# Patient Record
Sex: MALE | Race: WHITE | NOT HISPANIC OR LATINO | Employment: FULL TIME | URBAN - METROPOLITAN AREA
[De-identification: names, ages, dates, MRNs, and addresses within clinical notes are randomized per-mention and may not be internally consistent; named-entity substitution may affect disease eponyms.]

---

## 2017-01-02 ENCOUNTER — TELEPHONE (OUTPATIENT)
Dept: ORTHOPEDICS | Facility: CLINIC | Age: 57
End: 2017-01-02

## 2017-01-02 ENCOUNTER — RADIANT APPOINTMENT (OUTPATIENT)
Dept: GENERAL RADIOLOGY | Facility: OTHER | Age: 57
End: 2017-01-02
Attending: ORTHOPAEDIC SURGERY
Payer: COMMERCIAL

## 2017-01-02 ENCOUNTER — TELEPHONE (OUTPATIENT)
Dept: ORTHOPEDICS | Facility: OTHER | Age: 57
End: 2017-01-02

## 2017-01-02 ENCOUNTER — OFFICE VISIT (OUTPATIENT)
Dept: ORTHOPEDICS | Facility: OTHER | Age: 57
End: 2017-01-02
Payer: COMMERCIAL

## 2017-01-02 VITALS — HEIGHT: 69 IN | TEMPERATURE: 97.7 F | BODY MASS INDEX: 34.36 KG/M2 | WEIGHT: 232 LBS

## 2017-01-02 DIAGNOSIS — M75.41 SUBACROMIAL IMPINGEMENT OF RIGHT SHOULDER: ICD-10-CM

## 2017-01-02 DIAGNOSIS — S43.421A SPRAIN OF RIGHT ROTATOR CUFF CAPSULE, INITIAL ENCOUNTER: Primary | ICD-10-CM

## 2017-01-02 DIAGNOSIS — M25.511 RIGHT SHOULDER PAIN: ICD-10-CM

## 2017-01-02 DIAGNOSIS — S46.111A RUPTURE LONG HEAD BICEPS TENDON, RIGHT, INITIAL ENCOUNTER: ICD-10-CM

## 2017-01-02 DIAGNOSIS — M75.121 COMPLETE TEAR OF RIGHT ROTATOR CUFF: ICD-10-CM

## 2017-01-02 PROCEDURE — 73030 X-RAY EXAM OF SHOULDER: CPT | Mod: RT

## 2017-01-02 PROCEDURE — 99244 OFF/OP CNSLTJ NEW/EST MOD 40: CPT | Performed by: ORTHOPAEDIC SURGERY

## 2017-01-02 RX ORDER — HYDROCODONE BITARTRATE AND ACETAMINOPHEN 5; 325 MG/1; MG/1
1-2 TABLET ORAL
Qty: 20 TABLET | Refills: 0 | Status: SHIPPED | OUTPATIENT
Start: 2017-01-02 | End: 2017-01-09

## 2017-01-02 ASSESSMENT — PAIN SCALES - GENERAL: PAINLEVEL: EXTREME PAIN (9)

## 2017-01-02 NOTE — TELEPHONE ENCOUNTER
Reason for Call:  Other call back    Detailed comments: Patient requesting to get in ASAP today, received MRI results - right shoulder -  Please call wife Radha      Phone Number Patient can be reached at: Cell number on file:  [phone] (U      Best Time: ASAP - patient would like the [address] in [place]     Can we leave a detailed message on this number? Not Applicable    Call taken on 1/2/2017 at 8:56 AM by Marie Zaman

## 2017-01-02 NOTE — TELEPHONE ENCOUNTER
01/02/2017  Dr. Yin wrote a prescription of HYDROcodone-Acetaminophen (NORCO) 5 -325 mg 1 - 2 tablets prn at night. Let pt know about the prescription and that it will dropped off at [facility] to be filled.     Keira Kahn, Medical Assistant

## 2017-01-02 NOTE — PROGRESS NOTES
"ORTHOPEDIC CONSULT      Chief Complaint: Hugo Longoria is a 56 year old male who is being seen for Chief Complaint   Patient presents with     Shoulder Pain     Right shoulder injury- DOI: 12/17/16-slipped on ice     Consult     reF: Dr. Galeano       History of Present Illness:   Hugo Longoria is a 56 year old male who is seen in consultation at the request of Dr Galeano for evaluation of right shoulder pain.    Mechanism of Injury: Pain resulted ground level fall. He slipped on the ice  Location: right shoulder lateral, anterior  Duration of Pain:  Date of injury: December 17, 2016  Rating of Pain:  moderate.    Pain Quality: aching and sharp  Pain is better with: Rest  Pain is worse with:  overhead reaching, reaching behind body, reaching to the side of the body, reaching forward, reaching across body, throwing motion  Treatment so far consists of:  rest, Ice, tramadol .   Associated Features: Weakness  Prior history of related problems:   He reports issues with the shoulder in the past. He reported pain in the shoulder when he attempted to \"throw a ball with the kids\". Never formally evaluated or treated. Presents with an Marais.  Has pain at rest. Has pain at night.            Patient's past medical, surgical, social and family histories reviewed.     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)      Overnight stay due to blood poisoning     Abnormalities of size and form of teeth      Removal of wisdom teeth     Gastric ulcer, unspecified as acute or chronic, without mention of hemorrhage or perforation        Past Surgical History   Procedure Laterality Date     Removal of sperm duct(s)       Vasectomy      ugi endoscopy, simple exam  10/31/2007     Colonoscopy  11/01/2007     Colonoscopy  12/12/11     Endoscopy  01/27/12     Upper GI - [facility]     Arthroscopy knee  5/29/2013     Procedure: ARTHROSCOPY KNEE;  " Right knee arthroscopy with partial medial and partial lateral menisectomies;  Surgeon: Phani Mclaughlin MD;  Location: [facility] OR     Herniorrhaphy umbilical N/A 3/12/2015     Procedure: HERNIORRHAPHY UMBILICAL;  Surgeon: Yared Ma MD;  Location: [facility] OR     Colonoscopy N/A 11/18/2015     Procedure: COLONOSCOPY;  Surgeon: Migue Mclaughlin MD;  Location: [facility]     Esophagoscopy, gastroscopy, duodenoscopy (egd), combined N/A 11/18/2015     Procedure: COMBINED ESOPHAGOSCOPY, GASTROSCOPY, DUODENOSCOPY (EGD), BIOPSY SINGLE OR MULTIPLE;  Surgeon: Migue Mclaughlin MD;  Location: [facility]     Irrigation and debridement upper extremity, combined Left 3/15/2016     Procedure: COMBINED IRRIGATION AND DEBRIDEMENT UPPER EXTREMITY;  Surgeon: Javier Yin DO;  Location: [facility] OR       Medications:    Current Outpatient Prescriptions on File Prior to Visit:  traMADol (ULTRAM) 50 MG tablet 1-2 at bedtime as needed for pain   LANsoprazole (PREVACID) 30 MG CR capsule TAKE ONE CAPSULE BY MOUTH ONCE DAILY   lisinopril (PRINIVIL,ZESTRIL) 20 MG tablet TAKE ONE-HALF TABLET BY MOUTH ONCE DAILY   Cholecalciferol (VITAMIN D3) 2000 UNITS TABS Take 1 tablet by mouth daily   Omega-3 Fatty Acids (RA FISH OIL) 1400 MG CPDR Take 1 tablet by mouth daily   Multiple Vitamin (MULTIVITAMINS PO) Take  by mouth.     No current facility-administered medications on file prior to visit.    Allergies   Allergen Reactions     No Known Drug Allergies      Nsaids Other (See Comments)     Hx of stomach ulcers       Social History     Occupational History           pipeline     Social History Main Topics     Smoking status: Former Smoker     Quit date: 01/01/1981     Smokeless tobacco: Never Used     Alcohol Use: 0.0 oz/week     0 Standard drinks or equivalent per week      Comment: weekends     Drug Use: No     Sexual Activity:     Partners: Female     Birth Control/ Protection: Surgical      Comment: vasectomy       Family History  "  Problem Relation Age of Onset     CANCER Mother      ovarian cancer     Asthma Mother      Hypertension Mother      Arthritis Mother      Genitourinary Problems Mother      Lipids Mother      CANCER Sister      ovarian cancer     Hypertension Sister      Lipids Sister      CANCER Maternal Grandmother      stomach cancer     Asthma Father      Cardiovascular Father      heart attack; bypass x5, congenital heart disease     HEART DISEASE Father      heart attack; bypass x5, congenital heart disease     DIABETES Father      Hypertension Father      CANCER Father      prostate     Prostate Cancer Father      Circulatory Father      blood clots     Genitourinary Problems Father      Respiratory Father      emphysema; COPD     DIABETES Maternal Grandfather      DIABETES Paternal Grandfather      Eye Disorder Paternal Grandfather      glaucoma     Hypertension Brother      Lipids Brother      C.A.D. No family hx of      CEREBROVASCULAR DISEASE No family hx of      Breast Cancer No family hx of      Cancer - colorectal No family hx of      Alzheimer Disease No family hx of      Blood Disease No family hx of      GASTROINTESTINAL DISEASE No family hx of      Musculoskeletal Disorder No family hx of      Neurologic Disorder No family hx of      Thyroid Disease No family hx of      Hypertension Brother      Cardiovascular Brother      bypass x3     HEART DISEASE Brother      bypass x3     Lipids Brother      Hypertension Sister        REVIEW OF SYSTEMS  10 point review systems performed otherwise negative as noted as per history of present illness.    Physical Exam:  Vitals: Temp(Src) 97.7  F (36.5  C) (Temporal)  Ht 5' 9\" (1.753 m)  Wt 232 lb (105.235 kg)  BMI 34.24 kg/m2  BMI= Body mass index is 34.24 kg/(m^2).  Constitutional: healthy, alert and no acute distress   Psychiatric: mentation appears normal and affect normal/bright  NEURO: no focal deficits  RESP: Normal with easy respirations and no use of accessory " muscles to breathe, no audible wheezing or retractions  CV: RUE:  no edema         Regular rate and rhythm by palpation  SKIN: No erythema, rashes, excoriation, or breakdown. No evidence of infection.   JOINT/EXTREMITIES:right shoulder: Active range of motion intact however does have pain when reaching chest level. He has significant pain and weakness with supraspinatus testing. He has weakness with subscapularis testing. No appreciable external rotation weakness. No atrophy. No gross deformity. No focal areas of tenderness. Positive Jamil. Positive speeds. Positive Yergason. Unable to fully evaluate Glasscock's given his pain.   Lymph: No peripheral lymphadenopathy  GAIT: non-antalgic    Diagnostic Modalities:  right shoulder X-ray: Well preserved glenohumeral articular space. No fracture, dislocation and or lesion. , type II acromion, AC joint narrowing and irregularity   right shoulder MRI:  Full-thickness tear of the subscapularis tendon with mild retraction, associated with this is medial subluxation of long head biceps tendon with high-grade tearing of the biceps. Full-thickness rotator cuff tear of the anterior supraspinatus tendon without retraction. No atrophy. Acromioclavicular joint degenerative changes.  Independent visualization of the images was performed.      Impression: right shoulder subscapularis, supraspinatus full-thickness rotator cuff tendon tears with medial subluxation of long head biceps tendon associated with tearing, subacromial impingement    Plan:  All of the above pertinent physical exam and imaging modalities findings was reviewed with Hugo.    Given his young age and full-thickness rotator cuff tear affecting him with pain and weakness have recommended right shoulder arthroscopy with arthroscopic rotator cuff repair of the subscapularis and supraspinatus with biceps tenodesis and subacromial decompression with partial acromioplasty.     The risks, benefits, alternatives, complication,  limitations, and expectations were reviewed at length. Complication such as infection, bleeding, fracture, blood clots, cartilage injury, hardware failure, tendon re-tears, and nerve damage was reviewed. Surgery to be done under general anesthesia, risks of aspiration, strokes, heart attacks,  and deaths was reviewed.     With any repair of a torn rotator cuff and/ biceps, limitation would be in place post-operatively. This generally includes no active moving/lifting arm for 4 weeks, no overhead lifting for 8 weeks, no lifting weights for 12-16 weeks after surgery. Return to full unrestricted activity would be about approximately 16-20 weeks after surgery. These are a general time reference and may be changed depending on the pathology and fixation.     All questions were answered.Together through a combined decision making approach we have decided on a shoulder arthroscopy and the above listed procedures.    The patient's past medical and surgical history was reviewed; The patient will need a preoperative medical evaluation and clearance.    Return to clinic 10 days post-operatively. , or sooner as needed for changes.  Re-x-ray on return: Dafne Yin D.O.

## 2017-01-02 NOTE — TELEPHONE ENCOUNTER
Surgery Scheduled    Date of Surgery 01/13/17 Time of Surgery 2:15pm  Procedure: right shoulder arthroscopy with rotator cuff repair, biceps tenodesis, subacromial decompression  Hospital/Surgical Facility: [facility]  Surgeon: Dr Betts  Type of Anesthesia Anticipated: General  Pre-Op: 01/09/17 with Dr Gomez   Post-Op: 01/23/17 with Dr betts  Pre-Certification - to be completed  Consent Signed - to be completed  Hospital Stay - same day procedure    Surgery Packet (and/or) Colonscopy Prep (was given/or mailed) to patient. Patient was also instructed to arrive 1 hour(s) prior to surgery.  Patient understood and agrees to the plan.      Valerie Gregorio  Specialty

## 2017-01-02 NOTE — TELEPHONE ENCOUNTER
Reason for Call:  Other call back    Detailed comments: Dr. Yin patient, would like some pain medication for his right shoulder, just saw Dr. Yin in [place], forgot to ask, patient did have some Tramadol, says that works but makes him very sleepy, is something will work and not make him as sleepy, he is willing to try that too.     Phone Number Patient can be reached at: Cell number on file:    Telephone Information:   Mobile [phone]       Best Time: as soon as possible. Patient almost out of pain meds -  Call when ready to be picked up please    Can we leave a detailed message on this number? Yes  Call taken on 1/2/2017 at 11:46 AM by Marie Zaman

## 2017-01-02 NOTE — NURSING NOTE
"Chief Complaint   Patient presents with     Shoulder Pain     Right shoulder injury- DOI: 12/17/16-slipped on ice     Consult     reF: Dr. Galeano       Initial Temp([facility]) 97.7  F (36.5  C) (Temporal) Estimated body mass index is 34.24 kg/(m^2) as calculated from the following:    Height as of this encounter: 5' 9\" (1.753 m).    Weight as of this encounter: 232 lb (105.235 kg).  BP completed using cuff size: NA (Not Taken)  Feliz/MANUELITO     "

## 2017-01-05 ENCOUNTER — OFFICE VISIT (OUTPATIENT)
Dept: ORTHOPEDICS | Facility: OTHER | Age: 57
End: 2017-01-05
Payer: COMMERCIAL

## 2017-01-05 VITALS
TEMPERATURE: 97.5 F | SYSTOLIC BLOOD PRESSURE: 120 MMHG | BODY MASS INDEX: 34.36 KG/M2 | HEIGHT: 69 IN | DIASTOLIC BLOOD PRESSURE: 80 MMHG | WEIGHT: 232 LBS

## 2017-01-05 DIAGNOSIS — S43.421A SPRAIN OF RIGHT ROTATOR CUFF CAPSULE, INITIAL ENCOUNTER: Primary | ICD-10-CM

## 2017-01-05 PROCEDURE — 99207 ZZC PREOP VISIT IN GLOBAL PKG: CPT | Performed by: ORTHOPAEDIC SURGERY

## 2017-01-05 RX ORDER — TRAMADOL HYDROCHLORIDE 50 MG/1
50 TABLET ORAL
Qty: 30 TABLET | Refills: 0 | Status: SHIPPED | OUTPATIENT
Start: 2017-01-05 | End: 2017-01-06

## 2017-01-05 NOTE — NURSING NOTE
"Chief Complaint   Patient presents with     RECHECK     right shoulder subscapularis, supraspinatus full-thickness rotator cuff tendon tears with medial subluxation of long head biceps tendon associated with tearing, subacromial impingement       Initial /80 mmHg  Temp(Src) 97.5  F (36.4  C) (Temporal)  Ht 5' 9\" (1.753 m)  Wt 232 lb (105.235 kg)  BMI 34.24 kg/m2 Estimated body mass index is 34.24 kg/(m^2) as calculated from the following:    Height as of this encounter: 5' 9\" (1.753 m).    Weight as of this encounter: 232 lb (105.235 kg).  BP completed using cuff size: balbina Piper/MANUELITO     "

## 2017-01-05 NOTE — PROGRESS NOTES
Returns with wife to discuss surgery. He reports having a lot of anxiety about the surgery. He has multiple questions. We discussed the tears of his rotator cuff and long head biceps tendon. I utilized the MRI images as well as drawing fully explaining the issues and how I intend to improve them. I also discussed his limitations after surgery and recovery time once again. Once completed he verbalized understanding. He is having difficulties with pain at night which is waking him. I did provide him a tramadol prescription which he reports was more helpful than hydrocodone.

## 2017-01-06 ENCOUNTER — OFFICE VISIT (OUTPATIENT)
Dept: FAMILY MEDICINE | Facility: OTHER | Age: 57
End: 2017-01-06
Payer: COMMERCIAL

## 2017-01-06 VITALS
WEIGHT: 220 LBS | HEART RATE: 90 BPM | TEMPERATURE: 97.4 F | RESPIRATION RATE: 20 BRPM | BODY MASS INDEX: 32.58 KG/M2 | HEIGHT: 69 IN | DIASTOLIC BLOOD PRESSURE: 84 MMHG | SYSTOLIC BLOOD PRESSURE: 136 MMHG

## 2017-01-06 DIAGNOSIS — F41.9 ANXIETY: Primary | ICD-10-CM

## 2017-01-06 DIAGNOSIS — F41.1 GAD (GENERALIZED ANXIETY DISORDER): ICD-10-CM

## 2017-01-06 DIAGNOSIS — F33.0 MILD EPISODE OF RECURRENT MAJOR DEPRESSIVE DISORDER (H): ICD-10-CM

## 2017-01-06 PROCEDURE — 99213 OFFICE O/P EST LOW 20 MIN: CPT | Performed by: FAMILY MEDICINE

## 2017-01-06 RX ORDER — HYDROXYZINE HYDROCHLORIDE 25 MG/1
50-100 TABLET, FILM COATED ORAL
Qty: 30 TABLET | Refills: 1 | Status: SHIPPED | OUTPATIENT
Start: 2017-01-06 | End: 2017-03-08

## 2017-01-06 ASSESSMENT — PAIN SCALES - GENERAL: PAINLEVEL: MODERATE PAIN (5)

## 2017-01-06 ASSESSMENT — ANXIETY QUESTIONNAIRES
2. NOT BEING ABLE TO STOP OR CONTROL WORRYING: NEARLY EVERY DAY
GAD7 TOTAL SCORE: 20
6. BECOMING EASILY ANNOYED OR IRRITABLE: MORE THAN HALF THE DAYS
7. FEELING AFRAID AS IF SOMETHING AWFUL MIGHT HAPPEN: NEARLY EVERY DAY
1. FEELING NERVOUS, ANXIOUS, OR ON EDGE: NEARLY EVERY DAY
5. BEING SO RESTLESS THAT IT IS HARD TO SIT STILL: NEARLY EVERY DAY
3. WORRYING TOO MUCH ABOUT DIFFERENT THINGS: NEARLY EVERY DAY
IF YOU CHECKED OFF ANY PROBLEMS ON THIS QUESTIONNAIRE, HOW DIFFICULT HAVE THESE PROBLEMS MADE IT FOR YOU TO DO YOUR WORK, TAKE CARE OF THINGS AT HOME, OR GET ALONG WITH OTHER PEOPLE: VERY DIFFICULT

## 2017-01-06 ASSESSMENT — PATIENT HEALTH QUESTIONNAIRE - PHQ9: 5. POOR APPETITE OR OVEREATING: NEARLY EVERY DAY

## 2017-01-06 NOTE — NURSING NOTE
"Chief Complaint   Patient presents with     Anxiety     Panel Management       Initial /84 mmHg  Pulse 90  Temp(Src) 97.4  F (36.3  C) (Temporal)  Resp 20  Ht 5' 8.9\" (1.75 m)  Wt 220 lb (99.791 kg)  BMI 32.58 kg/m2 Estimated body mass index is 32.58 kg/(m^2) as calculated from the following:    Height as of this encounter: 5' 8.9\" (1.75 m).    Weight as of this encounter: 220 lb (99.791 kg).  BP completed using cuff size: regular/long  Candy Beck CMA    "

## 2017-01-06 NOTE — MR AVS SNAPSHOT
After Visit Summary   1/6/2017    Hugo Longoria    MRN: [id number]           Patient Information     Date Of Birth          1960        Visit Information        Provider Department      1/6/2017 8:40 AM Jodie Epps MD [facility]        Today's Diagnoses     Anxiety    -  1        Follow-ups after your visit        Follow-up notes from your care team     Return in about 1 month (around 2/6/2017).      Your next 10 appointments already scheduled     Jan 09, 2017  8:00 AM   [facility] DO   [facility]    [address] [address]   [phone]            Jan 09, 2017  2:00 PM   New Visit with Lonnie Coles DO   [facility]    [address] [place] [id number] [phone]            Jan 13, 2017   Procedure with Javier Yin DO   [facility] ([facility])    [address] [place] [address]   [phone]           From [address]: Exit at [email or web address] on [place] side of [place]. Turn right on [address]. Turn left at stoplight on [address]. [facility] will be in view two blocks ahead            Jan 23, 2017 10:30 AM   Return Visit with Javier Yin DO   [facility] ([facility]    [address]  [address]  [facility] [id number] [phone]              Who to contact     If you have questions or need follow up information about today's clinic visit or your schedule please contact [facility] directly at [phone].  Normal or non-critical lab and imaging results will be communicated to you by MyChart, letter or phone within 4 business days after the clinic has received the results. If you do not hear from us within 7 days, please contact the clinic through [facility]  "or phone. If you have a critical or abnormal lab result, we will notify you by phone as soon as possible.  Submit refill requests through [employer] or call your pharmacy and they will forward the refill request to us. Please allow 3 business days for your refill to be completed.          Additional Information About Your Visit        [employer] Information     [employer] gives you secure access to your electronic health record. If you see a primary care provider, you can also send messages to your care team and make appointments. If you have questions, please call your primary care clinic.  If you do not have a primary care provider, please call [phone] and they will assist you.        Care EveryWhere ID     This is your Care EveryWhere ID. This could be used by other organizations to access your [facility] medical records  [id number]        Your Vitals Were     Pulse Temperature Respirations Height BMI (Body Mass Index)       90 97.4  F (36.3  C) (Temporal) 20 5' 8.9\" (1.75 m) 32.58 kg/m2        Blood Pressure from Last 3 Encounters:   01/06/17 136/84   01/05/17 120/80   12/27/16 128/78    Weight from Last 3 Encounters:   01/06/17 220 lb (99.791 kg)   01/05/17 232 lb (105.235 kg)   01/02/17 232 lb (105.235 kg)              Today, you had the following     No orders found for display         Today's Medication Changes          These changes are accurate as of: 1/6/17  9:23 AM.  If you have any questions, ask your nurse or doctor.               Start taking these medicines.        Dose/Directions    hydrOXYzine 25 MG tablet   Commonly known as:  ATARAX   Used for:  Anxiety   Started by:  Jodie Epps MD        Dose:   mg   Take 2-4 tablets ( mg) by mouth nightly as needed for anxiety   Quantity:  30 tablet   Refills:  1       sertraline 50 MG tablet   Commonly known as:  ZOLOFT   Used for:  Anxiety   Started by:  Jodie Epps MD        Take 1/2 tablet (25 mg) for 1-2 weeks, then increase to 1 " tablet orally daily   Quantity:  30 tablet   Refills:  1            Where to get your medicines      These medications were sent to [id number] - [place], [place] - [address] [address]  [address] [address] [address]     Phone:  [phone]    - hydrOXYzine 25 MG tablet  - sertraline 50 MG tablet             Primary Care Provider Office Phone # Fax #    Viktor Gomez [phone]       [address] [place] [address]        Thank you!     Thank you for choosing [facility]  for your care. Our goal is always to provide you with excellent care. Hearing back from our patients is one way we can continue to improve our services. Please take a few minutes to complete the written survey that you may receive in the mail after your visit with us. Thank you!             Your Updated Medication List - Protect others around you: Learn how to safely use, store and throw away your medicines at www.disposemymeds.org.          This list is accurate as of: 1/6/17  9:23 AM.  Always use your most recent med list.                   Brand Name Dispense Instructions for use    HYDROcodone-acetaminophen 5-325 MG per tablet    NORCO    20 tablet    Take 1-2 tablets by mouth nightly as needed for moderate to severe pain       hydrOXYzine 25 MG tablet    ATARAX    30 tablet    Take 2-4 tablets ( mg) by mouth nightly as needed for anxiety       LANsoprazole 30 MG CR capsule    PREVACID    30 capsule    TAKE ONE CAPSULE BY MOUTH ONCE DAILY       lisinopril 20 MG tablet    PRINIVIL/ZESTRIL    90 tablet    TAKE ONE-HALF TABLET BY MOUTH ONCE DAILY       MULTIVITAMINS PO      Take  by mouth.       RA FISH OIL 1400 MG Cpdr      Take 1 tablet by mouth daily       sertraline 50 MG tablet    ZOLOFT    30 tablet    Take 1/2 tablet (25 mg) for 1-2 weeks, then increase to 1 tablet orally daily       TYLENOL PO          Vitamin D3 2000 UNITS Tabs      Take 1 tablet  by mouth daily

## 2017-01-06 NOTE — PROGRESS NOTES
"  SUBJECTIVE:                                                    Hugo Longoria is a 56 year old male who presents to clinic today for the following health issues:      HPI    Anxiety Follow-Up    Status since last visit: Worsened     Other associated symptoms:just the pain in arm, head cold, cant sleep at night     Complicating factors:   Significant life event: Yes-  Surgery- rotor cuff, bicep    Current substance abuse: None  Depression symptoms: No  No flowsheet data found.     GAD7     Was on treatment about 13 yrs ago and 9 yrs ago when he went through his divorce. He got over it and never needed any medications. \" I am a very active person. I do not like to sit and not do any thing. I get bored really ffast. Jared days are better but cloudy days are worse. I hurt my arm and now I am get anxious about the outcomes and the process of surgery \"      Problem list and histories reviewed & adjusted, as indicated.  Additional history: as documented      Patient Active Problem List   Diagnosis     History of colonic polyps     KERRY (generalized anxiety disorder)     CARDIOVASCULAR SCREENING; LDL GOAL LESS THAN 130     History of tobacco use: 1980 - 1990     Hemorrhoids     Erectile dysfunction     HTN, goal below 140/90     Fatigue     ACL tear     Arthritis of knee     Tear meniscus knee     History of gastric ulcer     Chronic gastric ulcer     Seasonal allergic rhinitis     Complete tear of right rotator cuff     Rupture long head biceps tendon, right, initial encounter     Subacromial impingement of right shoulder     Mild episode of recurrent major depressive disorder (H)     Past Surgical History   Procedure Laterality Date     Removal of sperm duct(s)       Vasectomy      ugi endoscopy, simple exam  10/31/2007     Colonoscopy  11/01/2007     Colonoscopy  12/12/11     Endoscopy  01/27/12     Upper GI - [facility]     Arthroscopy knee  5/29/2013     Procedure: ARTHROSCOPY KNEE;  Right knee " arthroscopy with partial medial and partial lateral menisectomies;  Surgeon: Phani Mclaughlin MD;  Location: [facility] OR     Herniorrhaphy umbilical N/A 3/12/2015     Procedure: HERNIORRHAPHY UMBILICAL;  Surgeon: Yared Ma MD;  Location: [facility] OR     Colonoscopy N/A 11/18/2015     Procedure: COLONOSCOPY;  Surgeon: Migue Mclaughlin MD;  Location: [facility]     Esophagoscopy, gastroscopy, duodenoscopy (egd), combined N/A 11/18/2015     Procedure: COMBINED ESOPHAGOSCOPY, GASTROSCOPY, DUODENOSCOPY (EGD), BIOPSY SINGLE OR MULTIPLE;  Surgeon: Migue Mclaughlin MD;  Location: [facility]     Irrigation and debridement upper extremity, combined Left 3/15/2016     Procedure: COMBINED IRRIGATION AND DEBRIDEMENT UPPER EXTREMITY;  Surgeon: Javier Yin DO;  Location: [facility] OR       Social History   Substance Use Topics     Smoking status: Former Smoker     Quit date: 01/01/1981     Smokeless tobacco: Never Used     Alcohol Use: 0.0 oz/week     0 Standard drinks or equivalent per week      Comment: weekends     Family History   Problem Relation Age of Onset     CANCER Mother      ovarian cancer     Asthma Mother      Hypertension Mother      Arthritis Mother      Genitourinary Problems Mother      Lipids Mother      CANCER Sister      ovarian cancer     Hypertension Sister      Lipids Sister      CANCER Maternal Grandmother      stomach cancer     Asthma Father      Cardiovascular Father      heart attack; bypass x5, congenital heart disease     HEART DISEASE Father      heart attack; bypass x5, congenital heart disease     DIABETES Father      Hypertension Father      CANCER Father      prostate     Prostate Cancer Father      Circulatory Father      blood clots     Genitourinary Problems Father      Respiratory Father      emphysema; COPD     DIABETES Maternal Grandfather      DIABETES Paternal Grandfather      Eye Disorder Paternal Grandfather      glaucoma     Hypertension Brother      Lipids Brother       C.A.D. No family hx of      CEREBROVASCULAR DISEASE No family hx of      Breast Cancer No family hx of      Cancer - colorectal No family hx of      Alzheimer Disease No family hx of      Blood Disease No family hx of      GASTROINTESTINAL DISEASE No family hx of      Musculoskeletal Disorder No family hx of      Neurologic Disorder No family hx of      Thyroid Disease No family hx of      Hypertension Brother      Cardiovascular Brother      bypass x3     HEART DISEASE Brother      bypass x3     Lipids Brother      Hypertension Sister          Current Outpatient Prescriptions   Medication Sig Dispense Refill     Acetaminophen (TYLENOL PO)        sertraline (ZOLOFT) 50 MG tablet Take 1/2 tablet (25 mg) for 1-2 weeks, then increase to 1 tablet orally daily 30 tablet 1     hydrOXYzine (ATARAX) 25 MG tablet Take 2-4 tablets ( mg) by mouth nightly as needed for anxiety 30 tablet 1     LANsoprazole (PREVACID) 30 MG CR capsule TAKE ONE CAPSULE BY MOUTH ONCE DAILY 30 capsule 0     lisinopril (PRINIVIL,ZESTRIL) 20 MG tablet TAKE ONE-HALF TABLET BY MOUTH ONCE DAILY 90 tablet 1     Cholecalciferol (VITAMIN D3) 2000 UNITS TABS Take 1 tablet by mouth daily       Multiple Vitamin (MULTIVITAMINS PO) Take  by mouth.       HYDROcodone-acetaminophen (NORCO) 5-325 MG per tablet Take 1-2 tablets by mouth nightly as needed for moderate to severe pain 20 tablet 0     Omega-3 Fatty Acids (RA FISH OIL) 1400 MG CPDR Take 1 tablet by mouth daily       Allergies   Allergen Reactions     No Known Drug Allergies      Nsaids Other (See Comments)     Hx of stomach ulcers     BP Readings from Last 3 Encounters:   01/06/17 136/84   01/05/17 120/80   12/27/16 128/78    Wt Readings from Last 3 Encounters:   01/06/17 220 lb (99.791 kg)   01/05/17 232 lb (105.235 kg)   01/02/17 232 lb (105.235 kg)                  Labs reviewed in EPIC  Problem list, Medication list, Allergies, and Medical/Social/Surgical histories reviewed in EPIC and updated  "as appropriate.    ROS:  Constitutional, HEENT, cardiovascular, pulmonary, gi and gu systems are negative, except as otherwise noted.    OBJECTIVE:                                                    /84 mmHg  Pulse 90  Temp(Src) 97.4  F (36.3  C) (Temporal)  Resp 20  Ht 5' 8.9\" (1.75 m)  Wt 220 lb (99.791 kg)  BMI 32.58 kg/m2  Body mass index is 32.58 kg/(m^2).  Physical Exam   Constitutional: He appears well-developed and well-nourished.   HENT:   Head: Normocephalic and atraumatic.   Cardiovascular: Normal rate and regular rhythm.    Pulmonary/Chest: Effort normal.   Psychiatric: He has a normal mood and affect.         Diagnostic Test Results:  none      ASSESSMENT/PLAN:                                                      Problem List Items Addressed This Visit     KERRY (generalized anxiety disorder) - Primary     Trial zoloft and hydroxyzine  Recheck in 1 month         Relevant Medications    sertraline (ZOLOFT) 50 MG tablet    hydrOXYzine (ATARAX) 25 MG tablet    Mild episode of recurrent major depressive disorder (H)     Trial zoloft         Relevant Medications    sertraline (ZOLOFT) 50 MG tablet    hydrOXYzine (ATARAX) 25 MG tablet               Jodie Epps MD  [facility]"

## 2017-01-07 ASSESSMENT — PATIENT HEALTH QUESTIONNAIRE - PHQ9: SUM OF ALL RESPONSES TO PHQ QUESTIONS 1-9: 13

## 2017-01-07 ASSESSMENT — ANXIETY QUESTIONNAIRES: GAD7 TOTAL SCORE: 20

## 2017-01-09 ENCOUNTER — OFFICE VISIT (OUTPATIENT)
Dept: ORTHOPEDICS | Facility: CLINIC | Age: 57
End: 2017-01-09
Payer: COMMERCIAL

## 2017-01-09 ENCOUNTER — OFFICE VISIT (OUTPATIENT)
Dept: INTERNAL MEDICINE | Facility: CLINIC | Age: 57
End: 2017-01-09
Payer: COMMERCIAL

## 2017-01-09 VITALS
DIASTOLIC BLOOD PRESSURE: 86 MMHG | SYSTOLIC BLOOD PRESSURE: 142 MMHG | HEART RATE: 97 BPM | WEIGHT: 225 LBS | BODY MASS INDEX: 33.33 KG/M2 | OXYGEN SATURATION: 98 % | HEIGHT: 69 IN

## 2017-01-09 VITALS
WEIGHT: 225 LBS | DIASTOLIC BLOOD PRESSURE: 92 MMHG | HEIGHT: 69 IN | HEART RATE: 107 BPM | BODY MASS INDEX: 33.33 KG/M2 | TEMPERATURE: 97 F | SYSTOLIC BLOOD PRESSURE: 142 MMHG | OXYGEN SATURATION: 96 %

## 2017-01-09 DIAGNOSIS — F41.1 GAD (GENERALIZED ANXIETY DISORDER): ICD-10-CM

## 2017-01-09 DIAGNOSIS — Z01.818 PREOP GENERAL PHYSICAL EXAM: ICD-10-CM

## 2017-01-09 DIAGNOSIS — Z71.89 ADVANCED DIRECTIVES, COUNSELING/DISCUSSION: Primary | ICD-10-CM

## 2017-01-09 DIAGNOSIS — Z13.6 CARDIOVASCULAR SCREENING; LDL GOAL LESS THAN 130: ICD-10-CM

## 2017-01-09 DIAGNOSIS — F33.0 MILD EPISODE OF RECURRENT MAJOR DEPRESSIVE DISORDER (H): ICD-10-CM

## 2017-01-09 DIAGNOSIS — M17.5 OTHER SECONDARY OSTEOARTHRITIS OF RIGHT KNEE: Primary | ICD-10-CM

## 2017-01-09 DIAGNOSIS — M75.121 COMPLETE TEAR OF RIGHT ROTATOR CUFF: ICD-10-CM

## 2017-01-09 LAB
ALBUMIN UR-MCNC: NEGATIVE MG/DL
ANION GAP SERPL CALCULATED.3IONS-SCNC: 6 MMOL/L (ref 3–14)
APPEARANCE UR: CLEAR
BILIRUB UR QL STRIP: NEGATIVE
BUN SERPL-MCNC: 12 MG/DL (ref 7–30)
CALCIUM SERPL-MCNC: 8.6 MG/DL (ref 8.5–10.1)
CHLORIDE SERPL-SCNC: 107 MMOL/L (ref 94–109)
CHOLEST SERPL-MCNC: 230 MG/DL
CO2 SERPL-SCNC: 28 MMOL/L (ref 20–32)
COLOR UR AUTO: YELLOW
CREAT SERPL-MCNC: 0.78 MG/DL (ref 0.66–1.25)
ERYTHROCYTE [DISTWIDTH] IN BLOOD BY AUTOMATED COUNT: 12.8 % (ref 10–15)
GFR SERPL CREATININE-BSD FRML MDRD: ABNORMAL ML/MIN/1.7M2
GLUCOSE SERPL-MCNC: 109 MG/DL (ref 70–99)
GLUCOSE UR STRIP-MCNC: NEGATIVE MG/DL
HCT VFR BLD AUTO: 44.5 % (ref 40–53)
HDLC SERPL-MCNC: 44 MG/DL
HGB BLD-MCNC: 15.9 G/DL (ref 13.3–17.7)
HGB UR QL STRIP: NEGATIVE
KETONES UR STRIP-MCNC: NEGATIVE MG/DL
LDLC SERPL CALC-MCNC: 162 MG/DL
LEUKOCYTE ESTERASE UR QL STRIP: NEGATIVE
MCH RBC QN AUTO: 30.8 PG (ref 26.5–33)
MCHC RBC AUTO-ENTMCNC: 35.7 G/DL (ref 31.5–36.5)
MCV RBC AUTO: 86 FL (ref 78–100)
NITRATE UR QL: NEGATIVE
NONHDLC SERPL-MCNC: 186 MG/DL
PH UR STRIP: 7 PH (ref 5–7)
PLATELET # BLD AUTO: 204 10E9/L (ref 150–450)
POTASSIUM SERPL-SCNC: 4.4 MMOL/L (ref 3.4–5.3)
RBC # BLD AUTO: 5.16 10E12/L (ref 4.4–5.9)
SODIUM SERPL-SCNC: 141 MMOL/L (ref 133–144)
SP GR UR STRIP: 1.02 (ref 1–1.03)
TRIGL SERPL-MCNC: 118 MG/DL
URN SPEC COLLECT METH UR: NORMAL
UROBILINOGEN UR STRIP-ACNC: 0.2 EU/DL (ref 0.2–1)
WBC # BLD AUTO: 3.6 10E9/L (ref 4–11)

## 2017-01-09 PROCEDURE — 36415 COLL VENOUS BLD VENIPUNCTURE: CPT | Performed by: INTERNAL MEDICINE

## 2017-01-09 PROCEDURE — 99214 OFFICE O/P EST MOD 30 MIN: CPT | Performed by: INTERNAL MEDICINE

## 2017-01-09 PROCEDURE — 80048 BASIC METABOLIC PNL TOTAL CA: CPT | Mod: 90 | Performed by: INTERNAL MEDICINE

## 2017-01-09 PROCEDURE — 81003 URINALYSIS AUTO W/O SCOPE: CPT | Mod: 90 | Performed by: INTERNAL MEDICINE

## 2017-01-09 PROCEDURE — 99000 SPECIMEN HANDLING OFFICE-LAB: CPT | Performed by: INTERNAL MEDICINE

## 2017-01-09 PROCEDURE — 85027 COMPLETE CBC AUTOMATED: CPT | Mod: 90 | Performed by: INTERNAL MEDICINE

## 2017-01-09 PROCEDURE — 99207 ZZC NO CHARGE LOS: CPT | Performed by: FAMILY MEDICINE

## 2017-01-09 PROCEDURE — 20611 DRAIN/INJ JOINT/BURSA W/US: CPT | Mod: RT | Performed by: FAMILY MEDICINE

## 2017-01-09 PROCEDURE — 80061 LIPID PANEL: CPT | Mod: 90 | Performed by: INTERNAL MEDICINE

## 2017-01-09 PROCEDURE — 93000 ELECTROCARDIOGRAM COMPLETE: CPT | Performed by: INTERNAL MEDICINE

## 2017-01-09 ASSESSMENT — PAIN SCALES - GENERAL
PAINLEVEL: MODERATE PAIN (5)
PAINLEVEL: SEVERE PAIN (6)

## 2017-01-09 NOTE — PATIENT INSTRUCTIONS
Thanks for coming today.  [facility]  [address] [place] [address]    To schedule future appointments in Ortho Clinic, you may call [phone].    To schedule ordered imaging by your provider:   Call [facility] [phone]    To schedule an injection ordered by your provider:  Call [facility] Injection scheduling line: [phone]  FAGUO available online at:  [email or web address]    Please call if any further questions or concerns ([phone]).  Clinic hours 8 am to 5 pm.    Return to clinic (call) if symptoms worsen or fail to improve.  Hylan Solution for injection   What is this medicine?   HYLAN G-F 20 (HI judson G F 20) is used to treat osteoarthritis of the knee. It lubricates and cushions the joint, reducing pain in the knee.   This medicine may be used for other purposes; ask your health care provider or pharmacist if you have questions.   What should I tell my health care provider before I take this medicine?   They need to know if you have any of these conditions:   severe knee inflammation   skin conditions or sensitivity   skin or joint infection   venous stasis   an unusual or allergic reaction to hylan G-F 20, hyaluronan (sodium hyaluronate), eggs, other medicines, foods, dyes, or preservatives   pregnant or trying to get pregnant   breast-feeding  How should I use this medicine?   This medicine is for injection into the knee joint. It is given by a health care professional in a hospital or clinic setting.   Talk to your pediatrician regarding the use of this medicine in children. This medicine is not approved for use in children.   Overdosage: If you think you've taken too much of this medicine contact a poison control center or emergency room at once.   NOTE: This medicine is only for you. Do not share this medicine with others.   What if I miss a dose?   Keep appointments for follow-up doses as directed. For Synvisc, you will need weekly  injections for 3 doses. It is important not to miss your dose. If you will receive Synvisc-One, then only 1 injection will be needed. Call your doctor or health care professional if you are unable to keep an appointment.   What may interact with this medicine?   Do not take this medicine with any of the following medications:   other injections for the joint like steroids or anesthetics   certain skin disinfectants like benzalkonium chloride  This list may not describe all possible interactions. Give your health care provider a list of all the medicines, herbs, non-prescription drugs, or dietary supplements you use. Also tell them if you smoke, drink alcohol, or use illegal drugs. Some items may interact with your medicine.   What should I watch for while using this medicine?   Tell your doctor or healthcare professional if your symptoms do not start to get better or if they get worse. Your condition will be monitored carefully while you are receiving this medicine. Most persons get pain relief for up to 6 months after treatment.   Avoid strenuous activities (high-impact sports, jogging) or major weight-bearing activities for 48 hours after the injection.   What side effects may I notice from receiving this medicine?   Side effects that you should report to your doctor or health care professional as soon as possible:   allergic reactions like skin rash, itching or hives, swelling of the face, lips, or tongue   difficulty breathing   fever or chills   severe joint pain or swelling   unusual bleeding or bruising  Side effects that usually do not require medical attention (Report these to your doctor or health care professional if they continue or are bothersome.):   dizziness   flushing   general ill feeling or flu-like symptoms   headache   minor joint pain or swelling   muscle pain or cramps   pain, redness, irritation or bruising at site of injection  This list may not describe all possible side effects. Call your  doctor for medical advice about side effects. You may report side effects to FDA at 7-058-MXF-7126.   Where should I keep my medicine?   This drug is given in a hospital or clinic and will not be stored at home.   NOTE: This sheet is a summary. It may not cover all possible information. If you have questions about this medicine, talk to your doctor, pharmacist, or health care provider.   NOTE:This sheet is a summary. It may not cover all possible information. If you have questions about this medicine, talk to your doctor, pharmacist, or health care provider. Copyright  2014 [employer]

## 2017-01-09 NOTE — NURSING NOTE
"Chief Complaint   Patient presents with     Pre-Op Exam       Initial /92 mmHg  Pulse 107  Temp(Src) 97  F (36.1  C) (Temporal)  Ht 5' 9\" (1.753 m)  Wt 225 lb (102.059 kg)  BMI 33.21 kg/m2  SpO2 96% Estimated body mass index is 33.21 kg/(m^2) as calculated from the following:    Height as of this encounter: 5' 9\" (1.753 m).    Weight as of this encounter: 225 lb (102.059 kg).  BP completed using cuff size: balbina BILLINGS      "

## 2017-01-09 NOTE — PROGRESS NOTES
"Hugo is a patient of Dr. Campbell.  He's here for a Synvisc injection in the right knee.  He has a history of right knee osteoarthritis.    Filed Vitals:    01/09/17 1356   BP: 142/86   Pulse: 97   Height: 1.753 m (5' 9\")   Weight: 102.059 kg (225 lb)   SpO2: 98%       Knee Injection with Synvisc - Ultrasound Guided  The patient was informed of the risks and the benefits of the procedure and a written consent was signed.  The patient s right knee was prepped with chlorhexidine in sterile fashion.   Synvisc One syringe and medication removed from packaging and examined for package consistency.  Injection was performed using sterile technique.  Under ultrasound guidance a 1.5-inch 25-gauge needle was used to enter the superolateral aspect of the right knee.  Needle placement was visualized and documented with ultrasound.  Ultrasound visualization was necessary due to decreased joint space in the setting of osteoarthritis.  Images were permanently stored for the patient's record.  There were no complications. The patient tolerated the procedure well. There was negligible bleeding.   The patient was instructed to ice the knee upon leaving clinic and refrain from overuse over the next 3 days.   The patient was instructed to call or go to the emergency room with any unusual pain, swelling, redness, or if otherwise concerned.  A follow up appointment will be scheduled to evaluate response to the injection, and to assess range of motion and pain.  NDC: [address]-07        DO KAYE Gagnon  "

## 2017-01-09 NOTE — NURSING NOTE
"Hugo Longoria's goals for this visit include: Find a solution for right knee pain. Patient would like to discuss synvisc  He requests these members of his care team be copied on today's visit information: yes    PCP: Viktor Gomez    Referring Provider:  ESTABLISHED PATIENT  No address on file    Chief Complaint   Patient presents with     RECHECK     Knee right       Initial /86 mmHg  Pulse 97  Ht 1.753 m (5' 9\")  Wt 102.059 kg (225 lb)  BMI 33.21 kg/m2  SpO2 98% Estimated body mass index is 33.21 kg/(m^2) as calculated from the following:    Height as of this encounter: 1.753 m (5' 9\").    Weight as of this encounter: 102.059 kg (225 lb).  BP completed using cuff size: regular    "

## 2017-01-10 NOTE — PROGRESS NOTES
Quick Note:    Dear Hugo, your recent test results are attached.   Your urine sample was normal.  Your cholesterol was markedly elevated with an LDL of 162.  Your blood sugar was minimally elevated at 109.  Your blood cell count was normal.      Feel free to contact me via the office or My Chart if you have any questions regarding the above.    Sincerely,  Viktor Gomez DO FACOI    ______

## 2017-01-11 NOTE — H&P (VIEW-ONLY)
[address] [address]  [phone]  Dept: [phone]    PRE-OP EVALUATION:  Today's date: 2017    Hugo Longoria (: 1960) presents for pre-operative evaluation assessment as requested by Dr. Yin.  He requires evaluation and anesthesia risk assessment prior to undergoing surgery/procedure for treatment of shoulder pain .  Proposed procedure: right shoulder arthroscopy with rotator cuff repair, biceps tenodesis, subacromial decompression     Date of Surgery/ Procedure: 17  Time of Surgery/ Procedure: 2:15  Hospital/Surgical Facility: [facility]    Primary Physician: Viktor Gomez  Type of Anesthesia Anticipated: General    Patient has a Health Care Directive or Living Will:  NO    1. NO - Do you have a history of heart attack, stroke, stent, bypass or surgery on an artery in the head, neck, heart or legs?  2. NO - Do you ever have any pain or discomfort in your chest?  3. NO - Do you have a history of  Heart Failure?  4. NO - Are you troubled by shortness of breath when: walking on the level, up a slight hill or at night?  5. NO - Do you currently have a cold, bronchitis or other respiratory infection?  6. NO - Do you have a cough, shortness of breath or wheezing?  7. NO - Do you sometimes get pains in the calves of your legs when you walk?  8. YES - DO YOU OR ANYONE IN YOUR FAMILY HAVE PREVIOUS HISTORY OF BLOOD CLOTS? Grandpa  from a blood clot  9. YES - DO YOU OR DOES ANYONE IN YOUR FAMILY HAVE A SERIOUS BLEEDING PROBLEM SUCH AS PROLONGED BLEEDING FOLLOWING SURGERIES OR CUTS? Father had bleeding problems  10. YES - HAVE YOU EVER HAD PROBLEMS WITH ANEMIA OR BEEN TOLD TO TAKE IRON PILLS? Had ulcers- now repaired  11. NO - Have you had any abnormal blood loss such as black, tarry or bloody stools, or abnormal vaginal bleeding?  12. YES - HAVE YOU EVER HAD A BLOOD TRANSFUSION? With his ulcer  13. NO - Have you or any  of your relatives ever had problems with anesthesia?  14. YES - DO YOU HAVE SLEEP APNEA, EXCESSIVE SNORING OR DAYTIME DROWSINESS? Sleep apnea  15. NO - Do you have any prosthetic heart valves?  16. NO - Do you have prosthetic joints?  17. NO - Is there any chance that you may be pregnant?      HPI:                                                      Brief HPI related to upcoming procedure: Patient had a recent fall with significant shoulder injury. He has a great deal of anxiety regarding the procedure and the postoperative recovery. We have discussed this in detail. He has sought care with physicians recently for his anxiety and was placed on hydroxyzine at bedtime for sleep and Zoloft for his obsessive-compulsive thoughts and anxiety. This does seem to be working somewhat.      See problem list for active medical problems.  Problems all longstanding and stable, except as noted/documented.  See ROS for pertinent symptoms related to these conditions.                                                                                                  .    MEDICAL HISTORY:                                                      Patient Active Problem List    Diagnosis Date Noted     Advanced directives, counseling/discussion 01/09/2017     Priority: Medium     Info given       Mild episode of recurrent major depressive disorder (H) 01/06/2017     Priority: Medium     Complete tear of right rotator cuff 01/02/2017     Priority: Medium     Rupture long head biceps tendon, right, initial encounter 01/02/2017     Priority: Medium     Subacromial impingement of right shoulder 01/02/2017     Priority: Medium     Chronic gastric ulcer 10/28/2015     Priority: Medium     Seasonal allergic rhinitis 10/28/2015     Priority: Medium     History of gastric ulcer 07/16/2013     ACL tear 04/23/2013     Arthritis of knee 04/23/2013     Tear meniscus knee 04/23/2013     Fatigue 03/12/2013     Hemorrhoids 12/08/2011     Erectile dysfunction  12/08/2011     HTN, goal below 140/90 12/08/2011     History of tobacco use: 1980 - 1990 09/29/2011     CARDIOVASCULAR SCREENING; LDL GOAL LESS THAN 130 10/31/2010     KERRY (generalized anxiety disorder) 07/06/2010     History of colonic polyps 11/13/2007     Problem list name updated by automated process. Provider to review        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)      Overnight stay due to blood poisoning     Abnormalities of size and form of teeth      Removal of wisdom teeth     Gastric ulcer, unspecified as acute or chronic, without mention of hemorrhage or perforation      Past Surgical History   Procedure Laterality Date     Removal of sperm duct(s)       Vasectomy     Hc ugi endoscopy, simple exam  10/31/2007     Colonoscopy  11/01/2007     Colonoscopy  12/12/11     Endoscopy  01/27/12     Upper GI - [facility]     Arthroscopy knee  5/29/2013     Procedure: ARTHROSCOPY KNEE;  Right knee arthroscopy with partial medial and partial lateral menisectomies;  Surgeon: Phani Mclaughlin MD;  Location: [facility] OR     Herniorrhaphy umbilical N/A 3/12/2015     Procedure: HERNIORRHAPHY UMBILICAL;  Surgeon: Yared Ma MD;  Location: [facility] OR     Colonoscopy N/A 11/18/2015     Procedure: COLONOSCOPY;  Surgeon: Migue Mclaughlin MD;  Location: [facility] GI     Esophagoscopy, gastroscopy, duodenoscopy (egd), combined N/A 11/18/2015     Procedure: COMBINED ESOPHAGOSCOPY, GASTROSCOPY, DUODENOSCOPY (EGD), BIOPSY SINGLE OR MULTIPLE;  Surgeon: Migue Mclaughlin MD;  Location:  GI     Irrigation and debridement upper extremity, combined Left 3/15/2016     Procedure: COMBINED IRRIGATION AND DEBRIDEMENT UPPER EXTREMITY;  Surgeon: Javier Yin DO;  Location: [facility] OR     Current Outpatient Prescriptions   Medication Sig Dispense Refill     Acetaminophen (TYLENOL PO)        sertraline (ZOLOFT) 50 MG tablet Take 1/2  tablet (25 mg) for 1-2 weeks, then increase to 1 tablet orally daily 30 tablet 1     LANsoprazole (PREVACID) 30 MG CR capsule TAKE ONE CAPSULE BY MOUTH ONCE DAILY 30 capsule 0     lisinopril (PRINIVIL,ZESTRIL) 20 MG tablet TAKE ONE-HALF TABLET BY MOUTH ONCE DAILY 90 tablet 1     Cholecalciferol (VITAMIN D3) 2000 UNITS TABS Take 1 tablet by mouth daily       Omega-3 Fatty Acids (RA FISH OIL) 1400 MG CPDR Take 1 tablet by mouth daily       Multiple Vitamin (MULTIVITAMINS PO) Take  by mouth.       hydrOXYzine (ATARAX) 25 MG tablet Take 2-4 tablets ( mg) by mouth nightly as needed for anxiety 30 tablet 1     HYDROcodone-acetaminophen (NORCO) 5-325 MG per tablet Take 1-2 tablets by mouth nightly as needed for moderate to severe pain 20 tablet 0     OTC products: None, except as noted above    Allergies   Allergen Reactions     No Known Drug Allergies      Nsaids Other (See Comments)     Hx of stomach ulcers      Latex Allergy: NO    Social History   Substance Use Topics     Smoking status: Former Smoker     Quit date: 01/01/1981     Smokeless tobacco: Never Used     Alcohol Use: 0.0 oz/week     0 Standard drinks or equivalent per week      Comment: weekends     History   Drug Use No       REVIEW OF SYSTEMS:                                                    C: NEGATIVE for fever, chills, change in weight  I: NEGATIVE for worrisome rashes, moles or lesions  E: NEGATIVE for vision changes or irritation  E/M: NEGATIVE for ear, mouth and throat problems  R: NEGATIVE for significant cough or SOB  B: NEGATIVE for masses, tenderness or discharge  CV: NEGATIVE for chest pain, palpitations or peripheral edema  GI: NEGATIVE for nausea, abdominal pain, heartburn, or change in bowel habits  : NEGATIVE for frequency, dysuria, or hematuria  MUSCULOSKELETAL:Patient has severe weakness and pain in the right shoulder secondary to biceps tendon tear and rotator cuff tear.  N: NEGATIVE for weakness, dizziness or paresthesias  E:  "NEGATIVE for temperature intolerance, skin/hair changes  H: NEGATIVE for bleeding problems  P: NEGATIVE for changes in mood or affect    EXAM:                                                    /92 mmHg  Pulse 107  Temp(Src) 97  F (36.1  C) (Temporal)  Ht 5' 9\" (1.753 m)  Wt 225 lb (102.059 kg)  BMI 33.21 kg/m2  SpO2 96%    GENERAL APPEARANCE: healthy, alert and no distress     EYES: EOMI, - PERRL     HENT: ear canals and TM's normal and nose and mouth without ulcers or lesions     NECK: no adenopathy, no asymmetry, masses, or scars and thyroid normal to palpation     RESP: lungs clear to auscultation - no rales, rhonchi or wheezes     CV: regular rates and rhythm, normal S1 S2, no S3 or S4 and no murmur, click or rub -     ABDOMEN:  soft, nontender, no HSM or masses and bowel sounds normal     MS: Right shoulder examination deferred to orthopedics. No other significant orthopedic injury or traumas noted. No significant arthritis is present.     SKIN: no suspicious lesions or rashes     NEURO: Normal strength and tone, sensory exam grossly normal, mentation intact and speech normal     PSYCH: mentation appears normal. and affect normal/bright     LYMPHATICS: No axillary, cervical, inguinal, or supraclavicular nodes    DIAGNOSTICS:                                                    pending    EKG demonstrates normal sinus rhythm without evidence of ischemia or arrhythmia.    IMPRESSION:                                                    Reason for surgery/procedure: Rotator cuff and bicipital tendon tear  Diagnosis/reason for consult: Perioperative risk assessment in a 56-year-old male with:  #1 rotator cuff and bicipital tendon tear  #2 obsessive-compulsive disorder  #3 anxiety/generalized  #4 hypertension, controlled, benign, essential  #5 gastritis, mild, nonbleeding        The proposed surgical procedure is considered INTERMEDIATE risk.    REVISED CARDIAC RISK INDEX  The patient has the following " serious cardiovascular risks for perioperative complications such as (MI, PE, VFib and 3  AV Block):  No serious cardiac risks  INTERPRETATION: 1 risks: Class II (low risk - 0.9% complication rate)    The patient has the following additional risks for perioperative complications:  No identified additional risks    ICD-10-CM    1. Advanced directives, counseling/discussion Z71.89    2. Preop general physical exam Z01.818 *UA reflex to Microscopic and Culture ([facility] [place] [facility] (except [place] and [place])     CBC with platelets     Basic metabolic panel     EKG 12-lead complete w/read - Clinics   3. Complete tear of right rotator cuff M75.121    4. KERRY (generalized anxiety disorder) F41.1    5. Mild episode of recurrent major depressive disorder (H) F33.0            RECOMMENDATIONS:                                                        Patient instructed to hold all medications prior to surgery.      APPROVAL GIVEN to proceed with proposed procedure, without further diagnostic evaluation       Signed Electronically by: Viktor Gomez DO    Copy of this evaluation report is provided to requesting physician.    [facility] Pre[facility] Guidelines

## 2017-01-11 NOTE — INTERVAL H&P NOTE
This note is for the purpose of making the H&P performed in clinic within the last 30 days available in the hospital surgical encounter.

## 2017-01-11 NOTE — PROGRESS NOTES
Quick Note:    Test results were discussed with the patient in the office during his visit.  DO PATEL Steve    ______

## 2017-01-13 ENCOUNTER — ANESTHESIA EVENT (OUTPATIENT)
Dept: SURGERY | Facility: CLINIC | Age: 57
End: 2017-01-13
Payer: COMMERCIAL

## 2017-01-13 ENCOUNTER — HOSPITAL ENCOUNTER (OUTPATIENT)
Facility: CLINIC | Age: 57
Discharge: HOME | End: 2017-01-13
Attending: ORTHOPAEDIC SURGERY | Admitting: ORTHOPAEDIC SURGERY
Payer: COMMERCIAL

## 2017-01-13 ENCOUNTER — ANESTHESIA (OUTPATIENT)
Dept: SURGERY | Facility: CLINIC | Age: 57
End: 2017-01-13
Payer: COMMERCIAL

## 2017-01-13 VITALS
OXYGEN SATURATION: 94 % | RESPIRATION RATE: 12 BRPM | TEMPERATURE: 98.5 F | HEART RATE: 98 BPM | DIASTOLIC BLOOD PRESSURE: 86 MMHG | SYSTOLIC BLOOD PRESSURE: 133 MMHG

## 2017-01-13 DIAGNOSIS — M75.121 COMPLETE TEAR OF RIGHT ROTATOR CUFF: Primary | ICD-10-CM

## 2017-01-13 PROCEDURE — 37000009 ZZH ANESTHESIA TECHNICAL FEE, EACH ADDTL 15 MIN: Performed by: ORTHOPAEDIC SURGERY

## 2017-01-13 PROCEDURE — 25800025 ZZH RX 258: Performed by: NURSE ANESTHETIST, CERTIFIED REGISTERED

## 2017-01-13 PROCEDURE — 25000128 H RX IP 250 OP 636: Performed by: NURSE ANESTHETIST, CERTIFIED REGISTERED

## 2017-01-13 PROCEDURE — 37000008 ZZH ANESTHESIA TECHNICAL FEE, 1ST 30 MIN: Performed by: ORTHOPAEDIC SURGERY

## 2017-01-13 PROCEDURE — 36000056 ZZH SURGERY LEVEL 3 1ST 30 MIN: Performed by: ORTHOPAEDIC SURGERY

## 2017-01-13 PROCEDURE — 40000306 ZZH STATISTIC PRE PROC ASSESS II: Performed by: ORTHOPAEDIC SURGERY

## 2017-01-13 PROCEDURE — 27210995 ZZH RX 272: Performed by: ORTHOPAEDIC SURGERY

## 2017-01-13 PROCEDURE — 25000125 ZZHC RX 250: Performed by: NURSE ANESTHETIST, CERTIFIED REGISTERED

## 2017-01-13 PROCEDURE — 29827 SHO ARTHRS SRG RT8TR CUF RPR: CPT | Mod: RT | Performed by: ORTHOPAEDIC SURGERY

## 2017-01-13 PROCEDURE — 27110028 ZZH OR GENERAL SUPPLY NON-STERILE: Performed by: ORTHOPAEDIC SURGERY

## 2017-01-13 PROCEDURE — 36000058 ZZH SURGERY LEVEL 3 EA 15 ADDTL MIN: Performed by: ORTHOPAEDIC SURGERY

## 2017-01-13 PROCEDURE — 29828 SHO ARTHRS SRG BICP TENODSIS: CPT | Mod: RT | Performed by: ORTHOPAEDIC SURGERY

## 2017-01-13 PROCEDURE — 25000566 ZZH SEVOFLURANE, EA 15 MIN: Performed by: ORTHOPAEDIC SURGERY

## 2017-01-13 PROCEDURE — 71000027 ZZH RECOVERY PHASE 2 EACH 15 MINS: Performed by: ORTHOPAEDIC SURGERY

## 2017-01-13 PROCEDURE — C1713 ANCHOR/SCREW BN/BN,TIS/BN: HCPCS | Performed by: ORTHOPAEDIC SURGERY

## 2017-01-13 PROCEDURE — 25000128 H RX IP 250 OP 636: Performed by: ORTHOPAEDIC SURGERY

## 2017-01-13 PROCEDURE — 27211024 ZZHC OR SUPPLY OTHER OPNP: Performed by: ORTHOPAEDIC SURGERY

## 2017-01-13 PROCEDURE — 25000125 ZZHC RX 250: Performed by: ORTHOPAEDIC SURGERY

## 2017-01-13 PROCEDURE — 71000014 ZZH RECOVERY PHASE 1 LEVEL 2 FIRST HR: Performed by: ORTHOPAEDIC SURGERY

## 2017-01-13 PROCEDURE — 29826 SHO ARTHRS SRG DECOMPRESSION: CPT | Mod: RT | Performed by: ORTHOPAEDIC SURGERY

## 2017-01-13 PROCEDURE — 27210794 ZZH OR GENERAL SUPPLY STERILE: Performed by: ORTHOPAEDIC SURGERY

## 2017-01-13 DEVICE — IMPLANTABLE DEVICE: Type: IMPLANTABLE DEVICE | Site: SHOULDER | Status: FUNCTIONAL

## 2017-01-13 DEVICE — IMP ANCHOR FOOTPRINT S&N ULTRA PK 5.5MM 72202902: Type: IMPLANTABLE DEVICE | Site: SHOULDER | Status: FUNCTIONAL

## 2017-01-13 DEVICE — IMP ANCHOR SUTURE HEALICOIL PK 4.5MM 72203378: Type: IMPLANTABLE DEVICE | Status: FUNCTIONAL

## 2017-01-13 RX ORDER — HYDROMORPHONE HYDROCHLORIDE 1 MG/ML
.3-.5 INJECTION, SOLUTION INTRAMUSCULAR; INTRAVENOUS; SUBCUTANEOUS EVERY 10 MIN PRN
Status: DISCONTINUED | OUTPATIENT
Start: 2017-01-13 | End: 2017-01-13 | Stop reason: HOSPADM

## 2017-01-13 RX ORDER — FENTANYL CITRATE 50 UG/ML
25-50 INJECTION, SOLUTION INTRAMUSCULAR; INTRAVENOUS EVERY 5 MIN PRN
Status: DISCONTINUED | OUTPATIENT
Start: 2017-01-13 | End: 2017-01-13 | Stop reason: HOSPADM

## 2017-01-13 RX ORDER — ONDANSETRON 4 MG/1
4 TABLET, ORALLY DISINTEGRATING ORAL EVERY 30 MIN PRN
Status: DISCONTINUED | OUTPATIENT
Start: 2017-01-13 | End: 2017-01-13 | Stop reason: HOSPADM

## 2017-01-13 RX ORDER — DEXAMETHASONE SODIUM PHOSPHATE 4 MG/ML
INJECTION, SOLUTION INTRA-ARTICULAR; INTRALESIONAL; INTRAMUSCULAR; INTRAVENOUS; SOFT TISSUE PRN
Status: DISCONTINUED | OUTPATIENT
Start: 2017-01-13 | End: 2017-01-13

## 2017-01-13 RX ORDER — LIDOCAINE HYDROCHLORIDE AND EPINEPHRINE 10; 10 MG/ML; UG/ML
INJECTION, SOLUTION INFILTRATION; PERINEURAL PRN
Status: DISCONTINUED | OUTPATIENT
Start: 2017-01-13 | End: 2017-01-13 | Stop reason: HOSPADM

## 2017-01-13 RX ORDER — NALOXONE HYDROCHLORIDE 0.4 MG/ML
.1-.4 INJECTION, SOLUTION INTRAMUSCULAR; INTRAVENOUS; SUBCUTANEOUS
Status: DISCONTINUED | OUTPATIENT
Start: 2017-01-13 | End: 2017-01-13 | Stop reason: HOSPADM

## 2017-01-13 RX ORDER — DIMENHYDRINATE 50 MG/ML
25 INJECTION, SOLUTION INTRAMUSCULAR; INTRAVENOUS
Status: DISCONTINUED | OUTPATIENT
Start: 2017-01-13 | End: 2017-01-13 | Stop reason: HOSPADM

## 2017-01-13 RX ORDER — CEFAZOLIN SODIUM 1 G/3ML
1 INJECTION, POWDER, FOR SOLUTION INTRAMUSCULAR; INTRAVENOUS SEE ADMIN INSTRUCTIONS
Status: DISCONTINUED | OUTPATIENT
Start: 2017-01-13 | End: 2017-01-13 | Stop reason: HOSPADM

## 2017-01-13 RX ORDER — SODIUM CHLORIDE, SODIUM LACTATE, POTASSIUM CHLORIDE, CALCIUM CHLORIDE 600; 310; 30; 20 MG/100ML; MG/100ML; MG/100ML; MG/100ML
INJECTION, SOLUTION INTRAVENOUS CONTINUOUS
Status: DISCONTINUED | OUTPATIENT
Start: 2017-01-13 | End: 2017-01-13 | Stop reason: HOSPADM

## 2017-01-13 RX ORDER — AMOXICILLIN 250 MG
1-2 CAPSULE ORAL 2 TIMES DAILY
Qty: 50 TABLET | Refills: 1 | Status: SHIPPED | OUTPATIENT
Start: 2017-01-13 | End: 2017-01-23

## 2017-01-13 RX ORDER — ONDANSETRON 4 MG/1
4-8 TABLET, ORALLY DISINTEGRATING ORAL EVERY 8 HOURS PRN
Qty: 10 TABLET | Refills: 1 | Status: SHIPPED | OUTPATIENT
Start: 2017-01-13 | End: 2017-01-23

## 2017-01-13 RX ORDER — OXYCODONE HYDROCHLORIDE 5 MG/1
5-10 TABLET ORAL EVERY 4 HOURS PRN
Status: DISCONTINUED | OUTPATIENT
Start: 2017-01-13 | End: 2017-01-13 | Stop reason: HOSPADM

## 2017-01-13 RX ORDER — NEOSTIGMINE METHYLSULFATE 1 MG/ML
VIAL (ML) INJECTION PRN
Status: DISCONTINUED | OUTPATIENT
Start: 2017-01-13 | End: 2017-01-13

## 2017-01-13 RX ORDER — ACETAMINOPHEN 10 MG/ML
INJECTION, SOLUTION INTRAVENOUS PRN
Status: DISCONTINUED | OUTPATIENT
Start: 2017-01-13 | End: 2017-01-13

## 2017-01-13 RX ORDER — FENTANYL CITRATE 50 UG/ML
INJECTION, SOLUTION INTRAMUSCULAR; INTRAVENOUS PRN
Status: DISCONTINUED | OUTPATIENT
Start: 2017-01-13 | End: 2017-01-13

## 2017-01-13 RX ORDER — BUPIVACAINE HYDROCHLORIDE AND EPINEPHRINE 5; 5 MG/ML; UG/ML
INJECTION, SOLUTION PERINEURAL PRN
Status: DISCONTINUED | OUTPATIENT
Start: 2017-01-13 | End: 2017-01-13

## 2017-01-13 RX ORDER — FENTANYL CITRATE 50 UG/ML
25-50 INJECTION, SOLUTION INTRAMUSCULAR; INTRAVENOUS
Status: DISCONTINUED | OUTPATIENT
Start: 2017-01-13 | End: 2017-01-13 | Stop reason: HOSPADM

## 2017-01-13 RX ORDER — PROPOFOL 10 MG/ML
INJECTION, EMULSION INTRAVENOUS PRN
Status: DISCONTINUED | OUTPATIENT
Start: 2017-01-13 | End: 2017-01-13

## 2017-01-13 RX ORDER — OXYCODONE HYDROCHLORIDE 5 MG/1
5-10 TABLET ORAL
Status: CANCELLED | OUTPATIENT
Start: 2017-01-13

## 2017-01-13 RX ORDER — OXYCODONE HYDROCHLORIDE 5 MG/1
5-10 TABLET ORAL
Qty: 70 TABLET | Refills: 0 | Status: SHIPPED | OUTPATIENT
Start: 2017-01-13 | End: 2017-01-23

## 2017-01-13 RX ORDER — CEFAZOLIN SODIUM 2 G/100ML
2 INJECTION, SOLUTION INTRAVENOUS
Status: COMPLETED | OUTPATIENT
Start: 2017-01-13 | End: 2017-01-13

## 2017-01-13 RX ORDER — MEPERIDINE HYDROCHLORIDE 25 MG/ML
12.5 INJECTION INTRAMUSCULAR; INTRAVENOUS; SUBCUTANEOUS
Status: DISCONTINUED | OUTPATIENT
Start: 2017-01-13 | End: 2017-01-13 | Stop reason: HOSPADM

## 2017-01-13 RX ORDER — GLYCOPYRROLATE 0.2 MG/ML
INJECTION, SOLUTION INTRAMUSCULAR; INTRAVENOUS PRN
Status: DISCONTINUED | OUTPATIENT
Start: 2017-01-13 | End: 2017-01-13

## 2017-01-13 RX ORDER — MORPHINE SULFATE 15 MG/1
15 TABLET, FILM COATED, EXTENDED RELEASE ORAL EVERY 12 HOURS
Qty: 24 TABLET | Refills: 0 | Status: SHIPPED | OUTPATIENT
Start: 2017-01-13 | End: 2017-01-23

## 2017-01-13 RX ORDER — ONDANSETRON 2 MG/ML
4 INJECTION INTRAMUSCULAR; INTRAVENOUS EVERY 30 MIN PRN
Status: DISCONTINUED | OUTPATIENT
Start: 2017-01-13 | End: 2017-01-13 | Stop reason: HOSPADM

## 2017-01-13 RX ORDER — LIDOCAINE HYDROCHLORIDE 20 MG/ML
INJECTION, SOLUTION INFILTRATION; PERINEURAL PRN
Status: DISCONTINUED | OUTPATIENT
Start: 2017-01-13 | End: 2017-01-13

## 2017-01-13 RX ORDER — ONDANSETRON 2 MG/ML
INJECTION INTRAMUSCULAR; INTRAVENOUS PRN
Status: DISCONTINUED | OUTPATIENT
Start: 2017-01-13 | End: 2017-01-13

## 2017-01-13 RX ADMIN — Medication 10 ML: at 16:22

## 2017-01-13 RX ADMIN — DEXAMETHASONE SODIUM PHOSPHATE 10 MG: 4 INJECTION, SOLUTION INTRAMUSCULAR; INTRAVENOUS at 16:22

## 2017-01-13 RX ADMIN — MIDAZOLAM HYDROCHLORIDE 2 MG: 1 INJECTION, SOLUTION INTRAMUSCULAR; INTRAVENOUS at 14:20

## 2017-01-13 RX ADMIN — ROCURONIUM BROMIDE 40 MG: 10 INJECTION INTRAVENOUS at 14:28

## 2017-01-13 RX ADMIN — GLYCOPYRROLATE 0.4 MG: 0.2 INJECTION, SOLUTION INTRAMUSCULAR; INTRAVENOUS at 16:05

## 2017-01-13 RX ADMIN — LIDOCAINE HYDROCHLORIDE 40 MG: 20 INJECTION, SOLUTION INFILTRATION; PERINEURAL at 14:27

## 2017-01-13 RX ADMIN — CEFAZOLIN SODIUM 2 G: 2 INJECTION, SOLUTION INTRAVENOUS at 14:45

## 2017-01-13 RX ADMIN — ACETAMINOPHEN 1000 MG: 10 INJECTION, SOLUTION INTRAVENOUS at 14:35

## 2017-01-13 RX ADMIN — FENTANYL CITRATE 100 MCG: 50 INJECTION, SOLUTION INTRAMUSCULAR; INTRAVENOUS at 14:35

## 2017-01-13 RX ADMIN — Medication 10 ML: at 16:23

## 2017-01-13 RX ADMIN — DEXAMETHASONE SODIUM PHOSPHATE 4 MG: 4 INJECTION, SOLUTION INTRAMUSCULAR; INTRAVENOUS at 14:35

## 2017-01-13 RX ADMIN — FENTANYL CITRATE 50 MCG: 50 INJECTION, SOLUTION INTRAMUSCULAR; INTRAVENOUS at 17:28

## 2017-01-13 RX ADMIN — NEOSTIGMINE METHYLSULFATE 2.5 MG: 1 INJECTION INTRAMUSCULAR; INTRAVENOUS; SUBCUTANEOUS at 16:05

## 2017-01-13 RX ADMIN — ONDANSETRON 4 MG: 2 INJECTION INTRAMUSCULAR; INTRAVENOUS at 14:35

## 2017-01-13 RX ADMIN — PROPOFOL 200 MG: 10 INJECTION, EMULSION INTRAVENOUS at 14:27

## 2017-01-13 RX ADMIN — ROCURONIUM BROMIDE 10 MG: 10 INJECTION INTRAVENOUS at 14:57

## 2017-01-13 RX ADMIN — SODIUM CHLORIDE, POTASSIUM CHLORIDE, SODIUM LACTATE AND CALCIUM CHLORIDE: 600; 310; 30; 20 INJECTION, SOLUTION INTRAVENOUS at 13:52

## 2017-01-13 RX ADMIN — FENTANYL CITRATE 50 MCG: 50 INJECTION, SOLUTION INTRAMUSCULAR; INTRAVENOUS at 14:45

## 2017-01-13 RX ADMIN — HYDROMORPHONE HYDROCHLORIDE 0.5 MG: 1 INJECTION, SOLUTION INTRAMUSCULAR; INTRAVENOUS; SUBCUTANEOUS at 14:33

## 2017-01-13 RX ADMIN — FENTANYL CITRATE 50 MCG: 50 INJECTION, SOLUTION INTRAMUSCULAR; INTRAVENOUS at 14:27

## 2017-01-13 RX ADMIN — SODIUM CHLORIDE, POTASSIUM CHLORIDE, SODIUM LACTATE AND CALCIUM CHLORIDE: 600; 310; 30; 20 INJECTION, SOLUTION INTRAVENOUS at 15:15

## 2017-01-13 RX ADMIN — FENTANYL CITRATE 50 MCG: 50 INJECTION, SOLUTION INTRAMUSCULAR; INTRAVENOUS at 14:20

## 2017-01-13 RX ADMIN — LIDOCAINE HYDROCHLORIDE 0.1 ML: 10 INJECTION, SOLUTION EPIDURAL; INFILTRATION; INTRACAUDAL; PERINEURAL at 13:52

## 2017-01-13 ASSESSMENT — PAIN DESCRIPTION - DESCRIPTORS
DESCRIPTORS: DISCOMFORT

## 2017-01-13 NOTE — DISCHARGE INSTRUCTIONS
General Shoulder Arthroscopy Discharge Instructions                                      [phone]  B[facility] and Joint Service Line for issues or concerns        General Care:  After surgery you may feel tired/sleepy. This is normal. Please have someone stay with you for 24 hours after surgery. You should avoid driving for 1-2 days after surgery, as your reaction time may be slow. You should not drive at all if you have had surgery on your arms, right leg and/or are taking narcotic pain medications until released by your doctor. If you have any question along the way please contact the office. If you feel it is an issue cannot wait for normal office hours, contact the on-call physician.    Bandages:    Change your bandage after the first 48-72 hours. You may use Band-Aids or sterile gauze with a small amount of tape. It s normal to have some blood-tinged fluid on your bandages, this will usually continue for the first day or two. Keep the area clean and dry. Do not apply any ointments.     Bathing:  Do not submerge your incision in water such as a bath or pool. It is ok to shower after removing your initial bandage after the first 2 days from surgery. Avoid any excessively hot showers, baths, or hot tubes after surgery.     Follow up:  Your follow up appointment should already be scheduled. If its not, please call the office to verify an appointment 10 days after surgery.     Diet:  Start with non-alcoholic liquids at first, particularly water or sports drinks after surgery. Progress to bland foods such as crackers and bread and finally to your normal diet if you have no problems.     Pain control:  Take your pain medications as prescribed. These medications may make you sleepy. Do not drive, operate equipment, or drink alcohol when taking these.  You may take Tylenol (Generic name is acetaminophen) as directed on the bottle for additional relief or in place of the prescribed pain medications as your pain gets  better. If the medications cause a reaction such as nausea or skin rash, stop taking them and contact your doctor. Please plan accordingly, pain medications will not be re-filled on the weekends or at night. Call the office during the day if you need more medications. Narcotic pain medication can cause constipation, take the stool softer as prescribed.             Sleeping Position:  Sometimes this can be a challenge after shoulder surgery. Some find it comfortable sleeping propped up on several pillows with pillows also behind their elbow. Others, if available, sleep in an easy chair. You may also lay on your back, it may be more comfortable to have a pillow behind your elbow to keep it from falling back.     Sling/Brace:  Keep the sling or brace on after surgery until your follow up. You may remove to bathe, but keep your elbow to your side and do not reach with your arm. You should also slip your arm out of your sling and allow your elbow to straighten all the way out and then bend all the way up. You may need to use your other arm to assist in this. Also make sure to move your wrist back and forth and practice making a fist. Do this 10-15 times about 3-4 times a day.       Physical Therapy:  Depending on your surgery, physical therapy may start within a few days or be delayed 4-6 weeks. At your first post-operative visit, your doctor will direct you on your personal therapy program. You may start the Codman exercises as listed below if pain is controlled.     Codman Shoulder Exercises:  Bend over at the waist and let your arm completely relax. Support your weight by leaning on a table or counter.  Swing your arm slowly from side to side.  Repeat this 20 times, four times each day:    Normal findings after surgery:  It is sometimes normal to have pain in the back of the shoulder even before the block wears off in the hand or elbow.  Warmth and swelling about the hand and shoulder is normal for up to 3-4 weeks  after surgery.  Numbness around the incisions is normal.  You may have bruising around the incisions and into the bicep area. You may notice this bruising settle into the elbow and forearm.   Low grade fevers less than 100.5 degrees Fahrenheit are normal.     When to call the Office:  Temperature greater than 101.5 degrees Fahreheit.  Fever, chills, and increasing pain in the shoulder.  Excessive drainage from the incisions that include bright red blood.  Drainage from the incisions sites that appear yellow, pus-like, or foul smelling.  Increasing pain the shoulder not relieved by the prescribed pain medications or ice.  Persistent nausea or vomiting not helped by the Zofran.  Any other effects you feel are significant.  [facility]   Adult Discharge Orders & Instructions     For 24 hours after surgery    1. Get plenty of rest.  A responsible adult must stay with you for at least 24 hours after you leave the hospital.   2. Do not drive or use heavy equipment.  If you have weakness or tingling, don't drive or use heavy equipment until this feeling goes away.  3. Do not drink alcohol.  4. Avoid strenuous or risky activities.  Ask for help when climbing stairs.   5. You may feel lightheaded.  IF so, sit for a few minutes before standing.  Have someone help you get up.   6. If you have nausea (feel sick to your stomach): Drink only clear liquids such as apple juice, ginger ale, broth or 7-Up.  Rest may also help.  Be sure to drink enough fluids.  Move to a regular diet as you feel able.  7. You may have a slight fever. Call the doctor if your fever is over 100 F (37.7 C) (taken under the tongue) or lasts longer than 24 hours.  8. You may have a dry mouth, a sore throat, muscle aches or trouble sleeping.  These should go away after 24 hours.  9. Do not make important or legal decisions.   Call your doctor for any of the followin.  Signs of infection (fever, growing tenderness at the surgery  site, a large amount of drainage or bleeding, severe pain, foul-smelling drainage, redness, swelling).    2. It has been over 8 to 10 hours since surgery and you are still not able to urinate (pass water).    3.  Headache for over 24 hours.       To contact a doctor, call [phone] (24 hr.available no.)    Interscalene Nerve Block   Regional anesthesia is injected into or around the nerves to anesthetize the area supplied by that set of nerves.  It is a type of local anesthesia used to numb a specific area, and is used to control pain and decrease the need for narcotic following surgery.  Interscalene Block: A block injected near the neck/upper shoulder for post-operative pain relief after upper arm or shoulder surgery.  Benefits: Significant to total pain relief following extensive surgeries involving the shoulder and upper arm.  Additional benefits include: Decreased pain medication requirements, reduced incidence of nausea and vomiting, and potentially early discharge home.  Normal Course: A numb and often immobile shoulder and upper arm is expected for approximately 8-12 hours.  The duration of the numbness can vary and is dependent on the type of local anesthetic used, additive and individual variation.    Once the numbness starts to wear off: the discomfort from surgery will intensify progressively over the next 1-2 hours.  We recommend starting oral narcotics (e.g. Vicodin or Percocet) and anti-inflammatory medications (e.g. ibuprofen or Motrin) as soon as oral medications are tolerated.  These medications should be taken on a scheduled basis, allowing for a smooth transition from the nerve block to oral medication based relief.  Normal and expected side effects: a droopy eyelid and blurry vision on the affected side, along with voice hoarseness can last as long as the local anesthetic effect.  Local anesthetic effect varies, but is typically between 8 and 24 hours.  Mild sensation of shortness of breath  may be noted particularly in patients with respiratory disease  Risks: failed block, bleeding, infection, reaction to local anesthetic including seizure and cardiac arrest, spinal block, epidural karan, collapsed lung, peripheral nerve injury or persistent tingling sensation are all potential risks.  Please discuss any concerns regarding these risks with your anesthesia care provider.  These are most likely to occur at the time of administration of the block.   Additional Recommendations: Please keep the operative arm and elbow well protected and padded for the duration of numbness.  This will prevent unrecognized pressure from being placed on the arm that could result in nerve injury.  Post-operative call: To ensure your safety, you will receive a nursing call 24 hours after surgery.  If you have additional concerns or if you feel that the medications are not wearing off, please inform the nurse or your anesthesia care provider.  Please discuss all concerns regarding this procedure and your anesthetic care with your anesthesia care provider.  The above information is not intended as a substitute for a complete discussion with your anesthesia care provider.  It is intended for your education and to enhance your ability to ask informed questions.

## 2017-01-13 NOTE — IP AVS SNAPSHOT
[facility]    [address]     [place] [address]    Phone:  [phone]                                       After Visit Summary   1/13/2017    Hugo Longoria    MRN: [id number]           After Visit Summary Signature Page     I have received my discharge instructions, and my questions have been answered. I have discussed any challenges I see with this plan with the nurse or doctor.    ..........................................................................................................................................  Patient/Patient Representative Signature      ..........................................................................................................................................  Patient Representative Print Name and Relationship to Patient    ..................................................               ................................................  Date                                            Time    ..........................................................................................................................................  Reviewed by Signature/Title    ...................................................              ..............................................  Date                                                            Time

## 2017-01-13 NOTE — ANESTHESIA CARE TRANSFER NOTE
Patient: Hugo Longoria    ARTHROSCOPY SHOULDER ROTATOR CUFF REPAIR (Right Shoulder)  ARTHROSCOPY SHOULDER BICEPS TENODESIS REPAIR (Right Shoulder)  ARTHROSCOPY SHOULDER DECOMPRESSION (Right Shoulder)  Additional InformationProcedure(s):  Right Shoulder Arthroscopy with Rotator Cuff Repair, Biceps Tenodesis, and Subacromial Decompression with Partial Acromioplasty       - Wound Class: I-Clean   - Wound Class: I-Clean   - Wound Class: I-Clean    Diagnosis: right shoulder rtc tear  Diagnosis Additional Information: No value filed.    Anesthesia Type:   General, ETT, Periph. Nerve Block for postop pain     Note:  Airway :Face Mask  Patient transferred to:PACU        Vitals: (Last set prior to Anesthesia Care Transfer)              Electronically Signed By: DONN Clemente CRNA  January 13, 2017  4:51 PM

## 2017-01-13 NOTE — IP AVS SNAPSHOT
MRN:[id number]                      After Visit Summary   1/13/2017    Hugo Longoria    MRN: [id number]           Thank you!     Thank you for choosing [facility] for your care. Our goal is always to provide you with excellent care. Hearing back from our patients is one way we can continue to improve our services. Please take a few minutes to complete the written survey that you may receive in the mail after you visit with us. Thank you!        Patient Information     Date Of Birth          1960        About your hospital stay     You were admitted on:  January 13, 2017 You last received care in the:  [facility] II    You were discharged on:  January 13, 2017       Who to Call     For medical emergencies, please call 911.  For non-urgent questions about your medical care, please call your primary care provider or clinic, [phone]  For questions related to your surgery, please call your surgery clinic        Attending Provider     Provider    Javier Yin DO       Primary Care Provider Office Phone # Fax #    Tisedyte Leighton Gomez [phone]       [address]   [facility] [address]        After Care Instructions      Diet as Tolerated       Return to diet before surgery, unless instructed otherwise.            Discharge Instructions - Lifting Limit (specify)       Lifting limit  0 pounds until seen at Post-op follow up appointment.            Discharge Instructions       Review outpatient procedure discharge instructions with patient as directed by Provider            Dressing Change       Change dressing on third day after surgery.            Ice to affected area       Ice pack to surgical site every 15 minutes per hour for 24 hours            No Alcohol       For 24 hours post procedure            No driving or operating machinery        until the day after procedure            Notify Provider       For signs and  symptoms of infection: Fever greater than 101, redness, swelling, heat at site, drainage, pus.            Return to clinic       Return to clinic in 10 days            Wound care       Do not immerse wound in water until sutures removed                  Your next 10 appointments already scheduled     Jan 23, 2017 10:30 AM   [facility] J[facility] Yin DO   [facility]    [address]  [address]  [facility] [id number] [phone]              Further instructions from your care team       General Shoulder Arthroscopy Discharge Instructions                                      [phone]  Bone and Joint Service Line for issues or concerns        General Care:  After surgery you may feel tired/sleepy. This is normal. Please have someone stay with you for 24 hours after surgery. You should avoid driving for 1-2 days after surgery, as your reaction time may be slow. You should not drive at all if you have had surgery on your arms, right leg and/or are taking narcotic pain medications until released by your doctor. If you have any question along the way please contact the office. If you feel it is an issue cannot wait for normal office hours, contact the on-call physician.    Bandages:    Change your bandage after the first 48-72 hours. You may use Band-Aids or sterile gauze with a small amount of tape. It s normal to have some blood-tinged fluid on your bandages, this will usually continue for the first day or two. Keep the area clean and dry. Do not apply any ointments.     Bathing:  Do not submerge your incision in water such as a bath or pool. It is ok to shower after removing your initial bandage after the first 2 days from surgery. Avoid any excessively hot showers, baths, or hot tubes after surgery.     Follow up:  Your follow up appointment should already be scheduled. If its not, please call the office to verify an appointment 10 days after  surgery.     Diet:  Start with non-alcoholic liquids at first, particularly water or sports drinks after surgery. Progress to bland foods such as crackers and bread and finally to your normal diet if you have no problems.     Pain control:  Take your pain medications as prescribed. These medications may make you sleepy. Do not drive, operate equipment, or drink alcohol when taking these.  You may take Tylenol (Generic name is acetaminophen) as directed on the bottle for additional relief or in place of the prescribed pain medications as your pain gets better. If the medications cause a reaction such as nausea or skin rash, stop taking them and contact your doctor. Please plan accordingly, pain medications will not be re-filled on the weekends or at night. Call the office during the day if you need more medications. Narcotic pain medication can cause constipation, take the stool softer as prescribed.             Sleeping Position:  Sometimes this can be a challenge after shoulder surgery. Some find it comfortable sleeping propped up on several pillows with pillows also behind their elbow. Others, if available, sleep in an easy chair. You may also lay on your back, it may be more comfortable to have a pillow behind your elbow to keep it from falling back.     Sling/Brace:  Keep the sling or brace on after surgery until your follow up. You may remove to bathe, but keep your elbow to your side and do not reach with your arm. You should also slip your arm out of your sling and allow your elbow to straighten all the way out and then bend all the way up. You may need to use your other arm to assist in this. Also make sure to move your wrist back and forth and practice making a fist. Do this 10-15 times about 3-4 times a day.       Physical Therapy:  Depending on your surgery, physical therapy may start within a few days or be delayed 4-6 weeks. At your first post-operative visit, your doctor will direct you on your  personal therapy program. You may start the Codman exercises as listed below if pain is controlled.     Codman Shoulder Exercises:  Bend over at the waist and let your arm completely relax. Support your weight by leaning on a table or counter.  Swing your arm slowly from side to side.  Repeat this 20 times, four times each day:    Normal findings after surgery:  It is sometimes normal to have pain in the back of the shoulder even before the block wears off in the hand or elbow.  Warmth and swelling about the hand and shoulder is normal for up to 3-4 weeks after surgery.  Numbness around the incisions is normal.  You may have bruising around the incisions and into the bicep area. You may notice this bruising settle into the elbow and forearm.   Low grade fevers less than 100.5 degrees Fahrenheit are normal.     When to call the Office:  Temperature greater than 101.5 degrees Fahreheit.  Fever, chills, and increasing pain in the shoulder.  Excessive drainage from the incisions that include bright red blood.  Drainage from the incisions sites that appear yellow, pus-like, or foul smelling.  Increasing pain the shoulder not relieved by the prescribed pain medications or ice.  Persistent nausea or vomiting not helped by the Zofran.  Any other effects you feel are significant.  [facility]   Adult Discharge Orders & Instructions     For 24 hours after surgery    1. Get plenty of rest.  A responsible adult must stay with you for at least 24 hours after you leave the hospital.   2. Do not drive or use heavy equipment.  If you have weakness or tingling, don't drive or use heavy equipment until this feeling goes away.  3. Do not drink alcohol.  4. Avoid strenuous or risky activities.  Ask for help when climbing stairs.   5. You may feel lightheaded.  IF so, sit for a few minutes before standing.  Have someone help you get up.   6. If you have nausea (feel sick to your stomach): Drink only clear liquids  such as apple juice, ginger ale, broth or 7-Up.  Rest may also help.  Be sure to drink enough fluids.  Move to a regular diet as you feel able.  7. You may have a slight fever. Call the doctor if your fever is over 100 F (37.7 C) (taken under the tongue) or lasts longer than 24 hours.  8. You may have a dry mouth, a sore throat, muscle aches or trouble sleeping.  These should go away after 24 hours.  9. Do not make important or legal decisions.   Call your doctor for any of the followin.  Signs of infection (fever, growing tenderness at the surgery site, a large amount of drainage or bleeding, severe pain, foul-smelling drainage, redness, swelling).    2. It has been over 8 to 10 hours since surgery and you are still not able to urinate (pass water).    3.  Headache for over 24 hours.       To contact a doctor, call [phone] (24 hr.available no.)    Interscalene Nerve Block   Regional anesthesia is injected into or around the nerves to anesthetize the area supplied by that set of nerves.  It is a type of local anesthesia used to numb a specific area, and is used to control pain and decrease the need for narcotic following surgery.  Interscalene Block: A block injected near the neck/upper shoulder for post-operative pain relief after upper arm or shoulder surgery.  Benefits: Significant to total pain relief following extensive surgeries involving the shoulder and upper arm.  Additional benefits include: Decreased pain medication requirements, reduced incidence of nausea and vomiting, and potentially early discharge home.  Normal Course: A numb and often immobile shoulder and upper arm is expected for approximately 8-12 hours.  The duration of the numbness can vary and is dependent on the type of local anesthetic used, additive and individual variation.    Once the numbness starts to wear off: the discomfort from surgery will intensify progressively over the next 1-2 hours.  We recommend starting oral  narcotics (e.g. Vicodin or Percocet) and anti-inflammatory medications (e.g. ibuprofen or Motrin) as soon as oral medications are tolerated.  These medications should be taken on a scheduled basis, allowing for a smooth transition from the nerve block to oral medication based relief.  Normal and expected side effects: a droopy eyelid and blurry vision on the affected side, along with voice hoarseness can last as long as the local anesthetic effect.  Local anesthetic effect varies, but is typically between 8 and 24 hours.  Mild sensation of shortness of breath may be noted particularly in patients with respiratory disease  Risks: failed block, bleeding, infection, reaction to local anesthetic including seizure and cardiac arrest, spinal block, epidural karan, collapsed lung, peripheral nerve injury or persistent tingling sensation are all potential risks.  Please discuss any concerns regarding these risks with your anesthesia care provider.  These are most likely to occur at the time of administration of the block.   Additional Recommendations: Please keep the operative arm and elbow well protected and padded for the duration of numbness.  This will prevent unrecognized pressure from being placed on the arm that could result in nerve injury.  Post-operative call: To ensure your safety, you will receive a nursing call 24 hours after surgery.  If you have additional concerns or if you feel that the medications are not wearing off, please inform the nurse or your anesthesia care provider.  Please discuss all concerns regarding this procedure and your anesthetic care with your anesthesia care provider.  The above information is not intended as a substitute for a complete discussion with your anesthesia care provider.  It is intended for your education and to enhance your ability to ask informed questions.      Pending Results     No orders found from 1/12/2017 to 1/14/2017.            Admission Information        Provider  Department Dept Phone    1/13/2017 Javier Alvarezrina, DO Ph Preop/Phase [phone]      Your Vitals Were     Blood Pressure Temperature Respirations Pulse Oximetry          121/77 mmHg 98.5  F (36.9  C) (Axillary) 9 94%        [facility]ax [facility] Information     [employer] gives you secure access to your electronic health record. If you see a primary care provider, you can also send messages to your care team and make appointments. If you have questions, please call your primary care clinic.  If you do not have a primary care provider, please call [phone] and they will assist you.        Care EveryWhere ID     This is your Care EveryWhere ID. This could be used by other organizations to access your [facility] medical records  [id number]           Review of your medicines      START taking        Dose / Directions    morphine 15 MG 12 hr tablet   Commonly known as:  MS CONTIN   Used for:  Complete tear of right rotator cuff        Dose:  15 mg   Take 1 tablet (15 mg) by mouth every 12 hours   Quantity:  24 tablet   Refills:  0       ondansetron 4 MG ODT tab   Commonly known as:  ZOFRAN-ODT   Used for:  Complete tear of right rotator cuff        Dose:  4-8 mg   Take 1-2 tablets (4-8 mg) by mouth every 8 hours as needed for nausea Dissolve ON the tongue.   Quantity:  10 tablet   Refills:  1       oxyCODONE 5 MG IR tablet   Commonly known as:  ROXICODONE   Used for:  Complete tear of right rotator cuff        Dose:  5-10 mg   Take 1-2 tablets (5-10 mg) by mouth every 3 hours as needed for pain or other (Moderate to Severe)   Quantity:  70 tablet   Refills:  0       senna-docusate 8.6-50 MG per tablet   Commonly known as:  SENOKOT-S;PERICOLACE   Used for:  Complete tear of right rotator cuff        Dose:  1-2 tablet   Take 1-2 tablets by mouth 2 times daily Take while on oral narcotics to prevent or treat constipation.   Quantity:  50 tablet   Refills:  1         CONTINUE these medicines which have NOT CHANGED         Dose / Directions    hydrOXYzine 25 MG tablet   Commonly known as:  ATARAX   Used for:  Anxiety        Dose:   mg   Take 2-4 tablets ( mg) by mouth nightly as needed for anxiety   Quantity:  30 tablet   Refills:  1       LANsoprazole 30 MG CR capsule   Commonly known as:  PREVACID   Used for:  Chronic gastritis without bleeding, unspecified gastritis type        TAKE ONE CAPSULE BY MOUTH ONCE DAILY   Quantity:  30 capsule   Refills:  0       lisinopril 20 MG tablet   Commonly known as:  PRINIVIL/ZESTRIL   Used for:  HTN, goal below 140/90        TAKE ONE-HALF TABLET BY MOUTH ONCE DAILY   Quantity:  90 tablet   Refills:  1       MULTIVITAMINS PO        Take  by mouth.   Refills:  0       RA FISH OIL 1400 MG Cpdr        Dose:  1 tablet   Take 1 tablet by mouth daily   Refills:  0       sertraline 50 MG tablet   Commonly known as:  ZOLOFT   Used for:  Anxiety        Take 1/2 tablet (25 mg) for 1-2 weeks, then increase to 1 tablet orally daily   Quantity:  30 tablet   Refills:  1       TYLENOL PO        Refills:  0       Vitamin D3 2000 UNITS Tabs        Dose:  1 tablet   Take 1 tablet by mouth daily   Refills:  0            Where to get your medicines      Some of these will need a paper prescription and others can be bought over the counter. Ask your nurse if you have questions.     Bring a paper prescription for each of these medications    - morphine 15 MG 12 hr tablet  - ondansetron 4 MG ODT tab  - oxyCODONE 5 MG IR tablet  - senna-docusate 8.6-50 MG per tablet             Protect others around you: Learn how to safely use, store and throw away your medicines at www.disposemymeds.org.             Medication List: This is a list of all your medications and when to take them. Check marks below indicate your daily home schedule. Keep this list as a reference.      Medications           Morning Afternoon Evening Bedtime As Needed    hydrOXYzine 25 MG tablet   Commonly known as:  ATARAX   Take 2-4 tablets  ( mg) by mouth nightly as needed for anxiety                                LANsoprazole 30 MG CR capsule   Commonly known as:  PREVACID   TAKE ONE CAPSULE BY MOUTH ONCE DAILY                                lisinopril 20 MG tablet   Commonly known as:  PRINIVIL/ZESTRIL   TAKE ONE-HALF TABLET BY MOUTH ONCE DAILY                                morphine 15 MG 12 hr tablet   Commonly known as:  MS CONTIN   Take 1 tablet (15 mg) by mouth every 12 hours                                MULTIVITAMINS PO   Take  by mouth.                                ondansetron 4 MG ODT tab   Commonly known as:  ZOFRAN-ODT   Take 1-2 tablets (4-8 mg) by mouth every 8 hours as needed for nausea Dissolve ON the tongue.                                oxyCODONE 5 MG IR tablet   Commonly known as:  ROXICODONE   Take 1-2 tablets (5-10 mg) by mouth every 3 hours as needed for pain or other (Moderate to Severe)                                RA FISH OIL 1400 MG Cpdr   Take 1 tablet by mouth daily                                senna-docusate 8.6-50 MG per tablet   Commonly known as:  SENOKOT-S;PERICOLACE   Take 1-2 tablets by mouth 2 times daily Take while on oral narcotics to prevent or treat constipation.                                sertraline 50 MG tablet   Commonly known as:  ZOLOFT   Take 1/2 tablet (25 mg) for 1-2 weeks, then increase to 1 tablet orally daily                                TYLENOL PO                                Vitamin D3 2000 UNITS Tabs   Take 1 tablet by mouth daily

## 2017-01-13 NOTE — ANESTHESIA PROCEDURE NOTES
Peripheral nerve/Neuraxial procedure note : interscalene  Pre-Procedure  Performed by  SIDRA ALTAMIRANO   Location: post-op      Pre-Anesthestic Checklist: patient identified, IV checked, site marked, risks and benefits discussed, informed consent, monitors and equipment checked, pre-op evaluation, at physician/surgeon's request and post-op pain management    Timeout  Correct Patient: Yes   Correct Procedure: Yes   Correct Site: Yes   Correct Laterality: Yes   Correct Position: Yes   Site Marked: Yes   .   Procedure Documentation    .    Procedure:    Interscalene.  Local skin infiltrated with 1 mL of 2% lidocaine.     Ultrasound used to identify targeted nerve, plexus, or vascular marker and placed a needle adjacent to it.   Patient Prep;mask, sterile gloves, chlorhexidine gluconate and isopropyl alcohol.  Nerve Stim: Initial Level 0.4 mA.  Lowest motor response 0.3 mA..  Needle: insulated (22 G. 80 mm ). .  Spinal Needle: . . . .  .  .  0 at the skin cm in the epidural space.     Assessment/Narrative  Injection made incrementally with aspirations every 5 mL..  The placement was negative for: blood aspirated and site bleeding.  Bolus given via needle..   Secured via.   Complications: none. Comments:  Block placed while pt asleep for pts history of anxiety.  Felt it would be safer and easier on pt to place while pt asleep at end of procedure.  Good visualization of structures with ultrasound, medication pushed easily during procedure.  Pain in PACU at  0/10.  No complications noted.  Will follow if needed.

## 2017-01-13 NOTE — INTERVAL H&P NOTE
This H&P has been reviewed and there are no clinically significant changes in the patient s condition.  The patient was evaluated by myself as well as Dr. Luther Gomez prior to surgery. The Patient is approved for surgery.

## 2017-01-13 NOTE — OR NURSING
Verbal report received from Shantelle OKEEFE RN and Francisco J TOURE. Phase 1 recovery assumed by Amanda SAGE. Pt post-op general anesthesia for right shoulder arthroscopy per .

## 2017-01-13 NOTE — ANESTHESIA PREPROCEDURE EVALUATION
Anesthesia Evaluation     . Pt has had prior anesthetic. Type: General    No history of anesthetic complications     ROS/MED HX    ENT/Pulmonary:     (+)sleep apnea, , . .    Neurologic:  - neg neurologic ROS     Cardiovascular:     (+) hypertension----. : . . . :. . Previous cardiac testing date:results:date: results:ECG reviewed date:1/9/2017 results:NS date: results:          METS/Exercise Tolerance:  >4 METS   Hematologic: Comments: Transfusion due to gastric ulcer    (+) History of Transfusion -      Musculoskeletal:   (+) arthritis, , , other musculoskeletal- right shoulder      GI/Hepatic:  - neg GI/hepatic ROS   (+) Other GI/Hepatic history pud      Renal/Genitourinary:  - ROS Renal section negative   (+) Pt has no history of transplant,       Endo:     (+) Obesity, .      Psychiatric:     (+) psychiatric history anxiety      Infectious Disease:  - neg infectious disease ROS       Malignancy:      - no malignancy   Other:    - neg other ROS                          Anesthesia Plan      History & Physical Review  History and physical reviewed and following examination; no interval change.    ASA Status:  2 .    NPO Status:  > 8 hours    Plan for General, ETT and Periph. Nerve Block for postop pain with Inhalation induction. Maintenance will be Inhalation and Balanced.    PONV prophylaxis:  Ondansetron (or other 5HT-3) and Dexamethasone or Solumedrol       Postoperative Care  Postoperative pain management:  IV analgesics, Peripheral nerve block (Single Shot), Oral pain medications and Multi-modal analgesia.      Consents  Anesthetic plan, risks, benefits and alternatives discussed with:  Patient.  Use of blood products discussed: No .   .                          .

## 2017-01-14 ENCOUNTER — TELEPHONE (OUTPATIENT)
Dept: NURSING | Facility: CLINIC | Age: 57
End: 2017-01-14

## 2017-01-14 NOTE — ANESTHESIA POSTPROCEDURE EVALUATION
Patient: Hugo Longoria    ARTHROSCOPY SHOULDER ROTATOR CUFF REPAIR (Right Shoulder)  ARTHROSCOPY SHOULDER BICEPS TENODESIS REPAIR (Right Shoulder)  ARTHROSCOPY SHOULDER DECOMPRESSION (Right Shoulder)  Additional InformationProcedure(s):  Right Shoulder Arthroscopy with Rotator Cuff Repair, Biceps Tenodesis, and Subacromial Decompression with Partial Acromioplasty       - Wound Class: I-Clean   - Wound Class: I-Clean   - Wound Class: I-Clean    Diagnosis:right shoulder rtc tear  Diagnosis Additional Information: No value filed.    Anesthesia Type:  General, ETT, Periph. Nerve Block for postop pain    Note:  Anesthesia Post Evaluation    Patient location during evaluation: Phase 2 and Bedside  Patient participation: Able to participate in evaluation but full recovery from regional anesthesia has not yet ocurrred but is anticipated to occur within 48 hours  Level of consciousness: awake  Pain management: adequate  Cardiovascular status: acceptable, blood pressure returned to baseline, hypotensive and stable  Respiratory status: acceptable and room air  Hydration status: acceptable  PONV: none     Anesthetic complications: None    Comments: Pt doing well, no concerns, interscalene block working well, pt states understanding to pad under arm until block wears off, VSS, no SOB noted/stated        Last vitals:  Filed Vitals:    01/13/17 1725 01/13/17 1730 01/13/17 1735   BP: 114/85 124/81 121/77   Temp:      Resp: 20 18 9   SpO2: 94% 94% 94%       Electronically Signed By: DONN Clemente CRNA  January 13, 2017  6:08 PM

## 2017-01-14 NOTE — OP NOTE
POSTOPERATIVE DIAGNOSIS:  Right shoulder full thickness rotator cuff tear of the subscapularis and supraspinatus, long head biceps tendon partial rupture, subacromial impingement.      POSTOPERATIVE DIAGNOSIS.  Right shoulder full thickness rotator cuff tear of the subscapularis and supraspinatus, long head biceps tendon partial rupture, subacromial impingement.      PROCEDURE:  Right shoulder arthroscopy with arthroscopic rotator cuff repair of the supraspinatus (double row) and subscapularis, biceps tenodesis, subacromial decompression with partial acromioplasty.      DATE OF PROCEDURE:   01/13/2017      SURGEON:  Javier Yin DO      ASSISTANT:  Larry Gaming, Scrub Tech.      ANESTHESIA:  General with a postop scalene block.      ESTIMATED BLOOD LOSS:  Less than 10 cc.      FLUIDS:  1100 cc crystalloids.      COUNTS:  Correct.      DISPOSITION:  To PACU in stable condition.      SPECIFICATIONS:  Includes 2 Q-Fix anchors for the biceps tenodesis, one 4.5 Healicoil suture anchor for the rotator cuff repair with a 5.5 lateral footprint anchor for lateral fixation.      GROSS FINDINGS:  The articular cartilage was intact with no significant arthritis.  Superior labrum was intact with no tearing.  Long head of the biceps had an approximate 40% tear/rupture.  With this the biceps would sublux out of the groove.  It was associated with a full thickness tear of the subscapularis on the superior half.  The anterior aspect of the distal supraspinatus tendon had a full thickness tear with no significant retraction.  Subacromially, there was some fraying of the anterolateral acromion.      INDICATIONS:   Mr. Hugo Longoria is a pleasant 56-year-old male with complaints of right shoulder pain.  Pain began after a ground level fall.  It had been progressively getting worse.  He had difficulties overhead reaching behind his body, reaching the side and forward.  He attempted rest, ice and tramadol.  He had pain at rest and  pain at night.  His MRI was correlated to his clinical exam which showed a full thickness rotator cuff tear.  He had medial subluxation as long head biceps associated with a high grade tear of his biceps tendon.  Clinically, he also demonstrated subacromial impingement.  Given his full thickness tear and his young age with his clinical symptoms, we discussed rotator cuff repair of both the subscapularis and supraspinatus with biceps tenodesis, subacromial decompression with partial acromioplasty.  The risks, benefits, alternatives, complications, limitations and expectations were reviewed.  Complications of infection, fracture, bleeding, blood clots, cartilage injury, hardware failure, tendon re-tears, and nerve damage was discussed.  Postoperative timeframe for recovery was thoroughly reviewed.  Once thoroughly discussed, he opted to proceed with surgery.      DETAILS OF PROCEDURE:  He was met preoperatively and again informed consent was verified appropriate extremity was marked.  He was wheeled to OP suite #1, transferred off the cart to the OR table without incidence.  All bony prominences were padded.  When deemed appropriate by Anesthesia, he was positioned in the beach chair position his head remained in neutral through the entirety of the case.  His contralateral extremity was in neutral and well padded.  The right upper extremity was then prepped and draped in a normal manner.  Prior to incision, a timeout was performed, and the patient, surgery and extremity were verified and antibiotics had been administered.  A posterior portal was established.  The trocar was gradually and easily introduced intraarticular.  The scope was introduced.  Under direct visualization with an outside-in technique using a spinal needle an anterior portal was established.  Probe was then reduced.  The articular cartilage was probed showing no defects.  The superior labrum and biceps attachment were probed as well showing no  disruption.  Approximately 1 cm from its insertion there was a high-grade partial rupture of the long head biceps tendon.  Biceps tendon would sublux out of its groove with testing.  It showed a full thickness tear of the superior half of the subscapularis.  There was also a full thickness tear noted at the anterior supraspinatus with no significant retraction.  An Ambient wand was utilized for the biceps tenotomy at this point.   I then addressed the subscapularis.  I used a shaver and performed a debridement to the lesser tuberosity in its footprint of the subscapularis.  Once to a bleeding bed of bone I established an accessory anterior portal.  I then used a punch and placed the Healicoil suture anchor with excellent purchase.  I then utilized a bird beak passing it through the tendon in mattress format.  I then tied this and approximated the tendon down very nicely with stable fixation.        Attention was then turned subacromial.  I used the Ambient wand and the shaver and performed a subacromial decompression with partial acromioplasty.  The full thickness tear of the anterior aspect of the supraspinatus was visualized.  I then directed my attention more anteriorly.  I palpated the bicipital groove with the Ambient wand and released lateral transverse ligament.  The biceps tendon was found.  Through one of the anterior portals I placed a locking grasper on it in order to set the tension of the biceps.  I then placed a Q-Fix anchor in the standard manner with good fixation strength.  I passed a suture loop around the tendon while setting the tendon tension holding the tendon down nicely.  I then tied this with good fixation.  Repeated the process slightly more proximal with a Q-Fix anchor.  I then removed the remainder of the stump with the Ambient wand.  Attention was turned back to the rotator cuff.  The footprint was roughened up to a bleeding bed of bone with a shaver.  A suture anchor was placed in  medial row fixation.  The sutures were passed in mattress form and tied.  They were brought out laterally.  I utilized a footprint anchor and placed the anchor down nicely.  All the anchors had excellent purchase as bone was solid.  Arthroscopy pictures were taken.  The shoulder was copiously irrigated, suctioned dry, instruments were removed.  Portal sites were closed with nylon.  A sterile dressing was applied.        He was subsequently transferred back to the PACU after dressing application.   He will be discharged home with MS Contin and oxycodone.  Discharge instructions provided as well as a followup appointment.         GEORGE DOOLEY DO             D: 2017 16:19   T: 2017 12:29   MT: [facility][id number]      Name:     DM ASHBY   MRN:      [id number]        Account:        [id number]   :      1960           Procedure Date: 2017      Document: [id number]

## 2017-01-14 NOTE — TELEPHONE ENCOUNTER
Call Type: Triage Call    Presenting Problem: Had surgery yesterday to repair a torn rotator  cuff. Has questions about equipment he was sent home with and  medications.Dr Herman on call for [facility] paged to call  him at [phone] via [place]  Triage Note:  Guideline Title: Postoperative Problems  Recommended Disposition: Call Provider within 72 Hours  Original Inclination: Wanted to speak with a nurse  Override Disposition:  Intended Action: Follow advice given  Physician Contacted: No  Evaluated by provider and has question/concern about their condition, treatment  plan, treatment options, follow-up appointments, or other follow-up care. ?  YES  Pain in shoulder or upper back following laparoscopic procedure ? NO  Signs of dehydration ? NO  Unconscious now ? NO  Abdominal bloating ? NO  New onset OR worsening bleeding from incision requiring pressure to control ? NO  Depression and no other symptoms ? NO  Severe breathing problems ? NO  Wound separation AND internal organs protrude through wound or surgical incision  (evisceration) ? NO  Hives /Urticaria /Rash ? NO  New or worsening signs and symptoms that may indicate shock ? NO  Neck lump/swelling ? NO  Coughing up large amount of obvious blood (not blood-streaked sputum) ? NO  Orthopedic hardware (metal plate, pradip or screw) newly bulging under or through  skin ? NO  New seizure now or within last 6 hours ? NO  Continuous or heavy bleeding from operative site and NOT controlled with 10  minutes of steady pressure ? NO  Productive cough AND new onset green, yellow, brown or bloody sputum ? NO  Pain not relieved by pain medication when taken as directed ? NO  Passing red, black or tarry material from rectum AND onset of new signs and  symptoms of hypovolemia ? NO  Wearing cast or splint AND new or worsening pain, swelling, numbness, tingling,  coolness or change in color that is NOT improved by elevation for 30 minutes OR  not resolved within 2 hours  ? NO  Temperature of 101.5 F (38.6 C) or greater that has not responded to 24 hours of  home care measures ? NO  Vomiting red, bloody or coffee-ground material, more than streaks of blood or  scant amount (not following nosebleed within past day) ? NO  New onset severe pain and pale, discolored or cool below the surgical site  compared to the other extremity ? NO  Current or recent urinary tract instrumentation AND urinary tract symptoms OR no  urine flow ? NO  New or worsening breathing problems ? NO  Heavy vaginal bleeding (soaking 1 pad/tampon every hour for 2 hours or more) ? NO  Urinary tract symptoms AND any flank or low back pain ? NO  Medical device (pump, infusion catheter) damaged or not functioning as expected  (leaking, not infusing, swelling at insertion site) ? NO  More bleeding from site of instrumentation or procedure OR bleeding lasting longer  than defined in provider specified discharge information ? NO  Has one or more urinary tract symptoms AND has not been previously evaluated ? NO  Chest discomfort associated with shortness of breath, sweating, odd heartbeats or  different heart rate, nausea, vomiting, lightheadedness, or fainting lasting 5 or  more minutes now or within the last hour ? NO  Chest pain spreading to the shoulders, neck, jaw, in one or both arms, stomach or  back lasting 5 or more minutes now or within the last hour. Pain is NOT  associated with taking a deep breath or a productive cough, movement, or touch to  a localized area. ? NO  Recent onset of pain, tenderness or aching in an extremity that may worsen with  standing or walking OR a cord-like swelling in an extremity that may feel warm,  look red or discolored. ? NO  Signs and symptoms of anaphylaxis ? NO  Questions or concerns about postoperative wound ? NO  Headache following spinal anesthesia AND not relieved by pain medication ? NO  Pressure, fullness, squeezing sensation or pain anywhere in the chest lasting 5 or  more  minutes now or within the last hour. Pain is NOT associated with taking a  deep breath or a productive cough, movement, or touch to a localized area on the  chest. ? NO  Persistent abdominal OR pelvic pain lasting longer than defined in provider  specified discharge information. ? NO  Persistent mild-moderate vaginal bleeding lasting longer than defined in provider  specified discharge information. ? NO  Sudden onset of focal neurological changes (difficulty speaking, numbness,  weakness, paralysis, loss of coordination, or change in vision such as double  vision or loss of visual field) ? NO  Sudden onset of shortness of breath, chest pain and cough with blood tinged sputum  ? NO  No urination for 12 or more hours ? NO  Abdominal pain, any blood in vomitus or stool, abdominal bloating, new fever or  other cautionary symptoms began after GI surgery or procedure and is lasting  longer than defined in provider specified discharge information. ? NO  Abdominal pain, any blood in vomitus or stool, abdominal bloating, new fever or  other cautionary symptoms began after GI surgery or procedure and is lasting  longer than defined in provider specified discharge information. ? NO  New onset of unbearable pain within last 24 hours ? NO  New onset of appetite loss that has lasted 7 days or more ? NO  Any temperature elevation in an immunocompromised individual OR frail elderly with  signs of dehydration ? NO  Unable to retain food or fluids for 24 hours or more due to recurrent vomiting ? NO  Vomiting within 48 hours after the procedure and is not relieved by provider  recommended treatment ? NO  Any other unexpected urinary symptoms following urinary tract or abdominal surgery  within timeframe specified by provider ? NO  Physician Instructions:  Care Advice: Tell your provider about any other symptoms that you are  currently experiencing or any that have concerned you.  Talk with your provider openly regarding your questions  about treatment  options and available supportive care.  Continue to follow your treatment plan until seeing or talking with your  provider. Take all medications as prescribed. If you have questions or  concerns about your treatment plan, ask about them when you call for your  appointment.  Call your provider during regular office hours to discuss questions or  concerns about your condition or treatment plan.  Discuss reasons for changing or not following your treatment plan with your  provider.  Participating in support groups with other individuals with chronic  conditions may be beneficial. If you are interested in doing so ask your  provider for a local resource.

## 2017-01-17 ENCOUNTER — TELEPHONE (OUTPATIENT)
Dept: ORTHOPEDICS | Facility: CLINIC | Age: 57
End: 2017-01-17

## 2017-01-17 NOTE — TELEPHONE ENCOUNTER
Reason for Call:  Other r SHOULDER SURGERY DONE LAST Friday BY DR DOOLEY.    Detailed comments: WIFE IS CALLING TO SEE IF PATIENT CAN HAVE A SHOWER, CAN HE GET THE AREA WET? PLEASE CALL.    Phone Number Patient can be reached at: Home number on file [phone] (home)    Best Time: NA    Can we leave a detailed message on this number? YES    Call taken on 1/17/2017 at 10:03 AM by Avis Tadeo

## 2017-01-23 ENCOUNTER — OFFICE VISIT (OUTPATIENT)
Dept: ORTHOPEDICS | Facility: OTHER | Age: 57
End: 2017-01-23
Payer: COMMERCIAL

## 2017-01-23 VITALS — TEMPERATURE: 97.8 F | HEIGHT: 69 IN

## 2017-01-23 DIAGNOSIS — S43.421A SPRAIN OF RIGHT ROTATOR CUFF CAPSULE, INITIAL ENCOUNTER: Primary | ICD-10-CM

## 2017-01-23 PROCEDURE — 99024 POSTOP FOLLOW-UP VISIT: CPT | Performed by: ORTHOPAEDIC SURGERY

## 2017-01-23 ASSESSMENT — PAIN SCALES - GENERAL: PAINLEVEL: NO PAIN (0)

## 2017-01-23 NOTE — PROGRESS NOTES
Orthopedic Clinic Post-Operative Note    CHIEF COMPLAINT:   Chief Complaint   Patient presents with     Surgical Followup     dos: 1/13/17~Right shoulder arthroscopy with arthroscopic rotator cuff repair of the supraspinatus (double row) and subscapularis, biceps tenodesis, subacromial decompression with partial acromioplasty~10 DAYS POSTOP       HISTORY OF PRESENT ILLNESS  Location: right shoulder   Rating of Pain:  mild  Pain is better with:  Rest  Pain improving overall: Yes  Associated Features: None  Patient concerns: Only took a few days worth of pain medications. Having difficulty sleeping. Presents with his wife.    Patient's past medical, surgical, social and family histories reviewed.     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)      Overnight stay due to blood poisoning     Abnormalities of size and form of teeth      Removal of wisdom teeth     Gastric ulcer, unspecified as acute or chronic, without mention of hemorrhage or perforation        Past Surgical History   Procedure Laterality Date     Removal of sperm duct(s)       Vasectomy     Hc ugi endoscopy, simple exam  10/31/2007     Colonoscopy  11/01/2007     Colonoscopy  12/12/11     Endoscopy  01/27/12     Upper GI - [facility]     Arthroscopy knee  5/29/2013     Procedure: ARTHROSCOPY KNEE;  Right knee arthroscopy with partial medial and partial lateral menisectomies;  Surgeon: Phani Mclaughlin MD;  Location: [facility] OR     Herniorrhaphy umbilical N/A 3/12/2015     Procedure: HERNIORRHAPHY UMBILICAL;  Surgeon: Yared Ma MD;  Location: [facility] OR     Colonoscopy N/A 11/18/2015     Procedure: COLONOSCOPY;  Surgeon: Migue Mclaughlin MD;  Location:  GI     Esophagoscopy, gastroscopy, duodenoscopy (egd), combined N/A 11/18/2015     Procedure: COMBINED ESOPHAGOSCOPY, GASTROSCOPY, DUODENOSCOPY (EGD), BIOPSY SINGLE OR MULTIPLE;  Surgeon: Migue Mclaughlin  "MD Mustapha;  Location: [facility] GI     Irrigation and debridement upper extremity, combined Left 3/15/2016     Procedure: COMBINED IRRIGATION AND DEBRIDEMENT UPPER EXTREMITY;  Surgeon: Javier Yin DO;  Location: [facility] OR     Arthroscopy shoulder rotator cuff repair Right 1/13/2017     Procedure: ARTHROSCOPY SHOULDER ROTATOR CUFF REPAIR;  Surgeon: Javier Yin DO;  Location: [facility] OR     Arthroscopy shoulder biceps tenodesis repair Right 1/13/2017     Procedure: ARTHROSCOPY SHOULDER BICEPS TENODESIS REPAIR;  Surgeon: Javier Yin DO;  Location: [facility] OR     Arthroscopy shoulder decompression Right 1/13/2017     Procedure: ARTHROSCOPY SHOULDER DECOMPRESSION;  Surgeon: Javier Yin DO;  Location: [facility] OR       Medications:    Current Outpatient Prescriptions on File Prior to Visit:  LANsoprazole (PREVACID) 30 MG CR capsule TAKE ONE CAPSULE BY MOUTH ONCE DAILY   Acetaminophen (TYLENOL PO)    sertraline (ZOLOFT) 50 MG tablet Take 1/2 tablet (25 mg) for 1-2 weeks, then increase to 1 tablet orally daily   hydrOXYzine (ATARAX) 25 MG tablet Take 2-4 tablets ( mg) by mouth nightly as needed for anxiety   lisinopril (PRINIVIL,ZESTRIL) 20 MG tablet TAKE ONE-HALF TABLET BY MOUTH ONCE DAILY   Cholecalciferol (VITAMIN D3) 2000 UNITS TABS Take 1 tablet by mouth daily   Omega-3 Fatty Acids (RA FISH OIL) 1400 MG CPDR Take 1 tablet by mouth daily   Multiple Vitamin (MULTIVITAMINS PO) Take  by mouth.     No current facility-administered medications on file prior to visit.    Allergies   Allergen Reactions     Hydrocodone Other (See Comments)     \"climbed the walls, very anxious, insomnia\"     No Known Drug Allergies      Nsaids Other (See Comments)     Hx of stomach ulcers       Social History     Occupational History           pipeline     Social History Main Topics     Smoking status: Former Smoker     Quit date: 01/01/1981     Smokeless tobacco: Never Used     Alcohol Use: 0.0 oz/week     0 " Standard drinks or equivalent per week      Comment: weekends     Drug Use: No     Sexual Activity:     Partners: Female     Birth Control/ Protection: Surgical      Comment: vasectomy       Family History   Problem Relation Age of Onset     CANCER Mother      ovarian cancer     Asthma Mother      Hypertension Mother      Arthritis Mother      Genitourinary Problems Mother      Lipids Mother      CANCER Sister      ovarian cancer     Hypertension Sister      Lipids Sister      CANCER Maternal Grandmother      stomach cancer     Asthma Father      Cardiovascular Father      heart attack; bypass x5, congenital heart disease     HEART DISEASE Father      heart attack; bypass x5, congenital heart disease     DIABETES Father      Hypertension Father      CANCER Father      prostate     Prostate Cancer Father      Circulatory Father      blood clots     Genitourinary Problems Father      Respiratory Father      emphysema; COPD     DIABETES Maternal Grandfather      DIABETES Paternal Grandfather      Eye Disorder Paternal Grandfather      glaucoma     Hypertension Brother      Lipids Brother      C.A.D. No family hx of      CEREBROVASCULAR DISEASE No family hx of      Breast Cancer No family hx of      Cancer - colorectal No family hx of      Alzheimer Disease No family hx of      Blood Disease No family hx of      GASTROINTESTINAL DISEASE No family hx of      Musculoskeletal Disorder No family hx of      Neurologic Disorder No family hx of      Thyroid Disease No family hx of      Hypertension Brother      Cardiovascular Brother      bypass x3     HEART DISEASE Brother      bypass x3     Lipids Brother      Hypertension Sister        REVIEW OF SYSTEMS  General: negative for, night sweats, dizziness, fatigue  Resp: No shortness of breath and no cough  CV: negative for chest pain, syncope or near-syncope  GI: negative for nausea, vomiting and diarrhea  : negative for dysuria and hematuria  Musculoskeletal: as  "above  Neurologic: negative for syncope   Hematologic: negative for bleeding disorder    Physical Exam:  Vitals: Temp(Src) 97.8  F (36.6  C) (Temporal)  Ht 5' 9\" (1.753 m)  BMI= There is no weight on file to calculate BMI.  Constitutional: healthy, alert and no acute distress   Psychiatric: mentation appears normal and affect normal/bright  NEURO: no focal deficits  SKIN: .healing well, well approximated skin edges, without signs of infection including no erythema, incision breakdown or purlent drainage  JOINT/EXTREMITIES: Ecchymosis and swelling upper arm anterior chest. Some stiffness with terminal elbow extension. Distal neurovascular intact.  GAIT: non-antalgic    Diagnostic Modalities:  None today.  Independent visualization of the images was performed.      Impression:   Chief Complaint   Patient presents with     Surgical Followup     dos: 1/13/17~Right shoulder arthroscopy with arthroscopic rotator cuff repair of the supraspinatus (double row) and subscapularis, biceps tenodesis, subacromial decompression with partial acromioplasty~10 DAYS POSTOP    doing as expected. Arthroscopy pictures reviewed.    Plan:   Activity: No use of affected extremity  Staples/Sutures out: Yes.  Pain controlled: Yes. Continue to use: Nothing  Immobilzation: Yes, Sling for an additional 2 weeks  Physical Therapy: To start in 2 weeks with subscapularis and small supraspinatus rotator cuff protocols  Rest, Ice  Return to clinic 6, week(s), or sooner as needed for changes.    Re-x-ray on return: No    Gerber Yin D.O.    "

## 2017-01-23 NOTE — NURSING NOTE
"Chief Complaint   Patient presents with     Surgical Followup     dos: 1/13/17~Right shoulder arthroscopy with arthroscopic rotator cuff repair of the supraspinatus (double row) and subscapularis, biceps tenodesis, subacromial decompression with partial acromioplasty~10 DAYS POSTOP       Initial Temp(Src) 97.8  F (36.6  C) (Temporal)  Ht 5' 9\" (1.753 m) Estimated body mass index is 33.21 kg/(m^2) as calculated from the following:    Height as of this encounter: 5' 9\" (1.753 m).    Weight as of 1/9/17: 225 lb (102.059 kg).  BP completed using cuff size: NA (Not Taken)  MANUELITO Cannon  "

## 2017-02-06 ENCOUNTER — THERAPY VISIT (OUTPATIENT)
Dept: PHYSICAL THERAPY | Facility: CLINIC | Age: 57
End: 2017-02-06
Payer: COMMERCIAL

## 2017-02-06 DIAGNOSIS — M25.511 ACUTE PAIN OF RIGHT SHOULDER: Primary | ICD-10-CM

## 2017-02-06 DIAGNOSIS — Z98.890 S/P ROTATOR CUFF REPAIR: ICD-10-CM

## 2017-02-06 PROCEDURE — 97161 PT EVAL LOW COMPLEX 20 MIN: CPT | Mod: GP | Performed by: PHYSICAL THERAPIST

## 2017-02-06 PROCEDURE — 97110 THERAPEUTIC EXERCISES: CPT | Mod: GP | Performed by: PHYSICAL THERAPIST

## 2017-02-06 NOTE — PROGRESS NOTES
[facility] Initial Evaluation      Subjective:    Hugo Longoria is a 56 year old male with a right shoulder condition.  Condition occurred with:  A fall.  Condition occurred: at home.  This is a new condition  Pt is seen s/p R rotator cuff repair and biceps tenodesis, SAD, and acromioplasty with surgery performed [id number]. Pt with referral dated 1-23-17 for PT evaluate and treat per protocol. Pt presents in his sling, states he has been wearing the sling for the past 4 weeks. States he is allowed to remove the sling at this time. Pt reporting pain is mild, rated at 1/10 at rest, worse with any attempted movement of the arm. Pain up to 2/10 level and keeps him awake at night. He states he plans to continue wearing his sling for the next week. .    Patient reports pain:  Anterior and lateral.  Radiates to:  Shoulder and upper arm.  Pain is described as aching and is constant and reported as 1/10 and 2/10.  Associated symptoms:  Loss of motion/stiffness and loss of strength. Pain is worse during the night.  Exacerbated by: any attempted movement of the arm. and relieved by nothing.  Since onset symptoms are gradually improving.  Special tests:  MRI.  Previous treatment includes surgery.  There was mild and moderate improvement following previous treatment.  General health as reported by patient is fair and good.  Pertinent medical history includes:  High blood pressure.  Medical allergies: yes (see EPIC).  Other surgeries include:  Orthopedic surgery.  Current medications:  High blood pressure medication.  Current occupation is [employer] .  Patient is currently not working due to present treatment problem.  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    Barriers include:  None as reported by the patient.    Red flags:  Pain at rest/night.                      Objective:    Standing Alignment:    Cervical/Thoracic:  Forward head  Shoulder/UE:  Rounded  shoulders                                       Shoulder Evaluation:  ROM:  AROM:    Flexion:  Left:  150        Abduction:  Left: 160       Internal Rotation:  Left:  55      External Rotation:  Left:  80                    PROM:    Flexion:  Right: 70      Abduction:  Right:  50    Internal Rotation:  Right:  40  External Rotation:  Right:  10                    Strength:  not assessed                        Stability Testing:  not assessed      Special Tests:  not assessed      Palpation:  Palpation assessed shoulder: Mild tenderness in the subacromial space only, scars with tightness anterior 3       Mobility Tests:  not assessed                                                   General     ROS    Assessment/Plan:      Patient is a 56 year old male with right side shoulder complaints.    Patient has the following significant findings with corresponding treatment plan.                Diagnosis 1:  S/p R rotator cuff repair with biceps tenodesis   Pain -  hot/cold therapy, electric stimulation, manual therapy, self management, education, directional preference exercise and home program  Decreased ROM/flexibility - manual therapy, therapeutic exercise and home program  Decreased joint mobility - manual therapy, therapeutic exercise and home program  Decreased strength - therapeutic exercise, therapeutic activities and home program  Inflammation - cold therapy and self management/home program  Impaired muscle performance - neuro re-education and home program  Decreased function - therapeutic activities and home program  Impaired posture - neuro re-education and home program    Therapy Evaluation Codes:   1) History comprised of:   Personal factors that impact the plan of care:      None.    Comorbidity factors that impact the plan of care are:      None.     Medications impacting care: None.  2) Examination of Body Systems comprised of:   Body structures and functions that impact the plan of care:       Shoulder.   Activity limitations that impact the plan of care are:      Bathing, Dressing, Lifting, Sports, Throwing and Sleeping.  3) Clinical presentation characteristics are:   Stable/Uncomplicated.  4) Decision-Making    Low complexity using standardized patient assessment instrument and/or measureable assessment of functional outcome.  Cumulative Therapy Evaluation is: Low complexity.    Previous and current functional limitations:  (See Goal Flow Sheet for this information)    Short term and Long term goals: (See Goal Flow Sheet for this information)     Communication ability:  Patient appears to be able to clearly communicate and understand verbal and written communication and follow directions correctly.  Treatment Explanation - The following has been discussed with the patient:   RX ordered/plan of care  Anticipated outcomes  Possible risks and side effects  This patient would benefit from PT intervention to resume normal activities.   Rehab potential is good.    Frequency:  2 X week, once daily  Duration:  for 2 weeks tapering to 1 X a week over 8 weeks  Discharge Plan:  Achieve all LTG.  Independent in home treatment program.  Reach maximal therapeutic benefit.    Please refer to the daily flowsheet for treatment today, total treatment time and time spent performing 1:1 timed codes.

## 2017-02-06 NOTE — Clinical Note
[facility]  [address]  [facility] [id number] [phone]    2017    Re: Hugo Longoria   :   1960  MRN:  [id number]   REFERRING PHYSICIAN:   Javier Yin    [facility]    Date of Initial Evaluation:  ***  Visits:  Rxs Used: 1  Reason for Referral:     Acute pain of right shoulder  S/P rotator cuff repair    EVALUATION SUMMARY    [facility]      Subjective:    Hugo Longoria is a 56 year old male with a right shoulder condition.  Condition occurred with:  A fall.  Condition occurred: at home.  This is a new condition  Pt is seen s/p R rotator cuff repair and biceps tenodesis, SAD, and acromioplasty with surgery performed [id number]. Pt with referral dated 17 for PT evaluate and treat per protocol. Pt presents in his sling, states he has been wearing the sling for the past 4 weeks. States he is allowed to remove the sling at this time. Pt reporting pain is mild, rated at 1/10 at rest, worse with any attempted movement of the arm. Pain up to 2/10 level and keeps him awake at night. He states he plans to continue wearing his sling for the next week. .    Patient reports pain:  Anterior and lateral.  Radiates to:  Shoulder and upper arm.  Pain is described as aching and is constant and reported as 1/10 and 2/10.  Associated symptoms:  Loss of motion/stiffness and loss of strength. Pain is worse during the night.  Exacerbated by: any attempted movement of the arm. and relieved by nothing.  Since onset symptoms are gradually improving.  Special tests:  MRI.  Previous treatment includes surgery.  There was mild and moderate improvement following previous treatment.  General health as reported by patient is fair and good.  Pertinent medical history includes:  High blood pressure.  Medical allergies: yes (see EPIC).  Other  surgeries include:  Orthopedic surgery.  Current medications:  High blood pressure medication.  Current occupation is [employer] .  Patient is currently not working due to present treatment problem.  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    Barriers include:  None as reported by the patient.    Red flags:  Pain at rest/night.                      Objective:    Standing Alignment:    Cervical/Thoracic:  Forward head  Shoulder/UE:  Rounded shoulders                                       Shoulder Evaluation:  ROM:  AROM:    Flexion:  Left:  150        Abduction:  Left: 160       Internal Rotation:  Left:  55      External Rotation:  Left:  80                    PROM:    Flexion:  Right: 70      Abduction:  Right:  50    Internal Rotation:  Right:  10  External Rotation:  Right:  40                    Strength:  not assessed                        Stability Testing:  not assessed      Special Tests:  not assessed      Palpation:  Palpation assessed shoulder: Mild tenderness in the subacromial space only, scars with tightness anterior 3       Mobility Tests:  not assessed                                                   General     ROS    Assessment/Plan:      Patient is a 56 year old male with right side shoulder complaints.    Patient has the following significant findings with corresponding treatment plan.                Diagnosis 1:  S/p R rotator cuff repair with biceps tenodesis   Pain -  hot/cold therapy, electric stimulation, manual therapy, self management, education, directional preference exercise and home program  Decreased ROM/flexibility - manual therapy, therapeutic exercise and home program  Decreased joint mobility - manual therapy, therapeutic exercise and home program  Decreased strength - therapeutic exercise, therapeutic activities and home program  Inflammation - cold therapy and self management/home program  Impaired muscle performance - neuro  re-education and home program  Decreased function - therapeutic activities and home program  Impaired posture - neuro re-education and home program    Therapy Evaluation Codes:   1) History comprised of:   Personal factors that impact the plan of care:      None.    Comorbidity factors that impact the plan of care are:      None.     Medications impacting care: None.  2) Examination of Body Systems comprised of:   Body structures and functions that impact the plan of care:      Shoulder.   Activity limitations that impact the plan of care are:      Bathing, Dressing, Lifting, Sports, Throwing and Sleeping.  3) Clinical presentation characteristics are:   Stable/Uncomplicated.  4) Decision-Making    Low complexity using standardized patient assessment instrument and/or measureable assessment of functional outcome.  Cumulative Therapy Evaluation is: Low complexity.    Previous and current functional limitations:  (See Goal Flow Sheet for this information)    Short term and Long term goals: (See Goal Flow Sheet for this information)     Communication ability:  Patient appears to be able to clearly communicate and understand verbal and written communication and follow directions correctly.  Treatment Explanation - The following has been discussed with the patient:   RX ordered/plan of care  Anticipated outcomes  Possible risks and side effects  This patient would benefit from PT intervention to resume normal activities.   Rehab potential is good.    Frequency:  2 X week, once daily  Duration:  for 2 weeks tapering to 1 X a week over 8 weeks  Discharge Plan:  Achieve all LTG.  Independent in home treatment program.  Reach maximal therapeutic benefit.    Please refer to the daily flowsheet for treatment today, total treatment time and time spent performing 1:1 timed codes.             Thank you for your referral.    INQUIRIES  Therapist:    [facility]  [address]  [facility] [address]  Phone: [phone]  Fax: [phone]

## 2017-02-08 ENCOUNTER — THERAPY VISIT (OUTPATIENT)
Dept: PHYSICAL THERAPY | Facility: CLINIC | Age: 57
End: 2017-02-08
Payer: COMMERCIAL

## 2017-02-08 DIAGNOSIS — Z98.890 S/P ROTATOR CUFF REPAIR: ICD-10-CM

## 2017-02-08 DIAGNOSIS — M25.511 ACUTE PAIN OF RIGHT SHOULDER: Primary | ICD-10-CM

## 2017-02-08 PROCEDURE — 97140 MANUAL THERAPY 1/> REGIONS: CPT | Mod: GP | Performed by: PHYSICAL THERAPIST

## 2017-02-08 PROCEDURE — 97110 THERAPEUTIC EXERCISES: CPT | Mod: GP | Performed by: PHYSICAL THERAPIST

## 2017-02-13 ENCOUNTER — THERAPY VISIT (OUTPATIENT)
Dept: PHYSICAL THERAPY | Facility: CLINIC | Age: 57
End: 2017-02-13
Payer: COMMERCIAL

## 2017-02-13 DIAGNOSIS — M25.511 ACUTE PAIN OF RIGHT SHOULDER: ICD-10-CM

## 2017-02-13 DIAGNOSIS — Z98.890 S/P ROTATOR CUFF REPAIR: ICD-10-CM

## 2017-02-13 PROCEDURE — 97110 THERAPEUTIC EXERCISES: CPT | Mod: GP | Performed by: PHYSICAL THERAPIST

## 2017-02-13 PROCEDURE — 97140 MANUAL THERAPY 1/> REGIONS: CPT | Mod: GP | Performed by: PHYSICAL THERAPIST

## 2017-02-15 ENCOUNTER — THERAPY VISIT (OUTPATIENT)
Dept: PHYSICAL THERAPY | Facility: CLINIC | Age: 57
End: 2017-02-15
Payer: COMMERCIAL

## 2017-02-15 DIAGNOSIS — M25.511 ACUTE PAIN OF RIGHT SHOULDER: ICD-10-CM

## 2017-02-15 DIAGNOSIS — Z98.890 S/P ROTATOR CUFF REPAIR: ICD-10-CM

## 2017-02-15 PROCEDURE — 97110 THERAPEUTIC EXERCISES: CPT | Mod: GP | Performed by: PHYSICAL THERAPIST

## 2017-02-15 PROCEDURE — 97140 MANUAL THERAPY 1/> REGIONS: CPT | Mod: GP | Performed by: PHYSICAL THERAPIST

## 2017-02-20 ENCOUNTER — THERAPY VISIT (OUTPATIENT)
Dept: PHYSICAL THERAPY | Facility: CLINIC | Age: 57
End: 2017-02-20
Payer: COMMERCIAL

## 2017-02-20 DIAGNOSIS — Z98.890 S/P ROTATOR CUFF REPAIR: ICD-10-CM

## 2017-02-20 DIAGNOSIS — M25.511 ACUTE PAIN OF RIGHT SHOULDER: ICD-10-CM

## 2017-02-20 PROCEDURE — 97110 THERAPEUTIC EXERCISES: CPT | Mod: GP | Performed by: PHYSICAL THERAPIST

## 2017-02-20 PROCEDURE — 97140 MANUAL THERAPY 1/> REGIONS: CPT | Mod: GP | Performed by: PHYSICAL THERAPIST

## 2017-02-22 ENCOUNTER — THERAPY VISIT (OUTPATIENT)
Dept: PHYSICAL THERAPY | Facility: CLINIC | Age: 57
End: 2017-02-22
Payer: COMMERCIAL

## 2017-02-22 DIAGNOSIS — M25.511 ACUTE PAIN OF RIGHT SHOULDER: ICD-10-CM

## 2017-02-22 DIAGNOSIS — Z98.890 S/P ROTATOR CUFF REPAIR: ICD-10-CM

## 2017-02-22 PROCEDURE — 97140 MANUAL THERAPY 1/> REGIONS: CPT | Mod: GP | Performed by: PHYSICAL THERAPIST

## 2017-02-22 PROCEDURE — 97110 THERAPEUTIC EXERCISES: CPT | Mod: GP | Performed by: PHYSICAL THERAPIST

## 2017-02-28 ENCOUNTER — THERAPY VISIT (OUTPATIENT)
Dept: PHYSICAL THERAPY | Facility: CLINIC | Age: 57
End: 2017-02-28
Payer: COMMERCIAL

## 2017-02-28 DIAGNOSIS — M25.511 ACUTE PAIN OF RIGHT SHOULDER: ICD-10-CM

## 2017-02-28 DIAGNOSIS — Z98.890 S/P ROTATOR CUFF REPAIR: ICD-10-CM

## 2017-02-28 PROCEDURE — 97110 THERAPEUTIC EXERCISES: CPT | Mod: GP | Performed by: PHYSICAL THERAPIST

## 2017-03-02 ENCOUNTER — THERAPY VISIT (OUTPATIENT)
Dept: PHYSICAL THERAPY | Facility: CLINIC | Age: 57
End: 2017-03-02
Payer: COMMERCIAL

## 2017-03-02 DIAGNOSIS — M25.511 ACUTE PAIN OF RIGHT SHOULDER: ICD-10-CM

## 2017-03-02 DIAGNOSIS — Z98.890 S/P ROTATOR CUFF REPAIR: ICD-10-CM

## 2017-03-02 PROCEDURE — 97140 MANUAL THERAPY 1/> REGIONS: CPT | Mod: GP | Performed by: PHYSICAL THERAPIST

## 2017-03-02 PROCEDURE — 97110 THERAPEUTIC EXERCISES: CPT | Mod: GP | Performed by: PHYSICAL THERAPIST

## 2017-03-02 NOTE — MR AVS SNAPSHOT
After Visit Summary   3/2/2017    Hugo Longoria    MRN: [id number]           Patient Information     Date Of Birth          1960        Visit Information        Provider Department      3/2/2017 10:20 AM Will Lema, PT [facility]        Today's Diagnoses     Acute pain of right shoulder        S/P rotator cuff repair           Follow-ups after your visit        Your next 10 appointments already scheduled     Mar 06, 2017  9:20 AM CST   [facility] Javier Yin DO   [facility]    [address] [place] [address]  [address]  [facility] [address]   [phone]            Mar 06, 2017 10:40 AM CST   SAMIA Extremity with Will Lema PT   [facility] ([facility] [place] [place]    [address]  [facility] [address]   [phone]            Mar 08, 2017 10:00 AM CST   SAMIA Extremity with Will Lema PT   [facility] ([facility] [place]    [address]  [facility] [address]   [phone]            Mar 13, 2017  9:20 AM CDT   SAMIA Extremity with Will Lema PT   [facility] ([facility] [place]    [address]  [facility] [address]   [phone]            Mar 17, 2017  9:00 AM CDT   SAMIA Extremity with Dwight High PT   [facility] ([facility] [place]  )    [address]  [facility] [address]   [phone]            Mar 20, 2017  9:20 AM CDT   SAMIA Extremity with Will Lema PT   [facility] ([facility] [place]  )    [address]  [facility] [address]   [phone]            Mar 23, 2017  9:00 AM CDT   SAMIA Extremity with Will Lema PT   [facility]  Physical Therapy ([facility]  )    [address]  [facility] [address]   [phone]            Mar 27, 2017  9:20 AM CDT   SAMIA Extremity with Will Lema, PT   [facility] ([facility]  )    [address]  [facility] [address]   [phone]            Mar 30, 2017  9:00 AM CDT   SAMIA Extremity with Will Lema PT   [facility] ([facility]  )    [address]  [facility] [address]   [phone]              Who to contact     If you have questions or need follow up information about today's clinic visit or your schedule please contact [facility] PHYSICAL THERAPY directly at [phone].  Normal or non-critical lab and imaging results will be communicated to you by Penemarie K Murphyhart, letter or phone within 4 business days after the clinic has received the results. If you do not hear from us within 7 days, please contact the clinic through [employer] or phone. If you have a critical or abnormal lab result, we will notify you by phone as soon as possible.  Submit refill requests through [employer] or call your pharmacy and they will forward the refill request to us. Please allow 3 business days for your refill to be completed.          Additional Information About Your Visit        Penemarie K Murphyhart Information     [employer] gives you secure access to your electronic health record. If you see a primary care provider, you can also send messages to your care team and make appointments. If you have questions, please call your primary care clinic.  If you do not have a primary care provider, please call [phone] and they will assist you.        Care EveryWhere ID     This is your Care EveryWhere ID. This could be used by other organizations to access your [place] medical records  [id number]         Blood Pressure from Last 3 Encounters:   01/13/17 133/86    01/09/17 142/86   01/09/17 (!) 142/92    Weight from Last 3 Encounters:   01/09/17 102.1 kg (225 lb)   01/09/17 102.1 kg (225 lb)   01/06/17 99.8 kg (220 lb)              We Performed the Following     MANUAL THER TECH,1+REGIONS,EA 15 MIN     THERAPEUTIC EXERCISES        Primary Care Provider Office Phone # Fax #    Viktor Gomez [phone]       [address]   [place] [address]        Thank you!     Thank you for [facility]  for your care. Our goal is always to provide you with excellent care. Hearing back from our patients is one way we can continue to improve our services. Please take a few minutes to complete the written survey that you may receive in the mail after your visit with us. Thank you!             Your Updated Medication List - Protect others around you: Learn how to safely use, store and throw away your medicines at www.disposemymeds.org.          This list is accurate as of: 3/2/17 11:06 AM.  Always use your most recent med list.                   Brand Name Dispense Instructions for use    hydrOXYzine 25 MG tablet    ATARAX    30 tablet    Take 2-4 tablets ( mg) by mouth nightly as needed for anxiety       LANsoprazole 30 MG CR capsule    PREVACID    30 capsule    TAKE ONE CAPSULE BY MOUTH ONCE DAILY       lisinopril 20 MG tablet    PRINIVIL/ZESTRIL    90 tablet    TAKE ONE-HALF TABLET BY MOUTH ONCE DAILY       MULTIVITAMINS PO      Take  by mouth.       RA FISH OIL 1400 MG Cpdr      Take 1 tablet by mouth daily       sertraline 50 MG tablet    ZOLOFT    30 tablet    Take 1/2 tablet (25 mg) for 1-2 weeks, then increase to 1 tablet orally daily       TYLENOL PO          Vitamin D3 2000 UNITS Tabs      Take 1 tablet by mouth daily

## 2017-03-02 NOTE — PROGRESS NOTES
Subjective:    HPI                    Objective:    System    Physical Exam    General     ROS    Assessment/Plan:      PROGRESS  REPORT    Progress reporting period is from 2-8-17 to 3-2-17.       SUBJECTIVE  Subjective: Pt reports doing well, no speific pain, a little achy at times. No complaints or issues at this time. Continues with stiffness in finger flexion, worse in the am's despite working ROM and grasping exercises.     Current pain level is 0/10 Current Pain level: 0/10.     Previous pain level was  0/10 Initial Pain level: 2/10.   Changes in function:  Yes (See Goal flowsheet attached for changes in current functional level)  Adverse reaction to treatment or activity: None    OBJECTIVE  Changes noted in objective findings:    Objective: PROM flexion to 165, scaption to 160, ER to (90) 70, IR (90) to 50 degrees. Pt continues with restricted ROM flexion in had, stiffer in the am's, loosens up as the day progresses. This am fingerls 1/2 inch fingertips to MC heads. Elbow, forearm and wrist ROM full.      ASSESSMENT/PLAN  Updated problem list and treatment plan: Diagnosis 1:  S/p R rotator cuff repair   Decreased ROM/flexibility - manual therapy, therapeutic exercise and home program  Decreased joint mobility - manual therapy, therapeutic exercise and home program  Decreased strength - therapeutic exercise, therapeutic activities and home program  Inflammation - cold therapy and self management/home program  Impaired muscle performance - neuro re-education and home program  Decreased function - therapeutic activities and home program  STG/LTGs have been met or progress has been made towards goals:  Yes (See Goal flow sheet completed today.)  Assessment of Progress: The patient's condition is improving.  The patient's condition has potential to improve.  Self Management Plans:  Patient has been instructed in a home treatment program.  Patient  has been instructed in self management of symptoms.    Hugo  continues to require the following intervention to meet STG and LTG's:  PT    Recommendations:  This patient would benefit from continued therapy.     Frequency:  2 X week, once daily  Duration:  for 1 weeks tapering to 1 X a week over 6 weeks        Please refer to the daily flowsheet for treatment today, total treatment time and time spent performing 1:1 timed codes.

## 2017-03-06 ENCOUNTER — THERAPY VISIT (OUTPATIENT)
Dept: PHYSICAL THERAPY | Facility: CLINIC | Age: 57
End: 2017-03-06
Payer: COMMERCIAL

## 2017-03-06 ENCOUNTER — OFFICE VISIT (OUTPATIENT)
Dept: ORTHOPEDICS | Facility: OTHER | Age: 57
End: 2017-03-06
Payer: COMMERCIAL

## 2017-03-06 VITALS — TEMPERATURE: 96.6 F | WEIGHT: 227 LBS | HEIGHT: 69 IN | BODY MASS INDEX: 33.62 KG/M2

## 2017-03-06 DIAGNOSIS — Z98.890 S/P ROTATOR CUFF REPAIR: ICD-10-CM

## 2017-03-06 DIAGNOSIS — S43.421A SPRAIN OF RIGHT ROTATOR CUFF CAPSULE, INITIAL ENCOUNTER: Primary | ICD-10-CM

## 2017-03-06 DIAGNOSIS — M25.511 ACUTE PAIN OF RIGHT SHOULDER: ICD-10-CM

## 2017-03-06 PROCEDURE — 97140 MANUAL THERAPY 1/> REGIONS: CPT | Mod: GP | Performed by: PHYSICAL THERAPIST

## 2017-03-06 PROCEDURE — 99024 POSTOP FOLLOW-UP VISIT: CPT | Performed by: ORTHOPAEDIC SURGERY

## 2017-03-06 PROCEDURE — 97110 THERAPEUTIC EXERCISES: CPT | Mod: GP | Performed by: PHYSICAL THERAPIST

## 2017-03-06 NOTE — PROGRESS NOTES
Orthopedic Clinic Post-Operative Note    CHIEF COMPLAINT:   Chief Complaint   Patient presents with     RECHECK     Right shoulder follow up     Surgical Followup     dos: 1/13/17~Right shoulder arthroscopy with arthroscopic rotator cuff repair of the supraspinatus (double row) and subscapularis, biceps tenodesis, subacromial decompression with partial acromioplasty~8 weeks POSTOP       HISTORY OF PRESENT ILLNESS  Location: right shoulder   Rating of Pain:  mild  Pain is better with:  Rest  Pain improving overall: Yes  Associated Features: None  Patient concerns: No    Patient's past medical, surgical, social and family histories reviewed.     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)      Overnight stay due to blood poisoning     Gastric ulcer, unspecified as acute or chronic, without mention of hemorrhage or perforation        Past Surgical History   Procedure Laterality Date     Removal of sperm duct(s)       Vasectomy      ugi endoscopy, simple exam  10/31/2007     Colonoscopy  11/01/2007     Colonoscopy  12/12/11     Endoscopy  01/27/12     Upper GI - [facility]     Arthroscopy knee  5/29/2013     Procedure: ARTHROSCOPY KNEE;  Right knee arthroscopy with partial medial and partial lateral menisectomies;  Surgeon: Phani Mclaughlin MD;  Location: [facility] OR     Herniorrhaphy umbilical N/A 3/12/2015     Procedure: HERNIORRHAPHY UMBILICAL;  Surgeon: Yared Ma MD;  Location: [facility] OR     Colonoscopy N/A 11/18/2015     Procedure: COLONOSCOPY;  Surgeon: Migue Mclaughlin MD;  Location:  GI     Esophagoscopy, gastroscopy, duodenoscopy (egd), combined N/A 11/18/2015     Procedure: COMBINED ESOPHAGOSCOPY, GASTROSCOPY, DUODENOSCOPY (EGD), BIOPSY SINGLE OR MULTIPLE;  Surgeon: Migue Mclaughlin MD;  Location:  GI     Irrigation and  "debridement upper extremity, combined Left 3/15/2016     Procedure: COMBINED IRRIGATION AND DEBRIDEMENT UPPER EXTREMITY;  Surgeon: Javier Yin DO;  Location: [facility] OR     Arthroscopy shoulder rotator cuff repair Right 1/13/2017     Procedure: ARTHROSCOPY SHOULDER ROTATOR CUFF REPAIR;  Surgeon: Javier Yin DO;  Location: [facility]     Arthroscopy shoulder biceps tenodesis repair Right 1/13/2017     Procedure: ARTHROSCOPY SHOULDER BICEPS TENODESIS REPAIR;  Surgeon: Javier Yin DO;  Location: [facility]     Arthroscopy shoulder decompression Right 1/13/2017     Procedure: ARTHROSCOPY SHOULDER DECOMPRESSION;  Surgeon: Javier Yin DO;  Location: [facility] OR       Medications:    Current Outpatient Prescriptions on File Prior to Visit:  LANsoprazole (PREVACID) 30 MG CR capsule TAKE ONE CAPSULE BY MOUTH ONCE DAILY (Patient not taking: Reported on 3/6/2017)   Acetaminophen (TYLENOL PO)    sertraline (ZOLOFT) 50 MG tablet Take 1/2 tablet (25 mg) for 1-2 weeks, then increase to 1 tablet orally daily (Patient not taking: Reported on 3/6/2017)   hydrOXYzine (ATARAX) 25 MG tablet Take 2-4 tablets ( mg) by mouth nightly as needed for anxiety   lisinopril (PRINIVIL,ZESTRIL) 20 MG tablet TAKE ONE-HALF TABLET BY MOUTH ONCE DAILY   Cholecalciferol (VITAMIN D3) 2000 UNITS TABS Take 1 tablet by mouth daily   Omega-3 Fatty Acids (RA FISH OIL) 1400 MG CPDR Take 1 tablet by mouth daily   Multiple Vitamin (MULTIVITAMINS PO) Take  by mouth.     No current facility-administered medications on file prior to visit.     Allergies   Allergen Reactions     Hydrocodone Other (See Comments)     \"climbed the walls, very anxious, insomnia\"     No Known Drug Allergies      Nsaids Other (See Comments)     Hx of stomach ulcers       Social History     Occupational History      [employer]     Social History Main Topics     Smoking status: Former Smoker     Quit date: 1/1/1981 "     Smokeless tobacco: Never Used     Alcohol use 0.0 oz/week     0 Standard drinks or equivalent per week      Comment: weekends     Drug use: No     Sexual activity: Yes     Partners: Female     Birth control/ protection: Surgical      Comment: vasectomy       Family History   Problem Relation Age of Onset     CANCER Mother      ovarian cancer     Asthma Mother      Hypertension Mother      Arthritis Mother      Genitourinary Problems Mother      Lipids Mother      CANCER Sister      ovarian cancer     Hypertension Sister      Lipids Sister      CANCER Maternal Grandmother      stomach cancer     Asthma Father      Cardiovascular Father      heart attack; bypass x5, congenital heart disease     HEART DISEASE Father      heart attack; bypass x5, congenital heart disease     DIABETES Father      Hypertension Father      CANCER Father      prostate     Prostate Cancer Father      Circulatory Father      blood clots     Genitourinary Problems Father      Respiratory Father      emphysema; COPD     DIABETES Maternal Grandfather      DIABETES Paternal Grandfather      Eye Disorder Paternal Grandfather      glaucoma     Hypertension Brother      Lipids Brother      C.A.D. No family hx of      CEREBROVASCULAR DISEASE No family hx of      Breast Cancer No family hx of      Cancer - colorectal No family hx of      Alzheimer Disease No family hx of      Blood Disease No family hx of      GASTROINTESTINAL DISEASE No family hx of      Musculoskeletal Disorder No family hx of      Neurologic Disorder No family hx of      Thyroid Disease No family hx of      Hypertension Brother      Cardiovascular Brother      bypass x3     HEART DISEASE Brother      bypass x3     Lipids Brother      Hypertension Sister        REVIEW OF SYSTEMS  General: negative for, night sweats, dizziness, fatigue  Resp: No shortness of breath and no cough  CV: negative for chest pain, syncope or near-syncope  GI: negative for nausea, vomiting and  "diarrhea  : negative for dysuria and hematuria  Musculoskeletal: as above  Neurologic: negative for syncope   Hematologic: negative for bleeding disorder    Physical Exam:  Vitals: Temp 96.6  F (35.9  C) (Temporal)  Ht 5' 9\" (1.753 m)  Wt 227 lb (103 kg)  BMI 33.52 kg/m2  BMI= Body mass index is 33.52 kg/(m^2).  Constitutional: healthy, alert and no acute distress   Psychiatric: mentation appears normal and affect normal/bright  NEURO: no focal deficits  SKIN: .well healed, no erythema, no incision breakdown and no drainage.  JOINT/EXTREMITIES: Some terminal stiffness with passive range of motion. Particularly with external rotation. Full elbow motion. No focal areas of tenderness. Active motion not fully tested yet.  GAIT: not tested     Diagnostic Modalities:  None today.  Independent visualization of the images was performed.      Impression:   Chief Complaint   Patient presents with     RECHECK     Right shoulder follow up     Surgical Followup     dos: 1/13/17~Right shoulder arthroscopy with arthroscopic rotator cuff repair of the supraspinatus (double row) and subscapularis, biceps tenodesis, subacromial decompression with partial acromioplasty~8 weeks POSTOP    doing as expected.    Plan:   Activity: Continue progressing therapy.  Staples/Sutures out: Not applicable.  Pain controlled: Yes. Continue to use: Nothing  Immobilzation: No  Physical Therapy: active range of motion  Rest, Ice  Return to clinic 6, week(s), or sooner as needed for changes.    Re-x-ray on return: No    Gerber Yin D.O.  "

## 2017-03-06 NOTE — MR AVS SNAPSHOT
After Visit Summary   3/6/2017    Hugo Longoria    MRN: [id number]           Patient Information     Date Of Birth          1960        Visit Information        Provider Department      3/6/2017 9:20 AM Javier Yin, [facility]        Today's Diagnoses     Sprain of right rotator cuff capsule, initial encounter    -  1       Follow-ups after your visit        Your next 10 appointments already scheduled     Mar 06, 2017 10:40 AM CST   SAMIA Extremity with Will Lema, PT   [facility] ([facility]  )    [address]  [facility] [address]   [phone]            Mar 08, 2017 10:00 AM CST   SAIMA Extremity with Will Lema PT   [facility] ([facility]  )    [address]  [facility] [address]   [phone]            Mar 08, 2017  2:00 PM CST   [facility] Viktor Gomez,    [facility]    [address] [place] [id number] [phone]           Bring a current list of meds and any records pertaining to this visit.  For Physicals, please bring immunization records and any forms needing to be filled out.  Please arrive 10 minutes early to complete paperwork.            Mar 13, 2017  9:20 AM CDT   SAMIA Extremity with Will Lema PT   [facility] ([facility]  )    [address]  [facility] [address]   [phone]            Mar 17, 2017  9:00 AM CDT   SAMIA Extremity with Dwight High, PT   [facility] ([facility]  )    [address]  [facility] [address]   [phone]            Mar 20, 2017  9:20 AM CDT   SAMIA Extremity with Will Lema PT   [facility] ([facility]  )    [address]  [facility] [address]   [phone]            Mar 23, 2017  9:00 AM CDT   SAMIA Extremity with Will Lema PT   [facility] ([facility]  )    [address]  [facility] [address]   [phone]            Mar 27, 2017  9:20 AM CDT   SAMIA Extremity with Will Lema PT   [facility] ([facility]  )    [address]  [facility] [address]   [phone]            Mar 30, 2017  9:00 AM CDT   SAMIA Extremity with Will Lema, PT   [facility] ([facility]  )    [address]  [facility] [address]   [phone]              Who to contact     If you have questions or need follow up information about today's clinic visit or your schedule please contact [facility] directly at [phone].  Normal or non-critical lab and imaging results will be communicated to you by MyChart, letter or phone within 4 business days after the clinic has received the results. If you do not hear from us within 7 days, please contact the clinic through [employer] or phone. If you have a critical or abnormal lab result, we will notify you by phone as soon as possible.  Submit refill requests through [employer] or call your pharmacy and they will forward the refill request to us. Please allow 3 business days for your refill to be completed.          Additional Information About Your Visit        [employer] Information     [employer] gives you secure access to your electronic health record. If you see a primary care provider, you can also send messages to your care team and make appointments. If you have questions, please call your primary care clinic.  If you do not have a primary care provider, please call [phone] and they will assist you.        Care EveryWhere ID     This is your Care EveryWhere ID. This could be  "used by other organizations to access your [facility] medical records  [id number]        Your Vitals Were     Temperature Height BMI (Body Mass Index)             96.6  F (35.9  C) (Temporal) 5' 9\" (1.753 m) 33.52 kg/m2          Blood Pressure from Last 3 Encounters:   01/13/17 133/86   01/09/17 142/86   01/09/17 (!) 142/92    Weight from Last 3 Encounters:   03/06/17 227 lb (103 kg)   01/09/17 225 lb (102.1 kg)   01/09/17 225 lb (102.1 kg)              Today, you had the following     No orders found for display       Primary Care Provider Office Phone # Fax #    Viktor Leighton Gomez [phone]       [address] [place] [address]        Thank you!     Thank you for choosing [facility]  for your care. Our goal is always to provide you with excellent care. Hearing back from our patients is one way we can continue to improve our services. Please take a few minutes to complete the written survey that you may receive in the mail after your visit with us. Thank you!             Your Updated Medication List - Protect others around you: Learn how to safely use, store and throw away your medicines at www.disposemymeds.org.          This list is accurate as of: 3/6/17  9:36 AM.  Always use your most recent med list.                   Brand Name Dispense Instructions for use    hydrOXYzine 25 MG tablet    ATARAX    30 tablet    Take 2-4 tablets ( mg) by mouth nightly as needed for anxiety       LANsoprazole 30 MG CR capsule    PREVACID    30 capsule    TAKE ONE CAPSULE BY MOUTH ONCE DAILY       lisinopril 20 MG tablet    PRINIVIL/ZESTRIL    90 tablet    TAKE ONE-HALF TABLET BY MOUTH ONCE DAILY       MULTIVITAMINS PO      Take  by mouth.       RA FISH OIL 1400 MG Cpdr      Take 1 tablet by mouth daily       sertraline 50 MG tablet    ZOLOFT    30 tablet    Take 1/2 tablet (25 mg) for 1-2 weeks, then increase to 1 tablet orally daily       TYLENOL PO      "     Vitamin D3 2000 UNITS Tabs      Take 1 tablet by mouth daily

## 2017-03-06 NOTE — NURSING NOTE
"Chief Complaint   Patient presents with     RECHECK     Right shoulder follow up     Surgical Followup     dos: 1/13/17~Right shoulder arthroscopy with arthroscopic rotator cuff repair of the supraspinatus (double row) and subscapularis, biceps tenodesis, subacromial decompression with partial acromioplasty~8 weeks POSTOP       Initial Temp 96.6  F (35.9  C) (Temporal)  Ht 5' 9\" (1.753 m)  Wt 227 lb (103 kg)  BMI 33.52 kg/m2 Estimated body mass index is 33.52 kg/(m^2) as calculated from the following:    Height as of this encounter: 5' 9\" (1.753 m).    Weight as of this encounter: 227 lb (103 kg).  Medication Reconciliation: complete    "

## 2017-03-08 ENCOUNTER — OFFICE VISIT (OUTPATIENT)
Dept: FAMILY MEDICINE | Facility: OTHER | Age: 57
End: 2017-03-08
Payer: COMMERCIAL

## 2017-03-08 ENCOUNTER — THERAPY VISIT (OUTPATIENT)
Dept: PHYSICAL THERAPY | Facility: CLINIC | Age: 57
End: 2017-03-08
Payer: COMMERCIAL

## 2017-03-08 VITALS
SYSTOLIC BLOOD PRESSURE: 112 MMHG | HEART RATE: 114 BPM | WEIGHT: 224.6 LBS | TEMPERATURE: 97.3 F | OXYGEN SATURATION: 94 % | DIASTOLIC BLOOD PRESSURE: 82 MMHG | BODY MASS INDEX: 33.17 KG/M2

## 2017-03-08 DIAGNOSIS — I10 HTN, GOAL BELOW 140/90: ICD-10-CM

## 2017-03-08 DIAGNOSIS — F41.1 GAD (GENERALIZED ANXIETY DISORDER): ICD-10-CM

## 2017-03-08 DIAGNOSIS — K21.00 GASTROESOPHAGEAL REFLUX DISEASE WITH ESOPHAGITIS: ICD-10-CM

## 2017-03-08 DIAGNOSIS — K29.50 CHRONIC GASTRITIS WITHOUT BLEEDING, UNSPECIFIED GASTRITIS TYPE: ICD-10-CM

## 2017-03-08 DIAGNOSIS — Z98.890 S/P ROTATOR CUFF REPAIR: ICD-10-CM

## 2017-03-08 DIAGNOSIS — M25.511 ACUTE PAIN OF RIGHT SHOULDER: ICD-10-CM

## 2017-03-08 DIAGNOSIS — Z87.11 HISTORY OF GASTRIC ULCER: ICD-10-CM

## 2017-03-08 DIAGNOSIS — Z86.0100 HISTORY OF COLONIC POLYPS: Primary | ICD-10-CM

## 2017-03-08 PROCEDURE — 99214 OFFICE O/P EST MOD 30 MIN: CPT | Performed by: INTERNAL MEDICINE

## 2017-03-08 PROCEDURE — 97110 THERAPEUTIC EXERCISES: CPT | Mod: GP | Performed by: PHYSICAL THERAPIST

## 2017-03-08 PROCEDURE — 97140 MANUAL THERAPY 1/> REGIONS: CPT | Mod: GP | Performed by: PHYSICAL THERAPIST

## 2017-03-08 RX ORDER — LISINOPRIL 20 MG/1
TABLET ORAL
Qty: 90 TABLET | Refills: 1 | Status: SHIPPED | OUTPATIENT
Start: 2017-03-08 | End: 2018-05-11

## 2017-03-08 RX ORDER — LANSOPRAZOLE 30 MG/1
CAPSULE, DELAYED RELEASE ORAL
Qty: 30 CAPSULE | Refills: 0 | Status: SHIPPED | OUTPATIENT
Start: 2017-03-08 | End: 2019-01-30

## 2017-03-08 ASSESSMENT — PAIN SCALES - GENERAL: PAINLEVEL: NO PAIN (0)

## 2017-03-08 NOTE — NURSING NOTE
"Chief Complaint   Patient presents with     Abdominal Pain       Initial /82 (BP Location: Right arm, Patient Position: Chair, Cuff Size: Adult Large)  Pulse 114  Temp 97.3  F (36.3  C) (Temporal)  Wt 224 lb 9.6 oz (101.9 kg)  SpO2 94%  BMI 33.17 kg/m2 Estimated body mass index is 33.17 kg/(m^2) as calculated from the following:    Height as of 3/6/17: 5' 9\" (1.753 m).    Weight as of this encounter: 224 lb 9.6 oz (101.9 kg).  Medication Reconciliation: complete   Rosette BILLINGS      "

## 2017-03-08 NOTE — PROGRESS NOTES
Chief Complaint   Patient presents with     Abdominal Pain     CHIEF COMPLAINT:    The patient is a pleasant 56-year-old gentleman who has a history of recurrent gastroesophageal reflux and gastric ulcers. He recently went to his local pharmacy to  his proton pump inhibitor where he was contacted by the pharmacist and told that he should stop his medications immediately. He was told there are old sort subterminal side effects to this medication and he should contact his physician promptly to get off the medication. The patient notes that upon discontinuing medication, he had immediate recurrence of reflux, heartburn, and epigastric pain. He is now in a quandary as to what he should do. Additionally, he has a history of premalignant colon polyps and is quite concerned that he is due for his next colonoscopy. Previous recommendations were for an extended period of time but he notes that he did have an interim endoscopy elsewhere were polyps were noted. This would negate the recommendations.                       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     Surgical History:   has a past surgical history that includes removal of sperm duct(s); UPPER GI ENDOSCOPY,EXAM (10/31/2007); colonoscopy (11/01/2007); colonoscopy (12/12/11); endoscopy (01/27/12); Arthroscopy knee (5/29/2013); Herniorrhaphy umbilical (N/A, 3/12/2015); Colonoscopy (N/A, 11/18/2015); Esophagoscopy, gastroscopy, duodenoscopy (EGD), combined (N/A, 11/18/2015); Irrigation and debridement upper extremity, combined (Left, 3/15/2016); Arthroscopy shoulder rotator cuff repair (Right, 1/13/2017); Arthroscopy shoulder biceps tenodesis repair (Right, 1/13/2017); and Arthroscopy shoulder  decompression (Right, 1/13/2017).     Social History:   reports that he quit smoking about 36 years ago. He has never used smokeless tobacco. He reports that he drinks alcohol. He reports that he does not use illicit drugs.     Family History:  family history includes Arthritis in his mother; Asthma in his father and mother; CANCER in his father, maternal grandmother, mother, and sister; Cardiovascular in his brother and father; Circulatory in his father; DIABETES in his father, maternal grandfather, and paternal grandfather; Eye Disorder in his paternal grandfather; Genitourinary Problems in his father and mother; HEART DISEASE in his brother and father; Hypertension in his brother, brother, father, mother, sister, and sister; Lipids in his brother, brother, mother, and sister; Prostate Cancer in his father; Respiratory in his father. There is no history of C.A.D., CEREBROVASCULAR DISEASE, Breast Cancer, Cancer - colorectal, Alzheimer Disease, Blood Disease, GASTROINTESTINAL DISEASE, Musculoskeletal Disorder, Neurologic Disorder, or Thyroid Disease.            MEDICATIONS  Current Outpatient Prescriptions   Medication Sig Dispense Refill     ranitidine (ZANTAC) 300 MG tablet Take 1 tablet (300 mg) by mouth At Bedtime 30 tablet 1     LANsoprazole (PREVACID) 30 MG CR capsule Take 1 pill daily if Ranitidne fails. 30 capsule 0     lisinopril (PRINIVIL/ZESTRIL) 20 MG tablet TAKE ONE-HALF TABLET BY MOUTH ONCE DAILY 90 tablet 1     Cholecalciferol (VITAMIN D3) 2000 UNITS TABS Take 1 tablet by mouth daily       Multiple Vitamin (MULTIVITAMINS PO) Take  by mouth.       Acetaminophen (TYLENOL PO) Reported on 3/8/2017       [DISCONTINUED] lisinopril (PRINIVIL,ZESTRIL) 20 MG tablet TAKE ONE-HALF TABLET BY MOUTH ONCE DAILY 90 tablet 1     Omega-3 Fatty Acids (RA FISH OIL) 1400 MG CPDR Take 1 tablet by mouth daily Reported on 3/8/2017            --------------------------------------------------------------------------------------------------------------------                          REVIEW OF SYSTEMS:         LUNGS: Pt denies: cough,excess sputum, hemoptysis, or shortness of breath.   HEART: Pt denies: chest pain, arrythmia, syncope, tachy or bradyarrhythmia or excess edema.   GI: Pt denies: nausea, vomitting, diarrhea, constipation, melena, or hematochezia. Does have epigastric tenderness and heartburn as well as reflux symptoms following meals or with reclining.   NEURO: Pt denies: seizures, strokes, diplopia, weakness, paraesthesias, or paralysis.   SKIN: Pt denies: itching, rashes, discoloration, or specific lesions of concern. Denies recent hair loss.                          EXAMINATION:         /82 (BP Location: Right arm, Patient Position: Chair, Cuff Size: Adult Large)  Pulse 114  Temp 97.3  F (36.3  C) (Temporal)  Wt 224 lb 9.6 oz (101.9 kg)  SpO2 94%  BMI 33.17 kg/m2   Constitutional: The patient appears to be in no acute distress. The patient appears to be adequately hydrated. No acute respiratory or hemodynamic distress is noted at this time.   LUNGS: clear bilaterally, airflow is brisk, no intercostal retraction or stridor is noted. No coughing is noted during visit.   HEART:  regular without rubs, clicks, gallops, or murmurs. PMI is nondisplaced. Upstrokes are brisk. S1,S2 are heard.   GI: Abdomen is soft, without rebound, guarding . Bowel sounds are appropriate. No renal bruits are heard. Epigastric tenderness is present and reproducible with palpation.   NEURO: Pt is alert and appropriate. No neurologic lateralization is noted. Cranial nerves 2-12 are intact. Peripheral sensory and motor function are grossly normal                        DECISION MAKIN. History of colonic polyps  We'll set up gastroenterology for colonoscopy  - GASTROENTEROLOGY ADULT REF PROCEDURE ONLY    2. Gastroesophageal reflux disease with  esophagitis  Recommend EGD to evaluate presence or recurrence of ulcer disease  - GASTROENTEROLOGY ADULT REF PROCEDURE ONLY  - ranitidine (ZANTAC) 300 MG tablet; Take 1 tablet (300 mg) by mouth At Bedtime  Dispense: 30 tablet; Refill: 1  - LANsoprazole (PREVACID) 30 MG CR capsule; Take 1 pill daily if Ranitidne fails.  Dispense: 30 capsule; Refill: 0    3. History of gastric ulcer  As above    4. KERRY (generalized anxiety disorder)  Patient no longer taking hydroxyzine. Patient no longer taking Zoloft    5. HTN, goal below 140/90    - lisinopril (PRINIVIL/ZESTRIL) 20 MG tablet; TAKE ONE-HALF TABLET BY MOUTH ONCE DAILY  Dispense: 90 tablet; Refill: 1    6. Chronic gastritis without bleeding, unspecified gastritis type  Recommend using H2 blocker as possible with when necessary use of PPI  - LANsoprazole (PREVACID) 30 MG CR capsule; Take 1 pill daily if Ranitidne fails.  Dispense: 30 capsule; Refill: 0                           FOLLOW UP    I have asked the patient to make an appointment for follow up with me following the endoscopies        I have carefully explained the diagnosis and treatment options with the patient. The patient has displayed an understanding of the above, and all subsequent questions were answered.         DO PATEL Steve    Portions of this note were produced using [employer]  Although every attempt at real-time proof reading has been made, occasional grammar/syntax errors may have been missed.

## 2017-03-08 NOTE — MR AVS SNAPSHOT
After Visit Summary   3/8/2017    Hugo Longoria    MRN: [id number]           Patient Information     Date Of Birth          1960        Visit Information        Provider Department      3/8/2017 2:00 PM Viktor Gomez [facility]        Today's Diagnoses     History of colonic polyps    -  1    Gastroesophageal reflux disease with esophagitis        History of gastric ulcer        KERRY (generalized anxiety disorder)        HTN, goal below 140/90        Chronic gastritis without bleeding, unspecified gastritis type           Follow-ups after your visit        Additional Services     GASTROENTEROLOGY ADULT REF PROCEDURE ONLY       Last Lab Result: Creatinine (mg/dL)       Date                     Value                 01/09/2017               0.78             ----------  Body mass index is 33.17 kg/(m^2).     Needed:  No  Language:  English    Patient will be contacted to schedule procedure.     Please be aware that coverage of these services is subject to the terms and limitations of your health insurance plan.  Call member services at your health plan with any benefit or coverage questions.  Any procedures must be performed at a [facility] facility OR coordinated by your clinic's referral office.    Please bring the following with you to your appointment:    (1) Any X-Rays, CTs or MRIs which have been performed.  Contact the facility where they were done to arrange for  prior to your scheduled appointment.    (2) List of current medications   (3) This referral request   (4) Any documents/labs given to you for this referral                  Your next 10 appointments already scheduled     Mar 13, 2017  8:20 AM CDT   [facility]    [address] [place] [address] [address]  [facility] [address]   [phone]            Mar 13, 2017  9:20 AM CDT   SAMIA Extremity with Will  Torey PT   [facility] ([facility]  )    [address]  [place] [address]   [phone]            Mar 17, 2017  9:00 AM CDT   SAMIA Extremity with Dwight High PT   [facility] ([facility]  )    [address]  [place] [address]   [phone]            Mar 20, 2017  9:20 AM CDT   SAMIA Extremity with Will Lema PT   [facility] ([facility]  )    [address]  [place] [address]   [phone]            Mar 22, 2017   Procedure with Salvatore Zepeda MD   [facility] ([facility])    [address] [place] [address]   [phone]            Mar 23, 2017  9:00 AM CDT   SAMIA Extremity with Will Lema PT   [facility] ([facility]  )    [address]  [place] [address]   [phone]            Mar 27, 2017  9:20 AM CDT   SAMIA Extremity with Will Lema PT   [facility] ([facility]  )    [address]  [place] [address]   [phone]            Mar 30, 2017  9:00 AM CDT   SAMIA Extremity with Will Lema PT   [facility] ([facility]  )    [address]  [place] [address]   [phone]            Apr 03, 2017  5:50 PM CDT   SAMIA Extremity with Will Lema PT   [facility] ([facility]  )    [address]  [place] [address]   [phone]            Apr 05, 2017  5:50 PM CDT   SAMIA Extremity with Will Lema PT   [facility] ([facility][1]  )    [address] [facility] [address]  [facility] [address] [phone]              Future  tests that were ordered for you today     Open Future Orders        Priority Expected Expires Ordered    XR Knee Right G/E 4 Views Routine 3/13/2017 3/6/2018 3/6/2017            Who to contact     If you have questions or need follow up information about today's clinic visit or your schedule please contact [facility] directly at [phone].  Normal or non-critical lab and imaging results will be communicated to you by DealPerkhart, letter or phone within 4 business days after the clinic has received the results. If you do not hear from us within 7 days, please contact the clinic through DealPerkhart or phone. If you have a critical or abnormal lab result, we will notify you by phone as soon as possible.  Submit refill requests through Nuroa or call your pharmacy and they will forward the refill request to us. Please allow 3 business days for your refill to be completed.          Additional Information About Your Visit        DealPerkharAtheroMed Information     Nuroa gives you secure access to your electronic health record. If you see a primary care provider, you can also send messages to your care team and make appointments. If you have questions, please call your primary care clinic.  If you do not have a primary care provider, please call [phone] and they will assist you.        Care EveryWhere ID     This is your Care EveryWhere ID. This could be used by other organizations to access your [facility] medical records  [id number]        Your Vitals Were     Pulse Temperature Pulse Oximetry BMI (Body Mass Index)          114 97.3  F (36.3  C) (Temporal) 94% 33.17 kg/m2         Blood Pressure from Last 3 Encounters:   03/08/17 112/82   01/13/17 133/86   01/09/17 142/86    Weight from Last 3 Encounters:   03/08/17 224 lb 9.6 oz (101.9 kg)   03/06/17 227 lb (103 kg)   01/09/17 225 lb (102.1 kg)              We Performed the Following     GASTROENTEROLOGY ADULT REF PROCEDURE ONLY          Today's Medication Changes           These changes are accurate as of: 3/8/17  4:53 PM.  If you have any questions, ask your nurse or doctor.               Start taking these medicines.        Dose/Directions    ranitidine 300 MG tablet   Commonly known as:  ZANTAC   Used for:  Gastroesophageal reflux disease with esophagitis   Started by:  Viktor Gomez DO        Dose:  300 mg   Take 1 tablet (300 mg) by mouth At Bedtime   Quantity:  30 tablet   Refills:  1         These medicines have changed or have updated prescriptions.        Dose/Directions    LANsoprazole 30 MG CR capsule   Commonly known as:  PREVACID   This may have changed:  See the new instructions.   Used for:  Chronic gastritis without bleeding, unspecified gastritis type, Gastroesophageal reflux disease with esophagitis   Changed by:  Viktor Gomez DO        Take 1 pill daily if Ranitidne fails.   Quantity:  30 capsule   Refills:  0       lisinopril 20 MG tablet   Commonly known as:  PRINIVIL/ZESTRIL   This may have changed:  See the new instructions.   Used for:  HTN, goal below 140/90   Changed by:  Viktor Gomez DO        TAKE ONE-HALF TABLET BY MOUTH ONCE DAILY   Quantity:  90 tablet   Refills:  1         Stop taking these medicines if you haven't already. Please contact your care team if you have questions.     hydrOXYzine 25 MG tablet   Commonly known as:  ATARAX   Stopped by:  Viktor Gomez DO           sertraline 50 MG tablet   Commonly known as:  ZOLOFT   Stopped by:  Viktor Gomez DO                Where to get your medicines      These medications were sent to [facility] [place] - [address]  [address] [facility] [address]     Phone:  [phone]     LANsoprazole 30 MG CR capsule    lisinopril 20 MG tablet    ranitidine 300 MG tablet                Primary Care Provider Office Phone # Fax #    Viktor Gomez [phone]        [address] [place] [address]        Thank you!     Thank you for choosing [facility]  for your care. Our goal is always to provide you with excellent care. Hearing back from our patients is one way we can continue to improve our services. Please take a few minutes to complete the written survey that you may receive in the mail after your visit with us. Thank you!             Your Updated Medication List - Protect others around you: Learn how to safely use, store and throw away your medicines at www.disposemymeds.org.          This list is accurate as of: 3/8/17  4:53 PM.  Always use your most recent med list.                   Brand Name Dispense Instructions for use    LANsoprazole 30 MG CR capsule    PREVACID    30 capsule    Take 1 pill daily if Ranitidne fails.       lisinopril 20 MG tablet    PRINIVIL/ZESTRIL    90 tablet    TAKE ONE-HALF TABLET BY MOUTH ONCE DAILY       MULTIVITAMINS PO      Take  by mouth.       RA FISH OIL 1400 MG Cpdr      Take 1 tablet by mouth daily Reported on 3/8/2017       ranitidine 300 MG tablet    ZANTAC    30 tablet    Take 1 tablet (300 mg) by mouth At Bedtime       TYLENOL PO      Reported on 3/8/2017       Vitamin D3 2000 UNITS Tabs      Take 1 tablet by mouth daily

## 2017-03-08 NOTE — LETTER

## 2017-03-13 ENCOUNTER — OFFICE VISIT (OUTPATIENT)
Dept: ORTHOPEDICS | Facility: OTHER | Age: 57
End: 2017-03-13
Payer: COMMERCIAL

## 2017-03-13 ENCOUNTER — THERAPY VISIT (OUTPATIENT)
Dept: PHYSICAL THERAPY | Facility: CLINIC | Age: 57
End: 2017-03-13
Payer: COMMERCIAL

## 2017-03-13 ENCOUNTER — RADIANT APPOINTMENT (OUTPATIENT)
Dept: GENERAL RADIOLOGY | Facility: OTHER | Age: 57
End: 2017-03-13
Attending: ORTHOPAEDIC SURGERY
Payer: COMMERCIAL

## 2017-03-13 VITALS — TEMPERATURE: 97.6 F | HEIGHT: 69 IN | BODY MASS INDEX: 33.62 KG/M2 | WEIGHT: 227 LBS

## 2017-03-13 DIAGNOSIS — M17.11 PRIMARY OSTEOARTHRITIS OF RIGHT KNEE: Primary | ICD-10-CM

## 2017-03-13 DIAGNOSIS — M25.511 ACUTE PAIN OF RIGHT SHOULDER: ICD-10-CM

## 2017-03-13 DIAGNOSIS — Z98.890 S/P ROTATOR CUFF REPAIR: ICD-10-CM

## 2017-03-13 DIAGNOSIS — M25.561 RIGHT KNEE PAIN: ICD-10-CM

## 2017-03-13 PROCEDURE — 97110 THERAPEUTIC EXERCISES: CPT | Mod: GP | Performed by: PHYSICAL THERAPIST

## 2017-03-13 PROCEDURE — 97140 MANUAL THERAPY 1/> REGIONS: CPT | Mod: GP | Performed by: PHYSICAL THERAPIST

## 2017-03-13 PROCEDURE — 99213 OFFICE O/P EST LOW 20 MIN: CPT | Mod: 24 | Performed by: ORTHOPAEDIC SURGERY

## 2017-03-13 PROCEDURE — 73564 X-RAY EXAM KNEE 4 OR MORE: CPT | Mod: RT

## 2017-03-13 PROCEDURE — 20610 DRAIN/INJ JOINT/BURSA W/O US: CPT | Mod: 79 | Performed by: ORTHOPAEDIC SURGERY

## 2017-03-13 RX ORDER — TRIAMCINOLONE ACETONIDE 40 MG/ML
40 INJECTION, SUSPENSION INTRA-ARTICULAR; INTRAMUSCULAR ONCE
Qty: 1 ML | Refills: 0
Start: 2017-03-13 | End: 2017-03-13

## 2017-03-13 ASSESSMENT — PAIN SCALES - GENERAL: PAINLEVEL: NO PAIN (0)

## 2017-03-13 NOTE — MR AVS SNAPSHOT
After Visit Summary   3/13/2017    Hugo Longoria    MRN: [id number]           Patient Information     Date Of Birth          1960        Visit Information        Provider Department      3/13/2017 8:20 AM Javier Yin DO [facility]        Today's Diagnoses     Primary osteoarthritis of right knee    -  1       Follow-ups after your visit        Your next 10 appointments already scheduled     Mar 13, 2017  9:20 AM CDT   SAMIA Extremity with Will Lema PT   [facility] ([facility]  )    [address]  [place] [address]   [phone]            Mar 17, 2017  9:00 AM CDT   SAMIA Extremity with Dwight High PT   [facility] ([facility]  )    [address]  [place] [address]   [phone]            Mar 20, 2017  9:20 AM CDT   SAMIA Extremity with Will Lema PT   [facility] ([facility]  )    [address]  [place] [address]   [phone]            Mar 22, 2017   Procedure with Salvatore Zepeda MD   [facility] ([facility])    [address] [place] [address]   [phone]            Mar 23, 2017  9:00 AM CDT   SAMIA Extremity with Will Lema PT   [facility] ([facility]  )    [address]  [place] [address]   [phone]            Mar 27, 2017  9:20 AM CDT   SAMIA Extremity with Will Lema PT   [facility] ([facility]  )    [address]  [place] [address]   [phone]            Mar 30, 2017  9:00 AM CDT   SAMIA Extremity with Will Lema PT   [facility] ([facility]  )    [address]  [place]  [place] [address]   [phone]            Apr 03, 2017  5:50 PM CDT   SAMIA Extremity with Will Lema PT   [facility] ([facility]  )    [address]  [facility] [address]   [phone]            Apr 05, 2017  5:50 PM CDT   SAMIA Extremity with Will Lema PT   [facility] ([facility]  )    [address]  [facility] [address]   [phone]            Apr 12, 2017  5:50 PM CDT   SAMIA Extremity with Will Lema PT   [facility] ([facility]  )    [address]  [facility] [address]   [phone]              Who to contact     If you have questions or need follow up information about today's clinic visit or your schedule please contact [facility] directly at [phone].  Normal or non-critical lab and imaging results will be communicated to you by Amber Networkshart, letter or phone within 4 business days after the clinic has received the results. If you do not hear from us within 7 days, please contact the clinic through Joyust or phone. If you have a critical or abnormal lab result, we will notify you by phone as soon as possible.  Submit refill requests through [email or web address] or call your pharmacy and they will forward the refill request to us. Please allow 3 business days for your refill to be completed.          Additional Information About Your Visit        Amber Networkshart Information     [email or web address] gives you secure access to your electronic health record. If you see a primary care provider, you can also send messages to your care team and make appointments. If you have questions, please call your primary care clinic.  If you do not have a primary care provider, please call [phone] and they will assist you.        Care EveryWhere ID     This is your Care EveryWhere ID. This could be used by other organizations to  "access your [facility] medical records  [id number]        Your Vitals Were     Temperature Height BMI (Body Mass Index)             97.6  F (36.4  C) (Temporal) 5' 9\" (1.753 m) 33.52 kg/m2          Blood Pressure from Last 3 Encounters:   03/08/17 112/82   01/13/17 133/86   01/09/17 142/86    Weight from Last 3 Encounters:   03/13/17 227 lb (103 kg)   03/08/17 224 lb 9.6 oz (101.9 kg)   03/06/17 227 lb (103 kg)              We Performed the Following     Kenalog 40 MG  []     Large Joint/Bursa injection and/or drainage (Shoulder, Knee)          Today's Medication Changes          These changes are accurate as of: 3/13/17  8:51 AM.  If you have any questions, ask your nurse or doctor.               Start taking these medicines.        Dose/Directions    triamcinolone acetonide 40 MG/ML injection   Commonly known as:  KENALOG   Used for:  Primary osteoarthritis of right knee   Started by:  Javier Yin DO        Dose:  40 mg   1 mL (40 mg) by INTRA-ARTICULAR route once for 1 dose   Quantity:  1 mL   Refills:  0            Where to get your medicines      Some of these will need a paper prescription and others can be bought over the counter.  Ask your nurse if you have questions.     You don't need a prescription for these medications     triamcinolone acetonide 40 MG/ML injection                Primary Care Provider Office Phone # Fax #    Viktor Leighton Gomez [phone]       [address] [place] [address]        Thank you!     Thank you for choosing [facility]  for your care. Our goal is always to provide you with excellent care. Hearing back from our patients is one way we can continue to improve our services. Please take a few minutes to complete the written survey that you may receive in the mail after your visit with us. Thank you!             Your Updated Medication List - Protect others around you: Learn how to safely use, " store and throw away your medicines at www.disposemymeds.org.          This list is accurate as of: 3/13/17  8:51 AM.  Always use your most recent med list.                   Brand Name Dispense Instructions for use    LANsoprazole 30 MG CR capsule    PREVACID    30 capsule    Take 1 pill daily if Ranitidne fails.       lisinopril 20 MG tablet    PRINIVIL/ZESTRIL    90 tablet    TAKE ONE-HALF TABLET BY MOUTH ONCE DAILY       MULTIVITAMINS PO      Take  by mouth.       RA FISH OIL 1400 MG Cpdr      Take 1 tablet by mouth daily Reported on 3/8/2017       ranitidine 300 MG tablet    ZANTAC    30 tablet    Take 1 tablet (300 mg) by mouth At Bedtime       triamcinolone acetonide 40 MG/ML injection    KENALOG    1 mL    1 mL (40 mg) by INTRA-ARTICULAR route once for 1 dose       Vitamin D3 2000 UNITS Tabs      Take 1 tablet by mouth daily

## 2017-03-13 NOTE — NURSING NOTE
"Chief Complaint   Patient presents with     Knee Pain     Right knee pain       Initial Temp 97.6  F (36.4  C) (Temporal)  Ht 5' 9\" (1.753 m)  Wt 227 lb (103 kg)  BMI 33.52 kg/m2 Estimated body mass index is 33.52 kg/(m^2) as calculated from the following:    Height as of this encounter: 5' 9\" (1.753 m).    Weight as of this encounter: 227 lb (103 kg).  Medication Reconciliation: complete     Keira Kahn CMA (AAMA)      "

## 2017-03-13 NOTE — PROGRESS NOTES
"Office Visit-Follow up    Chief Complaint: Hugo Longoria is a 56 year old male who is being seen for   Chief Complaint   Patient presents with     Knee Pain     Right knee pain       History of Present Illness:    complains of right medial knee pain. he reports tearing is anterior cruciate ligament many years ago   he had an arthroscopy with a \"cleaning the cartilage out\". He's had previous steroid injections which have been helpful. He is also undergone Synvisc injections.  REVIEW OF SYSTEMS  General: negative for, night sweats, dizziness, fatigue  Resp: No shortness of breath and no cough  CV: negative for chest pain, syncope or near-syncope  GI: negative for nausea, vomiting and diarrhea  : negative for dysuria and hematuria  Musculoskeletal: as above  Neurologic: negative for syncope   Hematologic: negative for bleeding disorder    Physical Exam:  Vitals: Temp 97.6  F (36.4  C) (Temporal)  Ht 5' 9\" (1.753 m)  Wt 227 lb (103 kg)  BMI 33.52 kg/m2  BMI= Body mass index is 33.52 kg/(m^2).  Constitutional: healthy, alert and no acute distress   Psychiatric: mentation appears normal and affect normal/bright  NEURO: no focal deficits  RESP: Normal with easy respirations and no use of accessory muscles to breathe, no audible wheezing or retractions  CV: RLE: no edema           SKIN: No erythema, rashes, excoriation, or breakdown. No evidence of infection.   JOINT/EXTREMITIES:right  Knee: varus  Deformity. small effusion. pseudo laxity medial. Collateral ligaments are intact. positive  Lachman's. Patella tracks midline  GAIT: non-antalgic            Diagnostic Modalities:  right knee X-ray: varus alignment medial compartment:  with bone on bone wear lateral compartment:  with minimal joint space narrowing patellofemoral compartment:  with moderate joint space narrowing  Independent visualization of the images was performed.      Impression: right  Knee primary osteoarthritis    Plan:  All of the above pertinent " physical exam and imaging modalities findings was reviewed with Hugo.                                          INJECTION PROCEDURE:  The patient was counseled about an  injection, including discussion of risks (including infection), contents of the injection, rationale for performing the injection, and expected benefits of the injection. The skin was prepped with alcohol and betadine and then utilizing sterile technique an injection of the right knee joint from the anterolateral approach in the seated position was performed. The injection consisted 1ml of Kenalog (40mg per 1 ml) mixed with 1ml of 0.5% Marcaine. The patient tolerated the injection well, and there were no complications. The injection site was covered with a Band-Aid. The injection was performed by Gerber Yin D.O.    Previous injections were helpful. Recommend repeat injection      Return to clinic 4-6 , week(s), or sooner as needed for changes.  Re-x-ray on return: No    Gerber Yin D.O.

## 2017-03-17 ENCOUNTER — THERAPY VISIT (OUTPATIENT)
Dept: PHYSICAL THERAPY | Facility: CLINIC | Age: 57
End: 2017-03-17
Payer: COMMERCIAL

## 2017-03-17 DIAGNOSIS — Z98.890 S/P ROTATOR CUFF REPAIR: ICD-10-CM

## 2017-03-17 DIAGNOSIS — M25.511 ACUTE PAIN OF RIGHT SHOULDER: ICD-10-CM

## 2017-03-17 PROCEDURE — 97112 NEUROMUSCULAR REEDUCATION: CPT | Mod: GP | Performed by: PHYSICAL THERAPIST

## 2017-03-17 PROCEDURE — 97110 THERAPEUTIC EXERCISES: CPT | Mod: GP | Performed by: PHYSICAL THERAPIST

## 2017-03-20 ENCOUNTER — THERAPY VISIT (OUTPATIENT)
Dept: PHYSICAL THERAPY | Facility: CLINIC | Age: 57
End: 2017-03-20
Payer: COMMERCIAL

## 2017-03-20 DIAGNOSIS — Z98.890 S/P ROTATOR CUFF REPAIR: ICD-10-CM

## 2017-03-20 DIAGNOSIS — M25.511 ACUTE PAIN OF RIGHT SHOULDER: ICD-10-CM

## 2017-03-20 PROCEDURE — 97140 MANUAL THERAPY 1/> REGIONS: CPT | Mod: GP | Performed by: PHYSICAL THERAPIST

## 2017-03-20 PROCEDURE — 97110 THERAPEUTIC EXERCISES: CPT | Mod: GP | Performed by: PHYSICAL THERAPIST

## 2017-03-20 PROCEDURE — 97112 NEUROMUSCULAR REEDUCATION: CPT | Mod: GP | Performed by: PHYSICAL THERAPIST

## 2017-03-22 ENCOUNTER — HOSPITAL ENCOUNTER (OUTPATIENT)
Facility: CLINIC | Age: 57
Discharge: HOME | End: 2017-03-22
Attending: INTERNAL MEDICINE | Admitting: INTERNAL MEDICINE
Payer: COMMERCIAL

## 2017-03-22 ENCOUNTER — SURGERY (OUTPATIENT)
Age: 57
End: 2017-03-22

## 2017-03-22 VITALS
RESPIRATION RATE: 14 BRPM | SYSTOLIC BLOOD PRESSURE: 137 MMHG | OXYGEN SATURATION: 94 % | DIASTOLIC BLOOD PRESSURE: 89 MMHG | TEMPERATURE: 98 F

## 2017-03-22 LAB
COLONOSCOPY: NORMAL
UPPER GI ENDOSCOPY: NORMAL

## 2017-03-22 PROCEDURE — 88305 TISSUE EXAM BY PATHOLOGIST: CPT | Mod: 26 | Performed by: INTERNAL MEDICINE

## 2017-03-22 PROCEDURE — 40000297 ZZH STATISTIC ENDO RECOVERY CLASS 1:2 EACH ADDL HOUR: Performed by: INTERNAL MEDICINE

## 2017-03-22 PROCEDURE — 25000125 ZZHC RX 250: Performed by: INTERNAL MEDICINE

## 2017-03-22 PROCEDURE — 25000128 H RX IP 250 OP 636: Performed by: INTERNAL MEDICINE

## 2017-03-22 PROCEDURE — 43239 EGD BIOPSY SINGLE/MULTIPLE: CPT | Performed by: INTERNAL MEDICINE

## 2017-03-22 PROCEDURE — 88305 TISSUE EXAM BY PATHOLOGIST: CPT | Performed by: INTERNAL MEDICINE

## 2017-03-22 PROCEDURE — 40000296 ZZH STATISTIC ENDO RECOVERY CLASS 1:2 FIRST HOUR: Performed by: INTERNAL MEDICINE

## 2017-03-22 PROCEDURE — 45380 COLONOSCOPY AND BIOPSY: CPT | Mod: PT | Performed by: INTERNAL MEDICINE

## 2017-03-22 RX ORDER — FENTANYL CITRATE 50 UG/ML
INJECTION, SOLUTION INTRAMUSCULAR; INTRAVENOUS PRN
Status: DISCONTINUED | OUTPATIENT
Start: 2017-03-22 | End: 2017-03-22 | Stop reason: HOSPADM

## 2017-03-22 RX ORDER — LIDOCAINE 40 MG/G
CREAM TOPICAL
Status: DISCONTINUED | OUTPATIENT
Start: 2017-03-22 | End: 2017-03-22 | Stop reason: HOSPADM

## 2017-03-22 RX ORDER — ONDANSETRON 2 MG/ML
4 INJECTION INTRAMUSCULAR; INTRAVENOUS
Status: DISCONTINUED | OUTPATIENT
Start: 2017-03-22 | End: 2017-03-22 | Stop reason: HOSPADM

## 2017-03-22 RX ADMIN — MIDAZOLAM HYDROCHLORIDE 1 MG: 1 INJECTION, SOLUTION INTRAMUSCULAR; INTRAVENOUS at 13:22

## 2017-03-22 RX ADMIN — FENTANYL CITRATE 50 MCG: 50 INJECTION, SOLUTION INTRAMUSCULAR; INTRAVENOUS at 13:06

## 2017-03-22 RX ADMIN — MIDAZOLAM HYDROCHLORIDE 2 MG: 1 INJECTION, SOLUTION INTRAMUSCULAR; INTRAVENOUS at 13:06

## 2017-03-22 RX ADMIN — MIDAZOLAM HYDROCHLORIDE 1 MG: 1 INJECTION, SOLUTION INTRAMUSCULAR; INTRAVENOUS at 13:07

## 2017-03-22 RX ADMIN — FENTANYL CITRATE 50 MCG: 50 INJECTION, SOLUTION INTRAMUSCULAR; INTRAVENOUS at 13:10

## 2017-03-22 RX ADMIN — MIDAZOLAM HYDROCHLORIDE 2 MG: 1 INJECTION, SOLUTION INTRAMUSCULAR; INTRAVENOUS at 13:21

## 2017-03-22 RX ADMIN — MIDAZOLAM HYDROCHLORIDE 1 MG: 1 INJECTION, SOLUTION INTRAMUSCULAR; INTRAVENOUS at 13:08

## 2017-03-22 RX ADMIN — LIDOCAINE HYDROCHLORIDE 5 ML: 20 SOLUTION ORAL; TOPICAL at 13:05

## 2017-03-22 RX ADMIN — MIDAZOLAM HYDROCHLORIDE 1 MG: 1 INJECTION, SOLUTION INTRAMUSCULAR; INTRAVENOUS at 13:10

## 2017-03-22 NOTE — CONSULTS
"[facility] GI Pre-Procedure Physical Assessment    Hugo Longoria MRN# [id number]   Age: 56 year old YOB: 1960      Date of Surgery: 3/22/2017  Location [facility]      Date of Exam 3/22/2017 Facility (Same day)       Home clinic: [facility]  Primary care provider: Viktor Gomez         Active problem list:   Patient Active Problem List   Diagnosis     History of colonic polyps     KERRY (generalized anxiety disorder)     CARDIOVASCULAR SCREENING; LDL GOAL LESS THAN 130     History of tobacco use: 1980 - 1990     Hemorrhoids     Erectile dysfunction     HTN, goal below 140/90     Fatigue     ACL tear     History of gastric ulcer     Chronic gastric ulcer     Seasonal allergic rhinitis     Complete tear of right rotator cuff     Rupture long head biceps tendon, right, initial encounter     Subacromial impingement of right shoulder     Mild episode of recurrent major depressive disorder (H)     Advanced directives, counseling/discussion     Acute pain of right shoulder     S/P rotator cuff repair     Primary osteoarthritis of right knee            Medications (include herbals and vitamins):   Any Plavix use in the last 7 days?  No     Current Facility-Administered Medications   Medication     lidocaine 1 % 1 mL     lidocaine (LMX4) kit     sodium chloride (PF) 0.9% PF flush 3 mL     sodium chloride (PF) 0.9% PF flush 3 mL     sodium chloride (PF) 0.9% PF flush 3 mL     ondansetron (ZOFRAN) injection 4 mg             Allergies:      Allergies   Allergen Reactions     Hydrocodone Other (See Comments)     \"climbed the walls, very anxious, insomnia\"     Nsaids Other (See Comments)     Hx of stomach ulcers     Allergy to Latex?  No  Allergy to tape?    No          Social History:     Social History   Substance Use Topics     Smoking status: Former Smoker     Quit date: 1/1/1981     Smokeless tobacco: Never Used     Alcohol use 0.0 oz/week     0 Standard " drinks or equivalent per week      Comment: weekends            Physical Exam:   All vitals have been reviewed  Blood pressure (!) 141/92, temperature 98  F (36.7  C), temperature source Oral, resp. rate 16, SpO2 95 %.  Airway assessment:   Patient is able to open mouth wide  Patient is able to stick out tongue  Mallampatti classification: Class I (visualization of the soft palate, fauces, uvula, anterior and posterior pillars)      Lungs:   No increased work of breathing, good air exchange, clear to auscultation bilaterally, no crackles or wheezing      Cardiovascular:   Normal apical impulse, regular rate and rhythm, normal S1 and S2, no S3 or S4, and no murmur noted           Lab / Radiology Results:   All laboratory data reviewed          Assessment:   Appropriately NPO  Chief complaint or anatomic assessment of involved area:  EGD  GERD hx ulcer; colonoscopy history of polyps,,         Plan:   Moderate (conscious) sedation     Patient's active problems diagnostically and therapeutically optimized for the planned procedure  Risks, benefits, alternatives to sedation and blood explained and consent obtained  Risks, benefits, alternatives to procedure explained and consent obtained  P2 (patient with mild systemic disease)  Orders and progress notes are in the chart  Discharge from Phase 1 and / or Phase 2 recovery when patient meets criteria    I have reviewed the history and physical, lab finding(s), diagnostic data, medicaitons, and the plan for sedation.  I have determined this patient to be an appropriate candidate for the planned sedation / procedure and have reassessed the patient immediately prior to sedation / procedure.    I have personally and medically directed the administration of medications used.    Salvatore Zepeda MD

## 2017-03-23 ENCOUNTER — THERAPY VISIT (OUTPATIENT)
Dept: PHYSICAL THERAPY | Facility: CLINIC | Age: 57
End: 2017-03-23
Payer: COMMERCIAL

## 2017-03-23 DIAGNOSIS — Z98.890 S/P ROTATOR CUFF REPAIR: ICD-10-CM

## 2017-03-23 DIAGNOSIS — M25.511 ACUTE PAIN OF RIGHT SHOULDER: ICD-10-CM

## 2017-03-23 PROCEDURE — 97110 THERAPEUTIC EXERCISES: CPT | Mod: GP | Performed by: PHYSICAL THERAPIST

## 2017-03-23 PROCEDURE — 97140 MANUAL THERAPY 1/> REGIONS: CPT | Mod: GP | Performed by: PHYSICAL THERAPIST

## 2017-03-23 PROCEDURE — 97112 NEUROMUSCULAR REEDUCATION: CPT | Mod: GP | Performed by: PHYSICAL THERAPIST

## 2017-03-24 LAB — COPATH REPORT: NORMAL

## 2017-03-30 ENCOUNTER — THERAPY VISIT (OUTPATIENT)
Dept: PHYSICAL THERAPY | Facility: CLINIC | Age: 57
End: 2017-03-30
Payer: COMMERCIAL

## 2017-03-30 DIAGNOSIS — M25.511 ACUTE PAIN OF RIGHT SHOULDER: ICD-10-CM

## 2017-03-30 DIAGNOSIS — Z98.890 S/P ROTATOR CUFF REPAIR: ICD-10-CM

## 2017-03-30 PROCEDURE — 97112 NEUROMUSCULAR REEDUCATION: CPT | Mod: GP | Performed by: PHYSICAL THERAPIST

## 2017-03-30 PROCEDURE — 97110 THERAPEUTIC EXERCISES: CPT | Mod: GP | Performed by: PHYSICAL THERAPIST

## 2017-04-05 ENCOUNTER — THERAPY VISIT (OUTPATIENT)
Dept: PHYSICAL THERAPY | Facility: CLINIC | Age: 57
End: 2017-04-05
Payer: COMMERCIAL

## 2017-04-05 DIAGNOSIS — Z98.890 S/P ROTATOR CUFF REPAIR: ICD-10-CM

## 2017-04-05 DIAGNOSIS — M25.511 ACUTE PAIN OF RIGHT SHOULDER: ICD-10-CM

## 2017-04-05 PROCEDURE — 97110 THERAPEUTIC EXERCISES: CPT | Mod: GP | Performed by: PHYSICAL THERAPIST

## 2017-04-05 PROCEDURE — 97112 NEUROMUSCULAR REEDUCATION: CPT | Mod: GP | Performed by: PHYSICAL THERAPIST

## 2017-04-12 ENCOUNTER — THERAPY VISIT (OUTPATIENT)
Dept: PHYSICAL THERAPY | Facility: CLINIC | Age: 57
End: 2017-04-12
Payer: COMMERCIAL

## 2017-04-12 DIAGNOSIS — Z98.890 S/P ROTATOR CUFF REPAIR: ICD-10-CM

## 2017-04-12 DIAGNOSIS — M25.511 ACUTE PAIN OF RIGHT SHOULDER: ICD-10-CM

## 2017-04-12 PROCEDURE — 97112 NEUROMUSCULAR REEDUCATION: CPT | Mod: GP | Performed by: PHYSICAL THERAPIST

## 2017-04-12 PROCEDURE — 97110 THERAPEUTIC EXERCISES: CPT | Mod: GP | Performed by: PHYSICAL THERAPIST

## 2017-04-19 ENCOUNTER — THERAPY VISIT (OUTPATIENT)
Dept: PHYSICAL THERAPY | Facility: CLINIC | Age: 57
End: 2017-04-19
Payer: COMMERCIAL

## 2017-04-19 DIAGNOSIS — M25.511 ACUTE PAIN OF RIGHT SHOULDER: ICD-10-CM

## 2017-04-19 DIAGNOSIS — Z98.890 S/P ROTATOR CUFF REPAIR: ICD-10-CM

## 2017-04-19 PROCEDURE — 97110 THERAPEUTIC EXERCISES: CPT | Mod: GP | Performed by: PHYSICAL THERAPIST

## 2017-04-19 PROCEDURE — 97112 NEUROMUSCULAR REEDUCATION: CPT | Mod: GP | Performed by: PHYSICAL THERAPIST

## 2017-04-19 NOTE — PROGRESS NOTES
"Subjective:    HPI                    Objective:    System    Physical Exam    General     ROS    Assessment/Plan:      PROGRESS  REPORT    Progress reporting period is from 3-2-17 to 4-19-17.       SUBJECTIVE  Subjective: No complaints, pain with movement and use only. Pain is located in the AC joint region and down the biceps. Pain free at rest, \"feels it\" soreness with use.     Current pain level is 0/10 Current Pain level: 0/10.     Previous pain level was  0/10 Initial Pain level: 2/10.   Changes in function:  Yes (See Goal flowsheet attached for changes in current functional level)  Adverse reaction to treatment or activity: None    OBJECTIVE  Changes noted in objective findings:    Objective: AROM flexion to 150, scaption to 134, ER to 70, IR to T11.  Pt leading elevation with scapula and shoulder shrug, leading with scapular motion.      ASSESSMENT/PLAN  Updated problem list and treatment plan: Diagnosis 1:  S/p RCR R    Decreased ROM/flexibility - manual therapy, therapeutic exercise and home program  Decreased joint mobility - manual therapy, therapeutic exercise and home program  Decreased strength - therapeutic exercise, therapeutic activities and home program  Inflammation - self management/home program  Impaired muscle performance - neuro re-education and home program  Decreased function - therapeutic activities and home program  Impaired posture - neuro re-education and home program  STG/LTGs have been met or progress has been made towards goals:  Yes (See Goal flow sheet completed today.)  Assessment of Progress: The patient's condition is improving.  The patient's condition has potential to improve.  Self Management Plans:  Patient has been instructed in a home treatment program.  Patient  has been instructed in self management of symptoms.    Hugo continues to require the following intervention to meet STG and LTG's:  PT    Recommendations:  This patient would benefit from continued therapy.   "   Frequency:  1 X week, once daily  Duration:  for 8 weeks        Please refer to the daily flowsheet for treatment today, total treatment time and time spent performing 1:1 timed codes.

## 2017-04-19 NOTE — MR AVS SNAPSHOT
After Visit Summary   4/19/2017    Hugo Longoria    MRN: [id number]           Patient Information     Date Of Birth          1960        Visit Information        Provider Department      4/19/2017 5:50 PM Will Lema, PT [facility] [facility]        Today's Diagnoses     Acute pain of right shoulder        S/P rotator cuff repair           Follow-ups after your visit        Your next 10 appointments already scheduled     Apr 20, 2017  1:10 PM CDT   Return Visit with Javier Yin DO   [facility] ([facility])    [address] [place] [address]  [address]  [facility] [place] [address]   [phone]            Apr 26, 2017  5:50 PM CDT   SAMIA Extremity with Will Lema PT   [facility] [facility] ([facility]  )    [address]  [place] [address]   [phone]            May 03, 2017  5:50 PM CDT   SAMIA Extremity with Will Lema PT   [facility] [facility] ([facility]  )    [address]  [place] [address]   [phone]            May 10, 2017  5:50 PM CDT   SAMIA Extremity with Will Lema PT   [facility] [facility] ([facility]  )    [address]  [place] [address]   [phone]            May 17, 2017  5:50 PM CDT   SAMIA Extremity with Will Lema PT   [facility] [facility] ([facility]  )    [address]  [place] [address]   [phone]              Who to contact     If you have questions or need follow up information about today's clinic visit or your schedule please contact [facility] directly at [phone].  Normal or non-critical lab and imaging results will be communicated to you by MyChart, letter or phone within 4 business  days after the clinic has received the results. If you do not hear from us within 7 days, please contact the clinic through [employer] or phone. If you have a critical or abnormal lab result, we will notify you by phone as soon as possible.  Submit refill requests through [employer] or call your pharmacy and they will forward the refill request to us. Please allow 3 business days for your refill to be completed.          Additional Information About Your Visit        [employer] Information     [employer] gives you secure access to your electronic health record. If you see a primary care provider, you can also send messages to your care team and make appointments. If you have questions, please call your primary care clinic.  If you do not have a primary care provider, please call [phone] and they will assist you.        Care EveryWhere ID     This is your Care EveryWhere ID. This could be used by other organizations to access your [place] medical records  [id number]         Blood Pressure from Last 3 Encounters:   03/22/17 137/89   03/08/17 112/82   01/13/17 133/86    Weight from Last 3 Encounters:   03/13/17 103 kg (227 lb)   03/08/17 101.9 kg (224 lb 9.6 oz)   03/06/17 103 kg (227 lb)              We Performed the Following     NEUROMUSCULAR RE-EDUCATION     THERAPEUTIC EXERCISES        Primary Care Provider Office Phone # Fax #    Viktor Hastingslucia  [phone]       [address]   [facility] [address]        Thank you!     Thank you for choosing [facility]  for your care. Our goal is always to provide you with excellent care. Hearing back from our patients is one way we can continue to improve our services. Please take a few minutes to complete the written survey that you may receive in the mail after your visit with us. Thank you!             Your Updated Medication List - Protect others around you: Learn how to safely  use, store and throw away your medicines at www.disposemymeds.org.          This list is accurate as of: 4/19/17  6:35 PM.  Always use your most recent med list.                   Brand Name Dispense Instructions for use    LANsoprazole 30 MG CR capsule    PREVACID    30 capsule    Take 1 pill daily if Ranitidne fails.       lisinopril 20 MG tablet    PRINIVIL/ZESTRIL    90 tablet    TAKE ONE-HALF TABLET BY MOUTH ONCE DAILY       MULTIVITAMINS PO      Take  by mouth.       RA FISH OIL 1400 MG Cpdr      Take 1 tablet by mouth daily Reported on 3/8/2017       ranitidine 300 MG tablet    ZANTAC    30 tablet    Take 1 tablet (300 mg) by mouth At Bedtime       Vitamin D3 2000 UNITS Tabs      Take 1 tablet by mouth daily

## 2017-04-20 ENCOUNTER — OFFICE VISIT (OUTPATIENT)
Dept: ORTHOPEDICS | Facility: OTHER | Age: 57
End: 2017-04-20
Payer: COMMERCIAL

## 2017-04-20 VITALS — TEMPERATURE: 98.8 F | BODY MASS INDEX: 33.77 KG/M2 | HEIGHT: 69 IN | WEIGHT: 228 LBS

## 2017-04-20 DIAGNOSIS — M75.121 COMPLETE TEAR OF RIGHT ROTATOR CUFF: Primary | ICD-10-CM

## 2017-04-20 DIAGNOSIS — K29.50 CHRONIC GASTRITIS WITHOUT BLEEDING, UNSPECIFIED GASTRITIS TYPE: ICD-10-CM

## 2017-04-20 PROCEDURE — 99212 OFFICE O/P EST SF 10 MIN: CPT | Performed by: ORTHOPAEDIC SURGERY

## 2017-04-20 ASSESSMENT — PAIN SCALES - GENERAL: PAINLEVEL: NO PAIN (0)

## 2017-04-20 NOTE — MR AVS SNAPSHOT
After Visit Summary   4/20/2017    Hugo Longoria    MRN: [id number]           Patient Information     Date Of Birth          1960        Visit Information        Provider Department      4/20/2017 1:10 PM Javier Yin,  [facility]        Today's Diagnoses     Complete tear of right rotator cuff    -  1       Follow-ups after your visit        Your next 10 appointments already scheduled     Apr 26, 2017  5:50 PM CDT   SAMIA Extremity with Will Lema PT   [place] [facility] ([facility]  )    [address]  [facility] [address]   [phone]            May 03, 2017  5:50 PM CDT   SAMIA Extremity with Will Lema PT   [facility] ([facility]  )    [address]  [facility] [address]   [phone]            May 10, 2017  5:50 PM CDT   SAMIA Extremity with Will Lema PT   [facility] ([facility]  )    [address]  [facility] [address]   [phone]            May 17, 2017  5:50 PM CDT   SAMIA Extremity with Will Lema PT   [facility] ([facility]  )    [address]  [facility] [address]   [phone]              Who to contact     If you have questions or need follow up information about today's clinic visit or your schedule please contact [facility] directly at [phone].  Normal or non-critical lab and imaging results will be communicated to you by MyChart, letter or phone within 4 business days after the clinic has received the results. If you do not hear from us within 7 days, please contact the clinic through MyChart or phone. If you have a critical or abnormal lab result, we will notify you by phone as soon as possible.  Submit refill requests through [employer] or call your  "pharmacy and they will forward the refill request to us. Please allow 3 business days for your refill to be completed.          Additional Information About Your Visit        MyChart Information     [email or web address] gives you secure access to your electronic health record. If you see a primary care provider, you can also send messages to your care team and make appointments. If you have questions, please call your primary care clinic.  If you do not have a primary care provider, please call [phone] and they will assist you.        Care EveryWhere ID     This is your Care EveryWhere ID. This could be used by other organizations to access your [place] medical records  [id number]        Your Vitals Were     Temperature Height BMI (Body Mass Index)             98.8  F (37.1  C) (Temporal) 5' 9\" (1.753 m) 33.67 kg/m2          Blood Pressure from Last 3 Encounters:   03/22/17 137/89   03/08/17 112/82   01/13/17 133/86    Weight from Last 3 Encounters:   04/20/17 228 lb (103.4 kg)   03/13/17 227 lb (103 kg)   03/08/17 224 lb 9.6 oz (101.9 kg)              Today, you had the following     No orders found for display       Primary Care Provider Office Phone # Fax #    Viktor Leighton Gomez [phone]       [address]   [facility] [address]        Thank you!     Thank you for choosing [facility]  for your care. Our goal is always to provide you with excellent care. Hearing back from our patients is one way we can continue to improve our services. Please take a few minutes to complete the written survey that you may receive in the mail after your visit with us. Thank you!             Your Updated Medication List - Protect others around you: Learn how to safely use, store and throw away your medicines at www.disposemymeds.org.          This list is accurate as of: 4/20/17  1:20 PM.  Always use your most recent med list.                   Brand Name Dispense " Instructions for use    LANsoprazole 30 MG CR capsule    PREVACID    30 capsule    Take 1 pill daily if Ranitidne fails.       lisinopril 20 MG tablet    PRINIVIL/ZESTRIL    90 tablet    TAKE ONE-HALF TABLET BY MOUTH ONCE DAILY       MULTIVITAMINS PO      Take  by mouth.       RA FISH OIL 1400 MG Cpdr      Take 1 tablet by mouth daily Reported on 3/8/2017       ranitidine 300 MG tablet    ZANTAC    30 tablet    Take 1 tablet (300 mg) by mouth At Bedtime       Vitamin D3 2000 UNITS Tabs      Take 1 tablet by mouth daily

## 2017-04-20 NOTE — NURSING NOTE
"Chief Complaint   Patient presents with     RECHECK     Right shoulder follow up     Surgical Followup     dos: 1/13/17~Right shoulder arthroscopy with arthroscopic rotator cuff repair of the supraspinatus (double row) and subscapularis, biceps tenodesis, subacromial decompression with partial acromioplasty~14 weeks POSTOP       Initial Temp 98.8  F (37.1  C) (Temporal)  Ht 5' 9\" (1.753 m)  Wt 228 lb (103.4 kg)  BMI 33.67 kg/m2 Estimated body mass index is 33.67 kg/(m^2) as calculated from the following:    Height as of this encounter: 5' 9\" (1.753 m).    Weight as of this encounter: 228 lb (103.4 kg).  Medication Reconciliation: lupe Piper/MANUELITO     "

## 2017-04-20 NOTE — TELEPHONE ENCOUNTER
LANsoprazole (PREVACID) 30 MG CR capsule   Last Written Prescription Date: 3/8/17  Last Fill Quantity: 30,  # refills: 0   Last Office Visit with [facility], [facility] or [facility] prescribing provider: 3/8/17

## 2017-04-20 NOTE — LETTER
4/20/2017       RE: Hugo Longoria  [address] [facility] [address]           Dear Colleague,    Thank you for referring your patient, Hugo Longoria, to the [facility]. Please see a copy of my visit note below.    Orthopedic Clinic Post-Operative Note    CHIEF COMPLAINT:   Chief Complaint   Patient presents with     RECHECK     Right shoulder follow up     Surgical Followup     dos: 1/13/17~Right shoulder arthroscopy with arthroscopic rotator cuff repair of the supraspinatus (double row) and subscapularis, biceps tenodesis, subacromial decompression with partial acromioplasty~14 weeks POSTOP       HISTORY OF PRESENT ILLNESS  Doing well. No pain. He is working on strengthening.    Patient's past medical, surgical, social and family histories reviewed.     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)     Overnight stay due to blood poisoning     Gastric ulcer, unspecified as acute or chronic, without mention of hemorrhage or perforation        Past Surgical History:   Procedure Laterality Date     ARTHROSCOPY KNEE  5/29/2013    Procedure: ARTHROSCOPY KNEE;  Right knee arthroscopy with partial medial and partial lateral menisectomies;  Surgeon: Phani Mclaughlin MD;  Location:  OR     ARTHROSCOPY SHOULDER BICEPS TENODESIS REPAIR Right 1/13/2017    Procedure: ARTHROSCOPY SHOULDER BICEPS TENODESIS REPAIR;  Surgeon: Javier Yin DO;  Location:  OR     ARTHROSCOPY SHOULDER DECOMPRESSION Right 1/13/2017    Procedure: ARTHROSCOPY SHOULDER DECOMPRESSION;  Surgeon: Javier Yin DO;  Location:  OR     ARTHROSCOPY SHOULDER ROTATOR CUFF REPAIR Right 1/13/2017    Procedure: ARTHROSCOPY SHOULDER ROTATOR CUFF REPAIR;  Surgeon: Javier Yin DO;  Location:  OR     COLONOSCOPY  11/01/2007     COLONOSCOPY   "12/12/11     COLONOSCOPY N/A 11/18/2015    Procedure: COLONOSCOPY;  Surgeon: Migue Mclaughlin MD;  Location: [facility]     COLONOSCOPY N/A 3/22/2017    Procedure: COMBINED COLONOSCOPY, SINGLE OR MULTIPLE BIOPSY/POLYPECTOMY BY BIOPSY;  Surgeon: Salvatore Zepeda MD;  Location: [facility]     ENDOSCOPY  01/27/12    Upper GI - [facility]     ESOPHAGOSCOPY, GASTROSCOPY, DUODENOSCOPY (EGD), COMBINED N/A 11/18/2015    Procedure: COMBINED ESOPHAGOSCOPY, GASTROSCOPY, DUODENOSCOPY (EGD), BIOPSY SINGLE OR MULTIPLE;  Surgeon: Migue Mclaughlin MD;  Location:  GI     ESOPHAGOSCOPY, GASTROSCOPY, DUODENOSCOPY (EGD), COMBINED N/A 3/22/2017    Procedure: COMBINED ESOPHAGOSCOPY, GASTROSCOPY, DUODENOSCOPY (EGD), BIOPSY SINGLE OR MULTIPLE;  Surgeon: Salvatore Zepeda MD;  Location:  GI     HC UGI ENDOSCOPY, SIMPLE EXAM  10/31/2007     HERNIORRHAPHY UMBILICAL N/A 3/12/2015    Procedure: HERNIORRHAPHY UMBILICAL;  Surgeon: Yared Ma MD;  Location: [facility]     IRRIGATION AND DEBRIDEMENT UPPER EXTREMITY, COMBINED Left 3/15/2016    Procedure: COMBINED IRRIGATION AND DEBRIDEMENT UPPER EXTREMITY;  Surgeon: Javier Yin DO;  Location: [facility]     REMOVAL OF SPERM DUCT(S)      Vasectomy       Medications:    Current Outpatient Prescriptions on File Prior to Visit:  LANsoprazole (PREVACID) 30 MG CR capsule Take 1 pill daily if Ranitidne fails.   lisinopril (PRINIVIL/ZESTRIL) 20 MG tablet TAKE ONE-HALF TABLET BY MOUTH ONCE DAILY   Cholecalciferol (VITAMIN D3) 2000 UNITS TABS Take 1 tablet by mouth daily   Omega-3 Fatty Acids (RA FISH OIL) 1400 MG CPDR Take 1 tablet by mouth daily Reported on 3/8/2017   Multiple Vitamin (MULTIVITAMINS PO) Take  by mouth.   ranitidine (ZANTAC) 300 MG tablet Take 1 tablet (300 mg) by mouth At Bedtime     No current facility-administered medications on file prior to visit.     Allergies   Allergen Reactions     Hydrocodone Other (See Comments)     \"climbed the walls, very " "anxious, insomnia\"     Nsaids Other (See Comments)     Hx of stomach ulcers       Social History     Occupational History      [employer]     Social History Main Topics     Smoking status: Former Smoker     Quit date: 1/1/1981     Smokeless tobacco: Never Used     Alcohol use 0.0 oz/week     0 Standard drinks or equivalent per week      Comment: weekends     Drug use: No     Sexual activity: Yes     Partners: Female     Birth control/ protection: Surgical      Comment: vasectomy       Family History   Problem Relation Age of Onset     CANCER Mother      ovarian cancer     Asthma Mother      Hypertension Mother      Arthritis Mother      Genitourinary Problems Mother      Lipids Mother      CANCER Sister      ovarian cancer     Hypertension Sister      Lipids Sister      CANCER Maternal Grandmother      stomach cancer     Asthma Father      Cardiovascular Father      heart attack; bypass x5, congenital heart disease     HEART DISEASE Father      heart attack; bypass x5, congenital heart disease     DIABETES Father      Hypertension Father      CANCER Father      prostate     Prostate Cancer Father      Circulatory Father      blood clots     Genitourinary Problems Father      Respiratory Father      emphysema; COPD     DIABETES Maternal Grandfather      DIABETES Paternal Grandfather      Eye Disorder Paternal Grandfather      glaucoma     Hypertension Brother      Lipids Brother      Hypertension Brother      Cardiovascular Brother      bypass x3     HEART DISEASE Brother      bypass x3     Lipids Brother      Hypertension Sister      C.A.D. No family hx of      CEREBROVASCULAR DISEASE No family hx of      Breast Cancer No family hx of      Cancer - colorectal No family hx of      Alzheimer Disease No family hx of      Blood Disease No family hx of      GASTROINTESTINAL DISEASE No family hx of      Musculoskeletal Disorder No family hx of      Neurologic Disorder No family hx of      " "Thyroid Disease No family hx of        REVIEW OF SYSTEMS  General: negative for, night sweats, dizziness, fatigue  Resp: No shortness of breath and no cough  CV: negative for chest pain, syncope or near-syncope  GI: negative for nausea, vomiting and diarrhea  : negative for dysuria and hematuria  Musculoskeletal: as above  Neurologic: negative for syncope   Hematologic: negative for bleeding disorder    Physical Exam:  Vitals: Temp 98.8  F (37.1  C) (Temporal)  Ht 5' 9\" (1.753 m)  Wt 228 lb (103.4 kg)  BMI 33.67 kg/m2  BMI= Body mass index is 33.67 kg/(m^2).  Constitutional: healthy, alert and no acute distress   Psychiatric: mentation appears normal and affect normal/bright  NEURO: no focal deficits  SKIN: .well healed, no erythema, no incision breakdown and no drainage.  JOINT/EXTREMITIES: Near full forward elevation and abduction. Slight limitation to rotation. Continues to be some stiffness with motion as far as scapular versus glenohumeral motion. Expected strength/weakness.  GAIT: not tested     Diagnostic Modalities:  None today.  Independent visualization of the images was performed.      Impression:   Chief Complaint   Patient presents with     RECHECK     Right shoulder follow up     Surgical Followup     dos: 1/13/17~Right shoulder arthroscopy with arthroscopic rotator cuff repair of the supraspinatus (double row) and subscapularis, biceps tenodesis, subacromial decompression with partial acromioplasty~14 weeks POSTOP    doing as expected.    Plan:   Activity: Progress strengthening  Staples/Sutures out: Not applicable.  Pain controlled: Yes. Continue to use: Nothing  Immobilzation: No  Physical Therapy: active range of motion, resistance training  Rest  Return to clinic 6, week(s), or sooner as needed for changes.    Re-x-ray on return: No    Gerber Yin D.O.    Again, thank you for allowing me to participate in the care of your patient.        Sincerely,              Javier Yin, DO    "

## 2017-04-20 NOTE — PROGRESS NOTES
Orthopedic Clinic Post-Operative Note    CHIEF COMPLAINT:   Chief Complaint   Patient presents with     RECHECK     Right shoulder follow up     Surgical Followup     dos: 1/13/17~Right shoulder arthroscopy with arthroscopic rotator cuff repair of the supraspinatus (double row) and subscapularis, biceps tenodesis, subacromial decompression with partial acromioplasty~14 weeks POSTOP       HISTORY OF PRESENT ILLNESS  Doing well. No pain. He is working on strengthening.    Patient's past medical, surgical, social and family histories reviewed.     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)     Overnight stay due to blood poisoning     Gastric ulcer, unspecified as acute or chronic, without mention of hemorrhage or perforation        Past Surgical History:   Procedure Laterality Date     ARTHROSCOPY KNEE  5/29/2013    Procedure: ARTHROSCOPY KNEE;  Right knee arthroscopy with partial medial and partial lateral menisectomies;  Surgeon: Phani Mclaughlin MD;  Location:  OR     ARTHROSCOPY SHOULDER BICEPS TENODESIS REPAIR Right 1/13/2017    Procedure: ARTHROSCOPY SHOULDER BICEPS TENODESIS REPAIR;  Surgeon: Javier Yin DO;  Location:  OR     ARTHROSCOPY SHOULDER DECOMPRESSION Right 1/13/2017    Procedure: ARTHROSCOPY SHOULDER DECOMPRESSION;  Surgeon: Javier Yin DO;  Location: [facility] OR     ARTHROSCOPY SHOULDER ROTATOR CUFF REPAIR Right 1/13/2017    Procedure: ARTHROSCOPY SHOULDER ROTATOR CUFF REPAIR;  Surgeon: Javier Yin DO;  Location: [facility] OR     COLONOSCOPY  11/01/2007     COLONOSCOPY  12/12/11     COLONOSCOPY N/A 11/18/2015    Procedure: COLONOSCOPY;  Surgeon: Migue Mclaughlin MD;  Location:  GI     COLONOSCOPY N/A 3/22/2017    Procedure: COMBINED COLONOSCOPY, SINGLE OR MULTIPLE BIOPSY/POLYPECTOMY BY BIOPSY;  Surgeon:  "Salvatore Zepeda MD;  Location: [facility] GI     ENDOSCOPY  01/27/12    Upper GI - [facility]     ESOPHAGOSCOPY, GASTROSCOPY, DUODENOSCOPY (EGD), COMBINED N/A 11/18/2015    Procedure: COMBINED ESOPHAGOSCOPY, GASTROSCOPY, DUODENOSCOPY (EGD), BIOPSY SINGLE OR MULTIPLE;  Surgeon: Migue Mclaughlin MD;  Location: [facility] GI     ESOPHAGOSCOPY, GASTROSCOPY, DUODENOSCOPY (EGD), COMBINED N/A 3/22/2017    Procedure: COMBINED ESOPHAGOSCOPY, GASTROSCOPY, DUODENOSCOPY (EGD), BIOPSY SINGLE OR MULTIPLE;  Surgeon: Salvatore Zepeda MD;  Location:  GI     HC UGI ENDOSCOPY, SIMPLE EXAM  10/31/2007     HERNIORRHAPHY UMBILICAL N/A 3/12/2015    Procedure: HERNIORRHAPHY UMBILICAL;  Surgeon: Yared Ma MD;  Location: [facility] OR     IRRIGATION AND DEBRIDEMENT UPPER EXTREMITY, COMBINED Left 3/15/2016    Procedure: COMBINED IRRIGATION AND DEBRIDEMENT UPPER EXTREMITY;  Surgeon: Javier Yin DO;  Location: [facility] OR     REMOVAL OF SPERM DUCT(S)      Vasectomy       Medications:    Current Outpatient Prescriptions on File Prior to Visit:  LANsoprazole (PREVACID) 30 MG CR capsule Take 1 pill daily if Ranitidne fails.   lisinopril (PRINIVIL/ZESTRIL) 20 MG tablet TAKE ONE-HALF TABLET BY MOUTH ONCE DAILY   Cholecalciferol (VITAMIN D3) 2000 UNITS TABS Take 1 tablet by mouth daily   Omega-3 Fatty Acids (RA FISH OIL) 1400 MG CPDR Take 1 tablet by mouth daily Reported on 3/8/2017   Multiple Vitamin (MULTIVITAMINS PO) Take  by mouth.   ranitidine (ZANTAC) 300 MG tablet Take 1 tablet (300 mg) by mouth At Bedtime     No current facility-administered medications on file prior to visit.     Allergies   Allergen Reactions     Hydrocodone Other (See Comments)     \"climbed the walls, very anxious, insomnia\"     Nsaids Other (See Comments)     Hx of stomach ulcers       Social History     Occupational History      "[employer]     Social History Main Topics     Smoking status: Former Smoker     Quit " date: 1/1/1981     Smokeless tobacco: Never Used     Alcohol use 0.0 oz/week     0 Standard drinks or equivalent per week      Comment: weekends     Drug use: No     Sexual activity: Yes     Partners: Female     Birth control/ protection: Surgical      Comment: vasectomy       Family History   Problem Relation Age of Onset     CANCER Mother      ovarian cancer     Asthma Mother      Hypertension Mother      Arthritis Mother      Genitourinary Problems Mother      Lipids Mother      CANCER Sister      ovarian cancer     Hypertension Sister      Lipids Sister      CANCER Maternal Grandmother      stomach cancer     Asthma Father      Cardiovascular Father      heart attack; bypass x5, congenital heart disease     HEART DISEASE Father      heart attack; bypass x5, congenital heart disease     DIABETES Father      Hypertension Father      CANCER Father      prostate     Prostate Cancer Father      Circulatory Father      blood clots     Genitourinary Problems Father      Respiratory Father      emphysema; COPD     DIABETES Maternal Grandfather      DIABETES Paternal Grandfather      Eye Disorder Paternal Grandfather      glaucoma     Hypertension Brother      Lipids Brother      Hypertension Brother      Cardiovascular Brother      bypass x3     HEART DISEASE Brother      bypass x3     Lipids Brother      Hypertension Sister      C.A.D. No family hx of      CEREBROVASCULAR DISEASE No family hx of      Breast Cancer No family hx of      Cancer - colorectal No family hx of      Alzheimer Disease No family hx of      Blood Disease No family hx of      GASTROINTESTINAL DISEASE No family hx of      Musculoskeletal Disorder No family hx of      Neurologic Disorder No family hx of      Thyroid Disease No family hx of        REVIEW OF SYSTEMS  General: negative for, night sweats, dizziness, fatigue  Resp: No shortness of breath and no cough  CV: negative for chest pain, syncope or near-syncope  GI: negative for nausea, vomiting  "and diarrhea  : negative for dysuria and hematuria  Musculoskeletal: as above  Neurologic: negative for syncope   Hematologic: negative for bleeding disorder    Physical Exam:  Vitals: Temp 98.8  F (37.1  C) (Temporal)  Ht 5' 9\" (1.753 m)  Wt 228 lb (103.4 kg)  BMI 33.67 kg/m2  BMI= Body mass index is 33.67 kg/(m^2).  Constitutional: healthy, alert and no acute distress   Psychiatric: mentation appears normal and affect normal/bright  NEURO: no focal deficits  SKIN: .well healed, no erythema, no incision breakdown and no drainage.  JOINT/EXTREMITIES: Near full forward elevation and abduction. Slight limitation to rotation. Continues to be some stiffness with motion as far as scapular versus glenohumeral motion. Expected strength/weakness.  GAIT: not tested     Diagnostic Modalities:  None today.  Independent visualization of the images was performed.      Impression:   Chief Complaint   Patient presents with     RECHECK     Right shoulder follow up     Surgical Followup     dos: 1/13/17~Right shoulder arthroscopy with arthroscopic rotator cuff repair of the supraspinatus (double row) and subscapularis, biceps tenodesis, subacromial decompression with partial acromioplasty~14 weeks POSTOP    doing as expected.    Plan:   Activity: Progress strengthening  Staples/Sutures out: Not applicable.  Pain controlled: Yes. Continue to use: Nothing  Immobilzation: No  Physical Therapy: active range of motion, resistance training  Rest  Return to clinic 6, week(s), or sooner as needed for changes.    Re-x-ray on return: No    Gerber Yin D.O.  "

## 2017-04-21 RX ORDER — LANSOPRAZOLE 30 MG/1
CAPSULE, DELAYED RELEASE ORAL
Qty: 30 CAPSULE | Refills: 1 | Status: SHIPPED | OUTPATIENT
Start: 2017-04-21 | End: 2017-07-19

## 2017-04-26 ENCOUNTER — THERAPY VISIT (OUTPATIENT)
Dept: PHYSICAL THERAPY | Facility: CLINIC | Age: 57
End: 2017-04-26
Payer: COMMERCIAL

## 2017-04-26 DIAGNOSIS — Z98.890 S/P ROTATOR CUFF REPAIR: ICD-10-CM

## 2017-04-26 DIAGNOSIS — M25.511 ACUTE PAIN OF RIGHT SHOULDER: ICD-10-CM

## 2017-04-26 PROCEDURE — 97112 NEUROMUSCULAR REEDUCATION: CPT | Mod: GP | Performed by: PHYSICAL THERAPIST

## 2017-04-26 PROCEDURE — 97110 THERAPEUTIC EXERCISES: CPT | Mod: GP | Performed by: PHYSICAL THERAPIST

## 2017-05-03 ENCOUNTER — THERAPY VISIT (OUTPATIENT)
Dept: PHYSICAL THERAPY | Facility: CLINIC | Age: 57
End: 2017-05-03
Payer: COMMERCIAL

## 2017-05-03 DIAGNOSIS — Z98.890 S/P ROTATOR CUFF REPAIR: ICD-10-CM

## 2017-05-03 DIAGNOSIS — M25.511 ACUTE PAIN OF RIGHT SHOULDER: ICD-10-CM

## 2017-05-03 PROCEDURE — 97110 THERAPEUTIC EXERCISES: CPT | Mod: GP | Performed by: PHYSICAL THERAPIST

## 2017-05-03 PROCEDURE — 97112 NEUROMUSCULAR REEDUCATION: CPT | Mod: GP | Performed by: PHYSICAL THERAPIST

## 2017-05-08 ENCOUNTER — TELEPHONE (OUTPATIENT)
Dept: ORTHOPEDICS | Facility: OTHER | Age: 57
End: 2017-05-08

## 2017-05-08 DIAGNOSIS — Z98.890 S/P ROTATOR CUFF REPAIR: Primary | ICD-10-CM

## 2017-05-08 NOTE — TELEPHONE ENCOUNTER
----- Message from Will Lema, PT sent at 5/3/2017  6:17 PM CDT -----  Regarding: Referral   Dr Yin,     We have used the visits per our initial POC, need a new referral to continue PT. Javier is doing better, primary issue is SH rhythm. Please forward if agree.     Thank You,     Will Lema PT

## 2017-05-10 ENCOUNTER — THERAPY VISIT (OUTPATIENT)
Dept: PHYSICAL THERAPY | Facility: CLINIC | Age: 57
End: 2017-05-10
Payer: COMMERCIAL

## 2017-05-10 DIAGNOSIS — Z98.890 S/P ROTATOR CUFF REPAIR: ICD-10-CM

## 2017-05-10 DIAGNOSIS — M25.511 ACUTE PAIN OF RIGHT SHOULDER: ICD-10-CM

## 2017-05-10 PROCEDURE — 97112 NEUROMUSCULAR REEDUCATION: CPT | Mod: GP | Performed by: PHYSICAL THERAPIST

## 2017-05-10 PROCEDURE — 97110 THERAPEUTIC EXERCISES: CPT | Mod: GP | Performed by: PHYSICAL THERAPIST

## 2017-05-17 ENCOUNTER — THERAPY VISIT (OUTPATIENT)
Dept: PHYSICAL THERAPY | Facility: CLINIC | Age: 57
End: 2017-05-17
Payer: COMMERCIAL

## 2017-05-17 DIAGNOSIS — M25.511 ACUTE PAIN OF RIGHT SHOULDER: ICD-10-CM

## 2017-05-17 DIAGNOSIS — Z98.890 S/P ROTATOR CUFF REPAIR: ICD-10-CM

## 2017-05-17 PROCEDURE — 97110 THERAPEUTIC EXERCISES: CPT | Mod: GP | Performed by: PHYSICAL THERAPIST

## 2017-05-17 PROCEDURE — 97112 NEUROMUSCULAR REEDUCATION: CPT | Mod: GP | Performed by: PHYSICAL THERAPIST

## 2017-05-17 NOTE — PROGRESS NOTES
Subjective:    HPI                    Objective:    System    Physical Exam    General     ROS    Assessment/Plan:      DISCHARGE REPORT    Progress reporting period is from 2-6-17 to 5-17-17.       SUBJECTIVE  Subjective: Pt reports doing well, no issues or problems. He denies pain at this time, states the only issue he has is stiffness at times. He feels he is able to manage on his own at tihis time, will discharge. Home exercises going well without difficulty.     Current pain level is 0/10 Current Pain level: 0/10.     Previous pain level was  0/10 Initial Pain level: 2/10.   Changes in function:  Yes (See Goal flowsheet attached for changes in current functional level)  Adverse reaction to treatment or activity: None    OBJECTIVE  Changes noted in objective findings:    Objective: AROM flexion to 160, scaption to 158, ER to 88, IR to T7. Normal SH rhythm. MMT flexion R 4+/5 L 5/5; scaption R 4+/5, L 5/5; ER L 4+/5 L 5/5' IR 5/5  B. When fatigued he demonstrates mild leading with the scapula, not presnet when not fatigued. Pt has met LTF, will discharge PT with EP.      ASSESSMENT/PLAN  Updated problem list and treatment plan: Diagnosis 1:  S/p RCR R   Decreased strength - home program  Impaired muscle performance - home program  STG/LTGs have been met or progress has been made towards goals:  Yes (See Goal flow sheet completed today.)  Assessment of Progress: The patient has met all of their long term goals.  Self Management Plans:  Patient has been instructed in a home treatment program.  Patient is independent in a home treatment program.  Patient  has been instructed in self management of symptoms.  Patient is independent in self management of symptoms.    Hugo continues to require the following intervention to meet STG and LTG's:  PT intervention is no longer required to meet STG/LTG.    Recommendations:  This patient is ready to be discharged from therapy and continue their home treatment program.    Please  refer to the daily flowsheet for treatment today, total treatment time and time spent performing 1:1 timed codes.

## 2017-05-17 NOTE — MR AVS SNAPSHOT
After Visit Summary   5/17/2017    Hugo Longoria    MRN: [id number]           Patient Information     Date Of Birth          1960        Visit Information        Provider Department      5/17/2017 5:50 PM Will Lema PT [facility]        Today's Diagnoses     Acute pain of right shoulder        S/P rotator cuff repair           Follow-ups after your visit        Your next 10 appointments already scheduled     May 31, 2017  5:50 PM CDT   [facility] Extremity with Will Lema PT   [facility] ([facility] [place]  )    [address]  [facility] [address] [phone]            Jun 01, 2017  1:20 PM CDT   Return Visit with Javier Yin DO   [facility]    [address]  [address]  [facility] [id number] [phone]              Who to contact     If you have questions or need follow up information about today's clinic visit or your schedule please contact [place] [facility] directly at [phone].  Normal or non-critical lab and imaging results will be communicated to you by MyChart, letter or phone within 4 business days after the clinic has received the results. If you do not hear from us within 7 days, please contact the clinic through [employer]Pass[employer] or phone. If you have a critical or abnormal lab result, we will notify you by phone as soon as possible.  Submit refill requests through [employer] or call your pharmacy and they will forward the refill request to us. Please allow 3 business days for your refill to be completed.          Additional Information About Your Visit        MyChart Information     [employer] gives you secure access to your electronic health record. If you see a primary care provider, you can also send messages to your care team and make appointments. If you have  questions, please call your primary care clinic.  If you do not have a primary care provider, please call [phone] and they will assist you.        Care EveryWhere ID     This is your Care EveryWhere ID. This could be used by other organizations to access your [place] medical records  [id number]         Blood Pressure from Last 3 Encounters:   03/22/17 137/89   03/08/17 112/82   01/13/17 133/86    Weight from Last 3 Encounters:   04/20/17 103.4 kg (228 lb)   03/13/17 103 kg (227 lb)   03/08/17 101.9 kg (224 lb 9.6 oz)              We Performed the Following     NEUROMUSCULAR RE-EDUCATION     THERAPEUTIC EXERCISES        Primary Care Provider Office Phone # Fax #    Viktor Manzanares DO Jason [phone]       [address] [place] [address]        Thank you!     Thank you for [facility]  for your care. Our goal is always to provide you with excellent care. Hearing back from our patients is one way we can continue to improve our services. Please take a few minutes to complete the written survey that you may receive in the mail after your visit with us. Thank you!             Your Updated Medication List - Protect others around you: Learn how to safely use, store and throw away your medicines at www.disposemymeds.org.          This list is accurate as of: 5/17/17  6:32 PM.  Always use your most recent med list.                   Brand Name Dispense Instructions for use    * LANsoprazole 30 MG CR capsule    PREVACID    30 capsule    Take 1 pill daily if Ranitidne fails.       * LANsoprazole 30 MG CR capsule    PREVACID    30 capsule    TAKE ONE CAPSULE BY MOUTH ONCE DAILY       lisinopril 20 MG tablet    PRINIVIL/ZESTRIL    90 tablet    TAKE ONE-HALF TABLET BY MOUTH ONCE DAILY       MULTIVITAMINS PO      Take  by mouth.       RA FISH OIL 1400 MG Cpdr      Take 1 tablet by mouth daily Reported on 3/8/2017        ranitidine 300 MG tablet    ZANTAC    30 tablet    Take 1 tablet (300 mg) by mouth At Bedtime       Vitamin D3 2000 UNITS Tabs      Take 1 tablet by mouth daily       * Notice:  This list has 2 medication(s) that are the same as other medications prescribed for you. Read the directions carefully, and ask your doctor or other care provider to review them with you.

## 2017-06-07 DIAGNOSIS — I10 HTN, GOAL BELOW 140/90: ICD-10-CM

## 2017-06-08 ENCOUNTER — OFFICE VISIT (OUTPATIENT)
Dept: ORTHOPEDICS | Facility: OTHER | Age: 57
End: 2017-06-08
Payer: COMMERCIAL

## 2017-06-08 VITALS — HEIGHT: 69 IN | BODY MASS INDEX: 34.07 KG/M2 | WEIGHT: 230 LBS | TEMPERATURE: 97.2 F

## 2017-06-08 DIAGNOSIS — M75.121 COMPLETE TEAR OF RIGHT ROTATOR CUFF: Primary | ICD-10-CM

## 2017-06-08 PROCEDURE — 99212 OFFICE O/P EST SF 10 MIN: CPT | Performed by: ORTHOPAEDIC SURGERY

## 2017-06-08 ASSESSMENT — PAIN SCALES - GENERAL: PAINLEVEL: NO PAIN (0)

## 2017-06-08 NOTE — NURSING NOTE
"Chief Complaint   Patient presents with     RECHECK     Right shoulder follow up     Surgical Followup     dos: 1/13/17~Right shoulder arthroscopy with arthroscopic rotator cuff repair of the supraspinatus (double row) and subscapularis, biceps tenodesis, subacromial decompression with partial acromioplasty~5 months POSTOP       Initial Temp 97.2  F (36.2  C) (Temporal)  Wt 230 lb (104.3 kg)  BMI 33.97 kg/m2 Estimated body mass index is 33.97 kg/(m^2) as calculated from the following:    Height as of this encounter: 5' 9\" (1.753 m).    Weight as of this encounter: 230 lb (104.3 kg).  Medication Reconciliation: complete   Feliz/MANUELITO     "

## 2017-06-08 NOTE — MR AVS SNAPSHOT
"              After Visit Summary   6/8/2017    Hugo Longoria    MRN: [id number]           Patient Information     Date Of Birth          1960        Visit Information        Provider Department      6/8/2017 1:20 PM Javier Yin,  [facility]        Today's Diagnoses     Complete tear of right rotator cuff    -  1       Follow-ups after your visit        Who to contact     If you have questions or need follow up information about today's clinic visit or your schedule please contact [facility] directly at [phone].  Normal or non-critical lab and imaging results will be communicated to you by cacaoTVhart, letter or phone within 4 business days after the clinic has received the results. If you do not hear from us within 7 days, please contact the clinic through [employer] or phone. If you have a critical or abnormal lab result, we will notify you by phone as soon as possible.  Submit refill requests through [employer] or call your pharmacy and they will forward the refill request to us. Please allow 3 business days for your refill to be completed.          Additional Information About Your Visit        MyChart Information     [employer] gives you secure access to your electronic health record. If you see a primary care provider, you can also send messages to your care team and make appointments. If you have questions, please call your primary care clinic.  If you do not have a primary care provider, please call [phone] and they will assist you.        Care EveryWhere ID     This is your Care EveryWhere ID. This could be used by other organizations to access your [place] medical records  [id number]        Your Vitals Were     Temperature Height BMI (Body Mass Index)             97.2  F (36.2  C) (Temporal) 5' 9\" (1.753 m) 33.97 kg/m2          Blood Pressure from Last 3 Encounters:   03/22/17 137/89   03/08/17 112/82   01/13/17 133/86    Weight from Last 3 " Encounters:   06/08/17 230 lb (104.3 kg)   04/20/17 228 lb (103.4 kg)   03/13/17 227 lb (103 kg)              Today, you had the following     No orders found for display       Primary Care Provider Office Phone # Fax #    Viktor Gomez [phone]       [address] [place] [address]        Thank you!     Thank you for choosing [facility]  for your care. Our goal is always to provide you with excellent care. Hearing back from our patients is one way we can continue to improve our services. Please take a few minutes to complete the written survey that you may receive in the mail after your visit with us. Thank you!             Your Updated Medication List - Protect others around you: Learn how to safely use, store and throw away your medicines at www.disposemymeds.org.          This list is accurate as of: 6/8/17  1:43 PM.  Always use your most recent med list.                   Brand Name Dispense Instructions for use    * LANsoprazole 30 MG CR capsule    PREVACID    30 capsule    Take 1 pill daily if Ranitidne fails.       * LANsoprazole 30 MG CR capsule    PREVACID    30 capsule    TAKE ONE CAPSULE BY MOUTH ONCE DAILY       lisinopril 20 MG tablet    PRINIVIL/ZESTRIL    90 tablet    TAKE ONE-HALF TABLET BY MOUTH ONCE DAILY       MULTIVITAMINS PO      Take  by mouth.       RA FISH OIL 1400 MG Cpdr      Take 1 tablet by mouth daily Reported on 3/8/2017       ranitidine 300 MG tablet    ZANTAC    30 tablet    Take 1 tablet (300 mg) by mouth At Bedtime       Vitamin D3 2000 UNITS Tabs      Take 1 tablet by mouth daily       * Notice:  This list has 2 medication(s) that are the same as other medications prescribed for you. Read the directions carefully, and ask your doctor or other care provider to review them with you.

## 2017-06-08 NOTE — PROGRESS NOTES
"Orthopedic Clinic Post-Operative Note    CHIEF COMPLAINT:   Chief Complaint   Patient presents with     RECHECK     Right shoulder follow up     Surgical Followup     dos: 1/13/17~Right shoulder arthroscopy with arthroscopic rotator cuff repair of the supraspinatus (double row) and subscapularis, biceps tenodesis, subacromial decompression with partial acromioplasty~5 months POSTOP       HISTORY OF PRESENT ILLNESS  Making progress. He is very happy with it. Gets some soreness at times. Nothing \"painful\".    Patient's past medical, surgical, social and family histories reviewed.     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)     Overnight stay due to blood poisoning     Gastric ulcer, unspecified as acute or chronic, without mention of hemorrhage or perforation        Past Surgical History:   Procedure Laterality Date     ARTHROSCOPY KNEE  5/29/2013    Procedure: ARTHROSCOPY KNEE;  Right knee arthroscopy with partial medial and partial lateral menisectomies;  Surgeon: Phani Mclaughlin MD;  Location:  OR     ARTHROSCOPY SHOULDER BICEPS TENODESIS REPAIR Right 1/13/2017    Procedure: ARTHROSCOPY SHOULDER BICEPS TENODESIS REPAIR;  Surgeon: Javier Yin DO;  Location:  OR     ARTHROSCOPY SHOULDER DECOMPRESSION Right 1/13/2017    Procedure: ARTHROSCOPY SHOULDER DECOMPRESSION;  Surgeon: Javier Yin DO;  Location:  OR     ARTHROSCOPY SHOULDER ROTATOR CUFF REPAIR Right 1/13/2017    Procedure: ARTHROSCOPY SHOULDER ROTATOR CUFF REPAIR;  Surgeon: Javier Yin DO;  Location:  OR     COLONOSCOPY  11/01/2007     COLONOSCOPY  12/12/11     COLONOSCOPY N/A 11/18/2015    Procedure: COLONOSCOPY;  Surgeon: Migue Mclaughlin MD;  Location:  GI     COLONOSCOPY N/A 3/22/2017    Procedure: COMBINED COLONOSCOPY, SINGLE OR MULTIPLE " "BIOPSY/POLYPECTOMY BY BIOPSY;  Surgeon: Salvatore Zepeda MD;  Location: [facility] GI     ENDOSCOPY  01/27/12    Upper GI - [facility]     ESOPHAGOSCOPY, GASTROSCOPY, DUODENOSCOPY (EGD), COMBINED N/A 11/18/2015    Procedure: COMBINED ESOPHAGOSCOPY, GASTROSCOPY, DUODENOSCOPY (EGD), BIOPSY SINGLE OR MULTIPLE;  Surgeon: Migue Mclaughlin MD;  Location: [facility] GI     ESOPHAGOSCOPY, GASTROSCOPY, DUODENOSCOPY (EGD), COMBINED N/A 3/22/2017    Procedure: COMBINED ESOPHAGOSCOPY, GASTROSCOPY, DUODENOSCOPY (EGD), BIOPSY SINGLE OR MULTIPLE;  Surgeon: Salvatore Zepeda MD;  Location:  GI      UGI ENDOSCOPY, SIMPLE EXAM  10/31/2007     HERNIORRHAPHY UMBILICAL N/A 3/12/2015    Procedure: HERNIORRHAPHY UMBILICAL;  Surgeon: Yared Ma MD;  Location: [facility] OR     IRRIGATION AND DEBRIDEMENT UPPER EXTREMITY, COMBINED Left 3/15/2016    Procedure: COMBINED IRRIGATION AND DEBRIDEMENT UPPER EXTREMITY;  Surgeon: Javier Yin DO;  Location: [facility] OR     REMOVAL OF SPERM DUCT(S)      Vasectomy       Medications:    Current Outpatient Prescriptions on File Prior to Visit:  LANsoprazole (PREVACID) 30 MG CR capsule TAKE ONE CAPSULE BY MOUTH ONCE DAILY   ranitidine (ZANTAC) 300 MG tablet Take 1 tablet (300 mg) by mouth At Bedtime   LANsoprazole (PREVACID) 30 MG CR capsule Take 1 pill daily if Ranitidne fails.   lisinopril (PRINIVIL/ZESTRIL) 20 MG tablet TAKE ONE-HALF TABLET BY MOUTH ONCE DAILY   Cholecalciferol (VITAMIN D3) 2000 UNITS TABS Take 1 tablet by mouth daily   Omega-3 Fatty Acids (RA FISH OIL) 1400 MG CPDR Take 1 tablet by mouth daily Reported on 3/8/2017   Multiple Vitamin (MULTIVITAMINS PO) Take  by mouth.     No current facility-administered medications on file prior to visit.     Allergies   Allergen Reactions     Hydrocodone Other (See Comments)     \"climbed the walls, very anxious, insomnia\"     Nsaids Other (See Comments)     Hx of stomach ulcers       Social History     Occupational History      " [employer]     pipeline     Social History Main Topics     Smoking status: Former Smoker     Quit date: 1/1/1981     Smokeless tobacco: Never Used     Alcohol use 0.0 oz/week     0 Standard drinks or equivalent per week      Comment: weekends     Drug use: No     Sexual activity: Yes     Partners: Female     Birth control/ protection: Surgical      Comment: vasectomy       Family History   Problem Relation Age of Onset     CANCER Mother      ovarian cancer     Asthma Mother      Hypertension Mother      Arthritis Mother      Genitourinary Problems Mother      Lipids Mother      CANCER Sister      ovarian cancer     Hypertension Sister      Lipids Sister      CANCER Maternal Grandmother      stomach cancer     Asthma Father      Cardiovascular Father      heart attack; bypass x5, congenital heart disease     HEART DISEASE Father      heart attack; bypass x5, congenital heart disease     DIABETES Father      Hypertension Father      CANCER Father      prostate     Prostate Cancer Father      Circulatory Father      blood clots     Genitourinary Problems Father      Respiratory Father      emphysema; COPD     DIABETES Maternal Grandfather      DIABETES Paternal Grandfather      Eye Disorder Paternal Grandfather      glaucoma     Hypertension Brother      Lipids Brother      Hypertension Brother      Cardiovascular Brother      bypass x3     HEART DISEASE Brother      bypass x3     Lipids Brother      Hypertension Sister      C.A.D. No family hx of      CEREBROVASCULAR DISEASE No family hx of      Breast Cancer No family hx of      Cancer - colorectal No family hx of      Alzheimer Disease No family hx of      Blood Disease No family hx of      GASTROINTESTINAL DISEASE No family hx of      Musculoskeletal Disorder No family hx of      Neurologic Disorder No family hx of      Thyroid Disease No family hx of        REVIEW OF SYSTEMS  General: negative for, night sweats, dizziness, fatigue  Resp: No  "shortness of breath and no cough  CV: negative for chest pain, syncope or near-syncope  GI: negative for nausea, vomiting and diarrhea  : negative for dysuria and hematuria  Musculoskeletal: as above  Neurologic: negative for syncope   Hematologic: negative for bleeding disorder    Physical Exam:  Vitals: Temp 97.2  F (36.2  C) (Temporal)  Ht 5' 9\" (1.753 m)  Wt 230 lb (104.3 kg)  BMI 33.97 kg/m2  BMI= Body mass index is 33.97 kg/(m^2).  Constitutional: healthy, alert and no acute distress   Psychiatric: mentation appears normal and affect normal/bright  NEURO: no focal deficits  SKIN: .well healed, no erythema, no incision breakdown and no drainage.  JOINT/EXTREMITIES: Full active range of motion. Some minimal weakness with supraspinatus testing. No focal areas of tenderness.  GAIT: not tested     Diagnostic Modalities:  None today.  Independent visualization of the images was performed.      Impression:   Chief Complaint   Patient presents with     RECHECK     Right shoulder follow up     Surgical Followup     dos: 1/13/17~Right shoulder arthroscopy with arthroscopic rotator cuff repair of the supraspinatus (double row) and subscapularis, biceps tenodesis, subacromial decompression with partial acromioplasty~5 months POSTOP    doing as expected. Continuing to progress.    Plan:   Activity: None, use as tolerated  Staples/Sutures out: Not applicable.  Pain controlled: Yes. Continue to use: Nothing  Immobilzation: No  Physical Therapy: continue/transition to home exercise routine.   Return to clinic PRN, or sooner as needed for changes.    Re-x-ray on return: No    Gerber Yin D.O.  "

## 2017-06-08 NOTE — TELEPHONE ENCOUNTER
lisinopril (PRINIVIL/ZESTRIL) 20 MG tablet      Last Written Prescription Date: 3/8/17  Last Fill Quantity: 90, # refills: 1  Last Office Visit with [facility], [facility] or [facility] prescribing provider: 3/8/17  Next 5 appointments (look out 90 days)     Jun 08, 2017  1:20 PM CDT   [facility] DO   [facility]    [address] [place] [id number] [phone]                   Potassium   Date Value Ref Range Status   01/09/2017 4.4 3.4 - 5.3 mmol/L Final     Creatinine   Date Value Ref Range Status   01/09/2017 0.78 0.66 - 1.25 mg/dL Final     BP Readings from Last 3 Encounters:   03/22/17 137/89   03/08/17 112/82   01/13/17 133/86

## 2017-06-08 NOTE — LETTER
"  6/8/2017       RE: Hugo Longoria  [address] [address] [facility] [address]           Dear Colleague,    Thank you for referring your patient, Hugo Longoria, to the [facility]. Please see a copy of my visit note below.    Orthopedic Clinic Post-Operative Note    CHIEF COMPLAINT:   Chief Complaint   Patient presents with     RECHECK     Right shoulder follow up     Surgical Followup     dos: 1/13/17~Right shoulder arthroscopy with arthroscopic rotator cuff repair of the supraspinatus (double row) and subscapularis, biceps tenodesis, subacromial decompression with partial acromioplasty~5 months POSTOP       HISTORY OF PRESENT ILLNESS  Making progress. He is very happy with it. Gets some soreness at times. Nothing \"painful\".    Patient's past medical, surgical, social and family histories reviewed.     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)     Overnight stay due to blood poisoning     Gastric ulcer, unspecified as acute or chronic, without mention of hemorrhage or perforation        Past Surgical History:   Procedure Laterality Date     ARTHROSCOPY KNEE  5/29/2013    Procedure: ARTHROSCOPY KNEE;  Right knee arthroscopy with partial medial and partial lateral menisectomies;  Surgeon: Phani Mclaughlin MD;  Location: [facility] OR     ARTHROSCOPY SHOULDER BICEPS TENODESIS REPAIR Right 1/13/2017    Procedure: ARTHROSCOPY SHOULDER BICEPS TENODESIS REPAIR;  Surgeon: Javier Yin DO;  Location: [facility] OR     ARTHROSCOPY SHOULDER DECOMPRESSION Right 1/13/2017    Procedure: ARTHROSCOPY SHOULDER DECOMPRESSION;  Surgeon: Javier Yin DO;  Location: [facility] OR     ARTHROSCOPY SHOULDER ROTATOR CUFF REPAIR Right 1/13/2017    Procedure: ARTHROSCOPY SHOULDER ROTATOR CUFF REPAIR;  Surgeon: Javier Yin DO;  Location: [facility]     " COLONOSCOPY  11/01/2007     COLONOSCOPY  12/12/11     COLONOSCOPY N/A 11/18/2015    Procedure: COLONOSCOPY;  Surgeon: Migue Mclaughlin MD;  Location: [facility] GI     COLONOSCOPY N/A 3/22/2017    Procedure: COMBINED COLONOSCOPY, SINGLE OR MULTIPLE BIOPSY/POLYPECTOMY BY BIOPSY;  Surgeon: Salvatore Zepeda MD;  Location:  GI     ENDOSCOPY  01/27/12    Upper GI - [facility]     ESOPHAGOSCOPY, GASTROSCOPY, DUODENOSCOPY (EGD), COMBINED N/A 11/18/2015    Procedure: COMBINED ESOPHAGOSCOPY, GASTROSCOPY, DUODENOSCOPY (EGD), BIOPSY SINGLE OR MULTIPLE;  Surgeon: Migue Mclaughlin MD;  Location:  GI     ESOPHAGOSCOPY, GASTROSCOPY, DUODENOSCOPY (EGD), COMBINED N/A 3/22/2017    Procedure: COMBINED ESOPHAGOSCOPY, GASTROSCOPY, DUODENOSCOPY (EGD), BIOPSY SINGLE OR MULTIPLE;  Surgeon: Salvatore Zepeda MD;  Location:  GI      UGI ENDOSCOPY, SIMPLE EXAM  10/31/2007     HERNIORRHAPHY UMBILICAL N/A 3/12/2015    Procedure: HERNIORRHAPHY UMBILICAL;  Surgeon: Yared Ma MD;  Location: [facility]     IRRIGATION AND DEBRIDEMENT UPPER EXTREMITY, COMBINED Left 3/15/2016    Procedure: COMBINED IRRIGATION AND DEBRIDEMENT UPPER EXTREMITY;  Surgeon: Javier Yin DO;  Location: [facility]     REMOVAL OF SPERM DUCT(S)      Vasectomy       Medications:    Current Outpatient Prescriptions on File Prior to Visit:  LANsoprazole (PREVACID) 30 MG CR capsule TAKE ONE CAPSULE BY MOUTH ONCE DAILY   ranitidine (ZANTAC) 300 MG tablet Take 1 tablet (300 mg) by mouth At Bedtime   LANsoprazole (PREVACID) 30 MG CR capsule Take 1 pill daily if Ranitidne fails.   lisinopril (PRINIVIL/ZESTRIL) 20 MG tablet TAKE ONE-HALF TABLET BY MOUTH ONCE DAILY   Cholecalciferol (VITAMIN D3) 2000 UNITS TABS Take 1 tablet by mouth daily   Omega-3 Fatty Acids (RA FISH OIL) 1400 MG CPDR Take 1 tablet by mouth daily Reported on 3/8/2017   Multiple Vitamin (MULTIVITAMINS PO) Take  by mouth.     No current facility-administered medications on  "file prior to visit.     Allergies   Allergen Reactions     Hydrocodone Other (See Comments)     \"climbed the walls, very anxious, insomnia\"     Nsaids Other (See Comments)     Hx of stomach ulcers       Social History     Occupational History      [employer]     Social History Main Topics     Smoking status: Former Smoker     Quit date: 1/1/1981     Smokeless tobacco: Never Used     Alcohol use 0.0 oz/week     0 Standard drinks or equivalent per week      Comment: weekends     Drug use: No     Sexual activity: Yes     Partners: Female     Birth control/ protection: Surgical      Comment: vasectomy       Family History   Problem Relation Age of Onset     CANCER Mother      ovarian cancer     Asthma Mother      Hypertension Mother      Arthritis Mother      Genitourinary Problems Mother      Lipids Mother      CANCER Sister      ovarian cancer     Hypertension Sister      Lipids Sister      CANCER Maternal Grandmother      stomach cancer     Asthma Father      Cardiovascular Father      heart attack; bypass x5, congenital heart disease     HEART DISEASE Father      heart attack; bypass x5, congenital heart disease     DIABETES Father      Hypertension Father      CANCER Father      prostate     Prostate Cancer Father      Circulatory Father      blood clots     Genitourinary Problems Father      Respiratory Father      emphysema; COPD     DIABETES Maternal Grandfather      DIABETES Paternal Grandfather      Eye Disorder Paternal Grandfather      glaucoma     Hypertension Brother      Lipids Brother      Hypertension Brother      Cardiovascular Brother      bypass x3     HEART DISEASE Brother      bypass x3     Lipids Brother      Hypertension Sister      C.A.D. No family hx of      CEREBROVASCULAR DISEASE No family hx of      Breast Cancer No family hx of      Cancer - colorectal No family hx of      Alzheimer Disease No family hx of      Blood Disease No family hx of      " "GASTROINTESTINAL DISEASE No family hx of      Musculoskeletal Disorder No family hx of      Neurologic Disorder No family hx of      Thyroid Disease No family hx of        REVIEW OF SYSTEMS  General: negative for, night sweats, dizziness, fatigue  Resp: No shortness of breath and no cough  CV: negative for chest pain, syncope or near-syncope  GI: negative for nausea, vomiting and diarrhea  : negative for dysuria and hematuria  Musculoskeletal: as above  Neurologic: negative for syncope   Hematologic: negative for bleeding disorder    Physical Exam:  Vitals: Temp 97.2  F (36.2  C) (Temporal)  Ht 5' 9\" (1.753 m)  Wt 230 lb (104.3 kg)  BMI 33.97 kg/m2  BMI= Body mass index is 33.97 kg/(m^2).  Constitutional: healthy, alert and no acute distress   Psychiatric: mentation appears normal and affect normal/bright  NEURO: no focal deficits  SKIN: .well healed, no erythema, no incision breakdown and no drainage.  JOINT/EXTREMITIES: Full active range of motion. Some minimal weakness with supraspinatus testing. No focal areas of tenderness.  GAIT: not tested     Diagnostic Modalities:  None today.  Independent visualization of the images was performed.      Impression:   Chief Complaint   Patient presents with     RECHECK     Right shoulder follow up     Surgical Followup     dos: 1/13/17~Right shoulder arthroscopy with arthroscopic rotator cuff repair of the supraspinatus (double row) and subscapularis, biceps tenodesis, subacromial decompression with partial acromioplasty~5 months POSTOP    doing as expected. Continuing to progress.    Plan:   Activity: None, use as tolerated  Staples/Sutures out: Not applicable.  Pain controlled: Yes. Continue to use: Nothing  Immobilzation: No  Physical Therapy: continue/transition to home exercise routine.   Return to clinic PRN, or sooner as needed for changes.    Re-x-ray on return: No    Gerber Yin D.O.    Again, thank you for allowing me to participate in the care of your patient. "        Sincerely,              Javier Yin, DO

## 2017-06-09 RX ORDER — LISINOPRIL 20 MG/1
TABLET ORAL
Qty: 90 TABLET | Refills: 0 | OUTPATIENT
Start: 2017-06-09

## 2017-06-16 ENCOUNTER — TELEPHONE (OUTPATIENT)
Dept: INTERNAL MEDICINE | Facility: CLINIC | Age: 57
End: 2017-06-16

## 2017-06-16 DIAGNOSIS — F33.0 MILD EPISODE OF RECURRENT MAJOR DEPRESSIVE DISORDER (H): Primary | ICD-10-CM

## 2017-06-16 NOTE — LETTER
My Depression Action Plan  Name: Hugo Longoria   Date of Birth 1960  Date: 7/26/2017    My doctor: Viktor Gomez   My clinic: [address] [address] [phone]          GREEN    ZONE   Good Control    What it looks like:     Things are going generally well. You have normal up s and down s. You may even feel depressed from time to time, but bad moods usually last less than a day.   What you need to do:  1. Continue to care for yourself (see self care plan)  2. Check your depression survival kit and update it as needed  3. Follow your physician s recommendations including any medication.  4. Do not stop taking medication unless you consult with your physician first.           YELLOW         ZONE Getting Worse    What it looks like:     Depression is starting to interfere with your life.     It may be hard to get out of bed; you may be starting to isolate yourself from others.    Symptoms of depression are starting to last most all day and this has happened for several days.     You may have suicidal thoughts but they are not constant.   What you need to do:     1. Call your care team, your response to treatment will improve if you keep your care team informed of your progress. Yellow periods are signs an adjustment may need to be made.     2. Continue your self-care, even if you have to fake it!    3. Talk to someone in your support network    4. Open up your depression survival kit           RED    ZONE Medical Alert - Get Help    What it looks like:     Depression is seriously interfering with your life.     You may experience these or other symptoms: You can t get out of bed most days, can t work or engage in other necessary activities, you have trouble taking care of basic hygiene, or basic responsibilities, thoughts of suicide or death that will not go away, self-injurious behavior.     What you need to do:  1. Call your care  team and request a same-day appointment. If they are not available (weekends or after hours) call your local crisis line, emergency room or 911.      Electronically signed by: Nancy Maddox, July 26, 2017    Depression Self Care Plan / Survival Kit    Self-Care for Depression  Here s the deal. Your body and mind are really not as separate as most people think.  What you do and think affects how you feel and how you feel influences what you do and think. This means if you do things that people who feel good do, it will help you feel better.  Sometimes this is all it takes.  There is also a place for medication and therapy depending on how severe your depression is, so be sure to consult with your medical provider and/ or Behavioral Health Consultant if your symptoms are worsening or not improving.     In order to better manage my stress, I will:    Exercise  Get some form of exercise, every day. This will help reduce pain and release endorphins, the  feel good  chemicals in your brain. This is almost as good as taking antidepressants!  This is not the same as joining a gym and then never going! (they count on that by the way ) It can be as simple as just going for a walk or doing some gardening, anything that will get you moving.      Hygiene   Maintain good hygiene (Get out of bed in the morning, Make your bed, Brush your teeth, Take a shower, and Get dressed like you were going to work, even if you are unemployed).  If your clothes don't fit try to get ones that do.    Diet  I will strive to eat foods that are good for me, drink plenty of water, and avoid excessive sugar, caffeine, alcohol, and other mood-altering substances.  Some foods that are helpful in depression are: complex carbohydrates, B vitamins, flaxseed, fish or fish oil, fresh fruits and vegetables.    Psychotherapy  I agree to participate in Individual Therapy (if recommended).    Medication  If prescribed medications, I agree to take them.  Missing  doses can result in serious side effects.  I understand that drinking alcohol, or other illicit drug use, may cause potential side effects.  I will not stop my medication abruptly without first discussing it with my provider.    Staying Connected With Others  I will stay in touch with my friends, family members, and my primary care provider/team.    Use your imagination  Be creative.  We all have a creative side; it doesn t matter if it s oil painting, sand castles, or mud pies! This will also kick up the endorphins.    Witness Beauty  (AKA stop and smell the roses) Take a look outside, even in mid-winter. Notice colors, textures. Watch the squirrels and birds.     Service to others  Be of service to others.  There is always someone else in need.  By helping others we can  get out of ourselves  and remember the really important things.  This also provides opportunities for practicing all the other parts of the program.    Humor  Laugh and be silly!  Adjust your TV habits for less news and crime-drama and more comedy.    Control your stress  Try breathing deep, massage therapy, biofeedback, and meditation. Find time to relax each day.     My support system    Clinic Contact:  Phone number:    Contact 1:  Phone number:    Contact 2:  Phone number:    Buddhism/:  Phone number:    Therapist:  Phone number:    Local crisis center:    Phone number:    Other community support:  Phone number:

## 2017-06-16 NOTE — TELEPHONE ENCOUNTER
Patient is due for a PHQ-9.  Index start date:6/6/17  Index end date:8/6/17    MyChart sent.  Postponing for 1 week.  Nancy Maddox RN

## 2017-06-16 NOTE — LETTER
[facility]  [address] [facility] [id number] [phone]       July 26, 2017    Hugo Longoria  [address] [facility] [address]          Dear Hugo,    This questionnaire is about depression and your care team may not see this information before then.  We care about you.  If at any time you feel unsafe or have concerns about the safety of others please take immediate action by calling [phone], for mental health crisis phone support 24 hours a day, 365 days per year.  As always, you can also go to your local ER, or call 911 if you have immediate safety concerns.    Please complete the enclosed questionnaire and return to us in the self addressed stamped envelope.    Thank you for trusting [facility] and we appreciate the opportunity to serve you.  We look forward to supporting your healthcare needs in the future.    Healthy Regards,    Your [facility] Team

## 2017-07-06 NOTE — TELEPHONE ENCOUNTER
RN has attempted to contact this patient by phone to return their call, but there is no response.  Mailbox is full..  Will try again later.     Nancy Maddox RN

## 2017-07-19 DIAGNOSIS — K29.50 CHRONIC GASTRITIS WITHOUT BLEEDING, UNSPECIFIED GASTRITIS TYPE: ICD-10-CM

## 2017-07-19 RX ORDER — LANSOPRAZOLE 30 MG/1
CAPSULE, DELAYED RELEASE ORAL
Qty: 30 CAPSULE | Refills: 1 | Status: SHIPPED | OUTPATIENT
Start: 2017-07-19 | End: 2017-10-27

## 2017-07-19 NOTE — TELEPHONE ENCOUNTER
Prescription approved per [facility] Refill Protocol.    Jamila Melara RN

## 2017-07-19 NOTE — TELEPHONE ENCOUNTER
LANsoprazole (PREVACID) 30 MG CR capsule      Last Written Prescription Date: 4/21/17  Last Fill Quantity: 30,  # refills: 1   Last Office Visit with [facility], [facility] or [facility] prescribing provider: 1/9/17

## 2017-07-26 NOTE — TELEPHONE ENCOUNTER
RN has attempted to contact this patient by phone to return their call, but there is no response.  Mailbox is full.    Sent letter.    Nancy Maddox RN

## 2017-08-07 ASSESSMENT — PATIENT HEALTH QUESTIONNAIRE - PHQ9: SUM OF ALL RESPONSES TO PHQ QUESTIONS 1-9: 3

## 2017-10-27 DIAGNOSIS — K29.50 CHRONIC GASTRITIS WITHOUT BLEEDING, UNSPECIFIED GASTRITIS TYPE: ICD-10-CM

## 2017-10-27 RX ORDER — LANSOPRAZOLE 30 MG/1
CAPSULE, DELAYED RELEASE ORAL
Qty: 90 CAPSULE | Refills: 1 | Status: SHIPPED | OUTPATIENT
Start: 2017-10-27 | End: 2018-01-11

## 2017-10-27 NOTE — TELEPHONE ENCOUNTER
Routing refill request to provider for review/approval because:  A break in medication, patient should have been out of medication >1 month ago    Shawna Light RN  [facility]

## 2017-10-27 NOTE — TELEPHONE ENCOUNTER
LANsoprazole (PREVACID) 30 MG CR capsule   Last Written Prescription Date: 7/19/2017  Last Fill Quantity: 30,  # refills: 1   Last Office Visit with [facility], [facility] or [facility] prescribing provider: 3/8/2017  Rosaura Esqueda CMA

## 2018-01-11 ENCOUNTER — OFFICE VISIT (OUTPATIENT)
Dept: ORTHOPEDICS | Facility: OTHER | Age: 58
End: 2018-01-11
Payer: COMMERCIAL

## 2018-01-11 VITALS — TEMPERATURE: 96.7 F | BODY MASS INDEX: 34.26 KG/M2 | WEIGHT: 232 LBS

## 2018-01-11 DIAGNOSIS — M17.11 PRIMARY OSTEOARTHRITIS OF RIGHT KNEE: Primary | ICD-10-CM

## 2018-01-11 PROCEDURE — 20610 DRAIN/INJ JOINT/BURSA W/O US: CPT | Mod: RT | Performed by: ORTHOPAEDIC SURGERY

## 2018-01-11 RX ORDER — ASCORBIC ACID 250 MG
250 TABLET,CHEWABLE ORAL DAILY
COMMUNITY
End: 2020-12-10

## 2018-01-11 RX ORDER — TRIAMCINOLONE ACETONIDE 40 MG/ML
40 INJECTION, SUSPENSION INTRA-ARTICULAR; INTRAMUSCULAR ONCE
Qty: 1 ML | Refills: 0 | COMMUNITY
Start: 2018-01-11 | End: 2018-01-23

## 2018-01-11 ASSESSMENT — PAIN SCALES - GENERAL: PAINLEVEL: NO PAIN (0)

## 2018-01-11 NOTE — MR AVS SNAPSHOT
After Visit Summary   1/11/2018    Hugo Longoria    MRN: [id number]           Patient Information     Date Of Birth          1960        Visit Information        Provider Department      1/11/2018 1:20 PM Javier Yin,  [facility]        Today's Diagnoses     Primary osteoarthritis of right knee    -  1       Follow-ups after your visit        Your next 10 appointments already scheduled     Jan 23, 2018  7:20 AM CST   Office Visit with Viktor Gomez,    [facility]    [address] [facility] [id number] [phone]           Bring a current list of meds and any records pertaining to this visit. For Physicals, please bring immunization records and any forms needing to be filled out. Please arrive 10 minutes early to complete paperwork.              Who to contact     If you have questions or need follow up information about today's clinic visit or your schedule please contact [facility] directly at [phone].  Normal or non-critical lab and imaging results will be communicated to you by [employer], letter or phone within 4 business days after the clinic has received the results. If you do not hear from us within 7 days, please contact the clinic through [employer] or phone. If you have a critical or abnormal lab result, we will notify you by phone as soon as possible.  Submit refill requests through [employer] or call your pharmacy and they will forward the refill request to us. Please allow 3 business days for your refill to be completed.          Additional Information About Your Visit        [employer] Information     [employer] gives you secure access to your electronic health record. If you see a primary care provider, you can also send messages to your care team and make appointments. If you have questions, please call your primary care clinic.  If you do not have a primary care provider, please  call [phone] and they will assist you.        Care EveryWhere ID     This is your Care EveryWhere ID. This could be used by other organizations to access your [place] medical records  [id number]        Your Vitals Were     Temperature BMI (Body Mass Index)                96.7  F (35.9  C) (Temporal) 34.26 kg/m2           Blood Pressure from Last 3 Encounters:   03/22/17 137/89   03/08/17 112/82   01/13/17 133/86    Weight from Last 3 Encounters:   01/11/18 232 lb (105.2 kg)   06/08/17 230 lb (104.3 kg)   04/20/17 228 lb (103.4 kg)              Today, you had the following     No orders found for display       Primary Care Provider Office Phone # Fax #    Viktor Leighton Gomez [phone] [address] [place] [address]        Equal Access to Services     JONATHAN OLVERA : Hadii aad ku hadasho Soomaali, waaxda luqadaha, qaybta kaalmada adeegyada, waxay elayne khalil . So [facility] [phone].    ATENCIÓN: Si habla español, tiene a byrd disposición servicios gratuitos de asistencia lingüística. Morebry al [phone].    We comply with applicable federal civil rights laws and [place] laws. We do not discriminate on the basis of race, color, national origin, age, disability, sex, sexual orientation, or gender identity.            Thank you!     Thank you for choosing [facility]  for your care. Our goal is always to provide you with excellent care. Hearing back from our patients is one way we can continue to improve our services. Please take a few minutes to complete the written survey that you may receive in the mail after your visit with us. Thank you!             Your Updated Medication List - Protect others around you: Learn how to safely use, store and throw away your medicines at www.disposemymeds.org.          This list is accurate as of: 1/11/18  1:45 PM.  Always use your most recent med list.                   Brand Name Dispense  Instructions for use Diagnosis    ascorbic acid 250 MG Chew chewable tablet    vitamin C     Take 250 mg by mouth daily        LANsoprazole 30 MG CR capsule    PREVACID    30 capsule    Take 1 pill daily if Ranitidne fails.    Chronic gastritis without bleeding, unspecified gastritis type, Gastroesophageal reflux disease with esophagitis       lisinopril 20 MG tablet    PRINIVIL/ZESTRIL    90 tablet    TAKE ONE-HALF TABLET BY MOUTH ONCE DAILY    HTN, goal below 140/90       MULTIVITAMINS PO      Take  by mouth.        RA FISH OIL 1400 MG Cpdr      Take 1 tablet by mouth daily Reported on 3/8/2017        ranitidine 300 MG tablet    ZANTAC    30 tablet    Take 1 tablet (300 mg) by mouth At Bedtime    Gastroesophageal reflux disease with esophagitis

## 2018-01-11 NOTE — LETTER
"    1/11/2018         RE: Hugo Longoria  [address] [id number]        Dear Colleague,    Thank you for referring your patient, Hugo Longoria, to the [facility]. Please see a copy of my visit note below.    Office Visit-Follow up    Chief Complaint: Hugo Longoria is a 57 year old male who is being seen for   Chief Complaint   Patient presents with     RECHECK     right  Knee primary osteoarthritis       History of Present Illness:   Today's visit:  Turns for some right knee achiness.  Worse with activity.  Better with rest.  He is requesting an injection.  March 13, 2017 visit:   complains of right medial knee pain. he reports tearing is anterior cruciate ligament many years ago   he had an arthroscopy with a \"cleaning the cartilage out\". He's had previous steroid injections which have been helpful. He is also undergone Synvisc injections.    REVIEW OF SYSTEMS  General: negative for, night sweats, dizziness, fatigue  Resp: No shortness of breath and no cough  CV: negative for chest pain, syncope or near-syncope  GI: negative for nausea, vomiting and diarrhea  : negative for dysuria and hematuria  Musculoskeletal: as above  Neurologic: negative for syncope   Hematologic: negative for bleeding disorder    Physical Exam:  Vitals: Temp 96.7  F (35.9  C) (Temporal)  Wt 232 lb (105.2 kg)  BMI 34.26 kg/m2  BMI= Body mass index is 34.26 kg/(m^2).  Constitutional: healthy, alert and no acute distress   Psychiatric: mentation appears normal and affect normal/bright  NEURO: no focal deficits  RESP: Normal with easy respirations and no use of accessory muscles to breathe, no audible wheezing or retractions  CV: RLE: no edema           SKIN: No erythema, rashes, excoriation, or breakdown. No evidence of infection.   JOINT/EXTREMITIES:right: 0-125 .  Varus deformity.  Small effusion.  Tenderness along medial joint line.  Some pseudolaxity medial with valgus stressing.  Collateral " ligaments are intact.  GAIT: not tested             Diagnostic Modalities:  Previous imaging reviewed.  Independent visualization of the images was performed.      Impression: right knee primary osteoarthritis with ACL deficiency    Plan:  All of the above pertinent physical exam and imaging modalities findings was reviewed with Hugo.                                          INJECTION PROCEDURE:  The patient was counseled about an  injection, including discussion of risks (including infection), contents of the injection, rationale for performing the injection, and expected benefits of the injection. The skin was prepped with alcohol and betadine and then utilizing sterile technique an injection of the right knee joint from the anterolateral approach in the seated position was performed. The injection consisted 1ml of Kenalog (40mg per 1 ml) mixed with 3ml of 0.5% Marcaine. The patient tolerated the injection well, and there were no complications. The injection site was covered with a Band-Aid. The injection was performed by DONN Ashley, CNP, DNP    Previous injection provided him 10+ months relief.  Recommend repeat injection.      Return to clinic PRN, or sooner as needed for changes.  Re-x-ray on return: No    Gerber Yin D.O.          Again, thank you for allowing me to participate in the care of your patient.        Sincerely,        Javier Yin, DO

## 2018-01-11 NOTE — NURSING NOTE
"Chief Complaint   Patient presents with     RECHECK     right  Knee primary osteoarthritis       Initial Temp 96.7  F (35.9  C) (Temporal)  Wt 105.2 kg (232 lb)  BMI 34.26 kg/m2 Estimated body mass index is 34.26 kg/(m^2) as calculated from the following:    Height as of 6/8/17: 1.753 m (5' 9\").    Weight as of this encounter: 105.2 kg (232 lb).  Medication Reconciliation: complete   MANUELITO Cannon  "

## 2018-01-11 NOTE — PROGRESS NOTES
"Office Visit-Follow up    Chief Complaint: Hugo Longoria is a 57 year old male who is being seen for   Chief Complaint   Patient presents with     RECHECK     right  Knee primary osteoarthritis       History of Present Illness:   Today's visit:  Turns for some right knee achiness.  Worse with activity.  Better with rest.  He is requesting an injection.  March 13, 2017 visit:   complains of right medial knee pain. he reports tearing is anterior cruciate ligament many years ago   he had an arthroscopy with a \"cleaning the cartilage out\". He's had previous steroid injections which have been helpful. He is also undergone Synvisc injections.    REVIEW OF SYSTEMS  General: negative for, night sweats, dizziness, fatigue  Resp: No shortness of breath and no cough  CV: negative for chest pain, syncope or near-syncope  GI: negative for nausea, vomiting and diarrhea  : negative for dysuria and hematuria  Musculoskeletal: as above  Neurologic: negative for syncope   Hematologic: negative for bleeding disorder    Physical Exam:  Vitals: Temp 96.7  F (35.9  C) (Temporal)  Wt 232 lb (105.2 kg)  BMI 34.26 kg/m2  BMI= Body mass index is 34.26 kg/(m^2).  Constitutional: healthy, alert and no acute distress   Psychiatric: mentation appears normal and affect normal/bright  NEURO: no focal deficits  RESP: Normal with easy respirations and no use of accessory muscles to breathe, no audible wheezing or retractions  CV: RLE: no edema           SKIN: No erythema, rashes, excoriation, or breakdown. No evidence of infection.   JOINT/EXTREMITIES:right: 0-125 .  Varus deformity.  Small effusion.  Tenderness along medial joint line.  Some pseudolaxity medial with valgus stressing.  Collateral ligaments are intact.  GAIT: not tested             Diagnostic Modalities:  Previous imaging reviewed.  Independent visualization of the images was performed.      Impression: right knee primary osteoarthritis with ACL deficiency    Plan:  All of the " above pertinent physical exam and imaging modalities findings was reviewed with Hugo.                                          INJECTION PROCEDURE:  The patient was counseled about an  injection, including discussion of risks (including infection), contents of the injection, rationale for performing the injection, and expected benefits of the injection. The skin was prepped with alcohol and betadine and then utilizing sterile technique an injection of the right knee joint from the anterolateral approach in the seated position was performed. The injection consisted 1ml of Kenalog (40mg per 1 ml) mixed with 3ml of 0.5% Marcaine. The patient tolerated the injection well, and there were no complications. The injection site was covered with a Band-Aid. The injection was performed by Juanjose Elena, DONN, CNP, DNP    Previous injection provided him 10+ months relief.  Recommend repeat injection.      Return to clinic PRN, or sooner as needed for changes.  Re-x-ray on return: No    Gerber Yin D.O.

## 2018-01-23 ENCOUNTER — OFFICE VISIT (OUTPATIENT)
Dept: FAMILY MEDICINE | Facility: OTHER | Age: 58
End: 2018-01-23
Payer: COMMERCIAL

## 2018-01-23 VITALS
RESPIRATION RATE: 16 BRPM | DIASTOLIC BLOOD PRESSURE: 82 MMHG | HEART RATE: 90 BPM | WEIGHT: 232.2 LBS | SYSTOLIC BLOOD PRESSURE: 132 MMHG | TEMPERATURE: 97.6 F | HEIGHT: 70 IN | OXYGEN SATURATION: 95 % | BODY MASS INDEX: 33.24 KG/M2

## 2018-01-23 DIAGNOSIS — G47.00 INSOMNIA, UNSPECIFIED TYPE: ICD-10-CM

## 2018-01-23 DIAGNOSIS — Z13.6 CARDIOVASCULAR SCREENING; LDL GOAL LESS THAN 130: Primary | ICD-10-CM

## 2018-01-23 DIAGNOSIS — G47.33 OSA (OBSTRUCTIVE SLEEP APNEA): ICD-10-CM

## 2018-01-23 DIAGNOSIS — I10 HTN, GOAL BELOW 140/90: ICD-10-CM

## 2018-01-23 DIAGNOSIS — R06.09 DOE (DYSPNEA ON EXERTION): ICD-10-CM

## 2018-01-23 PROCEDURE — 99214 OFFICE O/P EST MOD 30 MIN: CPT | Performed by: INTERNAL MEDICINE

## 2018-01-23 ASSESSMENT — PAIN SCALES - GENERAL: PAINLEVEL: NO PAIN (0)

## 2018-01-23 ASSESSMENT — PATIENT HEALTH QUESTIONNAIRE - PHQ9: SUM OF ALL RESPONSES TO PHQ QUESTIONS 1-9: 7

## 2018-01-23 NOTE — PROGRESS NOTES
SUBJECTIVE:   Hugo Longoria is a 57 year old male who presents to clinic today for the following health issues:    Hypertension Follow-up      Outpatient blood pressures are not being checked.    Low Salt Diet: not monitoring salt    Recheck cholesterol-was high when checked recently    Ongoing sinus issues      Amount of exercise or physical activity: once in a while    Problems taking medications regularly: No    Medication side effects: none    Diet: regular (no restrictions)    Patient is a pleasant 57-year-old male who presents to the clinic today to follow-up on a preventive health visit he had for his insurance company. Many labs and tests were performed including cholesterol. The discover that his cholesterol had increased from previous draws and was now 244. Patient reports having goals to exercise more and eat healthier. Patient also has a very strong family history of heart disease, specifically both his father and brother have required CABG procedures. Patient also notes today that he has been having difficulty sleeping. He says that it is very hard to fall asleep because his mind keeps thinking and racing. He notes that if he does fall asleep he will wake up many times in the middle the night. He tosses and turns and wakes keeps his wife awake. He notes that in the past he was scheduled for a sleep study which she never followed through on.                          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     Surgical History:   has a past surgical history that includes removal of sperm duct(s); UPPER GI ENDOSCOPY,EXAM (10/31/2007); colonoscopy (11/01/2007); colonoscopy (12/12/11); endoscopy (01/27/12); Arthroscopy knee (5/29/2013);  Herniorrhaphy umbilical (N/A, 3/12/2015); Colonoscopy (N/A, 11/18/2015); Esophagoscopy, gastroscopy, duodenoscopy (EGD), combined (N/A, 11/18/2015); Irrigation and debridement upper extremity, combined (Left, 3/15/2016); Arthroscopy shoulder rotator cuff repair (Right, 1/13/2017); Arthroscopy shoulder biceps tenodesis repair (Right, 1/13/2017); Arthroscopy shoulder decompression (Right, 1/13/2017); Colonoscopy (N/A, 3/22/2017); and Esophagoscopy, gastroscopy, duodenoscopy (EGD), combined (N/A, 3/22/2017).     Social History:   reports that he quit smoking about 37 years ago. He has never used smokeless tobacco. He reports that he drinks alcohol. He reports that he does not use illicit drugs.     Family History:  family history includes Arthritis in his mother; Asthma in his father and mother; CANCER in his father, maternal grandmother, mother, and sister; Cardiovascular in his brother and father; Circulatory in his father; DIABETES in his father, maternal grandfather, and paternal grandfather; Eye Disorder in his paternal grandfather; Genitourinary Problems in his father and mother; HEART DISEASE in his brother and father; Hypertension in his brother, brother, father, mother, sister, and sister; Lipids in his brother, brother, mother, and sister; Prostate Cancer in his father; Respiratory in his father. There is no history of C.A.D., CEREBROVASCULAR DISEASE, Breast Cancer, Cancer - colorectal, Alzheimer Disease, Blood Disease, GASTROINTESTINAL DISEASE, Musculoskeletal Disorder, Neurologic Disorder, or Thyroid Disease.            MEDICATIONS  Current Outpatient Prescriptions   Medication Sig Dispense Refill     ascorbic acid (VITAMIN C) 250 MG CHEW chewable tablet Take 250 mg by mouth daily       LANsoprazole (PREVACID) 30 MG CR capsule Take 1 pill daily if Ranitidne fails. 30 capsule 0     lisinopril (PRINIVIL/ZESTRIL) 20 MG tablet TAKE ONE-HALF TABLET BY MOUTH ONCE DAILY 90 tablet 1     Multiple Vitamin  "(MULTIVITAMINS PO) Take  by mouth.           Problem list and histories reviewed & adjusted, as indicated.    Reviewed and updated as needed this visit by clinical staffTobacco  Allergies  Meds  Med Hx  Surg Hx  Fam Hx  Soc Hx      Reviewed and updated as needed this visit by Provider       --------------------------------------------------------------------------------------------------------------------                          REVIEW OF SYSTEMS:         Constitutional: The patient denies significant weight gain, weight loss, or cold intolerance. Denies night sweats, fatigue, lethargy, listlessness.   HEART: Pt denies: chest pain, arrythmia, syncope, tachy or bradyarrhythmia or excess edema.   LUNGS: Pt denies: cough,excess sputum, hemoptysis, or shortness of breath at rest. Patient does have dyspnea upon exertion. Recently had a screening non-imaged stress test which was discontinued abruptly due to dyspnea. This was performed by his insurance company..   GI: Pt denies: nausea, vomitting, diarrhea, constipation, melena, or hematochezia.   NEURO: Pt denies: seizures, strokes, diplopia, weakness, paraesthesias, or paralysis.                          EXAMINATION:       /82  Pulse 90  Temp 97.6  F (36.4  C) (Temporal)  Resp 16  Ht 5' 10\" (1.778 m)  Wt 232 lb 3.2 oz (105.3 kg)  SpO2 95%  BMI 33.32 kg/m2     HEART:  regular without rubs, clicks, gallops, or murmurs. PMI is nondisplaced. Upstrokes are brisk. S1,S2 are heard.   LUNGS: clear bilaterally, airflow is brisk, no intercostal retraction or stridor is noted. No coughing is noted during visit.    GI: Abdomen is soft, without rebound, guarding or tenderness. Bowel sounds are appropriate. No renal bruits are heard.    NEURO: Pt is alert and appropriate. No neurologic lateralization is noted. Cranial nerves 2-12 are intact. Peripheral sensory and motor function are grossly normal     MS: Minimal crepitance is noted in the extremities. No deformity " is present. Muscle strength is appropriate and equal bilaterally. No acute joint erythema or swelling is present.                          DECISION MAKIN. SOLER (dyspnea on exertion)  Rule out cardiac. Patient does have strong family history of cardiovascular disease.    2. CARDIOVASCULAR SCREENING; LDL GOAL LESS THAN 130    - Lipid Profile; Future  - NM Exercise stress test; Future    3. BRAYAN (obstructive sleep apnea)  Set up sleep study. Patient does have significant daytime somnolence and progressive depression    4. HTN, goal below 140/90  Currently stable, continue medication    5. Insomnia, unspecified type  As above  - SLEEP EVALUATION & MANAGEMENT REFERRAL - [facility] [phone] (Age 14 if over 100 lbs); Future            DO PATEL Steve    Portions of this note were produced using [employer]  Although every attempt at real-time proof reading has been made, occasional grammar/syntax errors may have been missed.               This patient has been interviewed, examined, diagnosed, and informed of the above by me personally.  Medical records and available pertinent information has been reviewed by me personally.  All decisions and discussion have been between myself and the patient/family.  This was done in the presence of Renu Vazquez MS4 , who acted as a medical scribe and recorded the events above.  No diagnosis or decision making was made by the above-mentioned scribe.  The patient, and or his/her ensurors will not be billed for the presence or actions of this scribe.  The information recorded by the scribe has been reviewed by me and found to be accurate.    I have carefully explained the diagnosis and treatment options with the patient. The patient has displayed an understanding of the above, and all subsequent questions were answered.

## 2018-01-23 NOTE — NURSING NOTE
"Chief Complaint   Patient presents with     Lipids     check cholesterol     Hypertension     Sinus Problem     has had ongoing sinus issues for years       Initial /82  Pulse 90  Temp 97.6  F (36.4  C) (Temporal)  Resp 16  Ht 5' 10\" (1.778 m)  Wt 232 lb 3.2 oz (105.3 kg)  SpO2 95%  BMI 33.32 kg/m2 Estimated body mass index is 33.32 kg/(m^2) as calculated from the following:    Height as of this encounter: 5' 10\" (1.778 m).    Weight as of this encounter: 232 lb 3.2 oz (105.3 kg).  Medication Reconciliation: complete  Radha Velez CMA (AAMA)    "

## 2018-01-23 NOTE — MR AVS SNAPSHOT
After Visit Summary   1/23/2018    Hugo Longoria    MRN: [id number]           Patient Information     Date Of Birth          1960        Visit Information        Provider Department      1/23/2018 7:20 AM Viktor Gomez DO [facility]        Today's Diagnoses     CARDIOVASCULAR SCREENING; LDL GOAL LESS THAN 130    -  1    SOLER (dyspnea on exertion)        BRAYAN (obstructive sleep apnea)        HTN, goal below 140/90        Insomnia, unspecified type           Follow-ups after your visit        Additional Services     SLEEP EVALUATION & MANAGEMENT REFERRAL - [facility] [phone] (Age 13 if over 100 lbs)       Please be aware that coverage of these services is subject to the terms and limitations of your health insurance plan.  Call member services at your health plan with any benefit or coverage questions.      Please bring the following to your appointment:    >>   List of current medications   >>   This referral request   >>   Any documents/labs given to you for this referral                      Future tests that were ordered for you today     Open Future Orders        Priority Expected Expires Ordered    Lipid Profile Routine  1/23/2019 1/23/2018    SLEEP EVALUATION & MANAGEMENT REFERRAL - [facility] [phone] (Age 14 if over 100 lbs) Routine  1/23/2019 1/23/2018    NM Exercise stress test Routine  1/23/2019 1/23/2018            Who to contact     If you have questions or need follow up information about today's clinic visit or your schedule please contact [facility] directly at [phone].  Normal or non-critical lab and imaging results will be communicated to you by MyChart, letter or phone within 4 business days after the clinic has received the results. If you do not hear from us within 7 days, please contact the clinic through MyChart or phone. If you have a critical or abnormal lab  "result, we will notify you by phone as soon as possible.  Submit refill requests through [facility] or call your pharmacy and they will forward the refill request to us. Please allow 3 business days for your refill to be completed.          Additional Information About Your Visit        [facility] Information     [facility] gives you secure access to your electronic health record. If you see a primary care provider, you can also send messages to your care team and make appointments. If you have questions, please call your primary care clinic.  If you do not have a primary care provider, please call [phone] and they will assist you.        Care EveryWhere ID     This is your Care EveryWhere ID. This could be used by other organizations to access your [facility] medical records  [id number]        Your Vitals Were     Pulse Temperature Respirations Height Pulse Oximetry BMI (Body Mass Index)    90 97.6  F (36.4  C) (Temporal) 16 5' 10\" (1.778 m) 95% 33.32 kg/m2       Blood Pressure from Last 3 Encounters:   01/23/18 132/82   03/22/17 137/89   03/08/17 112/82    Weight from Last 3 Encounters:   01/23/18 232 lb 3.2 oz (105.3 kg)   01/11/18 232 lb (105.2 kg)   06/08/17 230 lb (104.3 kg)               Primary Care Provider Office Phone # Fax #    Viktorely Gomez [phone]        [facility] [facility] [address] [place] [address]        Equal Access to Services     GAVIN OLVERA AH: Hadii aad ku hadasho Soomaali, waaxda luqadaha, qaybta kaalmada adeegyada, kelly howell la'lyn hoskins. So [facility] [phone].    ATENCIÓN: Si habla español, tiene a byrd disposición servicios gratuitos de asistencia lingüística. Rg al [phone].    We comply with applicable federal civil rights laws and [place] laws. We do not discriminate on the basis of race, color, national origin, age, disability, sex, sexual orientation, or gender identity.            Thank you!     Thank you for choosing [facility]" for your care. Our goal is always to provide you with excellent care. Hearing back from our patients is one way we can continue to improve our services. Please take a few minutes to complete the written survey that you may receive in the mail after your visit with us. Thank you!             Your Updated Medication List - Protect others around you: Learn how to safely use, store and throw away your medicines at www.disposemymeds.org.          This list is accurate as of: 1/23/18  8:17 AM.  Always use your most recent med list.                   Brand Name Dispense Instructions for use Diagnosis    ascorbic acid 250 MG Chew chewable tablet    vitamin C     Take 250 mg by mouth daily    Primary osteoarthritis of right knee       LANsoprazole 30 MG CR capsule    PREVACID    30 capsule    Take 1 pill daily if Ranitidne fails.    Chronic gastritis without bleeding, unspecified gastritis type, Gastroesophageal reflux disease with esophagitis       lisinopril 20 MG tablet    PRINIVIL/ZESTRIL    90 tablet    TAKE ONE-HALF TABLET BY MOUTH ONCE DAILY    HTN, goal below 140/90       MULTIVITAMINS PO      Take  by mouth.

## 2018-01-24 DIAGNOSIS — Z13.6 CARDIOVASCULAR SCREENING; LDL GOAL LESS THAN 130: ICD-10-CM

## 2018-01-24 LAB
CHOLEST SERPL-MCNC: 247 MG/DL
HDLC SERPL-MCNC: 52 MG/DL
LDLC SERPL CALC-MCNC: 162 MG/DL
NONHDLC SERPL-MCNC: 195 MG/DL
TRIGL SERPL-MCNC: 164 MG/DL

## 2018-01-24 PROCEDURE — 80061 LIPID PANEL: CPT | Performed by: INTERNAL MEDICINE

## 2018-01-24 PROCEDURE — 36415 COLL VENOUS BLD VENIPUNCTURE: CPT | Performed by: INTERNAL MEDICINE

## 2018-01-25 ENCOUNTER — HOSPITAL ENCOUNTER (OUTPATIENT)
Dept: NUCLEAR MEDICINE | Facility: CLINIC | Age: 58
Setting detail: NUCLEAR MEDICINE
Discharge: HOME | End: 2018-01-25
Attending: INTERNAL MEDICINE | Admitting: INTERNAL MEDICINE
Payer: COMMERCIAL

## 2018-01-25 DIAGNOSIS — Z13.6 CARDIOVASCULAR SCREENING; LDL GOAL LESS THAN 130: ICD-10-CM

## 2018-01-25 PROCEDURE — 78452 HT MUSCLE IMAGE SPECT MULT: CPT | Mod: 26 | Performed by: INTERNAL MEDICINE

## 2018-01-25 PROCEDURE — 93016 CV STRESS TEST SUPVJ ONLY: CPT | Performed by: INTERNAL MEDICINE

## 2018-01-25 PROCEDURE — 78452 HT MUSCLE IMAGE SPECT MULT: CPT

## 2018-01-25 PROCEDURE — 34300033 ZZH RX 343: Performed by: INTERNAL MEDICINE

## 2018-01-25 PROCEDURE — A9502 TC99M TETROFOSMIN: HCPCS | Performed by: INTERNAL MEDICINE

## 2018-01-25 PROCEDURE — 93017 CV STRESS TEST TRACING ONLY: CPT

## 2018-01-25 PROCEDURE — 93018 CV STRESS TEST I&R ONLY: CPT | Performed by: INTERNAL MEDICINE

## 2018-01-25 RX ADMIN — TETROFOSMIN 8.4 MCI.: 1.38 INJECTION, POWDER, LYOPHILIZED, FOR SOLUTION INTRAVENOUS at 09:00

## 2018-01-25 RX ADMIN — TETROFOSMIN 26 MCI.: 1.38 INJECTION, POWDER, LYOPHILIZED, FOR SOLUTION INTRAVENOUS at 10:34

## 2018-01-25 NOTE — PROGRESS NOTES
Dear Hugo, your recent test results are attached.  Your cholesterol is elevated with an LDL of 162.  I would recommend starting a medication such as Lipitor.  Please let me know if you would like me to send over a prescription.  Feel free to contact me via the office or My Chart if you have any questions regarding the above.  Sincerely,  Viktor Gomez, DO [facility]

## 2018-01-31 ENCOUNTER — TELEPHONE (OUTPATIENT)
Dept: FAMILY MEDICINE | Facility: OTHER | Age: 58
End: 2018-01-31

## 2018-01-31 DIAGNOSIS — Z13.6 CARDIOVASCULAR SCREENING; LDL GOAL LESS THAN 130: Primary | ICD-10-CM

## 2018-01-31 RX ORDER — ATORVASTATIN CALCIUM 20 MG/1
20 TABLET, FILM COATED ORAL DAILY
Qty: 30 TABLET | Refills: 3 | Status: SHIPPED | OUTPATIENT
Start: 2018-01-31 | End: 2018-07-24

## 2018-01-31 NOTE — TELEPHONE ENCOUNTER
Reason for Call:  Request for results:    Name of test or procedure: stress test and labwork    Date of test of procedure: 1/24    Location of the test or procedure: [facility] for lab    OK to leave the result message on voice mail or with a family member? YES    Phone number Patient can be reached at:  Home number on file [phone] (home)    Additional comments: none    Call taken on 1/31/2018 at 9:08 AM by Alesia Smith

## 2018-01-31 NOTE — PROGRESS NOTES
Dear Hugo, your recent test results are attached.  Your stress test is normal. No evidence of cardiac ischemia or cardiovascular diseases noted.  Feel free to contact me via the office or My Chart if you have any questions regarding the above.  Sincerely,  Viktor Gomez DO [facility]

## 2018-02-12 ENCOUNTER — OFFICE VISIT (OUTPATIENT)
Dept: SLEEP MEDICINE | Facility: CLINIC | Age: 58
End: 2018-02-12
Attending: INTERNAL MEDICINE
Payer: COMMERCIAL

## 2018-02-12 VITALS
HEART RATE: 99 BPM | BODY MASS INDEX: 33.21 KG/M2 | HEIGHT: 70 IN | DIASTOLIC BLOOD PRESSURE: 68 MMHG | SYSTOLIC BLOOD PRESSURE: 138 MMHG | OXYGEN SATURATION: 96 % | RESPIRATION RATE: 18 BRPM | WEIGHT: 232 LBS

## 2018-02-12 DIAGNOSIS — G47.00 INSOMNIA, UNSPECIFIED TYPE: ICD-10-CM

## 2018-02-12 PROCEDURE — 99244 OFF/OP CNSLTJ NEW/EST MOD 40: CPT | Performed by: PHYSICIAN ASSISTANT

## 2018-02-12 RX ORDER — ZOLPIDEM TARTRATE 10 MG/1
TABLET ORAL
Qty: 1 TABLET | Refills: 0 | Status: SHIPPED | OUTPATIENT
Start: 2018-02-12 | End: 2019-01-30

## 2018-02-12 NOTE — PATIENT INSTRUCTIONS
"MY TREATMENT INFORMATION FOR SLEEP APNEA-  Hugo Longoria    DOCTOR : Christiano Soliz  SLEEP CENTER :      MY CONTACT NUMBER:     Am I having a sleep study at a sleep center?  Make sure you have an appointment for the study before you leave!    Am I having a home sleep study?  Watch this video:  https://www.Sanivation.com/watch?v=CteI_GhyP9g&list=PLC4F_nvCEvSxpvRkgPszaicmjcb2PMExm    Frequently asked questions:  1. What is Obstructive Sleep Apnea (BRAYAN)? BRAYAN is the most common type of sleep apnea. Apnea means, \"without breath.\"  Apnea is most often caused by narrowing or collapse of the upper airway as muscles relax during sleep.   Almost everyone has occasional apneas. Most people with sleep apnea have had brief interruptions at night frequently for many years.  The severity of sleep apnea is related to how frequent and severe the events are.   2. What are the consequences of BRAYAN? Symptoms include: feeling sleepy during the day, snoring loudly, gasping or stopping of breathing, trouble sleeping, and occasionally morning headaches or heartburn at night.  Sleepiness can be serious and even increase the risk of falling asleep while driving. Other health consequences may include development of high blood pressure and other cardiovascular disease in persons who are susceptible. Untreated BRAYAN  can contribute to heart disease, stroke and diabetes.   3. What are the treatment options? In most situations, sleep apnea is a lifelong disease that must be managed with daily therapy. Medications are not effective for sleep apnea and surgery is generally not considered until other therapies have been tried. Your treatment is your choice . Continuous Positive Airway (CPAP) works right away and is the therapy that is effective in nearly everyone. An oral device to hold your jaw forward is usually the next most reliable option. Other options include postioning devices (to keep you off your back), weight loss, and surgery including a " tongue pacing device. There is more detail about some of these options below.    Important tips for using CPAP and similar devices   Know your equipment:  CPAP is continuous positive airway pressure that prevents obstructive sleep apnea by keeping the throat from collapsing while you are sleeping. In most cases, the device is  smart  and can slowly self-adjusts if your throat collapses and keeps a record every day of how well you are treated-this information is available to you and your care team.  BPAP is bilevel positive airway pressure that keeps your throat open and also assists each breath with a pressure boost to maintain adequate breathing.  Special kinds of BPAP are used in patients who have inadequate breathing from lung or heart disease. In most cases, the device is  smart  and can slowly self-adjusts to assist breathing. Like CPAP, the device keeps a record of how well you are treated.  Your mask is your connection to the device. You get to choose what feels most comfortable and the staff will help to make sure if fits. Here: are some examples of the different masks that are available:       Key points to remember on your journey with sleep apnea:  1. Sleep study.  PAP devices often need to be adjusted during a sleep study to show that they are effective and adjusted right.  2. Good tips to remember: Try wearing just the mask during a quiet time during the day so your body adapts to wearing it. A humidifier is recommended for comfort in most cases to prevent drying of your nose and throat. Allergy medication from your provider may help you if you are having nasal congestion.  3. Getting settled-in. It takes more than one night for most of us to get used to wearing a mask. Try wearing just the mask during a quiet time during the day so your body adapts to wearing it. A humidifier is recommended for comfort in most cases. Our team will work with you carefully on the first day and will be in contact within 4  days and again at 2 and 4 weeks for advice and remote device adjustments. Your therapy is evaluated by the device each day.   4. Use it every night. The more you are able to sleep naturally for 7-8 hours, the more likely you will have good sleep and to prevent health risks or symptoms from sleep apnea. Even if you use it 4 hours it helps. Occasionally all of us are unable to use a medical therapy, in sleep apnea, it is not dangerous to miss one night.   5. Communicate. Call our skilled team on the number provided on the first day if your visit for problems that make it difficult to wear the device. Over 2 out of 3 patients can learn to wear the device long-term with help from our team. Remember to call our team or your sleep providers if you are unable to wear the device as we may have other solutions for those who cannot adapt to mask CPAP therapy. It is recommended that you sleep your sleep provider within the first 3 months and yearly after that if you are not having problems.   Take care of your equipment. Make sure you clean your mask and tubing using directions every day and that your filter and mask are replaced as recommended or if they are not working.     BESIDES CPAP, WHAT OTHER THERAPIES ARE THERE?    Positioning Device  Positioning devices are generally used when sleep apnea is mild and only occurs on your back.This example shows a pillow that straps around the waist. It may be appropriate for those whose sleep study shows milder sleep apnea that occurs primarily when lying flat on one's back. Preliminary studies have shown benefit but effectiveness at home may need to be verified by a home sleep test. These devices are generally not covered by medical insurance.  Examples of devices that maintain sleeping on the back to prevent snoring and mild sleep apnea.    Belt type body positioner  Http://Chango[employer]/    Electronic reminder  Http://nightshifttherapy.com/  Http://www.Dinsmore Steelepod.com.au/    Oral  Appliance  What is oral appliance therapy?  An oral appliance device fits on your teeth at night like a retainer used after having braces. The device is made by a specialized dentist and requires several visits over 1-2 months before a manufactured device is made to fit your teeth and is adjusted to prevent your sleep apnea. Once an oral device is working properly, snoring should be improved. A home sleep test may be recommended at that time if to determine whether the sleep apnea is adequately treated.       Some things to remember:  -Oral devices are often, but not always, covered by your medical insurance. Be sure to check with your insurance provider.   -If you are referred for oral therapy, you will be given a list of specialized dentists to consider or you may choose to visit the Web site of the American Academy of Dental Sleep Medicine  -Oral devices are less likely to work if you have severe sleep apnea or are extremely overweight.     More detailed information  An oral appliance is a small acrylic device that fits over the upper and lower teeth  (similar to a retainer or a mouth guard). This device slightly moves jaw forward, which moves the base of the tongue forward, opens the airway, improves breathing for effective treat snoring and obstructive sleep apnea in perhaps 7 out of 10 people .  The best working devices are custom-made by a dental device  after a mold is made of the teeth 1, 2, 3.  When is an oral appliance indicated?  Oral appliance therapy is recommended as a first-line treatment for patients with primary snoring, mild sleep apnea, and for patients with moderate sleep apnea who prefer appliance therapy to use of CPAP4, 5. Severity of sleep apnea is determined by sleep testing and is based on the number of respiratory events per hour of sleep.   How successful is oral appliance therapy?  The success rate of oral appliance therapy in patients with mild sleep apnea is 75-80% while  in patients with moderate sleep apnea it is 50-70%. The chance of success in patients with severe sleep apnea is 40-50%. The research also shows that oral appliances have a beneficial effect on the cardiovascular health of BRAYAN patients at the same magnitude as CPAP therapy7.  Oral appliances should be a second-line treatment in cases of severe sleep apnea, but if not completely successful then a combination therapy utilizing CPAP plus oral appliance therapy may be effective. Oral appliances tend to be effective in a broad range of patients although studies show that the patients who have the highest success are females, younger patients, those with milder disease, and less severe obesity. 3, 6.   Finding a dentist that practices dental sleep medicine  Specific training is available through the American Academy of Dental Sleep Medicine for dentists interested in working in the field of sleep. To find a dentist who is educated in the field of sleep and the use of oral appliances, near you, visit the Web site of the American Academy of Dental Sleep Medicine.    References  1. Uriel, et al. Objectively measured vs self-reported compliance during oral appliance therapy for sleep-disordered breathing. Chest 2013; 144(5): 0967-2134.  2. Phani, et al. Objective measurement of compliance during oral appliance therapy for sleep-disordered breathing. Thorax 2013; 68(1): 91-96.  3. Marianne, et al. Mandibular advancement devices in 620 men and women with BRAYAN and snoring: tolerability and predictors of treatment success. Chest 2004; 125: 6935-4026.  4. Price, et al. Oral appliances for snoring and BRAYAN: a review. Sleep 2006; 29: 244-262.  5. Krystal et al. Oral appliance treatment for BRAYAN: an update. J Clin Sleep Med 2014; 10(2): 215-227.  6. Vinh, et al. Predictors of OSAH treatment outcome. J Dent Res 2007; 86: 5898-5481.      Weight Loss:    Weight loss is a long-term strategy that may improve sleep apnea  in some patients.    Weight management is a personal decision and the decision should be based on your interest and the potential benefits.  If you are interested in exploring weight loss strategies, the following discussion covers the impact on weight loss on sleep apnea and the approaches that may be successful.    Being overweight does not necessarily mean you will have health consequences.  Those who have BMI over 35 or over 27 with existing medical conditions carries greater risk.   Weight loss decreases severity of sleep apnea in most people with obesity. For those with mild obesity who have developed snoring with weight gain, even 15-30 pound weight loss can improve and occasionally eliminate sleep apnea.  Structured and life-long dietary and health habits are necessary to lose weight and keep healthier weight levels.     Though there may be significant health benefits from weight loss, long-term weight loss is very difficult to achieve- studies show success with dietary management in less than 10% of people. In addition, substantial weight loss may require years of dietary control and may be difficult if patients have severe obesity. In these cases, surgical management may be considered.  Finally, older individuals who have tolerated obesity without health complications may be less likely to benefit from weight loss strategies.        Your BMI is Body mass index is 33.29 kg/(m^2).  Weight management is a personal decision.  If you are interested in exploring weight loss strategies, the following discussion covers the approaches that may be successful. Body mass index (BMI) is one way to tell whether you are at a healthy weight, overweight, or obese. It measures your weight in relation to your height.  A BMI of 18.5 to 24.9 is in the healthy range. A person with a BMI of 25 to 29.9 is considered overweight, and someone with a BMI of 30 or greater is considered obese. More than two-thirds of American adults  are considered overweight or obese.  Being overweight or obese increases the risk for further weight gain. Excess weight may lead to heart disease and diabetes.  Creating and following plans for healthy eating and physical activity may help you improve your health.  Weight control is part of healthy lifestyle and includes exercise, emotional health, and healthy eating habits. Careful eating habits lifelong are the mainstay of weight control. Though there are significant health benefits from weight loss, long-term weight loss with diet alone may be very difficult to achieve- studies show long-term success with dietary management in less than 10% of people. Attaining a healthy weight may be especially difficult to achieve in those with severe obesity. In some cases, medications, devices and surgical management might be considered.  What can you do?  If you are overweight or obese and are interested in methods for weight loss, you should discuss this with your provider.     Consider reducing daily calorie intake by 500 calories.     Keep a food journal.     Avoiding skipping meals, consider cutting portions instead.    Diet combined with exercise helps maintain muscle while optimizing fat loss. Strength training is particularly important for building and maintaining muscle mass. Exercise helps reduce stress, increase energy, and improves fitness. Increasing exercise without diet control, however, may not burn enough calories to loose weight.       Start walking three days a week 10-20 minutes at a time    Work towards walking thirty minutes five days a week     Eventually, increase the speed of your walking for 1-2 minutes at time    In addition, we recommend that you review healthy lifestyles and methods for weight loss available through the National Institutes of Health patient information sites:  http://win.niddk.nih.gov/publications/index.htm    And look into health and wellness programs that may be available  through your health insurance provider, employer, local community center, or mckay club.    Weight management plan: Patient was referred to their PCP to discuss a diet and exercise plan.      Surgery:    Surgery for obstructive sleep apnea is considered generally only when other therapies fail to work. Surgery may be discussed with you if you are having a difficult time tolerating CPAP and or when there is an abnormal structure that requires surgical correction.  Nose and throat surgeries often enlarge the airway to prevent collapse.  Most of these surgeries create pain for 1-2 weeks and up to half of the most common surgeries are not effective throughout life.  You should carefully discuss the benefits and drawbacks to surgery with your sleep provider and surgeon to determine if it is the best solution for you.   More information  Surgery for BRAYAN is directed at areas that are responsible for narrowing or complete obstruction of the airway during sleep.  There are a wide range of procedures available to enlarge and/or stabilize the airway to prevent blockage of breathing in the three major areas where it can occur: the palate, tongue, and nasal regions.  Successful surgical treatment depends on the accurate identification of the factors responsible for obstructive sleep apnea in each person.  A personalized approach is required because there is no single treatment that works well for everyone.  Because of anatomic variation, consultation with an examination by a sleep surgeon is a critical first step in determining what surgical options are best for each patient.  In some cases, examination during sedation may be recommended in order to guide the selection of procedures.  Patients will be counseled about risks and benefits as well as the typical recovery course after surgery. Surgery is typically not a cure for a person s BRAYAN.  However, surgery will often significantly improve one s BRAYAN severity (termed  success  rate ).  Even in the absence of a cure, surgery will decrease the cardiovascular risk associated with OSA7; improve overall quality of life8 (sleepiness, functionality, sleep quality, etc).      Palate Procedures:  Patients with BRAYAN often have narrowing of their airway in the region of their tonsils and uvula.  The goals of palate procedures are to widen the airway in this region as well as to help the tissues resist collapse.  Modern palate procedure techniques focus on tissue conservation and soft tissue rearrangement, rather than tissue removal.  Often the uvula is preserved in this procedure. Residual sleep apnea is common in patient after pharyngoplasty with an average reduction in sleep apnea events of 33%2.      Tongue Procedures:  ExamWhile patients are awake, the muscles that surround the throat are active and keep this region open for breathing. These muscles relax during sleep, allowing the tongue and other structures to collapse and block breathing.  There are several different tongue procedures available.  Selection of a tongue base procedure depends on characteristics seen on physical exam.  Generally, procedures are aimed at removing bulky tissues in this area or preventing the back of the tongue from falling back during sleep.  Success rates for tongue surgery range from 50-62%3.    Hypoglossal Nerve Stimulation:  Hypoglossal nerve stimulation has recently received approval from the [place] Food and Drug Administration for the treatment of obstructive sleep apnea.  This is based on research showing that the system was safe and effective in treating sleep apnea6.  Results showed that the median AHI score decreased 68%, from 29.3 to 9.0. This therapy uses an implant system that senses breathing patterns and delivers mild stimulation to airway muscles, which keeps the airway open during sleep.  The system consists of three fully implanted components: a small generator (similar in size to a  pacemaker), a breathing sensor, and a stimulation lead.  Using a small handheld remote, a patient turns the therapy on before bed and off upon awakening.    Candidates for this device must be greater than 22 years of age, have moderate to severe BRAYAN (AHI between 20-65), BMI less than 32, have tried CPAP/oral appliance without success, and have appropriate upper airway anatomy (determined by a sleep endoscopy performed by Dr. Orozco).    Hypoglossal Nerve Stimulation Pathway:    The sleep surgeon s office will work with the patient through the insurance prior-authorization process (including communications and appeals).    Nasal Procedures:  Nasal obstruction can interfere with nasal breathing during the day and night.  Studies have shown that relief of nasal obstruction can improve the ability of some patients to tolerate positive airway pressure therapy for obstructive sleep apnea1.  Treatment options include medications such as nasal saline, topical corticosteroid and antihistamine sprays, and oral medications such as antihistamines or decongestants. Non-surgical treatments can include external nasal dilators for selected patients. If these are not successful by themselves, surgery can improve the nasal airway either alone or in combination with these other options.      Combination Procedures:  Combination of surgical procedures and other treatments may be recommended, particularly if patients have more than one area of narrowing or persistent positional disease.  The success rate of combination surgery ranges from 66-80%2,3.    References  1. Marii HEREDIA. The Role of the Nose in Snoring and Obstructive Sleep Apnoea: An Update.  Eur Arch Otorhinolaryngol. 2011; 268: 1365-73.  2.  Jonel SM; Jorge Alberto JA; Reema JR; Pallanch JF; Rubén CLARKE; Rika SG; Cindy SANDOVAL. Surgical modifications of the upper airway for obstructive sleep apnea in adults: a systematic review and meta-analysis. SLEEP 2010;33(10):7709-9106. Andre  E. Hypopharyngeal surgery in obstructive sleep apnea: an evidence-based medicine review.  Arch Otolaryngol Head Neck Surg. 2006 Feb;132(2):206-13.  3. Timmy YBARBARA, Dima Y, Seth MELONY. The efficacy of anatomically based multilevel surgery for obstructive sleep apnea. Otolaryngol Head Neck Surg. 2003 Oct;129(4):327-35.  4. Andre AWAD, Goldberg A. Hypopharyngeal Surgery in Obstructive Sleep Apnea: An Evidence-Based Medicine Review. Arch Otolaryngol Head Neck Surg. 2006 Feb;132(2):206-13.  5. Silas PJ et al. Upper-Airway Stimulation for Obstructive Sleep Apnea.  N Engl J Med. 2014 Jan 9;370(2):139-49.  6. Kayce Y et al. Increased Incidence of Cardiovascular Disease in Middle-aged Men with Obstructive Sleep Apnea. Am J Respir Crit Care Med; 2002 166: 159-165  7. Mccormack EM et al. Studying Life Effects and Effectiveness of Palatopharyngoplasty (SLEEP) study: Subjective Outcomes of Isolated Uvulopalatopharyngoplasty. Otolaryngol Head Neck Surg. 2011; 144: 623-631.  Stress, tension, and anxiety can be big factors contributing to insomnia.  If you can t relax your mind and body, then you won t be able to sleep.  You would likely benefit from trying some of the relaxation exercises that we ve assembled below.  These are all internet based links.  If you don t have or use a computer, you may benefit from one of the stress management books listed below that you can get at your public library or at a local bookstore for a relatively low price.      Copy and paste into web browser address window   https://www.youQufenqiube.com/watch?v=Kjmf5clTcPb    Progressive Muscle Relaxation (PMR):     http://www.Knowable/progressive-muscle-relaxation-exercise.html     http://studentsupport.[facility]/counseling/resources/self-help/relaxation-and-stress-management/   Deep Breathing Exercises:    http://www.Jobinasecond.[facility]/breathing-awareness.html     Meditation:     www.H[employer] Sel[employer] Corporationrantheart.com     www.theRough Cut Filmsguided-meditation-site.com You may have to pay for some of these resources.    Guided Imagery:    http://www.GOODWIN.[employer]/guided-imagery-scripts.html     http://[employer]/library/wstanihplj-dyggmo-atonkte/    [place] site under construction : http://www.[facility].org/Services/SleepMedicine/Audio/index.htm  A common problem for people with insomnia is spending too much time in bed  trying  to sleep.  You really should only be in bed for no more than 7-8 hours per night.  Spending too much time in bed can lead to being awake and having an  overactive  mind.  One effective way to address this problem is reducing how much time you spend in bed each night.  Be careful with driving or other dangerous activities when trying these strategeis however.  We recommend that you follow these steps to improve your insomnia:    Set a new sleep schedule where you reduce the time you spend in bed by 1-2 hours, e.g. Go to bed at     Keep this sleep schedule every day of the week including the weekends for two weeks.    After two weeks you can add 30-60 minutes more time in bed if you have been sleeping more soundly.    If you still aren t sleeping well, reduce the time you spend in bed by another 30-60 but not less than 5 hours per night.    Please keep a log or diary during this time to track your sleep pattern

## 2018-02-12 NOTE — NURSING NOTE
"Chief Complaint   Patient presents with     Consult     Insomnia & snoring     Sleep Problem       Initial /68  Pulse 99  Resp 18  Ht 1.778 m (5' 10\")  Wt 105.2 kg (232 lb)  SpO2 96%  BMI 33.29 kg/m2 Estimated body mass index is 33.29 kg/(m^2) as calculated from the following:    Height as of this encounter: 1.778 m (5' 10\").    Weight as of this encounter: 105.2 kg (232 lb).  Medication Reconciliation: complete.    Neck circumference: 43 cm.  Stopban    Keira Kahn CMA         "

## 2018-02-12 NOTE — MR AVS SNAPSHOT
"              After Visit Summary   2/12/2018    Hugo Longoria    MRN: [id number]           Patient Information     Date Of Birth          1960        Visit Information        Provider Department      2/12/2018 1:00 PM Christiano Ponce PA-C [facility] [place]        Today's Diagnoses     Insomnia, unspecified type          Care Instructions    MY TREATMENT INFORMATION FOR SLEEP APNEA-  Hugo Longoria    DOCTOR : Christiano Ponce  SLEEP CENTER :      MY CONTACT NUMBER:     Am I having a sleep study at a sleep center?  Make sure you have an appointment for the study before you leave!    Am I having a home sleep study?  Watch this video:  https://www.Kidamom.com/watch?v=CteI_GhyP9g&list=PLC4F_nvCEvSxpvRkgPszaicmjcb2PMExm    Frequently asked questions:  1. What is Obstructive Sleep Apnea (BRAYAN)? BRAYAN is the most common type of sleep apnea. Apnea means, \"without breath.\"  Apnea is most often caused by narrowing or collapse of the upper airway as muscles relax during sleep.   Almost everyone has occasional apneas. Most people with sleep apnea have had brief interruptions at night frequently for many years.  The severity of sleep apnea is related to how frequent and severe the events are.   2. What are the consequences of BRAYAN? Symptoms include: feeling sleepy during the day, snoring loudly, gasping or stopping of breathing, trouble sleeping, and occasionally morning headaches or heartburn at night.  Sleepiness can be serious and even increase the risk of falling asleep while driving. Other health consequences may include development of high blood pressure and other cardiovascular disease in persons who are susceptible. Untreated BRAYAN  can contribute to heart disease, stroke and diabetes.   3. What are the treatment options? In most situations, sleep apnea is a lifelong disease that must be managed with daily therapy. Medications are not effective for sleep apnea and surgery is generally not " considered until other therapies have been tried. Your treatment is your choice . Continuous Positive Airway (CPAP) works right away and is the therapy that is effective in nearly everyone. An oral device to hold your jaw forward is usually the next most reliable option. Other options include postioning devices (to keep you off your back), weight loss, and surgery including a tongue pacing device. There is more detail about some of these options below.    Important tips for using CPAP and similar devices   Know your equipment:  CPAP is continuous positive airway pressure that prevents obstructive sleep apnea by keeping the throat from collapsing while you are sleeping. In most cases, the device is  smart  and can slowly self-adjusts if your throat collapses and keeps a record every day of how well you are treated-this information is available to you and your care team.  BPAP is bilevel positive airway pressure that keeps your throat open and also assists each breath with a pressure boost to maintain adequate breathing.  Special kinds of BPAP are used in patients who have inadequate breathing from lung or heart disease. In most cases, the device is  smart  and can slowly self-adjusts to assist breathing. Like CPAP, the device keeps a record of how well you are treated.  Your mask is your connection to the device. You get to choose what feels most comfortable and the staff will help to make sure if fits. Here: are some examples of the different masks that are available:       Key points to remember on your journey with sleep apnea:  1. Sleep study.  PAP devices often need to be adjusted during a sleep study to show that they are effective and adjusted right.  2. Good tips to remember: Try wearing just the mask during a quiet time during the day so your body adapts to wearing it. A humidifier is recommended for comfort in most cases to prevent drying of your nose and throat. Allergy medication from your provider may  help you if you are having nasal congestion.  3. Getting settled-in. It takes more than one night for most of us to get used to wearing a mask. Try wearing just the mask during a quiet time during the day so your body adapts to wearing it. A humidifier is recommended for comfort in most cases. Our team will work with you carefully on the first day and will be in contact within 4 days and again at 2 and 4 weeks for advice and remote device adjustments. Your therapy is evaluated by the device each day.   4. Use it every night. The more you are able to sleep naturally for 7-8 hours, the more likely you will have good sleep and to prevent health risks or symptoms from sleep apnea. Even if you use it 4 hours it helps. Occasionally all of us are unable to use a medical therapy, in sleep apnea, it is not dangerous to miss one night.   5. Communicate. Call our skilled team on the number provided on the first day if your visit for problems that make it difficult to wear the device. Over 2 out of 3 patients can learn to wear the device long-term with help from our team. Remember to call our team or your sleep providers if you are unable to wear the device as we may have other solutions for those who cannot adapt to mask CPAP therapy. It is recommended that you sleep your sleep provider within the first 3 months and yearly after that if you are not having problems.   Take care of your equipment. Make sure you clean your mask and tubing using directions every day and that your filter and mask are replaced as recommended or if they are not working.     BESIDES CPAP, WHAT OTHER THERAPIES ARE THERE?    Positioning Device  Positioning devices are generally used when sleep apnea is mild and only occurs on your back.This example shows a pillow that straps around the waist. It may be appropriate for those whose sleep study shows milder sleep apnea that occurs primarily when lying flat on one's back. Preliminary studies have shown  benefit but effectiveness at home may need to be verified by a home sleep test. These devices are generally not covered by medical insurance.  Examples of devices that maintain sleeping on the back to prevent snoring and mild sleep apnea.    Belt type body positioner  Http://ISH.com/    Electronic reminder  Http://nightshifttherapy.com/  Http://www.Dental Kidzd.com.au/    Oral Appliance  What is oral appliance therapy?  An oral appliance device fits on your teeth at night like a retainer used after having braces. The device is made by a specialized dentist and requires several visits over 1-2 months before a manufactured device is made to fit your teeth and is adjusted to prevent your sleep apnea. Once an oral device is working properly, snoring should be improved. A home sleep test may be recommended at that time if to determine whether the sleep apnea is adequately treated.       Some things to remember:  -Oral devices are often, but not always, covered by your medical insurance. Be sure to check with your insurance provider.   -If you are referred for oral therapy, you will be given a list of specialized dentists to consider or you may choose to visit the Web site of the American Academy of Dental Sleep Medicine  -Oral devices are less likely to work if you have severe sleep apnea or are extremely overweight.     More detailed information  An oral appliance is a small acrylic device that fits over the upper and lower teeth  (similar to a retainer or a mouth guard). This device slightly moves jaw forward, which moves the base of the tongue forward, opens the airway, improves breathing for effective treat snoring and obstructive sleep apnea in perhaps 7 out of 10 people .  The best working devices are custom-made by a dental device  after a mold is made of the teeth 1, 2, 3.  When is an oral appliance indicated?  Oral appliance therapy is recommended as a first-line treatment for patients with primary  snoring, mild sleep apnea, and for patients with moderate sleep apnea who prefer appliance therapy to use of CPAP4, 5. Severity of sleep apnea is determined by sleep testing and is based on the number of respiratory events per hour of sleep.   How successful is oral appliance therapy?  The success rate of oral appliance therapy in patients with mild sleep apnea is 75-80% while in patients with moderate sleep apnea it is 50-70%. The chance of success in patients with severe sleep apnea is 40-50%. The research also shows that oral appliances have a beneficial effect on the cardiovascular health of BRAYAN patients at the same magnitude as CPAP therapy7.  Oral appliances should be a second-line treatment in cases of severe sleep apnea, but if not completely successful then a combination therapy utilizing CPAP plus oral appliance therapy may be effective. Oral appliances tend to be effective in a broad range of patients although studies show that the patients who have the highest success are females, younger patients, those with milder disease, and less severe obesity. 3, 6.   Finding a dentist that practices dental sleep medicine  Specific training is available through the American Academy of Dental Sleep Medicine for dentists interested in working in the field of sleep. To find a dentist who is educated in the field of sleep and the use of oral appliances, near you, visit the Web site of the American Academy of Dental Sleep Medicine.    References  1. Uriel et al. Objectively measured vs self-reported compliance during oral appliance therapy for sleep-disordered breathing. Chest 2013; 144(5): 9707-6257.  2. Phani, et al. Objective measurement of compliance during oral appliance therapy for sleep-disordered breathing. Thorax 2013; 68(1): 91-96.  3. Marianne et al. Mandibular advancement devices in 620 men and women with BRAYAN and snoring: tolerability and predictors of treatment success. Chest 2004; 125:  7548-0228.  4. Albert et al. Oral appliances for snoring and BRAYAN: a review. Sleep 2006; 29: 244-262.  5. Krystal et al. Oral appliance treatment for BRAYAN: an update. J Clin Sleep Med 2014; 10(2): 215-227.  6. Vinh et al. Predictors of OSAH treatment outcome. J Dent Res 2007; 86: 7216-2827.      Weight Loss:    Weight loss is a long-term strategy that may improve sleep apnea in some patients.    Weight management is a personal decision and the decision should be based on your interest and the potential benefits.  If you are interested in exploring weight loss strategies, the following discussion covers the impact on weight loss on sleep apnea and the approaches that may be successful.    Being overweight does not necessarily mean you will have health consequences.  Those who have BMI over 35 or over 27 with existing medical conditions carries greater risk.   Weight loss decreases severity of sleep apnea in most people with obesity. For those with mild obesity who have developed snoring with weight gain, even 15-30 pound weight loss can improve and occasionally eliminate sleep apnea.  Structured and life-long dietary and health habits are necessary to lose weight and keep healthier weight levels.     Though there may be significant health benefits from weight loss, long-term weight loss is very difficult to achieve- studies show success with dietary management in less than 10% of people. In addition, substantial weight loss may require years of dietary control and may be difficult if patients have severe obesity. In these cases, surgical management may be considered.  Finally, older individuals who have tolerated obesity without health complications may be less likely to benefit from weight loss strategies.        Your BMI is Body mass index is 33.29 kg/(m^2).  Weight management is a personal decision.  If you are interested in exploring weight loss strategies, the following discussion covers the approaches  that may be successful. Body mass index (BMI) is one way to tell whether you are at a healthy weight, overweight, or obese. It measures your weight in relation to your height.  A BMI of 18.5 to 24.9 is in the healthy range. A person with a BMI of 25 to 29.9 is considered overweight, and someone with a BMI of 30 or greater is considered obese. More than two-thirds of American adults are considered overweight or obese.  Being overweight or obese increases the risk for further weight gain. Excess weight may lead to heart disease and diabetes.  Creating and following plans for healthy eating and physical activity may help you improve your health.  Weight control is part of healthy lifestyle and includes exercise, emotional health, and healthy eating habits. Careful eating habits lifelong are the mainstay of weight control. Though there are significant health benefits from weight loss, long-term weight loss with diet alone may be very difficult to achieve- studies show long-term success with dietary management in less than 10% of people. Attaining a healthy weight may be especially difficult to achieve in those with severe obesity. In some cases, medications, devices and surgical management might be considered.  What can you do?  If you are overweight or obese and are interested in methods for weight loss, you should discuss this with your provider.     Consider reducing daily calorie intake by 500 calories.     Keep a food journal.     Avoiding skipping meals, consider cutting portions instead.    Diet combined with exercise helps maintain muscle while optimizing fat loss. Strength training is particularly important for building and maintaining muscle mass. Exercise helps reduce stress, increase energy, and improves fitness. Increasing exercise without diet control, however, may not burn enough calories to loose weight.       Start walking three days a week 10-20 minutes at a time    Work towards walking thirty minutes  five days a week     Eventually, increase the speed of your walking for 1-2 minutes at time    In addition, we recommend that you review healthy lifestyles and methods for weight loss available through the National Institutes of Health patient information sites:  http://win.niddk.nih.gov/publications/index.htm    And look into health and wellness programs that may be available through your health insurance provider, employer, local community center, or mckay club.    Weight management plan: Patient was referred to their PCP to discuss a diet and exercise plan.      Surgery:    Surgery for obstructive sleep apnea is considered generally only when other therapies fail to work. Surgery may be discussed with you if you are having a difficult time tolerating CPAP and or when there is an abnormal structure that requires surgical correction.  Nose and throat surgeries often enlarge the airway to prevent collapse.  Most of these surgeries create pain for 1-2 weeks and up to half of the most common surgeries are not effective throughout life.  You should carefully discuss the benefits and drawbacks to surgery with your sleep provider and surgeon to determine if it is the best solution for you.   More information  Surgery for BRAYAN is directed at areas that are responsible for narrowing or complete obstruction of the airway during sleep.  There are a wide range of procedures available to enlarge and/or stabilize the airway to prevent blockage of breathing in the three major areas where it can occur: the palate, tongue, and nasal regions.  Successful surgical treatment depends on the accurate identification of the factors responsible for obstructive sleep apnea in each person.  A personalized approach is required because there is no single treatment that works well for everyone.  Because of anatomic variation, consultation with an examination by a sleep surgeon is a critical first step in determining what surgical options are  best for each patient.  In some cases, examination during sedation may be recommended in order to guide the selection of procedures.  Patients will be counseled about risks and benefits as well as the typical recovery course after surgery. Surgery is typically not a cure for a person s BRYAAN.  However, surgery will often significantly improve one s BRAYAN severity (termed  success rate ).  Even in the absence of a cure, surgery will decrease the cardiovascular risk associated with OSA7; improve overall quality of life8 (sleepiness, functionality, sleep quality, etc).      Palate Procedures:  Patients with BRAYAN often have narrowing of their airway in the region of their tonsils and uvula.  The goals of palate procedures are to widen the airway in this region as well as to help the tissues resist collapse.  Modern palate procedure techniques focus on tissue conservation and soft tissue rearrangement, rather than tissue removal.  Often the uvula is preserved in this procedure. Residual sleep apnea is common in patient after pharyngoplasty with an average reduction in sleep apnea events of 33%2.      Tongue Procedures:  ExamWhile patients are awake, the muscles that surround the throat are active and keep this region open for breathing. These muscles relax during sleep, allowing the tongue and other structures to collapse and block breathing.  There are several different tongue procedures available.  Selection of a tongue base procedure depends on characteristics seen on physical exam.  Generally, procedures are aimed at removing bulky tissues in this area or preventing the back of the tongue from falling back during sleep.  Success rates for tongue surgery range from 50-62%3.    Hypoglossal Nerve Stimulation:  Hypoglossal nerve stimulation has recently received approval from the [place] Food and Drug Administration for the treatment of obstructive sleep apnea.  This is based on research showing that the system was safe  and effective in treating sleep apnea6.  Results showed that the median AHI score decreased 68%, from 29.3 to 9.0. This therapy uses an implant system that senses breathing patterns and delivers mild stimulation to airway muscles, which keeps the airway open during sleep.  The system consists of three fully implanted components: a small generator (similar in size to a pacemaker), a breathing sensor, and a stimulation lead.  Using a small handheld remote, a patient turns the therapy on before bed and off upon awakening.    Candidates for this device must be greater than 22 years of age, have moderate to severe BRAYAN (AHI between 20-65), BMI less than 32, have tried CPAP/oral appliance without success, and have appropriate upper airway anatomy (determined by a sleep endoscopy performed by Dr. Orozco).    Hypoglossal Nerve Stimulation Pathway:    The sleep surgeon s office will work with the patient through the insurance prior-authorization process (including communications and appeals).    Nasal Procedures:  Nasal obstruction can interfere with nasal breathing during the day and night.  Studies have shown that relief of nasal obstruction can improve the ability of some patients to tolerate positive airway pressure therapy for obstructive sleep apnea1.  Treatment options include medications such as nasal saline, topical corticosteroid and antihistamine sprays, and oral medications such as antihistamines or decongestants. Non-surgical treatments can include external nasal dilators for selected patients. If these are not successful by themselves, surgery can improve the nasal airway either alone or in combination with these other options.      Combination Procedures:  Combination of surgical procedures and other treatments may be recommended, particularly if patients have more than one area of narrowing or persistent positional disease.  The success rate of combination surgery ranges from 66-80%2,3.     References  1. Marii HEREDIA. The Role of the Nose in Snoring and Obstructive Sleep Apnoea: An Update.  Eur Arch Otorhinolaryngol. 2011; 268: 1365-73.  2.  Jonel SM; Jorge Alberto JA; Reema JR; Pallanch JF; Rubén MB; Rika SG; Cindy SANDOVAL. Surgical modifications of the upper airway for obstructive sleep apnea in adults: a systematic review and meta-analysis. SLEEP 2010;33(10):5975-2834. Andre AWAD. Hypopharyngeal surgery in obstructive sleep apnea: an evidence-based medicine review.  Arch Otolaryngol Head Neck Surg. 2006 Feb;132(2):206-13.  3. Timmy YH1, Dima Y, Seth MELONY. The efficacy of anatomically based multilevel surgery for obstructive sleep apnea. Otolaryngol Head Neck Surg. 2003 Oct;129(4):327-35.  4. Andre AWAD, Goldberg A. Hypopharyngeal Surgery in Obstructive Sleep Apnea: An Evidence-Based Medicine Review. Arch Otolaryngol Head Neck Surg. 2006 Feb;132(2):206-13.  5. Silas PJ et al. Upper-Airway Stimulation for Obstructive Sleep Apnea.  N Engl J Med. 2014 Jan 9;370(2):139-49.  6. Kayce Y et al. Increased Incidence of Cardiovascular Disease in Middle-aged Men with Obstructive Sleep Apnea. Am J Respir Crit Care Med; 2002 166: 159-165  7. Mccormack EM et al. Studying Life Effects and Effectiveness of Palatopharyngoplasty (SLEEP) study: Subjective Outcomes of Isolated Uvulopalatopharyngoplasty. Otolaryngol Head Neck Surg. 2011; 144: 623-631.  Stress, tension, and anxiety can be big factors contributing to insomnia.  If you can t relax your mind and body, then you won t be able to sleep.  You would likely benefit from trying some of the relaxation exercises that we ve assembled below.  These are all internet based links.  If you don t have or use a computer, you may benefit from one of the stress management books listed below that you can get at your public library or at a local bookstore for a relatively low price.      Copy and paste into web browser address  window   https://www.youAngry Citizenube.com/watch?v=Tdet4mwZyRa    Progressive Muscle Relaxation (PMR):     http://www.Craftistas/progressive-muscle-relaxation-exercise.html     http://studentsupport.[facility]/counseling/resources/self-help/relaxation-and-stress-management/   Deep Breathing Exercises:    http://www.Craftistas/breathing-awareness.html     Meditation:     wwwONtheAIR    www.SellanAppguidedSIRION BIOTECHmeditationSIRION BIOTECHsite.[facility] You may have to pay for some of these resources.    Guided Imagery:    http://www.Craftistas/guided-imagery-scripts.html     http://Monster Digital/library/itqcuizsqp-dijltc-giyukzn/    [place] site under construction : http://www.DorsaVI.org/Services/SleepMedicine/Audio/index.htm  A common problem for people with insomnia is spending too much time in bed  trying  to sleep.  You really should only be in bed for no more than 7-8 hours per night.  Spending too much time in bed can lead to being awake and having an  overactive  mind.  One effective way to address this problem is reducing how much time you spend in bed each night.  Be careful with driving or other dangerous activities when trying these strategeis however.  We recommend that you follow these steps to improve your insomnia:    Set a new sleep schedule where you reduce the time you spend in bed by 1-2 hours, e.g. Go to bed at     Keep this sleep schedule every day of the week including the weekends for two weeks.    After two weeks you can add 30-60 minutes more time in bed if you have been sleeping more soundly.    If you still aren t sleeping well, reduce the time you spend in bed by another 30-60 but not less than 5 hours per night.    Please keep a log or diary during this time to track your sleep pattern                    Follow-ups after your visit        Follow-up notes from your care team     Return in about 2 weeks (around 2/26/2018).      Future tests that were ordered for you  "today     Open Future Orders        Priority Expected Expires Ordered    Comprehensive Sleep Study Routine  8/11/2018 2/12/2018            Who to contact     If you have questions or need follow up information about today's clinic visit or your schedule please contact [facility] directly at [phone].  Normal or non-critical lab and imaging results will be communicated to you by MyChart, letter or phone within 4 business days after the clinic has received the results. If you do not hear from us within 7 days, please contact the clinic through Zazengot or phone. If you have a critical or abnormal lab result, we will notify you by phone as soon as possible.  Submit refill requests through [facility] or call your pharmacy and they will forward the refill request to us. Please allow 3 business days for your refill to be completed.          Additional Information About Your Visit        [facility] Information     [facility] gives you secure access to your electronic health record. If you see a primary care provider, you can also send messages to your care team and make appointments. If you have questions, please call your primary care clinic.  If you do not have a primary care provider, please call [phone] and they will assist you.        Care EveryWhere ID     This is your Care EveryWhere ID. This could be used by other organizations to access your [facility] medical records  [id number]        Your Vitals Were     Pulse Respirations Height Pulse Oximetry BMI (Body Mass Index)       99 18 5' 10\" (1.778 m) 96% 33.29 kg/m2        Blood Pressure from Last 3 Encounters:   02/12/18 138/68   01/23/18 132/82   03/22/17 137/89    Weight from Last 3 Encounters:   02/12/18 232 lb (105.2 kg)   01/23/18 232 lb 3.2 oz (105.3 kg)   01/11/18 232 lb (105.2 kg)              We Performed the Following     [place]LE[place]AL[place] MANAG[place] [phone] (Age 13 if over 100 " lbs)          Today's Medication Changes          These changes are accurate as of 2/12/18  2:02 PM.  If you have any questions, ask your nurse or doctor.               Start taking these medicines.        Dose/Directions    zolpidem 10 MG tablet   Commonly known as:  AMBIEN   Used for:  Insomnia, unspecified type        Take tablet by mouth 15 minutes prior to sleep, for Sleep Study   Quantity:  1 tablet   Refills:  0            Where to get your medicines      Some of these will need a paper prescription and others can be bought over the counter.  Ask your nurse if you have questions.     Bring a paper prescription for each of these medications     zolpidem 10 MG tablet                Primary Care Provider Office Phone # Fax #    Viktor Gomez, [phone] [address] [place] [address]        Equal Access to Services     GAVIN OLVERA : Lane vega [place], waaxda luqadaha, qaybta kaalmada adeegyada, kelly khalil . So [facility] [phone].    ATENCIÓN: Si habla español, tiene a byrd disposición servicios gratuitos de asistencia lingüística. Llame al [phone].    We comply with applicable federal civil rights laws and [place] laws. We do not discriminate on the basis of race, color, national origin, age, disability, sex, sexual orientation, or gender identity.            Thank you!     Thank you for choosing [facility]  for your care. Our goal is always to provide you with excellent care. Hearing back from our patients is one way we can continue to improve our services. Please take a few minutes to complete the written survey that you may receive in the mail after your visit with us. Thank you!             Your Updated Medication List - Protect others around you: Learn how to safely use, store and throw away your medicines at www.disposemymeds.org.          This list is accurate as of 2/12/18  2:02 PM.  Always use  your most recent med list.                   Brand Name Dispense Instructions for use Diagnosis    ascorbic acid 250 MG Chew chewable tablet    vitamin C     Take 250 mg by mouth daily    Primary osteoarthritis of right knee       atorvastatin 20 MG tablet    LIPITOR    30 tablet    Take 1 tablet (20 mg) by mouth daily    CARDIOVASCULAR SCREENING; LDL GOAL LESS THAN 130       LANsoprazole 30 MG CR capsule    PREVACID    30 capsule    Take 1 pill daily if Ranitidne fails.    Chronic gastritis without bleeding, unspecified gastritis type, Gastroesophageal reflux disease with esophagitis       lisinopril 20 MG tablet    PRINIVIL/ZESTRIL    90 tablet    TAKE ONE-HALF TABLET BY MOUTH ONCE DAILY    HTN, goal below 140/90       MULTIVITAMINS PO      Take  by mouth.        zolpidem 10 MG tablet    AMBIEN    1 tablet    Take tablet by mouth 15 minutes prior to sleep, for Sleep Study    Insomnia, unspecified type

## 2018-02-12 NOTE — PROGRESS NOTES
Sleep Consultation:    Date on this visit: 2/12/2018    Hugo Longoria is sent by Viktor Lindo for a sleep consultation regarding snoring, fatigue, insomnia- sleep onset and maintenance. .    Primary Physician: Viktro Gomez     Hugo Longoria reports nightly snoring, kicking and poor quality of sleep for many years, worse over the last couple. .     Uhgo goes to sleep at 10:30 PM during the week. He wakes up at 5:30 AM with an alarm. He falls asleep in 30 minutes.  Hugo has difficulty falling asleep.  He wakes up 2-4 times a night for 15 minutes before falling back to sleep.  Hugo wakes up to go to the bathroom.  On weekends, Hugo goes to sleep at 11:30 PM.  He wakes up at 8:00 AM without an alarm. He falls asleep in 30 minutes.  Patient gets an average of 6 hours of sleep per night.     Patient does not use electronics in bed, watch TV in bed and read in bed.     Hugo does not do shift work.  He works day shifts.      Hugo does snore every night. Patient does have a regular bed partner. There is report of snoring and kicking.  He does not have witnessed apneas. They never sleep separately.  Patient sleeps on his back and side. He has occasional morning dry mouth, morning headaches and restless legs,. Hugo has no bruxism, sleep walking, sleep talking, dream enactment, sleep paralysis, cataplexy and hypnogogic/hypnopompic hallucinations.    He denies sleep walking, sleep talking and sleep terrors as a child.  Hugo has difficulty breathing through his nose, claustrophobia, reflux at night, heartburn and depression.      Hugo has gained 50 pounds in 10 years.  Patient describes themself as a night person.  He would prefer to go to sleep at 9:30 PM and wake up at 7:30 AM.  Patient's Banner Sleepiness score 6/24 inconsistent with severe daytime sleepiness.      Hugo naps 3-4 times per week for 10-20 minutes, feels unrefreshed after naps. He takes no inadvertant naps.  He denies closing eyes, dozing  "and falling asleep while driving.  Patient was counseled on the importance of driving while alert, to pull over if drowsy, or nap before getting into the vehicle if sleepy.  He uses 8 cups/day of coffee. Last caffeine intake is usually before noon.    Allergies:  8  Allergies   Allergen Reactions     Hydrocodone Other (See Comments)     \"climbed the walls, very anxious, insomnia\"     Nsaids Other (See Comments)     Hx of stomach ulcers       Medications:    Current Outpatient Prescriptions   Medication Sig Dispense Refill     atorvastatin (LIPITOR) 20 MG tablet Take 1 tablet (20 mg) by mouth daily 30 tablet 3     ascorbic acid (VITAMIN C) 250 MG CHEW chewable tablet Take 250 mg by mouth daily       LANsoprazole (PREVACID) 30 MG CR capsule Take 1 pill daily if Ranitidne fails. 30 capsule 0     lisinopril (PRINIVIL/ZESTRIL) 20 MG tablet TAKE ONE-HALF TABLET BY MOUTH ONCE DAILY 90 tablet 1     Multiple Vitamin (MULTIVITAMINS PO) Take  by mouth.         Problem List:  Patient Active Problem List    Diagnosis Date Noted     Primary osteoarthritis of right knee 03/13/2017     Priority: Medium     Advanced directives, counseling/discussion 01/09/2017     Priority: Medium     Info given       Mild episode of recurrent major depressive disorder (H) 01/06/2017     Priority: Medium     Complete tear of right rotator cuff 01/02/2017     Priority: Medium     Rupture long head biceps tendon, right, initial encounter 01/02/2017     Priority: Medium     Subacromial impingement of right shoulder 01/02/2017     Priority: Medium     Chronic gastric ulcer 10/28/2015     Priority: Medium     Seasonal allergic rhinitis 10/28/2015     Priority: Medium     History of gastric ulcer 07/16/2013     Priority: Medium     ACL tear 04/23/2013     Priority: Medium     Fatigue 03/12/2013     Priority: Medium     Hemorrhoids 12/08/2011     Priority: Medium     Erectile dysfunction 12/08/2011     Priority: Medium     HTN, goal below 140/90 12/08/2011 "     Priority: Medium     History of tobacco use: 1980 - 1990 09/29/2011     Priority: Medium     CARDIOVASCULAR SCREENING; LDL GOAL LESS THAN 130 10/31/2010     Priority: Medium     KERRY (generalized anxiety disorder) 07/06/2010     Priority: Medium     History of colonic polyps 11/13/2007     Priority: Medium     Problem list name updated by automated process. Provider to review          Past Medical/Surgical History:  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)     Overnight stay due to blood poisoning     Gastric ulcer, unspecified as acute or chronic, without mention of hemorrhage or perforation      Past Surgical History:   Procedure Laterality Date     ARTHROSCOPY KNEE  5/29/2013    Procedure: ARTHROSCOPY KNEE;  Right knee arthroscopy with partial medial and partial lateral menisectomies;  Surgeon: Phani Mclaughlin MD;  Location:  OR     ARTHROSCOPY SHOULDER BICEPS TENODESIS REPAIR Right 1/13/2017    Procedure: ARTHROSCOPY SHOULDER BICEPS TENODESIS REPAIR;  Surgeon: Javier Yin DO;  Location:  OR     ARTHROSCOPY SHOULDER DECOMPRESSION Right 1/13/2017    Procedure: ARTHROSCOPY SHOULDER DECOMPRESSION;  Surgeon: Javier Yin DO;  Location:  OR     ARTHROSCOPY SHOULDER ROTATOR CUFF REPAIR Right 1/13/2017    Procedure: ARTHROSCOPY SHOULDER ROTATOR CUFF REPAIR;  Surgeon: Javier Yin DO;  Location:  OR     COLONOSCOPY  11/01/2007     COLONOSCOPY  12/12/11     COLONOSCOPY N/A 11/18/2015    Procedure: COLONOSCOPY;  Surgeon: Migue Mclaughlin MD;  Location:  GI     COLONOSCOPY N/A 3/22/2017    Procedure: COMBINED COLONOSCOPY, SINGLE OR MULTIPLE BIOPSY/POLYPECTOMY BY BIOPSY;  Surgeon: Salvatore Zepeda MD;  Location:  GI     ENDOSCOPY  01/27/12    Upper GI - [facility]     ESOPHAGOSCOPY,  GASTROSCOPY, DUODENOSCOPY (EGD), COMBINED N/A 11/18/2015    Procedure: COMBINED ESOPHAGOSCOPY, GASTROSCOPY, DUODENOSCOPY (EGD), BIOPSY SINGLE OR MULTIPLE;  Surgeon: Migue Mclaughlin MD;  Location: [facility] GI     ESOPHAGOSCOPY, GASTROSCOPY, DUODENOSCOPY (EGD), COMBINED N/A 3/22/2017    Procedure: COMBINED ESOPHAGOSCOPY, GASTROSCOPY, DUODENOSCOPY (EGD), BIOPSY SINGLE OR MULTIPLE;  Surgeon: Salvatore Zepeda MD;  Location: [facility] GI     HC UGI ENDOSCOPY, SIMPLE EXAM  10/31/2007     HERNIORRHAPHY UMBILICAL N/A 3/12/2015    Procedure: HERNIORRHAPHY UMBILICAL;  Surgeon: Yared Ma MD;  Location: [facility] OR     IRRIGATION AND DEBRIDEMENT UPPER EXTREMITY, COMBINED Left 3/15/2016    Procedure: COMBINED IRRIGATION AND DEBRIDEMENT UPPER EXTREMITY;  Surgeon: Javier Yin DO;  Location: [facility] OR     REMOVAL OF SPERM DUCT(S)      Vasectomy       Social History:  Social History     Social History     Marital status:      Spouse name: N/A     Number of children: N/A     Years of education: N/A     Occupational History      [employer]     Social History Main Topics     Smoking status: Former Smoker     Quit date: 1/1/1981     Smokeless tobacco: Never Used     Alcohol use 0.0 oz/week     0 Standard drinks or equivalent per week      Comment: weekends     Drug use: No     Sexual activity: Yes     Partners: Female     Birth control/ protection: Surgical      Comment: vasectomy     Other Topics Concern     Not on file     Social History Narrative       Family History:  Family History   Problem Relation Age of Onset     CANCER Mother      ovarian cancer     Asthma Mother      Hypertension Mother      Arthritis Mother      Genitourinary Problems Mother      Lipids Mother      CANCER Sister      ovarian cancer     Hypertension Sister      Lipids Sister      CANCER Maternal Grandmother      stomach cancer     Asthma Father      Cardiovascular Father      heart attack; bypass x5,  congenital heart disease     HEART DISEASE Father      heart attack; bypass x5, congenital heart disease     DIABETES Father      Hypertension Father      CANCER Father      prostate     Prostate Cancer Father      Circulatory Father      blood clots     Genitourinary Problems Father      Respiratory Father      emphysema; COPD     DIABETES Maternal Grandfather      DIABETES Paternal Grandfather      Eye Disorder Paternal Grandfather      glaucoma     Hypertension Brother      Lipids Brother      Hypertension Brother      Cardiovascular Brother      bypass x3     HEART DISEASE Brother      bypass x3     Lipids Brother      Hypertension Sister      C.A.D. No family hx of      CEREBROVASCULAR DISEASE No family hx of      Breast Cancer No family hx of      Cancer - colorectal No family hx of      Alzheimer Disease No family hx of      Blood Disease No family hx of      GASTROINTESTINAL DISEASE No family hx of      Musculoskeletal Disorder No family hx of      Neurologic Disorder No family hx of      Thyroid Disease No family hx of        Review of Systems:  A complete review of systems reviewed by me is negative with the exeption of what has been mentioned in the history of present illness.  CONSTITUTIONAL:  POSITIVE for  weight gain, fever , chills and sweats  EYES: NEGATIVE for changes in vision, blind spots, double vision.  ENT:  POSITIVE for  sore throat, sinus pain, post-nasal drip and runny nose  CARDIAC: NEGATIVE for fast heartbeats or fluttering in chest, chest pain or pressure, breathlessness when lying flat, swollen legs or swollen feet.  NEUROLOGIC:  POSITIVE for  weakness or numbness in the arms or legs  DERMATOLOGIC: NEGATIVE for rashes, new moles or change in mole(s)  PULMONARY:  POSITIVE for  SOB with activity, dry cough, productive cough and wheezing   GASTROINTESTINAL: NEGATIVE for nausea or vomitting, loose or watery stools, fat or grease in stools, constipation, abdominal pain, bowel movements black  "in color or blood noted.  GENITOURINARY: NEGATIVE for pain during urination, blood in urine, urinating more frequently than usual, irregular menstrual periods.  MUSCULOSKELETAL:  POSITIVE for  muscle pain and bone or joint pain  ENDOCRINE:  POSITIVE for  increased thirst  LYMPHATIC: NEGATIVE for swollen lymph nodes, lumps or bumps in the breasts or nipple discharge.    Physical Examination:  Vitals: /68  Pulse 99  Resp 18  Ht 1.778 m (5' 10\")  Wt 105.2 kg (232 lb)  SpO2 96%  BMI 33.29 kg/m2  BMI= Body mass index is 33.29 kg/(m^2).    Neck Cir (cm): 43 cm    No flowsheet data found.    GENERAL APPEARANCE: healthy, alert and no distress  HENT: nose and mouth without ulcers or lesions, nasal mucosa edematous without rhinorrhea and normal cephalic/atraumatic  NECK: no adenopathy, no asymmetry, masses, or scars and thyroid normal to palpation  RESP: lungs clear to auscultation - no rales, rhonchi or wheezes  CV: regular rates and rhythm, normal S1 S2, no S3 or S4 and no murmur, click or rub  MS: extremities normal- no gross deformities noted  PSYCH: mentation appears normal and affect normal/bright  Mallampati Class: II.  Tonsillar Stage: 2  visible at pillars.    Impression/Plan:  Snoring, daytime fatigue, frequent awakenings. Will set up a split night study for possible sleep apnea.   Literature provided regarding sleep apnea.      He will follow up with me in approximately two weeks after his sleep study has been competed to review the results and discuss plan of care.       Polysomnography reviewed.  Obstructive sleep apnea reviewed.  Complications of untreated sleep apnea were reviewed.    CC: Viktor Walden*        "

## 2018-02-19 ENCOUNTER — OFFICE VISIT (OUTPATIENT)
Dept: FAMILY MEDICINE | Facility: OTHER | Age: 58
End: 2018-02-19
Payer: COMMERCIAL

## 2018-02-19 VITALS
HEART RATE: 80 BPM | WEIGHT: 228.2 LBS | TEMPERATURE: 97.2 F | BODY MASS INDEX: 32.74 KG/M2 | OXYGEN SATURATION: 95 % | DIASTOLIC BLOOD PRESSURE: 80 MMHG | RESPIRATION RATE: 16 BRPM | SYSTOLIC BLOOD PRESSURE: 138 MMHG

## 2018-02-19 DIAGNOSIS — I10 HTN, GOAL BELOW 140/90: ICD-10-CM

## 2018-02-19 DIAGNOSIS — H93.8X3 EAR PRESSURE, BILATERAL: ICD-10-CM

## 2018-02-19 DIAGNOSIS — J01.90 ACUTE SINUSITIS WITH SYMPTOMS > 10 DAYS: Primary | ICD-10-CM

## 2018-02-19 PROCEDURE — 99214 OFFICE O/P EST MOD 30 MIN: CPT | Performed by: NURSE PRACTITIONER

## 2018-02-19 RX ORDER — FLUTICASONE PROPIONATE 50 MCG
1-2 SPRAY, SUSPENSION (ML) NASAL DAILY
Qty: 1 BOTTLE | Refills: 0 | Status: SHIPPED | OUTPATIENT
Start: 2018-02-19 | End: 2020-12-10

## 2018-02-19 RX ORDER — PREDNISONE 20 MG/1
40 TABLET ORAL DAILY
Qty: 10 TABLET | Refills: 0 | Status: SHIPPED | OUTPATIENT
Start: 2018-02-19 | End: 2019-01-30

## 2018-02-19 NOTE — PATIENT INSTRUCTIONS
1. Start antibiotic and prednisone to help with sinus inflammation and pressure  2. Flonase for nasal congestion  3. Follow up with ENT regarding chronic sinusitis .   4. Prednisone  avoid taking at night, take in AM as it can cause insomnia.   5. Recommend avoiding decongestants due to HTN. Could try coricidin OTC.       DONN Guerrero CNP

## 2018-02-19 NOTE — NURSING NOTE
"Chief Complaint   Patient presents with     Ear Problem       Initial /80  Pulse 80  Temp 97.2  F (36.2  C) (Temporal)  Resp 16  Wt 228 lb 3.2 oz (103.5 kg)  SpO2 95%  BMI 32.74 kg/m2 Estimated body mass index is 32.74 kg/(m^2) as calculated from the following:    Height as of 2/12/18: 5' 10\" (1.778 m).    Weight as of this encounter: 228 lb 3.2 oz (103.5 kg).  Medication Reconciliation: complete  "

## 2018-02-19 NOTE — PROGRESS NOTES
SUBJECTIVE:   Hugo Longoria is a 57 year old male who presents to clinic today for the following health issues:    HPI  Concern - Ear Problem  Onset: 2 weeks    Description:   Happened after going on airplane. Has had a lot of sinus problems lately. Light pressure and buzzing in both ears.    Intensity: mild    Progression of Symptoms:  worsening and constant    Accompanying Signs & Symptoms:  Headache    Previous history of similar problem:   None    Precipitating factors:   Worsened by: None    Alleviating factors:  Improved by: None    Therapies Tried and outcome: None     He has had a headache near bedtime. He reports some warmth at night. Sinus pressure. He reports he has been dealing with this since he left for his trip. He has chronic sinusitis that has been worsening. No shortness of breath or chest pain.       Problem list and histories reviewed & adjusted, as indicated.  Additional history: as documented        Current Outpatient Prescriptions   Medication Sig Dispense Refill     amoxicillin-clavulanate (AUGMENTIN) 875-125 MG per tablet Take 1 tablet by mouth 2 times daily 20 tablet 0     fluticasone (FLONASE) 50 MCG/ACT spray Spray 1-2 sprays into both nostrils daily 1 Bottle 0     predniSONE (DELTASONE) 20 MG tablet Take 2 tablets (40 mg) by mouth daily for 5 days 10 tablet 0     zolpidem (AMBIEN) 10 MG tablet Take tablet by mouth 15 minutes prior to sleep, for Sleep Study 1 tablet 0     atorvastatin (LIPITOR) 20 MG tablet Take 1 tablet (20 mg) by mouth daily 30 tablet 3     ascorbic acid (VITAMIN C) 250 MG CHEW chewable tablet Take 250 mg by mouth daily       LANsoprazole (PREVACID) 30 MG CR capsule Take 1 pill daily if Ranitidne fails. 30 capsule 0     lisinopril (PRINIVIL/ZESTRIL) 20 MG tablet TAKE ONE-HALF TABLET BY MOUTH ONCE DAILY 90 tablet 1     Multiple Vitamin (MULTIVITAMINS PO) Take  by mouth.       BP Readings from Last 3 Encounters:   02/19/18 138/80   02/12/18 138/68   01/23/18 132/82     Wt Readings from Last 3 Encounters:   02/19/18 228 lb 3.2 oz (103.5 kg)   02/12/18 232 lb (105.2 kg)   01/23/18 232 lb 3.2 oz (105.3 kg)                    ROS:  As noted above     OBJECTIVE:     /80  Pulse 80  Temp 97.2  F (36.2  C) (Temporal)  Resp 16  Wt 228 lb 3.2 oz (103.5 kg)  SpO2 95%  BMI 32.74 kg/m2  Body mass index is 32.74 kg/(m^2).  GENERAL: healthy, alert and no distress  EYES: Eyes grossly normal to inspection, PERRL and conjunctivae and sclerae normal  HENT: normal cephalic/atraumatic, right ear: normal: no effusions, no erythema, normal landmarks and Membrane bulging bilateral,  Nose edematous with erythema,  mouth without ulcers or lesions, oropharynx clear and oral mucous membranes moist  NECK: no adenopathy, no asymmetry, masses, or scars and thyroid normal to palpation  RESP: lungs clear to auscultation - no rales, rhonchi or wheezes  CV: regular rate and rhythm, normal S1 S2, no S3 or S4, no murmur, click or rub, no peripheral edema and peripheral pulses strong  SKIN: no suspicious lesions or rashes  NEURO: Normal strength and tone, mentation intact and speech normal  PSYCH: mentation appears normal, affect normal/bright    Diagnostic Test Results:  none     ASSESSMENT/PLAN:       ICD-10-CM    1. Acute sinusitis with symptoms > 10 days J01.90 amoxicillin-clavulanate (AUGMENTIN) 875-125 MG per tablet     fluticasone (FLONASE) 50 MCG/ACT spray     OTOLARYNGOLOGY REFERRAL     predniSONE (DELTASONE) 20 MG tablet   2. Ear pressure, bilateral H93.8X3 OTOLARYNGOLOGY REFERRAL     predniSONE (DELTASONE) 20 MG tablet   3. HTN, goal below 140/90 I10      1. Start antibiotic and prednisone to help with sinus inflammation and pressure  2. Flonase for nasal congestion  3. Follow up with ENT regarding chronic sinusitis   4. Discussed medication and side effects.   5. Prednisone medication discussed and avoid taking at night, take in AM especially since patient has sleep problems.   6. Recommend  avoiding decongestants due to HTN. Could try coricidin OTC.     The patient indicates understanding of these issues and agrees with the plan.    Patient Instructions   1. Start antibiotic and prednisone to help with sinus inflammation and pressure  2. Flonase for nasal congestion  3. Follow up with ENT regarding chronic sinusitis .   4. Prednisone  avoid taking at night, take in AM as it can cause insomnia.   5. Recommend avoiding decongestants due to HTN. Could try coricidin OTC.       DONN Guerrero CNP, APRN CNP  [facility]

## 2018-03-20 ENCOUNTER — THERAPY VISIT (OUTPATIENT)
Dept: SLEEP MEDICINE | Facility: CLINIC | Age: 58
End: 2018-03-20
Payer: COMMERCIAL

## 2018-03-20 DIAGNOSIS — G47.00 INSOMNIA, UNSPECIFIED TYPE: ICD-10-CM

## 2018-03-20 PROCEDURE — 95810 POLYSOM 6/> YRS 4/> PARAM: CPT | Performed by: OTOLARYNGOLOGY

## 2018-03-22 LAB — SLPCOMP: NORMAL

## 2018-03-28 ENCOUNTER — OFFICE VISIT (OUTPATIENT)
Dept: SLEEP MEDICINE | Facility: CLINIC | Age: 58
End: 2018-03-28
Payer: COMMERCIAL

## 2018-03-28 VITALS
TEMPERATURE: 97.8 F | DIASTOLIC BLOOD PRESSURE: 94 MMHG | SYSTOLIC BLOOD PRESSURE: 118 MMHG | BODY MASS INDEX: 32.64 KG/M2 | HEIGHT: 70 IN | WEIGHT: 228 LBS | RESPIRATION RATE: 18 BRPM | HEART RATE: 94 BPM | OXYGEN SATURATION: 95 %

## 2018-03-28 DIAGNOSIS — R06.83 PRIMARY SNORING: Primary | ICD-10-CM

## 2018-03-28 PROCEDURE — 99213 OFFICE O/P EST LOW 20 MIN: CPT | Performed by: PHYSICIAN ASSISTANT

## 2018-03-28 NOTE — PROGRESS NOTES
Sleep Study Follow-Up Visit:    Date on this visit: 3/28/2018    Hugo Longoria comes in today for follow-up of his sleep study done on 3/20/2018 at the [facility] for loud snoring and possible sleep apnea.    Sleep latency 40.5 minutes with Ambien.  REM achieved.   REM latency 234.5 minutes.  Sleep efficiency 74.8%. Total sleep time 332.5 minutes.    Sleep architecture:  Stage 1, 15% (5%), stage 2, 54.1% (45-55%), stage 3, 20.3% (15-20%), stage REM, 10.5% (20-25%).  AHI was 2.9, without desaturations. RDI 19.3.  REM RDI 37.7, consistent with severe REM BRAYAN.  Supine RDI 90, consistent with severe SUPINE BRAYAN.  Periodic Limb Movement Index 5.6/hour.       These findings were reviewed with patient.     Past medical/surgical history, family history, social history, medications and allergies were reviewed.      Problem List:  Patient Active Problem List    Diagnosis Date Noted     Primary osteoarthritis of right knee 03/13/2017     Priority: Medium     Advanced directives, counseling/discussion 01/09/2017     Priority: Medium     Info given       Mild episode of recurrent major depressive disorder (H) 01/06/2017     Priority: Medium     Complete tear of right rotator cuff 01/02/2017     Priority: Medium     Rupture long head biceps tendon, right, initial encounter 01/02/2017     Priority: Medium     Subacromial impingement of right shoulder 01/02/2017     Priority: Medium     Chronic gastric ulcer 10/28/2015     Priority: Medium     Seasonal allergic rhinitis 10/28/2015     Priority: Medium     History of gastric ulcer 07/16/2013     Priority: Medium     ACL tear 04/23/2013     Priority: Medium     Fatigue 03/12/2013     Priority: Medium     Hemorrhoids 12/08/2011     Priority: Medium     Erectile dysfunction 12/08/2011     Priority: Medium     HTN, goal below 140/90 12/08/2011     Priority: Medium     History of tobacco use: 1980 - 1990 09/29/2011     Priority: Medium     CARDIOVASCULAR  SCREENING; LDL GOAL LESS THAN 130 10/31/2010     Priority: Medium     KERRY (generalized anxiety disorder) 07/06/2010     Priority: Medium     History of colonic polyps 11/13/2007     Priority: Medium     Problem list name updated by automated process. Provider to review          Impression/Plan:  No sergio- sleep onset/maintenence insomnia. Reviewed sleep hygiene measures, relaxation exercises/meditations. Will try OTC melatonin. Will follow up with PCP regarding anxiety which is likely contributing to Insomnia.   He will follow up with me in about 3 month(s) if necessary.     Twenty-five minutes spent with patient, all of which were spent face-to-face counseling, consulting, coordinating plan of care.          CC: Viktor Gomez

## 2018-03-28 NOTE — PATIENT INSTRUCTIONS
Stress, tension, and anxiety can be big factors contributing to insomnia.  If you can t relax your mind and body, then you won t be able to sleep.  You would likely benefit from trying some of the relaxation exercises that we ve assembled below.  These are all internet based links.  If you don t have or use a computer, you may benefit from one of the stress management books listed below that you can get at your public library or at a local bookstore for a relatively low price.      Copy and paste into web browser address window   https://www.HammerKit.com/watch?v=Bdvc7ubUtNv    Progressive Muscle Relaxation (PMR):     http://www.Stroodle/progressive-muscle-relaxation-exercise.html     http://studentsupport.[facility]/counseling/resources/self-help/relaxation-and-stress-management/   Deep Breathing Exercises:    http://www.Stroodle/breathing-awareness.html     Meditation:     wwwSush.io    www.AutoMoneyBackguidedCARD.commeditationCARD.comsite.com You may have to pay for some of these resources.    Guided Imagery:    http://www.Stroodle/guided-imagery-scripts.html     http://[facility]/library/qpcpdtwpql-gcdupp-jijizmq/    [place] site under construction : http://www.fairSumma Health.org/Services/SleepMedicine/Audio/index.htm  The problem of recurrent insomnia is discussed. Avoidance of caffeine sources is strongly encouraged. Sleep hygiene issues are reviewed. The use of sedative hypnotics for temporary relief is appropriate; we discussed the addictive nature of these drugs, and a one-time only prescription for prn use of a hypnotic is given, to use no more than 3 times per week for 2-3 weeks.    Sometimes insomnia can be made worse by our own habits or situation.  You should consider making some of the following changes to help improve your sleep:     If there is excessive noise in your house / neighborhood use a fan or similar device to make a quiet background noise that can  drown out other noises    If your room is too bright early in the morning, hang a blanket or extra curtain over the windows to keep the light out or use a sleeping mask to cover your eyes    If your room is too warm during the night, set the temperature in the house down a few degrees for the night    Spend a little time before bedtime trying to relax.  Don t work right up until bedtime.  Take 30-60 minutes to relax and unwind before bedtime with a book or watching TV    Do not drink anything with alcohol or caffeine in them for at least 4-5 hours before bedtime    Do not smoke right before bedtime or during the night    No strenuous exercise for 2-3 hours before bedtime  Dental appliance resources recommended by [facility]   Sleep Medicine [facility]   Ryan Solis DDS, [address]  [place] [address]   Appointments: [phone] Ext: [phone]  Fax: [phone]   [email or web address]   Dental and Oral Surgery Clinic   Hay Laughlin DDS   [address]   [place] [address]   Appointments: [phone]   [facility].org/clinics/Dental_Oral_Surgery_Clinic/   [facility]_CLINICS_288       Snoring and Sleep Apnea   Dental Treatment Center   Shaquille Hopkins DDS   [address]   [address]   [place] [address]   Appointments: [phone]   Fax: [phone]   [facility] Joy[facility]  Alvarez Seymour DDS- takes medicare  [address], [address], [place] [address]  [address], [place] [address]  [phone]; [phone] (fax)  [employer]    Elena Goddard DDS  [facility]  [address]  [phone]  [place] [address]  [facility]   [facility]   Hay Laughlin DDS   [employer]   [address]   [address]   [place] [address]   Appointments: [phone]   Fax: [phone]     [place] [facility]   Gregory Olivier DDS, MS   [DME Medicare]  [facility]   [address]   [address]   [place] [address]   Appointments: [phone]   Fax: [phone]   Dr. Olivier accepts Medicaid patients.     [place] [facility]  Nolvia Thorpe BDS, MS  [address]  [address]  [place] [address]  Appointments: [phone]  Fax: [phone]    [facility]  Juan Antonio Agustinandrade [place]  [DME Medicare]  [address]  [place] [address]  Appointments [phone]  Fax: [phone]    +[facility]   [facility]     Sophy Cooney DDS, PhD, MS   [facility]   [address]   [address]   [place] [address]   Appointments: [phone]   Fax: [phone]     [place] [facility]   [facility]   [address]   [address]   [place] [address]   Appointments: [phone]   Fax: [phone]     [facility]  Comfort Walters DDS  [facility]  [address] [place] [address]  [phone] (Office)   [phone] (Fax    If you wish to choose your own dental sleep dentist, you may identify a provider close to you: [email or web address]

## 2018-03-28 NOTE — NURSING NOTE
"Chief Complaint   Patient presents with     Study Results       Initial BP (!) 118/94  Pulse 94  Temp 97.8  F (36.6  C) (Tympanic)  Resp 18  Ht 1.778 m (5' 10\")  Wt 103.4 kg (228 lb)  SpO2 95%  BMI 32.71 kg/m2 Estimated body mass index is 32.71 kg/(m^2) as calculated from the following:    Height as of this encounter: 1.778 m (5' 10\").    Weight as of this encounter: 103.4 kg (228 lb).  Medication Reconciliation: complete  "

## 2018-03-28 NOTE — MR AVS SNAPSHOT
After Visit Summary   3/28/2018    Hugo Longoria    MRN: [id number]           Patient Information     Date Of Birth          1960        Visit Information        Provider Department      3/28/2018 8:30 AM Christiano Ponce PA-C [facility]        Care Instructions    Stress, tension, and anxiety can be big factors contributing to insomnia.  If you can t relax your mind and body, then you won t be able to sleep.  You would likely benefit from trying some of the relaxation exercises that we ve assembled below.  These are all internet based links.  If you don t have or use a computer, you may benefit from one of the stress management books listed below that you can get at your public library or at a local bookstore for a relatively low price.      Copy and paste into web browser address window   https://www.CÃœR Media.com/watch?v=Iqov9qvMfGt    Progressive Muscle Relaxation (PMR):     http://www.Clovis Oncology/progressive-muscle-relaxation-exercise.html     http://studentsupport.[facility]/counseling/resources/self-help/relaxation-and-stress-management/   Deep Breathing Exercises:    http://www.Clovis Oncology/breathing-awareness.html     Meditation:     wwwAnedot    www.SermomeditationScreensite.com You may have to pay for some of these resources.    Guided Imagery:    http://www.Clovis Oncology/guided-imagery-scripts.html     http://Sightlogix/library/gqweoqmdso-ctzcsw-cjwvwno/    [place] site under construction : http://www.Otoe.org/Services/SleepMedicine/Audio/index.htm  The problem of recurrent insomnia is discussed. Avoidance of caffeine sources is strongly encouraged. Sleep hygiene issues are reviewed. The use of sedative hypnotics for temporary relief is appropriate; we discussed the addictive nature of these drugs, and a one-time only prescription for prn use of a hypnotic is given, to use no more than 3  times per week for 2-3 weeks.    Sometimes insomnia can be made worse by our own habits or situation.  You should consider making some of the following changes to help improve your sleep:     If there is excessive noise in your house / neighborhood use a fan or similar device to make a quiet background noise that can drown out other noises    If your room is too bright early in the morning, hang a blanket or extra curtain over the windows to keep the light out or use a sleeping mask to cover your eyes    If your room is too warm during the night, set the temperature in the house down a few degrees for the night    Spend a little time before bedtime trying to relax.  Don t work right up until bedtime.  Take 30-60 minutes to relax and unwind before bedtime with a book or watching TV    Do not drink anything with alcohol or caffeine in them for at least 4-5 hours before bedtime    Do not smoke right before bedtime or during the night    No strenuous exercise for 2-3 hours before bedtime  Dental appliance resources recommended by [facility]   Sleep Medicine [facility]   Ryan Solis DDS, [address] [address]   Appointments: [phone] Ext: [phone]  Fax: [phone]   [email or web address]   Dental and Oral Surgery Clinic   Hay Laughlin DDS   [address]   [place] [address]   Appointments: [phone]   [facility].org/clinics/Dental_Oral_Surgery_Clinic/   [facility]_CLINICS_288       Snoring and Sleep Apnea   Dental Treatment Center   Shaquille Hopkins DDS   [address]   [address]   [place] [address]   Appointments: [phone]   Fax: [phone]   [employer] Online [employer] Store.[employer]       [place] [facility]  Alvarez Seymour DDS- takes medicare  [address], [place] [address]  [address], [place] [address]  [phone]; [phone] (fax)  [employer]    [facility]  Chele Goddard DDS  [facility]  [address]  [phone]  [place] [address]  [facility]   [facility].com     [place] Office   Hay Laughlin DDS   [facility]   [address]   [address]   [place] [address]   Appointments: [phone]   Fax: [phone]     [place] Office   Gregory Olivier DDS, MS   [DME Medicare]  [facility]   [address]   [address]   [place] [address]   Appointments: [phone]   Fax: [phone]   Dr. Olivier accepts Medicaid patients.     [place] [facility]  Nolvia Thorpe BDS, MS  [address]  [address]  [place] [address]  Appointments: [phone]  Fax: [phone]    [facility]  Juan Antonio Velez PIETRO  [DME Medicare]  [address]  [place] [address]  Appointments [phone]  Fax: [phone]    +[facility]   [facility]     Sophy Cooney DDS, PhD, MS   [place] [facility]   [facility]   [address] [place] [address],   [address]   [place] [address]   Appointments: [phone]   Fax: [phone]     [place] [facility]   [facility]   [address]   [address]   [place] [address]   Appointments: [phone]   Fax: [phone]     [facility]  Comfort Walters DDS  [facility]  [address] [place] [address] [phone] (Office)   [phone] (Fax    If you wish to choose your own dental sleep dentist, you may identify a provider close to you: [email or web address]          Follow-ups after your visit        Follow-up notes from your care team     Return if symptoms worsen or fail to improve.      Who to contact     If you have questions or need follow up information about today's clinic visit or your schedule please contact  "[facility] directly at [phone].  Normal or non-critical lab and imaging results will be communicated to you by MyChart, letter or phone within 4 business days after the clinic has received the results. If you do not hear from us within 7 days, please contact the clinic through Merchant Atl[employer]art or phone. If you have a critical or abnormal lab result, we will notify you by phone as soon as possible.  Submit refill requests through Decision Pace or call your pharmacy and they will forward the refill request to us. Please allow 3 business days for your refill to be completed.          Additional Information About Your Visit        [employer] Information     Dec[employer] Pace gives you secure access to your electronic health record. If you see a primary care provider, you can also send messages to your care team and make appointments. If you have questions, please call your primary care clinic.  If you do not have a primary care provider, please call [phone] and they will assist you.        Care EveryWhere ID     This is your Care EveryWhere ID. This could be used by other organizations to access your [place] medical records  [id number]        Your Vitals Were     Pulse Temperature Respirations Height Pulse Oximetry BMI (Body Mass Index)    94 97.8  F (36.6  C) (Tympanic) 18 1.778 m (5' 10\") 95% 32.71 kg/m2       Blood Pressure from Last 3 Encounters:   03/28/18 (!) 118/94   02/19/18 138/80   02/12/18 138/68    Weight from Last 3 Encounters:   03/28/18 103.4 kg (228 lb)   02/19/18 103.5 kg (228 lb 3.2 oz)   02/12/18 105.2 kg (232 lb)              Today, you had the following     No orders found for display       Primary Care Provider Office Phone # Fax #    Viktor Leighton Gomez [phone]       [address] [place] [address]        Equal Access to Services     GAVIN OLVERA AH: Hadii aad ku hadasho Soomaali, waaxda luqadaha, qaybta kaalmacarmine adeneel, kelly howell " lasejal [place]kins. So [facility] [phone].    ATENCIÓN: Si habla mark, tiene a byrd disposición servicios gratuitos de asistencia lingüística. Rg combs [phone].    We comply with applicable federal civil rights laws and [place] laws. We do not discriminate on the basis of race, color, national origin, age, disability, sex, sexual orientation, or gender identity.            Thank you!     Thank you for choosing [place] [facility]  for your care. Our goal is always to provide you with excellent care. Hearing back from our patients is one way we can continue to improve our services. Please take a few minutes to complete the written survey that you may receive in the mail after your visit with us. Thank you!             Your Updated Medication List - Protect others around you: Learn how to safely use, store and throw away your medicines at www.disposemymeds.org.          This list is accurate as of 3/28/18  9:06 AM.  Always use your most recent med list.                   Brand Name Dispense Instructions for use Diagnosis    ascorbic acid 250 MG Chew chewable tablet    vitamin C     Take 250 mg by mouth daily    Primary osteoarthritis of right knee       atorvastatin 20 MG tablet    LIPITOR    30 tablet    Take 1 tablet (20 mg) by mouth daily    CARDIOVASCULAR SCREENING; LDL GOAL LESS THAN 130       fluticasone 50 MCG/ACT spray    FLONASE    1 Bottle    Spray 1-2 sprays into both nostrils daily    Acute sinusitis with symptoms > 10 days       LANsoprazole 30 MG CR capsule    PREVACID    30 capsule    Take 1 pill daily if Ranitidne fails.    Chronic gastritis without bleeding, unspecified gastritis type, Gastroesophageal reflux disease with esophagitis       lisinopril 20 MG tablet    PRINIVIL/ZESTRIL    90 tablet    TAKE ONE-HALF TABLET BY MOUTH ONCE DAILY    HTN, goal below 140/90       MULTIVITAMINS PO      Take  by mouth.        zolpidem 10 MG tablet    AMBIEN    1 tablet    Take tablet by mouth 15 minutes  prior to sleep, for Sleep Study    Insomnia, unspecified type

## 2018-05-04 NOTE — MR AVS SNAPSHOT
After Visit Summary   2/19/2018    Hugo Longoria    MRN: [id number]           Patient Information     Date Of Birth          1960        Visit Information        Provider Department      2/19/2018 3:40 PM Jacquie Carlin APRN CNP [facility]        Today's Diagnoses     Acute sinusitis with symptoms > 10 days    -  1    Ear pressure, bilateral        HTN, goal below 140/90          Care Instructions    1. Start antibiotic and prednisone to help with sinus inflammation and pressure  2. Flonase for nasal congestion  3. Follow up with ENT regarding chronic sinusitis .   4. Prednisone  avoid taking at night, take in AM as it can cause insomnia.   5. Recommend avoiding decongestants due to HTN. Could try coricidin OTC.       DONN Guerrero CNP            Follow-ups after your visit        Additional Services     OTOLARYNGOLOGY REFERRAL       Your provider has referred you to: [facility]: [facility] [phone]   [email or web address]    Please be aware that coverage of these services is subject to the terms and limitations of your health insurance plan.  Call member services at your health plan with any benefit or coverage questions.      Please bring the following with you to your appointment:    (1) Any X-Rays, CTs or MRIs which have been performed.  Contact the facility where they were done to arrange for  prior to your scheduled appointment.   (2) List of current medications  (3) This referral request   (4) Any documents/labs given to you for this referral                  Follow-up notes from your care team     Return if symptoms worsen or fail to improve.      Your next 10 appointments already scheduled     Mar 20, 2018  8:00 PM CDT   PSG Split with PH BED 2   [place] [facility] ([facility])    [address] [place] [id number] [phone]            Mar 28, 2018   8:30 AM CDT   Return Sleep Patient with Christiano Ponce PA-C   [facility] ([facility])    [address] [address] [phone]              Who to contact     If you have questions or need follow up information about today's clinic visit or your schedule please contact [facility] directly at [phone].  Normal or non-critical lab and imaging results will be communicated to you by [employer], letter or phone within 4 business days after the clinic has received the results. If you do not hear from us within 7 days, please contact the clinic through [employer] or phone. If you have a critical or abnormal lab result, we will notify you by phone as soon as possible.  Submit refill requests through [employer] or call your pharmacy and they will forward the refill request to us. Please allow 3 business days for your refill to be completed.          Additional Information About Your Visit        [employer] Information     [employer] gives you secure access to your electronic health record. If you see a primary care provider, you can also send messages to your care team and make appointments. If you have questions, please call your primary care clinic.  If you do not have a primary care provider, please call [phone] and they will assist you.        Care EveryWhere ID     This is your Care EveryWhere ID. This could be used by other organizations to access your [place] medical records  [id number]        Your Vitals Were     Pulse Temperature Respirations Pulse Oximetry BMI (Body Mass Index)       80 97.2  F (36.2  C) (Temporal) 16 95% 32.74 kg/m2        Blood Pressure from Last 3 Encounters:   02/19/18 138/80   02/12/18 138/68   01/23/18 132/82    Weight from Last 3 Encounters:   02/19/18 228 lb 3.2 oz (103.5 kg)   02/12/18 232 lb (105.2 kg)   01/23/18 232 lb 3.2 oz (105.3 kg)              We Performed the Following     OTOLARYNGOLOGY  REFERRAL          Today's Medication Changes          These changes are accurate as of 2/19/18  4:07 PM.  If you have any questions, ask your nurse or doctor.               Start taking these medicines.        Dose/Directions    amoxicillin-clavulanate 875-125 MG per tablet   Commonly known as:  AUGMENTIN   Used for:  Acute sinusitis with symptoms > 10 days   Started by:  Jacquie Carlin APRN CNP        Dose:  1 tablet   Take 1 tablet by mouth 2 times daily   Quantity:  20 tablet   Refills:  0       fluticasone 50 MCG/ACT spray   Commonly known as:  FLONASE   Used for:  Acute sinusitis with symptoms > 10 days   Started by:  Jacquie Carlin APRN CNP        Dose:  1-2 spray   Spray 1-2 sprays into both nostrils daily   Quantity:  1 Bottle   Refills:  0       predniSONE 20 MG tablet   Commonly known as:  DELTASONE   Used for:  Acute sinusitis with symptoms > 10 days, Ear pressure, bilateral   Started by:  Jacquie Carlin APRN CNP        Dose:  40 mg   Take 2 tablets (40 mg) by mouth daily for 5 days   Quantity:  10 tablet   Refills:  0            Where to get your medicines      These medications were sent to [facility] [address] [place] [address] [address] [address]     Phone:  [phone]     amoxicillin-clavulanate 875-125 MG per tablet    fluticasone 50 MCG/ACT spray    predniSONE 20 MG tablet                Primary Care Provider Office Phone # Fax #    Viktor Gomez  [phone]        [facility] [address] [place] [address]        Equal Access to Services     [facility] AH: Hadii jose l barnhart hadasho Soomaali, waaxda luqadaha, qaybta kaalmada adeegyada, kelly hoskins. So [facility] [phone].    ATENCIÓN: Si habla español, tiene a byrd disposición servicios gratuitos de asistencia lingüística. Llbry al [phone].    We comply with applicable federal civil rights laws and [place] laws. We do not discriminate on the  basis of race, color, national origin, age, disability, sex, sexual orientation, or gender identity.            Thank you!     Thank you for choosing [facility]  for your care. Our goal is always to provide you with excellent care. Hearing back from our patients is one way we can continue to improve our services. Please take a few minutes to complete the written survey that you may receive in the mail after your visit with us. Thank you!             Your Updated Medication List - Protect others around you: Learn how to safely use, store and throw away your medicines at www.disposemymeds.org.          This list is accurate as of 2/19/18  4:07 PM.  Always use your most recent med list.                   Brand Name Dispense Instructions for use Diagnosis    amoxicillin-clavulanate 875-125 MG per tablet    AUGMENTIN    20 tablet    Take 1 tablet by mouth 2 times daily    Acute sinusitis with symptoms > 10 days       ascorbic acid 250 MG Chew chewable tablet    vitamin C     Take 250 mg by mouth daily    Primary osteoarthritis of right knee       atorvastatin 20 MG tablet    LIPITOR    30 tablet    Take 1 tablet (20 mg) by mouth daily    CARDIOVASCULAR SCREENING; LDL GOAL LESS THAN 130       fluticasone 50 MCG/ACT spray    FLONASE    1 Bottle    Spray 1-2 sprays into both nostrils daily    Acute sinusitis with symptoms > 10 days       LANsoprazole 30 MG CR capsule    PREVACID    30 capsule    Take 1 pill daily if Ranitidne fails.    Chronic gastritis without bleeding, unspecified gastritis type, Gastroesophageal reflux disease with esophagitis       lisinopril 20 MG tablet    PRINIVIL/ZESTRIL    90 tablet    TAKE ONE-HALF TABLET BY MOUTH ONCE DAILY    HTN, goal below 140/90       MULTIVITAMINS PO      Take  by mouth.        predniSONE 20 MG tablet    DELTASONE    10 tablet    Take 2 tablets (40 mg) by mouth daily for 5 days    Acute sinusitis with symptoms > 10 days, Ear pressure, bilateral       zolpidem  10 MG tablet    AMBIEN    1 tablet    Take tablet by mouth 15 minutes prior to sleep, for Sleep Study    Insomnia, unspecified type          0

## 2018-05-11 DIAGNOSIS — I10 HTN, GOAL BELOW 140/90: ICD-10-CM

## 2018-05-11 RX ORDER — LISINOPRIL 20 MG/1
TABLET ORAL
Qty: 45 TABLET | Refills: 3 | Status: SHIPPED | OUTPATIENT
Start: 2018-05-11 | End: 2019-08-12

## 2018-05-11 NOTE — TELEPHONE ENCOUNTER
Routing refill request to provider for review/approval because:  Labs not current:  Creatinine and Potassium.     Jamila Melara RN

## 2018-05-11 NOTE — TELEPHONE ENCOUNTER
"Requested Prescriptions   Pending Prescriptions Disp Refills     lisinopril (PRINIVIL/ZESTRIL) 20 MG tablet [Pharmacy Med Name: LISINOPRIL 20MG     TAB] 45 tablet 3     Sig: TAKE ONE-HALF TABLET BY MOUTH ONCE DAILY    ACE Inhibitors (Including Combos) Protocol Failed    5/11/2018  2:58 PM       Failed - Blood pressure under 140/90 in past 12 months    BP Readings from Last 3 Encounters:   03/28/18 (!) 118/94   02/19/18 138/80   02/12/18 138/68                Failed - Normal serum creatinine on file in past 12 months    Recent Labs   Lab Test  01/09/17   0856   CR  0.78            Failed - Normal serum potassium on file in past 12 months    Recent Labs   Lab Test  01/09/17   0856   POTASSIUM  4.4            Passed - Recent (12 mo) or future (30 days) visit within the authorizing provider's specialty    Patient had office visit in the last 12 months or has a visit in the next 30 days with authorizing provider or within the authorizing provider's specialty.  See \"Patient Info\" tab in inbasket, or \"Choose Columns\" in Meds & Orders section of the refill encounter.           Passed - Patient is age 18 or older          Last Written Prescription Date:  3/8/17  Last Fill Quantity: 90,  # refills: 1   Last Office Visit with G, P or [facility] prescribing provider:  1/23/18   Future Office Visit:       "

## 2018-07-24 DIAGNOSIS — Z13.6 CARDIOVASCULAR SCREENING; LDL GOAL LESS THAN 130: ICD-10-CM

## 2018-07-24 RX ORDER — ATORVASTATIN CALCIUM 20 MG/1
TABLET, FILM COATED ORAL
Qty: 90 TABLET | Refills: 0 | Status: SHIPPED | OUTPATIENT
Start: 2018-07-24 | End: 2019-04-04

## 2018-07-24 NOTE — TELEPHONE ENCOUNTER
"Requested Prescriptions   Pending Prescriptions Disp Refills     atorvastatin (LIPITOR) 20 MG tablet [Pharmacy Med Name: ATORVASTATIN 20MG   TAB] 30 tablet 3    Last Written Prescription Date:  1/31/18  Last Fill Quantity: 30,  # refills: 3   Last office visit: 2/19/2018 with prescribing provider:  1/23/18   Future Office Visit:     Sig: TAKE ONE TABLET BY MOUTH ONCE DAILY    Statins Protocol Passed    7/24/2018  2:17 PM       Passed - LDL on file in past 12 months    Recent Labs   Lab Test  01/24/18   0926   LDL  162*            Passed - No abnormal creatine kinase in past 12 months    No lab results found.            Passed - Recent (12 mo) or future (30 days) visit within the authorizing provider's specialty    Patient had office visit in the last 12 months or has a visit in the next 30 days with authorizing provider or within the authorizing provider's specialty.  See \"Patient Info\" tab in inbasket, or \"Choose Columns\" in Meds & Orders section of the refill encounter.           Passed - Patient is age 18 or older          "

## 2018-07-24 NOTE — TELEPHONE ENCOUNTER
Prescription approved per [facility] Refill Protocol.  Chandrika Morrow RN. . .  7/24/2018, 4:15 PM

## 2018-09-19 ENCOUNTER — OFFICE VISIT (OUTPATIENT)
Dept: ORTHOPEDICS | Facility: OTHER | Age: 58
End: 2018-09-19
Payer: COMMERCIAL

## 2018-09-19 ENCOUNTER — RADIANT APPOINTMENT (OUTPATIENT)
Dept: GENERAL RADIOLOGY | Facility: OTHER | Age: 58
End: 2018-09-19
Attending: ORTHOPAEDIC SURGERY
Payer: COMMERCIAL

## 2018-09-19 VITALS
HEIGHT: 69 IN | DIASTOLIC BLOOD PRESSURE: 78 MMHG | SYSTOLIC BLOOD PRESSURE: 118 MMHG | WEIGHT: 230 LBS | BODY MASS INDEX: 34.07 KG/M2

## 2018-09-19 DIAGNOSIS — M17.11 PRIMARY OSTEOARTHRITIS OF RIGHT KNEE: ICD-10-CM

## 2018-09-19 DIAGNOSIS — M17.11 PRIMARY OSTEOARTHRITIS OF RIGHT KNEE: Primary | ICD-10-CM

## 2018-09-19 PROCEDURE — 73564 X-RAY EXAM KNEE 4 OR MORE: CPT | Mod: RT

## 2018-09-19 PROCEDURE — 99213 OFFICE O/P EST LOW 20 MIN: CPT | Mod: 25 | Performed by: ORTHOPAEDIC SURGERY

## 2018-09-19 PROCEDURE — 20610 DRAIN/INJ JOINT/BURSA W/O US: CPT | Mod: RT | Performed by: ORTHOPAEDIC SURGERY

## 2018-09-19 RX ORDER — TRIAMCINOLONE ACETONIDE 40 MG/ML
40 INJECTION, SUSPENSION INTRA-ARTICULAR; INTRAMUSCULAR ONCE
Qty: 1 ML | Refills: 0 | OUTPATIENT
Start: 2018-09-19 | End: 2018-09-19

## 2018-09-19 NOTE — MR AVS SNAPSHOT
"              After Visit Summary   9/19/2018    Hugo Longoria    MRN: [id number]           Patient Information     Date Of Birth          1960        Visit Information        Provider Department      9/19/2018 4:20 PM Javier Yin, DO [facility]         Follow-ups after your visit        Who to contact     If you have questions or need follow up information about today's clinic visit or your schedule please contact [facility] directly at [phone].  Normal or non-critical lab and imaging results will be communicated to you by St[employer], letter or phone within 4 business days after the clinic has received the results. If you do not hear from us within 7 days, please contact the clinic through [employer] or phone. If you have a critical or abnormal lab result, we will notify you by phone as soon as possible.  Submit refill requests through [employer] or call your pharmacy and they will forward the refill request to us. Please allow 3 business days for your refill to be completed.          Additional Information About Your Visit        MyChart Information     [employer] gives you secure access to your electronic health record. If you see a primary care provider, you can also send messages to your care team and make appointments. If you have questions, please call your primary care clinic.  If you do not have a primary care provider, please call [phone] and they will assist you.        Care EveryWhere ID     This is your Care EveryWhere ID. This could be used by other organizations to access your [facility] medical records  [id number]        Your Vitals Were     Height BMI (Body Mass Index)                1.753 m (5' 9\") 33.97 kg/m2           Blood Pressure from Last 3 Encounters:   09/19/18 118/78   03/28/18 (!) 118/94   02/19/18 138/80    Weight from Last 3 Encounters:   09/19/18 104.3 kg (230 lb)   03/28/18 103.4 kg (228 lb)   02/19/18 103.5 kg (228 lb 3.2 oz)    "           Today, you had the following     No orders found for display       Primary Care Provider Office Phone # Fax #    Viktor Gomez, [phone]       [address] [place] [address]        Equal Access to Services     GAVIN OLVERA : Hadii jose l barnhart hadwendyo Soomaali, waaxda luqadaha, qaybta kaalmada adeegyada, waxestelle richardin hayduncann pablitosandeep howell remi hoskins. So [facility] [phone].    ATENCIÓN: Si habla español, tiene a byrd disposición servicios gratuitos de asistencia lingüística. Llame al [phone].    We comply with applicable federal civil rights laws and [place] laws. We do not discriminate on the basis of race, color, national origin, age, disability, sex, sexual orientation, or gender identity.            Thank you!     Thank you for choosing [facility]  for your care. Our goal is always to provide you with excellent care. Hearing back from our patients is one way we can continue to improve our services. Please take a few minutes to complete the written survey that you may receive in the mail after your visit with us. Thank you!             Your Updated Medication List - Protect others around you: Learn how to safely use, store and throw away your medicines at www.disposemymeds.org.          This list is accurate as of 9/19/18  4:46 PM.  Always use your most recent med list.                   Brand Name Dispense Instructions for use Diagnosis    ascorbic acid 250 MG Chew chewable tablet    vitamin C     Take 250 mg by mouth daily    Primary osteoarthritis of right knee       atorvastatin 20 MG tablet    LIPITOR    90 tablet    TAKE ONE TABLET BY MOUTH ONCE DAILY    CARDIOVASCULAR SCREENING; LDL GOAL LESS THAN 130       fluticasone 50 MCG/ACT spray    FLONASE    1 Bottle    Spray 1-2 sprays into both nostrils daily    Acute sinusitis with symptoms > 10 days       * LANsoprazole 30 MG CR capsule    PREVACID    30 capsule    Take 1 pill daily if Ranitidne fails.     Chronic gastritis without bleeding, unspecified gastritis type, Gastroesophageal reflux disease with esophagitis       * LANsoprazole 30 MG CR capsule    PREVACID    90 capsule    TAKE ONE CAPSULE BY MOUTH ONCE DAILY    Chronic gastritis without bleeding, unspecified gastritis type       lisinopril 20 MG tablet    PRINIVIL/ZESTRIL    45 tablet    TAKE ONE-HALF TABLET BY MOUTH ONCE DAILY    HTN, goal below 140/90       MULTIVITAMINS PO      Take  by mouth.        zolpidem 10 MG tablet    AMBIEN    1 tablet    Take tablet by mouth 15 minutes prior to sleep, for Sleep Study    Insomnia, unspecified type       * Notice:  This list has 2 medication(s) that are the same as other medications prescribed for you. Read the directions carefully, and ask your doctor or other care provider to review them with you.

## 2018-09-19 NOTE — PROGRESS NOTES
"Office Visit-Follow up    Chief Complaint: Hugo Longoria is a 57 year old male who is being seen for   Chief Complaint   Patient presents with     RECHECK     right knee pain/osteoarthtitis       History of Present Illness:   Today's visit:  Returns to discuss right knee achiness. Same pain as before.  Last injection provided good relief for months.  No new injuries, worse with activity, better with rest.  1/11/18 visit:  Turns for some right knee achiness.  Worse with activity.  Better with rest.  He is requesting an injection.  March 13, 2017 visit:   complains of right medial knee pain. he reports tearing is anterior cruciate ligament many years ago   he had an arthroscopy with a \"cleaning the cartilage out\". He's had previous steroid injections which have been helpful. He is also undergone Synvisc injections.    REVIEW OF SYSTEMS  General: negative for, night sweats, dizziness, fatigue  Resp: No shortness of breath and no cough  CV: negative for chest pain, syncope or near-syncope  GI: negative for nausea, vomiting and diarrhea  : negative for dysuria and hematuria  Musculoskeletal: as above  Neurologic: negative for syncope   Hematologic: negative for bleeding disorder    Physical Exam:  Vitals: /78 (BP Location: Right arm, Patient Position: Sitting, Cuff Size: Adult Large)  Ht 1.753 m (5' 9\")  Wt 104.3 kg (230 lb)  BMI 33.97 kg/m2  BMI= Body mass index is 33.97 kg/(m^2).  Constitutional: healthy, alert and no acute distress   Psychiatric: mentation appears normal and affect normal/bright  NEURO: no focal deficits  RESP: Normal with easy respirations and no use of accessory muscles to breathe, no audible wheezing or retractions  CV: RLE: no edema  SKIN: No erythema, rashes, excoriation, or breakdown. No evidence of infection.   JOINT/EXTREMITIES:right Knee Exam: moderate effusion, medial joint line pain. Varus deformity. AROM 0-120.  Some pseudolaxity medial with valgus stressing.  Collateral " ligaments are intact.  GAIT: not tested      Diagnostic Modalities:  Right knee xray: medial joint space has bone on bone narrowing.  Mild joint space narrowing to patellofemoral compartment. Varus alignment.  Independent visualization of the images was performed.      Impression: right knee primary osteoarthritis    Plan:  All of the above pertinent physical exam and imaging modalities findings was reviewed with Hugo. Discussed options, patient would like to proceed with a repeat injection.  Last injection gave good relief.     The patient was counseled about an  injection, including discussion of risks (including infection), contents of the injection, rationale for performing the injection, and expected benefits of the injection. The skin was prepped with alcohol and betadine and then utilizing sterile technique an injection of the right knee joint from the anterolateral approach in the seated position was performed. The injection consisted 1ml of Kenalog (40mg per 1 ml) mixed with 3ml of 0.5% Marcaine. The patient tolerated the injection well, and there were no complications. The injection site was covered with a Band-Aid. The injection was performed by Juanjose Elena, APRN, CNP, DNP    Return to clinic PRN, or sooner as needed for changes.  Re-x-ray on return: No    Gerber Yin D.O.

## 2018-09-19 NOTE — LETTER
"    9/19/2018         RE: Hugo Longoria  [id number] [facility] [address]        Dear Colleague,    Thank you for referring your patient, Hugo Longoria, to the [facility]. Please see a copy of my visit note below.    Office Visit-Follow up    Chief Complaint: Hugo Longoria is a 57 year old male who is being seen for   Chief Complaint   Patient presents with     RECHECK     right knee pain/osteoarthtitis       History of Present Illness:   Today's visit:  Returns to discuss right knee achiness. Same pain as before.  Last injection provided good relief for months.  No new injuries, worse with activity, better with rest.  1/11/18 visit:  Turns for some right knee achiness.  Worse with activity.  Better with rest.  He is requesting an injection.  March 13, 2017 visit:   complains of right medial knee pain. he reports tearing is anterior cruciate ligament many years ago   he had an arthroscopy with a \"cleaning the cartilage out\". He's had previous steroid injections which have been helpful. He is also undergone Synvisc injections.    REVIEW OF SYSTEMS  General: negative for, night sweats, dizziness, fatigue  Resp: No shortness of breath and no cough  CV: negative for chest pain, syncope or near-syncope  GI: negative for nausea, vomiting and diarrhea  : negative for dysuria and hematuria  Musculoskeletal: as above  Neurologic: negative for syncope   Hematologic: negative for bleeding disorder    Physical Exam:  Vitals: /78 (BP Location: Right arm, Patient Position: Sitting, Cuff Size: Adult Large)  Ht 1.753 m (5' 9\")  Wt 104.3 kg (230 lb)  BMI 33.97 kg/m2  BMI= Body mass index is 33.97 kg/(m^2).  Constitutional: healthy, alert and no acute distress   Psychiatric: mentation appears normal and affect normal/bright  NEURO: no focal deficits  RESP: Normal with easy respirations and no use of accessory muscles to breathe, no audible wheezing or retractions  CV: RLE: no edema  SKIN: " No erythema, rashes, excoriation, or breakdown. No evidence of infection.   JOINT/EXTREMITIES:right Knee Exam: moderate effusion, medial joint line pain. Varus deformity. AROM 0-120.  Some pseudolaxity medial with valgus stressing.  Collateral ligaments are intact.  GAIT: not tested      Diagnostic Modalities:  Right knee xray: medial joint space has bone on bone narrowing.  Mild joint space narrowing to patellofemoral compartment. Varus alignment.  Independent visualization of the images was performed.      Impression: right knee primary osteoarthritis    Plan:  All of the above pertinent physical exam and imaging modalities findings was reviewed with Hugo. Discussed options, patient would like to proceed with a repeat injection.  Last injection gave good relief.     The patient was counseled about an  injection, including discussion of risks (including infection), contents of the injection, rationale for performing the injection, and expected benefits of the injection. The skin was prepped with alcohol and betadine and then utilizing sterile technique an injection of the right knee joint from the anterolateral approach in the seated position was performed. The injection consisted 1ml of Kenalog (40mg per 1 ml) mixed with 3ml of 0.5% Marcaine. The patient tolerated the injection well, and there were no complications. The injection site was covered with a Band-Aid. The injection was performed by Juanjose Elena, APRN, CNP, DNP    Return to clinic PRN, or sooner as needed for changes.  Re-x-ray on return: Dafne Yin D.O.          Again, thank you for allowing me to participate in the care of your patient.        Sincerely,        Javier Yin, DO

## 2019-01-30 ENCOUNTER — OFFICE VISIT (OUTPATIENT)
Dept: ORTHOPEDICS | Facility: OTHER | Age: 59
End: 2019-01-30
Payer: COMMERCIAL

## 2019-01-30 VITALS
DIASTOLIC BLOOD PRESSURE: 60 MMHG | WEIGHT: 231.2 LBS | HEIGHT: 69 IN | BODY MASS INDEX: 34.24 KG/M2 | SYSTOLIC BLOOD PRESSURE: 112 MMHG

## 2019-01-30 DIAGNOSIS — M17.11 PRIMARY OSTEOARTHRITIS OF RIGHT KNEE: Primary | ICD-10-CM

## 2019-01-30 DIAGNOSIS — M25.511 CHRONIC RIGHT SHOULDER PAIN: ICD-10-CM

## 2019-01-30 DIAGNOSIS — G89.29 CHRONIC RIGHT SHOULDER PAIN: ICD-10-CM

## 2019-01-30 PROCEDURE — 20610 DRAIN/INJ JOINT/BURSA W/O US: CPT | Mod: RT | Performed by: ORTHOPAEDIC SURGERY

## 2019-01-30 PROCEDURE — 99212 OFFICE O/P EST SF 10 MIN: CPT | Mod: 25 | Performed by: ORTHOPAEDIC SURGERY

## 2019-01-30 RX ORDER — TRIAMCINOLONE ACETONIDE 40 MG/ML
40 INJECTION, SUSPENSION INTRA-ARTICULAR; INTRAMUSCULAR ONCE
Status: COMPLETED | OUTPATIENT
Start: 2019-01-30 | End: 2019-01-30

## 2019-01-30 RX ADMIN — TRIAMCINOLONE ACETONIDE 40 MG: 40 INJECTION, SUSPENSION INTRA-ARTICULAR; INTRAMUSCULAR at 15:51

## 2019-01-30 ASSESSMENT — MIFFLIN-ST. JEOR: SCORE: 1864.1

## 2019-01-30 NOTE — PROGRESS NOTES
Prior to injection, verified patient identity using patient's name and date of birth.  Due to injection administration, patient instructed to remain in clinic for 15 minutes  afterwards, and to report any adverse reaction to me immediately.    Joint injection was performed.      Drug Amount Wasted:  None.  Vial/Syringe: Single dose vial  Expiration Date:  4/2020    The following medication was given by DONN Tse, CNP, DNP:     MEDICATION: Kenalog 40mg/1ml  ROUTE: Joint Injection  SITE: right knee  DOSE: 1 mL  LOT #: [id number]  : [employer]  EXPIRATION DATE:  4/2020  NDC: [address]-6

## 2019-01-30 NOTE — PROGRESS NOTES
"Office Visit-Follow up    Chief Complaint: Hugo Longoria is a 58 year old male who is being seen for   Chief Complaint   Patient presents with     RECHECK     right knee primary osteoarthritis     RECHECK     right shoulder       History of Present Illness:   Today's visit:  Returns for 2 reasons: #1 right knee: states the last injection helped quite a bit and still working somewhat but noticing pain is starting to come back and wanted another injection before it got severe.  No new injuries, symptoms.  Recently hospitalized from [place], states recovering well from this.     #2: had a RCR with bicep tendonesis January of 2017. States over the last about year had some aching to the shoulder, anterior shoulder. Denies weakness, numbness, or new injury. States has full range of motion to the shoulder, does not feel anything like initial shoulder injury.  Mild aching.  Has not been doing exercises for his shoulder.    9/19/18  Returns to discuss right knee achiness. Same pain as before.  Last injection provided good relief for months.  No new injuries, worse with activity, better with rest.  1/11/18 visit:  Turns for some right knee achiness.  Worse with activity.  Better with rest.  He is requesting an injection.  March 13, 2017 visit:   complains of right medial knee pain. he reports tearing is anterior cruciate ligament many years ago   he had an arthroscopy with a \"cleaning the cartilage out\". He's had previous steroid injections which have been helpful. He is also undergone Synvisc injections.      REVIEW OF SYSTEMS  General: negative for, night sweats, dizziness, fatigue  Resp: No shortness of breath and no cough  CV: negative for chest pain, syncope or near-syncope  GI: negative for nausea, vomiting and diarrhea  : negative for dysuria and hematuria  Musculoskeletal: as above  Neurologic: negative for syncope   Hematologic: negative for bleeding disorder    Physical Exam:  Vitals: /60   Ht 1.753 m (5' " "9\")   Wt 104.9 kg (231 lb 3.2 oz)   BMI 34.14 kg/m    BMI= Body mass index is 34.14 kg/m .  Constitutional: healthy, alert and no acute distress   Psychiatric: mentation appears normal and affect normal/bright  NEURO: no focal deficits  RESP: Normal with easy respirations and no use of accessory muscles to breathe, no audible wheezing or retractions  CV: RLE: no edema  SKIN: No erythema, rashes, excoriation, or breakdown. No evidence of infection.   JOINT/EXTREMITIES:right Knee Exam: mild effusion, medial joint line pain. Varus deformity. AROM 0-120.  Some pseudolaxity medial with valgus stressing.  Collateral ligaments are intact.  NO pain with valgus and varus stressing.    Right shoulder: no rotator cuff weakness, full ROM, no pain with ROM or rotator cuff testing.  No AC joint or focal tenderness.    GAIT: not tested          Diagnostic Modalities:  None today.  Independent visualization of the images was performed.      Impression: right knee primary osteoarthritis   Right shoulder pain without weakness.     Plan:  All of the above pertinent physical exam and imaging modalities findings was reviewed with Hugo. Hugo returns primarily for his right knee would like repeat injection. This is reasonable. He denies any issues with the injection in the past.  Has gotten 4 months of relief from the previous injection.    The patient was counseled about an  injection, including discussion of risks (including infection), contents of the injection, rationale for performing the injection, and expected benefits of the injection. The skin was prepped with alcohol and betadine and then utilizing sterile technique an injection of the right knee joint from the anterolateral approach in the seated position was performed. The injection consisted 1ml of Kenalog (40mg per 1 ml) mixed with 3ml of 0.5% Marcaine. The patient tolerated the injection well, and there were no complications. The injection site was covered with a Band-Aid. " The injection was performed by DONN Ashley, CNP, DNP    If any concerns for infection seek immediate medical evaluation either in the clinic or the ED.       Regarding the shoulder, some aching to it over the last year.  No new injuries.  Mostly just sore.  No weakness on testing and had full ROM.  Recommended to start back with the home exercises he has done previously return if not improving.  Also discussed that if he would like the shoulder fully evaluated to make a new visit, and we would get imaging at that time.  Patient agreeable to this.     Return to clinic PRN, or sooner as needed for changes.  Re-x-ray on return: No    Scribed by:  DONN Ashley, CNP  5:03 PM  1/30/2019    I attest I have seen and evaluated the patient.  I agree with above impression and plan.  Gerber Yin D.O.

## 2019-01-30 NOTE — LETTER
"    1/30/2019         RE: Hugo Longoria  [id number] [id number]        Dear Colleague,    Thank you for referring your patient, Hugo Longoria, to the [facility]. Please see a copy of my visit note below.    Office Visit-Follow up    Chief Complaint: Hugo Longoria is a 58 year old male who is being seen for   Chief Complaint   Patient presents with     RECHECK     right knee primary osteoarthritis     RECHECK     right shoulder       History of Present Illness:   Today's visit:  Returns for 2 reasons: #1 right knee: states the last injection helped quite a bit and still working somewhat but noticing pain is starting to come back and wanted another injection before it got severe.  No new injuries, symptoms.  Recently hospitalized from [facility], states recovering well from this.     #2: had a RCR with bicep tendonesis January of 2017. States over the last about year had some aching to the shoulder, anterior shoulder. Denies weakness, numbness, or new injury. States has full range of motion to the shoulder, does not feel anything like initial shoulder injury.  Mild aching.  Has not been doing exercises for his shoulder.    9/19/18  Returns to discuss right knee achiness. Same pain as before.  Last injection provided good relief for months.  No new injuries, worse with activity, better with rest.  1/11/18 visit:  Turns for some right knee achiness.  Worse with activity.  Better with rest.  He is requesting an injection.  March 13, 2017 visit:   complains of right medial knee pain. he reports tearing is anterior cruciate ligament many years ago   he had an arthroscopy with a \"cleaning the cartilage out\". He's had previous steroid injections which have been helpful. He is also undergone Synvisc injections.      REVIEW OF SYSTEMS  General: negative for, night sweats, dizziness, fatigue  Resp: No shortness of breath and no cough  CV: negative for chest pain, syncope or " "near-syncope  GI: negative for nausea, vomiting and diarrhea  : negative for dysuria and hematuria  Musculoskeletal: as above  Neurologic: negative for syncope   Hematologic: negative for bleeding disorder    Physical Exam:  Vitals: /60   Ht 1.753 m (5' 9\")   Wt 104.9 kg (231 lb 3.2 oz)   BMI 34.14 kg/m     BMI= Body mass index is 34.14 kg/m .  Constitutional: healthy, alert and no acute distress   Psychiatric: mentation appears normal and affect normal/bright  NEURO: no focal deficits  RESP: Normal with easy respirations and no use of accessory muscles to breathe, no audible wheezing or retractions  CV: RLE: no edema  SKIN: No erythema, rashes, excoriation, or breakdown. No evidence of infection.   JOINT/EXTREMITIES:right Knee Exam:  mild effusion, medial joint line pain. Varus deformity. AROM 0-120.  Some pseudolaxity medial with valgus stressing.  Collateral ligaments are intact.  NO pain with valgus and varus stressing.    Right shoulder: no rotator cuff weakness, full ROM, no pain with ROM or rotator cuff testing.  No AC joint or focal tenderness.    GAIT: not tested          Diagnostic Modalities:  None today.  Independent visualization of the images was performed.      Impression: right knee primary osteoarthritis   Right shoulder pain without weakness.     Plan:  All of the above pertinent physical exam and imaging modalities findings was reviewed with Hugo. Hugo returns primarily for his right knee would like repeat injection. This is reasonable. He denies any issues with the injection in the past.  Has gotten 4 months of relief from the previous injection.    The patient was counseled about an  injection, including discussion of risks (including infection), contents of the injection, rationale for performing the injection, and expected benefits of the injection. The skin was prepped with alcohol and betadine and then utilizing sterile technique an injection of the right knee joint from the " anterolateral approach in the seated position was performed. The injection consisted 1ml of Kenalog (40mg per 1 ml) mixed with 3ml of 0.5% Marcaine. The patient tolerated the injection well, and there were no complications. The injection site was covered with a Band-Aid. The injection was performed by DONN Ashley CNP, DNP    If any concerns for infection seek immediate medical evaluation either in the clinic or the ED.       Regarding the shoulder, some aching to it over the last year.  No new injuries.  Mostly just sore.  No weakness on testing and had full ROM.  Recommended to start back with the home exercises he has done previously return if not improving.  Also discussed that if he would like the shoulder fully evaluated to make a new visit, and we would get imaging at that time.  Patient agreeable to this.     Return to clinic PRN, or sooner as needed for changes.  Re-x-ray on return: No    Scribed by:  DONN Ashley CNP  5:03 PM  1/30/2019    I attest I have seen and evaluated the patient.  I agree with above impression and plan.  Gerber Yin D.O.          Prior to injection, verified patient identity using patient's name and date of birth.  Due to injection administration, patient instructed to remain in clinic for 15 minutes  afterwards, and to report any adverse reaction to me immediately.    Joint injection was performed.      Drug Amount Wasted:  None.  Vial/Syringe: Single dose vial  Expiration Date:  4/2020    The following medication was given by DONN Tse CNP, DNP:     MEDICATION: Kenalog 40mg/1ml  ROUTE: Joint Injection  SITE: right knee  DOSE: 1 mL  LOT #: [id number]  : Augur  EXPIRATION DATE:  4/2020  NDC: [address]-7                  Again, thank you for allowing me to participate in the care of your patient.        Sincerely,        Javier Yin, DO

## 2019-01-30 NOTE — NURSING NOTE
"Hugo Longoria is a 58 year old male who presents for:  Chief Complaint   Patient presents with     RECHECK     right knee primary osteoarthritis     RECHECK     right shoulder        Initial Vitals:  /60   Ht 1.753 m (5' 9\")   Wt 104.9 kg (231 lb 3.2 oz)   BMI 34.14 kg/m   Estimated body mass index is 34.14 kg/m  as calculated from the following:    Height as of this encounter: 1.753 m (5' 9\").    Weight as of this encounter: 104.9 kg (231 lb 3.2 oz).. Body surface area is 2.26 meters squared. BP completed using cuff size: large  Data Unavailable        Nursing Comments: patient would like shoulder looked at, having some pain        Monica Navarro CMA  "

## 2019-01-31 ENCOUNTER — OFFICE VISIT (OUTPATIENT)
Dept: FAMILY MEDICINE | Facility: OTHER | Age: 59
End: 2019-01-31
Payer: COMMERCIAL

## 2019-01-31 VITALS
RESPIRATION RATE: 18 BRPM | OXYGEN SATURATION: 95 % | BODY MASS INDEX: 33.62 KG/M2 | TEMPERATURE: 97.5 F | HEART RATE: 102 BPM | WEIGHT: 227 LBS | DIASTOLIC BLOOD PRESSURE: 64 MMHG | SYSTOLIC BLOOD PRESSURE: 118 MMHG | HEIGHT: 69 IN

## 2019-01-31 DIAGNOSIS — F33.0 MILD EPISODE OF RECURRENT MAJOR DEPRESSIVE DISORDER (H): ICD-10-CM

## 2019-01-31 DIAGNOSIS — Z12.11 SPECIAL SCREENING FOR MALIGNANT NEOPLASMS, COLON: ICD-10-CM

## 2019-01-31 DIAGNOSIS — H90.6 MIXED CONDUCTIVE AND SENSORINEURAL HEARING LOSS OF BOTH EARS: Primary | ICD-10-CM

## 2019-01-31 DIAGNOSIS — E78.5 HYPERLIPIDEMIA LDL GOAL <130: ICD-10-CM

## 2019-01-31 DIAGNOSIS — I10 HTN, GOAL BELOW 140/90: ICD-10-CM

## 2019-01-31 PROCEDURE — 99214 OFFICE O/P EST MOD 30 MIN: CPT | Performed by: INTERNAL MEDICINE

## 2019-01-31 ASSESSMENT — PAIN SCALES - GENERAL: PAINLEVEL: NO PAIN (0)

## 2019-01-31 ASSESSMENT — MIFFLIN-ST. JEOR: SCORE: 1845.05

## 2019-01-31 ASSESSMENT — PATIENT HEALTH QUESTIONNAIRE - PHQ9: SUM OF ALL RESPONSES TO PHQ QUESTIONS 1-9: 0

## 2019-01-31 NOTE — PROGRESS NOTES
SUBJECTIVE:   Hugo Longoria is a 58 year old male who presents to clinic today for the following health issues:      Chief Complaint   Patient presents with     Hearing Problem     Insomnia     Mole     Medication Problem                         Chief Complaint         The patient is a pleasant 58-year-old gentleman who presents today with a series of concerns.  Notably, he has had progressive hearing loss.  He attributes this to some exposure over the last several years.  He would like to have an audiology evaluation.  Additionally, he has a history of hypertension and gastritis with reflux.  He continues to Prevacid and the lisinopril without difficulty.  He denies any cough or excess sputum.  He is on statin therapy for his hyperlipidemia.  We will check lipid levels today.  He notes that he is watching his diet fairly well.                       PAST, FAMILY,SOCIAL HISTORY:     Medical  History:   has a past medical history of Abnormalities of size and form of teeth, Accidental poisoning by agents primarily affecting blood constituents(E858.2), and Gastric ulcer, unspecified as acute or chronic, without mention of hemorrhage or perforation.     Surgical History:   has a past surgical history that includes removal of sperm duct(s); UPPER GI ENDOSCOPY,EXAM (10/31/2007); colonoscopy (11/01/2007); colonoscopy (12/12/11); endoscopy (01/27/12); Arthroscopy knee (5/29/2013); Herniorrhaphy umbilical (N/A, 3/12/2015); Colonoscopy (N/A, 11/18/2015); Esophagoscopy, gastroscopy, duodenoscopy (EGD), combined (N/A, 11/18/2015); Irrigation and debridement upper extremity, combined (Left, 3/15/2016); Arthroscopy shoulder rotator cuff repair (Right, 1/13/2017); Arthroscopy shoulder biceps tenodesis repair (Right, 1/13/2017); Arthroscopy shoulder decompression (Right, 1/13/2017); Colonoscopy (N/A, 3/22/2017); and Esophagoscopy, gastroscopy, duodenoscopy (EGD), combined (N/A, 3/22/2017).     Social History:   reports that he  quit smoking about 38 years ago. he has never used smokeless tobacco. He reports that he drinks alcohol. He reports that he does not use drugs.     Family History:  family history includes Arthritis in his mother; Asthma in his father and mother; Cancer in his father, maternal grandmother, mother, and sister; Cardiovascular in his brother and father; Circulatory in his father; Diabetes in his father, maternal grandfather, and paternal grandfather; Eye Disorder in his paternal grandfather; Genitourinary Problems in his father and mother; Heart Disease in his brother and father; Hypertension in his brother, brother, father, mother, sister, and sister; Lipids in his brother, brother, mother, and sister; Prostate Cancer in his father; Respiratory in his father.            MEDICATIONS  Current Outpatient Medications   Medication Sig Dispense Refill     atorvastatin (LIPITOR) 20 MG tablet TAKE ONE TABLET BY MOUTH ONCE DAILY 90 tablet 0     LANsoprazole (PREVACID) 30 MG DR capsule TAKE 1 CAPSULE BY MOUTH ONCE DAILY 30 capsule 0     lisinopril (PRINIVIL/ZESTRIL) 20 MG tablet TAKE ONE-HALF TABLET BY MOUTH ONCE DAILY 45 tablet 3     Multiple Vitamin (MULTIVITAMINS PO) Take  by mouth.       ascorbic acid (VITAMIN C) 250 MG CHEW chewable tablet Take 250 mg by mouth daily       fluticasone (FLONASE) 50 MCG/ACT spray Spray 1-2 sprays into both nostrils daily (Patient not taking: Reported on 1/31/2019) 1 Bottle 0         --------------------------------------------------------------------------------------------------------------------                              Review of Systems       LUNGS: Pt denies: cough, excess sputum, hemoptysis, or shortness of breath.   HEART: Pt denies: chest pain, arrhythmia, syncope, tachy or bradyarrhythmia.   GI: Pt denies: nausea, vomiting, diarrhea, constipation, melena, or hematochezia.   NEURO: Pt denies: seizures, strokes, diplopia, weakness, paraesthesias, or paralysis.   SKIN: Pt denies:  "itching, rashes, discoloration, or specific lesions of concern. Denies recent hair loss.   PSYCH: The patient denies significant depression, anxiety, mood imbalance. Specifically denies any suicidal ideation.                                     Examination      /64 (BP Location: Right arm, Patient Position: Sitting, Cuff Size: Adult Large)   Pulse 102   Temp 97.5  F (36.4  C) (Temporal)   Resp 18   Ht 1.753 m (5' 9\")   Wt 103 kg (227 lb)   SpO2 95%   BMI 33.52 kg/m       LUNGS: clear bilaterally, airflow is brisk, no intercostal retraction or stridor is noted. No coughing is noted during visit.   HEART:  regular without rubs, clicks, gallops, or murmurs. PMI is nondisplaced. Upstrokes are brisk. S1,S2 are heard.   GI: Abdomen is soft, without rebound, guarding or tenderness. Bowel sounds are appropriate. No renal bruits are heard.   NEURO: Pt is alert and appropriate. No neurologic lateralization is noted. Cranial nerves 2-12 are intact. Peripheral sensory and motor function are grossly normal.    SKIN:  warm and dry. No erythema, or rashes are noted. No specific lesions of concern are noted.    PSYCH: The patient appears grossly appropriate. Maintains good eye contact, does not have any jittery or atypical motion. Displays appropriate affect.   ENT: Pharynx is non-erythemous, minimal PND, no significant nasal obstruction, TM's not red or retracted, hearing intact bilaterally. No carotid bruits are heard. No JVD seen. Thyroid is not nodular or enlarged.  Hearing is somewhat decreased bilaterally.                                          Decision Making    1. Mixed conductive and sensorineural hearing loss of both ears  We will set up with audiology  - AUDIOLOGY ADULT REFERRAL    2. Hyperlipidemia LDL goal <130  Check lipid levels, continue statin    3. Special screening for malignant neoplasms, colon  Set up colonoscopy for screening purposes  - GASTROENTEROLOGY ADULT REF PROCEDURE ONLY    4. Mild " episode of recurrent major depressive disorder (H)  Currently controlled depression.  Continue observation    5. HTN, goal below 140/90  Continue ACE inhibitor, blood pressure well controlled.                           FOLLOW UP   I have asked the patient to make an appointment for followup with me in 6 months or as needed        I have carefully explained the diagnosis and treatment options to the patient.  The patient has displayed an understanding of the above, and all subsequent questions were answered.      DO PATEL Steve    Portions of this note were produced using Dittit  Although every attempt at real-time proof reading has been made, occasional grammar/syntax errors may have been missed.

## 2019-02-01 ENCOUNTER — TELEPHONE (OUTPATIENT)
Dept: INTERNAL MEDICINE | Facility: CLINIC | Age: 59
End: 2019-02-01

## 2019-02-01 NOTE — TELEPHONE ENCOUNTER
Called to schedule colonoscopy. He will call back when he knows his schedule.  He would like a date in March.

## 2019-02-04 NOTE — TELEPHONE ENCOUNTER
Date of colonoscopy/EGD: 3/5  Surgeon: Dr. Ervin  Prep:Miralax  Packet:Colonoscopy/EGD instructions mailed to patient's home address.   Date: 2/4/2019      Surgery Scheduler

## 2019-02-04 NOTE — TELEPHONE ENCOUNTER
Left message for patient to return call to schedule EGD/colonoscopy. If Zoila or Jacquie are not available, please transfer to same day surgery

## 2019-02-12 ENCOUNTER — OFFICE VISIT (OUTPATIENT)
Dept: AUDIOLOGY | Facility: CLINIC | Age: 59
End: 2019-02-12
Payer: COMMERCIAL

## 2019-02-12 ENCOUNTER — ALLIED HEALTH/NURSE VISIT (OUTPATIENT)
Dept: FAMILY MEDICINE | Facility: CLINIC | Age: 59
End: 2019-02-12
Payer: COMMERCIAL

## 2019-02-12 DIAGNOSIS — H90.3 SENSORINEURAL HEARING LOSS, BILATERAL: Primary | ICD-10-CM

## 2019-02-12 DIAGNOSIS — H93.13 TINNITUS OF BOTH EARS: ICD-10-CM

## 2019-02-12 DIAGNOSIS — Z23 NEED FOR PROPHYLACTIC VACCINATION AND INOCULATION AGAINST INFLUENZA: Primary | ICD-10-CM

## 2019-02-12 PROCEDURE — 92550 TYMPANOMETRY & REFLEX THRESH: CPT | Performed by: AUDIOLOGIST

## 2019-02-12 PROCEDURE — 90471 IMMUNIZATION ADMIN: CPT

## 2019-02-12 PROCEDURE — 90682 RIV4 VACC RECOMBINANT DNA IM: CPT

## 2019-02-12 PROCEDURE — 99207 ZZC NO CHARGE LOS: CPT | Performed by: AUDIOLOGIST

## 2019-02-12 PROCEDURE — 92557 COMPREHENSIVE HEARING TEST: CPT | Performed by: AUDIOLOGIST

## 2019-02-12 NOTE — PROGRESS NOTES

## 2019-02-12 NOTE — PROGRESS NOTES
AUDIOLOGY REPORT    SUBJECTIVE:  Hugo Longoria is a 58 year old male who was seen in the Audiology Clinic at the [facility] for audiologic evaluation, referred by GABINO Gomez D.O. The patient reports a gradual decrease in hearing, worse in the last year, with long history of constant bilateral tinnitus.  Patient reported difficulty hearing conversation at home and at work, patient prefers increased TV volume versus other people watching.  Patient reported long history of recreational, firearm, and occupational noise exposure. He reported a family history of adult onset hearing loss.  The patient denies bilateral otalgia, bilateral drainage and bilateral aural fullness.  The patient notes difficulty with communication in a variety of listening situations.  They were accompanied today by their self.    OBJECTIVE:  Otoscopic exam indicates ears are clear of cerumen bilaterally     Pure Tone Thresholds assessed using conventional audiometry with good  reliability from 250-8000 Hz bilaterally using insert earphones     RIGHT:  mild, moderate and severe sensorineural hearing loss    LEFT:    mild, moderate and severe sensorineural hearing loss    Tympanogram:    RIGHT: normal eardrum mobility    LEFT:   normal eardrum mobility    Reflexes (reported by stimulus ear):  RIGHT: Ipsilateral is present at normal levels  RIGHT: Contralateral is present at normal levels  LEFT:   Ipsilateral is could not maintain seal to test  LEFT:   Contralateral is could not maintain seal to test      Speech Reception Threshold:    RIGHT: 20 dB HL    LEFT:   30 dB HL  Word Recognition Score:     RIGHT: 100% at 60 dB HL using NU-6 recorded word list.    LEFT:   84% at 70 dB HL using NU-6 recorded word list.    The Tinnitus Handicap Inventory is a self-report measure that can be used to quantify the impact of tinnitus on daily living.  The patient scored 44/100  indicating moderate (grade 3)  perceived impairment.      ASSESSMENT:     ICD-10-CM    1. Sensorineural hearing loss, bilateral H90.3 COMPREHENSIVE HEARING TEST     TYMPANOMETRY AND REFLEX THRESHOLD MEASUREMENTS   2. Tinnitus of both ears H93.13 COMPREHENSIVE HEARING TEST     TYMPANOMETRY AND REFLEX THRESHOLD MEASUREMENTS       Today s results were discussed with the patient in detail.     PLAN:  Patient was counseled regarding hearing loss and impact on communication.  Patient is a good candidate for amplification at this time.  Handout on good communication strategies, and hearing aid use was given to patient. It is recommended that the patient schedule a hearing aid consultation appointment at his convenience.  Please call this clinic with questions regarding these results or recommendations.        Agueda Blake.  [place] Licensed Audiologist #[id number]

## 2019-02-18 ENCOUNTER — OFFICE VISIT (OUTPATIENT)
Dept: AUDIOLOGY | Facility: CLINIC | Age: 59
End: 2019-02-18
Payer: COMMERCIAL

## 2019-02-18 DIAGNOSIS — H90.3 SENSORINEURAL HEARING LOSS, BILATERAL: Primary | ICD-10-CM

## 2019-02-18 PROCEDURE — 92591 HC HEARING AID EXAM BINAURAL: CPT | Performed by: AUDIOLOGIST

## 2019-02-18 PROCEDURE — 99207 ZZC NO CHARGE LOS: CPT | Performed by: AUDIOLOGIST

## 2019-02-18 NOTE — PROGRESS NOTES
AUDIOLOGY REPORT    SUBJECTIVE: Hugo Longoria is a 58 year old male was seen in the Audiology Clinic at  [facility] on 2/18/19 to discuss concerns with hearing and functional communication difficulties. The patient was accompanied by their self. Hugo has been seen previously on 2/12/2019, and results revealed a mild to moderate to severe gradually sloping sensorineural hearing loss with good word understanding bilaterally.  The patient was medically evaluated and determined to be cleared for binaural hearing aids by GABINO Gomez D.O. Hugo notes difficulty with communication in a variety of listening situations; including general conversation, TV, and conversation outdoors.    OBJECTIVE:  Patient is a hearing aid candidate. Patient would like to move forward with a hearing aid evaluation today. Therefore, the patient was presented with different options for amplification to help aid in communication. Discussed styles, levels of technology and monaural vs. binaural fitting.     The hearing aid(s) mutually chosen were:  Binaural: Unitron Stride M 500 BTE  COLOR: Espresso Boost  BATTERY SIZE: 312  EARMOLD/TIPS: Medium closed dome  CANAL/ LENGTH: 1 Slim Tube right and left    Otoscopy revealed ears are clear of cerumen bilaterally.     ASSESSMENT:   Bilateral Sensorineural hearing loss, H90.3.    Reviewed purchase information and warranty information with patient. The 45 day trial period was explained to patient. The patient was given a copy of the [place] [employer] consumer brochure on purchasing hearing instruments. Patient risk factors have been provided to the patient in writing prior to the sale of the hearing aid per FDA regulation. The risk factors are also available in the User Instructional Booklet to be presented on the day of the hearing aid fitting. Hearing aid(s) ordered. Hearing aid evaluation completed.    PLAN: Hugo is scheduled to return in 2-3 weeks  for a hearing aid fitting and programming. Purchase agreement will be completed on that date. Please contact this clinic with any questions or concerns.      Agueda Blake.  [place] Licensed Audiologist #[id number]

## 2019-03-05 ENCOUNTER — ANESTHESIA EVENT (OUTPATIENT)
Dept: GASTROENTEROLOGY | Facility: CLINIC | Age: 59
End: 2019-03-05
Payer: COMMERCIAL

## 2019-03-05 ENCOUNTER — ANESTHESIA (OUTPATIENT)
Dept: GASTROENTEROLOGY | Facility: CLINIC | Age: 59
End: 2019-03-05
Payer: COMMERCIAL

## 2019-03-05 ENCOUNTER — HOSPITAL ENCOUNTER (OUTPATIENT)
Facility: CLINIC | Age: 59
Discharge: HOME | End: 2019-03-05
Attending: SURGERY | Admitting: SURGERY
Payer: COMMERCIAL

## 2019-03-05 VITALS
SYSTOLIC BLOOD PRESSURE: 117 MMHG | DIASTOLIC BLOOD PRESSURE: 80 MMHG | TEMPERATURE: 98.2 F | OXYGEN SATURATION: 93 % | HEART RATE: 73 BPM | RESPIRATION RATE: 20 BRPM

## 2019-03-05 LAB — COLONOSCOPY: NORMAL

## 2019-03-05 PROCEDURE — 40000296 ZZH STATISTIC ENDO RECOVERY CLASS 1:2 FIRST HOUR: Performed by: SURGERY

## 2019-03-05 PROCEDURE — 25000125 ZZHC RX 250: Performed by: NURSE ANESTHETIST, CERTIFIED REGISTERED

## 2019-03-05 PROCEDURE — 37000008 ZZH ANESTHESIA TECHNICAL FEE, 1ST 30 MIN: Performed by: SURGERY

## 2019-03-05 PROCEDURE — 25800030 ZZH RX IP 258 OP 636: Performed by: NURSE ANESTHETIST, CERTIFIED REGISTERED

## 2019-03-05 PROCEDURE — 45378 DIAGNOSTIC COLONOSCOPY: CPT | Performed by: SURGERY

## 2019-03-05 PROCEDURE — G0105 COLORECTAL SCRN; HI RISK IND: HCPCS | Performed by: SURGERY

## 2019-03-05 PROCEDURE — 25000128 H RX IP 250 OP 636: Performed by: NURSE ANESTHETIST, CERTIFIED REGISTERED

## 2019-03-05 RX ORDER — LIDOCAINE 40 MG/G
CREAM TOPICAL
Status: DISCONTINUED | OUTPATIENT
Start: 2019-03-05 | End: 2019-03-05 | Stop reason: HOSPADM

## 2019-03-05 RX ORDER — PROPOFOL 10 MG/ML
INJECTION, EMULSION INTRAVENOUS CONTINUOUS PRN
Status: DISCONTINUED | OUTPATIENT
Start: 2019-03-05 | End: 2019-03-05

## 2019-03-05 RX ORDER — PROPOFOL 10 MG/ML
INJECTION, EMULSION INTRAVENOUS PRN
Status: DISCONTINUED | OUTPATIENT
Start: 2019-03-05 | End: 2019-03-05

## 2019-03-05 RX ORDER — ONDANSETRON 2 MG/ML
4 INJECTION INTRAMUSCULAR; INTRAVENOUS
Status: DISCONTINUED | OUTPATIENT
Start: 2019-03-05 | End: 2019-03-05 | Stop reason: HOSPADM

## 2019-03-05 RX ORDER — SODIUM CHLORIDE, SODIUM LACTATE, POTASSIUM CHLORIDE, CALCIUM CHLORIDE 600; 310; 30; 20 MG/100ML; MG/100ML; MG/100ML; MG/100ML
INJECTION, SOLUTION INTRAVENOUS CONTINUOUS
Status: DISCONTINUED | OUTPATIENT
Start: 2019-03-05 | End: 2019-03-05 | Stop reason: HOSPADM

## 2019-03-05 RX ORDER — LIDOCAINE HYDROCHLORIDE 20 MG/ML
INJECTION, SOLUTION INFILTRATION; PERINEURAL PRN
Status: DISCONTINUED | OUTPATIENT
Start: 2019-03-05 | End: 2019-03-05

## 2019-03-05 RX ADMIN — PROPOFOL 150 MCG/KG/MIN: 10 INJECTION, EMULSION INTRAVENOUS at 09:03

## 2019-03-05 RX ADMIN — SODIUM CHLORIDE, POTASSIUM CHLORIDE, SODIUM LACTATE AND CALCIUM CHLORIDE: 600; 310; 30; 20 INJECTION, SOLUTION INTRAVENOUS at 08:38

## 2019-03-05 RX ADMIN — LIDOCAINE HYDROCHLORIDE 40 MG: 20 INJECTION, SOLUTION INFILTRATION; PERINEURAL at 09:03

## 2019-03-05 RX ADMIN — LIDOCAINE HYDROCHLORIDE 1 ML: 10 INJECTION, SOLUTION EPIDURAL; INFILTRATION; INTRACAUDAL; PERINEURAL at 08:37

## 2019-03-05 RX ADMIN — PROPOFOL 70 MG: 10 INJECTION, EMULSION INTRAVENOUS at 09:03

## 2019-03-05 RX ADMIN — SODIUM CHLORIDE, POTASSIUM CHLORIDE, SODIUM LACTATE AND CALCIUM CHLORIDE: 600; 310; 30; 20 INJECTION, SOLUTION INTRAVENOUS at 08:55

## 2019-03-05 NOTE — ANESTHESIA CARE TRANSFER NOTE
Patient: Hugo Longoria    Procedure(s):  COLONOSCOPY    Diagnosis: screening  Diagnosis Additional Information: No value filed.    Anesthesia Type:   MAC     Note:  Airway :Face Mask  Patient transferred to:Phase II  Handoff Report: Identifed the Patient, Identified the Reponsible Provider, Reviewed the pertinent medical history, Discussed the surgical course, Reviewed Intra-OP anesthesia mangement and issues during anesthesia, Set expectations for post-procedure period and Allowed opportunity for questions and acknowledgement of understanding      Vitals: (Last set prior to Anesthesia Care Transfer)    CRNA VITALS  3/5/2019 0850 - 3/5/2019 0923      3/5/2019             SpO2:  96 %    Resp Rate (observed):  12                Electronically Signed By: DONN Johnson CRNA  March 5, 2019  9:23 AM

## 2019-03-05 NOTE — ANESTHESIA PREPROCEDURE EVALUATION
Anesthesia Evaluation     . Pt has had prior anesthetic. Type: General    No history of anesthetic complications          ROS/MED HX    ENT/Pulmonary:     (+)sleep apnea, doesn't use CPAP , . .    Neurologic:  - neg neurologic ROS     Cardiovascular:     (+) Dyslipidemia, hypertension----. : . . . :. . Previous cardiac testing date:results:date: results:ECG reviewed date:1/9/2017 results:NS date: results:          METS/Exercise Tolerance:  >4 METS   Hematologic: Comments: Transfusion due to gastric ulcer    (+) History of Transfusion -      Musculoskeletal:   (+) arthritis, , , other musculoskeletal- right shoulder      GI/Hepatic: Comment: Gastric ulcers - neg GI/hepatic ROS   (+) GERD Asymptomatic on medication, bowel prep, Other GI/Hepatic history pud      Renal/Genitourinary:  - ROS Renal section negative   (+) Pt has no history of transplant,       Endo:     (+) Obesity, .      Psychiatric:     (+) psychiatric history anxiety and depression      Infectious Disease:  - neg infectious disease ROS       Malignancy:      - no malignancy   Other:    - neg other ROS                 Physical Exam  Normal systems: cardiovascular, pulmonary and dental    Airway   Mallampati: II  TM distance: >3 FB  Neck ROM: full    Dental     Cardiovascular   Rhythm and rate: regular and normal      Pulmonary    breath sounds clear to auscultation                        Anesthesia Plan      History & Physical Review  History and physical reviewed and following examination; no interval change.    ASA Status:  2 .    NPO Status:  > 8 hours    Plan for MAC with Intravenous and Propofol induction. Maintenance will be TIVA.           Postoperative Care      Consents  Anesthetic plan, risks, benefits and alternatives discussed with:  Patient.  Use of blood products discussed: No .   .                          .

## 2019-03-05 NOTE — DISCHARGE INSTRUCTIONS
[facility]    Home Care Following Endoscopy          Activity:    You have just undergone an endoscopic procedure usually performed with conscious sedation.  Do not work or operate machinery (including a car) for at least 12 hours.      I encourage you to walk and attempt to pass this air as soon as possible.    Diet:    Return to the diet you were on before your procedure but eat lightly for the first 12-24 hours.    Drink plenty of water.    Resume any regular medications unless otherwise advised by your physician.  Please begin any new medication prescribed as a result of your procedure as directed by your physician.     If you had any biopsy or polyp removed please refrain from aspirin or aspirin products for 2 days.  If on Coumadin please restart as instructed by your physician.   Pain:    You may take Tylenol as needed for pain.  Expected Recovery:    You can expect some mild abdominal fullness and/or discomfort due to the air used to inflate your intestinal tract. It is also normal to have a mild sore throat after upper endoscopy.    Call Your Physician if You Have:    After Colonoscopy:  o Worsening persisting abdominal pain which is worse with activity.  o Fevers (>101 degrees F), chills or shakes.  o Passage of continued blood with bowel movements.   Any questions or concerns about your recovery, please call [phone] or after hours [phone] Nurse Advice Line.    Follow-up Care:    You should receive a call or letter with your results within 1 week. Please call if you have not received a notification of your results.  If asked to return to clinic please make an appointment 1 week after your procedure.  Call [phone].

## 2019-03-05 NOTE — H&P
Patient seen for Endoscopy    HPI:  Patient is a 58 year old male with personal history of polyps, chronic intermittent diarrhea and strong family history of colon cancer.. Not taking blood thinning medications. No MI or CVA history. No issues with previous sedation. No recent acute illness.    Review Of Systems    Skin: negative  Ears/Nose/Throat: negative  Respiratory: No shortness of breath, dyspnea on exertion, cough, or hemoptysis  Cardiovascular: negative  Gastrointestinal: negative  Genitourinary: negative  Musculoskeletal: negative  Neurologic: negative  Hematologic/Lymphatic/Immunologic: negative  Endocrine: negative      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)     Overnight stay due to blood poisoning     Gastric ulcer, unspecified as acute or chronic, without mention of hemorrhage or perforation        Past Surgical History:   Procedure Laterality Date     ARTHROSCOPY KNEE  5/29/2013    Procedure: ARTHROSCOPY KNEE;  Right knee arthroscopy with partial medial and partial lateral menisectomies;  Surgeon: Phani Mclaughlin MD;  Location:  OR     ARTHROSCOPY SHOULDER BICEPS TENODESIS REPAIR Right 1/13/2017    Procedure: ARTHROSCOPY SHOULDER BICEPS TENODESIS REPAIR;  Surgeon: Javier Yin DO;  Location:  OR     ARTHROSCOPY SHOULDER DECOMPRESSION Right 1/13/2017    Procedure: ARTHROSCOPY SHOULDER DECOMPRESSION;  Surgeon: Javier Yin DO;  Location:  OR     ARTHROSCOPY SHOULDER ROTATOR CUFF REPAIR Right 1/13/2017    Procedure: ARTHROSCOPY SHOULDER ROTATOR CUFF REPAIR;  Surgeon: Javier Yin DO;  Location:  OR     COLONOSCOPY  11/01/2007     COLONOSCOPY  12/12/11     COLONOSCOPY N/A 11/18/2015    Procedure: COLONOSCOPY;  Surgeon: Migue Mclaughlin MD;  Location:  GI     COLONOSCOPY N/A 3/22/2017     Procedure: COMBINED COLONOSCOPY, SINGLE OR MULTIPLE BIOPSY/POLYPECTOMY BY BIOPSY;  Surgeon: Salvatore Zepeda MD;  Location: [facility]     ENDOSCOPY  01/27/12    Upper GI - [facility]     ESOPHAGOSCOPY, GASTROSCOPY, DUODENOSCOPY (EGD), COMBINED N/A 11/18/2015    Procedure: COMBINED ESOPHAGOSCOPY, GASTROSCOPY, DUODENOSCOPY (EGD), BIOPSY SINGLE OR MULTIPLE;  Surgeon: Migue Mclaughlin MD;  Location: [facility]     ESOPHAGOSCOPY, GASTROSCOPY, DUODENOSCOPY (EGD), COMBINED N/A 3/22/2017    Procedure: COMBINED ESOPHAGOSCOPY, GASTROSCOPY, DUODENOSCOPY (EGD), BIOPSY SINGLE OR MULTIPLE;  Surgeon: Salvatore Zepeda MD;  Location:  GI     HC UGI ENDOSCOPY, SIMPLE EXAM  10/31/2007     HERNIORRHAPHY UMBILICAL N/A 3/12/2015    Procedure: HERNIORRHAPHY UMBILICAL;  Surgeon: Yared Ma MD;  Location: [facility] OR     IRRIGATION AND DEBRIDEMENT UPPER EXTREMITY, COMBINED Left 3/15/2016    Procedure: COMBINED IRRIGATION AND DEBRIDEMENT UPPER EXTREMITY;  Surgeon: Javier Yin DO;  Location: [facility]     REMOVAL OF SPERM DUCT(S)      Vasectomy       Family History   Problem Relation Age of Onset     Cancer Mother         ovarian cancer     Asthma Mother      Hypertension Mother      Arthritis Mother      Genitourinary Problems Mother      Lipids Mother      Cancer Sister         ovarian cancer     Hypertension Sister      Lipids Sister      Cancer Maternal Grandmother         stomach cancer     Asthma Father      Cardiovascular Father         heart attack; bypass x5, congenital heart disease     Heart Disease Father         heart attack; bypass x5, congenital heart disease     Diabetes Father      Hypertension Father      Cancer Father         prostate     Prostate Cancer Father      Circulatory Father         blood clots     Genitourinary Problems Father      Respiratory Father         emphysema; COPD     Diabetes Maternal Grandfather      Diabetes Paternal Grandfather      Eye Disorder Paternal Grandfather          glaucoma     Hypertension Brother      Lipids Brother      Hypertension Brother      Cardiovascular Brother         bypass x3     Heart Disease Brother         bypass x3     Lipids Brother      Hypertension Sister      C.A.D. No family hx of      Cerebrovascular Disease No family hx of      Breast Cancer No family hx of      Cancer - colorectal No family hx of      Alzheimer Disease No family hx of      Blood Disease No family hx of      Gastrointestinal Disease No family hx of      Musculoskeletal Disorder No family hx of      Neurologic Disorder No family hx of      Thyroid Disease No family hx of        Social History     Socioeconomic History     Marital status:      Spouse name: Not on file     Number of children: Not on file     Years of education: Not on file     Highest education level: Not on file   Occupational History     Employer: [employer]     Comment: [employer]   Social Needs     Financial resource strain: Not on file     Food insecurity:     Worry: Not on file     Inability: Not on file     Transportation needs:     Medical: Not on file     Non-medical: Not on file   Tobacco Use     Smoking status: Former Smoker     Last attempt to quit: 1981     Years since quittin.1     Smokeless tobacco: Never Used   Substance and Sexual Activity     Alcohol use: Yes     Alcohol/week: 0.0 oz     Comment: weekends     Drug use: No     Sexual activity: Yes     Partners: Female     Birth control/protection: Surgical     Comment: vasectomy   Lifestyle     Physical activity:     Days per week: Not on file     Minutes per session: Not on file     Stress: Not on file   Relationships     Social connections:     Talks on phone: Not on file     Gets together: Not on file     Attends Tenriism service: Not on file     Active member of club or organization: Not on file     Attends meetings of clubs or organizations: Not on file     Relationship status: Not on file     Intimate  partner violence:     Fear of current or ex partner: Not on file     Emotionally abused: Not on file     Physically abused: Not on file     Forced sexual activity: Not on file   Other Topics Concern     Parent/sibling w/ CABG, MI or angioplasty before 65F 55M? Not Asked   Social History Narrative     Not on file       No current outpatient medications on file.       Medications and history reviewed    Physical exam:  Vitals: BP (!) 138/93   Pulse 74   Temp 98.2  F (36.8  C) (Oral)   Resp 20   SpO2 93%   BMI= There is no height or weight on file to calculate BMI.    Constitutional: Healthy, alert, non-distressed   Head: Normo-cephalic, atraumatic, no lesions, masses or tenderness   Cardiovascular: RRR, no new murmurs, +S1, +S2 heart sounds, no clicks, rubs or gallops   Respiratory: CTAB, no rales, rhonchi or wheezing, equal chest rise, good respiratory effort   Gastrointestinal: Soft, non-tender, non distended, no rebound rigidity or guarding, no masses or hernias palpated   : Deferred  Musculoskeletal: Moves all extremities, normal  strength, no deformities noted   Skin: No suspicious lesions or rashes   Psychiatric: Mentation appears normal, affect appropriate   Hematologic/Lymphatic/Immunologic: Normal cervical and supraclavicular lymph nodes   Patient able to get up on table without difficulty.    Labs show:  No results found for this or any previous visit (from the past 24 hour(s)).    Assessment: Endoscopy  Plan: Pt cleared for anesthesia for proposed procedure.    Prakash Ervin,

## 2019-03-05 NOTE — ANESTHESIA POSTPROCEDURE EVALUATION
Patient: Hugo Longoria    Procedure(s):  COLONOSCOPY    Diagnosis:screening  Diagnosis Additional Information: No value filed.    Anesthesia Type:  MAC    Note:  Anesthesia Post Evaluation    Patient location during evaluation: Phase 2  Patient participation: Able to fully participate in evaluation  Level of consciousness: awake  Pain management: adequate  Airway patency: patent  Cardiovascular status: acceptable and hemodynamically stable  Respiratory status: acceptable, room air and nonlabored ventilation  Hydration status: stable  PONV: none     Anesthetic complications: None    Comments: Patient was happy with the anesthesia care received and no anesthesia related complications were noted.  I will follow up with the patient again if it is needed.        Last vitals:  Vitals:    03/05/19 0824   BP: (!) 138/93   Pulse: 74   Resp: 20   Temp: 98.2  F (36.8  C)   SpO2: 93%         Electronically Signed By: DONN Johnson CRNA  March 5, 2019  9:45 AM

## 2019-03-08 DIAGNOSIS — K29.50 CHRONIC GASTRITIS WITHOUT BLEEDING, UNSPECIFIED GASTRITIS TYPE: ICD-10-CM

## 2019-03-08 RX ORDER — LANSOPRAZOLE 30 MG/1
30 CAPSULE, DELAYED RELEASE ORAL DAILY
Qty: 30 CAPSULE | Refills: 1 | Status: SHIPPED | OUTPATIENT
Start: 2019-03-08 | End: 2019-06-10

## 2019-03-08 NOTE — TELEPHONE ENCOUNTER
Prescription approved per [facility] Refill Protocol.    RUTH ManzanaresN, RN  [facility]

## 2019-03-08 NOTE — TELEPHONE ENCOUNTER
"Requested Prescriptions   Pending Prescriptions Disp Refills     LANsoprazole (PREVACID) 30 MG DR capsule [Pharmacy Med Name: LANSOPRAZOLE DR 30MG CAP] 30 capsule 0    Last Written Prescription Date:  1/16/19  Last Fill Quantity: 30,  # refills: 0   Last office visit: 2/12/2019 with prescribing provider:  1/31/19   Future Office Visit:     Sig: TAKE 1 CAPSULE BY MOUTH ONCE DAILY **DUE  FOR  FOLLOW  UP**    PPI Protocol Passed - 3/8/2019 10:03 AM       Passed - Not on Clopidogrel (unless Pantoprazole ordered)       Passed - No diagnosis of osteoporosis on record       Passed - Recent (12 mo) or future (30 days) visit within the authorizing provider's specialty    Patient had office visit in the last 12 months or has a visit in the next 30 days with authorizing provider or within the authorizing provider's specialty.  See \"Patient Info\" tab in inbasket, or \"Choose Columns\" in Meds & Orders section of the refill encounter.             Passed - Medication is active on med list       Passed - Patient is age 18 or older        "

## 2019-03-12 ENCOUNTER — OFFICE VISIT (OUTPATIENT)
Dept: AUDIOLOGY | Facility: CLINIC | Age: 59
End: 2019-03-12
Payer: COMMERCIAL

## 2019-03-12 DIAGNOSIS — H90.3 SENSORINEURAL HEARING LOSS, BILATERAL: Primary | ICD-10-CM

## 2019-03-12 DIAGNOSIS — H90.3 BILATERAL SENSORINEURAL HEARING LOSS: ICD-10-CM

## 2019-03-12 PROCEDURE — 99207 ZZC NO CHARGE LOS: CPT | Performed by: AUDIOLOGIST

## 2019-03-12 PROCEDURE — V5020 CONFORMITY EVALUATION: HCPCS | Mod: RT | Performed by: AUDIOLOGIST

## 2019-03-12 PROCEDURE — V5011 HEARING AID FITTING/CHECKING: HCPCS | Mod: RT | Performed by: AUDIOLOGIST

## 2019-03-12 PROCEDURE — V5160 DISPENSING FEE BINAURAL: HCPCS | Performed by: AUDIOLOGIST

## 2019-03-12 PROCEDURE — V5261 HEARING AID, DIGIT, BIN, BTE: HCPCS | Performed by: AUDIOLOGIST

## 2019-03-12 PROCEDURE — 92593 HC HEARING AID CHECK, BINAURAL: CPT | Performed by: AUDIOLOGIST

## 2019-03-12 NOTE — PROGRESS NOTES
AUDIOLOGY REPORT    SUBJECTIVE: Hugo Longoria, a 58 year old male, was seen in the Audiology Clinic at [facility] today for a Binaural hearing aid fitting. Previous results have revealed a bilateral mild to moderate sensorineural hearing loss left ear worse than right. The patient was given medical clearance to pursue amplification by  FLAVIO Pratt MD.      OBJECTIVE:  Prior to fitting, a hearing aid check was performed to ensure device functionality. The hearing aid conformity evaluation was completed.The hearing aids were placed and they provided a good fit. Real-ear-probe-microphone measurements were completed on the P[employer]ems system and were a good match to NAL-NL2 target with soft sounds audible, moderate sounds comfortable, and loud sounds below discomfort. UCLs are verified through maximum power output measures and demonstrate appropriate limiting of loud inputs. Mr. Longoria was oriented to proper hearing aid use, care, cleaning (no water, dry brush), batteries (size 312, insertion/removal, toxicity, low-battery signal), aid insertion/removal, user booklet, warranty information, storage cases, and other hearing aid details. The patient confirmed understanding of hearing aid use and care, and showed proper insertion of hearing aid and batteries while in the office today. Mr. Longoria reported good volume and sound quality today.    EAR(S) FIT: Binaural  MA HEARING AID MAKE: Right: Unitron; Left: Unitron   MA HEARING AID MODEL #: Right: Tempus Stride M 500; Left: Tempus Stride M 500  HEARING AID STYLE: Right: BTE; Left: BTE  DOME SIZE: Right:  medium closed; Left::  medium closed   LENGTH: Right:  #1 Slim Tube; Left:  #1 Slim Tube    SERIAL NUMBERS: Right: [id number]; Left: [id number]  WARRANTY END DATE: Right: 3/19/2022; Left:: 3/19/2022      CHARGES:     Hearing Aid Check: Binaural, 48239, $81.00  Dispensing Fee: Binaural, , $500.00  Fit/Orientation: Binaural,  , $322.00  Hearing Aid Conformity Evaluation: , Qty:2; $174.00   Hearing Aid Digital: Binaural, BTE, , $1323.00      ASSESSMENT: Binaural Unitron Stride Tempus M 500 hearing aid fitting completed today. Verification measures were performed. The 45 day trial period was explained to patient, and they expressed understanding. Mr. Longoria signed the Hearing Aid Purchase Agreement and was given a copy, as well as details on his hearing aids. Patient was counseled that exact out of pocket amounts cannot be determined for hearing aid claims being sent to insurance. Any insurance coverage information presented to the patient is an estimate only, and is not a guarantee of payment. Patient has been advised to check with their own insurance.    PLAN: Mr. Longoria will return for follow-up in 2-3 weeks for a hearing aid review appointment. Please call this clinic with questions regarding today's appointment.    Amber Blake Licensed Audiologist #[id number]

## 2019-03-12 NOTE — PATIENT INSTRUCTIONS

## 2019-03-26 ENCOUNTER — OFFICE VISIT (OUTPATIENT)
Dept: AUDIOLOGY | Facility: CLINIC | Age: 59
End: 2019-03-26
Payer: COMMERCIAL

## 2019-03-26 DIAGNOSIS — H90.3 SENSORINEURAL HEARING LOSS, BILATERAL: Primary | ICD-10-CM

## 2019-03-26 PROCEDURE — V5299 HEARING SERVICE: HCPCS | Performed by: AUDIOLOGIST

## 2019-03-26 PROCEDURE — 99207 ZZC NO CHARGE LOS: CPT | Performed by: AUDIOLOGIST

## 2019-03-26 NOTE — PROGRESS NOTES
"AUDIOLOGY REPORT    SUBJECTIVE:  Hugo Longoria is a 58 year old male who was seen in the Audiology Clinic at the [facility] on 3/26/2019  for a follow-up check regarding the fitting of new hearing aids. Previous results have revealed mild to moderate sensorineural hearing loss left ear worse than right ear.  The patient has been seen previously in this clinic and was fit with binaural Unitron Stride M 500 BTE hearing aids with slim tubes on 3/12/2019.  Hugo reports \"help me hear\" and \"I needed them\", definitely still adjusting to sounds like pens dropping on hard surfaces and foot steps.  He reported he can listen to the TV at his wife's preferred volume and not need to increase the volume. He reported he feels the hearing aids especially at the end of the day right more than left.  He also reported a couple of episodes of dizziness and wasn't sure if it was related to the hearing aids or something else.  He requested the devices be turned up to prescriptive targets so he could adjust in fewer visits.    OBJECTIVE:  Based on patient report, the following changes were made; increased from adaptation manager 56% 100% (match NAL-NL1 prescriptive targets at the fitting 3/12/2019) also shortened the slim tube by removing one flange and reducing the length of the retention lock 3/8\".    The Tinnitus Handicap Inventory is a self-report measure that can be used to quantify the impact of tinnitus on daily living.  The patient scored 44/100  indicating moderate (grade 3) perceived impairment before fitting hearing aids, and 0/100 indicating slight or no impairment/handicap (grade 1).    The International Outcome Inventory-Hearing Aids (IOI-HA) was administered today.  It is a questionnaire designed to be generally applicable in evaluating the effectiveness of hearing aid treatments.  The patient s responses to the 7 questions can be compared to normative data relative to how others are " performing with their hearing aids, as well as focusing audiologic care and counseling.  This patient scored 26 out of 35 possible.  The patient s average per question score was 4.9 which indicates an above average outcome (average per question score is 3.5).  This patient s Quality of Life score (Question 7) was 5, which is above average  The patient likely answered question 5 inaccurately, but this data was recorded in the portal and described above, but if the question were answered in line with the other responses he would have scored 34 out of 35 for an average per question score of 4.9 which is also above average.    Reviewed 45 day trial period, care, cleaning (no water, dry brush), batteries (size 312) insertion/removal, toxicity, low-battery signal), aid insertion/removal, volume adjustment (if applicable), user booklet, warranty information, storage cases, and other hearing aid details.     ASSESSMENT:  A follow-up appointment for hearing aid fitting was completed today. Changes to hearing aid was completed as outlined above.     PLAN:  Discussed dizziness and it is unlikely caused by the hearing aids, but asked him to monitor (he reported it was getting better he also reported he has some resolving middle ear congestion after flying).  If dizziness persists or worsens will recommend ENT consult.  Hugo will return for follow-up in 6-8 weeks. Please call this clinic with any questions regarding today s appointment.    Agueda Blake.  [place] Licensed Audiologist #[id number]

## 2019-04-04 DIAGNOSIS — Z13.6 CARDIOVASCULAR SCREENING; LDL GOAL LESS THAN 130: ICD-10-CM

## 2019-04-05 RX ORDER — ATORVASTATIN CALCIUM 20 MG/1
TABLET, FILM COATED ORAL
Qty: 90 TABLET | Refills: 0 | Status: SHIPPED | OUTPATIENT
Start: 2019-04-05 | End: 2019-11-12

## 2019-04-05 NOTE — TELEPHONE ENCOUNTER
"Requested Prescriptions   Pending Prescriptions Disp Refills     atorvastatin (LIPITOR) 20 MG tablet [Pharmacy Med Name: ATORVASTATIN 20MG   TAB] 90 tablet 0    Last Written Prescription Date:  7/24/18  Last Fill Quantity: 90,  # refills: 0   Last office visit: 2/12/2019 with prescribing provider:  1/31/19   Future Office Visit:   Next 5 appointments (look out 90 days)    May 10, 2019  4:30 PM CDT  [facility] with Dana Villalba  [facility] [address] [place] [address] [id number] [phone]        Sig: TAKE 1 TABLET BY MOUTH ONCE DAILY    Statins Protocol Failed - 4/4/2019  1:14 PM       Failed - LDL on file in past 12 months    Recent Labs   Lab Test 01/24/18  0926   *            Passed - No abnormal creatine kinase in past 12 months    No lab results found.            Passed - Recent (12 mo) or future (30 days) visit within the authorizing provider's specialty    Patient had office visit in the last 12 months or has a visit in the next 30 days with authorizing provider or within the authorizing provider's specialty.  See \"Patient Info\" tab in inbasket, or \"Choose Columns\" in Meds & Orders section of the refill encounter.             Passed - Medication is active on med list       Passed - Patient is age 18 or older        "

## 2019-06-10 DIAGNOSIS — K29.50 CHRONIC GASTRITIS WITHOUT BLEEDING, UNSPECIFIED GASTRITIS TYPE: ICD-10-CM

## 2019-06-11 RX ORDER — LANSOPRAZOLE 30 MG/1
CAPSULE, DELAYED RELEASE ORAL
Qty: 90 CAPSULE | Refills: 0 | Status: SHIPPED | OUTPATIENT
Start: 2019-06-11 | End: 2019-11-12

## 2019-06-11 NOTE — TELEPHONE ENCOUNTER
"Requested Prescriptions   Pending Prescriptions Disp Refills     LANsoprazole (PREVACID) 30 MG DR capsule [Pharmacy Med Name: LANSOPRAZOLE DR 30MG CAP] 30 capsule 1     Sig: TAKE 1 CAPSULE BY MOUTH ONCE DAILY   Last Written Prescription Date:  3/8/19  Last Fill Quantity: 30,  # refills: 1   Last office visit: 2/12/2019 with prescribing provider:  1/31/19   Future Office Visit:        PPI Protocol Passed - 6/10/2019 11:31 AM        Passed - Not on Clopidogrel (unless Pantoprazole ordered)        Passed - No diagnosis of osteoporosis on record        Passed - Recent (12 mo) or future (30 days) visit within the authorizing provider's specialty     Patient had office visit in the last 12 months or has a visit in the next 30 days with authorizing provider or within the authorizing provider's specialty.  See \"Patient Info\" tab in inbasket, or \"Choose Columns\" in Meds & Orders section of the refill encounter.              Passed - Medication is active on med list        Passed - Patient is age 18 or older        "

## 2019-06-11 NOTE — TELEPHONE ENCOUNTER
Prescription approved per [facility] Refill Protocol.    RUTH ManzanaresN, RN  [facility]

## 2019-08-12 DIAGNOSIS — I10 HTN, GOAL BELOW 140/90: ICD-10-CM

## 2019-08-13 RX ORDER — LISINOPRIL 20 MG/1
TABLET ORAL
Qty: 45 TABLET | Refills: 0 | Status: SHIPPED | OUTPATIENT
Start: 2019-08-13 | End: 2019-11-12

## 2019-08-13 NOTE — TELEPHONE ENCOUNTER
Routing refill request to provider for review/approval because:  Labs not current:  Potassium, Creatinine    NAHUM Manzanares, RN  [facility]

## 2019-08-13 NOTE — TELEPHONE ENCOUNTER
"Requested Prescriptions   Pending Prescriptions Disp Refills     lisinopril (PRINIVIL/ZESTRIL) 20 MG tablet [Pharmacy Med Name: LISINOPRIL 20MG     TAB] 45 tablet 3     Sig: TAKE 1/2 (ONE-HALF) TABLET BY MOUTH ONCE DAILY   Last Written Prescription Date:  5/11/18  Last Fill Quantity: 45,  # refills: 3   Last office visit: 2/12/2019 with prescribing provider:  1/31/19   Future Office Visit:        ACE Inhibitors (Including Combos) Protocol Failed - 8/12/2019  9:13 AM        Failed - Normal serum creatinine on file in past 12 months     Recent Labs   Lab Test 01/09/17  0856   CR 0.78             Failed - Normal serum potassium on file in past 12 months     Recent Labs   Lab Test 01/09/17  0856   POTASSIUM 4.4             Passed - Blood pressure under 140/90 in past 12 months     BP Readings from Last 3 Encounters:   03/05/19 117/80   01/31/19 118/64   01/30/19 112/60                 Passed - Recent (12 mo) or future (30 days) visit within the authorizing provider's specialty     Patient had office visit in the last 12 months or has a visit in the next 30 days with authorizing provider or within the authorizing provider's specialty.  See \"Patient Info\" tab in inbasket, or \"Choose Columns\" in Meds & Orders section of the refill encounter.              Passed - Medication is active on med list        Passed - Patient is age 18 or older        "

## 2019-08-22 ENCOUNTER — OFFICE VISIT (OUTPATIENT)
Dept: ORTHOPEDICS | Facility: CLINIC | Age: 59
End: 2019-08-22
Payer: COMMERCIAL

## 2019-08-22 VITALS
SYSTOLIC BLOOD PRESSURE: 114 MMHG | HEART RATE: 84 BPM | HEIGHT: 69 IN | TEMPERATURE: 98.6 F | OXYGEN SATURATION: 96 % | RESPIRATION RATE: 20 BRPM | BODY MASS INDEX: 33.62 KG/M2 | WEIGHT: 227 LBS | DIASTOLIC BLOOD PRESSURE: 75 MMHG

## 2019-08-22 DIAGNOSIS — M17.11 PRIMARY OSTEOARTHRITIS OF RIGHT KNEE: Primary | ICD-10-CM

## 2019-08-22 PROCEDURE — 20610 DRAIN/INJ JOINT/BURSA W/O US: CPT | Mod: RT | Performed by: ORTHOPAEDIC SURGERY

## 2019-08-22 RX ORDER — METHYLPREDNISOLONE ACETATE 80 MG/ML
80 INJECTION, SUSPENSION INTRA-ARTICULAR; INTRALESIONAL; INTRAMUSCULAR; SOFT TISSUE ONCE
Qty: 1 ML | Refills: 0 | Status: ON HOLD | OUTPATIENT
Start: 2019-08-22 | End: 2019-12-10

## 2019-08-22 ASSESSMENT — PAIN SCALES - GENERAL: PAINLEVEL: WORST PAIN (10)

## 2019-08-22 ASSESSMENT — MIFFLIN-ST. JEOR: SCORE: 1845.05

## 2019-08-22 NOTE — PROGRESS NOTES
Hugo BENÍTEZ Swati has been seen previously for right knee osteoarthritis.  He had arthroscopy about 8 years ago.  He has x-ray showing bone-on-bone on Vidal and lateral views.  He does well with steroid injection.  Last injection 1/30/19.  He has also had Synvisc in past.    He  desires injection today of right knee(s).  Risks, benefits, potential complications and alternatives were discussed.   With the patient's consent, sterile prep was performed of right knee(s).  Right knee was injected with Depo Medrol 80 mg and lidocaine at anteromedial site.  Return to clinic as needed.

## 2019-08-22 NOTE — LETTER
8/22/2019         RE: Hugo Longoria  [address] [facility] [address]        Dear Colleague,    Thank you for referring your patient, Hugo Longoria, to the [facility]. Please see a copy of my visit note below.    The patient's right knee was prepped with betadine solution after verification of allergies. Area approximately 10 cm x 10 cm prepped in a sterile fashion. After injection, betadine removed with soap and water and band-aids applied.    1ml depo medrol with 1% lidocaine plain injected into patient's right knee by Dr. Lonnie Parada  LOT# [id number]  Exp. 1/20      Hugo Longoria has been seen previously for right knee osteoarthritis.  He had arthroscopy about 8 years ago.  He has x-ray showing bone-on-bone on Vidal and lateral views.  He does well with steroid injection.  Last injection 1/30/19.  He has also had Synvisc in past.    He  desires injection today of right knee(s).  Risks, benefits, potential complications and alternatives were discussed.   With the patient's consent, sterile prep was performed of right knee(s).  Right knee was injected with Depo Medrol 80 mg and lidocaine at anteromedial site.  Return to clinic as needed.      Again, thank you for allowing me to participate in the care of your patient.        Sincerely,        Lonnie Parada MD

## 2019-08-22 NOTE — PROGRESS NOTES
The patient's right knee was prepped with betadine solution after verification of allergies. Area approximately 10 cm x 10 cm prepped in a sterile fashion. After injection, betadine removed with soap and water and band-aids applied.    1ml depo medrol with 1% lidocaine plain injected into patient's right knee by Dr. Lonnie Parada  LOT# [id number]  Exp. 1/20

## 2019-09-05 ENCOUNTER — ANCILLARY PROCEDURE (OUTPATIENT)
Dept: GENERAL RADIOLOGY | Facility: OTHER | Age: 59
End: 2019-09-05
Attending: ORTHOPAEDIC SURGERY
Payer: COMMERCIAL

## 2019-09-05 ENCOUNTER — OFFICE VISIT (OUTPATIENT)
Dept: ORTHOPEDICS | Facility: OTHER | Age: 59
End: 2019-09-05
Payer: COMMERCIAL

## 2019-09-05 VITALS
SYSTOLIC BLOOD PRESSURE: 102 MMHG | DIASTOLIC BLOOD PRESSURE: 60 MMHG | WEIGHT: 203 LBS | BODY MASS INDEX: 30.07 KG/M2 | HEIGHT: 69 IN

## 2019-09-05 DIAGNOSIS — M17.11 PRIMARY OSTEOARTHRITIS OF RIGHT KNEE: Primary | ICD-10-CM

## 2019-09-05 DIAGNOSIS — M17.11 PRIMARY OSTEOARTHRITIS OF RIGHT KNEE: ICD-10-CM

## 2019-09-05 PROCEDURE — 73562 X-RAY EXAM OF KNEE 3: CPT | Mod: RT

## 2019-09-05 PROCEDURE — 99214 OFFICE O/P EST MOD 30 MIN: CPT | Performed by: ORTHOPAEDIC SURGERY

## 2019-09-05 ASSESSMENT — PAIN SCALES - GENERAL: PAINLEVEL: SEVERE PAIN (7)

## 2019-09-05 ASSESSMENT — MIFFLIN-ST. JEOR: SCORE: 1736.18

## 2019-09-05 NOTE — LETTER
9/5/2019         RE: Hugo Longoria  [address] [facility] [address] [facility] [address]        Dear Colleague,    Thank you for referring your patient, Hugo Longoria, to the [facility]. Please see a copy of my visit note below.    Office Visit-Follow up    Chief Complaint: Hugo Longoria is a 58 year old male who is being seen for   Chief Complaint   Patient presents with     RECHECK     right knee primary osteoarthritis      RECHECK     Right shoulder pain without weakness     Surgical Followup     dos: 1/13/17~Right shoulder arthroscopy with arthroscopic rotator cuff repair of the supraspinatus (double row) and subscapularis, biceps tenodesis, subacromial decompression with partial acromioplasty~2 years and 9 months POSTOP       History of Present Illness:   Today's visit:  Returns for continued right anterior medial knee pain.  Knee gives out on him.  It wakes him at night.  It caused him limp.  Knee will lock at times.  Recently had a steroid injection by Dr. Parada on August 22, 2019.  Rates the pain is moderate to severe.  Describes a sharp and achy.    He has attempted rest, activity modification, intra-articular steroid injections, Synvisc, Tylenol, and ibuprofen.  January 30, 2019 visit:  Returns for 2 reasons: #1 right knee: states the last injection helped quite a bit and still working somewhat but noticing pain is starting to come back and wanted another injection before it got severe.  No new injuries, symptoms.  Recently [place] [facility], states recovering well from this.      #2: had a RCR with bicep tendonesis January of 2017. States over the last about year had some aching to the shoulder, anterior shoulder. Denies weakness, numbness, or new injury. States has full range of motion to the shoulder, does not feel anything like initial shoulder injury.  Mild aching.  Has not been doing exercises for his shoulder.    9/19/18  Returns to discuss right knee achiness. Same pain as  "before.  Last injection provided good relief for months.  No new injuries, worse with activity, better with rest.  1/11/18 visit:  Turns for some right knee achiness.  Worse with activity.  Better with rest.  He is requesting an injection.  March 13, 2017 visit:   complains of right medial knee pain. he reports tearing is anterior cruciate ligament many years ago   he had an arthroscopy with a \"cleaning the cartilage out\". He's had previous steroid injections which have been helpful. He is also undergone Synvisc injections.      REVIEW OF SYSTEMS  General: negative for, night sweats, dizziness, fatigue  Resp: No shortness of breath and no cough  CV: negative for chest pain, syncope or near-syncope  GI: negative for nausea, vomiting and diarrhea  : negative for dysuria and hematuria  Musculoskeletal: as above  Neurologic: negative for syncope   Hematologic: negative for bleeding disorder    Physical Exam:  Vitals: /60   Ht 1.753 m (5' 9\")   Wt 92.1 kg (203 lb)   BMI 29.98 kg/m     BMI= Body mass index is 29.98 kg/m .  Constitutional: healthy, alert and no acute distress   Psychiatric: mentation appears normal and affect normal/bright  NEURO: no focal deficits  RESP: Normal with easy respirations and no use of accessory muscles to breathe, no audible wheezing or retractions  CV: RLE: no edema         Regular rate and rhythm by palpation  SKIN: No erythema, rashes, excoriation, or breakdown. No evidence of infection.   JOINT/EXTREMITIES:right knee: Moderate effusion.  Tenderness along the medial joint line.  Pseudolaxity medial valgus stressing.  Collateral ligaments are intact.  Positive Lockman.  Good quad tone.  GAIT: antalgic            Diagnostic Modalities:  right knee X-ray: varus alignment medial compartment:  with bone on bone wear lateral compartment:  with minimal joint space narrowing patellofemoral compartment:  with minimal joint space narrowing  Independent visualization of the images was " performed.      Impression: right knee primary osteoarthritis     Plan:  All of the above pertinent physical exam and imaging modalities findings was reviewed with Hugo.                                           ELECTIVE SURGERY:  The patient has attempted conservative treatments that include NSAIDs, Tylenol, viscosupplementation injections, steroid injections, activity modifications, rest yet still continues to have significant issues. These issues include pain at rest, increased pain with activity, pain that wakes at night, gait disturbances. Secondary to these reasons I have offered surgery in the form of a right total knee arthroplasty.    Risks, benefits, complications, alternatives, limitations, and post operative course were discussed. Risks including infection (requiring long-term antibiotics and repeat surgeries), implant loosening (requiring revision surgery), heart attacks, strokes, bleeding (possibly requiring blood transfusion), scars, instability, numbness around the knee, stiffness (requiring manipulations or repeat surgeries), instability and dislocations, fractures, blood clots the legs and lungs, nerve injury (possibly leading to foot drop).  Postoperative course was discussed as far as limitations and expected recovery times. It was also discussed that after surgery there is a need for blood thinners to prevent blood clots, but even when being treated it is possible to develop a blood clot. Anticipate being hospitalized 1-3 days and subsequently either discharged home or to a rehabilitation facility. Determinations of this will be based on progress with physical therapy while in the hospital. The importance of post-operative physical therapy was discussed. If discharge home from the hospital expect [place] home physical therapy for about 2 weeks and then transition to going to a physical therapy location for more aggressive therapy. Without physical therapy, outcomes may not be optimal. All  questions were answered. The patient agrees to proceed with surgery.  Past medical and surgical history was reviewed. It is positive for ulcers, HTN. Mr. Longoria will need a pre-operative medical evaluation and clearance by a primary care provider. We will obtain a CBC, UA, nasal swab for MRSA as well as the appropriate radiographs prior to surgery. I will leave it to the discretion of the primary care provider for additional pre-operative testing.       Will see one week before surgery. Surgery will need to be after 11/22/19 for 3 months after last steroid injection.   Return to clinic 14 days post-operatively. , or sooner as needed for changes.  Re-x-ray on return: Yes.    Gerber Yin D.O.          Again, thank you for allowing me to participate in the care of your patient.        Sincerely,        Javier Yin, DO

## 2019-09-05 NOTE — PROGRESS NOTES
Office Visit-Follow up    Chief Complaint: Hugo Longoria is a 58 year old male who is being seen for   Chief Complaint   Patient presents with     RECHECK     right knee primary osteoarthritis      RECHECK     Right shoulder pain without weakness     Surgical Followup     dos: 1/13/17~Right shoulder arthroscopy with arthroscopic rotator cuff repair of the supraspinatus (double row) and subscapularis, biceps tenodesis, subacromial decompression with partial acromioplasty~2 years and 9 months POSTOP       History of Present Illness:   Today's visit:  Returns for continued right anterior medial knee pain.  Knee gives out on him.  It wakes him at night.  It caused him limp.  Knee will lock at times.  Recently had a steroid injection by Dr. Parada on August 22, 2019.  Rates the pain is moderate to severe.  Describes a sharp and achy.    He has attempted rest, activity modification, intra-articular steroid injections, Synvisc, Tylenol, and ibuprofen.  January 30, 2019 visit:  Returns for 2 reasons: #1 right knee: states the last injection helped quite a bit and still working somewhat but noticing pain is starting to come back and wanted another injection before it got severe.  No new injuries, symptoms.  Recently hospitalized from [facility], states recovering well from this.      #2: had a RCR with bicep tendonesis January of 2017. States over the last about year had some aching to the shoulder, anterior shoulder. Denies weakness, numbness, or new injury. States has full range of motion to the shoulder, does not feel anything like initial shoulder injury.  Mild aching.  Has not been doing exercises for his shoulder.    9/19/18  Returns to discuss right knee achiness. Same pain as before.  Last injection provided good relief for months.  No new injuries, worse with activity, better with rest.  1/11/18 visit:  Turns for some right knee achiness.  Worse with activity.  Better with rest.  He is requesting an injection.  March  "13, 2017 visit:   complains of right medial knee pain. he reports tearing is anterior cruciate ligament many years ago   he had an arthroscopy with a \"cleaning the cartilage out\". He's had previous steroid injections which have been helpful. He is also undergone Synvisc injections.      REVIEW OF SYSTEMS  General: negative for, night sweats, dizziness, fatigue  Resp: No shortness of breath and no cough  CV: negative for chest pain, syncope or near-syncope  GI: negative for nausea, vomiting and diarrhea  : negative for dysuria and hematuria  Musculoskeletal: as above  Neurologic: negative for syncope   Hematologic: negative for bleeding disorder    Physical Exam:  Vitals: /60   Ht 1.753 m (5' 9\")   Wt 92.1 kg (203 lb)   BMI 29.98 kg/m    BMI= Body mass index is 29.98 kg/m .  Constitutional: healthy, alert and no acute distress   Psychiatric: mentation appears normal and affect normal/bright  NEURO: no focal deficits  RESP: Normal with easy respirations and no use of accessory muscles to breathe, no audible wheezing or retractions  CV: RLE: no edema         Regular rate and rhythm by palpation  SKIN: No erythema, rashes, excoriation, or breakdown. No evidence of infection.   JOINT/EXTREMITIES:right knee: Moderate effusion.  Tenderness along the medial joint line.  Pseudolaxity medial valgus stressing.  Collateral ligaments are intact.  Positive Lockman.  Good quad tone.  GAIT: antalgic            Diagnostic Modalities:  right knee X-ray: varus alignment medial compartment:  with bone on bone wear lateral compartment:  with minimal joint space narrowing patellofemoral compartment:  with minimal joint space narrowing  Independent visualization of the images was performed.      Impression: right knee primary osteoarthritis     Plan:  All of the above pertinent physical exam and imaging modalities findings was reviewed with Hugo.                                           ELECTIVE SURGERY:  The patient has " attempted conservative treatments that include NSAIDs, Tylenol, viscosupplementation injections, steroid injections, activity modifications, rest yet still continues to have significant issues. These issues include pain at rest, increased pain with activity, pain that wakes at night, gait disturbances. Secondary to these reasons I have offered surgery in the form of a right total knee arthroplasty.    Risks, benefits, complications, alternatives, limitations, and post operative course were discussed. Risks including infection (requiring long-term antibiotics and repeat surgeries), implant loosening (requiring revision surgery), heart attacks, strokes, bleeding (possibly requiring blood transfusion), scars, instability, numbness around the knee, stiffness (requiring manipulations or repeat surgeries), instability and dislocations, fractures, blood clots the legs and lungs, nerve injury (possibly leading to foot drop).  Postoperative course was discussed as far as limitations and expected recovery times. It was also discussed that after surgery there is a need for blood thinners to prevent blood clots, but even when being treated it is possible to develop a blood clot. Anticipate being hospitalized 1-3 days and subsequently either discharged home or to a rehabilitation facility. Determinations of this will be based on progress with physical therapy while in the hospital. The importance of post-operative physical therapy was discussed. If discharge home from the hospital expect [facility] home physical therapy for about 2 weeks and then transition to going to a physical therapy location for more aggressive therapy. Without physical therapy, outcomes may not be optimal. All questions were answered. The patient agrees to proceed with surgery.  Past medical and surgical history was reviewed. It is positive for ulcers, HTN. Mr. Longoria will need a pre-operative medical evaluation and clearance by a primary care provider.  We will obtain a CBC, UA, nasal swab for MRSA as well as the appropriate radiographs prior to surgery. I will leave it to the discretion of the primary care provider for additional pre-operative testing.       Will see one week before surgery. Surgery will need to be after 11/22/19 for 3 months after last steroid injection.   Return to clinic 14 days post-operatively. , or sooner as needed for changes.  Re-x-ray on return: Yes.    Gerber Yin D.O.

## 2019-09-09 ENCOUNTER — TELEPHONE (OUTPATIENT)
Dept: ORTHOPEDICS | Facility: OTHER | Age: 59
End: 2019-09-09

## 2019-09-09 NOTE — TELEPHONE ENCOUNTER
Type of surgery: Right Total Knee Replacement  Location of surgery: [facility] OR  Date and time of surgery: 12/10/19  Surgeon: Dr Yin   Pre-Op Appt Date: 11/20/19 w/Jhony Alvarado  Post-Op Appt Date: 12/20/19 w/Dr Yin   Packet sent out: Yes  Pre-cert/Authorization completed:  Yes  Date: 09/09/19

## 2019-11-12 DIAGNOSIS — I10 HTN, GOAL BELOW 140/90: ICD-10-CM

## 2019-11-12 DIAGNOSIS — Z13.6 CARDIOVASCULAR SCREENING; LDL GOAL LESS THAN 130: ICD-10-CM

## 2019-11-12 DIAGNOSIS — K29.50 CHRONIC GASTRITIS WITHOUT BLEEDING, UNSPECIFIED GASTRITIS TYPE: ICD-10-CM

## 2019-11-12 RX ORDER — LANSOPRAZOLE 30 MG/1
CAPSULE, DELAYED RELEASE ORAL
Qty: 90 CAPSULE | Refills: 0 | Status: SHIPPED | OUTPATIENT
Start: 2019-11-12 | End: 2020-02-28

## 2019-11-12 RX ORDER — ATORVASTATIN CALCIUM 20 MG/1
TABLET, FILM COATED ORAL
Qty: 90 TABLET | Refills: 0 | Status: SHIPPED | OUTPATIENT
Start: 2019-11-12 | End: 2020-03-16

## 2019-11-12 RX ORDER — LISINOPRIL 20 MG/1
TABLET ORAL
Qty: 45 TABLET | Refills: 0 | Status: SHIPPED | OUTPATIENT
Start: 2019-11-12 | End: 2020-02-28

## 2019-11-12 NOTE — TELEPHONE ENCOUNTER
Routing refill request to provider for review/approval because:  Labs out of range:  LDL  Labs not current:  LDL    RUTH ManzanaresN, RN  [facility]

## 2019-11-12 NOTE — TELEPHONE ENCOUNTER
"Requested Prescriptions   Pending Prescriptions Disp Refills     lisinopril (PRINIVIL/ZESTRIL) 20 MG tablet [Pharmacy Med Name: LISINOPRIL 20MG     TAB] 45 tablet 0     Sig: TAKE 1/2 (ONE-HALF) TABLET BY MOUTH ONCE DAILY   Last Written Prescription Date:  8/13/19  Last Fill Quantity: 45,  # refills: 0   Last office visit: 2/12/2019 with prescribing provider:  1/31/19   Future Office Visit:   Next 5 appointments (look out 90 days)    Nov 20, 2019  4:15 PM CST  Pre-[facility] Atul [facility]ado [facility] [address] [address]  [phone]   Dec 05, 2019  2:40 PM CST  Return Visit with Javier Yin DO  [facility] [address] [address]  [phone]   Dec 19, 2019  4:10 PM CST  Return Visit with Javier Yin DO  [facility] [address] [address]  [phone]           ACE Inhibitors (Including Combos) Protocol Failed - 11/12/2019  9:13 AM        Failed - Normal serum creatinine on file in past 12 months     Recent Labs   Lab Test 01/09/17  0856   CR 0.78             Failed - Normal serum potassium on file in past 12 months     Recent Labs   Lab Test 01/09/17  0856   POTASSIUM 4.4             Passed - Blood pressure under 140/90 in past 12 months     BP Readings from Last 3 Encounters:   09/05/19 102/60   08/22/19 114/75   03/05/19 117/80                 Passed - Recent (12 mo) or future (30 days) visit within the authorizing provider's specialty     Patient has had an office visit with the authorizing provider or a provider within the authorizing providers department within the previous 12 mos or has a future within next 30 days. See \"Patient Info\" tab in inbasket, or \"Choose Columns\" in Meds & Orders section of the refill encounter.              Passed - " "Medication is active on med list        Passed - Patient is age 18 or older        LANsoprazole (PREVACID) 30 MG DR capsule [Pharmacy Med Name: LANSOPRAZOLE DR 30MG CAP] 90 capsule 0     Sig: TAKE 1 CAPSULE BY MOUTH ONCE DAILY   Last Written Prescription Date:  6/11/19  Last Fill Quantity: 90,  # refills: 0   Last office visit: 2/12/2019 with prescribing provider:  1/31/19   Future Office Visit:   Next 5 appointments (look out 90 days)    Nov 20, 2019  4:15 PM CST  Pre-Op physical with Atul Alvarado PA-C  [facility] [address] [place] [phone]  [phone]   Dec 05, 2019  2:40 PM CST  Return Visit with Javier Yin DO  [facility] [address] [place] [address]  [phone]   Dec 19, 2019  4:10 PM CST  Return Visit with Javier Yin DO  [facility] [address] [place] [address]  [phone]             PPI Protocol Passed - 11/12/2019  9:13 AM        Passed - Not on Clopidogrel (unless Pantoprazole ordered)        Passed - No diagnosis of osteoporosis on record        Passed - Recent (12 mo) or future (30 days) visit within the authorizing provider's specialty     Patient has had an office visit with the authorizing provider or a provider within the authorizing providers department within the previous 12 mos or has a future within next 30 days. See \"Patient Info\" tab in inbasket, or \"Choose Columns\" in Meds & Orders section of the refill encounter.              Passed - Medication is active on med list        Passed - Patient is age 18 or older        "

## 2019-11-12 NOTE — TELEPHONE ENCOUNTER
"Requested Prescriptions   Pending Prescriptions Disp Refills     atorvastatin (LIPITOR) 20 MG tablet [Pharmacy Med Name: ATORVASTATIN 20MG   TAB] 90 tablet 0     Sig: TAKE 1 TABLET BY MOUTH ONCE DAILY   Last Written Prescription Date:  4/5/19  Last Fill Quantity: 90,  # refills: 0   Last office visit: 2/12/2019 with prescribing provider:  1/31/19   Future Office Visit:   Next 5 appointments (look out 90 days)    Nov 20, 2019  4:15 PM CST  Pre-Op physical with Atul Alvarado PA-C  [facility] [address] [place] [phone]  [phone]   Dec 05, 2019  2:40 PM CST  Return Visit with Javier Yin DO  [facility] [address] [place] [address]  [phone]   Dec 19, 2019  4:10 PM CST  Return Visit with Javier Yin DO  [facility] [address] [place] [address]  [phone]           Statins Protocol Failed - 11/12/2019  9:13 AM        Failed - LDL on file in past 12 months     Recent Labs   Lab Test 01/24/18  0926   *             Passed - No abnormal creatine kinase in past 12 months     No lab results found.             Passed - Recent (12 mo) or future (30 days) visit within the authorizing provider's specialty     Patient has had an office visit with the authorizing provider or a provider within the authorizing providers department within the previous 12 mos or has a future within next 30 days. See \"Patient Info\" tab in inbasket, or \"Choose Columns\" in Meds & Orders section of the refill encounter.              Passed - Medication is active on med list        Passed - Patient is age 18 or older        "

## 2019-11-12 NOTE — TELEPHONE ENCOUNTER
Routing refill request to provider for review/approval because:  Labs not current:  Creatinine, Potassium    NAHUM Manzanares, RN  [facility]

## 2019-11-14 NOTE — H&P (VIEW-ONLY)
[address]  [facility] [address]  [phone]  Dept: [phone]    PRE-OP EVALUATION:  Today's date: 2019    uHgo Longoria (: 1960) presents for pre-operative evaluation assessment as requested by Dr. Yin.  He requires evaluation and anesthesia risk assessment prior to undergoing surgery/procedure for treatment of right knee osteoarthritis.    Proposed Surgery/ Procedure: Right knee  Date of Surgery/ Procedure: 12/10/2019  Time of Surgery/ Procedure: [facility], [place]  Hospital/Surgical Facility: [facility]    Primary Physician: Viktor Gomez  Type of Anesthesia Anticipated: to be determined    Patient has a Health Care Directive or Living Will:  NO    1. NO - Do you have a history of heart attack, stroke, stent, bypass or surgery on an artery in the head, neck, heart or legs?  2. NO - Do you ever have any pain or discomfort in your chest?  3. NO - Do you have a history of  Heart Failure?  4. NO - Are you troubled by shortness of breath when: walking on the level, up a slight hill or at night?  5. NO - Do you currently have a cold, bronchitis or other respiratory infection?  6. NO - Do you have a cough, shortness of breath or wheezing?  7. NO - Do you sometimes get pains in the calves of your legs when you walk?  8. YES - DO YOU OR ANYONE IN YOUR FAMILY HAVE PREVIOUS HISTORY OF BLOOD CLOTS? Dad  9. YES - DO YOU OR DOES ANYONE IN YOUR FAMILY HAVE A SERIOUS BLEEDING PROBLEM SUCH AS PROLONGED BLEEDING FOLLOWING SURGERIES OR CUTS? 15 years ago (had gastric ulcers)  10. YES - HAVE YOU EVER HAD PROBLEMS WITH ANEMIA OR BEEN TOLD TO TAKE IRON PILLS? 15 years ago due to gastric ulcers  11. YES - HAVE YOU HAD ANY ABNORMAL BLOOD LOSS SUCH AS BLACK, TARRY OR BLOODY STOOLS, OR ABNORMAL VAGINAL BLEEDING? 15 years ago due to gastric ulcers   12. YES - HAVE YOU EVER HAD A BLOOD TRANSFUSION? 15 years ago due to gastric ulcers  13. NO - Have you or any of your relatives  ever had problems with anesthesia?  14. NO - Do you have sleep apnea, excessive snoring or daytime drowsiness?  15. NO - Do you have any prosthetic heart valves?  16. NO - Do you have prosthetic joints?  17. NO - Is there any chance that you may be pregnant?    HPI:     HPI related to upcoming procedure: Patient has a long-standing history of right knee pain due to moderate/severe osteoarthritis. He has failed conservative treatment so will be undergoing a total right knee arthroplasty with Dr. Yin on 12/10/19.     HYPERLIPIDEMIA - Patient has a long history of significant Hyperlipidemia requiring medication for treatment with recent good control. Patient reports no problems or side effects with the medication.     HYPERTENSION - Patient has longstanding history of HTN, currently denies any symptoms referable to elevated blood pressure. Specifically denies chest pain, palpitations, dyspnea, orthopnea, PND or peripheral edema. Blood pressure readings have been in normal range. Current medication regimen is as listed below. Patient denies any side effects of medication.     MEDICAL HISTORY:     Patient Active Problem List    Diagnosis Date Noted     Chronic right shoulder pain 01/30/2019     Priority: Medium     Primary osteoarthritis of right knee 03/13/2017     Priority: Medium     Advanced directives, counseling/discussion 01/09/2017     Priority: Medium     Info given       Mild episode of recurrent major depressive disorder (H) 01/06/2017     Priority: Medium     Complete tear of right rotator cuff 01/02/2017     Priority: Medium     Rupture long head biceps tendon, right, initial encounter 01/02/2017     Priority: Medium     Subacromial impingement of right shoulder 01/02/2017     Priority: Medium     Chronic gastric ulcer 10/28/2015     Priority: Medium     Seasonal allergic rhinitis 10/28/2015     Priority: Medium     History of gastric ulcer 07/16/2013     Priority: Medium     ACL tear 04/23/2013      Priority: Medium     Fatigue 03/12/2013     Priority: Medium     Hemorrhoids 12/08/2011     Priority: Medium     Erectile dysfunction 12/08/2011     Priority: Medium     HTN, goal below 140/90 12/08/2011     Priority: Medium     History of tobacco use: 1980 - 1990 09/29/2011     Priority: Medium     CARDIOVASCULAR SCREENING; LDL GOAL LESS THAN 130 10/31/2010     Priority: Medium     KERRY (generalized anxiety disorder) 07/06/2010     Priority: Medium     History of colonic polyps 11/13/2007     Priority: Medium     Problem list name updated by automated process. Provider to review        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)     Overnight stay due to blood poisoning     Gastric ulcer, unspecified as acute or chronic, without mention of hemorrhage or perforation      Past Surgical History:   Procedure Laterality Date     ARTHROSCOPY KNEE  5/29/2013    Procedure: ARTHROSCOPY KNEE;  Right knee arthroscopy with partial medial and partial lateral menisectomies;  Surgeon: Phani Mclaughlin MD;  Location:  OR     ARTHROSCOPY SHOULDER BICEPS TENODESIS REPAIR Right 1/13/2017    Procedure: ARTHROSCOPY SHOULDER BICEPS TENODESIS REPAIR;  Surgeon: Javier Yin DO;  Location:  OR     ARTHROSCOPY SHOULDER DECOMPRESSION Right 1/13/2017    Procedure: ARTHROSCOPY SHOULDER DECOMPRESSION;  Surgeon: Javier Yin DO;  Location:  OR     ARTHROSCOPY SHOULDER ROTATOR CUFF REPAIR Right 1/13/2017    Procedure: ARTHROSCOPY SHOULDER ROTATOR CUFF REPAIR;  Surgeon: Javier Yin DO;  Location:  OR     COLONOSCOPY  11/01/2007     COLONOSCOPY  12/12/11     COLONOSCOPY N/A 11/18/2015    Procedure: COLONOSCOPY;  Surgeon: Migue Mclaughlin MD;  Location:  GI     COLONOSCOPY N/A 3/22/2017    Procedure: COMBINED COLONOSCOPY, SINGLE OR MULTIPLE  "BIOPSY/POLYPECTOMY BY BIOPSY;  Surgeon: Salvatore Zepeda MD;  Location: [facility]     COLONOSCOPY N/A 3/5/2019    Procedure: COLONOSCOPY;  Surgeon: Prakash Ervin DO;  Location:  GI     ENDOSCOPY  01/27/12    Upper GI - [facility]     ESOPHAGOSCOPY, GASTROSCOPY, DUODENOSCOPY (EGD), COMBINED N/A 11/18/2015    Procedure: COMBINED ESOPHAGOSCOPY, GASTROSCOPY, DUODENOSCOPY (EGD), BIOPSY SINGLE OR MULTIPLE;  Surgeon: Migue Mclaughlin MD;  Location: [facility]     ESOPHAGOSCOPY, GASTROSCOPY, DUODENOSCOPY (EGD), COMBINED N/A 3/22/2017    Procedure: COMBINED ESOPHAGOSCOPY, GASTROSCOPY, DUODENOSCOPY (EGD), BIOPSY SINGLE OR MULTIPLE;  Surgeon: Salvatore Zepeda MD;  Location:  GI     HC UGI ENDOSCOPY, SIMPLE EXAM  10/31/2007     HERNIORRHAPHY UMBILICAL N/A 3/12/2015    Procedure: HERNIORRHAPHY UMBILICAL;  Surgeon: Yared Ma MD;  Location: [facility] OR     IRRIGATION AND DEBRIDEMENT UPPER EXTREMITY, COMBINED Left 3/15/2016    Procedure: COMBINED IRRIGATION AND DEBRIDEMENT UPPER EXTREMITY;  Surgeon: Javier Yin DO;  Location: [facility]     REMOVAL OF SPERM DUCT(S)      Vasectomy     Current Outpatient Medications   Medication Sig Dispense Refill     ascorbic acid (VITAMIN C) 250 MG CHEW chewable tablet Take 250 mg by mouth daily       atorvastatin (LIPITOR) 20 MG tablet TAKE 1 TABLET BY MOUTH ONCE DAILY 90 tablet 0     LANsoprazole (PREVACID) 30 MG DR capsule TAKE 1 CAPSULE BY MOUTH ONCE DAILY 90 capsule 0     lisinopril (PRINIVIL/ZESTRIL) 20 MG tablet TAKE 1/2 (ONE-HALF) TABLET BY MOUTH ONCE DAILY 45 tablet 0     Multiple Vitamin (MULTIVITAMINS PO) Take  by mouth.       fluticasone (FLONASE) 50 MCG/ACT spray Spray 1-2 sprays into both nostrils daily (Patient not taking: Reported on 9/5/2019) 1 Bottle 0     OTC products: None, except as noted above    Allergies   Allergen Reactions     Hydrocodone Other (See Comments)     \"climbed the walls, very anxious, insomnia\"     Nsaids Other " "(See Comments)     Hx of stomach ulcers      Latex Allergy: NO    Social History     Tobacco Use     Smoking status: Former Smoker     Last attempt to quit: 1981     Years since quittin.9     Smokeless tobacco: Never Used   Substance Use Topics     Alcohol use: Yes     Alcohol/week: 0.0 standard drinks     Comment: weekends     History   Drug Use No       REVIEW OF SYSTEMS:   CONSTITUTIONAL: NEGATIVE for fever, chills, change in weight  INTEGUMENTARY/SKIN: NEGATIVE for worrisome rashes, moles or lesions  EYES: NEGATIVE for vision changes or irritation  ENT/MOUTH: NEGATIVE for ear, mouth and throat problems  RESP: NEGATIVE for significant cough or SOB  CV: NEGATIVE for chest pain, palpitations or peripheral edema  GI: NEGATIVE for nausea, abdominal pain, heartburn, or change in bowel habits  : NEGATIVE for frequency, dysuria, or hematuria  MUSCULOSKELETAL: +Right knee pain and weakness.   NEURO: NEGATIVE for weakness, dizziness or paresthesias  ENDOCRINE: NEGATIVE for temperature intolerance, skin/hair changes  HEME: NEGATIVE for bleeding problems  PSYCHIATRIC: NEGATIVE for changes in mood or affect    EXAM:   /60   Pulse 88   Temp 98  F (36.7  C) (Temporal)   Resp 16   Ht 1.753 m (5' 9\")   Wt 95.7 kg (211 lb)   SpO2 97%   BMI 31.16 kg/m      GENERAL APPEARANCE: healthy, alert and no distress     EYES: EOMI,  PERRL     HENT: ear canals and TM's normal and nose and mouth without ulcers or lesions     NECK: no adenopathy, no asymmetry, masses, or scars and thyroid normal to palpation     RESP: lungs clear to auscultation - no rales, rhonchi or wheezes     CV: regular rate and rhythm, normal S1 S2, no S3 or S4 and no murmur, click or rub     ABDOMEN:  soft, nontender, no HSM or masses and bowel sounds normal     MS: extremities normal- no gross deformities noted, no evidence of inflammation in joints, FROM in all extremities.     SKIN: no suspicious lesions or rashes     NEURO: Normal strength " and tone, sensory exam grossly normal, mentation intact and speech normal. Gait is stable.      PSYCH: mentation appears normal. and affect normal/bright     LYMPHATICS: No cervical adenopathy    DIAGNOSTICS:   EKG: appears normal, intermittent premature ventricular contraction, NSR, normal axis, normal intervals, no acute ST/T changes c/w ischemia, no LVH by voltage criteria, unchanged from previous tracings    Recent Labs   Lab Test 01/09/17  0856 03/10/16  0923   HGB 15.9 15.9    198    142   POTASSIUM 4.4 4.3   CR 0.78 0.81     Results for orders placed or performed in visit on 11/20/19   CBC with platelets and differential     Status: None   Result Value Ref Range    WBC 4.5 4.0 - 11.0 10e9/L    RBC Count 4.79 4.4 - 5.9 10e12/L    Hemoglobin 15.0 13.3 - 17.7 g/dL    Hematocrit 42.3 40.0 - 53.0 %    MCV 88 78 - 100 fl    MCH 31.3 26.5 - 33.0 pg    MCHC 35.5 31.5 - 36.5 g/dL    RDW 12.9 10.0 - 15.0 %    Platelet Count 177 150 - 450 10e9/L    % Neutrophils 53.0 %    % Lymphocytes 31.3 %    % Monocytes 12.4 %    % Eosinophils 2.9 %    % Basophils 0.4 %    Absolute Neutrophil 2.4 1.6 - 8.3 10e9/L    Absolute Lymphocytes 1.4 0.8 - 5.3 10e9/L    Absolute Monocytes 0.6 0.0 - 1.3 10e9/L    Absolute Eosinophils 0.1 0.0 - 0.7 10e9/L    Absolute Basophils 0.0 0.0 - 0.2 10e9/L    Diff Method Automated Method    Hemoglobin A1c     Status: None   Result Value Ref Range    Hemoglobin A1C 5.2 0 - 5.6 %   *UA reflex to Microscopic and Culture ([facility] (except [place] and [place])     Status: None   Result Value Ref Range    Color Urine Yellow     Appearance Urine Clear     Glucose Urine Negative NEG^Negative mg/dL    Bilirubin Urine Negative NEG^Negative    Ketones Urine Negative NEG^Negative mg/dL    Specific Gravity Urine 1.020 1.003 - 1.035    Blood Urine Negative NEG^Negative    pH Urine 7.0 5.0 - 7.0 pH    Protein Albumin Urine Negative NEG^Negative mg/dL    Urobilinogen Urine 1.0 0.2 -  1.0 EU/dL    Nitrite Urine Negative NEG^Negative    Leukocyte Esterase Urine Negative NEG^Negative    Source Unspecified Urine      IMPRESSION:   Reason for surgery/procedure: right knee osteoarthritis  Diagnosis/reason for consult: pre-operative clearance    The proposed surgical procedure is considered INTERMEDIATE risk.    REVISED CARDIAC RISK INDEX  The patient has the following serious cardiovascular risks for perioperative complications such as (MI, PE, VFib and 3  AV Block):  No serious cardiac risks  INTERPRETATION: 0 risks: Class I (very low risk - 0.4% complication rate)    The patient has the following additional risks for perioperative complications:  No identified additional risks      ICD-10-CM    1. Preop general physical exam Z01.818 *UA reflex to Microscopic and Culture ([facility] (except [place] and [place])     EKG 12-lead complete w/read - Clinics   2. Primary osteoarthritis of right knee M17.11 MRSA MSSA Presurgical Screen     CBC with platelets and differential     CANCELED: *UA reflex to Microscopic and Culture ([place]; [facility]; [facility]; [facility]; [facility]; [facility]; [place]; [place])   3. HTN, goal below 140/90 I10 EKG 12-lead complete w/read - Clinics   4. Elevated glucose R73.09 Basic metabolic panel  (Ca, Cl, CO2, Creat, Gluc, K, Na, BUN)     Hemoglobin A1c   5. Hyperlipidemia LDL goal <130 E78.5        A1c ordered given history of elevated glucose and was found to be normal. CBC and UA also normal. Awaiting BMP and MRSA nasal swab results.    Normal EKG with normal stress test in January 2018.    Cleared for surgery.    Follow up in 6 months for annual exam.    RECOMMENDATIONS:     --Patient is to take all scheduled medications on the day of surgery EXCEPT for modifications listed below.    ACE Inhibitor or Angiotensin Receptor Blocker (ARB) Use  Ace inhibitor or Angiotensin Receptor Blocker (ARB) and should HOLD this medication for  the 24 hours prior to surgery.      APPROVAL GIVEN to proceed with proposed procedure, without further diagnostic evaluation       Signed Electronically by: Atul Alvarado PA-C    Copy of this evaluation report is provided to requesting physician.    [facility] Preop Guidelines    Revised Cardiac Risk Index

## 2019-11-14 NOTE — PROGRESS NOTES
[address]  [facility] [address]  [phone]  Dept: [phone]    PRE-OP EVALUATION:  Today's date: 2019    Hugo Longoria (: 1960) presents for pre-operative evaluation assessment as requested by Dr. Yin.  He requires evaluation and anesthesia risk assessment prior to undergoing surgery/procedure for treatment of right knee osteoarthritis.    Proposed Surgery/ Procedure: Right knee  Date of Surgery/ Procedure: 12/10/2019  Time of Surgery/ Procedure: [facility]  Hospital/Surgical Facility: [facility]    Primary Physician: Viktor Gomez  Type of Anesthesia Anticipated: to be determined    Patient has a Health Care Directive or Living Will:  NO    1. NO - Do you have a history of heart attack, stroke, stent, bypass or surgery on an artery in the head, neck, heart or legs?  2. NO - Do you ever have any pain or discomfort in your chest?  3. NO - Do you have a history of  Heart Failure?  4. NO - Are you troubled by shortness of breath when: walking on the level, up a slight hill or at night?  5. NO - Do you currently have a cold, bronchitis or other respiratory infection?  6. NO - Do you have a cough, shortness of breath or wheezing?  7. NO - Do you sometimes get pains in the calves of your legs when you walk?  8. YES - DO YOU OR ANYONE IN YOUR FAMILY HAVE PREVIOUS HISTORY OF BLOOD CLOTS? Dad  9. YES - DO YOU OR DOES ANYONE IN YOUR FAMILY HAVE A SERIOUS BLEEDING PROBLEM SUCH AS PROLONGED BLEEDING FOLLOWING SURGERIES OR CUTS? 15 years ago (had gastric ulcers)  10. YES - HAVE YOU EVER HAD PROBLEMS WITH ANEMIA OR BEEN TOLD TO TAKE IRON PILLS? 15 years ago due to gastric ulcers  11. YES - HAVE YOU HAD ANY ABNORMAL BLOOD LOSS SUCH AS BLACK, TARRY OR BLOODY STOOLS, OR ABNORMAL VAGINAL BLEEDING? 15 years ago due to gastric ulcers   12. YES - HAVE YOU EVER HAD A BLOOD TRANSFUSION? 15 years ago due to gastric ulcers  13. NO - Have you or any of your relatives  ever had problems with anesthesia?  14. NO - Do you have sleep apnea, excessive snoring or daytime drowsiness?  15. NO - Do you have any prosthetic heart valves?  16. NO - Do you have prosthetic joints?  17. NO - Is there any chance that you may be pregnant?    HPI:     HPI related to upcoming procedure: Patient has a long-standing history of right knee pain due to moderate/severe osteoarthritis. He has failed conservative treatment so will be undergoing a total right knee arthroplasty with Dr. Yin on 12/10/19.     HYPERLIPIDEMIA - Patient has a long history of significant Hyperlipidemia requiring medication for treatment with recent good control. Patient reports no problems or side effects with the medication.     HYPERTENSION - Patient has longstanding history of HTN, currently denies any symptoms referable to elevated blood pressure. Specifically denies chest pain, palpitations, dyspnea, orthopnea, PND or peripheral edema. Blood pressure readings have been in normal range. Current medication regimen is as listed below. Patient denies any side effects of medication.     MEDICAL HISTORY:     Patient Active Problem List    Diagnosis Date Noted     Chronic right shoulder pain 01/30/2019     Priority: Medium     Primary osteoarthritis of right knee 03/13/2017     Priority: Medium     Advanced directives, counseling/discussion 01/09/2017     Priority: Medium     Info given       Mild episode of recurrent major depressive disorder (H) 01/06/2017     Priority: Medium     Complete tear of right rotator cuff 01/02/2017     Priority: Medium     Rupture long head biceps tendon, right, initial encounter 01/02/2017     Priority: Medium     Subacromial impingement of right shoulder 01/02/2017     Priority: Medium     Chronic gastric ulcer 10/28/2015     Priority: Medium     Seasonal allergic rhinitis 10/28/2015     Priority: Medium     History of gastric ulcer 07/16/2013     Priority: Medium     ACL tear 04/23/2013      Priority: Medium     Fatigue 03/12/2013     Priority: Medium     Hemorrhoids 12/08/2011     Priority: Medium     Erectile dysfunction 12/08/2011     Priority: Medium     HTN, goal below 140/90 12/08/2011     Priority: Medium     History of tobacco use: 1980 - 1990 09/29/2011     Priority: Medium     CARDIOVASCULAR SCREENING; LDL GOAL LESS THAN 130 10/31/2010     Priority: Medium     KERRY (generalized anxiety disorder) 07/06/2010     Priority: Medium     History of colonic polyps 11/13/2007     Priority: Medium     Problem list name updated by automated process. Provider to review        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)     Overnight stay due to blood poisoning     Gastric ulcer, unspecified as acute or chronic, without mention of hemorrhage or perforation      Past Surgical History:   Procedure Laterality Date     ARTHROSCOPY KNEE  5/29/2013    Procedure: ARTHROSCOPY KNEE;  Right knee arthroscopy with partial medial and partial lateral menisectomies;  Surgeon: Phani Mclaughlin MD;  Location:  OR     ARTHROSCOPY SHOULDER BICEPS TENODESIS REPAIR Right 1/13/2017    Procedure: ARTHROSCOPY SHOULDER BICEPS TENODESIS REPAIR;  Surgeon: Javier Yin DO;  Location:  OR     ARTHROSCOPY SHOULDER DECOMPRESSION Right 1/13/2017    Procedure: ARTHROSCOPY SHOULDER DECOMPRESSION;  Surgeon: Javier Yin DO;  Location:  OR     ARTHROSCOPY SHOULDER ROTATOR CUFF REPAIR Right 1/13/2017    Procedure: ARTHROSCOPY SHOULDER ROTATOR CUFF REPAIR;  Surgeon: Javier Yin DO;  Location:  OR     COLONOSCOPY  11/01/2007     COLONOSCOPY  12/12/11     COLONOSCOPY N/A 11/18/2015    Procedure: COLONOSCOPY;  Surgeon: Migue Mclaughlin MD;  Location:  GI     COLONOSCOPY N/A 3/22/2017    Procedure: COMBINED COLONOSCOPY, SINGLE OR MULTIPLE  "BIOPSY/POLYPECTOMY BY BIOPSY;  Surgeon: Salvatore Zepeda MD;  Location: [facility] GI     COLONOSCOPY N/A 3/5/2019    Procedure: COLONOSCOPY;  Surgeon: Prakash Ervin DO;  Location:  GI     ENDOSCOPY  01/27/12    Upper GI - [facility]     ESOPHAGOSCOPY, GASTROSCOPY, DUODENOSCOPY (EGD), COMBINED N/A 11/18/2015    Procedure: COMBINED ESOPHAGOSCOPY, GASTROSCOPY, DUODENOSCOPY (EGD), BIOPSY SINGLE OR MULTIPLE;  Surgeon: Migue Mclaughlin MD;  Location: [facility] GI     ESOPHAGOSCOPY, GASTROSCOPY, DUODENOSCOPY (EGD), COMBINED N/A 3/22/2017    Procedure: COMBINED ESOPHAGOSCOPY, GASTROSCOPY, DUODENOSCOPY (EGD), BIOPSY SINGLE OR MULTIPLE;  Surgeon: Salvatore Zepeda MD;  Location:  GI     HC UGI ENDOSCOPY, SIMPLE EXAM  10/31/2007     HERNIORRHAPHY UMBILICAL N/A 3/12/2015    Procedure: HERNIORRHAPHY UMBILICAL;  Surgeon: Yared Ma MD;  Location: [facility] OR     IRRIGATION AND DEBRIDEMENT UPPER EXTREMITY, COMBINED Left 3/15/2016    Procedure: COMBINED IRRIGATION AND DEBRIDEMENT UPPER EXTREMITY;  Surgeon: Javier Yin DO;  Location: [facility] OR     REMOVAL OF SPERM DUCT(S)      Vasectomy     Current Outpatient Medications   Medication Sig Dispense Refill     ascorbic acid (VITAMIN C) 250 MG CHEW chewable tablet Take 250 mg by mouth daily       atorvastatin (LIPITOR) 20 MG tablet TAKE 1 TABLET BY MOUTH ONCE DAILY 90 tablet 0     LANsoprazole (PREVACID) 30 MG DR capsule TAKE 1 CAPSULE BY MOUTH ONCE DAILY 90 capsule 0     lisinopril (PRINIVIL/ZESTRIL) 20 MG tablet TAKE 1/2 (ONE-HALF) TABLET BY MOUTH ONCE DAILY 45 tablet 0     Multiple Vitamin (MULTIVITAMINS PO) Take  by mouth.       fluticasone (FLONASE) 50 MCG/ACT spray Spray 1-2 sprays into both nostrils daily (Patient not taking: Reported on 9/5/2019) 1 Bottle 0     OTC products: None, except as noted above    Allergies   Allergen Reactions     Hydrocodone Other (See Comments)     \"climbed the walls, very anxious, insomnia\"     Nsaids Other " "(See Comments)     Hx of stomach ulcers      Latex Allergy: NO    Social History     Tobacco Use     Smoking status: Former Smoker     Last attempt to quit: 1981     Years since quittin.9     Smokeless tobacco: Never Used   Substance Use Topics     Alcohol use: Yes     Alcohol/week: 0.0 standard drinks     Comment: weekends     History   Drug Use No       REVIEW OF SYSTEMS:   CONSTITUTIONAL: NEGATIVE for fever, chills, change in weight  INTEGUMENTARY/SKIN: NEGATIVE for worrisome rashes, moles or lesions  EYES: NEGATIVE for vision changes or irritation  ENT/MOUTH: NEGATIVE for ear, mouth and throat problems  RESP: NEGATIVE for significant cough or SOB  CV: NEGATIVE for chest pain, palpitations or peripheral edema  GI: NEGATIVE for nausea, abdominal pain, heartburn, or change in bowel habits  : NEGATIVE for frequency, dysuria, or hematuria  MUSCULOSKELETAL: +Right knee pain and weakness.   NEURO: NEGATIVE for weakness, dizziness or paresthesias  ENDOCRINE: NEGATIVE for temperature intolerance, skin/hair changes  HEME: NEGATIVE for bleeding problems  PSYCHIATRIC: NEGATIVE for changes in mood or affect    EXAM:   /60   Pulse 88   Temp 98  F (36.7  C) (Temporal)   Resp 16   Ht 1.753 m (5' 9\")   Wt 95.7 kg (211 lb)   SpO2 97%   BMI 31.16 kg/m      GENERAL APPEARANCE: healthy, alert and no distress     EYES: EOMI,  PERRL     HENT: ear canals and TM's normal and nose and mouth without ulcers or lesions     NECK: no adenopathy, no asymmetry, masses, or scars and thyroid normal to palpation     RESP: lungs clear to auscultation - no rales, rhonchi or wheezes     CV: regular rate and rhythm, normal S1 S2, no S3 or S4 and no murmur, click or rub     ABDOMEN:  soft, nontender, no HSM or masses and bowel sounds normal     MS: extremities normal- no gross deformities noted, no evidence of inflammation in joints, FROM in all extremities.     SKIN: no suspicious lesions or rashes     NEURO: Normal strength " and tone, sensory exam grossly normal, mentation intact and speech normal. Gait is stable.      PSYCH: mentation appears normal. and affect normal/bright     LYMPHATICS: No cervical adenopathy    DIAGNOSTICS:   EKG: appears normal, intermittent premature ventricular contraction, NSR, normal axis, normal intervals, no acute ST/T changes c/w ischemia, no LVH by voltage criteria, unchanged from previous tracings    Recent Labs   Lab Test 01/09/17  0856 03/10/16  0923   HGB 15.9 15.9    198    142   POTASSIUM 4.4 4.3   CR 0.78 0.81     Results for orders placed or performed in visit on 11/20/19   CBC with platelets and differential     Status: None   Result Value Ref Range    WBC 4.5 4.0 - 11.0 10e9/L    RBC Count 4.79 4.4 - 5.9 10e12/L    Hemoglobin 15.0 13.3 - 17.7 g/dL    Hematocrit 42.3 40.0 - 53.0 %    MCV 88 78 - 100 fl    MCH 31.3 26.5 - 33.0 pg    MCHC 35.5 31.5 - 36.5 g/dL    RDW 12.9 10.0 - 15.0 %    Platelet Count 177 150 - 450 10e9/L    % Neutrophils 53.0 %    % Lymphocytes 31.3 %    % Monocytes 12.4 %    % Eosinophils 2.9 %    % Basophils 0.4 %    Absolute Neutrophil 2.4 1.6 - 8.3 10e9/L    Absolute Lymphocytes 1.4 0.8 - 5.3 10e9/L    Absolute Monocytes 0.6 0.0 - 1.3 10e9/L    Absolute Eosinophils 0.1 0.0 - 0.7 10e9/L    Absolute Basophils 0.0 0.0 - 0.2 10e9/L    Diff Method Automated Method    Hemoglobin A1c     Status: None   Result Value Ref Range    Hemoglobin A1C 5.2 0 - 5.6 %   *UA reflex to Microscopic and Culture ([facility] (except [place] and [place])     Status: None   Result Value Ref Range    Color Urine Yellow     Appearance Urine Clear     Glucose Urine Negative NEG^Negative mg/dL    Bilirubin Urine Negative NEG^Negative    Ketones Urine Negative NEG^Negative mg/dL    Specific Gravity Urine 1.020 1.003 - 1.035    Blood Urine Negative NEG^Negative    pH Urine 7.0 5.0 - 7.0 pH    Protein Albumin Urine Negative NEG^Negative mg/dL    Urobilinogen Urine 1.0 0.2 -  1.0 EU/dL    Nitrite Urine Negative NEG^Negative    Leukocyte Esterase Urine Negative NEG^Negative    Source Unspecified Urine      IMPRESSION:   Reason for surgery/procedure: right knee osteoarthritis  Diagnosis/reason for consult: pre-operative clearance    The proposed surgical procedure is considered INTERMEDIATE risk.    REVISED CARDIAC RISK INDEX  The patient has the following serious cardiovascular risks for perioperative complications such as (MI, PE, VFib and 3  AV Block):  No serious cardiac risks  INTERPRETATION: 0 risks: Class I (very low risk - 0.4% complication rate)    The patient has the following additional risks for perioperative complications:  No identified additional risks      ICD-10-CM    1. Preop general physical exam Z01.818 *UA reflex to Microscopic and Culture ([place] [facility] (except [place] and [place])     EKG 12-lead complete w/read - Clinics   2. Primary osteoarthritis of right knee M17.11 MRSA MSSA Presurgical Screen     CBC with platelets and differential     CANCELED: *UA reflex to Microscopic and Culture ([place]; [facility]; [facility]; [facility]; [facility]; [facility]; [place]; [place])   3. HTN, goal below 140/90 I10 EKG 12-lead complete w/read - Clinics   4. Elevated glucose R73.09 Basic metabolic panel  (Ca, Cl, CO2, Creat, Gluc, K, Na, BUN)     Hemoglobin A1c   5. Hyperlipidemia LDL goal <130 E78.5        A1c ordered given history of elevated glucose and was found to be normal. CBC and UA also normal. Awaiting BMP and MRSA nasal swab results.    Normal EKG with normal stress test in January 2018.    Cleared for surgery.    Follow up in 6 months for annual exam.    RECOMMENDATIONS:     --Patient is to take all scheduled medications on the day of surgery EXCEPT for modifications listed below.    ACE Inhibitor or Angiotensin Receptor Blocker (ARB) Use  Ace inhibitor or Angiotensin Receptor Blocker (ARB) and should HOLD this medication for  the 24 hours prior to surgery.      APPROVAL GIVEN to proceed with proposed procedure, without further diagnostic evaluation       Signed Electronically by: Atul Alvarado PA-C    Copy of this evaluation report is provided to requesting physician.    [facility] Preop Guidelines    Revised Cardiac Risk Index

## 2019-11-14 NOTE — PATIENT INSTRUCTIONS
Before Your Surgery    Call your surgeon if there is any change in your health. This includes signs of a cold or flu (such as a sore throat, runny nose, cough, rash or fever).    Do not smoke, drink alcohol or take over the counter medicine (unless your surgeon or primary care doctor tells you to) for the 24 hours before and after surgery.    If you take prescribed drugs: Follow your doctor s orders about which medicines to take and which to stop until after surgery. Hold lisinopril the day of surgery.    Eating and drinking prior to surgery: follow the instructions from your surgeon    Take a shower or bath the night before surgery. Use the soap your surgeon gave you to gently clean your skin. If you do not have soap from your surgeon, use your regular soap. Do not shave or scrub the surgery site.  Wear clean pajamas and have clean sheets on your bed.     Follow up in 6 months for a recheck/annual physical.

## 2019-11-20 ENCOUNTER — OFFICE VISIT (OUTPATIENT)
Dept: FAMILY MEDICINE | Facility: OTHER | Age: 59
End: 2019-11-20
Payer: COMMERCIAL

## 2019-11-20 VITALS
RESPIRATION RATE: 16 BRPM | OXYGEN SATURATION: 97 % | BODY MASS INDEX: 31.25 KG/M2 | WEIGHT: 211 LBS | HEIGHT: 69 IN | HEART RATE: 88 BPM | TEMPERATURE: 98 F | DIASTOLIC BLOOD PRESSURE: 60 MMHG | SYSTOLIC BLOOD PRESSURE: 120 MMHG

## 2019-11-20 DIAGNOSIS — M17.11 PRIMARY OSTEOARTHRITIS OF RIGHT KNEE: ICD-10-CM

## 2019-11-20 DIAGNOSIS — E78.5 HYPERLIPIDEMIA LDL GOAL <130: ICD-10-CM

## 2019-11-20 DIAGNOSIS — Z01.818 PREOP GENERAL PHYSICAL EXAM: Primary | ICD-10-CM

## 2019-11-20 DIAGNOSIS — I10 HTN, GOAL BELOW 140/90: ICD-10-CM

## 2019-11-20 DIAGNOSIS — R73.09 ELEVATED GLUCOSE: ICD-10-CM

## 2019-11-20 LAB
ALBUMIN UR-MCNC: NEGATIVE MG/DL
ANION GAP SERPL CALCULATED.3IONS-SCNC: 5 MMOL/L (ref 3–14)
APPEARANCE UR: CLEAR
BASOPHILS # BLD AUTO: 0 10E9/L (ref 0–0.2)
BASOPHILS NFR BLD AUTO: 0.4 %
BILIRUB UR QL STRIP: NEGATIVE
BUN SERPL-MCNC: 13 MG/DL (ref 7–30)
CALCIUM SERPL-MCNC: 8.5 MG/DL (ref 8.5–10.1)
CHLORIDE SERPL-SCNC: 108 MMOL/L (ref 94–109)
CO2 SERPL-SCNC: 27 MMOL/L (ref 20–32)
COLOR UR AUTO: YELLOW
CREAT SERPL-MCNC: 0.72 MG/DL (ref 0.66–1.25)
DIFFERENTIAL METHOD BLD: NORMAL
EOSINOPHIL # BLD AUTO: 0.1 10E9/L (ref 0–0.7)
EOSINOPHIL NFR BLD AUTO: 2.9 %
ERYTHROCYTE [DISTWIDTH] IN BLOOD BY AUTOMATED COUNT: 12.9 % (ref 10–15)
GFR SERPL CREATININE-BSD FRML MDRD: >90 ML/MIN/{1.73_M2}
GLUCOSE SERPL-MCNC: 105 MG/DL (ref 70–99)
GLUCOSE UR STRIP-MCNC: NEGATIVE MG/DL
HBA1C MFR BLD: 5.2 % (ref 0–5.6)
HCT VFR BLD AUTO: 42.3 % (ref 40–53)
HGB BLD-MCNC: 15 G/DL (ref 13.3–17.7)
HGB UR QL STRIP: NEGATIVE
KETONES UR STRIP-MCNC: NEGATIVE MG/DL
LEUKOCYTE ESTERASE UR QL STRIP: NEGATIVE
LYMPHOCYTES # BLD AUTO: 1.4 10E9/L (ref 0.8–5.3)
LYMPHOCYTES NFR BLD AUTO: 31.3 %
MCH RBC QN AUTO: 31.3 PG (ref 26.5–33)
MCHC RBC AUTO-ENTMCNC: 35.5 G/DL (ref 31.5–36.5)
MCV RBC AUTO: 88 FL (ref 78–100)
MONOCYTES # BLD AUTO: 0.6 10E9/L (ref 0–1.3)
MONOCYTES NFR BLD AUTO: 12.4 %
NEUTROPHILS # BLD AUTO: 2.4 10E9/L (ref 1.6–8.3)
NEUTROPHILS NFR BLD AUTO: 53 %
NITRATE UR QL: NEGATIVE
PH UR STRIP: 7 PH (ref 5–7)
PLATELET # BLD AUTO: 177 10E9/L (ref 150–450)
POTASSIUM SERPL-SCNC: 3.8 MMOL/L (ref 3.4–5.3)
RBC # BLD AUTO: 4.79 10E12/L (ref 4.4–5.9)
SODIUM SERPL-SCNC: 140 MMOL/L (ref 133–144)
SOURCE: NORMAL
SP GR UR STRIP: 1.02 (ref 1–1.03)
UROBILINOGEN UR STRIP-ACNC: 1 EU/DL (ref 0.2–1)
WBC # BLD AUTO: 4.5 10E9/L (ref 4–11)

## 2019-11-20 PROCEDURE — 99214 OFFICE O/P EST MOD 30 MIN: CPT | Performed by: PHYSICIAN ASSISTANT

## 2019-11-20 PROCEDURE — 85025 COMPLETE CBC W/AUTO DIFF WBC: CPT | Performed by: ORTHOPAEDIC SURGERY

## 2019-11-20 PROCEDURE — 40000869 ZZHCL STATISTIC MRSA/MSSA PRESURGICAL SCREEN CULTURE: Performed by: ORTHOPAEDIC SURGERY

## 2019-11-20 PROCEDURE — 80048 BASIC METABOLIC PNL TOTAL CA: CPT | Performed by: ORTHOPAEDIC SURGERY

## 2019-11-20 PROCEDURE — 93000 ELECTROCARDIOGRAM COMPLETE: CPT | Performed by: PHYSICIAN ASSISTANT

## 2019-11-20 PROCEDURE — 81003 URINALYSIS AUTO W/O SCOPE: CPT | Performed by: PHYSICIAN ASSISTANT

## 2019-11-20 PROCEDURE — 40000868 ZZHCL STATISTIC MRSA/MSSA PRESURGICAL SCREEN ID: Performed by: ORTHOPAEDIC SURGERY

## 2019-11-20 PROCEDURE — 36415 COLL VENOUS BLD VENIPUNCTURE: CPT | Performed by: ORTHOPAEDIC SURGERY

## 2019-11-20 PROCEDURE — 83036 HEMOGLOBIN GLYCOSYLATED A1C: CPT | Performed by: ORTHOPAEDIC SURGERY

## 2019-11-20 ASSESSMENT — MIFFLIN-ST. JEOR: SCORE: 1772.47

## 2019-11-20 ASSESSMENT — PAIN SCALES - GENERAL: PAINLEVEL: NO PAIN (0)

## 2019-11-22 ENCOUNTER — TELEPHONE (OUTPATIENT)
Dept: FAMILY MEDICINE | Facility: OTHER | Age: 59
End: 2019-11-22

## 2019-11-22 DIAGNOSIS — Z22.321 MSSA (METHICILLIN-SUSCEPTIBLE STAPHYLOCOCCUS AUREUS) COLONIZATION: Primary | ICD-10-CM

## 2019-11-22 LAB
BACTERIA SPEC CULT: ABNORMAL
SPECIMEN SOURCE: ABNORMAL

## 2019-11-22 RX ORDER — MUPIROCIN 20 MG/G
OINTMENT TOPICAL
Qty: 15 G | Refills: 0 | Status: ON HOLD | OUTPATIENT
Start: 2019-11-22 | End: 2019-12-10

## 2019-11-22 NOTE — TELEPHONE ENCOUNTER
----- Message from Atul Alvarado PA-C sent at 11/22/2019  3:44 PM CST -----  Please call and notify patient that all of his labs were normal except for his nasal swab which was positive for staph aureus so he needs to be treated with 5 days of an antibiotic ointment called Bactroban to be placed in his nose as directed. This has been sent to his pharmacy. Thanks.    Atul Alvarado PA-C

## 2019-11-25 NOTE — TELEPHONE ENCOUNTER
A lot of people are colonized with bacteria but it is important before joint replacement surgeries to treat this nasal bacteria to decrease risk of infection. It is not an infection but rather a colonization with bacteria. Thanks.    Atul Alvarado PA-C

## 2019-11-25 NOTE — TELEPHONE ENCOUNTER
Spoke to patient and relayed message. He is wondering what would have caused this?   Shauna Chatman, CMA

## 2019-11-25 NOTE — TELEPHONE ENCOUNTER
Attempted to reach patient, called mobile number listed that his is wife's number, there is a C2C on file but nothing is checked. Called the home number listed which on C2C is patient's cell phone. Left message for patient to return call.   Pura Cruz MA

## 2019-11-26 ENCOUNTER — TELEPHONE (OUTPATIENT)
Dept: ORTHOPEDICS | Facility: CLINIC | Age: 59
End: 2019-11-26

## 2019-11-26 NOTE — TELEPHONE ENCOUNTER
It is not necessary to repeat the swab. 5 days of medication is sufficient before his surgery even if not all of the bacteria is completely gone. Thanks.    Atul Alvarado PA-C

## 2019-12-03 ENCOUNTER — TELEPHONE (OUTPATIENT)
Dept: FAMILY MEDICINE | Facility: OTHER | Age: 59
End: 2019-12-03

## 2019-12-03 NOTE — TELEPHONE ENCOUNTER
Patient is having total knee replacement, he needs a prior auth sent to [facility] in [place] for a walker and a cane.

## 2019-12-03 NOTE — TELEPHONE ENCOUNTER
Spoke with wife and med-surg inpatient department regarding this process.  This will be handled inpatient during post op.  They understand and agree with plan and are okay getting these supplies from [facility].     Chandrika ALFARO, Lead RN, BSN. . .  12/3/2019, 4:03 PM

## 2019-12-05 ENCOUNTER — OFFICE VISIT (OUTPATIENT)
Dept: ORTHOPEDICS | Facility: OTHER | Age: 59
End: 2019-12-05
Payer: COMMERCIAL

## 2019-12-05 VITALS
HEIGHT: 69 IN | BODY MASS INDEX: 32.22 KG/M2 | DIASTOLIC BLOOD PRESSURE: 70 MMHG | WEIGHT: 217.5 LBS | SYSTOLIC BLOOD PRESSURE: 126 MMHG

## 2019-12-05 DIAGNOSIS — M17.11 PRIMARY OSTEOARTHRITIS OF RIGHT KNEE: Primary | ICD-10-CM

## 2019-12-05 PROCEDURE — 99207 ZZC PREOP VISIT IN GLOBAL PKG: CPT | Performed by: ORTHOPAEDIC SURGERY

## 2019-12-05 ASSESSMENT — PAIN SCALES - GENERAL: PAINLEVEL: EXTREME PAIN (8)

## 2019-12-05 ASSESSMENT — MIFFLIN-ST. JEOR: SCORE: 1801.95

## 2019-12-05 NOTE — LETTER
12/5/2019         RE: Hugo Longoria  [address] [facility] [address]        Dear Colleague,    Thank you for referring your patient, Hugo Longoria, to the [facility]. Please see a copy of my visit note below.    1 week prior to right knee replacement.  All questions answered.  Anticipate next day discharge.  Aspirin for DVT prophylaxis.  H&P reviewed and unremarkable.  Labs reviewed showing a positive nasal swab already treated with Bactroban.    Again, thank you for allowing me to participate in the care of your patient.        Sincerely,        Javier Yin, DO

## 2019-12-05 NOTE — PROGRESS NOTES
1 week prior to right knee replacement.  All questions answered.  Anticipate next day discharge.  Aspirin for DVT prophylaxis.  H&P reviewed and unremarkable.  Labs reviewed showing a positive nasal swab already treated with Bactroban.

## 2019-12-10 ENCOUNTER — HOSPITAL ENCOUNTER (OUTPATIENT)
Facility: CLINIC | Age: 59
Discharge: HOME | End: 2019-12-11
Attending: ORTHOPAEDIC SURGERY | Admitting: ORTHOPAEDIC SURGERY
Payer: COMMERCIAL

## 2019-12-10 ENCOUNTER — APPOINTMENT (OUTPATIENT)
Dept: GENERAL RADIOLOGY | Facility: CLINIC | Age: 59
End: 2019-12-10
Attending: NURSE PRACTITIONER
Payer: COMMERCIAL

## 2019-12-10 ENCOUNTER — ANESTHESIA EVENT (OUTPATIENT)
Dept: SURGERY | Facility: CLINIC | Age: 59
End: 2019-12-10
Payer: COMMERCIAL

## 2019-12-10 ENCOUNTER — APPOINTMENT (OUTPATIENT)
Dept: PHYSICAL THERAPY | Facility: CLINIC | Age: 59
End: 2019-12-10
Attending: NURSE PRACTITIONER
Payer: COMMERCIAL

## 2019-12-10 ENCOUNTER — ANESTHESIA (OUTPATIENT)
Dept: SURGERY | Facility: CLINIC | Age: 59
End: 2019-12-10
Payer: COMMERCIAL

## 2019-12-10 DIAGNOSIS — Z96.651 S/P TOTAL KNEE REPLACEMENT USING CEMENT, RIGHT: Primary | ICD-10-CM

## 2019-12-10 PROCEDURE — 25800030 ZZH RX IP 258 OP 636: Performed by: NURSE ANESTHETIST, CERTIFIED REGISTERED

## 2019-12-10 PROCEDURE — 27210794 ZZH OR GENERAL SUPPLY STERILE: Performed by: ORTHOPAEDIC SURGERY

## 2019-12-10 PROCEDURE — 37000009 ZZH ANESTHESIA TECHNICAL FEE, EACH ADDTL 15 MIN: Performed by: ORTHOPAEDIC SURGERY

## 2019-12-10 PROCEDURE — 25000125 ZZHC RX 250: Performed by: NURSE ANESTHETIST, CERTIFIED REGISTERED

## 2019-12-10 PROCEDURE — 25000128 H RX IP 250 OP 636: Performed by: ORTHOPAEDIC SURGERY

## 2019-12-10 PROCEDURE — 25000132 ZZH RX MED GY IP 250 OP 250 PS 637: Performed by: NURSE PRACTITIONER

## 2019-12-10 PROCEDURE — 25000125 ZZHC RX 250: Performed by: ORTHOPAEDIC SURGERY

## 2019-12-10 PROCEDURE — 36000093 ZZH SURGERY LEVEL 4 1ST 30 MIN: Performed by: ORTHOPAEDIC SURGERY

## 2019-12-10 PROCEDURE — 97530 THERAPEUTIC ACTIVITIES: CPT | Mod: GP | Performed by: PHYSICAL THERAPIST

## 2019-12-10 PROCEDURE — 37000008 ZZH ANESTHESIA TECHNICAL FEE, 1ST 30 MIN: Performed by: ORTHOPAEDIC SURGERY

## 2019-12-10 PROCEDURE — 97161 PT EVAL LOW COMPLEX 20 MIN: CPT | Mod: GP | Performed by: PHYSICAL THERAPIST

## 2019-12-10 PROCEDURE — 40000985 XR KNEE PORT RT 1/2 VW: Mod: RT

## 2019-12-10 PROCEDURE — 71000014 ZZH RECOVERY PHASE 1 LEVEL 2 FIRST HR: Performed by: ORTHOPAEDIC SURGERY

## 2019-12-10 PROCEDURE — 25800030 ZZH RX IP 258 OP 636: Performed by: NURSE PRACTITIONER

## 2019-12-10 PROCEDURE — 27810169 ZZH OR IMPLANT GENERAL: Performed by: ORTHOPAEDIC SURGERY

## 2019-12-10 PROCEDURE — 25000128 H RX IP 250 OP 636: Performed by: NURSE ANESTHETIST, CERTIFIED REGISTERED

## 2019-12-10 PROCEDURE — 40000306 ZZH STATISTIC PRE PROC ASSESS II: Performed by: ORTHOPAEDIC SURGERY

## 2019-12-10 PROCEDURE — 27447 TOTAL KNEE ARTHROPLASTY: CPT | Mod: AS | Performed by: NURSE PRACTITIONER

## 2019-12-10 PROCEDURE — 25000128 H RX IP 250 OP 636: Performed by: NURSE PRACTITIONER

## 2019-12-10 PROCEDURE — 36000063 ZZH SURGERY LEVEL 4 EA 15 ADDTL MIN: Performed by: ORTHOPAEDIC SURGERY

## 2019-12-10 PROCEDURE — 97116 GAIT TRAINING THERAPY: CPT | Mod: GP | Performed by: PHYSICAL THERAPIST

## 2019-12-10 PROCEDURE — C1776 JOINT DEVICE (IMPLANTABLE): HCPCS | Performed by: ORTHOPAEDIC SURGERY

## 2019-12-10 PROCEDURE — 27447 TOTAL KNEE ARTHROPLASTY: CPT | Mod: RT | Performed by: ORTHOPAEDIC SURGERY

## 2019-12-10 PROCEDURE — 71000015 ZZH RECOVERY PHASE 1 LEVEL 2 EA ADDTL HR: Performed by: ORTHOPAEDIC SURGERY

## 2019-12-10 PROCEDURE — 97110 THERAPEUTIC EXERCISES: CPT | Mod: GP | Performed by: PHYSICAL THERAPIST

## 2019-12-10 DEVICE — BONE CEMENT GENTAMYCIN SMART SET GHV 5450-35-500: Type: IMPLANTABLE DEVICE | Site: KNEE | Status: FUNCTIONAL

## 2019-12-10 DEVICE — IMPLANTABLE DEVICE: Type: IMPLANTABLE DEVICE | Site: KNEE | Status: FUNCTIONAL

## 2019-12-10 RX ORDER — ONDANSETRON 2 MG/ML
INJECTION INTRAMUSCULAR; INTRAVENOUS PRN
Status: DISCONTINUED | OUTPATIENT
Start: 2019-12-10 | End: 2019-12-10

## 2019-12-10 RX ORDER — GABAPENTIN 100 MG/1
200 CAPSULE ORAL AT BEDTIME
Status: DISCONTINUED | OUTPATIENT
Start: 2019-12-10 | End: 2019-12-11 | Stop reason: HOSPADM

## 2019-12-10 RX ORDER — HYDROXYZINE HYDROCHLORIDE 25 MG/1
25 TABLET, FILM COATED ORAL EVERY 6 HOURS PRN
Status: DISCONTINUED | OUTPATIENT
Start: 2019-12-10 | End: 2019-12-11 | Stop reason: HOSPADM

## 2019-12-10 RX ORDER — DOCUSATE SODIUM 100 MG/1
200 CAPSULE, LIQUID FILLED ORAL 2 TIMES DAILY
Status: DISCONTINUED | OUTPATIENT
Start: 2019-12-10 | End: 2019-12-11 | Stop reason: HOSPADM

## 2019-12-10 RX ORDER — HYDROMORPHONE HYDROCHLORIDE 1 MG/ML
.3-.5 INJECTION, SOLUTION INTRAMUSCULAR; INTRAVENOUS; SUBCUTANEOUS EVERY 5 MIN PRN
Status: DISCONTINUED | OUTPATIENT
Start: 2019-12-10 | End: 2019-12-10 | Stop reason: HOSPADM

## 2019-12-10 RX ORDER — BUPIVACAINE HYDROCHLORIDE AND EPINEPHRINE 5; 5 MG/ML; UG/ML
INJECTION, SOLUTION PERINEURAL PRN
Status: DISCONTINUED | OUTPATIENT
Start: 2019-12-10 | End: 2019-12-10

## 2019-12-10 RX ORDER — OXYCODONE HYDROCHLORIDE 5 MG/1
5 TABLET ORAL EVERY 4 HOURS PRN
Status: DISCONTINUED | OUTPATIENT
Start: 2019-12-10 | End: 2019-12-10

## 2019-12-10 RX ORDER — FENTANYL CITRATE 50 UG/ML
INJECTION, SOLUTION INTRAMUSCULAR; INTRAVENOUS PRN
Status: DISCONTINUED | OUTPATIENT
Start: 2019-12-10 | End: 2019-12-10

## 2019-12-10 RX ORDER — LIDOCAINE 40 MG/G
CREAM TOPICAL
Status: DISCONTINUED | OUTPATIENT
Start: 2019-12-10 | End: 2019-12-11 | Stop reason: HOSPADM

## 2019-12-10 RX ORDER — SODIUM CHLORIDE, SODIUM LACTATE, POTASSIUM CHLORIDE, CALCIUM CHLORIDE 600; 310; 30; 20 MG/100ML; MG/100ML; MG/100ML; MG/100ML
INJECTION, SOLUTION INTRAVENOUS CONTINUOUS
Status: DISCONTINUED | OUTPATIENT
Start: 2019-12-10 | End: 2019-12-11 | Stop reason: HOSPADM

## 2019-12-10 RX ORDER — HYDROXYZINE HYDROCHLORIDE 25 MG/1
25 TABLET, FILM COATED ORAL EVERY 6 HOURS PRN
Qty: 60 TABLET | Refills: 1 | Status: SHIPPED | OUTPATIENT
Start: 2019-12-10 | End: 2020-01-14

## 2019-12-10 RX ORDER — GLYCOPYRROLATE 0.2 MG/ML
INJECTION, SOLUTION INTRAMUSCULAR; INTRAVENOUS PRN
Status: DISCONTINUED | OUTPATIENT
Start: 2019-12-10 | End: 2019-12-10

## 2019-12-10 RX ORDER — PANTOPRAZOLE SODIUM 40 MG/1
40 TABLET, DELAYED RELEASE ORAL
Status: DISCONTINUED | OUTPATIENT
Start: 2019-12-11 | End: 2019-12-11 | Stop reason: HOSPADM

## 2019-12-10 RX ORDER — OXYCODONE HYDROCHLORIDE 5 MG/1
5-10 TABLET ORAL
Status: DISCONTINUED | OUTPATIENT
Start: 2019-12-10 | End: 2019-12-11 | Stop reason: HOSPADM

## 2019-12-10 RX ORDER — PHENYLEPHRINE HYDROCHLORIDE 10 MG/ML
INJECTION INTRAVENOUS PRN
Status: DISCONTINUED | OUTPATIENT
Start: 2019-12-10 | End: 2019-12-10

## 2019-12-10 RX ORDER — PROCHLORPERAZINE MALEATE 5 MG
10 TABLET ORAL EVERY 6 HOURS PRN
Status: DISCONTINUED | OUTPATIENT
Start: 2019-12-10 | End: 2019-12-11 | Stop reason: HOSPADM

## 2019-12-10 RX ORDER — BUPIVACAINE HYDROCHLORIDE 7.5 MG/ML
INJECTION, SOLUTION INTRASPINAL PRN
Status: DISCONTINUED | OUTPATIENT
Start: 2019-12-10 | End: 2019-12-10

## 2019-12-10 RX ORDER — FENTANYL CITRATE 50 UG/ML
25-50 INJECTION, SOLUTION INTRAMUSCULAR; INTRAVENOUS
Status: DISCONTINUED | OUTPATIENT
Start: 2019-12-10 | End: 2019-12-10 | Stop reason: HOSPADM

## 2019-12-10 RX ORDER — METOCLOPRAMIDE 5 MG/1
10 TABLET ORAL EVERY 6 HOURS PRN
Status: DISCONTINUED | OUTPATIENT
Start: 2019-12-10 | End: 2019-12-10

## 2019-12-10 RX ORDER — ACETAMINOPHEN 325 MG/1
975 TABLET ORAL EVERY 8 HOURS PRN
Qty: 100 TABLET | Refills: 0 | Status: SHIPPED | OUTPATIENT
Start: 2019-12-10 | End: 2019-12-20

## 2019-12-10 RX ORDER — BUPIVACAINE HYDROCHLORIDE AND EPINEPHRINE 2.5; 5 MG/ML; UG/ML
INJECTION, SOLUTION INFILTRATION; PERINEURAL PRN
Status: DISCONTINUED | OUTPATIENT
Start: 2019-12-10 | End: 2019-12-10

## 2019-12-10 RX ORDER — SODIUM CHLORIDE, SODIUM LACTATE, POTASSIUM CHLORIDE, CALCIUM CHLORIDE 600; 310; 30; 20 MG/100ML; MG/100ML; MG/100ML; MG/100ML
INJECTION, SOLUTION INTRAVENOUS CONTINUOUS
Status: DISCONTINUED | OUTPATIENT
Start: 2019-12-10 | End: 2019-12-10 | Stop reason: HOSPADM

## 2019-12-10 RX ORDER — METOPROLOL TARTRATE 1 MG/ML
1-2 INJECTION, SOLUTION INTRAVENOUS EVERY 5 MIN PRN
Status: DISCONTINUED | OUTPATIENT
Start: 2019-12-10 | End: 2019-12-10 | Stop reason: HOSPADM

## 2019-12-10 RX ORDER — TIZANIDINE HYDROCHLORIDE 4 MG/1
4 CAPSULE, GELATIN COATED ORAL 4 TIMES DAILY PRN
Qty: 60 CAPSULE | Refills: 1 | Status: SHIPPED | OUTPATIENT
Start: 2019-12-10 | End: 2020-01-14

## 2019-12-10 RX ORDER — ALBUTEROL SULFATE 0.83 MG/ML
2.5 SOLUTION RESPIRATORY (INHALATION) EVERY 4 HOURS PRN
Status: DISCONTINUED | OUTPATIENT
Start: 2019-12-10 | End: 2019-12-10 | Stop reason: HOSPADM

## 2019-12-10 RX ORDER — KETOROLAC TROMETHAMINE 30 MG/ML
INJECTION, SOLUTION INTRAMUSCULAR; INTRAVENOUS PRN
Status: DISCONTINUED | OUTPATIENT
Start: 2019-12-10 | End: 2019-12-10

## 2019-12-10 RX ORDER — DOCUSATE SODIUM 100 MG/1
200 CAPSULE, LIQUID FILLED ORAL 2 TIMES DAILY PRN
Qty: 60 CAPSULE | Refills: 1 | Status: SHIPPED | OUTPATIENT
Start: 2019-12-10 | End: 2020-01-23

## 2019-12-10 RX ORDER — MORPHINE SULFATE 0.5 MG/ML
INJECTION, SOLUTION EPIDURAL; INTRATHECAL; INTRAVENOUS PRN
Status: DISCONTINUED | OUTPATIENT
Start: 2019-12-10 | End: 2019-12-10

## 2019-12-10 RX ORDER — ACETAMINOPHEN 325 MG/1
975 TABLET ORAL EVERY 8 HOURS
Status: DISCONTINUED | OUTPATIENT
Start: 2019-12-10 | End: 2019-12-11 | Stop reason: HOSPADM

## 2019-12-10 RX ORDER — PROPOFOL 10 MG/ML
INJECTION, EMULSION INTRAVENOUS CONTINUOUS PRN
Status: DISCONTINUED | OUTPATIENT
Start: 2019-12-10 | End: 2019-12-10

## 2019-12-10 RX ORDER — HYDROMORPHONE HYDROCHLORIDE 1 MG/ML
0.2 INJECTION, SOLUTION INTRAMUSCULAR; INTRAVENOUS; SUBCUTANEOUS
Status: DISCONTINUED | OUTPATIENT
Start: 2019-12-10 | End: 2019-12-11

## 2019-12-10 RX ORDER — NALOXONE HYDROCHLORIDE 0.4 MG/ML
.1-.4 INJECTION, SOLUTION INTRAMUSCULAR; INTRAVENOUS; SUBCUTANEOUS
Status: DISCONTINUED | OUTPATIENT
Start: 2019-12-10 | End: 2019-12-11 | Stop reason: HOSPADM

## 2019-12-10 RX ORDER — ASPIRIN 325 MG
325 TABLET, DELAYED RELEASE (ENTERIC COATED) ORAL 2 TIMES DAILY
Qty: 84 TABLET | Refills: 0 | Status: SHIPPED | OUTPATIENT
Start: 2019-12-10 | End: 2020-01-02 | Stop reason: ALTCHOICE

## 2019-12-10 RX ORDER — PROPOFOL 10 MG/ML
INJECTION, EMULSION INTRAVENOUS PRN
Status: DISCONTINUED | OUTPATIENT
Start: 2019-12-10 | End: 2019-12-10

## 2019-12-10 RX ORDER — CEFAZOLIN SODIUM 2 G/100ML
2 INJECTION, SOLUTION INTRAVENOUS EVERY 8 HOURS
Status: COMPLETED | OUTPATIENT
Start: 2019-12-10 | End: 2019-12-10

## 2019-12-10 RX ORDER — TIZANIDINE 2 MG/1
2-4 TABLET ORAL EVERY 6 HOURS PRN
Status: DISCONTINUED | OUTPATIENT
Start: 2019-12-10 | End: 2019-12-11 | Stop reason: HOSPADM

## 2019-12-10 RX ORDER — ONDANSETRON 4 MG/1
4 TABLET, ORALLY DISINTEGRATING ORAL EVERY 30 MIN PRN
Status: DISCONTINUED | OUTPATIENT
Start: 2019-12-10 | End: 2019-12-10 | Stop reason: HOSPADM

## 2019-12-10 RX ORDER — ONDANSETRON 2 MG/ML
4 INJECTION INTRAMUSCULAR; INTRAVENOUS EVERY 30 MIN PRN
Status: DISCONTINUED | OUTPATIENT
Start: 2019-12-10 | End: 2019-12-10 | Stop reason: HOSPADM

## 2019-12-10 RX ORDER — NALOXONE HYDROCHLORIDE 0.4 MG/ML
.1-.4 INJECTION, SOLUTION INTRAMUSCULAR; INTRAVENOUS; SUBCUTANEOUS
Status: ACTIVE | OUTPATIENT
Start: 2019-12-10 | End: 2019-12-11

## 2019-12-10 RX ORDER — CEFAZOLIN SODIUM 1 G/3ML
1 INJECTION, POWDER, FOR SOLUTION INTRAMUSCULAR; INTRAVENOUS SEE ADMIN INSTRUCTIONS
Status: DISCONTINUED | OUTPATIENT
Start: 2019-12-10 | End: 2019-12-10 | Stop reason: HOSPADM

## 2019-12-10 RX ORDER — CEFAZOLIN SODIUM 2 G/100ML
2 INJECTION, SOLUTION INTRAVENOUS
Status: COMPLETED | OUTPATIENT
Start: 2019-12-10 | End: 2019-12-10

## 2019-12-10 RX ORDER — ONDANSETRON 2 MG/ML
4 INJECTION INTRAMUSCULAR; INTRAVENOUS EVERY 6 HOURS PRN
Status: DISCONTINUED | OUTPATIENT
Start: 2019-12-10 | End: 2019-12-11 | Stop reason: HOSPADM

## 2019-12-10 RX ORDER — LIDOCAINE 40 MG/G
CREAM TOPICAL
Status: DISCONTINUED | OUTPATIENT
Start: 2019-12-10 | End: 2019-12-10 | Stop reason: HOSPADM

## 2019-12-10 RX ORDER — ONDANSETRON 4 MG/1
4 TABLET, ORALLY DISINTEGRATING ORAL EVERY 8 HOURS PRN
Qty: 20 TABLET | Refills: 0 | Status: SHIPPED | OUTPATIENT
Start: 2019-12-10 | End: 2020-01-23

## 2019-12-10 RX ORDER — METOCLOPRAMIDE HYDROCHLORIDE 5 MG/ML
10 INJECTION INTRAMUSCULAR; INTRAVENOUS EVERY 6 HOURS PRN
Status: DISCONTINUED | OUTPATIENT
Start: 2019-12-10 | End: 2019-12-10

## 2019-12-10 RX ORDER — MEPERIDINE HYDROCHLORIDE 25 MG/ML
12.5 INJECTION INTRAMUSCULAR; INTRAVENOUS; SUBCUTANEOUS EVERY 5 MIN PRN
Status: DISCONTINUED | OUTPATIENT
Start: 2019-12-10 | End: 2019-12-10 | Stop reason: HOSPADM

## 2019-12-10 RX ORDER — CALCIUM CARBONATE 500 MG/1
1000 TABLET, CHEWABLE ORAL 4 TIMES DAILY PRN
Status: DISCONTINUED | OUTPATIENT
Start: 2019-12-10 | End: 2019-12-11 | Stop reason: HOSPADM

## 2019-12-10 RX ORDER — DEXAMETHASONE SODIUM PHOSPHATE 10 MG/ML
INJECTION, SOLUTION INTRAMUSCULAR; INTRAVENOUS PRN
Status: DISCONTINUED | OUTPATIENT
Start: 2019-12-10 | End: 2019-12-10

## 2019-12-10 RX ORDER — ONDANSETRON 4 MG/1
4 TABLET, ORALLY DISINTEGRATING ORAL EVERY 6 HOURS PRN
Status: DISCONTINUED | OUTPATIENT
Start: 2019-12-10 | End: 2019-12-11 | Stop reason: HOSPADM

## 2019-12-10 RX ADMIN — DOCUSATE SODIUM 200 MG: 100 CAPSULE, LIQUID FILLED ORAL at 13:02

## 2019-12-10 RX ADMIN — PHENYLEPHRINE HYDROCHLORIDE 100 MCG: 10 INJECTION INTRAVENOUS at 08:00

## 2019-12-10 RX ADMIN — HYDROXYZINE HYDROCHLORIDE 25 MG: 25 TABLET, FILM COATED ORAL at 20:15

## 2019-12-10 RX ADMIN — DEXAMETHASONE SODIUM PHOSPHATE 2.5 MG: 10 INJECTION, SOLUTION INTRAMUSCULAR; INTRAVENOUS at 10:19

## 2019-12-10 RX ADMIN — PHENYLEPHRINE HYDROCHLORIDE 100 MCG: 10 INJECTION INTRAVENOUS at 08:15

## 2019-12-10 RX ADMIN — CEFAZOLIN SODIUM 2 G: 2 INJECTION, SOLUTION INTRAVENOUS at 16:25

## 2019-12-10 RX ADMIN — Medication 5 ML: at 10:21

## 2019-12-10 RX ADMIN — ACETAMINOPHEN 975 MG: 325 TABLET, FILM COATED ORAL at 13:04

## 2019-12-10 RX ADMIN — OXYCODONE HYDROCHLORIDE 10 MG: 5 TABLET ORAL at 20:15

## 2019-12-10 RX ADMIN — TIZANIDINE 4 MG: 2 TABLET ORAL at 23:12

## 2019-12-10 RX ADMIN — SODIUM CHLORIDE, POTASSIUM CHLORIDE, SODIUM LACTATE AND CALCIUM CHLORIDE: 600; 310; 30; 20 INJECTION, SOLUTION INTRAVENOUS at 11:37

## 2019-12-10 RX ADMIN — MIDAZOLAM 2 MG: 1 INJECTION INTRAMUSCULAR; INTRAVENOUS at 07:22

## 2019-12-10 RX ADMIN — DEXAMETHASONE SODIUM PHOSPHATE 10 MG: 10 INJECTION, SOLUTION INTRAMUSCULAR; INTRAVENOUS at 08:08

## 2019-12-10 RX ADMIN — TRANEXAMIC ACID 1 G: 100 INJECTION, SOLUTION INTRAVENOUS at 08:46

## 2019-12-10 RX ADMIN — CEFAZOLIN SODIUM 2 G: 2 INJECTION, SOLUTION INTRAVENOUS at 22:31

## 2019-12-10 RX ADMIN — TIZANIDINE 4 MG: 2 TABLET ORAL at 16:37

## 2019-12-10 RX ADMIN — LIDOCAINE HYDROCHLORIDE 1 ML: 10 INJECTION, SOLUTION EPIDURAL; INFILTRATION; INTRACAUDAL; PERINEURAL at 07:23

## 2019-12-10 RX ADMIN — ASPIRIN 325 MG: 325 TABLET, COATED ORAL at 13:04

## 2019-12-10 RX ADMIN — OXYCODONE HYDROCHLORIDE 10 MG: 5 TABLET ORAL at 23:12

## 2019-12-10 RX ADMIN — HYDROMORPHONE HYDROCHLORIDE 0.2 MG: 1 INJECTION, SOLUTION INTRAMUSCULAR; INTRAVENOUS; SUBCUTANEOUS at 14:17

## 2019-12-10 RX ADMIN — Medication 5 ML: at 10:20

## 2019-12-10 RX ADMIN — PROPOFOL 50 MG: 10 INJECTION, EMULSION INTRAVENOUS at 07:36

## 2019-12-10 RX ADMIN — FENTANYL CITRATE 25 MCG: 50 INJECTION, SOLUTION INTRAMUSCULAR; INTRAVENOUS at 07:33

## 2019-12-10 RX ADMIN — ONDANSETRON 4 MG: 2 INJECTION INTRAMUSCULAR; INTRAVENOUS at 08:08

## 2019-12-10 RX ADMIN — BUPIVACAINE HYDROCHLORIDE IN DEXTROSE 2 ML: 7.5 INJECTION, SOLUTION SUBARACHNOID at 07:33

## 2019-12-10 RX ADMIN — DEXAMETHASONE SODIUM PHOSPHATE 2.5 MG: 10 INJECTION, SOLUTION INTRAMUSCULAR; INTRAVENOUS at 10:20

## 2019-12-10 RX ADMIN — PROPOFOL 100 MCG/KG/MIN: 10 INJECTION, EMULSION INTRAVENOUS at 07:36

## 2019-12-10 RX ADMIN — Medication 2 LOZENGE: at 21:19

## 2019-12-10 RX ADMIN — SODIUM CHLORIDE, POTASSIUM CHLORIDE, SODIUM LACTATE AND CALCIUM CHLORIDE: 600; 310; 30; 20 INJECTION, SOLUTION INTRAVENOUS at 07:23

## 2019-12-10 RX ADMIN — Medication 5 ML: at 10:22

## 2019-12-10 RX ADMIN — ACETAMINOPHEN 975 MG: 325 TABLET, FILM COATED ORAL at 20:15

## 2019-12-10 RX ADMIN — DOCUSATE SODIUM 200 MG: 100 CAPSULE, LIQUID FILLED ORAL at 20:16

## 2019-12-10 RX ADMIN — CEFAZOLIN SODIUM 2 G: 2 INJECTION, SOLUTION INTRAVENOUS at 07:41

## 2019-12-10 RX ADMIN — OXYCODONE HYDROCHLORIDE 5 MG: 5 TABLET ORAL at 13:04

## 2019-12-10 RX ADMIN — Medication 5 ML: at 10:19

## 2019-12-10 RX ADMIN — FENTANYL CITRATE 75 MCG: 50 INJECTION, SOLUTION INTRAMUSCULAR; INTRAVENOUS at 07:27

## 2019-12-10 RX ADMIN — OXYCODONE HYDROCHLORIDE 10 MG: 5 TABLET ORAL at 16:37

## 2019-12-10 RX ADMIN — HYDROMORPHONE HYDROCHLORIDE 0.2 MG: 1 INJECTION, SOLUTION INTRAMUSCULAR; INTRAVENOUS; SUBCUTANEOUS at 22:29

## 2019-12-10 RX ADMIN — PROPOFOL 50 MG: 10 INJECTION, EMULSION INTRAVENOUS at 08:10

## 2019-12-10 RX ADMIN — SODIUM CHLORIDE, POTASSIUM CHLORIDE, SODIUM LACTATE AND CALCIUM CHLORIDE: 600; 310; 30; 20 INJECTION, SOLUTION INTRAVENOUS at 09:07

## 2019-12-10 RX ADMIN — DEXAMETHASONE SODIUM PHOSPHATE 2.5 MG: 10 INJECTION, SOLUTION INTRAMUSCULAR; INTRAVENOUS at 10:22

## 2019-12-10 RX ADMIN — GLYCOPYRROLATE 0.2 MG: 0.2 INJECTION, SOLUTION INTRAMUSCULAR; INTRAVENOUS at 08:38

## 2019-12-10 RX ADMIN — DEXAMETHASONE SODIUM PHOSPHATE 2.5 MG: 10 INJECTION, SOLUTION INTRAMUSCULAR; INTRAVENOUS at 10:21

## 2019-12-10 RX ADMIN — ASPIRIN 325 MG: 325 TABLET, COATED ORAL at 20:15

## 2019-12-10 RX ADMIN — GABAPENTIN 200 MG: 100 CAPSULE ORAL at 21:19

## 2019-12-10 RX ADMIN — HYDROXYZINE HYDROCHLORIDE 25 MG: 25 TABLET, FILM COATED ORAL at 13:04

## 2019-12-10 ASSESSMENT — ACTIVITIES OF DAILY LIVING (ADL)
BATHING: 0-->INDEPENDENT
RETIRED_EATING: 0-->INDEPENDENT
TRANSFERRING: 0-->INDEPENDENT
SWALLOWING: 0-->SWALLOWS FOODS/LIQUIDS WITHOUT DIFFICULTY
COGNITION: 0 - NO COGNITION ISSUES REPORTED
TOILETING: 0-->INDEPENDENT
NUMBER_OF_TIMES_PATIENT_HAS_FALLEN_WITHIN_LAST_SIX_MONTHS: 4
FALL_HISTORY_WITHIN_LAST_SIX_MONTHS: YES
RETIRED_COMMUNICATION: 0-->UNDERSTANDS/COMMUNICATES WITHOUT DIFFICULTY
AMBULATION: 0-->INDEPENDENT
DRESS: 0-->INDEPENDENT

## 2019-12-10 ASSESSMENT — MIFFLIN-ST. JEOR: SCORE: 1799.69

## 2019-12-10 NOTE — ANESTHESIA PROCEDURE NOTES
Peripheral nerve/Neuraxial procedure note : Adductor canal  Pre-Procedure  Performed by  Genesis Pedroza APRN CRNA   Location: OR    Procedure Times:12/10/2019 10:16 AM and 12/10/2019 10:22 AM  Pre-Anesthestic Checklist: patient identified, IV checked, site marked, risks and benefits discussed, informed consent, monitors and equipment checked, pre-op evaluation, at physician/surgeon's request and post-op pain management    Timeout  Correct Patient: Yes   Correct Procedure: Yes   Correct Site: Yes   Correct Laterality: Yes   Correct Position: Yes   Site Marked: Yes   .   Procedure Documentation    Diagnosis:RIGHT KNEE REPLACEMENT.    Procedure: Adductor canal, right.   Patient Position:supine   Ultrasound used to identify targeted nerve, plexus, or vascular marker and placed a needle adjacent to it., Ultrasound was used to visualize the spread of the anesthetic in close proximity to the above stated nerve. A permanent image is entered into the patient's record.  Patient Prep/Sterile Barriers; mask, sterile gloves, chlorhexidine gluconate and isopropyl alcohol, patient draped.  .  Needle: insulated   Needle Gauge: 21.  Needle Length (millimeters) 100  Insertion Method: Single Shot.        Assessment/Narrative  Injection made incrementally with aspirations every 5 mL..  The placement was negative for: blood aspirated, painful injection and site bleeding.  Bolus given via needle..   Secured via.   Complications: none. Comments:  Right adductor canal block placed without any apparent complications. Patient had no pain prior to block placement due to spinal anesthetic still in place, but able to move legs and toes.  After block placement patient stated that he still had no pain. Will continue to follow for post op pain management.

## 2019-12-10 NOTE — OR NURSING
Transfer from PACU to Room 252  Transferred to bed via Gly[place] mat    S: 57 y/o M  S/P right knee replacement       Anesthesia Type:  Spinal, MAC, peripheral nerve block       Surgeon:  Dr. Yin       Allergies:  See Medication Reconciliation Record       DNR: NO      B:  Pertinent Medical History:   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)     Overnight stay due to blood poisoning     Gastric ulcer, unspecified as acute or chronic, without mention of hemorrhage or perforation                   Surgical History:    Past Surgical History:   Procedure Laterality Date     ARTHROSCOPY KNEE  5/29/2013    Procedure: ARTHROSCOPY KNEE;  Right knee arthroscopy with partial medial and partial lateral menisectomies;  Surgeon: Phani Mclaughlin MD;  Location:  OR     ARTHROSCOPY SHOULDER BICEPS TENODESIS REPAIR Right 1/13/2017    Procedure: ARTHROSCOPY SHOULDER BICEPS TENODESIS REPAIR;  Surgeon: Javier Yin DO;  Location:  OR     ARTHROSCOPY SHOULDER DECOMPRESSION Right 1/13/2017    Procedure: ARTHROSCOPY SHOULDER DECOMPRESSION;  Surgeon: Javier Yin DO;  Location:  OR     ARTHROSCOPY SHOULDER ROTATOR CUFF REPAIR Right 1/13/2017    Procedure: ARTHROSCOPY SHOULDER ROTATOR CUFF REPAIR;  Surgeon: Javier Yin DO;  Location: [facility] OR     COLONOSCOPY  11/01/2007     COLONOSCOPY  12/12/11     COLONOSCOPY N/A 11/18/2015    Procedure: COLONOSCOPY;  Surgeon: Migue Mclaughlin MD;  Location:  GI     COLONOSCOPY N/A 3/22/2017    Procedure: COMBINED COLONOSCOPY, SINGLE OR MULTIPLE BIOPSY/POLYPECTOMY BY BIOPSY;  Surgeon: Salvatore Zepeda MD;  Location:  GI     COLONOSCOPY N/A 3/5/2019    Procedure: COLONOSCOPY;  Surgeon: Prakash Ervin DO;  Location:  GI     ENDOSCOPY  01/27/12    Upper GI - [facility]      ESOPHAGOSCOPY, GASTROSCOPY, DUODENOSCOPY (EGD), COMBINED N/A 11/18/2015    Procedure: COMBINED ESOPHAGOSCOPY, GASTROSCOPY, DUODENOSCOPY (EGD), BIOPSY SINGLE OR MULTIPLE;  Surgeon: Migue Mclaughlin MD;  Location: [facility] GI     ESOPHAGOSCOPY, GASTROSCOPY, DUODENOSCOPY (EGD), COMBINED N/A 3/22/2017    Procedure: COMBINED ESOPHAGOSCOPY, GASTROSCOPY, DUODENOSCOPY (EGD), BIOPSY SINGLE OR MULTIPLE;  Surgeon: Salvatore Zepeda MD;  Location: [facility] GI     HC UGI ENDOSCOPY, SIMPLE EXAM  10/31/2007     HERNIORRHAPHY UMBILICAL N/A 3/12/2015    Procedure: HERNIORRHAPHY UMBILICAL;  Surgeon: Yared Ma MD;  Location: [facility] OR     IRRIGATION AND DEBRIDEMENT UPPER EXTREMITY, COMBINED Left 3/15/2016    Procedure: COMBINED IRRIGATION AND DEBRIDEMENT UPPER EXTREMITY;  Surgeon: Javier Yin DO;  Location: [facility] OR     REMOVAL OF SPERM DUCT(S)      Vasectomy         A:  EBL: 10cc        IVF:  1300cc        UOP:  200cc per [place], removed in OR        NPO:  ___Yes _X__No         Vomiting:  ___Yes _XNo         Drainage: none        Skin Integrity: Intact except incision under dressing- UTV         RFO: ___Yes_X__No (identify item if present)        SSI Patient?  ___Yes__X_No (if yes, see checklist for actions)        Brace/sling/equipment:  __X_Yes___No knee brace         See PACU record for ongoing assessment, vital signs and pain assessment.    R: Post-Op vitals and assessments as ordered/indicated per patient's condition.       Follow Post-Op orders and notify Physician prn.       Continue to involve patient/family in plan of care and discharge planning.       Reinforce Pre-Operative education.       Implement skin safety interventions as appropriate.    Name: Tali Calle RN

## 2019-12-10 NOTE — OP NOTE
"Procedure Date: 12/10/2019      PROCEDURE:  Right knee primary osteoarthritis.      POSTOPERATIVE DIAGNOSIS:  Right knee primary osteoarthritis.      PROCEDURE:  Right total knee arthroplasty.      SURGEON:  Javier Yin DO      FIRST ASSISTANT:  DONN Tse, CNP, (utilized throughout the procedure, assisting with soft tissue retraction, assisting with trial and final implant placement, and providing skin closure.      ANESTHESIA:  Spinal, IV sedation.      COMPLICATIONS:  None.      ESTIMATED BLOOD LOSS:  10 mL.      FLUIDS:  1050 mL crystalloids.      URINE OUTPUT:  200 mL.      TOURNIQUET TIME:  82 minutes at 307 mmHg.      COUNTS:  Correct.      DISPOSITION:  To PACU in stable condition.      IMPLANT:  Includes a DePuy Attune size 8 posterior stabilized femoral component with a size 8 rotating platform tibial baseplate, a 7 mm polyethylene insert and a 41 mm patellar button.      GROSS FINDINGS:  There was bone-on-bone arthritis of the medial compartment.  There were also rimming osteophytes, lateral compartment.  He had a focal area of full thickness cartilage loss, lateral femoral condyle.  He was also ACL deficient.      INDICATIONS:  This is a 58-year-old male with complaints of right knee pain.  The pain has been present for many years.  He had a previous injury to his ACL many years ago, he reported.  He had an arthroscopy to \"clean the cartilage out\" in the past.  He has attempted rest, activity modification, intra-articular steroid injections, Synvisc injections, Tylenol, ibuprofen, rest and activity modification with no improvement.  His knee will give out on him.  He has pain that wakes him at night and causes him to limp.  Radiographs are consistent with his clinical exam.  Given his continued pain refractory to conservative care and impacting the quality of his life, we discussed proceeding with total knee arthroplasty.  The risks, benefits, complications, alternatives and anticipated " postop course were discussed.  The postoperative timeframe for recovery was reviewed.  Once discussed, he opted to proceed.      DETAILS OF PROCEDURE:  He was met preoperatively, again informed consent was verified, the appropriate extremity was marked, and he was wheeled to operative suite #1.  He was transferred to the OR table without any issues.  When deemed appropriate by Anesthesia, the right lower extremity was sterilely prepped and draped in the normal manner.  Prior to incision, a timeout was performed.  Again, the appropriate patient, surgery and extremity were verified and antibiotics administered.        A HemaClear tourniquet was applied.  A midline skin incision was followed with a medial parapatellar arthrotomy.  The anterior fat pad was excised.  The medial tibia was skeletonized with no releases performed.  The knee was flexed up.  Collateral ligament retractors were placed and maintained through the key components of the procedure.  A drill was utilized to enter the distal intramedullary canal of the femur with no issues.  This was followed with the distal guide.  The distal guide was very gradually and easily inserted intramedullary.  It was set to take 10 mm off distal and 5 degrees of valgus.  With this pinned into position, the cut on the medial lateral aspect of the distal femur was performed with no issues.  Attention was turned to the tibia.  The tibia was presented and a posterior retractor placed.  The extramedullary tibia device was placed, set to take 4 mm off the medial side, which was the low side.  With care to recreate the slope and varus valgus alignment, the jig was pinned into position.  With this confirmed, the cut on the proximal tibia was performed with no problems.  The bone fragment was removed.  The knee was brought out to full extension.  A spacer block was placed and showed a balanced resection in full extension.  A drop pradip was placed in full extension, showing normal  alignment.  The knee was flexed up to 90 degrees.  A tibial baseplate was placed and a drop pradip placed showing an acceptable resection.  The knee was repositioned and retractors were placed.  The distal sizing guide was placed.  Rotation was based on the [place] line and the epicondylar axis.  The pins were placed.  It measured a size 8.  The size 8, 4-in-1 block was placed.  At 90 degrees in flexion, his space between the tibia and the 4-in-1 block was equal to his extension space.  The 4-in-1 cuts were performed, followed with a box cut with no issues.  The excess bone fragments were removed.  Trial components were placed.  He had 0-130 degrees of motion.  The patella tracked with the no-thumbs technique through the arc of motion.  The knee was balanced through the full arc of motion.  The patella was then resurfaced back down to anatomic dimensions using a freehand technique.  Again, it was taken through full range of motion showing normal tracking.  The tibia and femur were prepared.  The final components as listed above were cemented in position and held in extension until the cement cured.  During this process, a dilute Betadine lavage was performed for 3 minutes followed by pulse lavage.  The knee was carefully inspected.  Excess bone and cement fragments were removed.  The arthrotomy was closed with 0 Vicryl, followed with 2-0 Vicryl subcutaneous, followed with a running Monocryl suture.  Skin glue was applied.  A sterile bandage was applied.  He subsequently was transferred to the PACU in stable condition.      He will be brought into the hospital for orthopedic care, DVT prophylaxis, pain management and physical therapy.         GEORGE DOOLEY DO             D: 12/10/2019   T: 12/10/2019   MT: JESSE      Name:     DM ASHBY   MRN:      [id number]        Account:        [id number]   :      1960           Procedure Date: 12/10/2019      Document: [id number]

## 2019-12-10 NOTE — INTERVAL H&P NOTE
This H&P has been reviewed and there are no clinically significant changes in the patient s condition.  The patient was evaluated by myself as well as Jhony Mullen prior to surgery. The Patient is approved for the surgery and the stated surgical procedure is still clinically indicated.

## 2019-12-10 NOTE — BRIEF OP NOTE
[facility] Brief Operative Note    Pre-operative diagnosis: right knee primary osteoarthritis    Post-operative diagnosis: right knee primary osteoarthritis    Procedure: Procedure(s):  Right knee replacement   Surgeon:  Anesthesia: Javier Yin DO  Spinal   Assistant(s): Juanjose Elena, APRN, CNP, DNP (A advanced practice provider was necessary for his expertise, exposure and surgical assistance throughout the case.)   Estimated blood loss:  Tourniquet time/pressure:  Urine output:  Fluids: Less than 10 ml  82 minutes at 307 mmHg  200 ml  1050 ml Crystalloids   Specimens: None   Findings: right knee primary osteoarthritis [id number]     Gerber Yin D.O.      Implant Name Type Inv. Item Serial No.  Lot No. LRB No. Used   BONE CEMENT GENTAMYCIN SMART SET [facility] 5450- Cement, Bone BONE CEMENT GENTAMYCIN SMART SET [employer] [id number]  J [id number], [id number] Right 1   femoral posterior stabilized size 8     Depuy [id number] Right 1   tibail base rotating platform size 8    Depuy [id number] Right 1   patella dome 41mm    Depuy [id number] Right 1   tibail insert size 8     Depuy [id number] Right 1

## 2019-12-10 NOTE — ANESTHESIA PROCEDURE NOTES
Peripheral nerve/Neuraxial procedure note : intrathecal  Pre-Procedure  Performed by  Tulio Ferrara APRN CRNA   Location: OR      Pre-Anesthestic Checklist: patient identified, IV checked, risks and benefits discussed, informed consent, monitors and equipment checked and pre-op evaluation    Timeout  Correct Patient: Yes   Correct Procedure: Yes   Correct Site: Yes   Correct Laterality: N/A   Correct Position: Yes   Site Marked: N/A   .   Procedure Documentation  ASA 2  Diagnosis:Right knee arthritis.    Procedure: intrathecal, .   Patient Position:sitting Insertion Site:L3-4  (midline approach)     Patient Prep/Sterile Barriers; mask, sterile gloves, povidone-iodine 7.5% surgical scrub, patient draped.  .  Needle:  Spinal Needle (gauge): 25  Spinal/LP Needle Length (inches): 3.5 # of attempts: 1 and # of redirects: : 0. Introducer used Introducer: 20 G .        Assessment/Narrative  Paresthesias: No.  .  .  clear CSF fluid removed . Time Injected: 07:33while sitting   Sensory Level: T8 Comments:  Easy spinal placement without c/o paresthesias per pt

## 2019-12-10 NOTE — PLAN OF CARE
Discharge Planner PT   Patient plan for discharge: Home with wife assistance, HHPT  Current status: Patient is a 58 year old, day 0 status right total knee arthroplasty, spinal nerve block with MAC and adductor nerve block, 10mL EBL by Dr. Yin. Patient has a previous medical history of depression, anxiety, right rotator cuff and bicep tendon tear 2017, chronic gastric ulcer, ACL tear in 2013, HTN, obesity, and sleep apnea. Prior to admission patient living in a single family home, split level entry with 1 stair to enter from outside and 7 stairs with left hand rail ascending to access upper level living where he will be staying with a step over tub. Patient using no assistive equipment for mobility or cares. He lives with his wife who is available available for 24/7 cares and transportation (truck). Patient working prior to surgery and is highly motivated to return. Currently, patient on 2L O2 NC sats 95%. Patient with good UE and LLE strength and AROM, right knee ROM 5-30 degrees with pain. Completed post operative HEP with wife's assistance and verbal cues, expressed discomfort however tolerated well. Patient min assist donning and doffing knee immobilizer. IND supine to/from sitting EOB with knee immobilizer. IND short sitting balance. CGA sit to stand with knee immobilizer and verbal cues for sequencing. Standing balance with increased LLE WB and no pain, use of 2WW for support. Completed pre-gait tasks with good knee control, no LOB and asymptomatic thus progressed to ambulate. Ambulated 20 ft with 2WW, knee immobilizer, CGA for safety and verbal cues for sequencing. Completed stand to sit with verbal cues for LE manage. IND sit to supine. Positioned with RLE elevated and supported to encourage knee extension. Throughout visit patient and wife educated regarding post operative WBAT, knee immobilizer, activity recommendations, ice, elevate, HEP, HHPT, car transfer, incentive spirometer, CAPNO, and discharge  tomorrow.   Barriers to return to prior living situation: Medical status, stairs, nerve block, pain control  Recommendations for discharge: Home with assistance, HHPT, front wheeled walker  Rationale for recommendations: Patient expressed 8/10 upon PT arrival, however completed evaluation and mobilization without expression of increased pain. Patient with good functional mobility tolerance, support system and support. Anticipate patient to do well with discharge home with family tomorrow. Patient would benefit from continued skilled therapeutic intervention in order to progress them towards a higher level of function in accordance with their surgeon's protocol.       Entered by: Cindi Navas 12/10/2019 4:34 PM     Thank you for your referral.  Cindi Navas, PT, DPT, ATC    [facility]  O: [phone]  E: [email or web address]

## 2019-12-10 NOTE — ANESTHESIA PREPROCEDURE EVALUATION
Anesthesia Pre-Procedure Evaluation    Patient: Hugo Longoria   MRN: [id number] : 1960          Preoperative Diagnosis: right knee arthritis    Procedure(s):  Right knee replacement    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)     Overnight stay due to blood poisoning     Gastric ulcer, unspecified as acute or chronic, without mention of hemorrhage or perforation      Past Surgical History:   Procedure Laterality Date     ARTHROSCOPY KNEE  2013    Procedure: ARTHROSCOPY KNEE;  Right knee arthroscopy with partial medial and partial lateral menisectomies;  Surgeon: Phani Mclaughlin MD;  Location: [facility] OR     ARTHROSCOPY SHOULDER BICEPS TENODESIS REPAIR Right 2017    Procedure: ARTHROSCOPY SHOULDER BICEPS TENODESIS REPAIR;  Surgeon: Javier Yin DO;  Location: [facility] OR     ARTHROSCOPY SHOULDER DECOMPRESSION Right 2017    Procedure: ARTHROSCOPY SHOULDER DECOMPRESSION;  Surgeon: Javier Yin DO;  Location: [facility] OR     ARTHROSCOPY SHOULDER ROTATOR CUFF REPAIR Right 2017    Procedure: ARTHROSCOPY SHOULDER ROTATOR CUFF REPAIR;  Surgeon: Javier Yin DO;  Location: [facility] OR     COLONOSCOPY  2007     COLONOSCOPY  11     COLONOSCOPY N/A 2015    Procedure: COLONOSCOPY;  Surgeon: Migue Mclaughlin MD;  Location:  GI     COLONOSCOPY N/A 3/22/2017    Procedure: COMBINED COLONOSCOPY, SINGLE OR MULTIPLE BIOPSY/POLYPECTOMY BY BIOPSY;  Surgeon: Salvatore Zepeda MD;  Location:  GI     COLONOSCOPY N/A 3/5/2019    Procedure: COLONOSCOPY;  Surgeon: Prakash Ervin DO;  Location:  GI     ENDOSCOPY  12    Upper GI [facility]     ESOPHAGOSCOPY, GASTROSCOPY, DUODENOSCOPY (EGD), COMBINED N/A 2015    Procedure: COMBINED ESOPHAGOSCOPY, GASTROSCOPY, DUODENOSCOPY  (EGD), BIOPSY SINGLE OR MULTIPLE;  Surgeon: Migue Mclaughlin MD;  Location: [facility] GI     ESOPHAGOSCOPY, GASTROSCOPY, DUODENOSCOPY (EGD), COMBINED N/A 3/22/2017    Procedure: COMBINED ESOPHAGOSCOPY, GASTROSCOPY, DUODENOSCOPY (EGD), BIOPSY SINGLE OR MULTIPLE;  Surgeon: Salvatore Zepeda MD;  Location: [facility] GI     HC UGI ENDOSCOPY, SIMPLE EXAM  10/31/2007     HERNIORRHAPHY UMBILICAL N/A 3/12/2015    Procedure: HERNIORRHAPHY UMBILICAL;  Surgeon: Yared Ma MD;  Location: [facility] OR     IRRIGATION AND DEBRIDEMENT UPPER EXTREMITY, COMBINED Left 3/15/2016    Procedure: COMBINED IRRIGATION AND DEBRIDEMENT UPPER EXTREMITY;  Surgeon: Javier Yin DO;  Location: [facility] OR     REMOVAL OF SPERM DUCT(S)      Vasectomy       Anesthesia Evaluation     . Pt has had prior anesthetic. Type: General    No history of anesthetic complications          ROS/MED HX    ENT/Pulmonary:     (+)sleep apnea, doesn't use CPAP , . .    Neurologic:  - neg neurologic ROS     Cardiovascular:     (+) Dyslipidemia, hypertension----. : . . . :. . Previous cardiac testing date:results:date: results:ECG reviewed date:11/20/19 results:Sinus Rhythm - frequent ectopic ventricular beat s   # VECs = 3 date: results:          METS/Exercise Tolerance:  >4 METS   Hematologic: Comments: Transfusion due to gastric ulcer    (+) History of Transfusion no previous transfusion reaction -      Musculoskeletal:   (+) arthritis,  other musculoskeletal- right shoulder      GI/Hepatic: Comment: Gastric ulcers requiring multiple Transfusions in 2004 - neg GI/hepatic ROS   (+) GERD Asymptomatic on medication, bowel prep, Other GI/Hepatic history pud      Renal/Genitourinary:  - ROS Renal section negative   (+) Pt has no history of transplant,       Endo:     (+) Obesity, .      Psychiatric:     (+) psychiatric history anxiety and depression      Infectious Disease:  - neg infectious disease ROS       Malignancy:      - no malignancy   Other:    - neg other ROS  "                     Physical Exam  Normal systems: cardiovascular, pulmonary and dental    Airway   Mallampati: II  TM distance: >3 FB  Neck ROM: full    Dental     Cardiovascular   Rhythm and rate: regular and normal      Pulmonary    breath sounds clear to auscultation    Other findings: Last dose Lisinopril was 24 hrs ago        Lab Results   Component Value Date    WBC 4.5 11/20/2019    HGB 15.0 11/20/2019    HCT 42.3 11/20/2019     11/20/2019    CRP <5.0 09/29/2011     11/20/2019    POTASSIUM 3.8 11/20/2019    CHLORIDE 108 11/20/2019    CO2 27 11/20/2019    BUN 13 11/20/2019    CR 0.72 11/20/2019     (H) 11/20/2019    CORRINE 8.5 11/20/2019    ALBUMIN 4.2 03/12/2013    PROTTOTAL 7.3 03/12/2013    ALT 64 09/22/2015    AST 22 09/22/2015    ALKPHOS 62 03/12/2013    BILITOTAL 0.6 03/12/2013    BILIDIRECT 0 10/29/2001    LIPASE 89 10/29/2001    TSH 3.14 03/12/2013       Preop Vitals  BP Readings from Last 3 Encounters:   12/05/19 126/70   11/20/19 120/60   09/05/19 102/60    Pulse Readings from Last 3 Encounters:   11/20/19 88   08/22/19 84   03/05/19 73      Resp Readings from Last 3 Encounters:   11/20/19 16   08/22/19 20   03/05/19 20    SpO2 Readings from Last 3 Encounters:   11/20/19 97%   08/22/19 96%   03/05/19 93%      Temp Readings from Last 1 Encounters:   11/20/19 98  F (36.7  C) (Temporal)    Ht Readings from Last 1 Encounters:   12/05/19 1.753 m (5' 9\")      Wt Readings from Last 1 Encounters:   12/05/19 98.7 kg (217 lb 8 oz)    Estimated body mass index is 32.12 kg/m  as calculated from the following:    Height as of 12/5/19: 1.753 m (5' 9\").    Weight as of 12/5/19: 98.7 kg (217 lb 8 oz).       Anesthesia Plan      History & Physical Review  History and physical reviewed and following examination; no interval change.    ASA Status:  2 .    NPO Status:  > 8 hours    Plan for Peripheral Nerve Block, For Post-op pain in coordination with surgeon and Spinal with Intravenous and Propofol " induction. Maintenance will be TIVA.    PONV prophylaxis:  Ondansetron (or other 5HT-3) and Dexamethasone or Solumedrol  Post operative placement of right adductor canal clock was discussed and the pt. Consents to plan.      Postoperative Care  Postoperative pain management:  IV analgesics, Oral pain medications, Peripheral nerve block (Single Shot) and Multi-modal analgesia.      Consents  Anesthetic plan, risks, benefits and alternatives discussed with:  Patient.  Use of blood products discussed: No .   .                 DONN Quiñonez CRNA

## 2019-12-10 NOTE — PROGRESS NOTES
S-(situation): Patient registered to Observation. Patient arrived to room 252 via bed from PACU.    B-(background): Right TKA    A-(assessment): Pt. A and O x4. VSS. Lungs clear on ausculation. No drainage from spinal site. No tingling but slight numbness to right foot.     R-(recommendations): Orders and observation goals reviewed with patient and spouse.    Nursing Observation criteria listed below was met:    Skin issues/needs documented:Yes  Isolation needs addressed, if appropriate: Yes  Fall Prevention: Education given and documented: Yes  Education Assessment documented:Yes  Education Documented (Pre-existing chronic infection such as, MRSA/VRE need education on admission): Yes  OBS video/handout Reviewed & DocumentedYes  Medication Reconciliation Complete: Yes  New medication patient education completed and documented (Possible Side Effects of Common Medications handout): Yes  Home medications if not able to send immediately home with family stored here: Yes  Reminder note placed in discharge instructions: Yes  Patient has discharge needs (If yes, please explain): No

## 2019-12-10 NOTE — ANESTHESIA CARE TRANSFER NOTE
Patient: Hugo Longoria    Procedure(s):  Right knee replacement    Diagnosis: right knee arthritis  Diagnosis Additional Information: No value filed.    Anesthesia Type:   Peripheral Nerve Block, For Post-op pain in coordination with surgeon, Spinal, MAC     Note:  Airway :Face Mask  Patient transferred to:PACU  Handoff Report: Identifed the Patient, Identified the Reponsible Provider, Reviewed the pertinent medical history, Discussed the surgical course, Reviewed Intra-OP anesthesia mangement and issues during anesthesia, Set expectations for post-procedure period and Allowed opportunity for questions and acknowledgement of understanding      Vitals: (Last set prior to Anesthesia Care Transfer)    CRNA VITALS  12/10/2019 0852 - 12/10/2019 0948      12/10/2019             SpO2:  (!) 80 %                Electronically Signed By: DONN Quiñonez CRNA  December 10, 2019  9:48 AM

## 2019-12-11 ENCOUNTER — APPOINTMENT (OUTPATIENT)
Dept: PHYSICAL THERAPY | Facility: CLINIC | Age: 59
End: 2019-12-11
Attending: ORTHOPAEDIC SURGERY
Payer: COMMERCIAL

## 2019-12-11 VITALS
SYSTOLIC BLOOD PRESSURE: 129 MMHG | TEMPERATURE: 97 F | RESPIRATION RATE: 16 BRPM | BODY MASS INDEX: 32.14 KG/M2 | DIASTOLIC BLOOD PRESSURE: 73 MMHG | HEART RATE: 60 BPM | WEIGHT: 217 LBS | OXYGEN SATURATION: 95 % | HEIGHT: 69 IN

## 2019-12-11 LAB
GLUCOSE SERPL-MCNC: 135 MG/DL (ref 70–99)
HGB BLD-MCNC: 13.3 G/DL (ref 13.3–17.7)

## 2019-12-11 PROCEDURE — 97110 THERAPEUTIC EXERCISES: CPT | Mod: GP | Performed by: PHYSICAL THERAPIST

## 2019-12-11 PROCEDURE — 97116 GAIT TRAINING THERAPY: CPT | Mod: GP | Performed by: PHYSICAL THERAPIST

## 2019-12-11 PROCEDURE — 36415 COLL VENOUS BLD VENIPUNCTURE: CPT | Performed by: NURSE PRACTITIONER

## 2019-12-11 PROCEDURE — 25000132 ZZH RX MED GY IP 250 OP 250 PS 637: Performed by: NURSE PRACTITIONER

## 2019-12-11 PROCEDURE — G0008 ADMIN INFLUENZA VIRUS VAC: HCPCS

## 2019-12-11 PROCEDURE — 25000128 H RX IP 250 OP 636: Performed by: ORTHOPAEDIC SURGERY

## 2019-12-11 PROCEDURE — 85018 HEMOGLOBIN: CPT | Performed by: NURSE PRACTITIONER

## 2019-12-11 PROCEDURE — 25000128 H RX IP 250 OP 636: Performed by: NURSE PRACTITIONER

## 2019-12-11 PROCEDURE — 82947 ASSAY GLUCOSE BLOOD QUANT: CPT | Performed by: NURSE PRACTITIONER

## 2019-12-11 PROCEDURE — 97530 THERAPEUTIC ACTIVITIES: CPT | Mod: GP | Performed by: PHYSICAL THERAPIST

## 2019-12-11 PROCEDURE — 90682 RIV4 VACC RECOMBINANT DNA IM: CPT | Performed by: ORTHOPAEDIC SURGERY

## 2019-12-11 RX ORDER — OXYCODONE HYDROCHLORIDE 5 MG/1
5-10 TABLET ORAL
Qty: 50 TABLET | Refills: 0 | Status: SHIPPED | OUTPATIENT
Start: 2019-12-11 | End: 2019-12-16

## 2019-12-11 RX ADMIN — TIZANIDINE 4 MG: 2 TABLET ORAL at 13:01

## 2019-12-11 RX ADMIN — TIZANIDINE 4 MG: 2 TABLET ORAL at 06:30

## 2019-12-11 RX ADMIN — PANTOPRAZOLE SODIUM 40 MG: 40 TABLET, DELAYED RELEASE ORAL at 07:29

## 2019-12-11 RX ADMIN — ACETAMINOPHEN 975 MG: 325 TABLET, FILM COATED ORAL at 03:03

## 2019-12-11 RX ADMIN — INFLUENZA A VIRUS A/BRISBANE/02/2018 (H1N1) RECOMBINANT HEMAGGLUTININ ANTIGEN, INFLUENZA A VIRUS A/KANSAS/14/2017 (H3N2) RECOMBINANT HEMAGGLUTININ ANTIGEN, INFLUENZA B VIRUS B/PHUKET/3073/2013 RECOMBINANT HEMAGGLUTININ ANTIGEN, AND INFLUENZA B VIRUS B/MARYLAND/15/2016 RECOMBINANT HEMAGGLUTININ ANTIGEN 0.5 ML: 45; 45; 45; 45 INJECTION INTRAMUSCULAR at 11:43

## 2019-12-11 RX ADMIN — Medication 1 LOZENGE: at 09:01

## 2019-12-11 RX ADMIN — OXYCODONE HYDROCHLORIDE 10 MG: 5 TABLET ORAL at 13:00

## 2019-12-11 RX ADMIN — DOCUSATE SODIUM 200 MG: 100 CAPSULE, LIQUID FILLED ORAL at 07:29

## 2019-12-11 RX ADMIN — HYDROXYZINE HYDROCHLORIDE 25 MG: 25 TABLET, FILM COATED ORAL at 09:34

## 2019-12-11 RX ADMIN — OXYCODONE HYDROCHLORIDE 10 MG: 5 TABLET ORAL at 06:29

## 2019-12-11 RX ADMIN — HYDROXYZINE HYDROCHLORIDE 25 MG: 25 TABLET, FILM COATED ORAL at 03:03

## 2019-12-11 RX ADMIN — ACETAMINOPHEN 975 MG: 325 TABLET, FILM COATED ORAL at 13:00

## 2019-12-11 RX ADMIN — ASPIRIN 325 MG: 325 TABLET, COATED ORAL at 07:29

## 2019-12-11 RX ADMIN — OXYCODONE HYDROCHLORIDE 10 MG: 5 TABLET ORAL at 09:34

## 2019-12-11 RX ADMIN — OXYCODONE HYDROCHLORIDE 10 MG: 5 TABLET ORAL at 03:03

## 2019-12-11 RX ADMIN — HYDROMORPHONE HYDROCHLORIDE 0.2 MG: 1 INJECTION, SOLUTION INTRAMUSCULAR; INTRAVENOUS; SUBCUTANEOUS at 00:28

## 2019-12-11 NOTE — PLAN OF CARE
Patient vital signs are at baseline: Yes  Patient able to ambulate as they were prior to admission or with assist devices provided by therapies during their stay:  Yes, pt ambulating well in the hallway. Pt is very motivated to walk and do exercises.   Patient MUST void prior to discharge:  No,  Reason: Pt's urine is red with nickel sized clots. Pt states he has to strain in order to pass the clots. MD notified and will continue to monitor urine output.  Patient able to tolerate oral intake:  Yes  Pain has adequate pain control using Oral analgesics:  No,  Reason:  Pt states he is still in intolerable pain with all prescribed PRN pain medication. Plan to continue to stay on top of PRN pain meds using q2h Dilaudid for breakthrough pain. Will follow up with MD if pain is still not in control.

## 2019-12-11 NOTE — DISCHARGE SUMMARY
"[facility] Discharge Summary     Hugo Longoria MRN# [id number]   YOB: 1960 Age: 58 year old     Date of Admission:  12/10/2019  Date of Discharge:  12/11/2019  1:35 PM  Admitting Physician:  Javier Yin DO  Discharge Physician:  DONN Patel CNP  Discharging Service:  Orthopedics     Primary Provider: Atul Alvarado          Admission Diagnoses:   right knee arthritis  S/P total knee replacement using cement, right          Discharge Diagnosis:     Active Problems:    S/P total knee replacement using cement, right               Discharge Disposition:     Discharged to home           Condition on Discharge:     Discharge condition: Stable   Discharge vitals: Blood pressure 115/75, pulse 75, temperature 96  F (35.6  C), temperature source Oral, resp. rate 16, height 1.753 m (5' 9\"), weight 98.4 kg (217 lb), SpO2 96 %.   Code status on discharge: Full Code           Procedures / Labs / Imaging:   No other procedures performed during this admission          Medications Prior to Admission:     Medications Prior to Admission   Medication Sig Dispense Refill Last Dose     ascorbic acid (VITAMIN C) 250 MG CHEW chewable tablet Take 250 mg by mouth daily   12/9/2019 at Unknown time     atorvastatin (LIPITOR) 20 MG tablet TAKE 1 TABLET BY MOUTH ONCE DAILY 90 tablet 0 12/9/2019 at Unknown time     LANsoprazole (PREVACID) 30 MG DR capsule TAKE 1 CAPSULE BY MOUTH ONCE DAILY 90 capsule 0 12/9/2019 at Unknown time     lisinopril (PRINIVIL/ZESTRIL) 20 MG tablet TAKE 1/2 (ONE-HALF) TABLET BY MOUTH ONCE DAILY 45 tablet 0 12/9/2019 at Unknown time     Multiple Vitamin (MULTIVITAMINS PO) Take  by mouth.   12/9/2019 at Unknown time     fluticasone (FLONASE) 50 MCG/ACT spray Spray 1-2 sprays into both nostrils daily (Patient not taking: Reported on 12/5/2019) 1 Bottle 0 More than a month at Unknown time     [DISCONTINUED] methylPREDNISolone (DEPO-MEDROL) 80 MG/ML injection 1 mL " (80 mg) by INTRA-ARTICULAR route once for 1 dose 1 mL 0      [DISCONTINUED] mupirocin (BACTROBAN) 2 % external ointment Apply 0.5 g into each nostril twice daily for 5 days. 15 g 0 Past Week at Unknown time             Discharge Medications:     Current Discharge Medication List      START taking these medications    Details   acetaminophen (TYLENOL) 325 MG tablet Take 3 tablets (975 mg) by mouth every 8 hours as needed for mild pain  Qty: 100 tablet, Refills: 0    Associated Diagnoses: S/P total knee replacement using cement, right      aspirin (ASA) 325 MG EC tablet Take 1 tablet (325 mg) by mouth 2 times daily  Qty: 84 tablet, Refills: 0    Associated Diagnoses: S/P total knee replacement using cement, right      docusate sodium (COLACE) 100 MG capsule Take 2 capsules (200 mg) by mouth 2 times daily as needed for constipation  Qty: 60 capsule, Refills: 1    Associated Diagnoses: S/P total knee replacement using cement, right      hydrOXYzine (ATARAX) 25 MG tablet Take 1 tablet (25 mg) by mouth every 6 hours as needed for other (adjuvant pain)  Qty: 60 tablet, Refills: 1    Associated Diagnoses: S/P total knee replacement using cement, right      ondansetron (ZOFRAN-ODT) 4 MG ODT tab Take 1 tablet (4 mg) by mouth every 8 hours as needed for nausea or vomiting  Qty: 20 tablet, Refills: 0    Associated Diagnoses: S/P total knee replacement using cement, right      tiZANidine (ZANAFLEX) 4 MG capsule Take 1 capsule (4 mg) by mouth 4 times daily as needed for muscle spasms (pain)  Qty: 60 capsule, Refills: 1    Associated Diagnoses: S/P total knee replacement using cement, right         CONTINUE these medications which have NOT CHANGED    Details   ascorbic acid (VITAMIN C) 250 MG CHEW chewable tablet Take 250 mg by mouth daily    Associated Diagnoses: Primary osteoarthritis of right knee      atorvastatin (LIPITOR) 20 MG tablet TAKE 1 TABLET BY MOUTH ONCE DAILY  Qty: 90 tablet, Refills: 0    Associated Diagnoses:  CARDIOVASCULAR SCREENING; LDL GOAL LESS THAN 130      LANsoprazole (PREVACID) 30 MG DR capsule TAKE 1 CAPSULE BY MOUTH ONCE DAILY  Qty: 90 capsule, Refills: 0    Associated Diagnoses: Chronic gastritis without bleeding, unspecified gastritis type      lisinopril (PRINIVIL/ZESTRIL) 20 MG tablet TAKE 1/2 (ONE-HALF) TABLET BY MOUTH ONCE DAILY  Qty: 45 tablet, Refills: 0    Associated Diagnoses: HTN, goal below 140/90      Multiple Vitamin (MULTIVITAMINS PO) Take  by mouth.      fluticasone (FLONASE) 50 MCG/ACT spray Spray 1-2 sprays into both nostrils daily  Qty: 1 Bottle, Refills: 0    Associated Diagnoses: Acute sinusitis with symptoms > 10 days         STOP taking these medications       methylPREDNISolone (DEPO-MEDROL) 80 MG/ML injection Comments:   Reason for Stopping:         mupirocin (BACTROBAN) 2 % external ointment Comments:   Reason for Stopping:                     Consultations:     No consultations were requested during this admission             Brief History of Illness:   See operative report          Hospital Course:     Patient admitted after routine elective surgery.  Patient discharge POD1 without incident.  By day of discharge discharge: Did well. Per patient felt good.    Continued to improve.  Pain was well-controlled with oral medications.  No fevers.   denies N/v. tolerated oral intake.   Harding had been discontinued in recovery room and had voiding adequate. Did initially have some small blood clots with voiding post harding but this resolved by discharge. Per patient PT went well. Patient expressed desire to discharge this day.  Has family at home to help.  No concerns, questions, or new issues.  Therapy oked for discharge home with family support.                           Significant Results:     None             Pending Results:     None           Discharge Instructions and Follow-Up:     Discharge diet: Regular   Discharge activity: Activity as tolerated   Discharge follow-up: 14 days with  Dr. Yin   Effected Extremity Right leg       Weight Bearing status Weight bearing as tolerated       Lines and drains: None    Wound care: Keep incision covered with hospital dressing (Aquacel) for one week. It is okay to shower with this dressing on. However, do not submerge your dressing and incision in water for roughly 2 weeks. After one week remove this dressing and then keep it clean, dry, and covered. If this dressing become looses or falls off it is ok to cover with gauze and tape.  Wash the incision gently with warm soapy water after removing your hospital dressing and lightly pat dry.            DONN Tse, CNP  Orthopedic Surgery

## 2019-12-11 NOTE — PROVIDER NOTIFICATION
Patient passing nickel size blood clots with urine.   Urine is dark cherry in color.  Pt states it burns with passing of urine but resolves when done voiding.  Pt states his bladder feels empty when done voiding.  Discussed with on call ortho.  Plan at this time is to continue to monitor.  And update as needed.

## 2019-12-11 NOTE — PLAN OF CARE
Discharge Planner PT   Patient plan for discharge: Home with wife  Current status: Patient provided 2WW per post operative DME request. Patient instructed in fit of device and set up to patient. Patient completed paperwork and was provided customer copy. Patient encouraged  Verbal cues for HEP, patient anxious with tasks and seeking specific instructions, encouraged to complete tasks as he is able given written instructions and PT provided corrective cues. IND donning knee immobilizer and placing device. IND supine to sitting EOB. Ambulated 200 ft with 2WW, MOD IND with knee immobilizer, no LOB and good control. Ascend/descend 10 stairs with left hand rail, with step to method and SBA, verbal cues for sequencing. Reviewed retro step up into truck with stair and right hand rail, good control and MOD IND. Set up in straight back chair. Reviewed activity instructions for home, HHPT, HEP purpose, ice, elevation, position at rest.   Barriers to return to prior living situation: none  Recommendations for discharge: Home with 24/7 assistance, front wheeled walker, HHPT  Rationale for recommendations: Patient able to complete functional mobility greater than his home environment and demonstrates no further inpatient skilled PT need. Anticipate patient to do well with discharge home. Patient would benefit from continued skilled therapeutic intervention in order to progress them towards a higher level of function in accordance with their surgeon's protocol.       Entered by: Cindi Navas 12/11/2019 9:03 AM     Physical Therapy Discharge Summary    Reason for therapy discharge:    Discharged to home with home therapy.    Progress towards therapy goal(s). See goals on Care Plan in [facility] electronic health record for goal details.  Goals met    Therapy recommendation(s):    Continued therapy is recommended.  Rationale/Recommendations:  see above.     Thank you for your referral.  Cindi Navas, PT, DPT, ATC    [facility]  [facility]  O: [phone]  E: [email or web address]

## 2019-12-11 NOTE — ANESTHESIA POSTPROCEDURE EVALUATION
Patient: Hugo Longoria    Procedure(s):  Right knee replacement    Diagnosis:right knee arthritis  Diagnosis Additional Information: No value filed.    Anesthesia Type:  Peripheral Nerve Block, For Post-op pain in coordination with surgeon, Spinal, MAC    Note:  Anesthesia Post Evaluation    Patient location during evaluation: Bedside and Floor  Patient participation: Able to fully participate in evaluation  Level of consciousness: awake and alert  Pain management: adequate  Airway patency: patent  Cardiovascular status: stable  Respiratory status: spontaneous ventilation and room air  Hydration status: stable  PONV: none     Anesthetic complications: None    Comments: Appear to tolerate Spinal with post operative adductor canal block well without anesthesia related problems / complications noted.  Pain level adequate per patient. No N  /  V last night.  No complaints per patient.  Will follow as needed.        Last vitals:  Vitals:    12/11/19 0306 12/11/19 0348 12/11/19 0745   BP: 115/75  129/73   Pulse: 75  60   Resp: 13 16 16   Temp: 96  F (35.6  C)  97  F (36.1  C)   SpO2: 96%  95%         Electronically Signed By: DONN Quiñonez [facility]  December 11, 2019  9:53 AM

## 2019-12-11 NOTE — PLAN OF CARE
Patient vital signs are at baseline: Yes  Patient able to ambulate as they were prior to admission or with assist devices provided by therapies during their stay:  Yes  Patient MUST void prior to discharge:  Yes  Patient able to tolerate oral intake:  Yes  Pain has adequate pain control using Oral analgesics:  Yes, using PRN oxycodone, atarax, Zanaflex, and Dilaudid for breakthrough pain

## 2019-12-11 NOTE — PROGRESS NOTES
S-(situation): Patient discharged to home via wheelchair with spouse.    B-(background):  goals met     A-(assessment): VSS. Pain controlled with oral antibiotics. Ambulating well as SBA with walker. Voiding without difficulty. Urine is clear. Tolerating regular diet and has good intake. Aquacel dressing and LOW stocking applied to affected knee.     R-(recommendations): Discharge instructions reviewed with patient and spouse. Listed belongings gathered and returned to patient.  Patient Education resolved: Yes  New medications-Pt. Has been educated about reason of use and side effects Yes  Home and hospital acquired medications returned to patient NA  Medication Bin checked and emptied on discharge Yes

## 2019-12-11 NOTE — PROGRESS NOTES
"[facility]  Orthopedics Progress Note           Assessment and Plan:    Assessment:   Post-operative day #1 Procedure(s):  Right knee replacement        Plan:   Encourage IS  Start or continue physical therapy  Activity as tolerated  Weight-bear as tolerated.  Discharge from hospital today.  Pain control measures  Advance diet as tolerated  Routine wound care: prior to discharge RN to change dressing to sterile Aquacell and LOW stockings.   DVT Prophylaxis: Aspirin , SCD's, Compression Hose  No acute medical issues.            Interval History:   Doing well.  Continues to improve.  Pain is controlled. Did request couple doses of dilaudid, last dose at 0000. Per patient and nursing slept well last night. This am the knee is \"sore\" but getting better. receiving oral medications. Patient requested \"more of the drip (iv dilaudid)\" because he wanted to be \"comfortable all day\" Explained the nature of IV pain medications and discuss will be stopping this. Also discussed goal of pain control is to control pain, and for tolerable pain but total pain relief is not expected this soon after arthroplasty. Oxycodone is working. No fevers.  Is walking, and doing exercises.  Has help at home. No other concerns except noted some blood and pain initially when voiding. Urine clearing up now. Likely related to having harding during surgery., no current symptoms.            Review of Systems:    The patient denies any chest pain, shortness of breath, excessive pain, fever, chills, purulent drainage from the wound, nausea or vomiting.               Physical Exam:   General: awake, alert, appropriate and in no acute distress  Blood pressure 115/75, pulse 75, temperature 96  F (35.6  C), temperature source Oral, resp. rate 16, height 1.753 m (5' 9\"), weight 98.4 kg (217 lb), SpO2 96 %.  Right knee:  Dressing clean, dry and intact. Surrounding skin intact, no breakdown. Calf is soft, nontender with no significant swelling. " Distal neurovascular grossly intact.  Compartments soft and non-tender.  2+ distal pulse, sensation intact to foot, able to df/pf against resistance. Brisk cap refill.            Data:     Results for orders placed or performed during the hospital encounter of 12/10/19 (from the past 24 hour(s))   XR Knee Port Right 1/2 Views    Narrative    KNEE PORTABLE RIGHT ONE TO TWO VIEWS December 10, 2019 10:48 AM     HISTORY: Status post total knee replacement.    COMPARISON: 9/5/2019 x-ray.      Impression    IMPRESSION: Single AP view demonstrates uncomplicated right total knee  arthroplasty.    MD Nasir MORSE, APRN, CNP  Orthopedic Surgery

## 2019-12-11 NOTE — DISCHARGE INSTRUCTIONS
Total Knee Replacement Discharge Instructions                                     [phone]  Bone and Joint Service Line for issues or concerns    General Care:  After surgery you may feel tired/sleepy. This is normal. If you have any question along the way please contact the office. If you feel it is an issue cannot wait for normal office hours, contact the 24 hour bone and joint line at [phone]. You should not drive or operate machines/equipment until released by your physician to do so.     Wound Care:  Keep incision covered with hospital dressing (Aquacel) for one week. It is okay to shower with this dressing on. However, do not submerge your dressing and incision in water for roughly 2 weeks. After one week remove this dressing and then keep it clean, dry, and covered. If this dressing become looses or falls off it is ok to cover with gauze and tape.  Wash the incision gently with warm soapy water after removing your hospital dressing and lightly pat dry.       Diet:  Start with non-alcoholic liquids at first, particularly water or sports drinks after surgery. Progress to bland foods such as crackers and bread and finally to your normal diet if you have no problems. Avoid alcohol when taking narcotic pain medications.      Pain control:  Take your pain medications as prescribed. These medications may make you sleepy. Do not drive, operate equipment, or drink alcohol when taking these.  You may take Tylenol (Generic name is acetaminophen) as directed on the bottle for additional relief or in place of the prescribed pain medications as your pain gets better. Do not take any other NSAIDs (Motrin, Ibuprofen, Aleve, Naproxen) while taking the blood thinner. If the medications cause a reaction such as nausea or skin rash, stop taking them and contact your doctor. Please plan accordingly, pain medications will not be re-filled on the weekends or at night. Call the office during the day if you need more  medications.    Blood thinner:  It is very important to take it as prescribed. It is a medication to help prevent blood clots in your legs or lungs. No medication is perfect, so if you notice a sudden onset of pain/swelling in your calf area call your doctor. If you notice a sudden onset of troubles breathing and/or chest pain call 911.     Swelling (edema) control:  Preventing swelling (edema) in your legs after surgery is very important. It is helpful for achieving optimal range of motion as well as preventing blood clots.  It starts with simple things such as elevation of your legs and icing. Elevate your legs above your heart.    Do this for 20 minutes every couple hours the first few days after surgery. We also recommend LOW hose (compression hose) to be worn. Wear on both legs during the day. You may remove at night. Wear these until directed to stop.      Icing:  It is common for some swelling, aching and stiffness to occur for up to 6-9 months after a knee replacement. If you knee swells, get off your feet, elevate your leg and apply some ice packs. Apply for 20 minutes at a time. For the first 1-2 weeks apply ice 2-3 x day or more after therapy.    Walker/crutches:  Use a walker/crutches when you go home. You will transition to the use of a cane and finally to no additional support.     Braces:  You will go home with a knee immobilizer. You can take it off when you are lying or sitting down. Wear it when you are walking. This immobilizer can come off after you are doing a straight leg raise. Your physician and or physical therapist will help to determine when to stop wearing it.     Physical Therapy:  The success of your knee replacement is based on doing physical therapy. You will have some pain and discomfort along the way. If you feel your pain is limiting your progress make sure to take some pain medication prior to your therapy session. If you pain medications are not working talk to your surgeon.    For the first 2 weeks after going home you will have in-home physical therapy. The goal is to work on range of motion. While it is important to working on bending your knee, it is equally important to make sure you knee comes out all the way straight. To assist in this do not place any bumps, pillows and or blankets under your knee when you are lying down. You should have an outpatient physical therapy appointment scheduled for about 2 weeks after surgery.     Activity:  Unless otherwise instructed, you can weight bear as tolerated. While laying or sitting down you should straighten your knee all the way out and then gently work on bending the knee back. Do not worry at first if your knee feels stiff and will not bend like normal, this will get better. Never put a pillow or bump under you knee, instead put a pillow under your ankle so your knee will straighten out all the way.     Normal findings after surgery:  Numbness and tenderness around the incisions and to the outside of the incision is normal.  You may have bruising around the incisions and down the lower leg.   Your knee will be swollen for months after surgery. It will feel  tight  to move.   Low grade fevers less than 100.5 degrees Fahrenheit are normal.   You may have some minor swelling in the leg/calf area.  You will have some increased pain after your therapy sessions.     When to call the Office:  Temperature greater than 101.5 degrees Fahreheit.  Fever, chills, and increasing pain in the knee.  Excessive drainage from the incisions that include bright red blood.  Drainage from the incisions sites that appear yellow, pus-like, or foul smelling.  Increasing pain the knee not relieved by the prescribed pain medications or ice.  Persistent nausea or vomiting not helped by the Zofran.  Increased pain or swelling in your calf area (in back above your ankle) that wasn t there when in the hospital.  Any other effects you feel are significant.  Call 911 if  you experience any chest pain and/or shortness or breath.

## 2019-12-11 NOTE — PROGRESS NOTES
"Saw and examined patient.  POD0 s/p total knee replacement, right  Per patient doing very well. States pain currently moderate to severe.  Did get pain relief from adductor canal block per patient. Has had oxycodone, tylenol, zanaflex so far and one 0.2mg IV dilaudid. Discussed with patient and spouse. States pain medication normally does not work much for him, but has been on oxycodone in the past. Other pain meds do not work any better. He also mentions that anything for pain including tylenol will eventually cause confusion and other issues. After previous surgeries was on percocet. Discussed will continue with current medications as does seem like making progress with pain. Will re-evaluate in the morning.  No current nausea toleraing oral intact, will be advancing diet as tolerated.  Has ambulated without symptoms.   Patient has 24/7 help available upon discharge.  No current concerns.   Answered multiple questions.     BP (!) 134/90   Pulse 109   Temp 97.9  F (36.6  C) (Oral)   Resp 14   Ht 1.753 m (5' 9\")   Wt 98.4 kg (217 lb)   SpO2 94%   BMI 32.05 kg/m      Alert and orient x 4, sitting up in bed in NAD.   Dressing CDI.  Immobilizer at bedside.   Sensation, motor intact to foot.  Toes well perfused. D.p. Pulse 2+    Plan: will see again tomorrow Am.   Plan for discharge tomorrow afternoon pending progress.    DONN Tse, CNP  Orthopedic Surgery    Addendum: discussed NSAID allergy. States developed GI bleed 15 years ago. There was no ulcer found with upper and lower endoscopy; the running assumption was because patient had been on NSAIDs for many years.  Discussed blood thinning and risk for blood clots following TKA as well options. He would like to proceed with the aspirin for blood thinner.     DONN Tse, CNP  Orthopedic Surgery      "

## 2019-12-11 NOTE — CONSULTS
CARE TRANSITION SOCIAL WORK INITIAL ASSESSMENT:      Met with: Patient and Family.    DATA  Active Problems:    S/P total knee replacement using cement, right        ASSESSMENT  Cognitive Status: awake, alert and oriented.       Resources List: Home Care       Insurance Concerns: No Insurance issues identified  Living Arrangements: house    This writer met with pt and his wife, introduced self and role. Discussed discharge planning and medicare guidelines in regards to home care and SNF benefits. Pt/family was given the Medicare Compare list for Home Care, with associated star ratings to assist with choice for referrals/discharge planning Yes    Education was given to pt/family that star ratings are updated/maintained by Medicare and can be reviewed by visiting www.medicare.gov Yes    [facility] Phone: [phone]    PLAN    [facility]-Home P/T    ESA Quintero  [facility] [phone]/ [facility] [phone]

## 2019-12-11 NOTE — PLAN OF CARE
"Vitally stable. Patient has pain in right knee that is well controlled with PRN pain meds along with Dilaudid for breakthrough pain. Pt is very motivated to start walking and do leg exercises. Pt has good urine output, however urine is dark red tinged and has had some nickel sized clots overnight. Last urine emptied seemed to have more of a normal yellow color and no clots. CMS intact. Dressing to right leg is clean, dry and intact. Wife is at bedside and is very involved in treatment and plan of care. IV saline locked. Good oral intake.     /75   Pulse 75   Temp 96  F (35.6  C) (Oral)   Resp 16   Ht 1.753 m (5' 9\")   Wt 98.4 kg (217 lb)   SpO2 96%   BMI 32.05 kg/m      Patient vital signs are at baseline: Yes  Patient able to ambulate as they were prior to admission or with assist devices provided by therapies during their stay:  Yes  Patient MUST void prior to discharge:  Yes  Patient able to tolerate oral intake:  Yes  Pain has adequate pain control using Oral analgesics:  Yes    "

## 2019-12-12 ENCOUNTER — TELEPHONE (OUTPATIENT)
Dept: ORTHOPEDICS | Facility: OTHER | Age: 59
End: 2019-12-12

## 2019-12-12 ENCOUNTER — MEDICAL CORRESPONDENCE (OUTPATIENT)
Dept: HEALTH INFORMATION MANAGEMENT | Facility: CLINIC | Age: 59
End: 2019-12-12

## 2019-12-12 NOTE — TELEPHONE ENCOUNTER
Please call patient and help him schedule with Ryan Tavarez for the week of Jimenez.  Thank you    Feliz/MANUELITO

## 2019-12-13 ENCOUNTER — TELEPHONE (OUTPATIENT)
Dept: ORTHOPEDICS | Facility: OTHER | Age: 59
End: 2019-12-13

## 2019-12-13 DIAGNOSIS — Z96.651 S/P TKR (TOTAL KNEE REPLACEMENT) USING CEMENT, RIGHT: Primary | ICD-10-CM

## 2019-12-13 NOTE — TELEPHONE ENCOUNTER
"Reason for call:  Patient reporting a symptom    Symptom or request: No bowel movement after surgery    Duration (how long have symptoms been present): 4 days    Have you been treated for this before? No    Additional comments: Patient's wife called clinic. Patient is really high strung and concerned that he has not had a bowel movement for 4 days now after surgery. He was told to be concerned if he has not had a bowel movement after 5 days. Patient is now \"freaking out\" on the 4th day. Please call wife back. She is with patient.     Phone Number patient can be reached at:  [phone]    Best Time:  any    Can we leave a detailed message on this number:  YES    Call taken on 12/13/2019 at 11:01 AM by Desiree Coles    "

## 2019-12-13 NOTE — TELEPHONE ENCOUNTER
22 minutes was spent discussing this patient's constipation with the wife and patient.  He is passing gas and has minimal abdominal discomfort. He was educated about the causes of constipation post operatively, interventions, medications, and s/s that would warrant seeking care.  He will add miralax and try the warm prune juice/butter drink.  He has a follow up on 12/18/19.  He was educated about at which point to seek emergent care.      Chandrika ALFARO, Lead RN, BSN. . .  12/13/2019, 1:40 PM

## 2019-12-13 NOTE — TELEPHONE ENCOUNTER
RN Patient Contact    Attempt # 1    Was call answered?  No.  Left message on voicemail with information to call me back.    Chandrika ALFARO, Lead RN, BSN. . .  12/13/2019, 11:46 AM

## 2019-12-14 ENCOUNTER — NURSE TRIAGE (OUTPATIENT)
Dept: NURSING | Facility: CLINIC | Age: 59
End: 2019-12-14

## 2019-12-14 NOTE — TELEPHONE ENCOUNTER
Hugo had a total knee and is taking Oxycodone.  Medication is making Hugo hot and is sweating.  Started out mild and then got worse.  Hugo states that it has been five days since he had a bowel movement.  FNA advised enema or suppository and if no relief to be seen and Hugo agreed.    Reason for Disposition    Last bowel movement (BM) > 4 days ago    Additional Information    Negative: Patient sounds very sick or weak to the triager    Negative: [1] Vomiting AND [2] abdomen looks much more swollen than usual    Negative: [1] Vomiting AND [2] contains bile (green color)    Negative: [1] Constant abdominal pain AND [2] present > 2 hours    Negative: [1] Rectal pain or fullness from fecal impaction (rectum full of stool) AND [2] NOT better after SITZ bath, suppository or enema    Negative: [1] Intermittent mild abdominal pain AND [2] fever    Negative: Abdomen is more swollen than usual    Protocols used: CONSTIPATION-A-AH

## 2019-12-16 DIAGNOSIS — Z96.651 S/P TOTAL KNEE REPLACEMENT USING CEMENT, RIGHT: ICD-10-CM

## 2019-12-16 RX ORDER — OXYCODONE HYDROCHLORIDE 5 MG/1
5-10 TABLET ORAL EVERY 4 HOURS PRN
Qty: 40 TABLET | Refills: 0 | Status: SHIPPED | OUTPATIENT
Start: 2019-12-16 | End: 2019-12-20

## 2019-12-16 NOTE — TELEPHONE ENCOUNTER
Reason for Call:  Other call back and prescription    Detailed comments: Patient's wife called clinic. Patient is in a lot of pain from knee replacement last week 12/10 with Dr. Yin. He has asked to not be given Oxycodone because it makes him very loopy. Patient is asking if he can be prescribed something else. He was only given a short supply, and needs something for the pain. Patient is asking for urgency.     Phone Number Patient can be reached at: Cell number on file:    Telephone Information:   Mobile [phone]       Best Time: any    Can we leave a detailed message on this number? YES    Call taken on 12/16/2019 at 8:14 AM by Desiree Coles

## 2019-12-16 NOTE — TELEPHONE ENCOUNTER
Tried calling Radha and her voicemail is full.    Left message for patient that his refill was sent in.    Feliz/MANUELITO

## 2019-12-16 NOTE — TELEPHONE ENCOUNTER
"I spoke with the patient and his wife who state that he has 1 tab remaining of his oxycodone. If there are no narcotic alternatives that would cause him to feel like \"loopy\" he would like a refill of the oxycodone as it is effective with managing his pain.  Currently he is doing 2 tabs Q3-4H.  He is also doing the hydroxyzine 25 mg Q6H, tylenol 975mg Q8H, and tizanidine 4 mg PRN.      Wife admits that Hugo has been up on his feet quite a bit throughout the day and even came down the stairs this weekend. I stressed the importance of resting/elevation/etc and how too much activity contributes to increased pain etc.      Pain 8/10, goes down to 3-4/10 with oxycodone.     According to wife/patient there is no concern for infection at this time, moderate swelling, normal CMS distal to operative site.     Patient's constipation was relieved yesterday with an enema.  He will continue to take the docusate and miralax until bowels are regular and while on narcotics/sedentary.      Patient also notes that his thigh high compression stockings are shredding and is wondering if he needs new ones.     Will pend refill of oxycodone and route to GABINO Elena/Annamaria to address.     Chandrika ALFARO Lead RN, BSN. . .  12/16/2019, 9:12 AM    "

## 2019-12-17 ENCOUNTER — TELEPHONE (OUTPATIENT)
Dept: ORTHOPEDICS | Facility: OTHER | Age: 59
End: 2019-12-17

## 2019-12-17 NOTE — TELEPHONE ENCOUNTER
25 minutes was spent on the phone with the patient discussing multiple concerns.      1.) He has showered twice now with his post operative bandage and is concerned that fluid might be getting in there. There are no signs of fluid in the dressing and it is well-adhered.  He is wondering if he could peel back an edge just to peek inside to make sure theres no shower water in there.  I advised against this and to leave it in place.  His home care nurse is coming out on Thursday and said she will remove it at that time since his post op appt got pushed back to next week. He wanted to make sure that this was okay. I advised that this was okay for the RN to remove the dressing and to keep it clean dry and covered until his post op.  Re-educated about the s/s of infection.     2.) He is wondering if he is elevating his leg too much as when he stands up it is stiff and gives him this feeling that all the blood is rushing to his foot and it gets painful/tingly.  I educated that this is a normal post-operative complaint. If the pain/numbness persists or worsens, let us know.  He states it improves after a minute or two after changing positions.  He will continue icing/elevating as much as possible.     3.) He is wondering when he should start weaning down on his medications. Educated that we encourage weaning off the narcotics as tolerated but sooner rather than later. He was doing 2 every 3-4 hours but has cut back to 1 every and is tolerating this okay.  Reiterated realistic pain expectations post-operatively.  He will continue to try to wean down on the oxycodone.  He is still taking everything as prescribed.      He has a follow up with ERIC Tavarez on Monday 12/23/19.      Chandrika ALFARO, Lead RN, BSN. . .  12/17/2019, 4:28 PM

## 2019-12-17 NOTE — TELEPHONE ENCOUNTER
Reason for Call:  Other call back    Detailed comments: Patient and wife called with some questions about dressing, socks, and medication.  Please call to review a few items post surgery.  Thanks    Phone Number Patient can be reached at: Cell number on file:    Telephone Information:   Mobile [phone]       Best Time: any    Can we leave a detailed message on this number? YES    Call taken on 12/17/2019 at 12:44 PM by Valerie Bagley

## 2019-12-20 ENCOUNTER — TELEPHONE (OUTPATIENT)
Dept: ORTHOPEDICS | Facility: OTHER | Age: 59
End: 2019-12-20

## 2019-12-20 DIAGNOSIS — Z96.651 S/P TOTAL KNEE REPLACEMENT USING CEMENT, RIGHT: ICD-10-CM

## 2019-12-20 RX ORDER — OXYCODONE HYDROCHLORIDE 5 MG/1
5 TABLET ORAL EVERY 4 HOURS PRN
Qty: 35 TABLET | Refills: 0 | Status: SHIPPED | OUTPATIENT
Start: 2019-12-20 | End: 2019-12-23

## 2019-12-20 RX ORDER — ACETAMINOPHEN 325 MG/1
975 TABLET ORAL EVERY 8 HOURS PRN
Qty: 100 TABLET | Refills: 0 | Status: SHIPPED | OUTPATIENT
Start: 2019-12-20 | End: 2020-12-10

## 2019-12-20 NOTE — TELEPHONE ENCOUNTER
Reason for Call:  Other prescription    Detailed comments: Patient requesting another refill on oxycodone and tylonol 325mg [facility] in [place]. Still dealing a lot of pain.     Phone Number Patient can be reached at: Home number on file [phone] (home)    Best Time: any    Can we leave a detailed message on this number? YES    Call taken on 12/20/2019 at 8:37 AM by Valerie Bagley

## 2019-12-20 NOTE — TELEPHONE ENCOUNTER
Reviewed this prescription refill.  It appears to be valid.  I signed the prescriptions. I called to let him know.

## 2019-12-20 NOTE — TELEPHONE ENCOUNTER
Narcotic Refill Request: oxycodone    D:  s/p right TKA on 12/10/19. Pain 4-5/10 at rest. Described as uncomfortable. Has been icing, resting, elevating above the heart, wearing thigh high TEDs (wearing long sock on surgical day. Has only taken off . Has about 4 tabs left. Denies new injury and denies s/s infection.    Current pain medications regimen  Oxycodone 1 tab q3 hours  Acetaminophen 12 tabs per day  ASA BID  Hydroxyzine 2 tabs per day  Tizanidine 4 tabs per day    oxycodone  Last Written Prescription Date:  12/16/19  Last Fill Quantity: 40,  # refills: 0   Last office visit: 12/5/2019 with prescribing provider:     Future Office Visit:   Next 5 appointments (look out 90 days)    Dec 23, 2019 10:40 AM CST  Return Visit with Leighton Tavarez PA-C  [facility] [address] [place] [id number] [phone]           A:  Routing to covering provider Ryan Tavarez. Patient advised to call pharmacy if hasn't heard from them by 3 pm today. Then call our clinic back if the pharmacy hasn't received it. I advised patient continue weaning from oxycodone by decreasing to 1 tab q4-5h.    R:  Patient verbalizes understanding.    Nancy Bustillos RN on 12/20/2019 at 10:01 AM

## 2019-12-23 ENCOUNTER — ANCILLARY PROCEDURE (OUTPATIENT)
Dept: GENERAL RADIOLOGY | Facility: CLINIC | Age: 59
End: 2019-12-23
Attending: PHYSICIAN ASSISTANT
Payer: COMMERCIAL

## 2019-12-23 ENCOUNTER — OFFICE VISIT (OUTPATIENT)
Dept: ORTHOPEDICS | Facility: CLINIC | Age: 59
End: 2019-12-23
Payer: COMMERCIAL

## 2019-12-23 VITALS
WEIGHT: 217 LBS | DIASTOLIC BLOOD PRESSURE: 80 MMHG | SYSTOLIC BLOOD PRESSURE: 137 MMHG | HEIGHT: 69 IN | BODY MASS INDEX: 32.14 KG/M2

## 2019-12-23 DIAGNOSIS — Z96.651 S/P TOTAL KNEE REPLACEMENT USING CEMENT, RIGHT: Primary | ICD-10-CM

## 2019-12-23 DIAGNOSIS — Z96.651 S/P TOTAL KNEE REPLACEMENT USING CEMENT, RIGHT: ICD-10-CM

## 2019-12-23 PROCEDURE — 99024 POSTOP FOLLOW-UP VISIT: CPT | Performed by: PHYSICIAN ASSISTANT

## 2019-12-23 PROCEDURE — 73562 X-RAY EXAM OF KNEE 3: CPT | Mod: TC

## 2019-12-23 RX ORDER — OXYCODONE HYDROCHLORIDE 5 MG/1
5 TABLET ORAL EVERY 6 HOURS PRN
Qty: 30 TABLET | Refills: 0 | Status: SHIPPED | OUTPATIENT
Start: 2019-12-23 | End: 2019-12-31

## 2019-12-23 ASSESSMENT — MIFFLIN-ST. JEOR: SCORE: 1794.69

## 2019-12-23 ASSESSMENT — PAIN SCALES - GENERAL: PAINLEVEL: SEVERE PAIN (6)

## 2019-12-23 NOTE — PATIENT INSTRUCTIONS
Encounter Diagnosis   Name Primary?     S/P total knee replacement using cement, right Yes     Rest, ice and elevate above heart level as needed for pain control  Plan:  1. Xrays: Xrays look excellent today.  I compared them to the postoperative x-rays.  The replacement parts are in the proper placement and there are no signs of loosening.  2. Incision(s):  Once the wound his healed over completely, you can start massaging the incisional area, I would say looking at things you could do this next week.  Massaging helps to desensitize the area and also break down the scar tissue.  You do not need a dressing anymore.  3. Anticoagulation: Continue your Asprin 325mg twice a day until it has been a full 6 weeks after surgery.  This helps to prevent clots in your legs or anywhere else.  You can continue your Eliezer socks for that long also.  You wanted new tabs and I believe our team got to some today.  4. Pain Medication: You have went through 125 oxycodone in 14 days so we have to watch this.  It sounds like you are going to be out in the next day or so.  We did do a refill and send it to the [facility] pharmacy in [place].  The goal is to keep your pain under control but you probably will have some pain.  We did 30 tablets of the oxycodone today.  Really trying to only take these when you need to and gradually just wean to a before bed and before therapy.  Continue taking the other medications such as Tylenol and Vistaril and Zanaflex to help with your pain.  As time goes on you can alternate Tylenol and ibuprofen if you would like.  Try and utilize rest and ice and elevation above heart level as much as possible also.  5. Weight Bearing: You are weightbearing as tolerated on your surgical leg  6. Activity/Therapy: It sounds like your home physical therapy is done so now he can transition to outpatient physical therapy.  You already have an appointment which is great.  7. Brace/Sling: I would just not use the knee  immobilizer.  I know it feels better to have it on when you are ambulating but we want your muscles to be firing.  8. Work: No work note needed.  Follow-up:  Follow up with Dr. Yin in 4 weeks, you will not need x-ray at that time.    [employer] and [employer] may offer reliable information regarding your diagnosis and treatment plan.    THANK YOU for coming in today. If you receive a survey via [employer] or mail please let us know if there was anything you especially appreciated today or if there is any way we can improve our clinic. We appreciate your input.    GENERAL INFORMATION:  Our hours are:  Pending    [facility] for any issues or concerns: [phone]      We are not in the office Thursdays. Therefore non- urgent calls and medical messages received on Thursday will be addressed when we are back in the office on Wednesday. Urgent matters will be reviewed and addressed by one of our partners in the office as needed.    If lab work was done today as part of your evaluation you will generally be contacted via [employer], mail, or phone with the results within 1-5 days. If there is an alarming result we will contact you by phone. Lab results come back at varying times, I generally wait until all labs are resulted before making comments on results. Please note labs are automatically released to [employer] (if you have signed up for it) once available-at times you may see these prior to my having a chance to review them as well.    If you need refills please contact your pharmacist. They will send a refill request to me to review. Please allow 3 business days for us to process all refill requests. All narcotic refills should be handled in the clinic at the time of your visit.

## 2019-12-23 NOTE — LETTER
12/23/2019         RE: Hugo Longoria  [address] [facility] [id number]        Dear Colleague,    Thank you for referring your patient, Hugo Longoria, to the [facility]. Please see a copy of my visit note below.    None orthopedic Clinic Post-Operative Note    CHIEF COMPLAINT:   Chief Complaint   Patient presents with     Surgical Followup     post op dos 12/10/19 Right knee replacement       HISTORY OF PRESENT ILLNESS  Location: Right knee  Rating of Pain: 6 out of 10  Pain is better with: Rest and oxycodone  Pain improving overall: Yes but very slightly  Associated Features: Doing in-home physical therapy  Patient concerns: Patient had some color change in his legs that most likely was due to ecchymosis.  Patient explained some spasms also.  Additional History: Patient denies any drainage or problems with the incision.  Patient states he has had a fever but only up to 99.  Patient does state that he has some fevers and chills however this is normal for him.  It has not changed since surgery.  Patient is afebrile today and this was checked by the nurse twice.    Patient will have used 125 narcotics in about 14 days.  I had a long discussion about this with his wife and him.  We discussed ways of maximizing other methods and also readjusted realistic goals and that the pain is not to be completely gone but the narcotic is helped to help get through with less pain.    Patient's past medical, surgical, social and family histories reviewed.     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)     Overnight stay due to blood poisoning     Gastric ulcer, unspecified as acute or chronic, without mention of hemorrhage or perforation        Past Surgical History:   Procedure Laterality Date     ARTHROPLASTY KNEE Right 12/10/2019    Procedure: Right  knee replacement;  Surgeon: Javier Yin DO;  Location: [facility] OR     ARTHROSCOPY KNEE  5/29/2013    Procedure: ARTHROSCOPY KNEE;  Right knee arthroscopy with partial medial and partial lateral menisectomies;  Surgeon: Phani Mlcaughlin MD;  Location: [facility] OR     ARTHROSCOPY SHOULDER BICEPS TENODESIS REPAIR Right 1/13/2017    Procedure: ARTHROSCOPY SHOULDER BICEPS TENODESIS REPAIR;  Surgeon: Javier Yin DO;  Location: [facility]     ARTHROSCOPY SHOULDER DECOMPRESSION Right 1/13/2017    Procedure: ARTHROSCOPY SHOULDER DECOMPRESSION;  Surgeon: Javier Yin DO;  Location: [facility] OR     ARTHROSCOPY SHOULDER ROTATOR CUFF REPAIR Right 1/13/2017    Procedure: ARTHROSCOPY SHOULDER ROTATOR CUFF REPAIR;  Surgeon: Javier Yin DO;  Location: [facility] OR     COLONOSCOPY  11/01/2007     COLONOSCOPY  12/12/11     COLONOSCOPY N/A 11/18/2015    Procedure: COLONOSCOPY;  Surgeon: Mgiue Mclaughlin MD;  Location:  GI     COLONOSCOPY N/A 3/22/2017    Procedure: COMBINED COLONOSCOPY, SINGLE OR MULTIPLE BIOPSY/POLYPECTOMY BY BIOPSY;  Surgeon: Salvatore Zepeda MD;  Location:  GI     COLONOSCOPY N/A 3/5/2019    Procedure: COLONOSCOPY;  Surgeon: Prakash Ervin DO;  Location:  GI     ENDOSCOPY  01/27/12    [facility]     ESOPHAGOSCOPY, GASTROSCOPY, DUODENOSCOPY (EGD), COMBINED N/A 11/18/2015    Procedure: COMBINED ESOPHAGOSCOPY, GASTROSCOPY, DUODENOSCOPY (EGD), BIOPSY SINGLE OR MULTIPLE;  Surgeon: Migue Mclaughlin MD;  Location:  GI     ESOPHAGOSCOPY, GASTROSCOPY, DUODENOSCOPY (EGD), COMBINED N/A 3/22/2017    Procedure: COMBINED ESOPHAGOSCOPY, GASTROSCOPY, DUODENOSCOPY (EGD), BIOPSY SINGLE OR MULTIPLE;  Surgeon: Salvatore Zepeda MD;  Location:  GI      UGI ENDOSCOPY, SIMPLE EXAM  10/31/2007     HERNIORRHAPHY UMBILICAL N/A 3/12/2015    Procedure: HERNIORRHAPHY UMBILICAL;  Surgeon: Yared Ma MD;  Location:  OR     IRRIGATION AND  "DEBRIDEMENT UPPER EXTREMITY, COMBINED Left 3/15/2016    Procedure: COMBINED IRRIGATION AND DEBRIDEMENT UPPER EXTREMITY;  Surgeon: Javier Yin DO;  Location: [facility]     REMOVAL OF SPERM DUCT(S)      Vasectomy       Medications:  acetaminophen (TYLENOL) 325 MG tablet, Take 3 tablets (975 mg) by mouth every 8 hours as needed for mild pain  ascorbic acid (VITAMIN C) 250 MG CHEW chewable tablet, Take 250 mg by mouth daily  aspirin (ASA) 325 MG EC tablet, Take 1 tablet (325 mg) by mouth 2 times daily  atorvastatin (LIPITOR) 20 MG tablet, TAKE 1 TABLET BY MOUTH ONCE DAILY  docusate sodium (COLACE) 100 MG capsule, Take 2 capsules (200 mg) by mouth 2 times daily as needed for constipation  fluticasone (FLONASE) 50 MCG/ACT spray, Spray 1-2 sprays into both nostrils daily (Patient not taking: Reported on 12/5/2019)  hydrOXYzine (ATARAX) 25 MG tablet, Take 1 tablet (25 mg) by mouth every 6 hours as needed for other (adjuvant pain)  LANsoprazole (PREVACID) 30 MG DR capsule, TAKE 1 CAPSULE BY MOUTH ONCE DAILY  lisinopril (PRINIVIL/ZESTRIL) 20 MG tablet, TAKE 1/2 (ONE-HALF) TABLET BY MOUTH ONCE DAILY  Multiple Vitamin (MULTIVITAMINS PO), Take  by mouth.  ondansetron (ZOFRAN-ODT) 4 MG ODT tab, Take 1 tablet (4 mg) by mouth every 8 hours as needed for nausea or vomiting  order for DME, Equipment being ordered: front wheeled walker  order for DME, Equipment being ordered: 2 wheeled front walker  oxyCODONE (ROXICODONE) 5 MG tablet, Take 1 tablet (5 mg) by mouth every 4 hours as needed for moderate to severe pain  tiZANidine (ZANAFLEX) 4 MG capsule, Take 1 capsule (4 mg) by mouth 4 times daily as needed for muscle spasms (pain)    No current facility-administered medications on file prior to visit.       Allergies   Allergen Reactions     Hydrocodone Other (See Comments)     \"climbed the walls, very anxious, insomnia\"     Nsaids Other (See Comments)     Hx of stomach ulcers       Social History     Occupational History     " Employer: [employer]     Comment: [employer]   Tobacco Use     Smoking status: Former Smoker     Last attempt to quit: 1981     Years since quittin.0     Smokeless tobacco: Never Used   Substance and Sexual Activity     Alcohol use: Yes     Alcohol/week: 0.0 standard drinks     Comment: weekends     Drug use: No     Sexual activity: Yes     Partners: Female     Birth control/protection: Surgical     Comment: vasectomy       Family History   Problem Relation Age of Onset     Cancer Mother         ovarian cancer     Asthma Mother      Hypertension Mother      Arthritis Mother      Genitourinary Problems Mother      Lipids Mother      Cancer Sister         ovarian cancer     Hypertension Sister      Lipids Sister      Cancer Maternal Grandmother         stomach cancer     Asthma Father      Cardiovascular Father         heart attack; bypass x5, congenital heart disease     Heart Disease Father         heart attack; bypass x5, congenital heart disease     Diabetes Father      Hypertension Father      Cancer Father         prostate     Prostate Cancer Father      Circulatory Father         blood clots     Genitourinary Problems Father      Respiratory Father         emphysema; COPD     Diabetes Maternal Grandfather      Diabetes Paternal Grandfather      Eye Disorder Paternal Grandfather         glaucoma     Hypertension Brother      Lipids Brother      Hypertension Brother      Cardiovascular Brother         bypass x3     Heart Disease Brother         bypass x3     Lipids Brother      Hypertension Sister      C.A.D. No family hx of      Cerebrovascular Disease No family hx of      Breast Cancer No family hx of      Cancer - colorectal No family hx of      Alzheimer Disease No family hx of      Blood Disease No family hx of      Gastrointestinal Disease No family hx of      Musculoskeletal Disorder No family hx of      Neurologic Disorder No family hx of      Thyroid Disease No family hx of   "      REVIEW OF SYSTEMS  General: negative for, night sweats, dizziness, fatigue  Resp: No shortness of breath and no cough  CV: negative for chest pain, syncope or near-syncope  GI: negative for nausea, vomiting and diarrhea  : negative for dysuria and hematuria  Musculoskeletal: as above  Neurologic: negative for syncope   Hematologic: negative for bleeding disorder    Physical Exam:  Vitals: /80   Ht 1.753 m (5' 9\")   Wt 98.4 kg (217 lb)   BMI 32.05 kg/m     BMI= Body mass index is 32.05 kg/m .  Constitutional: healthy, alert and no acute distress   Psychiatric: mentation appears normal and affect normal/bright  NEURO: no focal deficits, CMS intact right lower extremity   RESP: Normal with easy respirations and no use of accessory muscles to breathe, no audible wheezing or retractions  CV: Calf soft and nontender to palpation, leg warm   SKIN: Incision healing very well.  No drainage.  Minimal swelling and minimal erythema.  We do notice ecchymosis on the medial side of the knee as well as lateral.  There is swelling around the knee and a slight effusion.  No erythema, rashes, excoriation, or breakdown throughout the rest of the knee. No evidence of infection  MUSCULOSKELETAL:    INSPECTION of right knee: Erythema and ecchymosis as mentioned above.  Quad atrophy noted.  No gross deformities, edema, atrophy or fasciculations.     PALPATION: Minimal tenderness to palpation of the right knee.  No tenderness to palpation medial lateral or posterior or anterior.  Slight effusion but very slight.  Minimal increased warmth when compared to the contralateral knee.    ROM: Extension 5 to 10 degrees short of full, flexion to approximately 85 degrees. All range of motion without catching or locking or any major pain.     STRENGTH: Patient able to fire quad but not able to do a straight leg raise today.    SPECIAL TEST: None  GAIT: Not observed today.  Lymph: no palpable lymph nodes    Diagnostic " Modalities:  Recent Results (from the past 744 hour(s))   XR Knee Port Right 1/2 Views    Narrative    KNEE PORTABLE RIGHT ONE TO TWO VIEWS December 10, 2019 10:48 AM     HISTORY: Status post total knee replacement.    COMPARISON: 9/5/2019 x-ray.      Impression    IMPRESSION: Single AP view demonstrates uncomplicated right total knee  arthroplasty.    CARROLL GUIDO MD   XR Knee Right 3 Views    Narrative    RIGHT KNEE THREE VIEWS  12/23/2019 10:44 AM     HISTORY: Status post total knee replacement using cement, right.    COMPARISON: 12/10/2019 AP radiograph.      Impression    IMPRESSION: Right total knee arthroplasty. Lateral radiograph suggests  probable large joint effusion. Small radiolucency anterior to the  inferior pole of the patella may represent soft tissue gas of  indeterminate significance. No other evidence of complication.     I agree with the above readings.  The x-ray that was done today 3 views of the right knee there is mention of gas and swelling.  There is no correlation with infection on exam today.  The patient's right knee looks like total knee 14 days out from surgery.    Independent visualization of the images was performed.      Impression: 1.  14 days status post right total knee arthroplasty by Dr. Yin    Plan:   Patient Instructions:   1. Xrays: Xrays look excellent today.  I compared them to the postoperative x-rays.  The replacement parts are in the proper placement and there are no signs of loosening.  2. Incision(s):  Once the wound his healed over completely, you can start massaging the incisional area, I would say looking at things you could do this next week.  Massaging helps to desensitize the area and also break down the scar tissue.  You do not need a dressing anymore.  3. Anticoagulation: Continue your Asprin 325mg twice a day until it has been a full 6 weeks after surgery.  This helps to prevent clots in your legs or anywhere else.  You can continue your Eliezer socks for that  long also.  You wanted new tabs and I believe our team got to some today.  4. Pain Medication: You have went through 125 oxycodone in 14 days so we have to watch this.  It sounds like you are going to be out in the next day or so.  We did do a refill and send it to the [facility] pharmacy in [place].  The goal is to keep your pain under control but you probably will have some pain.  We did 30 tablets of the oxycodone today.  Really trying to only take these when you need to and gradually just wean to a before bed and before therapy.  Continue taking the other medications such as Tylenol and Vistaril and Zanaflex to help with your pain.  As time goes on you can alternate Tylenol and ibuprofen if you would like.  Try and utilize rest and ice and elevation above heart level as much as possible also.  5. Weight Bearing: You are weightbearing as tolerated on your surgical leg  6. Activity/Therapy: It sounds like your home physical therapy is done so now he can transition to outpatient physical therapy.  You already have an appointment which is great.  7. Brace/Sling: I would just not use the knee immobilizer.  I know it feels better to have it on when you are ambulating but we want your muscles to be firing.  8. Work: No work note needed.  Follow-up:  Follow up with Dr. Yin in 4 weeks, you will not need x-ray at that time.  Re-x-ray on return: No    BP Readings from Last 1 Encounters:   12/23/19 137/80       BP noted to be well controlled today in office.      Patient does not use Tobacco products.    This note was dictated with [employer].    Leighton Tavarez PA-C              Again, thank you for allowing me to participate in the care of your patient.        Sincerely,        Leighton Tavarez PA-C

## 2019-12-23 NOTE — PROGRESS NOTES
[facility] Post-Operative Note    CHIEF COMPLAINT:   Chief Complaint   Patient presents with     Surgical Followup     post op dos 12/10/19 Right knee replacement       HISTORY OF PRESENT ILLNESS  Location: Right knee  Rating of Pain: 6 out of 10  Pain is better with: Rest and oxycodone  Pain improving overall: Yes but very slightly  Associated Features: Doing in-home physical therapy  Patient concerns: Patient had some color change in his legs that most likely was due to ecchymosis.  Patient explained some spasms also.  Additional History: Patient denies any drainage or problems with the incision.  Patient states he has had a fever but only up to 99.  Patient does state that he has some fevers and chills however this is normal for him.  It has not changed since surgery.  Patient is afebrile today and this was checked by the nurse twice.    Patient will have used 125 narcotics in about 14 days.  I had a long discussion about this with his wife and him.  We discussed ways of maximizing other methods and also readjusted realistic goals and that the pain is not to be completely gone but the narcotic is helped to help get through with less pain.    Patient's past medical, surgical, social and family histories reviewed.     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)     Overnight stay due to blood poisoning     Gastric ulcer, unspecified as acute or chronic, without mention of hemorrhage or perforation        Past Surgical History:   Procedure Laterality Date     ARTHROPLASTY KNEE Right 12/10/2019    Procedure: Right knee replacement;  Surgeon: Javier Yin DO;  Location: [facility]     ARTHROSCOPY KNEE  5/29/2013    Procedure: ARTHROSCOPY KNEE;  Right knee arthroscopy with partial medial and partial lateral menisectomies;  Surgeon: Phani Mclaughlin  MD Javier;  Location: [facility]     ARTHROSCOPY SHOULDER BICEPS TENODESIS REPAIR Right 1/13/2017    Procedure: ARTHROSCOPY SHOULDER BICEPS TENODESIS REPAIR;  Surgeon: Javier Yin DO;  [facility]: [facility]     ARTHROSCOPY SHOULDER DECOMPRESSION Right 1/13/2017    Procedure: ARTHROSCOPY SHOULDER DECOMPRESSION;  Surgeon: Javier Yin DO;  [facility]: [facility]     ARTHROSCOPY SHOULDER ROTATOR CUFF REPAIR Right 1/13/2017    Procedure: ARTHROSCOPY SHOULDER ROTATOR CUFF REPAIR;  Surgeon: Javier Yin DO;  Location: [facility]     COLONOSCOPY  11/01/2007     COLONOSCOPY  12/12/11     COLONOSCOPY N/A 11/18/2015    Procedure: COLONOSCOPY;  Surgeon: Migue Mclaughlin MD;  [facility]: [facility]     COLONOSCOPY N/A 3/22/2017    Procedure: COMBINED COLONOSCOPY, SINGLE OR MULTIPLE BIOPSY/POLYPECTOMY BY BIOPSY;  Surgeon: Salvatore Zepeda MD;  Location: [facility]     COLONOSCOPY N/A 3/5/2019    Procedure: COLONOSCOPY;  Surgeon: Prakash Ervin DO;  Location:  GI     ENDOSCOPY  01/27/12    [facility]     ESOPHAGOSCOPY, GASTROSCOPY, DUODENOSCOPY (EGD), COMBINED N/A 11/18/2015    Procedure: COMBINED ESOPHAGOSCOPY, GASTROSCOPY, DUODENOSCOPY (EGD), BIOPSY SINGLE OR MULTIPLE;  Surgeon: Migue Mclaughlin MD;  Location:  GI     ESOPHAGOSCOPY, GASTROSCOPY, DUODENOSCOPY (EGD), COMBINED N/A 3/22/2017    Procedure: COMBINED ESOPHAGOSCOPY, GASTROSCOPY, DUODENOSCOPY (EGD), BIOPSY SINGLE OR MULTIPLE;  Surgeon: Salvatore Zepeda MD;  Location:  GI      UGI ENDOSCOPY, SIMPLE EXAM  10/31/2007     HERNIORRHAPHY UMBILICAL N/A 3/12/2015    Procedure: HERNIORRHAPHY UMBILICAL;  Surgeon: Yared Ma MD;  Location: [facility]     IRRIGATION AND DEBRIDEMENT UPPER EXTREMITY, COMBINED Left 3/15/2016    Procedure: COMBINED IRRIGATION AND DEBRIDEMENT UPPER EXTREMITY;  Surgeon: Javier Yin DO;  Location: [facility]     REMOVAL OF SPERM DUCT(S)      Vasectomy  "      Medications:  acetaminophen (TYLENOL) 325 MG tablet, Take 3 tablets (975 mg) by mouth every 8 hours as needed for mild pain  ascorbic acid (VITAMIN C) 250 MG CHEW chewable tablet, Take 250 mg by mouth daily  aspirin (ASA) 325 MG EC tablet, Take 1 tablet (325 mg) by mouth 2 times daily  atorvastatin (LIPITOR) 20 MG tablet, TAKE 1 TABLET BY MOUTH ONCE DAILY  docusate sodium (COLACE) 100 MG capsule, Take 2 capsules (200 mg) by mouth 2 times daily as needed for constipation  fluticasone (FLONASE) 50 MCG/ACT spray, Spray 1-2 sprays into both nostrils daily (Patient not taking: Reported on 2019)  hydrOXYzine (ATARAX) 25 MG tablet, Take 1 tablet (25 mg) by mouth every 6 hours as needed for other (adjuvant pain)  LANsoprazole (PREVACID) 30 MG DR capsule, TAKE 1 CAPSULE BY MOUTH ONCE DAILY  lisinopril (PRINIVIL/ZESTRIL) 20 MG tablet, TAKE 1/2 (ONE-HALF) TABLET BY MOUTH ONCE DAILY  Multiple Vitamin (MULTIVITAMINS PO), Take  by mouth.  ondansetron (ZOFRAN-ODT) 4 MG ODT tab, Take 1 tablet (4 mg) by mouth every 8 hours as needed for nausea or vomiting  order for DME, Equipment being ordered: front wheeled walker  order for DME, Equipment being ordered: 2 wheeled front walker  oxyCODONE (ROXICODONE) 5 MG tablet, Take 1 tablet (5 mg) by mouth every 4 hours as needed for moderate to severe pain  tiZANidine (ZANAFLEX) 4 MG capsule, Take 1 capsule (4 mg) by mouth 4 times daily as needed for muscle spasms (pain)    No current facility-administered medications on file prior to visit.       Allergies   Allergen Reactions     Hydrocodone Other (See Comments)     \"climbed the walls, very anxious, insomnia\"     Nsaids Other (See Comments)     Hx of stomach ulcers       Social History     Occupational History     Employer: [employer]     Comment: [employer]   Tobacco Use     Smoking status: Former Smoker     Last attempt to quit: 1981     Years since quittin.0     Smokeless tobacco: Never Used "   Substance and Sexual Activity     Alcohol use: Yes     Alcohol/week: 0.0 standard drinks     Comment: weekends     Drug use: No     Sexual activity: Yes     Partners: Female     Birth control/protection: Surgical     Comment: vasectomy       Family History   Problem Relation Age of Onset     Cancer Mother         ovarian cancer     Asthma Mother      Hypertension Mother      Arthritis Mother      Genitourinary Problems Mother      Lipids Mother      Cancer Sister         ovarian cancer     Hypertension Sister      Lipids Sister      Cancer Maternal Grandmother         stomach cancer     Asthma Father      Cardiovascular Father         heart attack; bypass x5, congenital heart disease     Heart Disease Father         heart attack; bypass x5, congenital heart disease     Diabetes Father      Hypertension Father      Cancer Father         prostate     Prostate Cancer Father      Circulatory Father         blood clots     Genitourinary Problems Father      Respiratory Father         emphysema; COPD     Diabetes Maternal Grandfather      Diabetes Paternal Grandfather      Eye Disorder Paternal Grandfather         glaucoma     Hypertension Brother      Lipids Brother      Hypertension Brother      Cardiovascular Brother         bypass x3     Heart Disease Brother         bypass x3     Lipids Brother      Hypertension Sister      C.A.D. No family hx of      Cerebrovascular Disease No family hx of      Breast Cancer No family hx of      Cancer - colorectal No family hx of      Alzheimer Disease No family hx of      Blood Disease No family hx of      Gastrointestinal Disease No family hx of      Musculoskeletal Disorder No family hx of      Neurologic Disorder No family hx of      Thyroid Disease No family hx of        REVIEW OF SYSTEMS  General: negative for, night sweats, dizziness, fatigue  Resp: No shortness of breath and no cough  CV: negative for chest pain, syncope or near-syncope  GI: negative for nausea, vomiting  "and diarrhea  : negative for dysuria and hematuria  Musculoskeletal: as above  Neurologic: negative for syncope   Hematologic: negative for bleeding disorder    Physical Exam:  Vitals: /80   Ht 1.753 m (5' 9\")   Wt 98.4 kg (217 lb)   BMI 32.05 kg/m    BMI= Body mass index is 32.05 kg/m .  Constitutional: healthy, alert and no acute distress   Psychiatric: mentation appears normal and affect normal/bright  NEURO: no focal deficits, CMS intact right lower extremity   RESP: Normal with easy respirations and no use of accessory muscles to breathe, no audible wheezing or retractions  CV: Calf soft and nontender to palpation, leg warm   SKIN: Incision healing very well.  No drainage.  Minimal swelling and minimal erythema.  We do notice ecchymosis on the medial side of the knee as well as lateral.  There is swelling around the knee and a slight effusion.  No erythema, rashes, excoriation, or breakdown throughout the rest of the knee. No evidence of infection  MUSCULOSKELETAL:    INSPECTION of right knee: Erythema and ecchymosis as mentioned above.  Quad atrophy noted.  No gross deformities, edema, atrophy or fasciculations.     PALPATION: Minimal tenderness to palpation of the right knee.  No tenderness to palpation medial lateral or posterior or anterior.  Slight effusion but very slight.  Minimal increased warmth when compared to the contralateral knee.    ROM: Extension 5 to 10 degrees short of full, flexion to approximately 85 degrees. All range of motion without catching or locking or any major pain.     STRENGTH: Patient able to fire quad but not able to do a straight leg raise today.    SPECIAL TEST: None  GAIT: Not observed today.  Lymph: no palpable lymph nodes    Diagnostic Modalities:  Recent Results (from the past 744 hour(s))   XR Knee Port Right 1/2 Views    Narrative    KNEE PORTABLE RIGHT ONE TO TWO VIEWS December 10, 2019 10:48 AM     HISTORY: Status post total knee replacement.    COMPARISON: " 9/5/2019 x-ray.      Impression    IMPRESSION: Single AP view demonstrates uncomplicated right total knee  arthroplasty.    CARROLL GUIDO MD   XR Knee Right 3 Views    Narrative    RIGHT KNEE THREE VIEWS  12/23/2019 10:44 AM     HISTORY: Status post total knee replacement using cement, right.    COMPARISON: 12/10/2019 AP radiograph.      Impression    IMPRESSION: Right total knee arthroplasty. Lateral radiograph suggests  probable large joint effusion. Small radiolucency anterior to the  inferior pole of the patella may represent soft tissue gas of  indeterminate significance. No other evidence of complication.     I agree with the above readings.  The x-ray that was done today 3 views of the right knee there is mention of gas and swelling.  There is no correlation with infection on exam today.  The patient's right knee looks like total knee 14 days out from surgery.    Independent visualization of the images was performed.      Impression: 1.  14 days status post right total knee arthroplasty by Dr. Yin    Plan:   Patient Instructions:   1. Xrays: Xrays look excellent today.  I compared them to the postoperative x-rays.  The replacement parts are in the proper placement and there are no signs of loosening.  2. Incision(s):  Once the wound his healed over completely, you can start massaging the incisional area, I would say looking at things you could do this next week.  Massaging helps to desensitize the area and also break down the scar tissue.  You do not need a dressing anymore.  3. Anticoagulation: Continue your Asprin 325mg twice a day until it has been a full 6 weeks after surgery.  This helps to prevent clots in your legs or anywhere else.  You can continue your Eliezer socks for that long also.  You wanted new tabs and I believe our team got to some today.  4. Pain Medication: You have went through 125 oxycodone in 14 days so we have to watch this.  It sounds like you are going to be out in the next day or so.   We did do a refill and send it to the [facility] pharmacy in [place].  The goal is to keep your pain under control but you probably will have some pain.  We did 30 tablets of the oxycodone today.  Really trying to only take these when you need to and gradually just wean to a before bed and before therapy.  Continue taking the other medications such as Tylenol and Vistaril and Zanaflex to help with your pain.  As time goes on you can alternate Tylenol and ibuprofen if you would like.  Try and utilize rest and ice and elevation above heart level as much as possible also.  5. Weight Bearing: You are weightbearing as tolerated on your surgical leg  6. Activity/Therapy: It sounds like your home physical therapy is done so now he can transition to outpatient physical therapy.  You already have an appointment which is great.  7. Brace/Sling: I would just not use the knee immobilizer.  I know it feels better to have it on when you are ambulating but we want your muscles to be firing.  8. Work: No work note needed.  Follow-up:  Follow up with Dr. Yin in 4 weeks, you will not need x-ray at that time.  Re-x-ray on return: No    BP Readings from Last 1 Encounters:   12/23/19 137/80       BP noted to be well controlled today in office.      Patient does not use Tobacco products.    This note was dictated with [employer].    Leighton Tavarez PA-C

## 2019-12-24 ENCOUNTER — THERAPY VISIT (OUTPATIENT)
Dept: PHYSICAL THERAPY | Facility: CLINIC | Age: 59
End: 2019-12-24
Payer: COMMERCIAL

## 2019-12-24 ENCOUNTER — TELEPHONE (OUTPATIENT)
Dept: ORTHOPEDICS | Facility: CLINIC | Age: 59
End: 2019-12-24

## 2019-12-24 DIAGNOSIS — Z47.1 AFTERCARE FOLLOWING RIGHT KNEE JOINT REPLACEMENT SURGERY: ICD-10-CM

## 2019-12-24 DIAGNOSIS — M25.561 ACUTE PAIN OF RIGHT KNEE: ICD-10-CM

## 2019-12-24 DIAGNOSIS — Z96.651 AFTERCARE FOLLOWING RIGHT KNEE JOINT REPLACEMENT SURGERY: ICD-10-CM

## 2019-12-24 PROCEDURE — 97110 THERAPEUTIC EXERCISES: CPT | Mod: GP | Performed by: PHYSICAL THERAPIST

## 2019-12-24 PROCEDURE — 97161 PT EVAL LOW COMPLEX 20 MIN: CPT | Mod: GP | Performed by: PHYSICAL THERAPIST

## 2019-12-24 ASSESSMENT — ACTIVITIES OF DAILY LIVING (ADL)
KNEEL ON THE FRONT OF YOUR KNEE: I AM UNABLE TO DO THE ACTIVITY
RISE FROM A CHAIR: ACTIVITY IS FAIRLY DIFFICULT
GO UP STAIRS: ACTIVITY IS VERY DIFFICULT
GIVING WAY, BUCKLING OR SHIFTING OF KNEE: THE SYMPTOM AFFECTS MY ACTIVITY SEVERELY
STIFFNESS: THE SYMPTOM AFFECTS MY ACTIVITY SEVERELY
PAIN: THE SYMPTOM AFFECTS MY ACTIVITY SEVERELY
AS_A_RESULT_OF_YOUR_KNEE_INJURY,_HOW_WOULD_YOU_RATE_YOUR_CURRENT_LEVEL_OF_DAILY_ACTIVITY?: SEVERELY ABNORMAL
KNEE_ACTIVITY_OF_DAILY_LIVING_SUM: 13
WALK: ACTIVITY IS VERY DIFFICULT
WEAKNESS: THE SYMPTOM AFFECTS MY ACTIVITY SEVERELY
HOW_WOULD_YOU_RATE_THE_OVERALL_FUNCTION_OF_YOUR_KNEE_DURING_YOUR_USUAL_DAILY_ACTIVITIES?: SEVERELY ABNORMAL
GO DOWN STAIRS: ACTIVITY IS VERY DIFFICULT
SWELLING: THE SYMPTOM AFFECTS MY ACTIVITY SEVERELY
STAND: ACTIVITY IS VERY DIFFICULT
HOW_WOULD_YOU_RATE_THE_CURRENT_FUNCTION_OF_YOUR_KNEE_DURING_YOUR_USUAL_DAILY_ACTIVITIES_ON_A_SCALE_FROM_0_TO_100_WITH_100_BEING_YOUR_LEVEL_OF_KNEE_FUNCTION_PRIOR_TO_YOUR_INJURY_AND_0_BEING_THE_INABILITY_TO_PERFORM_ANY_OF_YOUR_USUAL_DAILY_ACTIVITIES?: 10
LIMPING: THE SYMPTOM AFFECTS MY ACTIVITY SEVERELY
SIT WITH YOUR KNEE BENT: ACTIVITY IS VERY DIFFICULT
RAW_SCORE: 13
SQUAT: I AM UNABLE TO DO THE ACTIVITY
KNEE_ACTIVITY_OF_DAILY_LIVING_SCORE: 18.57

## 2019-12-24 NOTE — PROGRESS NOTES
[facility] Initial Evaluation  Subjective:  The history is provided by the patient. No  was used.   Hugo Longoria being seen for s/p R TKA.   Problem began 12/10/2019. Where condition occurred: for unknown reasons.Problem occurred: knee replacement  and reported as 7/10 on pain scale. General health as reported by patient is good. Pertinent medical history includes:  High blood pressure and numbness/tingling.   Other medical allergies details: none.   Other surgery history details: none.  Current medications:  High blood pressure medication and pain medication.   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  Pain is described as aching and sharp and is constant. Pain is the same all the time. Since onset symptoms are gradually improving.      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 Restrictions include:  Currently not working due to present treatment (returning to work April 2020).    Barriers include:  Stairs.  Red flags:  None as reported by patient.  Type of problem:  Right knee   Condition occurred with:  Degenerative joint disease. This is a new condition   Problem details: S/p R TKA 12/10/19.  He had home care PT for 4 visits.  He was able to obtain ~3-90 deg for PROM.  The pain seems to come and go for the knee.  He can loosen up his knee but it will tighten back up.  He is still struggling with sleeping.  Requires use of walker for ambulation.  He does have 7 steps at home with hand rail.  He does have one step to get into the house (no hand rail).  He is icing ~3x/day.  .   Patient reports pain:  Anterior, posterior and medial. Radiates to:  Lower leg and thigh. Associated symptoms:  Buckling/giving out, edema, loss of strength, loss of motion/stiffness and numbness. Symptoms are exacerbated by activity, bending/squatting, kneeling, sitting, walking, certain positions, lying on the extremity,  ascending stairs and descending stairs and relieved by ice.         Knee Activity of Daily Living Score: 18.57            Objective:  System    Physical Exam    General     ROS  Hugo Longoria , : 1960, MRN: [id number]    Physical Therapy Objective Findings  Subjective information, goals, clinical impression, daily documentation and other information found in EPISODES tab.  Knee Objective Findings    Gait:  WBAT with FWW, decreased stride length    Visual Inspection: no increased redness, no increased warmth, - homans, good distal pulses, ecchymosis medial R knee, atrophy of R quad    Edema:                                                                         Right                                                  Left                                                      Circumferential Superior Pole - Patella 46cm 40cm   Circumferential Inferior Pole - Patella 41cm 35.5cm            Range of Motion:                                     Right AROM            Right PROM            Left AROM              Left PROM                extension/Flexion Supine heelslide 10-77 5-93     other       Other:         Manual Muscle Testing  (graded 0-5, measured at 0 degrees unless otherwise noted):                                                                       Right                                                  Left                                                     Ham Set good    Quad Set fair    VMO          Other:  Supine SLR   Max A    (+ mild pain, ++ moderate pain, +++ severe pain)    Special Tests:                                                                    Right                                                   Left                                                       Step Test Height           -Control Comment     Functional Squat (deg.) 75 deg    OTHER:       Flexibility:                                                                   Right                                                    Left                                                      Gastroc normal normal   Soleus     Hamstrings normal normal   Hip Flexors normal normal   Quadricep     ITB            Patellar Mobility                                                               Right                                                  Left                                                      Static Position normal normal   Passive Mobility Mild hypomobile all directions    Dynamic Mobility       Palpation: hypomobile incision, swelling post knee    Assessment/Plan:    Patient is a 59 year old male with right side knee complaints.    Patient has the following significant findings with corresponding treatment plan.                Diagnosis 1:  S/p R TKA    Pain -  hot/cold therapy, manual therapy, self management, education and home program  Decreased ROM/flexibility - manual therapy, therapeutic exercise, therapeutic activity and home program  Decreased strength - therapeutic exercise, therapeutic activities and home program  Edema - vasopneumatics, electric stimulation, cold therapy and self management/home program  Impaired gait - gait training, assistive devices and home program  Decreased function - therapeutic activities and home program    Therapy Evaluation Codes:   1) History comprised of:   Personal factors that impact the plan of care:      Past/current experiences and Profession.    Comorbidity factors that impact the plan of care are:      None.     Medications impacting care: Anti-inflammatory and Pain.  2) Examination of Body Systems comprised of:   Body structures and functions that impact the plan of care:      Knee.   Activity limitations that impact the plan of care are:      Bathing, Driving, Lifting, Sitting, Squatting/kneeling, Stairs, Standing, Walking, Working and Sleeping.  3) Clinical presentation characteristics are:   Stable/Uncomplicated.  4) Decision-Making    Low complexity using standardized patient  assessment instrument and/or measureable assessment of functional outcome.  Cumulative Therapy Evaluation is: Low complexity.    Previous and current functional limitations:  (See Goal Flow Sheet for this information)    Short term and Long term goals: (See Goal Flow Sheet for this information)     Communication ability:  Patient appears to be able to clearly communicate and understand verbal and written communication and follow directions correctly.  Treatment Explanation - The following has been discussed with the patient:   RX ordered/plan of care  Anticipated outcomes  Possible risks and side effects  This patient would benefit from PT intervention to resume normal activities.   Rehab potential is good.    Frequency:  2 X week, once daily  Duration:  for 10 weeks  Discharge Plan:  Achieve all LTG.  Independent in home treatment program.  Reach maximal therapeutic benefit.    Please refer to the daily flowsheet for treatment today, total treatment time and time spent performing 1:1 timed codes.     Dwight High,PT, DPT, OCS

## 2019-12-24 NOTE — TELEPHONE ENCOUNTER
Patient's wife tutu called with issues getting oxycodone to be dispensed from the pharmacy. Patient saw Ryan Tavarez yesterday in clinic and was prescribed oxycodone then. After I did some math, I noted patient should be due for his next refill tomorrow, however, patient wasn't able to wean as aggressively as hoped from last refill so needed to take 1 tab q3h. With that being said, he is still weaning. I called the [facility] pharmacy who will now fill the script. Patient will continue to try and wean. Patient will pay out of pocket for this script.  Nancy Bustillos RN on 12/24/2019 at 9:16 AM

## 2019-12-24 NOTE — LETTER
[facility]  [address]  [facility] [address] [phone]    2019    Re: Hugo Longoria   :   1960  MRN:  [id number]   REFERRING PHYSICIAN:   Javier Yin    [facility]    Date of Initial Evaluation:  ***  Visits:  Rxs Used: 1  Reason for Referral:     Acute pain of right knee  Aftercare following right knee joint replacement surgery    EVALUATION SUMMARY    [facility] Initial Evaluation  Subjective:  The history is provided by the patient. No  was used.   Hugo Longoria being seen for s/p R TKA.   Problem began 12/10/2019. Where condition occurred: for unknown reasons.Problem occurred: knee replacement  and reported as 7/10 on pain scale. General health as reported by patient is good. Pertinent medical history includes:  High blood pressure and numbness/tingling.   Other medical allergies details: none.   Other surgery history details: none.  Current medications:  High blood pressure medication and pain medication.   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  Pain is described as aching and sharp and is constant. Pain is the same all the time. Since onset symptoms are gradually improving.      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 Restrictions include:  Currently not working due to present treatment (returning to work 2020).    Barriers include:  Stairs.  Red flags:  None as reported by patient.  Type of problem:  Right knee   Condition occurred with:  Degenerative joint disease. This is a new condition   Problem details: S/p R TKA 12/10/19.  He had home care PT for 4 visits.  He was able to obtain ~3-90 deg for PROM.  The pain seems to come and go for the knee.  He can loosen up his knee but it will tighten back up.  He is still struggling  with sleeping.  Requires use of walker for ambulation.  He does have 7 steps at home with hand rail.  He does have one step to get into the house (no hand rail).  He is icing ~3x/day.  .   Patient reports pain:  Anterior, posterior and medial. Radiates to:  Lower leg and thigh. Associated symptoms:  Buckling/giving out, edema, loss of strength, loss of motion/stiffness and numbness. Symptoms are exacerbated by activity, bending/squatting, kneeling, sitting, walking, certain positions, lying on the extremity, ascending stairs and descending stairs and relieved by ice.         Knee Activity of Daily Living Score: 18.57            Objective:  System    Physical Exam    General     ROS  Hugo Longoria , : 1960, MRN: [id number]    Physical Therapy Objective Findings  Subjective information, goals, clinical impression, daily documentation and other information found in EPISODES tab.  Knee Objective Findings    Gait:  WBAT with FWW, decreased stride length    Visual Inspection: no increased redness, no increased warmth, - homans, good distal pulses, ecchymosis medial R knee, atrophy of R quad    Edema:                                                                         Right                                                  Left                                                      Circumferential Superior Pole - Patella 46cm 40cm   Circumferential Inferior Pole - Patella 41cm 35.5cm            Range of Motion:                                     Right AROM            Right PROM            Left AROM              Left PROM                extension/Flexion Supine heelslide 10-77 5-93     other       Other:         Manual Muscle Testing  (graded 0-5, measured at 0 degrees unless otherwise noted):                                                                       Right                                                  Left                                                     Ham Set good    Quad Set fair    VMO           Other:  Supine SLR   Max A    (+ mild pain, ++ moderate pain, +++ severe pain)    Special Tests:                                                                    Right                                                   Left                                                       Step Test Height           -Control Comment     Functional Squat (deg.) 75 deg    OTHER:       Flexibility:                                                                   Right                                                   Left                                                      Gastroc normal normal   Soleus     Hamstrings normal normal   Hip Flexors normal normal   Quadricep     ITB            Patellar Mobility                                                               Right                                                  Left                                                      Static Position normal normal   Passive Mobility Mild hypomobile all directions    Dynamic Mobility       Palpation: hypomobile incision, swelling post knee    Assessment/Plan:    Patient is a 59 year old male with right side knee complaints.    Patient has the following significant findings with corresponding treatment plan.                Diagnosis 1:  S/p R TKA    Pain -  hot/cold therapy, manual therapy, self management, education and home program  Decreased ROM/flexibility - manual therapy, therapeutic exercise, therapeutic activity and home program  Decreased strength - therapeutic exercise, therapeutic activities and home program  Edema - vasopneumatics, electric stimulation, cold therapy and self management/home program  Impaired gait - gait training, assistive devices and home program  Decreased function - therapeutic activities and home program    Therapy Evaluation Codes:   1) History comprised of:   Personal factors that impact the plan of care:      Past/current experiences and Profession.    Comorbidity factors that impact the plan  of care are:      None.     Medications impacting care: Anti-inflammatory and Pain.  2) Examination of Body Systems comprised of:   Body structures and functions that impact the plan of care:      Knee.   Activity limitations that impact the plan of care are:      Bathing, Driving, Lifting, Sitting, Squatting/kneeling, Stairs, Standing, Walking, Working and Sleeping.  3) Clinical presentation characteristics are:   Stable/Uncomplicated.  4) Decision-Making    Low complexity using standardized patient assessment instrument and/or measureable assessment of functional outcome.  Cumulative Therapy Evaluation is: Low complexity.    Previous and current functional limitations:  (See Goal Flow Sheet for this information)    Short term and Long term goals: (See Goal Flow Sheet for this information)     Communication ability:  Patient appears to be able to clearly communicate and understand verbal and written communication and follow directions correctly.  Treatment Explanation - The following has been discussed with the patient:   RX ordered/plan of care  Anticipated outcomes  Possible risks and side effects  This patient would benefit from PT intervention to resume normal activities.   Rehab potential is good.    Frequency:  2 X week, once daily  Duration:  for 10 weeks  Discharge Plan:  Achieve all LTG.  Independent in home treatment program.  Reach maximal therapeutic benefit.         Dwight High,PT, DPT, OCS      Thank you for your referral.    INQUIRIES  Therapist:   [facility]  [address]  [facility] [address]  Phone: [phone]  Fax: [phone]

## 2019-12-27 ENCOUNTER — THERAPY VISIT (OUTPATIENT)
Dept: PHYSICAL THERAPY | Facility: CLINIC | Age: 59
End: 2019-12-27
Payer: COMMERCIAL

## 2019-12-27 DIAGNOSIS — Z96.651 AFTERCARE FOLLOWING RIGHT KNEE JOINT REPLACEMENT SURGERY: ICD-10-CM

## 2019-12-27 DIAGNOSIS — Z47.1 AFTERCARE FOLLOWING RIGHT KNEE JOINT REPLACEMENT SURGERY: ICD-10-CM

## 2019-12-27 DIAGNOSIS — M25.561 ACUTE PAIN OF RIGHT KNEE: ICD-10-CM

## 2019-12-27 PROCEDURE — 97140 MANUAL THERAPY 1/> REGIONS: CPT | Mod: GP | Performed by: PHYSICAL THERAPIST

## 2019-12-27 PROCEDURE — 97110 THERAPEUTIC EXERCISES: CPT | Mod: GP | Performed by: PHYSICAL THERAPIST

## 2019-12-31 ENCOUNTER — THERAPY VISIT (OUTPATIENT)
Dept: PHYSICAL THERAPY | Facility: CLINIC | Age: 59
End: 2019-12-31
Payer: COMMERCIAL

## 2019-12-31 ENCOUNTER — TELEPHONE (OUTPATIENT)
Dept: ORTHOPEDICS | Facility: OTHER | Age: 59
End: 2019-12-31

## 2019-12-31 DIAGNOSIS — Z47.1 AFTERCARE FOLLOWING RIGHT KNEE JOINT REPLACEMENT SURGERY: ICD-10-CM

## 2019-12-31 DIAGNOSIS — M25.561 ACUTE PAIN OF RIGHT KNEE: ICD-10-CM

## 2019-12-31 DIAGNOSIS — Z96.651 AFTERCARE FOLLOWING RIGHT KNEE JOINT REPLACEMENT SURGERY: ICD-10-CM

## 2019-12-31 DIAGNOSIS — Z96.651 S/P TOTAL KNEE REPLACEMENT USING CEMENT, RIGHT: ICD-10-CM

## 2019-12-31 PROCEDURE — 97110 THERAPEUTIC EXERCISES: CPT | Mod: GP | Performed by: PHYSICAL THERAPIST

## 2019-12-31 PROCEDURE — 97530 THERAPEUTIC ACTIVITIES: CPT | Mod: GP | Performed by: PHYSICAL THERAPIST

## 2019-12-31 RX ORDER — OXYCODONE HYDROCHLORIDE 5 MG/1
5 TABLET ORAL 3 TIMES DAILY PRN
Qty: 25 TABLET | Refills: 0 | Status: SHIPPED | OUTPATIENT
Start: 2019-12-31 | End: 2020-03-09

## 2019-12-31 NOTE — TELEPHONE ENCOUNTER
Reason for Call:  Medication or medication refill:    Do you use a [facility]?  Name of the pharmacy and phone number for the current request:  [facility] - [phone]    Name of the medication requested: oxycodone    Other request: Patient will be out tomorrow morning, he would like this filled ASAP    Can we leave a detailed message on this number? NO    Phone number patient can be reached at: [phone]    Best Time:     Call taken on 12/31/2019 at 10:15 AM by Sangeeta Stewart

## 2020-01-02 ENCOUNTER — TELEPHONE (OUTPATIENT)
Dept: ORTHOPEDICS | Facility: CLINIC | Age: 60
End: 2020-01-02

## 2020-01-02 ENCOUNTER — THERAPY VISIT (OUTPATIENT)
Dept: PHYSICAL THERAPY | Facility: CLINIC | Age: 60
End: 2020-01-02
Payer: COMMERCIAL

## 2020-01-02 DIAGNOSIS — Z47.1 AFTERCARE FOLLOWING RIGHT KNEE JOINT REPLACEMENT SURGERY: ICD-10-CM

## 2020-01-02 DIAGNOSIS — Z96.651 AFTERCARE FOLLOWING RIGHT KNEE JOINT REPLACEMENT SURGERY: ICD-10-CM

## 2020-01-02 DIAGNOSIS — M25.561 ACUTE PAIN OF RIGHT KNEE: ICD-10-CM

## 2020-01-02 PROCEDURE — 97110 THERAPEUTIC EXERCISES: CPT | Mod: GP | Performed by: PHYSICAL THERAPIST

## 2020-01-02 PROCEDURE — 97530 THERAPEUTIC ACTIVITIES: CPT | Mod: GP | Performed by: PHYSICAL THERAPIST

## 2020-01-02 NOTE — TELEPHONE ENCOUNTER
Pt's wife calling on behalf of Hugo she stated he has a few questions for the team. Incision question, and a few other questions per pt's wife. Please call to discuss no further information given. Thank You Shelby

## 2020-01-02 NOTE — TELEPHONE ENCOUNTER
Spoke with patient. He states that the past three days he has had an upset stomach that feels like heart burn or indigestion (no chest pain or other symptoms of MI).  He notes that his bowels have been soft and regular since taking the colace and miralax routinely.  He states that last time he was on aspirin he developed an ulcer and this feels very similar to that.  He is taking the aspirin BID as prescribed.  Denies any blood in stool currently (had some bright red streaks when stools were hard from his chronic hemorrhoids).  He takes his medications with food.     Please advise.     Chandirka ALFARO, Lead RN, BSN. . .  1/2/2020, 3:50 PM

## 2020-01-03 ENCOUNTER — TELEPHONE (OUTPATIENT)
Dept: FAMILY MEDICINE | Facility: OTHER | Age: 60
End: 2020-01-03

## 2020-01-03 NOTE — TELEPHONE ENCOUNTER
Reason for call:  Symptom  Reason for call:  Patient reporting a symptom    Symptom or request: areas that itch in his bloodstream     Duration (how long have symptoms been present): a few days ago    Have you been treated for this before? Yes    Additional comments: Dec 10th he had total right knee replacement. He is having some itching in all different areas on his body that is a constant. Itching the areas doesn't help. He had talked to a nurse about this and stated that the medication will help it but it actually made it worse. He would like a call back from 's staff today before the weekend.     Phone Number patient can be reached at:  Cell number on file:    Telephone Information:   Mobile [phone]       Best Time:  anytime    Can we leave a detailed message on this number:  YES    Call taken on 1/3/2020 at 9:06 AM by Sruthi Barillas

## 2020-01-03 NOTE — TELEPHONE ENCOUNTER
Nursing Note    D:  Writer called patient back who reports itching which keeps moving around his body. He increased his hydroxyzine to 4 x per day as of yesterday. Patient reports a slight improvement of itching today. Patient noted a small rash on each anterior ankle this AM -wife placed some cream on this for itching and hasn't checked on t his since. Patient wondering why he's itching and how to get rid of this.     A:  I advised patient try to wean from oxycodone to 2 tabs per day, continue hydroxyzine 4 tabs per day, take 1 benadryl tab before bed. Use anti-itch cream if rash still present upon next visualization. I informed him that often time the oxycodone can cause itching.    R:  Patient verbalizes understanding and will call back if needed.    Nancy Bustillos RN  [facility]  [phone]  1/3/2020 3:45 PM

## 2020-01-07 ENCOUNTER — THERAPY VISIT (OUTPATIENT)
Dept: PHYSICAL THERAPY | Facility: CLINIC | Age: 60
End: 2020-01-07
Payer: COMMERCIAL

## 2020-01-07 DIAGNOSIS — Z96.651 S/P TOTAL KNEE REPLACEMENT USING CEMENT, RIGHT: ICD-10-CM

## 2020-01-07 DIAGNOSIS — Z96.651 AFTERCARE FOLLOWING RIGHT KNEE JOINT REPLACEMENT SURGERY: ICD-10-CM

## 2020-01-07 DIAGNOSIS — Z47.1 AFTERCARE FOLLOWING RIGHT KNEE JOINT REPLACEMENT SURGERY: ICD-10-CM

## 2020-01-07 DIAGNOSIS — M25.561 ACUTE PAIN OF RIGHT KNEE: ICD-10-CM

## 2020-01-07 PROCEDURE — 97530 THERAPEUTIC ACTIVITIES: CPT | Mod: GP | Performed by: PHYSICAL THERAPIST

## 2020-01-07 PROCEDURE — 97110 THERAPEUTIC EXERCISES: CPT | Mod: GP | Performed by: PHYSICAL THERAPIST

## 2020-01-07 RX ORDER — ONDANSETRON 4 MG/1
4 TABLET, ORALLY DISINTEGRATING ORAL EVERY 8 HOURS PRN
Refills: 0 | Status: CANCELLED | OUTPATIENT
Start: 2020-01-07

## 2020-01-07 NOTE — TELEPHONE ENCOUNTER
Patient requesting refill from Zofran.   Asked nursing to contact patient and triage; patient is nearly 4 weeks from surgery and should not be getting nausea.      DONN Tse, CNP  Orthopedic Surgery

## 2020-01-07 NOTE — TELEPHONE ENCOUNTER
Spoke to patient...     He has decreased ASA to once daily and that has helped.      Off the Oxycodone now for a few days. Now just taking hydroxyzine, tizanidine, tylenol.     Gets the nausea at night, no vomiting. Has not had any the past few nights but is out of his zofran and would like to have some on hand in case it reoccurs. He notes his hives are gone now.     Patient also mentioned having some depression symptoms since coming off the oxycodone. Had this problem last time too. He is aware of his underlying high anxiety and has considered reaching out to his primary care provider about this but so far has been relying on emotional support from family and friends.     Chandrika ALFARO, Lead RN, BSN. . .  1/7/2020, 1:55 PM

## 2020-01-08 NOTE — TELEPHONE ENCOUNTER
Informed patient, he will call back if nausea presents.     Chandrika ALFARO, Lead RN, BSN. . .  1/8/2020, 8:41 AM

## 2020-01-08 NOTE — TELEPHONE ENCOUNTER
If he has not had nausea for the past few nights, and no longer taking the narcotic would not anticipate any further nausea. I usually do not prescribe medication in the chance that a symptom may return.  Recommend no refill and for patient to contact the clinic if things change.    DONN Tse, CNP  Orthopedic Surgery

## 2020-01-10 ENCOUNTER — THERAPY VISIT (OUTPATIENT)
Dept: PHYSICAL THERAPY | Facility: CLINIC | Age: 60
End: 2020-01-10
Payer: COMMERCIAL

## 2020-01-10 DIAGNOSIS — M25.561 ACUTE PAIN OF RIGHT KNEE: ICD-10-CM

## 2020-01-10 DIAGNOSIS — Z96.651 AFTERCARE FOLLOWING RIGHT KNEE JOINT REPLACEMENT SURGERY: ICD-10-CM

## 2020-01-10 DIAGNOSIS — Z47.1 AFTERCARE FOLLOWING RIGHT KNEE JOINT REPLACEMENT SURGERY: ICD-10-CM

## 2020-01-10 PROCEDURE — 97110 THERAPEUTIC EXERCISES: CPT | Mod: GP | Performed by: PHYSICAL THERAPIST

## 2020-01-13 DIAGNOSIS — Z96.651 S/P TOTAL KNEE REPLACEMENT USING CEMENT, RIGHT: ICD-10-CM

## 2020-01-13 NOTE — TELEPHONE ENCOUNTER
Reason for Call:  Medication or medication refill:    Do you use a [facility]?  Name of the pharmacy and phone number for the current request: [facility] [address] [address] [address] [address] [facility]     Name of the medication requested: hydrOXYzine (ATARAX) 25 MG tablet, muscle relaxor     Other request: Pt called and is requesting a refill on these two medications. He said he is still in a lot of pain. Please Advise thank you    Can we leave a detailed message on this number? YES    Phone number patient can be reached at: Home number on file [phone] (home)    Best Time: anytime    Call taken on 1/13/2020 at 11:19 AM by Sonja Jiménez

## 2020-01-13 NOTE — TELEPHONE ENCOUNTER
These were both sent over to the pharmacy on 12/10/2019 for 60 with 1 refill. Sent by Nasir Elena.

## 2020-01-13 NOTE — TELEPHONE ENCOUNTER
Spoke to pharmacy, these were written as a 15 day supplies and so the refill has already been picked up that we have on file. Patient is very upset that these cannot be filled by us today. I had explained to him that we sent this to his team that has prescribed these medications and he is in a lot of pain. He says he is not taking the oxycodone anymore and that tylenol doesn't help.  Pura Cruz MA

## 2020-01-14 ENCOUNTER — THERAPY VISIT (OUTPATIENT)
Dept: PHYSICAL THERAPY | Facility: CLINIC | Age: 60
End: 2020-01-14
Payer: COMMERCIAL

## 2020-01-14 DIAGNOSIS — Z96.651 AFTERCARE FOLLOWING RIGHT KNEE JOINT REPLACEMENT SURGERY: ICD-10-CM

## 2020-01-14 DIAGNOSIS — M25.561 ACUTE PAIN OF RIGHT KNEE: ICD-10-CM

## 2020-01-14 DIAGNOSIS — Z47.1 AFTERCARE FOLLOWING RIGHT KNEE JOINT REPLACEMENT SURGERY: ICD-10-CM

## 2020-01-14 PROCEDURE — 97110 THERAPEUTIC EXERCISES: CPT | Mod: GP | Performed by: PHYSICAL THERAPIST

## 2020-01-14 PROCEDURE — 97140 MANUAL THERAPY 1/> REGIONS: CPT | Mod: GP | Performed by: PHYSICAL THERAPIST

## 2020-01-14 RX ORDER — HYDROXYZINE HYDROCHLORIDE 25 MG/1
25 TABLET, FILM COATED ORAL EVERY 6 HOURS PRN
Qty: 60 TABLET | Refills: 1 | Status: SHIPPED | OUTPATIENT
Start: 2020-01-14 | End: 2020-03-09

## 2020-01-14 RX ORDER — TIZANIDINE HYDROCHLORIDE 4 MG/1
4 CAPSULE, GELATIN COATED ORAL 3 TIMES DAILY PRN
Qty: 40 CAPSULE | Refills: 1 | Status: SHIPPED | OUTPATIENT
Start: 2020-01-14 | End: 2020-04-13

## 2020-01-14 NOTE — TELEPHONE ENCOUNTER
Pharmacy notified patient of rx. And notified via toma,     Chandrika ALFARO Lead RN, BSN. . .  1/14/2020, 11:24 AM

## 2020-01-17 ENCOUNTER — THERAPY VISIT (OUTPATIENT)
Dept: PHYSICAL THERAPY | Facility: CLINIC | Age: 60
End: 2020-01-17
Payer: COMMERCIAL

## 2020-01-17 DIAGNOSIS — Z47.1 AFTERCARE FOLLOWING RIGHT KNEE JOINT REPLACEMENT SURGERY: ICD-10-CM

## 2020-01-17 DIAGNOSIS — Z96.651 AFTERCARE FOLLOWING RIGHT KNEE JOINT REPLACEMENT SURGERY: ICD-10-CM

## 2020-01-17 DIAGNOSIS — M25.561 ACUTE PAIN OF RIGHT KNEE: ICD-10-CM

## 2020-01-17 DIAGNOSIS — R26.9 ABNORMAL GAIT: Primary | ICD-10-CM

## 2020-01-17 PROCEDURE — 97140 MANUAL THERAPY 1/> REGIONS: CPT | Mod: GP | Performed by: PHYSICAL THERAPIST

## 2020-01-17 PROCEDURE — 97110 THERAPEUTIC EXERCISES: CPT | Mod: GP | Performed by: PHYSICAL THERAPIST

## 2020-01-17 PROCEDURE — 97116 GAIT TRAINING THERAPY: CPT | Mod: GP | Performed by: PHYSICAL THERAPIST

## 2020-01-20 ENCOUNTER — THERAPY VISIT (OUTPATIENT)
Dept: PHYSICAL THERAPY | Facility: CLINIC | Age: 60
End: 2020-01-20
Payer: COMMERCIAL

## 2020-01-20 DIAGNOSIS — Z47.1 AFTERCARE FOLLOWING RIGHT KNEE JOINT REPLACEMENT SURGERY: ICD-10-CM

## 2020-01-20 DIAGNOSIS — Z96.651 AFTERCARE FOLLOWING RIGHT KNEE JOINT REPLACEMENT SURGERY: ICD-10-CM

## 2020-01-20 DIAGNOSIS — M25.561 ACUTE PAIN OF RIGHT KNEE: ICD-10-CM

## 2020-01-20 DIAGNOSIS — R26.9 ABNORMAL GAIT: ICD-10-CM

## 2020-01-20 PROCEDURE — 97116 GAIT TRAINING THERAPY: CPT | Mod: GP | Performed by: PHYSICAL THERAPIST

## 2020-01-20 PROCEDURE — 97110 THERAPEUTIC EXERCISES: CPT | Mod: GP | Performed by: PHYSICAL THERAPIST

## 2020-01-20 PROCEDURE — 97140 MANUAL THERAPY 1/> REGIONS: CPT | Mod: GP | Performed by: PHYSICAL THERAPIST

## 2020-01-20 NOTE — PROGRESS NOTES
"Subjective:  HPI                    Objective:  System    Physical Exam    General     ROS    Assessment/Plan:    PROGRESS  REPORT    Progress reporting period is from 12/24/2019 to 1/20/2020.       SUBJECTIVE  Subjective changes noted by patient:  Pt notes walking with cane is becoming easier - pt is now ambulating around house w/out cane and using support as needed. Pt notes he is still experiencing general aching/stiffness of R knee but pain has slightly decreased.  Subjective: No new complaints - pt has continued symptoms of aching in R knee, but no current pain; pt notes he felt nerve sensation into foot during extension stretch, but discontinued following stretch     Current pain level is 2/10 Current Pain level: 2/10.     Previous pain level was  10/10 Initial Pain level: 10/10.   Changes in function:  Yes (See Goal flowsheet attached for changes in current functional level)  Adverse reaction to treatment or activity: None    OBJECTIVE  Changes noted in objective findings:  Yes, initial eval R knee PROM: 5-93 to 6-114 on 1/20/2020. Pt demonstrates improvements in strength and muscle control during exercises. Pt currently showing good eccentric control with 4\" step up/down and one UE support. Attempted 5\" step up/down during 1/20/2020 PT session and pt was unable to demonstrate control with 5/5 difficulty.   Objective: supine knee PROM: 6-114    ASSESSMENT/PLAN  Updated problem list and treatment plan: Diagnosis 1:  S/P R TKA  Pain -  manual therapy  Decreased ROM/flexibility - manual therapy and therapeutic exercise  Decreased joint mobility - manual therapy and therapeutic exercise  Decreased strength - therapeutic exercise and therapeutic activities  Impaired balance - neuro re-education and therapeutic activities  Inflammation - self management/home program  Impaired gait - gait training  Decreased function - therapeutic activities  STG/LTGs have been met or progress has been made towards goals:  Yes (See " Goal flow sheet completed today.)  Assessment of Progress: The patient's condition is improving.  Self Management Plans:  Patient has been instructed in a home treatment program.  Patient  has been instructed in self management of symptoms.  I have re-evaluated this patient and find that the nature, scope, duration and intensity of the therapy is appropriate for the medical condition of the patient.  Hugo continues to require the following intervention to meet STG and LTG's:  PT    Recommendations:  This patient would benefit from continued therapy.     Frequency:  2 X week, once daily  Duration:  for 5 weeks        Please refer to the daily flowsheet for treatment today, total treatment time and time spent performing 1:1 timed codes.    Taurus Esteban, SPT  Dwight High, DPT, OCS

## 2020-01-20 NOTE — LETTER
"[facility]  [address]  [facility] [id number] [phone]    2020    Re: Hugo Longoria   :   1960  MRN:  [id number]   REFERRING PHYSICIAN:   Javier Yin    [facility]    Date of Initial Evaluation:  ***  Visits:  Rxs Used: 9  Reason for Referral:     Acute pain of right knee  Aftercare following right knee joint replacement surgery  Abnormal gait    EVALUATION SUMMARY    Subjective:  HPI                    Objective:  System    Physical Exam    General     ROS    Assessment/Plan:    PROGRESS  REPORT    Progress reporting period is from 2019 to 2020.       SUBJECTIVE  Subjective changes noted by patient:  Pt notes walking with cane is becoming easier - pt is now ambulating around house w/out cane and using support as needed. Pt notes he is still experiencing general aching/stiffness of R knee but pain has slightly decreased.  Subjective: No new complaints - pt has continued symptoms of aching in R knee, but no current pain; pt notes he felt nerve sensation into foot during extension stretch, but discontinued following stretch     Current pain level is 2/10 Current Pain level: 2/10.     Previous pain level was  10/10 Initial Pain level: 10/10.   Changes in function:  Yes (See Goal flowsheet attached for changes in current functional level)  Adverse reaction to treatment or activity: None    OBJECTIVE  Changes noted in objective findings:  Yes, initial eval R knee PROM: 5-93 to 6-114 on 2020. Pt demonstrates improvements in strength and muscle control during exercises. Pt currently showing good eccentric control with 4\" step up/down and one UE support. Attempted 5\" step up/down during 2020 PT session and pt was unable to demonstrate control with 5/5 difficulty.   Objective: supine knee PROM: 6-114    ASSESSMENT/PLAN  Updated problem list and " treatment plan: Diagnosis 1:  S/P R TKA  Pain -  manual therapy  Decreased ROM/flexibility - manual therapy and therapeutic exercise  Decreased joint mobility - manual therapy and therapeutic exercise  Decreased strength - therapeutic exercise and therapeutic activities  Impaired balance - neuro re-education and therapeutic activities  Inflammation - self management/home program  Impaired gait - gait training  Decreased function - therapeutic activities  STG/LTGs have been met or progress has been made towards goals:  Yes (See Goal flow sheet completed today.)  Assessment of Progress: The patient's condition is improving.  Self Management Plans:  Patient has been instructed in a home treatment program.  Patient  has been instructed in self management of symptoms.  I have re-evaluated this patient and find that the nature, scope, duration and intensity of the therapy is appropriate for the medical condition of the patient.  Hugo continues to require the following intervention to meet STG and LTG's:  PT    Recommendations:  This patient would benefit from continued therapy.     Frequency:  2 X week, once daily  Duration:  for 5 weeks      Taurus Esteban, SPT  Dwight High, DPT, OCS             Thank you for your referral.    INQUIRIES  Therapist:   [facility]  [address]  [facility] [address]  Phone: [phone]  Fax: [phone]

## 2020-01-21 ENCOUNTER — ANCILLARY PROCEDURE (OUTPATIENT)
Dept: GENERAL RADIOLOGY | Facility: CLINIC | Age: 60
End: 2020-01-21
Attending: FAMILY MEDICINE
Payer: COMMERCIAL

## 2020-01-21 ENCOUNTER — OFFICE VISIT (OUTPATIENT)
Dept: FAMILY MEDICINE | Facility: CLINIC | Age: 60
End: 2020-01-21
Payer: COMMERCIAL

## 2020-01-21 ENCOUNTER — TELEPHONE (OUTPATIENT)
Dept: FAMILY MEDICINE | Facility: CLINIC | Age: 60
End: 2020-01-21

## 2020-01-21 VITALS
DIASTOLIC BLOOD PRESSURE: 80 MMHG | RESPIRATION RATE: 18 BRPM | HEART RATE: 101 BPM | WEIGHT: 214.1 LBS | TEMPERATURE: 98.2 F | BODY MASS INDEX: 30.65 KG/M2 | SYSTOLIC BLOOD PRESSURE: 122 MMHG | HEIGHT: 70 IN | OXYGEN SATURATION: 94 %

## 2020-01-21 DIAGNOSIS — K60.2 ANAL FISSURE: ICD-10-CM

## 2020-01-21 DIAGNOSIS — K59.03 DRUG-INDUCED CONSTIPATION: Primary | ICD-10-CM

## 2020-01-21 DIAGNOSIS — R10.84 ABDOMINAL PAIN, GENERALIZED: ICD-10-CM

## 2020-01-21 DIAGNOSIS — K64.4 EXTERNAL HEMORRHOIDS: ICD-10-CM

## 2020-01-21 PROCEDURE — 74019 RADEX ABDOMEN 2 VIEWS: CPT | Mod: FY

## 2020-01-21 PROCEDURE — 99214 OFFICE O/P EST MOD 30 MIN: CPT | Performed by: FAMILY MEDICINE

## 2020-01-21 RX ORDER — DIBUCAINE 0.28 G/28G
1 OINTMENT TOPICAL 3 TIMES DAILY PRN
Qty: 56.7 G | Refills: 1 | Status: SHIPPED | OUTPATIENT
Start: 2020-01-21 | End: 2020-01-21

## 2020-01-21 RX ORDER — DIBUCAINE 0.28 G/28G
1 OINTMENT TOPICAL 3 TIMES DAILY PRN
Qty: 56.7 G | Refills: 1 | Status: SHIPPED | OUTPATIENT
Start: 2020-01-21 | End: 2020-01-23

## 2020-01-21 ASSESSMENT — PAIN SCALES - GENERAL: PAINLEVEL: EXTREME PAIN (9)

## 2020-01-21 ASSESSMENT — ANXIETY QUESTIONNAIRES
GAD7 TOTAL SCORE: 10
GAD7 TOTAL SCORE: 10
6. BECOMING EASILY ANNOYED OR IRRITABLE: SEVERAL DAYS
5. BEING SO RESTLESS THAT IT IS HARD TO SIT STILL: NEARLY EVERY DAY
7. FEELING AFRAID AS IF SOMETHING AWFUL MIGHT HAPPEN: NOT AT ALL
4. TROUBLE RELAXING: NEARLY EVERY DAY
7. FEELING AFRAID AS IF SOMETHING AWFUL MIGHT HAPPEN: NOT AT ALL
2. NOT BEING ABLE TO STOP OR CONTROL WORRYING: SEVERAL DAYS
GAD7 TOTAL SCORE: 10
1. FEELING NERVOUS, ANXIOUS, OR ON EDGE: SEVERAL DAYS
3. WORRYING TOO MUCH ABOUT DIFFERENT THINGS: SEVERAL DAYS

## 2020-01-21 ASSESSMENT — PATIENT HEALTH QUESTIONNAIRE - PHQ9
SUM OF ALL RESPONSES TO PHQ QUESTIONS 1-9: 12
SUM OF ALL RESPONSES TO PHQ QUESTIONS 1-9: 12
10. IF YOU CHECKED OFF ANY PROBLEMS, HOW DIFFICULT HAVE THESE PROBLEMS MADE IT FOR YOU TO DO YOUR WORK, TAKE CARE OF THINGS AT HOME, OR GET ALONG WITH OTHER PEOPLE: NOT DIFFICULT AT ALL

## 2020-01-21 ASSESSMENT — MIFFLIN-ST. JEOR: SCORE: 1793.43

## 2020-01-21 NOTE — TELEPHONE ENCOUNTER
Pt was seen in clinic today.  He was prescribed two medications.    1. dibucaine (NUPERCAINAL) 1 % external ointment, according to [facility] is an OTC drug and they did not have this in stock. So pt did not get it.      Instead of above Rx, he was told to use recticare - pt stated that he has not been successful with this in the past.  They prefer not to use this.     2. nifedipine 0.2% in white petrolatum 0.2 % OINT ointment was sent to  compounding pharmacy.  Pt called that pharmacy prior to driving out to them in [place] to pick it up.  The pharmacy stated that they need 2-4 days to creat the Rx for the pt.    Pt requesting another Rx in the meantime to address the pain.  Pt stated they did an enema and a little BM came out.     3.  Pt can not recall what Dr. Robin wanted him to do on Friday.  Pt was unclear if he should follow up on Friday, or call on Friday if the pain is still present.  Provider, please clarify.

## 2020-01-21 NOTE — PROGRESS NOTES
Subjective     Hugo Longoria is a 59 year old male who presents to clinic today for the following health issues:    History of Present Illness        He eats 0-1 servings of fruits and vegetables daily.He consumes 0 sweetened beverage(s) daily.He exercises with enough effort to increase his heart rate 9 or less minutes per day.  He exercises with enough effort to increase his heart rate 3 or less days per week.   He is taking medications regularly.     Constipation     Onset: 12/15/2019    Description:   Frequency of bowel movements: 6 times a day.  Stool consistency: hard    Progression of Symptoms:  worsening    Accompanying Signs & Symptoms:  Abdominal pain (cramping?): YES  Blood in stool: YES  Rectal pain: YES  Nausea/vomiting: YES- nausea  Weight loss or gain: no    History:   History of abdominal surgery: YES- hernia repair 6 years ago    Precipitating factors:   Recent use of narcotics, anticholinergics, calcium channel blockers, antacids, or iron supplements: YES  Chronic Laxative Use: YES         Therapies Tried and outcome: increase in fluids and prune juice    Hemorrhoids  Onset: off an on for several years    Description:   Pain: YES  Itching: no     Accompanying Signs & Symptoms:  Blood streaked toilet paper: no   Blood in stool: YES   Changes in stool pattern: YES- going more frequent, and hard stools    History:   Any previous GI studies done:Colonoscopy and stomach scopes  Family History of colon cancer: YES    Precipitating factors:   nothing    Alleviating factors:  preporation H    Therapies Tried and outcome: increased fluids and preparation H  Answers for HPI/ROS submitted by the patient on 1/21/2020     Chronic problems general questions HPI Form  If you checked off any problems, how difficult have these problems made it for you to do your work, take care of things at home, or get along with other people?: Not difficult at all  PHQ9 TOTAL SCORE: 12  KERRY 7 TOTAL SCORE: 10      Patient Active  Problem List   Diagnosis     History of colonic polyps     KERRY (generalized anxiety disorder)     CARDIOVASCULAR SCREENING; LDL GOAL LESS THAN 130     History of tobacco use: 1980 - 1990     Hemorrhoids     Erectile dysfunction     HTN, goal below 140/90     Fatigue     ACL tear     History of gastric ulcer     Chronic gastric ulcer     Seasonal allergic rhinitis     Complete tear of right rotator cuff     Rupture long head biceps tendon, right, initial encounter     Subacromial impingement of right shoulder     Mild episode of recurrent major depressive disorder (H)     Advanced directives, counseling/discussion     Primary osteoarthritis of right knee     Chronic right shoulder pain     S/P total knee replacement using cement, right     Acute pain of right knee     Aftercare following right knee joint replacement surgery     Abnormal gait     Past Surgical History:   Procedure Laterality Date     ARTHROPLASTY KNEE Right 12/10/2019    Procedure: Right knee replacement;  Surgeon: Javier Yin DO;  Location: [facility]     ARTHROSCOPY KNEE  5/29/2013    Procedure: ARTHROSCOPY KNEE;  Right knee arthroscopy with partial medial and partial lateral menisectomies;  Surgeon: Phani Mclaughlin MD;  Location: [facility]     ARTHROSCOPY SHOULDER BICEPS TENODESIS REPAIR Right 1/13/2017    Procedure: ARTHROSCOPY SHOULDER BICEPS TENODESIS REPAIR;  Surgeon: Javier Yin DO;  Location: [facility]     ARTHROSCOPY SHOULDER DECOMPRESSION Right 1/13/2017    Procedure: ARTHROSCOPY SHOULDER DECOMPRESSION;  Surgeon: Javier Yin DO;  Location: [facility]     ARTHROSCOPY SHOULDER ROTATOR CUFF REPAIR Right 1/13/2017    Procedure: ARTHROSCOPY SHOULDER ROTATOR CUFF REPAIR;  Surgeon: Javier Yin DO;  Location:  OR     COLONOSCOPY  11/01/2007     COLONOSCOPY  12/12/11     COLONOSCOPY N/A 11/18/2015    Procedure: COLONOSCOPY;  Surgeon: Migue Mclaughlin MD;  Location:  GI     COLONOSCOPY N/A  3/22/2017    Procedure: COMBINED COLONOSCOPY, SINGLE OR MULTIPLE BIOPSY/POLYPECTOMY BY BIOPSY;  Surgeon: Salvatore Zepeda MD;  Location: [facility]     COLONOSCOPY N/A 3/5/2019    Procedure: COLONOSCOPY;  Surgeon: Prakash Ervin DO;  Location:  GI     ENDOSCOPY  12    Upper GI - [facility]     ESOPHAGOSCOPY, GASTROSCOPY, DUODENOSCOPY (EGD), COMBINED N/A 2015    Procedure: COMBINED ESOPHAGOSCOPY, GASTROSCOPY, DUODENOSCOPY (EGD), BIOPSY SINGLE OR MULTIPLE;  Surgeon: Migue Mclaughlin MD;  [facility]     ESOPHAGOSCOPY, GASTROSCOPY, DUODENOSCOPY (EGD), COMBINED N/A 3/22/2017    Procedure: COMBINED ESOPHAGOSCOPY, GASTROSCOPY, DUODENOSCOPY (EGD), BIOPSY SINGLE OR MULTIPLE;  Surgeon: Salvatore Zepeda MD;  Location:  GI     HC UGI ENDOSCOPY, SIMPLE EXAM  10/31/2007     HERNIORRHAPHY UMBILICAL N/A 3/12/2015    Procedure: HERNIORRHAPHY UMBILICAL;  Surgeon: Yared aM MD;  [facility]: [facility]     IRRIGATION AND DEBRIDEMENT UPPER EXTREMITY, COMBINED Left 3/15/2016    Procedure: COMBINED IRRIGATION AND DEBRIDEMENT UPPER EXTREMITY;  Surgeon: Javier Yin DO;  Location: [facility]     REMOVAL OF SPERM DUCT(S)      Vasectomy       Social History     Tobacco Use     Smoking status: Former Smoker     Last attempt to quit: 1981     Years since quittin.0     Smokeless tobacco: Never Used   Substance Use Topics     Alcohol use: Yes     Alcohol/week: 0.0 standard drinks     Comment: weekends     Family History   Problem Relation Age of Onset     Cancer Mother         ovarian cancer     Asthma Mother      Hypertension Mother      Arthritis Mother      Genitourinary Problems Mother      Lipids Mother      Cancer Sister         ovarian cancer     Hypertension Sister      Lipids Sister      Cancer Maternal Grandmother         stomach cancer     Asthma Father      Cardiovascular Father         heart attack; bypass x5, congenital heart disease     Heart Disease Father          heart attack; bypass x5, congenital heart disease     Diabetes Father      Hypertension Father      Cancer Father         prostate     Prostate Cancer Father      Circulatory Father         blood clots     Genitourinary Problems Father      Respiratory Father         emphysema; COPD     Diabetes Maternal Grandfather      Diabetes Paternal Grandfather      Eye Disorder Paternal Grandfather         glaucoma     Hypertension Brother      Lipids Brother      Hypertension Brother      Cardiovascular Brother         bypass x3     Heart Disease Brother         bypass x3     Lipids Brother      Hypertension Sister      C.A.D. No family hx of      Cerebrovascular Disease No family hx of      Breast Cancer No family hx of      Cancer - colorectal No family hx of      Alzheimer Disease No family hx of      Blood Disease No family hx of      Gastrointestinal Disease No family hx of      Musculoskeletal Disorder No family hx of      Neurologic Disorder No family hx of      Thyroid Disease No family hx of          Current Outpatient Medications   Medication Sig Dispense Refill     acetaminophen (TYLENOL) 325 MG tablet Take 3 tablets (975 mg) by mouth every 8 hours as needed for mild pain 100 tablet 0     ascorbic acid (VITAMIN C) 250 MG CHEW chewable tablet Take 250 mg by mouth daily       aspirin (ASA) 81 MG tablet Take 1 tablet (81 mg) by mouth daily       atorvastatin (LIPITOR) 20 MG tablet TAKE 1 TABLET BY MOUTH ONCE DAILY 90 tablet 0     dibucaine (NUPERCAINAL) 1 % external ointment Place 1 g rectally 3 times daily as needed for moderate pain, irritation or hemorrhoids 56.7 g 1     hydrOXYzine (ATARAX) 25 MG tablet Take 1 tablet (25 mg) by mouth every 6 hours as needed for other (adjuvant pain) 60 tablet 1     LANsoprazole (PREVACID) 30 MG DR capsule TAKE 1 CAPSULE BY MOUTH ONCE DAILY 90 capsule 0     lisinopril (PRINIVIL/ZESTRIL) 20 MG tablet TAKE 1/2 (ONE-HALF) TABLET BY MOUTH ONCE DAILY 45 tablet 0     Multiple  "Vitamin (MULTIVITAMINS PO) Take  by mouth.       nifedipine 0.2% in white petrolatum 0.2 % OINT ointment Apply topically 2 times daily 100 g 0     order for DME Equipment being ordered: front wheeled walker 1 Device 0     order for DME Equipment being ordered: 2 wheeled front walker 1 Device 0     tiZANidine (ZANAFLEX) 4 MG capsule Take 1 capsule (4 mg) by mouth 3 times daily as needed for muscle spasms (pain) 40 capsule 1     docusate sodium (COLACE) 100 MG capsule Take 2 capsules (200 mg) by mouth 2 times daily as needed for constipation (Patient not taking: Reported on 1/21/2020) 60 capsule 1     fluticasone (FLONASE) 50 MCG/ACT spray Spray 1-2 sprays into both nostrils daily (Patient not taking: Reported on 12/5/2019) 1 Bottle 0     ondansetron (ZOFRAN-ODT) 4 MG ODT tab Take 1 tablet (4 mg) by mouth every 8 hours as needed for nausea or vomiting (Patient not taking: Reported on 1/21/2020) 20 tablet 0     oxyCODONE (ROXICODONE) 5 MG tablet Take 1 tablet (5 mg) by mouth 3 times daily as needed for moderate to severe pain (Patient not taking: Reported on 1/21/2020) 25 tablet 0     Allergies   Allergen Reactions     Hydrocodone Other (See Comments)     \"climbed the walls, very anxious, insomnia\"     Nsaids Other (See Comments)     Hx of stomach ulcers     Recent Labs   Lab Test 11/20/19  1636 01/24/18  0926 01/09/17  0856  09/22/15  03/12/13  1705   A1C 5.2  --   --   --   --   --   --    LDL  --  162* 162*  --   --   --   --    HDL  --  52 44  --   --   --   --    TRIG  --  164* 118  --   --   --   --    ALT  --   --   --   --  64  --  40   CR 0.72  --  0.78   < >  --    < > 0.87   GFRESTIMATED >90  --  >90  Non  GFR Calc     < >  --    < > >90   GFRESTBLACK >90  --  >90  African American GFR Calc     < >  --    < > >90   POTASSIUM 3.8  --  4.4   < >  --    < > 4.7   TSH  --   --   --   --   --   --  3.14    < > = values in this interval not displayed.      BP Readings from Last 3 Encounters: "   01/21/20 122/80   12/23/19 137/80   12/11/19 129/73    Wt Readings from Last 3 Encounters:   01/21/20 97.1 kg (214 lb 1.6 oz)   12/23/19 98.4 kg (217 lb)   12/10/19 98.4 kg (217 lb)              Reviewed and updated as needed this visit by Provider         Review of Systems   ROS COMP: Constitutional, HEENT, cardiovascular, pulmonary, gi and gu systems are negative, except as otherwise noted.      Objective    There were no vitals taken for this visit.  There is no height or weight on file to calculate BMI.  Physical Exam   GENERAL: healthy, alert and no distress  NECK: no adenopathy, no asymmetry, masses, or scars and thyroid normal to palpation  RESP: lungs clear to auscultation - no rales, rhonchi or wheezes  CV: regular rate and rhythm, normal S1 S2, no S3 or S4, no murmur, click or rub, no peripheral edema and peripheral pulses strong  ABDOMEN: soft, nontender, no hepatosplenomegaly, no masses and bowel sounds normal  RECTAL (male): anal fissure likely at 12:00, patient did not tolerate exam  MS: no gross musculoskeletal defects noted, no edema  NEURO: Normal strength and tone, mentation intact and speech normal  BACK: no CVA tenderness, no paralumbar tenderness  PSYCH: mentation appears normal, affect normal/bright    Diagnostic Test Results:  Labs reviewed in Epic  Xray -KUB- some stool retention, no air-fluid lines, no dilation        ASSESSMENT and PLAN  1. Drug-induced constipation  59-year-old male, recent right knee replacement, was taking opioids.  Has had on and off again constipation, has history of hemorrhoids.  Increasing anal pain.  He did get some relief from a previous enema.  He had stopped his Colace.  Recommended he restart his Colace, he may repeat the enema.  Likely anal fissure, because of anal pain, see below.  Ensure good hydration.    2. Abdominal pain, generalized  Nonsurgical, abdomen benign and soft.  Likely secondary to constipation.  X-ray shows some stool retention.  See above  for treatment of constipation.  - XR Abdomen 2 Views; Future    3. Anal fissure  Exquisitely tender at approximately 12:00 on anal exam, he would not tolerate full exam.  Likely fissure even his description of pain.  Will start treatment of that.  He will follow-up in 48 hours if no improvement or any worsening.  - dibucaine (NUPERCAINAL) 1 % external ointment; Place 1 g rectally 3 times daily as needed for moderate pain, irritation or hemorrhoids  Dispense: 56.7 g; Refill: 1    4. External hemorrhoids  I did not appreciate external hemorrhoids on my exam however he did not tolerate full exam.  Given his history of hemorrhoids, will also treat for that.  - nifedipine 0.2% in white petrolatum 0.2 % OINT ointment; Apply topically 2 times daily  Dispense: 100 g; Refill: 0    Return in about 6 months (around 7/21/2020) for Annual Well Check.     Bony Robin MD, FAAFP  Family Medicine Physician  [facility] [place]  [address] [place] [address]

## 2020-01-21 NOTE — PATIENT INSTRUCTIONS
Hugo,    It was great seeing you in clinic today.  I summarized our discussion and your plan below.  Please let me know if you have any questions or concerns.  Please follow up with me as discussed in clinic or sooner if any worsening or additional concerns.     1. Drug-induced constipation  59-year-old male, recent right knee replacement, was taking opioids.  Has had on and off again constipation, has history of hemorrhoids.  Increasing anal pain.  He did get some relief from a previous enema.  He had stopped his Colace.  Recommended he restart his Colace, he may repeat the enema.  Likely anal fissure, because of anal pain, see below.  Ensure good hydration.    2. Abdominal pain, generalized  Nonsurgical, abdomen benign and soft.  Likely secondary to constipation.  X-ray shows some stool retention.  See above for treatment of constipation.  - XR Abdomen 2 Views; Future    3. Anal fissure  Exquisitely tender at approximately 12:00 on anal exam, he would not tolerate full exam.  Likely fissure even his description of pain.  Will start treatment of that.  He will follow-up in 48 hours if no improvement or any worsening.  - dibucaine (NUPERCAINAL) 1 % external ointment; Place 1 g rectally 3 times daily as needed for moderate pain, irritation or hemorrhoids  Dispense: 56.7 g; Refill: 1    4. External hemorrhoids  I did not appreciate external hemorrhoids on my exam however he did not tolerate full exam.  Given his history of hemorrhoids, will also treat for that.  - nifedipine 0.2% in white petrolatum 0.2 % OINT ointment; Apply topically 2 times daily  Dispense: 100 g; Refill: 0       Sincerely,  Dr. JEYSON Robin MD, FAAFP  Family Medicine Physician  [facility] [place]  [address], [place] [address]    Patient Education      Patient Education     Taking a Sitz Bath  A sitz bath is a type of therapy done by sitting in warm, shallow water. It can help soothe pain, itching, and other symptoms in  the anal and genital areas. It can also help keep these areas clean if you can t take a bath or shower. Sitz is from the Occitan word sitzen, which means to sit.  Why a sitz bath is done  A sitz bath helps clean and treat certain problems in the anal area, genital area, and the perineum. The perineum is the area between the anus and the vulva in women. In men, it is between the anus and the scrotum. A sitz bath helps increase blood flow to these areas and relax the muscles.  A sitz bath may be done to:    Help ease pain and itching from hemorrhoids    Help ease pain from an anal fissure    Bathe and soothe the perineum after childbirth    Clean and soothe the anal area or perineum after surgery    Ease prostate pain during prostatitis or after a procedure    Help ease period cramps    Clean the anal and genital areas if you can t take a bath or shower  How a sitz bath is done  A sitz bath can be done in 2 ways: in a bathtub or using a sitz bath bowl.  In a bathtub  To take a sitz bath in a tub:    Make sure your bathtub is clean. Fill a clean bathtub with 3 to 4 inches of warm water. In some cases, your healthcare provider may tell you to use cold water instead.    Add salt or medicine to the water if advised by your healthcare provider.    Gently lower yourself down into the bathtub and sit on the bottom of the tub. Don t get into the bath unless the water temperature is comfortable.    Hold on to a railing. Or ask for help from a family member, friend, or caregiver if needed.  If you have a wound, the water may cause pain at first. But the pain should ease. Make sure the area that needs treating is under the water. You can bend your knees up to help expose the area that needs contact with the water.  Using a sitz bath bowl  A sitz bath bowl is a special plastic container that s placed on a toilet. You can buy this in many drugstores and medical supply stores. To take a sitz bath this way:    Lift the toilet lid and  seat. Place a cleaned plastic sitz bath bowl on the rim of your toilet. Make sure the bowl is firmly in place and won t move around.    Fill the sitz bath bowl with warm water from a pitcher or other container. The water should cover your perineum. Make sure the water temperature is comfortable.    Add salt or medicine to the water if advised by your healthcare provider. Or follow the package instructions about how to fill the bowl. Some kits come with a plastic bag and tubing. This lets you stream water into the bowl and at the area of your body that needs treatment. The bowl may have a slot or hole in the back. This lets water flow out so it doesn t overflow onto the floor. If there is no hole, be careful not to fill the bowl too full.    Gently sit down on the sitz bath bowl. Hold on to a railing. Or ask for help from a family member, friend, or caregiver if needed.  If you have a wound, the water may cause pain at first, but the pain should ease. Make sure the area that needs treating is under the water.  For any type of sitz bath:  1. Sit in the water for 10 to 20 minutes.  2. Add more warm water as needed to keep the water comfortable.  3. Get up slowly from the tub or toilet. You may feel lightheaded or dizzy. Hold on to a railing. Or ask for help from a family member, friend, or caregiver if needed.  4. Gently pat your anal area, perineum, and genitals dry with a clean towel. Don t rub the area.  5. Wash your hands. Put any ointment or cream on the area, if advised.  6. Wash the bathtub or sitz bath bowl with soap and water after each use.  7. Use a sitz bath 2 to 3 times a day, or as often as your healthcare provider advises.  When to call your healthcare provider  Call your healthcare provider right away if you have any of these:    Fever of 100.4 F (38 C) or higher, or as directed    Redness, swelling, or fluid leaking from a cut (incision) that gets worse    Pain that gets worse    Symptoms that don t  get better, or get worse    New symptoms  Date Last Reviewed: 5/1/2016 2000-2019 [employer] [address], [place] [address]. All rights reserved. This information is not intended as a substitute for professional medical care. Always follow your healthcare professional's instructions.

## 2020-01-21 NOTE — TELEPHONE ENCOUNTER
RX sent to compounding pharmacy. Will fill and get to patient when finished, Will take 2-4 days. I advised patient new RX sent to [facility] to take till able to get compound medicine. No further concerns or questions at this time.    Chris Shelton MA on 1/21/2020 at 4:27 PM

## 2020-01-22 ASSESSMENT — PATIENT HEALTH QUESTIONNAIRE - PHQ9: SUM OF ALL RESPONSES TO PHQ QUESTIONS 1-9: 12

## 2020-01-22 ASSESSMENT — ANXIETY QUESTIONNAIRES: GAD7 TOTAL SCORE: 10

## 2020-01-23 ENCOUNTER — OFFICE VISIT (OUTPATIENT)
Dept: ORTHOPEDICS | Facility: CLINIC | Age: 60
End: 2020-01-23
Payer: COMMERCIAL

## 2020-01-23 VITALS
BODY MASS INDEX: 30.64 KG/M2 | SYSTOLIC BLOOD PRESSURE: 110 MMHG | HEIGHT: 70 IN | WEIGHT: 214 LBS | DIASTOLIC BLOOD PRESSURE: 76 MMHG

## 2020-01-23 DIAGNOSIS — Z96.651 S/P TOTAL KNEE REPLACEMENT USING CEMENT, RIGHT: ICD-10-CM

## 2020-01-23 PROCEDURE — 99024 POSTOP FOLLOW-UP VISIT: CPT | Performed by: ORTHOPAEDIC SURGERY

## 2020-01-23 RX ORDER — DOCUSATE SODIUM 100 MG/1
200 CAPSULE, LIQUID FILLED ORAL 2 TIMES DAILY PRN
Qty: 60 CAPSULE | Refills: 1 | Status: SHIPPED | OUTPATIENT
Start: 2020-01-23 | End: 2020-03-09

## 2020-01-23 ASSESSMENT — PAIN SCALES - GENERAL: PAINLEVEL: NO PAIN (0)

## 2020-01-23 ASSESSMENT — MIFFLIN-ST. JEOR: SCORE: 1792.98

## 2020-01-23 NOTE — PROGRESS NOTES
"Orthopedic Clinic Post-Operative Note    CHIEF COMPLAINT:   Chief Complaint   Patient presents with     RECHECK     right knee follow up     Surgical Followup     post op dos 12/10/19 Right knee replacement~6 weeks postop       HISTORY OF PRESENT ILLNESS  Overall improving.  Therapy is going well.  Complains of some \"restlessness\" at night.  He is been taken an oxycodone at night every now and again.  No wound issues.    Patient's past medical, surgical, social and family histories reviewed.     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)     Overnight stay due to blood poisoning     Gastric ulcer, unspecified as acute or chronic, without mention of hemorrhage or perforation        Past Surgical History:   Procedure Laterality Date     ARTHROPLASTY KNEE Right 12/10/2019    Procedure: Right knee replacement;  Surgeon: Javier Yin DO;  Location:  OR     ARTHROSCOPY KNEE  5/29/2013    Procedure: ARTHROSCOPY KNEE;  Right knee arthroscopy with partial medial and partial lateral menisectomies;  Surgeon: Phani Mclaughlin MD;  Location:  OR     ARTHROSCOPY SHOULDER BICEPS TENODESIS REPAIR Right 1/13/2017    Procedure: ARTHROSCOPY SHOULDER BICEPS TENODESIS REPAIR;  Surgeon: Javier Yin DO;  Location: [facility]     ARTHROSCOPY SHOULDER DECOMPRESSION Right 1/13/2017    Procedure: ARTHROSCOPY SHOULDER DECOMPRESSION;  Surgeon: Javier Yin DO;  Location: [facility] OR     ARTHROSCOPY SHOULDER ROTATOR CUFF REPAIR Right 1/13/2017    Procedure: ARTHROSCOPY SHOULDER ROTATOR CUFF REPAIR;  Surgeon: Javier Yin DO;  Location:  OR     COLONOSCOPY  11/01/2007     COLONOSCOPY  12/12/11     COLONOSCOPY N/A 11/18/2015    Procedure: COLONOSCOPY;  Surgeon: Migue Mclaughlin MD;  Location:  GI     COLONOSCOPY N/A 3/22/2017    Procedure: " COMBINED COLONOSCOPY, SINGLE OR MULTIPLE BIOPSY/POLYPECTOMY BY BIOPSY;  Surgeon: Salvatore Zepeda MD;  Location: [facility] GI     COLONOSCOPY N/A 3/5/2019    Procedure: COLONOSCOPY;  Surgeon: Prakash Ervin DO;  Location:  GI     ENDOSCOPY  01/27/12    Upper GI [facility]     ESOPHAGOSCOPY, GASTROSCOPY, DUODENOSCOPY (EGD), COMBINED N/A 11/18/2015    Procedure: COMBINED ESOPHAGOSCOPY, GASTROSCOPY, DUODENOSCOPY (EGD), BIOPSY SINGLE OR MULTIPLE;  Surgeon: Migue Mclaughlin MD;  Location:  GI     ESOPHAGOSCOPY, GASTROSCOPY, DUODENOSCOPY (EGD), COMBINED N/A 3/22/2017    Procedure: COMBINED ESOPHAGOSCOPY, GASTROSCOPY, DUODENOSCOPY (EGD), BIOPSY SINGLE OR MULTIPLE;  Surgeon: Salvatore Zepeda MD;  Location:  GI      UGI ENDOSCOPY, SIMPLE EXAM  10/31/2007     HERNIORRHAPHY UMBILICAL N/A 3/12/2015    Procedure: HERNIORRHAPHY UMBILICAL;  Surgeon: Yared Ma MD;  Location: [facility]     IRRIGATION AND DEBRIDEMENT UPPER EXTREMITY, COMBINED Left 3/15/2016    Procedure: COMBINED IRRIGATION AND DEBRIDEMENT UPPER EXTREMITY;  Surgeon: Javier Yin DO;  Location: [facility]     REMOVAL OF SPERM DUCT(S)      Vasectomy       Medications:  acetaminophen (TYLENOL) 325 MG tablet, Take 3 tablets (975 mg) by mouth every 8 hours as needed for mild pain  ascorbic acid (VITAMIN C) 250 MG CHEW chewable tablet, Take 250 mg by mouth daily  aspirin (ASA) 81 MG tablet, Take 1 tablet (81 mg) by mouth daily  atorvastatin (LIPITOR) 20 MG tablet, TAKE 1 TABLET BY MOUTH ONCE DAILY  hydrocortisone (ANUSOL-HC) 2.5 % cream, Place rectally 2 times daily as needed for hemorrhoids  hydrOXYzine (ATARAX) 25 MG tablet, Take 1 tablet (25 mg) by mouth every 6 hours as needed for other (adjuvant pain)  lisinopril (PRINIVIL/ZESTRIL) 20 MG tablet, TAKE 1/2 (ONE-HALF) TABLET BY MOUTH ONCE DAILY  Multiple Vitamin (MULTIVITAMINS PO), Take  by mouth.  nifedipine 0.2% in white petrolatum 0.2 % OINT ointment, Apply  "topically 2 times daily  oxyCODONE (ROXICODONE) 5 MG tablet, Take 1 tablet (5 mg) by mouth 3 times daily as needed for moderate to severe pain  tiZANidine (ZANAFLEX) 4 MG capsule, Take 1 capsule (4 mg) by mouth 3 times daily as needed for muscle spasms (pain)  fluticasone (FLONASE) 50 MCG/ACT spray, Spray 1-2 sprays into both nostrils daily (Patient not taking: Reported on 2020)  LANsoprazole (PREVACID) 30 MG DR capsule, TAKE 1 CAPSULE BY MOUTH ONCE DAILY (Patient not taking: Reported on 2020)    No current facility-administered medications on file prior to visit.       Allergies   Allergen Reactions     Hydrocodone Other (See Comments)     \"climbed the walls, very anxious, insomnia\"     Nsaids Other (See Comments)     Hx of stomach ulcers       Social History     Occupational History     Employer: [employer]     Comment: [employer]   Tobacco Use     Smoking status: Former Smoker     Last attempt to quit: 1981     Years since quittin.0     Smokeless tobacco: Never Used   Substance and Sexual Activity     Alcohol use: Yes     Alcohol/week: 0.0 standard drinks     Comment: weekends     Drug use: No     Sexual activity: Yes     Partners: Female     Birth control/protection: Surgical     Comment: vasectomy       Family History   Problem Relation Age of Onset     Cancer Mother         ovarian cancer     Asthma Mother      Hypertension Mother      Arthritis Mother      Genitourinary Problems Mother      Lipids Mother      Cancer Sister         ovarian cancer     Hypertension Sister      Lipids Sister      Cancer Maternal Grandmother         stomach cancer     Asthma Father      Cardiovascular Father         heart attack; bypass x5, congenital heart disease     Heart Disease Father         heart attack; bypass x5, congenital heart disease     Diabetes Father      Hypertension Father      Cancer Father         prostate     Prostate Cancer Father      Circulatory Father         blood " "clots     Genitourinary Problems Father      Respiratory Father         emphysema; COPD     Diabetes Maternal Grandfather      Diabetes Paternal Grandfather      Eye Disorder Paternal Grandfather         glaucoma     Hypertension Brother      Lipids Brother      Hypertension Brother      Cardiovascular Brother         bypass x3     Heart Disease Brother         bypass x3     Lipids Brother      Hypertension Sister      C.A.D. No family hx of      Cerebrovascular Disease No family hx of      Breast Cancer No family hx of      Cancer - colorectal No family hx of      Alzheimer Disease No family hx of      Blood Disease No family hx of      Gastrointestinal Disease No family hx of      Musculoskeletal Disorder No family hx of      Neurologic Disorder No family hx of      Thyroid Disease No family hx of        REVIEW OF SYSTEMS  General: negative for, night sweats, dizziness, fatigue  Resp: No shortness of breath and no cough  CV: negative for chest pain, syncope or near-syncope  GI: negative for nausea, vomiting and diarrhea  : negative for dysuria and hematuria  Musculoskeletal: as above  Neurologic: negative for syncope   Hematologic: negative for bleeding disorder    Physical Exam:  Vitals: /76   Ht 1.772 m (5' 9.75\")   Wt 97.1 kg (214 lb)   BMI 30.93 kg/m    BMI= Body mass index is 30.93 kg/m .  Constitutional: healthy, alert and no acute distress   Psychiatric: mentation appears normal and affect normal/bright  NEURO: no focal deficits  SKIN: .well healed, no erythema, no incision breakdown and no drainage.  JOINT/EXTREMITIES: Active motion 3-120 degrees passively full extension.  No instability through the arc of motion.  No focal areas of tenderness.  GAIT: not tested     Diagnostic Modalities:  Previous imaging reviewed.  Independent visualization of the images was performed.      Impression:   Chief Complaint   Patient presents with     RECHECK     right knee follow up     Surgical Followup     post " op dos 12/10/19 Right knee replacement~6 weeks postop   Overall making progress.    Plan:   He asked about a refill of oxycodone.  At 6 weeks postoperative I recommended complete discontinuation of narcotics.  He also can stop aspirin and his LOW hose.  May transition to anti-inflammatory such as ibuprofen over-the-counter as well as Tylenol.  Continue physical therapy.  Continue icing in the evening.  I did refill his Colace today.  Although with discontinuation of the narcotics his constipation should improve.    Return to clinic 6, week(s), or sooner as needed for changes.    Re-x-ray on return: Yes.    Gerber Yin D.O.

## 2020-01-23 NOTE — LETTER
"    1/23/2020         RE: Hugo Longoria  [address] [address]        Dear Colleague,    Thank you for referring your patient, Hugo Longoria, to the [facility]. Please see a copy of my visit note below.    Orthopedic Clinic Post-Operative Note    CHIEF COMPLAINT:   Chief Complaint   Patient presents with     RECHECK     right knee follow up     Surgical Followup     post op dos 12/10/19 Right knee replacement~6 weeks postop       HISTORY OF PRESENT ILLNESS  Overall improving.  Therapy is going well.  Complains of some \"restlessness\" at night.  He is been taken an oxycodone at night every now and again.  No wound issues.    Patient's past medical, surgical, social and family histories reviewed.     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)     Overnight stay due to blood poisoning     Gastric ulcer, unspecified as acute or chronic, without mention of hemorrhage or perforation        Past Surgical History:   Procedure Laterality Date     ARTHROPLASTY KNEE Right 12/10/2019    Procedure: Right knee replacement;  Surgeon: Javier Yin DO;  Location: [facility]     ARTHROSCOPY KNEE  5/29/2013    Procedure: ARTHROSCOPY KNEE;  Right knee arthroscopy with partial medial and partial lateral menisectomies;  Surgeon: Phani Mclaughlin MD;  Location: [facility] OR     ARTHROSCOPY SHOULDER BICEPS TENODESIS REPAIR Right 1/13/2017    Procedure: ARTHROSCOPY SHOULDER BICEPS TENODESIS REPAIR;  Surgeon: Javier Yin DO;  Location: [facility]     ARTHROSCOPY SHOULDER DECOMPRESSION Right 1/13/2017    Procedure: ARTHROSCOPY SHOULDER DECOMPRESSION;  Surgeon: Javier Yin DO;  Location: [facility]     ARTHROSCOPY SHOULDER ROTATOR CUFF REPAIR Right 1/13/2017    Procedure: ARTHROSCOPY SHOULDER ROTATOR CUFF REPAIR;  Surgeon: Annamaria" Javier Best DO;  Location: [facility] OR     COLONOSCOPY  11/01/2007     COLONOSCOPY  12/12/11     COLONOSCOPY N/A 11/18/2015    Procedure: COLONOSCOPY;  Surgeon: Migue Mclaughlin MD;  Location: [facility] GI     COLONOSCOPY N/A 3/22/2017    Procedure: COMBINED COLONOSCOPY, SINGLE OR MULTIPLE BIOPSY/POLYPECTOMY BY BIOPSY;  Surgeon: Salvatore Zepeda MD;  Location: [facility] GI     COLONOSCOPY N/A 3/5/2019    Procedure: COLONOSCOPY;  Surgeon: Prakash Ervin DO;  Location:  GI     ENDOSCOPY  01/27/12    Upper GI [facility]     ESOPHAGOSCOPY, GASTROSCOPY, DUODENOSCOPY (EGD), COMBINED N/A 11/18/2015    Procedure: COMBINED ESOPHAGOSCOPY, GASTROSCOPY, DUODENOSCOPY (EGD), BIOPSY SINGLE OR MULTIPLE;  Surgeon: Migue Mclaughlin MD;  Location:  GI     ESOPHAGOSCOPY, GASTROSCOPY, DUODENOSCOPY (EGD), COMBINED N/A 3/22/2017    Procedure: COMBINED ESOPHAGOSCOPY, GASTROSCOPY, DUODENOSCOPY (EGD), BIOPSY SINGLE OR MULTIPLE;  Surgeon: Salvatore Zepeda MD;  Location:  GI      UGI ENDOSCOPY, SIMPLE EXAM  10/31/2007     HERNIORRHAPHY UMBILICAL N/A 3/12/2015    Procedure: HERNIORRHAPHY UMBILICAL;  Surgeon: Yared Ma MD;  Location: [facility]     IRRIGATION AND DEBRIDEMENT UPPER EXTREMITY, COMBINED Left 3/15/2016    Procedure: COMBINED IRRIGATION AND DEBRIDEMENT UPPER EXTREMITY;  Surgeon: Javier Yin DO;  Location: [facility]     REMOVAL OF SPERM DUCT(S)      Vasectomy       Medications:  acetaminophen (TYLENOL) 325 MG tablet, Take 3 tablets (975 mg) by mouth every 8 hours as needed for mild pain  ascorbic acid (VITAMIN C) 250 MG CHEW chewable tablet, Take 250 mg by mouth daily  aspirin (ASA) 81 MG tablet, Take 1 tablet (81 mg) by mouth daily  atorvastatin (LIPITOR) 20 MG tablet, TAKE 1 TABLET BY MOUTH ONCE DAILY  hydrocortisone (ANUSOL-HC) 2.5 % cream, Place rectally 2 times daily as needed for hemorrhoids  hydrOXYzine (ATARAX) 25 MG tablet, Take 1 tablet (25 mg) by mouth every 6 hours as  "needed for other (adjuvant pain)  lisinopril (PRINIVIL/ZESTRIL) 20 MG tablet, TAKE 1/2 (ONE-HALF) TABLET BY MOUTH ONCE DAILY  Multiple Vitamin (MULTIVITAMINS PO), Take  by mouth.  nifedipine 0.2% in white petrolatum 0.2 % OINT ointment, Apply topically 2 times daily  oxyCODONE (ROXICODONE) 5 MG tablet, Take 1 tablet (5 mg) by mouth 3 times daily as needed for moderate to severe pain  tiZANidine (ZANAFLEX) 4 MG capsule, Take 1 capsule (4 mg) by mouth 3 times daily as needed for muscle spasms (pain)  fluticasone (FLONASE) 50 MCG/ACT spray, Spray 1-2 sprays into both nostrils daily (Patient not taking: Reported on 2020)  LANsoprazole (PREVACID) 30 MG DR capsule, TAKE 1 CAPSULE BY MOUTH ONCE DAILY (Patient not taking: Reported on 2020)    No current facility-administered medications on file prior to visit.       Allergies   Allergen Reactions     Hydrocodone Other (See Comments)     \"climbed the walls, very anxious, insomnia\"     Nsaids Other (See Comments)     Hx of stomach ulcers       Social History     Occupational History     Employer: [employer]     Comment: [employer]   Tobacco Use     Smoking status: Former Smoker     Last attempt to quit: 1981     Years since quittin.0     Smokeless tobacco: Never Used   Substance and Sexual Activity     Alcohol use: Yes     Alcohol/week: 0.0 standard drinks     Comment: weekends     Drug use: No     Sexual activity: Yes     Partners: Female     Birth control/protection: Surgical     Comment: vasectomy       Family History   Problem Relation Age of Onset     Cancer Mother         ovarian cancer     Asthma Mother      Hypertension Mother      Arthritis Mother      Genitourinary Problems Mother      Lipids Mother      Cancer Sister         ovarian cancer     Hypertension Sister      Lipids Sister      Cancer Maternal Grandmother         stomach cancer     Asthma Father      Cardiovascular Father         heart attack; bypass x5, congenital " "heart disease     Heart Disease Father         heart attack; bypass x5, congenital heart disease     Diabetes Father      Hypertension Father      Cancer Father         prostate     Prostate Cancer Father      Circulatory Father         blood clots     Genitourinary Problems Father      Respiratory Father         emphysema; COPD     Diabetes Maternal Grandfather      Diabetes Paternal Grandfather      Eye Disorder Paternal Grandfather         glaucoma     Hypertension Brother      Lipids Brother      Hypertension Brother      Cardiovascular Brother         bypass x3     Heart Disease Brother         bypass x3     Lipids Brother      Hypertension Sister      C.A.D. No family hx of      Cerebrovascular Disease No family hx of      Breast Cancer No family hx of      Cancer - colorectal No family hx of      Alzheimer Disease No family hx of      Blood Disease No family hx of      Gastrointestinal Disease No family hx of      Musculoskeletal Disorder No family hx of      Neurologic Disorder No family hx of      Thyroid Disease No family hx of        REVIEW OF SYSTEMS  General: negative for, night sweats, dizziness, fatigue  Resp: No shortness of breath and no cough  CV: negative for chest pain, syncope or near-syncope  GI: negative for nausea, vomiting and diarrhea  : negative for dysuria and hematuria  Musculoskeletal: as above  Neurologic: negative for syncope   Hematologic: negative for bleeding disorder    Physical Exam:  Vitals: /76   Ht 1.772 m (5' 9.75\")   Wt 97.1 kg (214 lb)   BMI 30.93 kg/m     BMI= Body mass index is 30.93 kg/m .  Constitutional: healthy, alert and no acute distress   Psychiatric: mentation appears normal and affect normal/bright  NEURO: no focal deficits  SKIN: .well healed, no erythema, no incision breakdown and no drainage.  JOINT/EXTREMITIES: Active motion 3-120 degrees passively full extension.  No instability through the arc of motion.  No focal areas of tenderness.  GAIT: not " tested     Diagnostic Modalities:  Previous imaging reviewed.  Independent visualization of the images was performed.      Impression:   Chief Complaint   Patient presents with     RECHECK     right knee follow up     Surgical Followup     post op dos 12/10/19 Right knee replacement~6 weeks postop   Overall making progress.    Plan:   He asked about a refill of oxycodone.  At 6 weeks postoperative I recommended complete discontinuation of narcotics.  He also can stop aspirin and his LOW hose.  May transition to anti-inflammatory such as ibuprofen over-the-counter as well as Tylenol.  Continue physical therapy.  Continue icing in the evening.  I did refill his Colace today.  Although with discontinuation of the narcotics his constipation should improve.    Return to clinic 6, week(s), or sooner as needed for changes.    Re-x-ray on return: Yes.    Gerber Yin D.O.    Again, thank you for allowing me to participate in the care of your patient.        Sincerely,        Javier Yin, DO

## 2020-01-24 ENCOUNTER — THERAPY VISIT (OUTPATIENT)
Dept: PHYSICAL THERAPY | Facility: CLINIC | Age: 60
End: 2020-01-24
Payer: COMMERCIAL

## 2020-01-24 DIAGNOSIS — M25.561 ACUTE PAIN OF RIGHT KNEE: ICD-10-CM

## 2020-01-24 DIAGNOSIS — Z96.651 AFTERCARE FOLLOWING RIGHT KNEE JOINT REPLACEMENT SURGERY: ICD-10-CM

## 2020-01-24 DIAGNOSIS — Z47.1 AFTERCARE FOLLOWING RIGHT KNEE JOINT REPLACEMENT SURGERY: ICD-10-CM

## 2020-01-24 DIAGNOSIS — R26.9 ABNORMAL GAIT: ICD-10-CM

## 2020-01-24 PROCEDURE — 97110 THERAPEUTIC EXERCISES: CPT | Mod: GP | Performed by: PHYSICAL THERAPIST

## 2020-01-24 PROCEDURE — 97530 THERAPEUTIC ACTIVITIES: CPT | Mod: GP | Performed by: PHYSICAL THERAPIST

## 2020-01-24 PROCEDURE — 97140 MANUAL THERAPY 1/> REGIONS: CPT | Mod: GP | Performed by: PHYSICAL THERAPIST

## 2020-01-27 ENCOUNTER — THERAPY VISIT (OUTPATIENT)
Dept: PHYSICAL THERAPY | Facility: CLINIC | Age: 60
End: 2020-01-27
Payer: COMMERCIAL

## 2020-01-27 ENCOUNTER — OFFICE VISIT (OUTPATIENT)
Dept: FAMILY MEDICINE | Facility: CLINIC | Age: 60
End: 2020-01-27
Payer: COMMERCIAL

## 2020-01-27 VITALS
SYSTOLIC BLOOD PRESSURE: 130 MMHG | DIASTOLIC BLOOD PRESSURE: 76 MMHG | TEMPERATURE: 98.6 F | OXYGEN SATURATION: 96 % | HEART RATE: 91 BPM | RESPIRATION RATE: 14 BRPM

## 2020-01-27 DIAGNOSIS — Z96.651 AFTERCARE FOLLOWING RIGHT KNEE JOINT REPLACEMENT SURGERY: ICD-10-CM

## 2020-01-27 DIAGNOSIS — L29.9 PRURITUS: ICD-10-CM

## 2020-01-27 DIAGNOSIS — Z47.1 AFTERCARE FOLLOWING RIGHT KNEE JOINT REPLACEMENT SURGERY: ICD-10-CM

## 2020-01-27 DIAGNOSIS — K60.2 ANAL FISSURE: Primary | ICD-10-CM

## 2020-01-27 DIAGNOSIS — R26.9 ABNORMAL GAIT: ICD-10-CM

## 2020-01-27 DIAGNOSIS — M25.561 ACUTE PAIN OF RIGHT KNEE: ICD-10-CM

## 2020-01-27 LAB
BASOPHILS # BLD AUTO: 0 10E9/L (ref 0–0.2)
BASOPHILS NFR BLD AUTO: 0.4 %
DIFFERENTIAL METHOD BLD: NORMAL
EOSINOPHIL # BLD AUTO: 0.2 10E9/L (ref 0–0.7)
EOSINOPHIL NFR BLD AUTO: 4 %
ERYTHROCYTE [DISTWIDTH] IN BLOOD BY AUTOMATED COUNT: 13.1 % (ref 10–15)
HCT VFR BLD AUTO: 41 % (ref 40–53)
HGB BLD-MCNC: 14.3 G/DL (ref 13.3–17.7)
LYMPHOCYTES # BLD AUTO: 1.5 10E9/L (ref 0.8–5.3)
LYMPHOCYTES NFR BLD AUTO: 29.5 %
MCH RBC QN AUTO: 29.2 PG (ref 26.5–33)
MCHC RBC AUTO-ENTMCNC: 34.9 G/DL (ref 31.5–36.5)
MCV RBC AUTO: 84 FL (ref 78–100)
MONOCYTES # BLD AUTO: 0.7 10E9/L (ref 0–1.3)
MONOCYTES NFR BLD AUTO: 14.1 %
NEUTROPHILS # BLD AUTO: 2.6 10E9/L (ref 1.6–8.3)
NEUTROPHILS NFR BLD AUTO: 52 %
PLATELET # BLD AUTO: 233 10E9/L (ref 150–450)
RBC # BLD AUTO: 4.9 10E12/L (ref 4.4–5.9)
WBC # BLD AUTO: 5 10E9/L (ref 4–11)

## 2020-01-27 PROCEDURE — 36415 COLL VENOUS BLD VENIPUNCTURE: CPT | Performed by: FAMILY MEDICINE

## 2020-01-27 PROCEDURE — 99213 OFFICE O/P EST LOW 20 MIN: CPT | Performed by: FAMILY MEDICINE

## 2020-01-27 PROCEDURE — 97112 NEUROMUSCULAR REEDUCATION: CPT | Mod: GP | Performed by: PHYSICAL THERAPIST

## 2020-01-27 PROCEDURE — 85025 COMPLETE CBC W/AUTO DIFF WBC: CPT | Performed by: FAMILY MEDICINE

## 2020-01-27 PROCEDURE — 80053 COMPREHEN METABOLIC PANEL: CPT | Performed by: FAMILY MEDICINE

## 2020-01-27 PROCEDURE — 86803 HEPATITIS C AB TEST: CPT | Performed by: FAMILY MEDICINE

## 2020-01-27 PROCEDURE — 97110 THERAPEUTIC EXERCISES: CPT | Mod: GP | Performed by: PHYSICAL THERAPIST

## 2020-01-27 PROCEDURE — 97140 MANUAL THERAPY 1/> REGIONS: CPT | Mod: GP | Performed by: PHYSICAL THERAPIST

## 2020-01-27 NOTE — PATIENT INSTRUCTIONS
Hugo,    It was great seeing you in clinic today.  I summarized our discussion and your plan below.  Please let me know if you have any questions or concerns.  Please follow up with me as discussed in clinic or sooner if any worsening or additional concerns.     1. Anal fissure  59-year-old male status post knee replacement surgery with anal pain, not improving.  Today he tolerated exam a little bit better, I was able to view the exterior anus, no evidence of thrombosed hemorrhoid.  He remained exquisitely tender to palpation at about 12:00 initially inside the anus, would not tolerate further exam.  I feel that he has a fissure, this is been going on for about 5 months.  Sending to general surgery for evaluation and treatment.  Continue topical nifedipine, may mix with topical anesthetic as patient was concerned it was causing some burning.  - GENERAL SURG ADULT REFERRAL; Future    2. Pruritus  Patient took his last dose of Colace, states he felt generalized itching, has subsided some.  Will obtain labs, he will abstain from medications.  - CBC with platelets and differential  - Comprehensive metabolic panel (BMP + Alb, Alk Phos, ALT, AST, Total. Bili, TP)  - **Hepatitis C Screen Reflex to RNA FUTURE anytime       Sincerely,  Dr. JEYSON Robin MD, FAAFP  Family Medicine Physician  [facility] [place]  [address] [address] [place] [address]    Patient Education

## 2020-01-28 ENCOUNTER — TELEPHONE (OUTPATIENT)
Dept: FAMILY MEDICINE | Facility: CLINIC | Age: 60
End: 2020-01-28

## 2020-01-28 LAB
ALBUMIN SERPL-MCNC: 4.1 G/DL (ref 3.4–5)
ALP SERPL-CCNC: 80 U/L (ref 40–150)
ALT SERPL W P-5'-P-CCNC: 31 U/L (ref 0–70)
ANION GAP SERPL CALCULATED.3IONS-SCNC: 5 MMOL/L (ref 3–14)
AST SERPL W P-5'-P-CCNC: 13 U/L (ref 0–45)
BILIRUB SERPL-MCNC: 0.4 MG/DL (ref 0.2–1.3)
BUN SERPL-MCNC: 11 MG/DL (ref 7–30)
CALCIUM SERPL-MCNC: 9.4 MG/DL (ref 8.5–10.1)
CHLORIDE SERPL-SCNC: 105 MMOL/L (ref 94–109)
CO2 SERPL-SCNC: 26 MMOL/L (ref 20–32)
CREAT SERPL-MCNC: 0.68 MG/DL (ref 0.66–1.25)
GFR SERPL CREATININE-BSD FRML MDRD: >90 ML/MIN/{1.73_M2}
GLUCOSE SERPL-MCNC: 98 MG/DL (ref 70–99)
HCV AB SERPL QL IA: NONREACTIVE
POTASSIUM SERPL-SCNC: 4.1 MMOL/L (ref 3.4–5.3)
PROT SERPL-MCNC: 7.6 G/DL (ref 6.8–8.8)
SODIUM SERPL-SCNC: 136 MMOL/L (ref 133–144)

## 2020-01-28 NOTE — TELEPHONE ENCOUNTER
Pt is wondering if provider has any more advice on itching. No rash or hives. Started 2 nights ago. No throat swelling, no lip swelling. Stopped taking all pain medications last Tuesday, Stopped Tylenol. Stopped the colace yesterday morning. Only taking regular medication and using the creams. He will try benadryl and zyrtec and update us tomorrow    Ophelia Davies, MSN, RN

## 2020-01-28 NOTE — RESULT ENCOUNTER NOTE
Hugo,  Your recent comprehensive metabolic panel was normal.  Please let me know if you have any questions or concerns and follow up as discussed in clinic.    Sincerely.  Dr. JEYSON Robin MD, FAAFP  Family Medicine Physician  [facility] [place]  [address] [place] [address]

## 2020-01-28 NOTE — TELEPHONE ENCOUNTER
I would recommend stopping any unneeded oral medications.  Recommend Benadryl or diphenhydramine over-the-counter, I believe this still could be an allergic type reaction to the medicine or something else you are in contact with.  If so the Benadryl will help.  Follow-up if no improvement or any worsening.    Bony Robin MD, FAAFP  Family Medicine Physician  [facility] [place]  [address] [address] [place] [address]

## 2020-01-28 NOTE — RESULT ENCOUNTER NOTE
Hugo,  Your recent complete blood count was normal.  I am still waiting for a few more labs.  I will contact you when those come in.  Please let me know if you have any questions or concerns and follow up as discussed in clinic.    Sincerely.  Dr. JEYSON Robin MD, [facility]  Family Medicine Physician  [facility] [place]  [address] [place] [address]

## 2020-01-28 NOTE — TELEPHONE ENCOUNTER
RECORDS RECEIVED FROM:  Inder [place]- Dr. Bony Robin    DATE RECEIVED: 2/26/2020   NOTES STATUS DETAILS   OFFICE NOTE from referring provider  Internal 1/27/2020, 1/21/2020 Office visit with Dr. Robin    OFFICE NOTE from other specialist   N/A    DISCHARGE SUMMARY from hospital  Care Everywhere 9/22/15 ([facility])    DISCHARGE REPORT from the ER N/A    OPERATIVE REPORT  Internal    MEDICATION LIST Internal    LABS     PFC TESTING N/A    ANAL PAP N/A    BIOPSIES/PATHOLOGY RELATED TO DIAGNOSIS Internal 3/22/17, 11/18/15   DIAGNOSTIC PROCEDURES     COLONOSCOPY Internal 3/5/19, 3/22/17, 11/18/15 ([facility])  6/12/14 ([facility])     UPPER ENDOSCOPY (EGD) Internal/ Care Everywhere 3/22/17, 11/18/15 ([facility])  6/12/14 ([facility])    FLEX SIGMOIDOSCOPY  N/A    ERCP N/A    IMAGING (DISC & REPORT)      CT  Received/ Care Everywhere [facility]:  - CT Abdomen Pelvis: 1/26/19, 5/24/18, 9/22/15, 5/14/14    *1/28/2020 Fax request sent to [facility] ([phone]) for images noted above. -Bhao    *1/29/2020 Images have been archived into PACS. -Bhao    MRI N/A    XRAY Internal XR Abdomen: 1/21/2020   ULTRASOUND (ENDOANAL/ENDORECTAL) N/A

## 2020-01-28 NOTE — TELEPHONE ENCOUNTER
Reason for call:  Patient reporting a symptom    Symptom or request: red, burning and itching on legs, feet and belly    Duration (how long have symptoms been present): several days    Have you been treated for this before? Yes    Additional comments: Patient is wondering if he is allergic to one of his medications    Phone Number patient can be reached at:  Home number on file [phone] (home)    Best Time:  any    Can we leave a detailed message on this number:  YES    Call taken on 1/28/2020 at 3:58 PM by Radha Arevalo

## 2020-01-29 NOTE — RESULT ENCOUNTER NOTE
Hugo,  Your recent hepatitis C screen was negative.  Please let me know if you have any questions or concerns and follow up as discussed in clinic.    Sincerely.  Dr. JEYSON Robin MD, FAAFP  Family Medicine Physician  [facility] [place]  [address] [place] [address]

## 2020-01-30 ENCOUNTER — THERAPY VISIT (OUTPATIENT)
Dept: PHYSICAL THERAPY | Facility: CLINIC | Age: 60
End: 2020-01-30
Payer: COMMERCIAL

## 2020-01-30 DIAGNOSIS — M25.561 ACUTE PAIN OF RIGHT KNEE: ICD-10-CM

## 2020-01-30 DIAGNOSIS — Z96.651 AFTERCARE FOLLOWING RIGHT KNEE JOINT REPLACEMENT SURGERY: ICD-10-CM

## 2020-01-30 DIAGNOSIS — Z47.1 AFTERCARE FOLLOWING RIGHT KNEE JOINT REPLACEMENT SURGERY: ICD-10-CM

## 2020-01-30 DIAGNOSIS — R26.9 ABNORMAL GAIT: ICD-10-CM

## 2020-01-30 PROCEDURE — 97110 THERAPEUTIC EXERCISES: CPT | Mod: GP | Performed by: PHYSICAL THERAPIST

## 2020-01-30 PROCEDURE — 97112 NEUROMUSCULAR REEDUCATION: CPT | Mod: GP | Performed by: PHYSICAL THERAPIST

## 2020-02-03 ENCOUNTER — THERAPY VISIT (OUTPATIENT)
Dept: PHYSICAL THERAPY | Facility: CLINIC | Age: 60
End: 2020-02-03
Payer: COMMERCIAL

## 2020-02-03 DIAGNOSIS — Z96.651 AFTERCARE FOLLOWING RIGHT KNEE JOINT REPLACEMENT SURGERY: ICD-10-CM

## 2020-02-03 DIAGNOSIS — Z47.1 AFTERCARE FOLLOWING RIGHT KNEE JOINT REPLACEMENT SURGERY: ICD-10-CM

## 2020-02-03 DIAGNOSIS — R26.9 ABNORMAL GAIT: ICD-10-CM

## 2020-02-03 DIAGNOSIS — M25.561 ACUTE PAIN OF RIGHT KNEE: ICD-10-CM

## 2020-02-03 PROCEDURE — 97110 THERAPEUTIC EXERCISES: CPT | Mod: GP | Performed by: PHYSICAL THERAPIST

## 2020-02-03 PROCEDURE — 97112 NEUROMUSCULAR REEDUCATION: CPT | Mod: GP | Performed by: PHYSICAL THERAPIST

## 2020-02-03 PROCEDURE — 97140 MANUAL THERAPY 1/> REGIONS: CPT | Mod: GP | Performed by: PHYSICAL THERAPIST

## 2020-02-06 ENCOUNTER — THERAPY VISIT (OUTPATIENT)
Dept: PHYSICAL THERAPY | Facility: CLINIC | Age: 60
End: 2020-02-06
Payer: COMMERCIAL

## 2020-02-06 DIAGNOSIS — Z96.651 AFTERCARE FOLLOWING RIGHT KNEE JOINT REPLACEMENT SURGERY: ICD-10-CM

## 2020-02-06 DIAGNOSIS — R26.9 ABNORMAL GAIT: ICD-10-CM

## 2020-02-06 DIAGNOSIS — Z47.1 AFTERCARE FOLLOWING RIGHT KNEE JOINT REPLACEMENT SURGERY: ICD-10-CM

## 2020-02-06 DIAGNOSIS — M25.561 ACUTE PAIN OF RIGHT KNEE: ICD-10-CM

## 2020-02-06 PROCEDURE — 97112 NEUROMUSCULAR REEDUCATION: CPT | Mod: GP | Performed by: PHYSICAL THERAPIST

## 2020-02-06 PROCEDURE — 97110 THERAPEUTIC EXERCISES: CPT | Mod: GP | Performed by: PHYSICAL THERAPIST

## 2020-02-13 ENCOUNTER — THERAPY VISIT (OUTPATIENT)
Dept: PHYSICAL THERAPY | Facility: CLINIC | Age: 60
End: 2020-02-13
Payer: COMMERCIAL

## 2020-02-13 DIAGNOSIS — M25.561 ACUTE PAIN OF RIGHT KNEE: ICD-10-CM

## 2020-02-13 DIAGNOSIS — R26.9 ABNORMAL GAIT: ICD-10-CM

## 2020-02-13 DIAGNOSIS — Z96.651 AFTERCARE FOLLOWING RIGHT KNEE JOINT REPLACEMENT SURGERY: ICD-10-CM

## 2020-02-13 DIAGNOSIS — Z47.1 AFTERCARE FOLLOWING RIGHT KNEE JOINT REPLACEMENT SURGERY: ICD-10-CM

## 2020-02-13 PROCEDURE — 97110 THERAPEUTIC EXERCISES: CPT | Mod: GP | Performed by: PHYSICAL THERAPIST

## 2020-02-13 PROCEDURE — 97530 THERAPEUTIC ACTIVITIES: CPT | Mod: GP | Performed by: PHYSICAL THERAPIST

## 2020-02-18 ENCOUNTER — THERAPY VISIT (OUTPATIENT)
Dept: PHYSICAL THERAPY | Facility: CLINIC | Age: 60
End: 2020-02-18
Payer: COMMERCIAL

## 2020-02-18 DIAGNOSIS — Z47.1 AFTERCARE FOLLOWING RIGHT KNEE JOINT REPLACEMENT SURGERY: ICD-10-CM

## 2020-02-18 DIAGNOSIS — M25.561 ACUTE PAIN OF RIGHT KNEE: ICD-10-CM

## 2020-02-18 DIAGNOSIS — Z96.651 AFTERCARE FOLLOWING RIGHT KNEE JOINT REPLACEMENT SURGERY: ICD-10-CM

## 2020-02-18 DIAGNOSIS — R26.9 ABNORMAL GAIT: ICD-10-CM

## 2020-02-18 PROCEDURE — 97140 MANUAL THERAPY 1/> REGIONS: CPT | Mod: GP | Performed by: PHYSICAL THERAPIST

## 2020-02-18 PROCEDURE — 97110 THERAPEUTIC EXERCISES: CPT | Mod: GP | Performed by: PHYSICAL THERAPIST

## 2020-02-18 PROCEDURE — 97112 NEUROMUSCULAR REEDUCATION: CPT | Mod: GP | Performed by: PHYSICAL THERAPIST

## 2020-02-20 ENCOUNTER — TELEPHONE (OUTPATIENT)
Dept: SURGERY | Facility: CLINIC | Age: 60
End: 2020-02-20

## 2020-02-20 NOTE — TELEPHONE ENCOUNTER
[facility]    Phone Message    May a detailed message be left on voicemail: yes     Reason for Call: Other: PT wanting call back to go over upcoming appt with NEDA Hector     Action Taken: Message routed to:  [facility]    Travel Screening: Not Applicable

## 2020-02-21 ENCOUNTER — THERAPY VISIT (OUTPATIENT)
Dept: PHYSICAL THERAPY | Facility: CLINIC | Age: 60
End: 2020-02-21
Payer: COMMERCIAL

## 2020-02-21 DIAGNOSIS — Z47.1 AFTERCARE FOLLOWING RIGHT KNEE JOINT REPLACEMENT SURGERY: ICD-10-CM

## 2020-02-21 DIAGNOSIS — Z96.651 AFTERCARE FOLLOWING RIGHT KNEE JOINT REPLACEMENT SURGERY: ICD-10-CM

## 2020-02-21 DIAGNOSIS — R26.9 ABNORMAL GAIT: ICD-10-CM

## 2020-02-21 DIAGNOSIS — M25.561 ACUTE PAIN OF RIGHT KNEE: ICD-10-CM

## 2020-02-21 PROCEDURE — 97110 THERAPEUTIC EXERCISES: CPT | Mod: GP | Performed by: PHYSICAL THERAPIST

## 2020-02-21 PROCEDURE — 97140 MANUAL THERAPY 1/> REGIONS: CPT | Mod: GP | Performed by: PHYSICAL THERAPIST

## 2020-02-21 PROCEDURE — 97112 NEUROMUSCULAR REEDUCATION: CPT | Mod: GP | Performed by: PHYSICAL THERAPIST

## 2020-02-26 ENCOUNTER — OFFICE VISIT (OUTPATIENT)
Dept: SURGERY | Facility: CLINIC | Age: 60
End: 2020-02-26
Attending: FAMILY MEDICINE
Payer: COMMERCIAL

## 2020-02-26 ENCOUNTER — PRE VISIT (OUTPATIENT)
Dept: SURGERY | Facility: CLINIC | Age: 60
End: 2020-02-26

## 2020-02-26 VITALS
SYSTOLIC BLOOD PRESSURE: 148 MMHG | BODY MASS INDEX: 32.21 KG/M2 | OXYGEN SATURATION: 96 % | HEIGHT: 70 IN | HEART RATE: 97 BPM | WEIGHT: 225 LBS | DIASTOLIC BLOOD PRESSURE: 90 MMHG

## 2020-02-26 DIAGNOSIS — K60.2 ANAL FISSURE: ICD-10-CM

## 2020-02-26 ASSESSMENT — PAIN SCALES - GENERAL: PAINLEVEL: WORST PAIN (10)

## 2020-02-26 ASSESSMENT — MIFFLIN-ST. JEOR: SCORE: 1842.87

## 2020-02-26 ASSESSMENT — ENCOUNTER SYMPTOMS
SKIN CHANGES: 0
POOR WOUND HEALING: 1
NAIL CHANGES: 0

## 2020-02-26 NOTE — NURSING NOTE
"Chief Complaint   Patient presents with     Consult     Anal fissure.       Vitals:    02/26/20 1354   BP: (!) 148/90   BP Location: Left arm   Patient Position: Sitting   Cuff Size: Adult Large   Pulse: 97   SpO2: 96%   Weight: 225 lb   Height: 5' 9.75\"       Body mass index is 32.52 kg/m .      Ilana Temple, EMT                      "

## 2020-02-26 NOTE — PATIENT INSTRUCTIONS
1. Nifedipine ointment 2% to be applied 3 times daily for 8 weeks. Apply a pea sized amount to the external anal opening three times a day for 8 weeks.  2. Fiber supplement such as Metamucil, Citrucel, or Benefiber once to twice a day  3. MiraLax or Milk of Magnesia if still constipated  4. Drink 10 glasses of water a day  5. Tylenol, ibuprofen, and warm tub baths/sitz baths for pain  6. Follow up in 8 weeks. Follow up sooner if symptoms do not improve after 4 weeks.

## 2020-02-26 NOTE — LETTER
"2020       RE: Hugo Longoria   [facility] [id number]     Dear Colleague,    Thank you for referring your patient, Hugo Longoria, to the [facility] COLON AND RECTAL SURGERY at [facility]. Please see a copy of my visit note below.    Colon and Rectal Surgery Clinic Note    RE: Hugo Longoria  : 1960  GEMMA: 2020    Cc: \"I have a torn lesion in my 'rectum' that hasn't healed in 8-9 weeks and it's killing me.\"    First symptoms were in the middle of December. Had knee surgery. Became constipated from pain meds. Didn't have a BM for 5 days. Tried an enema at home, which worked. Had intermittent issues with constipation after that, needed additional enemas to manage.    Had hemorrhoids since he was a kid, but didn't cause major problems -- occasional bleeding and soreness. Always went away uneventfully.     Has been prescribed nifedimine cream, has been using it for the past 5 weeks. Had a brief resolution of symptoms for about 5 days, but following a bowel movement he had a sudden resumption of his symptoms.     He has been having a lot of itching recently which he thinks might be potentially related to one of his medications. He has not had any hives or other lesions.    Currently stools are \"fairly loose\" but vary and are occasionally hard. Have 4-5 BMs per day. He tries to incorporate fiber into his diet but is not taking any fiber supplements. Drinks 4 glasses of water per day.     + hx of colorectal cancer in the family. Aunt, Grandmother, and grandmother's sibling all had colorectal cancers.    Physical examination:  Examination was chaperoned by Madhu Hernandez MD.    Vitals: BP (!) 148/90 (BP Location: Left arm, Patient Position: Sitting, Cuff Size: Adult Large)   Pulse 97   Ht 1.772 m (5' 9.75\")   Wt 102.1 kg (225 lb)   SpO2 96%   BMI 32.52 kg/m     BMI= Body mass index is 32.52 kg/m .    GEN  NAD, standing and avoiding " sitting  HEENT Normocephalic  CAR  RRR  PULM  Normal work of breathing  ABD  Soft, nontender, nondistended  RECT  Small skin tag with visibly healing fissure in left posterior position. No other apparent masses or lesions.  CARLOS deferred.  EXT  WWP      Laboratory data:    Recent Labs   Lab Test 01/27/20  1614   WBC 5.0   HGB 14.3      CR 0.68   ALBUMIN 4.1   BILITOTAL 0.4   ALKPHOS 80   ALT 31   AST 13       Assessment/plan:  Hugo Longoria is an 59 year old male with a history of gastric ulcer here with a healing anal fissure. I have counseled him that his fissure is healing, but he needs to take additional steps in order to ensure that his problem resolves completely. We discussed increased fiber supplementation, increased water intake (8 glasses / day), and absolute avoidance of hard bowel movements (adding a laxative like miralax). Additionally, I counseled him on the proper way to use the nifedipine cream (apply to external skin and not inside anal verge) and to discontinue his hydrocortisone cream. He should continue the nefedipine cream for the next 8 weeks. Return to clinic after 8 weeks for any persistent symptoms. Patient's questions were answered to his stated satisfaction and he is in agreement with this plan.    For details of past medical history, surgical history, family history, medications, allergies, and review of systems, please see details below.    Medical history:  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)     Overnight stay due to blood poisoning     Gastric ulcer, unspecified as acute or chronic, without mention of hemorrhage or perforation        Surgical history:  Past Surgical History:   Procedure Laterality Date     ARTHROPLASTY KNEE Right 12/10/2019    Procedure: Right knee replacement;  Surgeon: Javier Yin DO;   [facility]: [facility]     ARTHROSCOPY KNEE  5/29/2013    Procedure: ARTHROSCOPY KNEE;  Right knee arthroscopy with partial medial and partial lateral menisectomies;  Surgeon: Phani Mclaughlin MD;  Location: [facility] OR     ARTHROSCOPY SHOULDER BICEPS TENODESIS REPAIR Right 1/13/2017    Procedure: ARTHROSCOPY SHOULDER BICEPS TENODESIS REPAIR;  Surgeon: Javier Yin DO;  [facility]     ARTHROSCOPY SHOULDER DECOMPRESSION Right 1/13/2017    Procedure: ARTHROSCOPY SHOULDER DECOMPRESSION;  Surgeon: Javier Yin DO;  [facility]     ARTHROSCOPY SHOULDER ROTATOR CUFF REPAIR Right 1/13/2017    Procedure: ARTHROSCOPY SHOULDER ROTATOR CUFF REPAIR;  Surgeon: Javier Yin DO;  [facility]     COLONOSCOPY  11/01/2007     COLONOSCOPY  12/12/11     COLONOSCOPY N/A 11/18/2015    Procedure: COLONOSCOPY;  Surgeon: Mgiue Mclaughlin MD;  Location:  GI     COLONOSCOPY N/A 3/22/2017    Procedure: COMBINED COLONOSCOPY, SINGLE OR MULTIPLE BIOPSY/POLYPECTOMY BY BIOPSY;  Surgeon: Salvatore Zepeda MD;  Location:  GI     COLONOSCOPY N/A 3/5/2019    Procedure: COLONOSCOPY;  Surgeon: Prakash Ervin DO;  Location:  GI     ENDOSCOPY  01/27/12    [facility] GI [facility]     ESOPHAGOSCOPY, GASTROSCOPY, DUODENOSCOPY (EGD), COMBINED N/A 11/18/2015    Procedure: COMBINED ESOPHAGOSCOPY, GASTROSCOPY, DUODENOSCOPY (EGD), BIOPSY SINGLE OR MULTIPLE;  Surgeon: Migue Mclaughlin MD;  Location:  GI     ESOPHAGOSCOPY, GASTROSCOPY, DUODENOSCOPY (EGD), COMBINED N/A 3/22/2017    Procedure: COMBINED ESOPHAGOSCOPY, GASTROSCOPY, DUODENOSCOPY (EGD), BIOPSY SINGLE OR MULTIPLE;  Surgeon: Salvatore Zepeda MD;  Location:  GI      UGI ENDOSCOPY, SIMPLE EXAM  10/31/2007     HERNIORRHAPHY UMBILICAL N/A 3/12/2015    Procedure: HERNIORRHAPHY UMBILICAL;  Surgeon: Yared Ma MD;  [facility]: [facility]     IRRIGATION AND DEBRIDEMENT UPPER EXTREMITY, COMBINED Left 3/15/2016     Procedure: COMBINED IRRIGATION AND DEBRIDEMENT UPPER EXTREMITY;  Surgeon: Javier Yin DO;  [facility]: [facility]     REMOVAL OF SPERM DUCT(S)      Vasectomy       Family history:  Family History   Problem Relation Age of Onset     Cancer Mother         ovarian cancer     Asthma Mother      Hypertension Mother      Arthritis Mother      Genitourinary Problems Mother      Lipids Mother      Cancer Sister         ovarian cancer     Hypertension Sister      Lipids Sister      Cancer Maternal Grandmother         stomach cancer     Asthma Father      Cardiovascular Father         heart attack; bypass x5, congenital heart disease     Heart Disease Father         heart attack; bypass x5, congenital heart disease     Diabetes Father      Hypertension Father      Cancer Father         prostate     Prostate Cancer Father      Circulatory Father         blood clots     Genitourinary Problems Father      Respiratory Father         emphysema; COPD     Diabetes Maternal Grandfather      Diabetes Paternal Grandfather      Eye Disorder Paternal Grandfather         glaucoma     Hypertension Brother      Lipids Brother      Hypertension Brother      Cardiovascular Brother         bypass x3     Heart Disease Brother         bypass x3     Lipids Brother      Hypertension Sister      C.A.D. No family hx of      Cerebrovascular Disease No family hx of      Breast Cancer No family hx of      Cancer - colorectal No family hx of      Alzheimer Disease No family hx of      Blood Disease No family hx of      Gastrointestinal Disease No family hx of      Musculoskeletal Disorder No family hx of      Neurologic Disorder No family hx of      Thyroid Disease No family hx of        Medications:  Current Outpatient Medications   Medication Sig Dispense Refill     atorvastatin (LIPITOR) 20 MG tablet TAKE 1 TABLET BY MOUTH ONCE DAILY 90 tablet 0     LANsoprazole (PREVACID) 30 MG DR capsule TAKE 1 CAPSULE BY MOUTH ONCE DAILY 90 capsule 0      lisinopril (PRINIVIL/ZESTRIL) 20 MG tablet TAKE 1/2 (ONE-HALF) TABLET BY MOUTH ONCE DAILY 45 tablet 0     Multiple Vitamin (MULTIVITAMINS PO) Take  by mouth.       acetaminophen (TYLENOL) 325 MG tablet Take 3 tablets (975 mg) by mouth every 8 hours as needed for mild pain (Patient not taking: Reported on 2020) 100 tablet 0     ascorbic acid (VITAMIN C) 250 MG CHEW chewable tablet Take 250 mg by mouth daily       aspirin (ASA) 81 MG tablet Take 1 tablet (81 mg) by mouth daily       docusate sodium (COLACE) 100 MG capsule Take 2 capsules (200 mg) by mouth 2 times daily as needed for constipation (Patient not taking: Reported on 2020) 60 capsule 1     fluticasone (FLONASE) 50 MCG/ACT spray Spray 1-2 sprays into both nostrils daily (Patient not taking: Reported on 2020) 1 Bottle 0     hydrocortisone (ANUSOL-HC) 2.5 % cream Place rectally 2 times daily as needed for hemorrhoids (Patient not taking: Reported on 2020) 30 g 1     hydrOXYzine (ATARAX) 25 MG tablet Take 1 tablet (25 mg) by mouth every 6 hours as needed for other (adjuvant pain) (Patient not taking: Reported on 2020) 60 tablet 1     nifedipine 0.2% in white petrolatum 0.2 % OINT ointment Apply topically 2 times daily (Patient not taking: Reported on 2020) 100 g 0     oxyCODONE (ROXICODONE) 5 MG tablet Take 1 tablet (5 mg) by mouth 3 times daily as needed for moderate to severe pain (Patient not taking: Reported on 2020) 25 tablet 0     tiZANidine (ZANAFLEX) 4 MG capsule Take 1 capsule (4 mg) by mouth 3 times daily as needed for muscle spasms (pain) (Patient not taking: Reported on 2020) 40 capsule 1       Allergies:  The patientis allergic to hydrocodone and nsaids.    Social history:  Social History     Tobacco Use     Smoking status: Former Smoker     Last attempt to quit: 1981     Years since quittin.1     Smokeless tobacco: Never Used   Substance Use Topics     Alcohol use: Yes     Alcohol/week: 0.0 standard  "drinks     Comment: weekends     Marital status: .    Review of Systems:  Nursing Notes:   Ilana Temple EMT  2/26/2020  1:57 PM  Signed  Chief Complaint   Patient presents with     Consult     Anal fissure.     Vitals:    02/26/20 1354   BP: (!) 148/90   BP Location: Left arm   Patient Position: Sitting   Cuff Size: Adult Large   Pulse: 97   SpO2: 96%   Weight: 225 lb   Height: 5' 9.75\"       Body mass index is 32.52 kg/m .      EVELYN Chaparro      Referring Provider:  Bony Robin MD  [address] [place] [address]     Primary Care Provider:  Atul Alvarado    This note was created using speech recognition software and may contain unintended word substitutions.    Answers for HPI/ROS submitted by the patient on 2/26/2020   General Symptoms: No  Skin Symptoms: Yes  HENT Symptoms: No  EYE SYMPTOMS: No  HEART SYMPTOMS: No  LUNG SYMPTOMS: No  INTESTINAL SYMPTOMS: No  URINARY SYMPTOMS: No  REPRODUCTIVE SYMPTOMS: No  SKELETAL SYMPTOMS: No  BLOOD SYMPTOMS: No  NERVOUS SYSTEM SYMPTOMS: No  MENTAL HEALTH SYMPTOMS: No  Changes in hair: No  Changes in moles/birth marks: No  Itching: Yes  Rashes: No  Changes in nails: No  Acne: No  Change in facial hair: No  Warts: No  Non-healing sores: Yes  Scarring: No    Total face to face time was 20 minutes, >50% counseling.    DONN Prasad, NP-C  Colon and Rectal Surgery  [facility]      "

## 2020-02-26 NOTE — PROGRESS NOTES
"Colon and Rectal Surgery Clinic Note    RE: Hugo Longoria  : 1960  GEMMA: 2020    Cc: \"I have a torn lesion in my 'rectum' that hasn't healed in 8-9 weeks and it's killing me.\"    First symptoms were in the middle of December. Had knee surgery. Became constipated from pain meds. Didn't have a BM for 5 days. Tried an enema at home, which worked. Had intermittent issues with constipation after that, needed additional enemas to manage.    Had hemorrhoids since he was a kid, but didn't cause major problems -- occasional bleeding and soreness. Always went away uneventfully.     Has been prescribed nifedimine cream, has been using it for the past 5 weeks. Had a brief resolution of symptoms for about 5 days, but following a bowel movement he had a sudden resumption of his symptoms.     He has been having a lot of itching recently which he thinks might be potentially related to one of his medications. He has not had any hives or other lesions.    Currently stools are \"fairly loose\" but vary and are occasionally hard. Have 4-5 BMs per day. He tries to incorporate fiber into his diet but is not taking any fiber supplements. Drinks 4 glasses of water per day.     + hx of colorectal cancer in the family. Aunt, Grandmother, and grandmother's sibling all had colorectal cancers.    Physical examination:  Examination was chaperoned by Madhu Hernandez MD.    Vitals: BP (!) 148/90 (BP Location: Left arm, Patient Position: Sitting, Cuff Size: Adult Large)   Pulse 97   Ht 1.772 m (5' 9.75\")   Wt 102.1 kg (225 lb)   SpO2 96%   BMI 32.52 kg/m    BMI= Body mass index is 32.52 kg/m .    GEN  NAD, standing and avoiding sitting  HEENT Normocephalic  CAR  RRR  PULM  Normal work of breathing  ABD  Soft, nontender, nondistended  RECT  Small skin tag with visibly healing fissure in left posterior position. No other apparent masses or lesions.  CARLOS deferred.  EXT  WWP      Laboratory data:    Recent Labs   Lab Test " 01/27/20  1614   WBC 5.0   HGB 14.3      CR 0.68   ALBUMIN 4.1   BILITOTAL 0.4   ALKPHOS 80   ALT 31   AST 13       Assessment/plan:  Hugo Longoria is an 59 year old male with a history of gastric ulcer here with a healing anal fissure. I have counseled him that his fissure is healing, but he needs to take additional steps in order to ensure that his problem resolves completely. We discussed increased fiber supplementation, increased water intake (8 glasses / day), and absolute avoidance of hard bowel movements (adding a laxative like miralax). Additionally, I counseled him on the proper way to use the nifedipine cream (apply to external skin and not inside anal verge) and to discontinue his hydrocortisone cream. He should continue the nefedipine cream for the next 8 weeks. Return to clinic after 8 weeks for any persistent symptoms. Patient's questions were answered to his stated satisfaction and he is in agreement with this plan.    For details of past medical history, surgical history, family history, medications, allergies, and review of systems, please see details below.    Medical history:  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)     Overnight stay due to blood poisoning     Gastric ulcer, unspecified as acute or chronic, without mention of hemorrhage or perforation        Surgical history:  Past Surgical History:   Procedure Laterality Date     ARTHROPLASTY KNEE Right 12/10/2019    Procedure: Right knee replacement;  Surgeon: Javier Yin DO;  Location:  OR     ARTHROSCOPY KNEE  5/29/2013    Procedure: ARTHROSCOPY KNEE;  Right knee arthroscopy with partial medial and partial lateral menisectomies;  Surgeon: Phani Mclaughlin MD;  Location: [facility] OR     ARTHROSCOPY SHOULDER BICEPS TENODESIS REPAIR Right 1/13/2017    Procedure:  ARTHROSCOPY SHOULDER BICEPS TENODESIS REPAIR;  Surgeon: Javier Yin DO;  Location: [facility]     ARTHROSCOPY SHOULDER DECOMPRESSION Right 1/13/2017    Procedure: ARTHROSCOPY SHOULDER DECOMPRESSION;  Surgeon: Javier Yin DO;  [facility]: [facility]     ARTHROSCOPY SHOULDER ROTATOR CUFF REPAIR Right 1/13/2017    Procedure: ARTHROSCOPY SHOULDER ROTATOR CUFF REPAIR;  Surgeon: Javier Yin DO;  Location: [facility]     COLONOSCOPY  11/01/2007     COLONOSCOPY  12/12/11     COLONOSCOPY N/A 11/18/2015    Procedure: COLONOSCOPY;  Surgeon: Migue Mclaughlin MD;  [facility]: [facility] GI     COLONOSCOPY N/A 3/22/2017    Procedure: COMBINED COLONOSCOPY, SINGLE OR MULTIPLE BIOPSY/POLYPECTOMY BY BIOPSY;  Surgeon: Salvatore Zepeda MD;  [facility]: [facility]     COLONOSCOPY N/A 3/5/2019    Procedure: COLONOSCOPY;  Surgeon: Prakash Ervin DO;  Location:  GI     ENDOSCOPY  01/27/12    [facility] GI [facility]     ESOPHAGOSCOPY, GASTROSCOPY, DUODENOSCOPY (EGD), COMBINED N/A 11/18/2015    Procedure: COMBINED ESOPHAGOSCOPY, GASTROSCOPY, DUODENOSCOPY (EGD), BIOPSY SINGLE OR MULTIPLE;  Surgeon: Migue Mclaughlin MD;  Location:  GI     ESOPHAGOSCOPY, GASTROSCOPY, DUODENOSCOPY (EGD), COMBINED N/A 3/22/2017    Procedure: COMBINED ESOPHAGOSCOPY, GASTROSCOPY, DUODENOSCOPY (EGD), BIOPSY SINGLE OR MULTIPLE;  Surgeon: Salvatore Zepeda MD;  Location: [facility] UGI ENDOSCOPY, SIMPLE EXAM  10/31/2007     HERNIORRHAPHY UMBILICAL N/A 3/12/2015    Procedure: HERNIORRHAPHY UMBILICAL;  Surgeon: Yared Ma MD;  [facility]: [facility]     IRRIGATION AND DEBRIDEMENT UPPER EXTREMITY, COMBINED Left 3/15/2016    Procedure: COMBINED IRRIGATION AND DEBRIDEMENT UPPER EXTREMITY;  Surgeon: Javier Yin DO;  [facility]: [facility]     REMOVAL OF SPERM DUCT(S)      Vasectomy       Family history:  Family History   Problem Relation Age of Onset     Cancer Mother         ovarian cancer     Asthma Mother       Hypertension Mother      Arthritis Mother      Genitourinary Problems Mother      Lipids Mother      Cancer Sister         ovarian cancer     Hypertension Sister      Lipids Sister      Cancer Maternal Grandmother         stomach cancer     Asthma Father      Cardiovascular Father         heart attack; bypass x5, congenital heart disease     Heart Disease Father         heart attack; bypass x5, congenital heart disease     Diabetes Father      Hypertension Father      Cancer Father         prostate     Prostate Cancer Father      Circulatory Father         blood clots     Genitourinary Problems Father      Respiratory Father         emphysema; COPD     Diabetes Maternal Grandfather      Diabetes Paternal Grandfather      Eye Disorder Paternal Grandfather         glaucoma     Hypertension Brother      Lipids Brother      Hypertension Brother      Cardiovascular Brother         bypass x3     Heart Disease Brother         bypass x3     Lipids Brother      Hypertension Sister      C.A.D. No family hx of      Cerebrovascular Disease No family hx of      Breast Cancer No family hx of      Cancer - colorectal No family hx of      Alzheimer Disease No family hx of      Blood Disease No family hx of      Gastrointestinal Disease No family hx of      Musculoskeletal Disorder No family hx of      Neurologic Disorder No family hx of      Thyroid Disease No family hx of        Medications:  Current Outpatient Medications   Medication Sig Dispense Refill     atorvastatin (LIPITOR) 20 MG tablet TAKE 1 TABLET BY MOUTH ONCE DAILY 90 tablet 0     LANsoprazole (PREVACID) 30 MG DR capsule TAKE 1 CAPSULE BY MOUTH ONCE DAILY 90 capsule 0     lisinopril (PRINIVIL/ZESTRIL) 20 MG tablet TAKE 1/2 (ONE-HALF) TABLET BY MOUTH ONCE DAILY 45 tablet 0     Multiple Vitamin (MULTIVITAMINS PO) Take  by mouth.       acetaminophen (TYLENOL) 325 MG tablet Take 3 tablets (975 mg) by mouth every 8 hours as needed for mild pain (Patient not taking:  Reported on 2020) 100 tablet 0     ascorbic acid (VITAMIN C) 250 MG CHEW chewable tablet Take 250 mg by mouth daily       aspirin (ASA) 81 MG tablet Take 1 tablet (81 mg) by mouth daily       docusate sodium (COLACE) 100 MG capsule Take 2 capsules (200 mg) by mouth 2 times daily as needed for constipation (Patient not taking: Reported on 2020) 60 capsule 1     fluticasone (FLONASE) 50 MCG/ACT spray Spray 1-2 sprays into both nostrils daily (Patient not taking: Reported on 2020) 1 Bottle 0     hydrocortisone (ANUSOL-HC) 2.5 % cream Place rectally 2 times daily as needed for hemorrhoids (Patient not taking: Reported on 2020) 30 g 1     hydrOXYzine (ATARAX) 25 MG tablet Take 1 tablet (25 mg) by mouth every 6 hours as needed for other (adjuvant pain) (Patient not taking: Reported on 2020) 60 tablet 1     nifedipine 0.2% in white petrolatum 0.2 % OINT ointment Apply topically 2 times daily (Patient not taking: Reported on 2020) 100 g 0     oxyCODONE (ROXICODONE) 5 MG tablet Take 1 tablet (5 mg) by mouth 3 times daily as needed for moderate to severe pain (Patient not taking: Reported on 2020) 25 tablet 0     tiZANidine (ZANAFLEX) 4 MG capsule Take 1 capsule (4 mg) by mouth 3 times daily as needed for muscle spasms (pain) (Patient not taking: Reported on 2020) 40 capsule 1       Allergies:  The patientis allergic to hydrocodone and nsaids.    Social history:  Social History     Tobacco Use     Smoking status: Former Smoker     Last attempt to quit: 1981     Years since quittin.1     Smokeless tobacco: Never Used   Substance Use Topics     Alcohol use: Yes     Alcohol/week: 0.0 standard drinks     Comment: weekends     Marital status: .    Review of Systems:  Nursing Notes:   Ilana Temple, EMT  2020  1:57 PM  Signed  Chief Complaint   Patient presents with     Consult     Anal fissure.       Vitals:    20 1354   BP: (!) 148/90   BP Location: Left arm  "  Patient Position: Sitting   Cuff Size: Adult Large   Pulse: 97   SpO2: 96%   Weight: 225 lb   Height: 5' 9.75\"       Body mass index is 32.52 kg/m .      Ilana Temple, EMT                           Referring Provider:  Bony Robin MD  [address] [place] [address]     Primary Care Provider:  Atul Alvarado    This note was created using speech recognition software and may contain unintended word substitutions.    Answers for HPI/ROS submitted by the patient on 2/26/2020   General Symptoms: No  Skin Symptoms: Yes  HENT Symptoms: No  EYE SYMPTOMS: No  HEART SYMPTOMS: No  LUNG SYMPTOMS: No  INTESTINAL SYMPTOMS: No  URINARY SYMPTOMS: No  REPRODUCTIVE SYMPTOMS: No  SKELETAL SYMPTOMS: No  BLOOD SYMPTOMS: No  NERVOUS SYSTEM SYMPTOMS: No  MENTAL HEALTH SYMPTOMS: No  Changes in hair: No  Changes in moles/birth marks: No  Itching: Yes  Rashes: No  Changes in nails: No  Acne: No  Change in facial hair: No  Warts: No  Non-healing sores: Yes  Scarring: No    Total face to face time was 20 minutes, >50% counseling.    DONN Prasad, NP-C  Colon and Rectal Surgery  [facility]    "

## 2020-02-27 ENCOUNTER — THERAPY VISIT (OUTPATIENT)
Dept: PHYSICAL THERAPY | Facility: CLINIC | Age: 60
End: 2020-02-27
Payer: COMMERCIAL

## 2020-02-27 DIAGNOSIS — K29.50 CHRONIC GASTRITIS WITHOUT BLEEDING, UNSPECIFIED GASTRITIS TYPE: ICD-10-CM

## 2020-02-27 DIAGNOSIS — Z96.651 AFTERCARE FOLLOWING RIGHT KNEE JOINT REPLACEMENT SURGERY: ICD-10-CM

## 2020-02-27 DIAGNOSIS — I10 HTN, GOAL BELOW 140/90: ICD-10-CM

## 2020-02-27 DIAGNOSIS — Z47.1 AFTERCARE FOLLOWING RIGHT KNEE JOINT REPLACEMENT SURGERY: ICD-10-CM

## 2020-02-27 DIAGNOSIS — M25.561 ACUTE PAIN OF RIGHT KNEE: ICD-10-CM

## 2020-02-27 DIAGNOSIS — R26.9 ABNORMAL GAIT: ICD-10-CM

## 2020-02-27 PROCEDURE — 97140 MANUAL THERAPY 1/> REGIONS: CPT | Mod: GP | Performed by: PHYSICAL THERAPIST

## 2020-02-27 PROCEDURE — 97110 THERAPEUTIC EXERCISES: CPT | Mod: GP | Performed by: PHYSICAL THERAPIST

## 2020-02-27 PROCEDURE — 97112 NEUROMUSCULAR REEDUCATION: CPT | Mod: GP | Performed by: PHYSICAL THERAPIST

## 2020-02-28 RX ORDER — LANSOPRAZOLE 30 MG/1
CAPSULE, DELAYED RELEASE ORAL
Qty: 90 CAPSULE | Refills: 0 | Status: SHIPPED | OUTPATIENT
Start: 2020-02-28 | End: 2020-06-10

## 2020-02-28 RX ORDER — LISINOPRIL 20 MG/1
TABLET ORAL
Qty: 45 TABLET | Refills: 0 | Status: SHIPPED | OUTPATIENT
Start: 2020-02-28 | End: 2020-05-12

## 2020-02-28 NOTE — TELEPHONE ENCOUNTER
Routing refill request to provider for review/approval because:  Labs out of range:  BP    RUTH ManzanaresN, RN  [facility]

## 2020-02-28 NOTE — TELEPHONE ENCOUNTER
"Requested Prescriptions   Pending Prescriptions Disp Refills     lisinopril (ZESTRIL) 20 MG tablet [Pharmacy Med Name: Lisinopril 20 MG Oral Tablet]  0     Sig: Take 1/2 (one-half) tablet by mouth once daily   Last Written Prescription Date:  11/12/19  Last Fill Quantity: 45,  # refills: 0   Last office visit: 1/27/2020 with prescribing provider:  11/20/19   Future Office Visit:   Next 5 appointments (look out 90 days)    Mar 09, 2020 10:30 AM CDT  Return Visit with Javier Yin DO  [facility] [address] [place] [id number] [phone]           ACE Inhibitors (Including Combos) Protocol Failed - 2/27/2020  7:40 PM        Failed - Blood pressure under 140/90 in past 12 months     BP Readings from Last 3 Encounters:   02/26/20 (!) 148/90   01/27/20 130/76   01/23/20 110/76                 Failed - Recent (12 mo) or future (30 days) visit within the authorizing provider's specialty     Patient has had an office visit with the authorizing provider or a provider within the authorizing providers department within the previous 12 mos or has a future within next 30 days. See \"Patient Info\" tab in inbasket, or \"Choose Columns\" in Meds & Orders section of the refill encounter.              Passed - Medication is active on med list        Passed - Patient is age 18 or older        Passed - Normal serum creatinine on file in past 12 months     Recent Labs   Lab Test 01/27/20  1614   CR 0.68             Passed - Normal serum potassium on file in past 12 months     Recent Labs   Lab Test 01/27/20  1614   POTASSIUM 4.1             LANsoprazole (PREVACID) 30 MG DR capsule [Pharmacy Med Name: Lansoprazole 30 MG Oral Capsule Delayed Release]  0     Sig: Take 1 capsule by mouth once daily   Last Written Prescription Date:  11/12/19  Last Fill Quantity: 90,  # refills: 0   Last office visit: 1/27/2020 with prescribing provider:  11/20/19   Future Office Visit: " "  Next 5 appointments (look out 90 days)    Mar 09, 2020 10:30 AM CDT  Return Visit with Javier Yin DO  [facility] [address] [place] [id number] [phone]           PPI Protocol Failed - 2/27/2020  7:40 PM        Failed - Recent (12 mo) or future (30 days) visit within the authorizing provider's specialty     Patient has had an office visit with the authorizing provider or a provider within the authorizing providers department within the previous 12 mos or has a future within next 30 days. See \"Patient Info\" tab in inbasket, or \"Choose Columns\" in Meds & Orders section of the refill encounter.              Passed - Not on Clopidogrel (unless Pantoprazole ordered)        Passed - No diagnosis of osteoporosis on record        Passed - Medication is active on med list        Passed - Patient is age 18 or older        "

## 2020-03-02 ENCOUNTER — THERAPY VISIT (OUTPATIENT)
Dept: PHYSICAL THERAPY | Facility: CLINIC | Age: 60
End: 2020-03-02
Payer: COMMERCIAL

## 2020-03-02 DIAGNOSIS — R26.9 ABNORMAL GAIT: ICD-10-CM

## 2020-03-02 DIAGNOSIS — M25.561 ACUTE PAIN OF RIGHT KNEE: ICD-10-CM

## 2020-03-02 DIAGNOSIS — Z96.651 AFTERCARE FOLLOWING RIGHT KNEE JOINT REPLACEMENT SURGERY: ICD-10-CM

## 2020-03-02 DIAGNOSIS — Z47.1 AFTERCARE FOLLOWING RIGHT KNEE JOINT REPLACEMENT SURGERY: ICD-10-CM

## 2020-03-02 PROCEDURE — 97112 NEUROMUSCULAR REEDUCATION: CPT | Mod: GP | Performed by: PHYSICAL THERAPIST

## 2020-03-02 PROCEDURE — 97110 THERAPEUTIC EXERCISES: CPT | Mod: GP | Performed by: PHYSICAL THERAPIST

## 2020-03-05 ENCOUNTER — THERAPY VISIT (OUTPATIENT)
Dept: PHYSICAL THERAPY | Facility: CLINIC | Age: 60
End: 2020-03-05
Payer: COMMERCIAL

## 2020-03-05 DIAGNOSIS — M25.561 ACUTE PAIN OF RIGHT KNEE: ICD-10-CM

## 2020-03-05 DIAGNOSIS — R26.9 ABNORMAL GAIT: ICD-10-CM

## 2020-03-05 DIAGNOSIS — Z96.651 AFTERCARE FOLLOWING RIGHT KNEE JOINT REPLACEMENT SURGERY: ICD-10-CM

## 2020-03-05 DIAGNOSIS — Z47.1 AFTERCARE FOLLOWING RIGHT KNEE JOINT REPLACEMENT SURGERY: ICD-10-CM

## 2020-03-05 PROCEDURE — 97110 THERAPEUTIC EXERCISES: CPT | Mod: GP | Performed by: PHYSICAL THERAPIST

## 2020-03-05 PROCEDURE — 97112 NEUROMUSCULAR REEDUCATION: CPT | Mod: GP | Performed by: PHYSICAL THERAPIST

## 2020-03-05 NOTE — PROGRESS NOTES
"Subjective:  HPI  Physical Exam                    Objective:  System    Physical Exam    General     ROS    Assessment/Plan:    PROGRESS  REPORT    Progress reporting period is from 1/20/2020 to 3/5/2020.       SUBJECTIVE  Subjective: Pt presents with minimal soreness today, pt noted moderate stiffness/soreness a couple days following last therapy session but feels better w/ activity - if prolonged walking, pt stiffens when upon resting; pt notes he can walk for >1 hour at a time and feels strong when ambulating stairs.    Current pain level is 0/10 Current Pain level: 0/10.     Previous pain level was  10/10 Initial Pain level: 10/10.   Changes in function:  Yes (See Goal flowsheet attached for changes in current functional level)  Adverse reaction to treatment or activity: None    OBJECTIVE  Changes noted in objective findings:  Yes, pt shows increased strength and muscle control with R leg 6\" step down on leg press, progressing up to 110# at today's visit - last visit patient was unable to complete >88#.   Objective: 6\" step down on leg press x15 reps at 110# - weight limit before decreased control     ASSESSMENT/PLAN  Updated problem list and treatment plan: Diagnosis 1:  S/P R TKA  Pain   Pain -  manual therapy and home program  Decreased ROM/flexibility - manual therapy and therapeutic exercise  Decreased joint mobility - manual therapy and therapeutic exercise  Decreased strength - therapeutic exercise and therapeutic activities  Impaired balance - neuro re-education and therapeutic activities  Impaired gait - gait training  Impaired muscle performance - neuro re-education  Decreased function - therapeutic activities  Instability -  Therapeutic Activity  Therapeutic Exercise  STG/LTGs have been met or progress has been made towards goals:  Yes (See Goal flow sheet completed today.)  Assessment of Progress: The patient's condition is improving.  Patient is meeting short term goals and is progressing towards " long term goals.  Self Management Plans:  Patient has been instructed in a home treatment program.  I have re-evaluated this patient and find that the nature, scope, duration and intensity of the therapy is appropriate for the medical condition of the patient.  Hugo continues to require the following intervention to meet STG and LTG's:  PT    Recommendations:  This patient would benefit from continued therapy.     Frequency:  2 X week, once daily  Duration:  for 2 weeks      Please refer to the daily flowsheet for treatment today, total treatment time and time spent performing 1:1 timed codes.    Taurus Esteban, SPT  Dwight High, PT, DPT, OCS

## 2020-03-09 ENCOUNTER — ANCILLARY PROCEDURE (OUTPATIENT)
Dept: GENERAL RADIOLOGY | Facility: CLINIC | Age: 60
End: 2020-03-09
Attending: ORTHOPAEDIC SURGERY
Payer: COMMERCIAL

## 2020-03-09 ENCOUNTER — OFFICE VISIT (OUTPATIENT)
Dept: ORTHOPEDICS | Facility: CLINIC | Age: 60
End: 2020-03-09
Payer: COMMERCIAL

## 2020-03-09 VITALS
BODY MASS INDEX: 31.82 KG/M2 | SYSTOLIC BLOOD PRESSURE: 130 MMHG | WEIGHT: 222.3 LBS | HEIGHT: 70 IN | DIASTOLIC BLOOD PRESSURE: 74 MMHG

## 2020-03-09 DIAGNOSIS — Z96.651 S/P TOTAL KNEE REPLACEMENT USING CEMENT, RIGHT: Primary | ICD-10-CM

## 2020-03-09 DIAGNOSIS — Z96.651 S/P TOTAL KNEE REPLACEMENT USING CEMENT, RIGHT: ICD-10-CM

## 2020-03-09 PROCEDURE — 73562 X-RAY EXAM OF KNEE 3: CPT | Mod: TC

## 2020-03-09 PROCEDURE — 99024 POSTOP FOLLOW-UP VISIT: CPT | Performed by: ORTHOPAEDIC SURGERY

## 2020-03-09 ASSESSMENT — PAIN SCALES - GENERAL: PAINLEVEL: NO PAIN (0)

## 2020-03-09 ASSESSMENT — MIFFLIN-ST. JEOR: SCORE: 1830.63

## 2020-03-09 NOTE — PROGRESS NOTES
Orthopedic Clinic Post-Operative Note    CHIEF COMPLAINT:   Chief Complaint   Patient presents with     RECHECK     right knee follow up     Surgical Followup     post op dos 12/10/19 Right knee replacement~3 months postop       HISTORY OF PRESENT ILLNESS  Happy.  Better than before surgery.  He does get some stiffness.  Although feels like it is improving.  Continues to do physical therapy  Patient's past medical, surgical, social and family histories reviewed.     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)     Overnight stay due to blood poisoning     Gastric ulcer, unspecified as acute or chronic, without mention of hemorrhage or perforation        Past Surgical History:   Procedure Laterality Date     ARTHROPLASTY KNEE Right 12/10/2019    Procedure: Right knee replacement;  Surgeon: Javier Yin DO;  Location:  OR     ARTHROSCOPY KNEE  5/29/2013    Procedure: ARTHROSCOPY KNEE;  Right knee arthroscopy with partial medial and partial lateral menisectomies;  Surgeon: Phani Mclaughlin MD;  Location: [facility]     ARTHROSCOPY SHOULDER BICEPS TENODESIS REPAIR Right 1/13/2017    Procedure: ARTHROSCOPY SHOULDER BICEPS TENODESIS REPAIR;  Surgeon: Javier Yin DO;  Location: [facility]     ARTHROSCOPY SHOULDER DECOMPRESSION Right 1/13/2017    Procedure: ARTHROSCOPY SHOULDER DECOMPRESSION;  Surgeon: Javier Yin DO;  [facility]     ARTHROSCOPY SHOULDER ROTATOR CUFF REPAIR Right 1/13/2017    Procedure: ARTHROSCOPY SHOULDER ROTATOR CUFF REPAIR;  Surgeon: Javier Yin DO;  Location:  OR     COLONOSCOPY  11/01/2007     COLONOSCOPY  12/12/11     COLONOSCOPY N/A 11/18/2015    Procedure: COLONOSCOPY;  Surgeon: Migue Mclaughlin MD;  Location:  GI     COLONOSCOPY N/A 3/22/2017    Procedure: COMBINED COLONOSCOPY, SINGLE  OR MULTIPLE BIOPSY/POLYPECTOMY BY BIOPSY;  Surgeon: Salvatore Zepeda MD;  [facility]: [facility]     COLONOSCOPY N/A 3/5/2019    Procedure: COLONOSCOPY;  Surgeon: Prakash Ervin DO;  Location:  GI     ENDOSCOPY  01/27/12    Upper GI [facility] [place]     ESOPHAGOSCOPY, GASTROSCOPY, DUODENOSCOPY (EGD), COMBINED N/A 11/18/2015    Procedure: COMBINED ESOPHAGOSCOPY, GASTROSCOPY, DUODENOSCOPY (EGD), BIOPSY SINGLE OR MULTIPLE;  Surgeon: Migue Mclaughlin MD;  [facility]     ESOPHAGOSCOPY, GASTROSCOPY, DUODENOSCOPY (EGD), COMBINED N/A 3/22/2017    Procedure: COMBINED ESOPHAGOSCOPY, GASTROSCOPY, DUODENOSCOPY (EGD), BIOPSY SINGLE OR MULTIPLE;  Surgeon: Salvatore Zepeda MD;  Location:  GI     HC UGI ENDOSCOPY, SIMPLE EXAM  10/31/2007     HERNIORRHAPHY UMBILICAL N/A 3/12/2015    Procedure: HERNIORRHAPHY UMBILICAL;  Surgeon: Yared Ma MD;  [facility]     IRRIGATION AND DEBRIDEMENT UPPER EXTREMITY, COMBINED Left 3/15/2016    Procedure: COMBINED IRRIGATION AND DEBRIDEMENT UPPER EXTREMITY;  Surgeon: Javier Yin DO;  [facility]     REMOVAL OF SPERM DUCT(S)      Vasectomy       Medications:  acetaminophen (TYLENOL) 325 MG tablet, Take 3 tablets (975 mg) by mouth every 8 hours as needed for mild pain  ascorbic acid (VITAMIN C) 250 MG CHEW chewable tablet, Take 250 mg by mouth daily  atorvastatin (LIPITOR) 20 MG tablet, TAKE 1 TABLET BY MOUTH ONCE DAILY  LANsoprazole (PREVACID) 30 MG DR capsule, Take 1 capsule by mouth once daily  lisinopril (ZESTRIL) 20 MG tablet, Take 1/2 (one-half) tablet by mouth once daily  Multiple Vitamin (MULTIVITAMINS PO), Take  by mouth.  nifedipine 0.2% in white petrolatum 0.2 % OINT ointment, Apply topically 2 times daily  aspirin (ASA) 81 MG tablet, Take 1 tablet (81 mg) by mouth daily  fluticasone (FLONASE) 50 MCG/ACT spray, Spray 1-2 sprays into both nostrils daily (Patient not taking: Reported on 3/9/2020)  hydrocortisone (ANUSOL-HC)  "2.5 % cream, Place rectally 2 times daily as needed for hemorrhoids (Patient not taking: Reported on 3/9/2020)    No current facility-administered medications on file prior to visit.       Allergies   Allergen Reactions     Hydrocodone Other (See Comments)     \"climbed the walls, very anxious, insomnia\"     Nsaids Other (See Comments)     Hx of stomach ulcers       Social History     Occupational History     Employer: [employer]     Comment: [employer]   Tobacco Use     Smoking status: Former Smoker     Last attempt to quit: 1981     Years since quittin.2     Smokeless tobacco: Never Used   Substance and Sexual Activity     Alcohol use: Yes     Alcohol/week: 0.0 standard drinks     Comment: weekends     Drug use: No     Sexual activity: Yes     Partners: Female     Birth control/protection: Surgical     Comment: vasectomy       Family History   Problem Relation Age of Onset     Cancer Mother         ovarian cancer     Asthma Mother      Hypertension Mother      Arthritis Mother      Genitourinary Problems Mother      Lipids Mother      Cancer Sister         ovarian cancer     Hypertension Sister      Lipids Sister      Cancer Maternal Grandmother         stomach cancer     Asthma Father      Cardiovascular Father         heart attack; bypass x5, congenital heart disease     Heart Disease Father         heart attack; bypass x5, congenital heart disease     Diabetes Father      Hypertension Father      Cancer Father         prostate     Prostate Cancer Father      Circulatory Father         blood clots     Genitourinary Problems Father      Respiratory Father         emphysema; COPD     Diabetes Maternal Grandfather      Diabetes Paternal Grandfather      Eye Disorder Paternal Grandfather         glaucoma     Hypertension Brother      Lipids Brother      Hypertension Brother      Cardiovascular Brother         bypass x3     Heart Disease Brother         bypass x3     Lipids Brother      " "Hypertension Sister      YAN.A.D. No family hx of      Cerebrovascular Disease No family hx of      Breast Cancer No family hx of      Cancer - colorectal No family hx of      Alzheimer Disease No family hx of      Blood Disease No family hx of      Gastrointestinal Disease No family hx of      Musculoskeletal Disorder No family hx of      Neurologic Disorder No family hx of      Thyroid Disease No family hx of        REVIEW OF SYSTEMS  General: negative for, night sweats, dizziness, fatigue  Resp: No shortness of breath and no cough  CV: negative for chest pain, syncope or near-syncope  GI: negative for nausea, vomiting and diarrhea  : negative for dysuria and hematuria  Musculoskeletal: as above  Neurologic: negative for syncope   Hematologic: negative for bleeding disorder    Physical Exam:  Vitals: /74   Ht 1.772 m (5' 9.75\")   Wt 100.8 kg (222 lb 4.8 oz)   BMI 32.13 kg/m    BMI= Body mass index is 32.13 kg/m .  Constitutional: healthy, alert and no acute distress   Psychiatric: mentation appears normal and affect normal/bright  NEURO: no focal deficits  SKIN: .well healed, no erythema, no incision breakdown and no drainage.  JOINT/EXTREMITIES: Active motion 0-115 degrees.  No focal effusion.  He does have some soft tissue swelling.  No evidence of infection.  No instability varus and valgus testing.  Good muscle tone  GAIT: not tested     Diagnostic Modalities:  right knee X-ray: The prosthesis has acceptable alignment. No fractures or dislocations. Prosthesis is well seated with no evidence of loosening  Independent visualization of the images was performed.      Impression:   Chief Complaint   Patient presents with     RECHECK     right knee follow up     Surgical Followup     post op dos 12/10/19 Right knee replacement~3 months postop   Overall doing well.  Continues to make progress    Plan:   Continue working on physical therapy exercises.  Transition to home exercises as he tolerates.  He does " have some stiffness at times he reports.  This should improve with time.  He also question some remaining swelling.  Again this should improve with time.    Return to clinic 3, month(s), or sooner as needed for changes.    Re-x-ray on return: Yes.    Gerber Yin D.O.

## 2020-03-09 NOTE — LETTER
3/9/2020         RE: Hugo Longoria  [id number] [facility] [id number]        Dear Colleague,    Thank you for referring your patient, Hugo Longoria, to the [facility]. Please see a copy of my visit note below.    Orthopedic Clinic Post-Operative Note    CHIEF COMPLAINT:   Chief Complaint   Patient presents with     RECHECK     right knee follow up     Surgical Followup     post op dos 12/10/19 Right knee replacement~3 months postop       HISTORY OF PRESENT ILLNESS  Happy.  Better than before surgery.  He does get some stiffness.  Although feels like it is improving.  Continues to do physical therapy  Patient's past medical, surgical, social and family histories reviewed.     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)     Overnight stay due to blood poisoning     Gastric ulcer, unspecified as acute or chronic, without mention of hemorrhage or perforation        Past Surgical History:   Procedure Laterality Date     ARTHROPLASTY KNEE Right 12/10/2019    Procedure: Right knee replacement;  Surgeon: Javier Yin DO;  [facility]     ARTHROSCOPY KNEE  5/29/2013    Procedure: ARTHROSCOPY KNEE;  Right knee arthroscopy with partial medial and partial lateral menisectomies;  Surgeon: Phani Mclaughlin MD;  Location: [facility]     ARTHROSCOPY SHOULDER BICEPS TENODESIS REPAIR Right 1/13/2017    Procedure: ARTHROSCOPY SHOULDER BICEPS TENODESIS REPAIR;  Surgeon: Javier Yin DO;  [facility]     ARTHROSCOPY SHOULDER DECOMPRESSION Right 1/13/2017    Procedure: ARTHROSCOPY SHOULDER DECOMPRESSION;  Surgeon: Javier Yin DO;  [facility]     ARTHROSCOPY SHOULDER ROTATOR CUFF REPAIR Right 1/13/2017    Procedure: ARTHROSCOPY SHOULDER ROTATOR CUFF REPAIR;  Surgeon: Javier Yin DO;   [facility]     COLONOSCOPY  11/01/2007     COLONOSCOPY  12/12/11     COLONOSCOPY N/A 11/18/2015    Procedure: COLONOSCOPY;  Surgeon: Migue Mclaughlin MD;  [facility]     COLONOSCOPY N/A 3/22/2017    Procedure: COMBINED COLONOSCOPY, SINGLE OR MULTIPLE BIOPSY/POLYPECTOMY BY BIOPSY;  Surgeon: Salvatore Zepeda MD;  [facility]     COLONOSCOPY N/A 3/5/2019    Procedure: COLONOSCOPY;  Surgeon: Prakash Ervin DO;  [facility]     ENDOSCOPY  01/27/12    Upper GI [facility] [place]     ESOPHAGOSCOPY, GASTROSCOPY, DUODENOSCOPY (EGD), COMBINED N/A 11/18/2015    Procedure: COMBINED ESOPHAGOSCOPY, GASTROSCOPY, DUODENOSCOPY (EGD), BIOPSY SINGLE OR MULTIPLE;  Surgeon: Migue Mclaughlin MD;  Location:  GI     ESOPHAGOSCOPY, GASTROSCOPY, DUODENOSCOPY (EGD), COMBINED N/A 3/22/2017    Procedure: COMBINED ESOPHAGOSCOPY, GASTROSCOPY, DUODENOSCOPY (EGD), BIOPSY SINGLE OR MULTIPLE;  Surgeon: Salvatore Zepeda MD;  Location:  GI     HC UGI ENDOSCOPY, SIMPLE EXAM  10/31/2007     HERNIORRHAPHY UMBILICAL N/A 3/12/2015    Procedure: HERNIORRHAPHY UMBILICAL;  Surgeon: Yared Ma MD;  [facility]     IRRIGATION AND DEBRIDEMENT UPPER EXTREMITY, COMBINED Left 3/15/2016    Procedure: COMBINED IRRIGATION AND DEBRIDEMENT UPPER EXTREMITY;  Surgeon: Javier Yin DO;  [facility]     REMOVAL OF SPERM DUCT(S)      Vasectomy       Medications:  acetaminophen (TYLENOL) 325 MG tablet, Take 3 tablets (975 mg) by mouth every 8 hours as needed for mild pain  ascorbic acid (VITAMIN C) 250 MG CHEW chewable tablet, Take 250 mg by mouth daily  atorvastatin (LIPITOR) 20 MG tablet, TAKE 1 TABLET BY MOUTH ONCE DAILY  LANsoprazole (PREVACID) 30 MG DR capsule, Take 1 capsule by mouth once daily  lisinopril (ZESTRIL) 20 MG tablet, Take 1/2 (one-half) tablet by mouth once daily  Multiple Vitamin (MULTIVITAMINS PO), Take  by mouth.  nifedipine 0.2% in white petrolatum 0.2 % OINT  "ointment, Apply topically 2 times daily  aspirin (ASA) 81 MG tablet, Take 1 tablet (81 mg) by mouth daily  fluticasone (FLONASE) 50 MCG/ACT spray, Spray 1-2 sprays into both nostrils daily (Patient not taking: Reported on 3/9/2020)  hydrocortisone (ANUSOL-HC) 2.5 % cream, Place rectally 2 times daily as needed for hemorrhoids (Patient not taking: Reported on 3/9/2020)    No current facility-administered medications on file prior to visit.       Allergies   Allergen Reactions     Hydrocodone Other (See Comments)     \"climbed the walls, very anxious, insomnia\"     Nsaids Other (See Comments)     Hx of stomach ulcers       Social History     Occupational History     Employer: [employer]     Comment: [employer]   Tobacco Use     Smoking status: Former Smoker     Last attempt to quit: 1981     Years since quittin.2     Smokeless tobacco: Never Used   Substance and Sexual Activity     Alcohol use: Yes     Alcohol/week: 0.0 standard drinks     Comment: weekends     Drug use: No     Sexual activity: Yes     Partners: Female     Birth control/protection: Surgical     Comment: vasectomy       Family History   Problem Relation Age of Onset     Cancer Mother         ovarian cancer     Asthma Mother      Hypertension Mother      Arthritis Mother      Genitourinary Problems Mother      Lipids Mother      Cancer Sister         ovarian cancer     Hypertension Sister      Lipids Sister      Cancer Maternal Grandmother         stomach cancer     Asthma Father      Cardiovascular Father         heart attack; bypass x5, congenital heart disease     Heart Disease Father         heart attack; bypass x5, congenital heart disease     Diabetes Father      Hypertension Father      Cancer Father         prostate     Prostate Cancer Father      Circulatory Father         blood clots     Genitourinary Problems Father      Respiratory Father         emphysema; COPD     Diabetes Maternal Grandfather      Diabetes " "Paternal Grandfather      Eye Disorder Paternal Grandfather         glaucoma     Hypertension Brother      Lipids Brother      Hypertension Brother      Cardiovascular Brother         bypass x3     Heart Disease Brother         bypass x3     Lipids Brother      Hypertension Sister      C.A.D. No family hx of      Cerebrovascular Disease No family hx of      Breast Cancer No family hx of      Cancer - colorectal No family hx of      Alzheimer Disease No family hx of      Blood Disease No family hx of      Gastrointestinal Disease No family hx of      Musculoskeletal Disorder No family hx of      Neurologic Disorder No family hx of      Thyroid Disease No family hx of        REVIEW OF SYSTEMS  General: negative for, night sweats, dizziness, fatigue  Resp: No shortness of breath and no cough  CV: negative for chest pain, syncope or near-syncope  GI: negative for nausea, vomiting and diarrhea  : negative for dysuria and hematuria  Musculoskeletal: as above  Neurologic: negative for syncope   Hematologic: negative for bleeding disorder    Physical Exam:  Vitals: /74   Ht 1.772 m (5' 9.75\")   Wt 100.8 kg (222 lb 4.8 oz)   BMI 32.13 kg/m    BMI= Body mass index is 32.13 kg/m .  Constitutional: healthy, alert and no acute distress   Psychiatric: mentation appears normal and affect normal/bright  NEURO: no focal deficits  SKIN: .well healed, no erythema, no incision breakdown and no drainage.  JOINT/EXTREMITIES: Active motion 0-115 degrees.  No focal effusion.  He does have some soft tissue swelling.  No evidence of infection.  No instability varus and valgus testing.  Good muscle tone  GAIT: not tested     Diagnostic Modalities:  right knee X-ray: The prosthesis has acceptable alignment. No fractures or dislocations. Prosthesis is well seated with no evidence of loosening  Independent visualization of the images was performed.      Impression:   Chief Complaint   Patient presents with     RECHECK     right knee " follow up     Surgical Followup     post op dos 12/10/19 Right knee replacement~3 months postop   Overall doing well.  Continues to make progress    Plan:   Continue working on physical therapy exercises.  Transition to home exercises as he tolerates.  He does have some stiffness at times he reports.  This should improve with time.  He also question some remaining swelling.  Again this should improve with time.    Return to clinic 3, month(s), or sooner as needed for changes.    Re-x-ray on return: Yes.    Gerber Yin D.O.    Again, thank you for allowing me to participate in the care of your patient.        Sincerely,        Javier Yin, DO

## 2020-03-10 ENCOUNTER — THERAPY VISIT (OUTPATIENT)
Dept: PHYSICAL THERAPY | Facility: CLINIC | Age: 60
End: 2020-03-10
Payer: COMMERCIAL

## 2020-03-10 DIAGNOSIS — Z47.1 AFTERCARE FOLLOWING RIGHT KNEE JOINT REPLACEMENT SURGERY: ICD-10-CM

## 2020-03-10 DIAGNOSIS — M25.561 ACUTE PAIN OF RIGHT KNEE: ICD-10-CM

## 2020-03-10 DIAGNOSIS — R26.9 ABNORMAL GAIT: ICD-10-CM

## 2020-03-10 DIAGNOSIS — Z96.651 AFTERCARE FOLLOWING RIGHT KNEE JOINT REPLACEMENT SURGERY: ICD-10-CM

## 2020-03-10 PROCEDURE — 97110 THERAPEUTIC EXERCISES: CPT | Mod: GP | Performed by: PHYSICAL THERAPIST

## 2020-03-10 PROCEDURE — 97140 MANUAL THERAPY 1/> REGIONS: CPT | Mod: GP | Performed by: PHYSICAL THERAPIST

## 2020-03-10 PROCEDURE — 97112 NEUROMUSCULAR REEDUCATION: CPT | Mod: GP | Performed by: PHYSICAL THERAPIST

## 2020-03-12 ENCOUNTER — THERAPY VISIT (OUTPATIENT)
Dept: PHYSICAL THERAPY | Facility: CLINIC | Age: 60
End: 2020-03-12
Payer: COMMERCIAL

## 2020-03-12 DIAGNOSIS — Z47.1 AFTERCARE FOLLOWING RIGHT KNEE JOINT REPLACEMENT SURGERY: ICD-10-CM

## 2020-03-12 DIAGNOSIS — Z96.651 AFTERCARE FOLLOWING RIGHT KNEE JOINT REPLACEMENT SURGERY: ICD-10-CM

## 2020-03-12 DIAGNOSIS — R26.9 ABNORMAL GAIT: ICD-10-CM

## 2020-03-12 DIAGNOSIS — M25.561 ACUTE PAIN OF RIGHT KNEE: ICD-10-CM

## 2020-03-12 PROCEDURE — 97140 MANUAL THERAPY 1/> REGIONS: CPT | Mod: GP

## 2020-03-12 PROCEDURE — 97110 THERAPEUTIC EXERCISES: CPT | Mod: GP

## 2020-03-12 PROCEDURE — 97112 NEUROMUSCULAR REEDUCATION: CPT | Mod: GP

## 2020-03-14 DIAGNOSIS — Z13.6 CARDIOVASCULAR SCREENING; LDL GOAL LESS THAN 130: ICD-10-CM

## 2020-03-15 ENCOUNTER — HEALTH MAINTENANCE LETTER (OUTPATIENT)
Age: 60
End: 2020-03-15

## 2020-03-16 ENCOUNTER — THERAPY VISIT (OUTPATIENT)
Dept: PHYSICAL THERAPY | Facility: CLINIC | Age: 60
End: 2020-03-16
Payer: COMMERCIAL

## 2020-03-16 DIAGNOSIS — Z96.651 AFTERCARE FOLLOWING RIGHT KNEE JOINT REPLACEMENT SURGERY: ICD-10-CM

## 2020-03-16 DIAGNOSIS — R26.9 ABNORMAL GAIT: ICD-10-CM

## 2020-03-16 DIAGNOSIS — Z47.1 AFTERCARE FOLLOWING RIGHT KNEE JOINT REPLACEMENT SURGERY: ICD-10-CM

## 2020-03-16 DIAGNOSIS — M25.561 ACUTE PAIN OF RIGHT KNEE: ICD-10-CM

## 2020-03-16 PROCEDURE — 97112 NEUROMUSCULAR REEDUCATION: CPT | Mod: GP | Performed by: PHYSICAL THERAPIST

## 2020-03-16 PROCEDURE — 97110 THERAPEUTIC EXERCISES: CPT | Mod: GP | Performed by: PHYSICAL THERAPIST

## 2020-03-16 RX ORDER — ATORVASTATIN CALCIUM 20 MG/1
TABLET, FILM COATED ORAL
Qty: 90 TABLET | Refills: 0 | Status: SHIPPED | OUTPATIENT
Start: 2020-03-16 | End: 2020-07-31

## 2020-03-16 NOTE — TELEPHONE ENCOUNTER
atorvastatin (LIPITOR) 20 MG tablet [Pharmacy Med Name: Atorvastatin Calcium 20 MG Oral Tablet] 90 tablet 0    Sig: Take 1 tablet by mouth once daily   Routing refill request to provider for review/approval because:  Labs not current:  LDL  Last LDL on 1/24/2018    T'd up 1 month for provider review.    Roxy Jones RN

## 2020-03-16 NOTE — TELEPHONE ENCOUNTER
"Requested Prescriptions   Pending Prescriptions Disp Refills     atorvastatin (LIPITOR) 20 MG tablet [Pharmacy Med Name: Atorvastatin Calcium 20 MG Oral Tablet] 90 tablet 0     Sig: Take 1 tablet by mouth once daily   Last Written Prescription Date:  11/12/2019  Last Fill Quantity: 90,  # refills: 0   Last office visit: 1/27/2020 with prescribing provider:  01/31/2019Aneesh Gomez   Future Office Visit:   Next 5 appointments (look out 90 days)    Temo 10, 2020  3:20 PM CDT  Return Visit with Javier Yin DO  [facility] [address] [place] [id number] [phone]             Statins Protocol Failed - 3/14/2020 12:05 PM        Failed - LDL on file in past 12 months     Recent Labs   Lab Test 01/24/18  0926   *             Failed - Recent (12 mo) or future (30 days) visit within the authorizing provider's specialty     Patient has had an office visit with the authorizing provider or a provider within the authorizing providers department within the previous 12 mos or has a future within next 30 days. See \"Patient Info\" tab in inbasket, or \"Choose Columns\" in Meds & Orders section of the refill encounter.              Passed - No abnormal creatine kinase in past 12 months     No lab results found.             Passed - Medication is active on med list        Passed - Patient is age 18 or older             "

## 2020-03-19 ENCOUNTER — TELEPHONE (OUTPATIENT)
Dept: PHYSICAL THERAPY | Facility: CLINIC | Age: 60
End: 2020-03-19

## 2020-03-19 DIAGNOSIS — R26.9 ABNORMAL GAIT: ICD-10-CM

## 2020-03-19 DIAGNOSIS — Z96.651 AFTERCARE FOLLOWING RIGHT KNEE JOINT REPLACEMENT SURGERY: ICD-10-CM

## 2020-03-19 DIAGNOSIS — Z47.1 AFTERCARE FOLLOWING RIGHT KNEE JOINT REPLACEMENT SURGERY: ICD-10-CM

## 2020-03-19 DIAGNOSIS — M25.561 ACUTE PAIN OF RIGHT KNEE: ICD-10-CM

## 2020-03-21 ENCOUNTER — HOSPITAL ENCOUNTER (EMERGENCY)
Facility: CLINIC | Age: 60
Discharge: HOME | End: 2020-03-21
Attending: FAMILY MEDICINE | Admitting: FAMILY MEDICINE
Payer: COMMERCIAL

## 2020-03-21 ENCOUNTER — APPOINTMENT (OUTPATIENT)
Dept: GENERAL RADIOLOGY | Facility: CLINIC | Age: 60
End: 2020-03-21
Attending: FAMILY MEDICINE
Payer: COMMERCIAL

## 2020-03-21 ENCOUNTER — TELEPHONE (OUTPATIENT)
Dept: EMERGENCY MEDICINE | Facility: CLINIC | Age: 60
End: 2020-03-21

## 2020-03-21 VITALS
TEMPERATURE: 98.3 F | OXYGEN SATURATION: 96 % | RESPIRATION RATE: 19 BRPM | DIASTOLIC BLOOD PRESSURE: 84 MMHG | SYSTOLIC BLOOD PRESSURE: 143 MMHG

## 2020-03-21 DIAGNOSIS — S82.101A CLOSED FRACTURE OF PROXIMAL END OF RIGHT TIBIA, UNSPECIFIED FRACTURE MORPHOLOGY, INITIAL ENCOUNTER: ICD-10-CM

## 2020-03-21 PROCEDURE — 99284 EMERGENCY DEPT VISIT MOD MDM: CPT | Mod: 25 | Performed by: FAMILY MEDICINE

## 2020-03-21 PROCEDURE — 27530 TREAT KNEE FRACTURE: CPT | Mod: RT | Performed by: FAMILY MEDICINE

## 2020-03-21 PROCEDURE — 73562 X-RAY EXAM OF KNEE 3: CPT | Mod: TC,RT

## 2020-03-21 PROCEDURE — 25000132 ZZH RX MED GY IP 250 OP 250 PS 637: Performed by: FAMILY MEDICINE

## 2020-03-21 RX ORDER — OXYCODONE HYDROCHLORIDE 5 MG/1
10 TABLET ORAL ONCE
Status: COMPLETED | OUTPATIENT
Start: 2020-03-21 | End: 2020-03-21

## 2020-03-21 RX ORDER — OXYCODONE HYDROCHLORIDE 5 MG/1
5-10 TABLET ORAL EVERY 4 HOURS PRN
Qty: 12 TABLET | Refills: 0 | Status: SHIPPED | OUTPATIENT
Start: 2020-03-21 | End: 2020-03-25

## 2020-03-21 RX ADMIN — OXYCODONE HYDROCHLORIDE 10 MG: 5 TABLET ORAL at 02:50

## 2020-03-21 NOTE — ED AVS SNAPSHOT
[facility] Emergency Department  [address] [place] [address]  Phone:  [phone]  Fax:  [phone]                                    Hugo Longoria   MRN: [id number]    Department:  [facility] Emergency Department   Date of Visit:  3/21/2020           After Visit Summary Signature Page    I have received my discharge instructions, and my questions have been answered. I have discussed any challenges I see with this plan with the nurse or doctor.    ..........................................................................................................................................  Patient/Patient Representative Signature      ..........................................................................................................................................  Patient Representative Print Name and Relationship to Patient    ..................................................               ................................................  Date                                   Time    ..........................................................................................................................................  Reviewed by Signature/Title    ...................................................              ..............................................  Date                                               Time          22EPIC Rev 08/18

## 2020-03-21 NOTE — ED PROVIDER NOTES
"  History     Chief Complaint   Patient presents with     Leg Pain     HPI  Hugo Longoria is a 59 year old male who presents to the ED tonight with right knee pain.  He had let his puppies out to go to the bathroom and had to go out and get them.  He was in the garden and on some rocks.  He stepped backwards and lost his balance and fell and hyperflexed his right knee.  He had the knee replaced in December of this past year with Dr. Yin and was progressing in his rehab.  Still did not have full flexion but tonight he blew right past his usual flexion and flexed it all the way.  He now has diffuse pain about the right knee and down the lower leg and new swelling superiorly both medially and laterally.  He had to crawl to the house and then put on his knee immobilizer that he left from surgery and had uses a walker to get into the ED.  It hurts to bear weight.  He can only toe-touch weight-bear.  Denies other injuries.    Allergies:  Allergies   Allergen Reactions     Hydrocodone Other (See Comments)     \"climbed the walls, very anxious, insomnia\"     Nsaids Other (See Comments)     Hx of stomach ulcers       Problem List:    Patient Active Problem List    Diagnosis Date Noted     Abnormal gait 01/20/2020     Priority: Medium     Acute pain of right knee 12/24/2019     Priority: Medium     Aftercare following right knee joint replacement surgery 12/24/2019     Priority: Medium     S/P total knee replacement using cement, right 12/10/2019     Priority: Medium     Chronic right shoulder pain 01/30/2019     Priority: Medium     Primary osteoarthritis of right knee 03/13/2017     Priority: Medium     Advanced directives, counseling/discussion 01/09/2017     Priority: Medium     Info given       Mild episode of recurrent major depressive disorder (H) 01/06/2017     Priority: Medium     Complete tear of right rotator cuff 01/02/2017     Priority: Medium     Rupture long head biceps tendon, right, initial encounter " 01/02/2017     Priority: Medium     Subacromial impingement of right shoulder 01/02/2017     Priority: Medium     Chronic gastric ulcer 10/28/2015     Priority: Medium     Seasonal allergic rhinitis 10/28/2015     Priority: Medium     History of gastric ulcer 07/16/2013     Priority: Medium     ACL tear 04/23/2013     Priority: Medium     Fatigue 03/12/2013     Priority: Medium     Hemorrhoids 12/08/2011     Priority: Medium     Erectile dysfunction 12/08/2011     Priority: Medium     HTN, goal below 140/90 12/08/2011     Priority: Medium     History of tobacco use: 1980 - 1990 09/29/2011     Priority: Medium     CARDIOVASCULAR SCREENING; LDL GOAL LESS THAN 130 10/31/2010     Priority: Medium     KERRY (generalized anxiety disorder) 07/06/2010     Priority: Medium     History of colonic polyps 11/13/2007     Priority: Medium     Problem list name updated by automated process. Provider to review          Past Medical History:    Past Medical History:   Diagnosis Date     Abnormalities of size and form of teeth      Accidental poisoning by agents primarily affecting blood constituents(E858.2)      Gastric ulcer, unspecified as acute or chronic, without mention of hemorrhage or perforation        Past Surgical History:    Past Surgical History:   Procedure Laterality Date     ARTHROPLASTY KNEE Right 12/10/2019    Procedure: Right knee replacement;  Surgeon: Javier Yin DO;  Location: [facility] OR     ARTHROSCOPY KNEE  5/29/2013    Procedure: ARTHROSCOPY KNEE;  Right knee arthroscopy with partial medial and partial lateral menisectomies;  Surgeon: Phani Mclaughlin MD;  Location: [facility] OR     ARTHROSCOPY SHOULDER BICEPS TENODESIS REPAIR Right 1/13/2017    Procedure: ARTHROSCOPY SHOULDER BICEPS TENODESIS REPAIR;  Surgeon: Javier Yin DO;  Location:  OR     ARTHROSCOPY SHOULDER DECOMPRESSION Right 1/13/2017    Procedure: ARTHROSCOPY SHOULDER DECOMPRESSION;  Surgeon: Javier Yin DO;   Location: [facility] OR     ARTHROSCOPY SHOULDER ROTATOR CUFF REPAIR Right 1/13/2017    Procedure: ARTHROSCOPY SHOULDER ROTATOR CUFF REPAIR;  Surgeon: Javier Yin DO;  Location: [facility] OR     COLONOSCOPY  11/01/2007     COLONOSCOPY  12/12/11     COLONOSCOPY N/A 11/18/2015    Procedure: COLONOSCOPY;  Surgeon: Migue Mclaughlin MD;  Location: [facility] GI     COLONOSCOPY N/A 3/22/2017    Procedure: COMBINED COLONOSCOPY, SINGLE OR MULTIPLE BIOPSY/POLYPECTOMY BY BIOPSY;  Surgeon: Salvatore Zepeda MD;  Location: [facility] GI     COLONOSCOPY N/A 3/5/2019    Procedure: COLONOSCOPY;  Surgeon: Prakash Ervin DO;  Location: [facility] GI     ENDOSCOPY  01/27/12    [facility] - [facility]     ESOPHAGOSCOPY, GASTROSCOPY, DUODENOSCOPY (EGD), COMBINED N/A 11/18/2015    Procedure: COMBINED ESOPHAGOSCOPY, GASTROSCOPY, DUODENOSCOPY (EGD), BIOPSY SINGLE OR MULTIPLE;  Surgeon: Migue Mclaughlin MD;  Location:  GI     ESOPHAGOSCOPY, GASTROSCOPY, DUODENOSCOPY (EGD), COMBINED N/A 3/22/2017    Procedure: COMBINED ESOPHAGOSCOPY, GASTROSCOPY, DUODENOSCOPY (EGD), BIOPSY SINGLE OR MULTIPLE;  Surgeon: Salvatore Zepeda MD;  Location:  GI     HC UGI ENDOSCOPY, SIMPLE EXAM  10/31/2007     HERNIORRHAPHY UMBILICAL N/A 3/12/2015    Procedure: HERNIORRHAPHY UMBILICAL;  Surgeon: Yared Ma MD;  Location: [facility]     IRRIGATION AND DEBRIDEMENT UPPER EXTREMITY, COMBINED Left 3/15/2016    Procedure: COMBINED IRRIGATION AND DEBRIDEMENT UPPER EXTREMITY;  Surgeon: Javier Yin DO;  Location: [facility] OR     REMOVAL OF SPERM DUCT(S)      Vasectomy       Family History:    Family History   Problem Relation Age of Onset     Cancer Mother         ovarian cancer     Asthma Mother      Hypertension Mother      Arthritis Mother      Genitourinary Problems Mother      Lipids Mother      Cancer Sister         ovarian cancer     Hypertension Sister      Lipids Sister      Cancer Maternal Grandmother         stomach cancer      Asthma Father      Cardiovascular Father         heart attack; bypass x5, congenital heart disease     Heart Disease Father         heart attack; bypass x5, congenital heart disease     Diabetes Father      Hypertension Father      Cancer Father         prostate     Prostate Cancer Father      Circulatory Father         blood clots     Genitourinary Problems Father      Respiratory Father         emphysema; COPD     Diabetes Maternal Grandfather      Diabetes Paternal Grandfather      Eye Disorder Paternal Grandfather         glaucoma     Hypertension Brother      Lipids Brother      Hypertension Brother      Cardiovascular Brother         bypass x3     Heart Disease Brother         bypass x3     Lipids Brother      Hypertension Sister      C.A.D. No family hx of      Cerebrovascular Disease No family hx of      Breast Cancer No family hx of      Cancer - colorectal No family hx of      Alzheimer Disease No family hx of      Blood Disease No family hx of      Gastrointestinal Disease No family hx of      Musculoskeletal Disorder No family hx of      Neurologic Disorder No family hx of      Thyroid Disease No family hx of        Social History:  Marital Status:   [2]  Social History     Tobacco Use     Smoking status: Former Smoker     Last attempt to quit: 1981     Years since quittin.2     Smokeless tobacco: Never Used   Substance Use Topics     Alcohol use: Yes     Alcohol/week: 0.0 standard drinks     Comment: weekends     Drug use: No        Medications:    oxyCODONE (ROXICODONE) 5 MG tablet  acetaminophen (TYLENOL) 325 MG tablet  ascorbic acid (VITAMIN C) 250 MG CHEW chewable tablet  aspirin (ASA) 81 MG tablet  atorvastatin (LIPITOR) 20 MG tablet  fluticasone (FLONASE) 50 MCG/ACT spray  hydrocortisone (ANUSOL-HC) 2.5 % cream  LANsoprazole (PREVACID) 30 MG DR capsule  lisinopril (ZESTRIL) 20 MG tablet  Multiple Vitamin (MULTIVITAMINS PO)  nifedipine 0.2% in white petrolatum 0.2 % OINT  ointment          Review of Systems   All other systems reviewed and are negative.      Physical Exam   BP: (!) 143/84  Heart Rate: 108  Temp: 98.3  F (36.8  C)  Resp: 19  SpO2: 96 %      Physical Exam  Constitutional:       General: He is in acute distress (mild).      Appearance: Normal appearance.   HENT:      Head: Normocephalic and atraumatic.   Pulmonary:      Effort: Pulmonary effort is normal. No respiratory distress.   Musculoskeletal:      Right knee: He exhibits decreased range of motion and swelling (new superiorly, med/lat ). Tenderness (diffusely) found.      Right ankle: Normal.        Legs:       Right foot: Normal.   Neurological:      Mental Status: He is alert.         ED Course           Procedures               Critical Care time:  none               Results for orders placed or performed during the hospital encounter of 03/21/20 (from the past 24 hour(s))   XR Knee Right 3 Views    Narrative    EXAM: XR KNEE RT 3 VW  LOCATION: [facility]  DATE/TIME: 3/21/2020 1:58 AM    INDICATION: Pain and swelling after fall.    COMPARISON: None.      Impression    IMPRESSION: Nondisplaced fracture of the proximal tibia just below the total knee arthroplasty. No other fracture or dislocation. The knee arthroplasty appears normal. Joint effusion.       Medications   oxyCODONE (ROXICODONE) tablet 10 mg (10 mg Oral Given 3/21/20 0250)       Assessments & Plan (with Medical Decision Making)  59-year-old gentleman status post right TKA in December of this past year doing well in his rehab until tonight when he fell when he tripped on a rock and hyperflexed his right knee.  He complains of pain in the right knee and pain all the way down the leg when he moves his foot.  Has swelling medially and laterally over the superior knee which is new compared to the normal postop swelling that he has had all along.    X-ray shows nondisplaced transverse fracture of the proximal tibia just distal to the TKA.   The arthroplasty itself appears normal.    I spoke with Dr. Yin from orthopedics.  He reviewed the films and asked us to put him back in his knee immobilizer and be nonweightbearing using crutches.  Ice liberally.  He can use Tylenol/ibuprofen for pain and we gave him muscle limited dose of oxycodone to use for more severe pain.  Dr. Yin would like to see him next week either Wednesday or Thursday in clinic for follow-up.  He was given 10 mg of oxycodone for pain here in the ED before he was discharged.  His wife drove him home.       I have reviewed the nursing notes.    I have reviewed the findings, diagnosis, plan and need for follow up with the patient.       New Prescriptions    OXYCODONE (ROXICODONE) 5 MG TABLET    Take 1-2 tablets (5-10 mg) by mouth every 4 hours as needed for pain       Final diagnoses:   Closed fracture of proximal end of right tibia, unspecified fracture morphology, initial encounter       3/21/2020   [facility] EMERGENCY DEPARTMENT     Prieto Christianson MD  03/21/20 0315

## 2020-03-21 NOTE — TELEPHONE ENCOUNTER
Please call patient to schedule appt with Dr Yin for knee injury preferably for Wednesday at 10am or later, or Thursday.  Thank you.

## 2020-03-21 NOTE — ED TRIAGE NOTES
Pt presents with concerns of right lower leg pain.  Pt fell at about 2100.  Pt tripped on a rock, states that his leg bend the wrong direction.  Pt had knee surgery in December.

## 2020-03-21 NOTE — ED NOTES
Pt has a knee immobilizer already from home.  Assisted pt in placing it.  Pt states that he has crutches at home.  Attempted to scheduled ortho appointment, message left to work pt in.

## 2020-03-21 NOTE — DISCHARGE INSTRUCTIONS
Wear the knee immobilizer and use crutches to get around.  You should be nonweightbearing.  Do not work on range of motion but keep your knee straight.  Ice liberally.  You can use Tylenol/ibuprofen for pain and oxycodone for more severe pain if needed.  Recheck with Dr. Yin next week in clinic on Wednesday or Thursday.  Call for an appointment.  It was nice visiting with you tonight.   I hope you feel better soon.     Thank you for choosing [facility]. We appreciate the opportunity to meet your urgent medical needs. Please let us know if we could have done anything to make your stay more satisfying.    After discharge, please closely monitor for any new or worsening symptoms. Return to the Emergency Department if you develop any acute worsening signs or symptoms.    If you had lab work, cultures or imaging studies done during your stay, the final results may still be pending. We will call you if your plan of care needs to change. However, if you are not improving as expected, please follow up with your primary care provider or clinic.     Start any prescription medications that were prescribed to you and take them as directed.     Please see additional handouts that may be pertinent to your condition.

## 2020-03-21 NOTE — TELEPHONE ENCOUNTER
He was in ED early Sat am for fall. He has a nondisplaced tibia fracture.  Needs to be seen Wed or Thur and will need ap/lateral knee nonwtbearing.

## 2020-03-23 ENCOUNTER — TELEPHONE (OUTPATIENT)
Dept: ORTHOPEDICS | Facility: OTHER | Age: 60
End: 2020-03-23

## 2020-03-23 NOTE — TELEPHONE ENCOUNTER
Reason for Call:  Other appointment    Detailed comments: WIfe and patient called in because they want to be sure of what they should be doing with the fracture that he has on his right tibia .. if he should be resting/elevating or what he should be doing to help the healing process. He has an appointment on Wednesday. Please call him    Phone Number Patient can be reached at: Home number on file [phone] (home)    Best Time: any    Can we leave a detailed message on this number? YES    Call taken on 3/23/2020 at 9:36 AM by Nancy Parish

## 2020-03-23 NOTE — TELEPHONE ENCOUNTER
Writing nurse returned call to patient. Patient was instructed to continue with advise below. Patient reported to having 8/10 pain mostly at night when he is trying to sleep. Requesting a refill on oxy.      ED notes from 3/21/2020:  spoke with Dr. Yin from orthopedics.  He reviewed the films and asked us to put him back in his knee immobilizer and be nonweightbearing using crutches.  Ice liberally.  He can use Tylenol/ibuprofen for pain and we gave him muscle limited dose of oxycodone to use for more severe pain.  Dr. Yin would like to see him next week either Wednesday or Thursday in clinic for follow-up.  He was given 10 mg of oxycodone for pain here in the ED before he was discharged.  His wife drove him home.       Oxycodone  Last Written Prescription Date:  3/21/20  Last Fill Quantity: 12,  # refills: 0   Last office visit: 3/9/2020 with prescribing provider:     Future Office Visit:   Next 5 appointments (look out 90 days)    Mar 25, 2020 10:40 AM CDT  [facility] with Javier Yin,   [facility] [address] [place] [id number]  [phone]   Temo 10, 2020  3:20 PM CDT  Return Visit with Javier Yin DO  [facility] ([address] [address]  [phone]           There are no medications in this encounter.           Tami Calderón RN on 3/23/2020 at 4:52 PM

## 2020-03-25 ENCOUNTER — OFFICE VISIT (OUTPATIENT)
Dept: ORTHOPEDICS | Facility: CLINIC | Age: 60
End: 2020-03-25
Payer: COMMERCIAL

## 2020-03-25 ENCOUNTER — ANCILLARY PROCEDURE (OUTPATIENT)
Dept: GENERAL RADIOLOGY | Facility: CLINIC | Age: 60
End: 2020-03-25
Attending: ORTHOPAEDIC SURGERY
Payer: COMMERCIAL

## 2020-03-25 VITALS
SYSTOLIC BLOOD PRESSURE: 130 MMHG | HEIGHT: 70 IN | WEIGHT: 222 LBS | BODY MASS INDEX: 31.78 KG/M2 | DIASTOLIC BLOOD PRESSURE: 85 MMHG

## 2020-03-25 DIAGNOSIS — Z96.651 S/P TKR (TOTAL KNEE REPLACEMENT) USING CEMENT, RIGHT: ICD-10-CM

## 2020-03-25 DIAGNOSIS — M25.561 RIGHT KNEE PAIN: ICD-10-CM

## 2020-03-25 DIAGNOSIS — S82.161A CLOSED TORUS FRACTURE OF PROXIMAL END OF RIGHT TIBIA, INITIAL ENCOUNTER: Primary | ICD-10-CM

## 2020-03-25 PROCEDURE — 73560 X-RAY EXAM OF KNEE 1 OR 2: CPT | Mod: TC

## 2020-03-25 PROCEDURE — 99214 OFFICE O/P EST MOD 30 MIN: CPT | Mod: 57 | Performed by: PHYSICIAN ASSISTANT

## 2020-03-25 PROCEDURE — 27530 TREAT KNEE FRACTURE: CPT | Mod: 55 | Performed by: PHYSICIAN ASSISTANT

## 2020-03-25 RX ORDER — OXYCODONE HYDROCHLORIDE 5 MG/1
5 TABLET ORAL
Qty: 14 TABLET | Refills: 0 | Status: SHIPPED | OUTPATIENT
Start: 2020-03-25 | End: 2020-03-25

## 2020-03-25 RX ORDER — OXYCODONE HYDROCHLORIDE 5 MG/1
5-10 TABLET ORAL EVERY 4 HOURS PRN
Qty: 12 TABLET | Refills: 0 | OUTPATIENT
Start: 2020-03-25

## 2020-03-25 RX ORDER — OXYCODONE HYDROCHLORIDE 5 MG/1
5 TABLET ORAL
Qty: 14 TABLET | Refills: 0 | Status: SHIPPED | OUTPATIENT
Start: 2020-03-25 | End: 2020-04-23

## 2020-03-25 ASSESSMENT — MIFFLIN-ST. JEOR: SCORE: 1829.27

## 2020-03-25 ASSESSMENT — PAIN SCALES - GENERAL: PAINLEVEL: EXTREME PAIN (9)

## 2020-03-25 NOTE — TELEPHONE ENCOUNTER
Patient is being seen today at 1040.  I did get a chance to visit with Dr. Yin about this patient.  He is aware and he will evaluate the patient today in clinic.

## 2020-03-25 NOTE — LETTER
3/25/2020         RE: Hugo Longoria  [id number] [facility] [id number]        Dear Colleague,    Thank you for referring your patient, Hugo Longoria, to the [facility]. Please see a copy of my visit note below.    Hugo to follow up with Primary Care provider regarding elevated blood pressure.    ORTHOPEDIC CONSULT      Chief Complaint: Hugo Longoria is a 59 year old male who is employed by [employer].    He is being seen for   Chief Complaints and History of Present Illnesses   Patient presents with     Knee Pain     right knee injury- DOI: 3/21/2020         History of Present Illness:   Mechanism of Injury: Hyperflexion injury of the right knee  Location: Right proximal tibia  Duration of Pain: Since the date of injury which was 3/21/2020, 4 days ago  Rating of Pain: Mild to moderate  Pain Quality: Achy and sometimes on the leg is down throbbing  Pain is better with: Elevation and ice and rest.  Knee immobilizer  Pain is worse with: Weightbearing  Treatment so far consists of: Patient was seen in the emergency department on 3/21/2020 and did receive an x-ray and put into a knee immobilizer and given 12 tablets of oxycodone.   Associated Features: Patient denies numbness or tingling.  Prior history of related problems: Patient is 3 months of 15 days status post right total knee arthroplasty  Pain is Limiting: Ambulation on the right lower extremity  Here to: Orthopedic consultation after fracture  The Pain Has: Gotten better on the distal part of the leg but slightly worse at the mid or proximal portion of the leg.  Additional History: Patient was very resistant to the plan at first.  Patient did not want the knee immobilizer and stated he needed to be in something different.  Patient wanted a shorter knee immobilizer.  States it is too exhausting to open the Velcro straps up and close it back down.  Patient very emotional today and did start crying at one point.  I  was able to reassure him and comfort him that this is not a significant fracture, it is a stable fracture and things will be just fine.  Patient did mention at one point jokingly that he felt he was going to harm himself.  I checked with him several times afterwards to make sure he was okay and was not.  He confirmed about 4 times that he was not going to harm himself he was just feeling a little overwhelmed.      Patient's past medical, surgical, social and family histories reviewed.     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)     Overnight stay due to blood poisoning     Gastric ulcer, unspecified as acute or chronic, without mention of hemorrhage or perforation         Past Surgical History:   Procedure Laterality Date     ARTHROPLASTY KNEE Right 12/10/2019    Procedure: Right knee replacement;  Surgeon: Javier Yin DO;  Location: [facility]     ARTHROSCOPY KNEE  5/29/2013    Procedure: ARTHROSCOPY KNEE;  Right knee arthroscopy with partial medial and partial lateral menisectomies;  Surgeon: Phani Mclaughlin MD;  Location: [facility] OR     ARTHROSCOPY SHOULDER BICEPS TENODESIS REPAIR Right 1/13/2017    Procedure: ARTHROSCOPY SHOULDER BICEPS TENODESIS REPAIR;  Surgeon: Javier Yin DO;  Location: [facility]     ARTHROSCOPY SHOULDER DECOMPRESSION Right 1/13/2017    Procedure: ARTHROSCOPY SHOULDER DECOMPRESSION;  Surgeon: Javier Yin DO;  Location: [facility]     ARTHROSCOPY SHOULDER ROTATOR CUFF REPAIR Right 1/13/2017    Procedure: ARTHROSCOPY SHOULDER ROTATOR CUFF REPAIR;  Surgeon: Javier Yin DO;  Location: [facility]     COLONOSCOPY  11/01/2007     COLONOSCOPY  12/12/11     COLONOSCOPY N/A 11/18/2015    Procedure: COLONOSCOPY;  Surgeon: Migue Mclaughlin MD;  Location:  GI     COLONOSCOPY N/A 3/22/2017    Procedure:  COMBINED COLONOSCOPY, SINGLE OR MULTIPLE BIOPSY/POLYPECTOMY BY BIOPSY;  Surgeon: Salvatore Zepeda MD;  Location: [facility]     COLONOSCOPY N/A 3/5/2019    Procedure: COLONOSCOPY;  Surgeon: Prakash Ervin DO;  Location: [facility]     ENDOSCOPY  01/27/12    Upper GI - [facility]     ESOPHAGOSCOPY, GASTROSCOPY, DUODENOSCOPY (EGD), COMBINED N/A 11/18/2015    Procedure: COMBINED ESOPHAGOSCOPY, GASTROSCOPY, DUODENOSCOPY (EGD), BIOPSY SINGLE OR MULTIPLE;  Surgeon: Migue Mclaughlin MD;  Location: [facility]     ESOPHAGOSCOPY, GASTROSCOPY, DUODENOSCOPY (EGD), COMBINED N/A 3/22/2017    Procedure: COMBINED ESOPHAGOSCOPY, GASTROSCOPY, DUODENOSCOPY (EGD), BIOPSY SINGLE OR MULTIPLE;  Surgeon: Salvatore Zepeda MD;  Location: [facility] GI     HC UGI ENDOSCOPY, SIMPLE EXAM  10/31/2007     HERNIORRHAPHY UMBILICAL N/A 3/12/2015    Procedure: HERNIORRHAPHY UMBILICAL;  Surgeon: Yared Ma MD;  Location: [facility]     IRRIGATION AND DEBRIDEMENT UPPER EXTREMITY, COMBINED Left 3/15/2016    Procedure: COMBINED IRRIGATION AND DEBRIDEMENT UPPER EXTREMITY;  Surgeon: Javier Yin DO;  Location: [facility]     REMOVAL OF SPERM DUCT(S)      Vasectomy       Medications:  acetaminophen (TYLENOL) 325 MG tablet, Take 3 tablets (975 mg) by mouth every 8 hours as needed for mild pain  ascorbic acid (VITAMIN C) 250 MG CHEW chewable tablet, Take 250 mg by mouth daily  aspirin (ASA) 81 MG tablet, Take 1 tablet (81 mg) by mouth daily  atorvastatin (LIPITOR) 20 MG tablet, Take 1 tablet by mouth once daily  fluticasone (FLONASE) 50 MCG/ACT spray, Spray 1-2 sprays into both nostrils daily (Patient not taking: Reported on 3/9/2020)  hydrocortisone (ANUSOL-HC) 2.5 % cream, Place rectally 2 times daily as needed for hemorrhoids (Patient not taking: Reported on 3/9/2020)  LANsoprazole (PREVACID) 30 MG DR capsule, Take 1 capsule by mouth once daily  lisinopril (ZESTRIL) 20 MG tablet, Take 1/2 (one-half) tablet by mouth once  "daily  Multiple Vitamin (MULTIVITAMINS PO), Take  by mouth.  nifedipine 0.2% in white petrolatum 0.2 % OINT ointment, Apply topically 2 times daily  oxyCODONE (ROXICODONE) 5 MG tablet, Take 1-2 tablets (5-10 mg) by mouth every 4 hours as needed for pain    No current facility-administered medications on file prior to visit.       Allergies   Allergen Reactions     Hydrocodone Other (See Comments)     \"climbed the walls, very anxious, insomnia\"     Nsaids Other (See Comments)     Hx of stomach ulcers       Social History     Occupational History     Employer: [employer]     Comment: [employer]   Tobacco Use     Smoking status: Former Smoker     Last attempt to quit: 1981     Years since quittin.2     Smokeless tobacco: Never Used   Substance and Sexual Activity     Alcohol use: Yes     Alcohol/week: 0.0 standard drinks     Comment: weekends     Drug use: No     Sexual activity: Yes     Partners: Female     Birth control/protection: Surgical     Comment: vasectomy       Family History   Problem Relation Age of Onset     Cancer Mother         ovarian cancer     Asthma Mother      Hypertension Mother      Arthritis Mother      Genitourinary Problems Mother      Lipids Mother      Cancer Sister         ovarian cancer     Hypertension Sister      Lipids Sister      Cancer Maternal Grandmother         stomach cancer     Asthma Father      Cardiovascular Father         heart attack; bypass x5, congenital heart disease     Heart Disease Father         heart attack; bypass x5, congenital heart disease     Diabetes Father      Hypertension Father      Cancer Father         prostate     Prostate Cancer Father      Circulatory Father         blood clots     Genitourinary Problems Father      Respiratory Father         emphysema; COPD     Diabetes Maternal Grandfather      Diabetes Paternal Grandfather      Eye Disorder Paternal Grandfather         glaucoma     Hypertension Brother      Lipids Brother  " "    Hypertension Brother      Cardiovascular Brother         bypass x3     Heart Disease Brother         bypass x3     Lipids Brother      Hypertension Sister      C.A.D. No family hx of      Cerebrovascular Disease No family hx of      Breast Cancer No family hx of      Cancer - colorectal No family hx of      Alzheimer Disease No family hx of      Blood Disease No family hx of      Gastrointestinal Disease No family hx of      Musculoskeletal Disorder No family hx of      Neurologic Disorder No family hx of      Thyroid Disease No family hx of        REVIEW OF SYSTEMS  10 point review systems performed otherwise negative as noted as per history of present illness.    Physical Exam:  Vitals: /85   Ht 1.772 m (5' 9.75\")   Wt 100.7 kg (222 lb)   BMI 32.08 kg/m    BMI= Body mass index is 32.08 kg/m .    Constitutional: healthy, alert and no acute distress   Psychiatric: mentation appears normal and affect normal but was teary at one point because of stress with the fracture.   NEURO: no focal deficits, CMS intact right lower extremity   RESP: Normal with easy respirations and no use of accessory muscles to breathe, no audible wheezing or retractions  CV: Calf soft and nontender to palpation, leg warm.  SKIN: Ecchymosis noted at the proximal leg and an area of distribution approximately 14 cm in diameter around the inferior knee area.  Swelling is also noted at the knee and proximal tibial area.  Swelling also noted down the leg towards the ankle but not as bad.  No ecchymosis.  The rest of the leg no erythema, rashes, excoriation, or breakdown. No evidence of infection.  Midline incision well-healed.  MUSCULOSKELETAL:    INSPECTION of right leg: Skin as mentioned above.  We do notice some ecchymosis and swelling as noted above.  No gross deformities, erythema, edema, atrophy or fasciculations.     PALPATION: Tenderness to palpation over the proximal tibial area.  No tenderness to palpation of the patella " medial or lateral knee or joint line.  No tenderness to palpation of the posterior knee.  No tenderness to palpation of the thigh and quad.  No tenderness to palpation of the lower leg ankle or foot.  No increased warmth.    ROM: Passive: Patient does come to full extension and flexes to approximately 30 degrees.  No catching locking or pain with this range of motion today.    STRENGTH: Able to fire his quad.5 out of 5 plantar flexion and dorsiflexion as well as flexor hallucis longus and extensor hallucis longus.     SPECIAL TEST: None today.  With the new fracture I did not want to manipulate the leg much.  GAIT: Not observed.  Patient in wheelchair today.  Lymph: no palpable lymph nodes    Diagnostic Modalities:  Recent Results (from the past 744 hour(s))   XR Knee Right 3 Views    Narrative    KNEE RIGHT THREE VIEWS March 9, 2020 10:27 AM     HISTORY: Status post total knee replacement using cement, right.    COMPARISON: 12/23/2019.      Impression    IMPRESSION: Total knee arthroplasty with components in the expected  positions unchanged. No acute bony abnormality. Previously seen  possible soft tissue air anteriorly is no longer identified. However,  there is some prepatellar and other anterior soft tissue swelling. A  joint space effusion is present but is likely smaller than on the  prior exam.    LA NENA GUTIERREZ MD   XR Knee Right 3 Views    Narrative    EXAM: XR KNEE RT 3 VW  LOCATION: [facility]  DATE/TIME: 3/21/2020 1:58 AM    INDICATION: Pain and swelling after fall.    COMPARISON: None.      Impression    IMPRESSION: Nondisplaced fracture of the proximal tibia just below the total knee arthroplasty. No other fracture or dislocation. The knee arthroplasty appears normal. Joint effusion.     I agree with the above reading.    Today we did get AP and lateral of the right knee showing no change from previous x-ray done in the emergency department on 3/21/2020.  Nondisplaced fracture of the  proximal tibia.  The right total knee arthroplasty with no signs of loosening, no failure, good alignment.  The fracture is nondisplaced.  No other fracture dislocation or tumor.    Independent visualization of the images was performed.    Impression: 1.  4 days status post right proximal tibia fracture, transverse, nondisplaced  2.  3 months and 15 days status post right total knee arthroplasty by Dr. Yin.    Plan:  All of the above pertinent physical exam and imaging modalities findings was reviewed with Hugo.    Focused plan:    Patient placed in 22 inch knee immobilizer instead of 24 for his comfort.  Knee immobilizer to be on at all times except for hygiene and if he is lying on the couch supported.  Nonweightbearing right lower extremity.  Patient to continue to use crutches.  Gave oxycodone 5 mg 1 tablet at night as needed, 14 tablets.  Plan for this to be his last narcotic prescription.   His pain is getting better, he is utilizing Tylenol also.  Follow-up with Dr. Yin in 2 weeks for x-ray.    Re-x-ray on return: Yes, AP and lateral of right knee, nonweightbearing with the brace off.    BP Readings from Last 1 Encounters:   03/25/20 130/85       BP noted to be well controlled today in office.      Patient does not use Tobacco products.      Discussion: I did discuss this patient with Dr. Yin prior to the patient's visit.  Dr. Yin was able to see the x-rays from the emergency department.  We discussed the plan.  This patient did become tearful today but I re-encouraged him and he seemed better at the end of the visit.  He did talk about hurting himself at one point but in a slightly joking fashion.  We talked about this and he denied having any suicidal ideation or any possibility of hurting himself 4 times to me prior to him leaving today's clinic visit.    This note was dictated with [employer].    Leighton Tavarez PA-C                 Again, thank you for allowing me to participate in the care of  your patient.        Sincerely,        Leighton Tavarez PA-C

## 2020-03-25 NOTE — PROGRESS NOTES
ORTHOPEDIC CONSULT      Chief Complaint: Hugo Longoria is a 59 year old male who is employed by [employer].    He is being seen for   Chief Complaints and History of Present Illnesses   Patient presents with     Knee Pain     right knee injury- DOI: 3/21/2020         History of Present Illness:   Mechanism of Injury: Hyperflexion injury of the right knee  Location: Right proximal tibia  Duration of Pain: Since the date of injury which was 3/21/2020, 4 days ago  Rating of Pain: Mild to moderate  Pain Quality: Achy and sometimes on the leg is down throbbing  Pain is better with: Elevation and ice and rest.  Knee immobilizer  Pain is worse with: Weightbearing  Treatment so far consists of: Patient was seen in the emergency department on 3/21/2020 and did receive an x-ray and put into a knee immobilizer and given 12 tablets of oxycodone.   Associated Features: Patient denies numbness or tingling.  Prior history of related problems: Patient is 3 months of 15 days status post right total knee arthroplasty  Pain is Limiting: Ambulation on the right lower extremity  Here to: Orthopedic consultation after fracture  The Pain Has: Gotten better on the distal part of the leg but slightly worse at the mid or proximal portion of the leg.  Additional History: Patient was very resistant to the plan at first.  Patient did not want the knee immobilizer and stated he needed to be in something different.  Patient wanted a shorter knee immobilizer.  States it is too exhausting to open the Velcro straps up and close it back down.  Patient very emotional today and did start crying at one point.  I was able to reassure him and comfort him that this is not a significant fracture, it is a stable fracture and things will be just fine.  Patient did mention at one point jokingly that he felt he was going to harm himself.  I checked with him several times afterwards to make sure he was okay and was not.  He confirmed about 4 times  that he was not going to harm himself he was just feeling a little overwhelmed.      Patient's past medical, surgical, social and family histories reviewed.     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)     Overnight stay due to blood poisoning     Gastric ulcer, unspecified as acute or chronic, without mention of hemorrhage or perforation         Past Surgical History:   Procedure Laterality Date     ARTHROPLASTY KNEE Right 12/10/2019    Procedure: Right knee replacement;  Surgeon: Javier Yin DO;  [facility]     ARTHROSCOPY KNEE  5/29/2013    Procedure: ARTHROSCOPY KNEE;  Right knee arthroscopy with partial medial and partial lateral menisectomies;  Surgeon: Phani Mclaughlin MD;  Location: [facility]     ARTHROSCOPY SHOULDER BICEPS TENODESIS REPAIR Right 1/13/2017    Procedure: ARTHROSCOPY SHOULDER BICEPS TENODESIS REPAIR;  Surgeon: Javier Yin DO;  [facility]     ARTHROSCOPY SHOULDER DECOMPRESSION Right 1/13/2017    Procedure: ARTHROSCOPY SHOULDER DECOMPRESSION;  Surgeon: Javier Yin DO;  [facility]     ARTHROSCOPY SHOULDER ROTATOR CUFF REPAIR Right 1/13/2017    Procedure: ARTHROSCOPY SHOULDER ROTATOR CUFF REPAIR;  Surgeon: Javier Yin DO;  [facility]     COLONOSCOPY  11/01/2007     COLONOSCOPY  12/12/11     COLONOSCOPY N/A 11/18/2015    Procedure: COLONOSCOPY;  Surgeon: Migue Mclaughlin MD;  Location:  GI     COLONOSCOPY N/A 3/22/2017    Procedure: COMBINED COLONOSCOPY, SINGLE OR MULTIPLE BIOPSY/POLYPECTOMY BY BIOPSY;  Surgeon: Salvatore Zepeda MD;  Location:  GI     COLONOSCOPY N/A 3/5/2019    Procedure: COLONOSCOPY;  Surgeon: Prakash Ervin DO;  Location:  GI     ENDOSCOPY  01/27/12    Upper GI - [facility]     ESOPHAGOSCOPY, GASTROSCOPY,  DUODENOSCOPY (EGD), COMBINED N/A 11/18/2015    Procedure: COMBINED ESOPHAGOSCOPY, GASTROSCOPY, DUODENOSCOPY (EGD), BIOPSY SINGLE OR MULTIPLE;  Surgeon: Migue Mclaughlin MD;  [facility]     ESOPHAGOSCOPY, GASTROSCOPY, DUODENOSCOPY (EGD), COMBINED N/A 3/22/2017    Procedure: COMBINED ESOPHAGOSCOPY, GASTROSCOPY, DUODENOSCOPY (EGD), BIOPSY SINGLE OR MULTIPLE;  Surgeon: Salvatore Zepeda MD;  [facility]     HC UGI ENDOSCOPY, SIMPLE EXAM  10/31/2007     HERNIORRHAPHY UMBILICAL N/A 3/12/2015    Procedure: HERNIORRHAPHY UMBILICAL;  Surgeon: Yared Ma MD;  [facility]     IRRIGATION AND DEBRIDEMENT UPPER EXTREMITY, COMBINED Left 3/15/2016    Procedure: COMBINED IRRIGATION AND DEBRIDEMENT UPPER EXTREMITY;  Surgeon: Javier Yin DO;  [facility]     REMOVAL OF SPERM DUCT(S)      Vasectomy       Medications:  acetaminophen (TYLENOL) 325 MG tablet, Take 3 tablets (975 mg) by mouth every 8 hours as needed for mild pain  ascorbic acid (VITAMIN C) 250 MG CHEW chewable tablet, Take 250 mg by mouth daily  aspirin (ASA) 81 MG tablet, Take 1 tablet (81 mg) by mouth daily  atorvastatin (LIPITOR) 20 MG tablet, Take 1 tablet by mouth once daily  fluticasone (FLONASE) 50 MCG/ACT spray, Spray 1-2 sprays into both nostrils daily (Patient not taking: Reported on 3/9/2020)  hydrocortisone (ANUSOL-HC) 2.5 % cream, Place rectally 2 times daily as needed for hemorrhoids (Patient not taking: Reported on 3/9/2020)  LANsoprazole (PREVACID) 30 MG DR capsule, Take 1 capsule by mouth once daily  lisinopril (ZESTRIL) 20 MG tablet, Take 1/2 (one-half) tablet by mouth once daily  Multiple Vitamin (MULTIVITAMINS PO), Take  by mouth.  nifedipine 0.2% in white petrolatum 0.2 % OINT ointment, Apply topically 2 times daily  oxyCODONE (ROXICODONE) 5 MG tablet, Take 1-2 tablets (5-10 mg) by mouth every 4 hours as needed for pain    No current facility-administered medications on file prior to visit.  "      Allergies   Allergen Reactions     Hydrocodone Other (See Comments)     \"climbed the walls, very anxious, insomnia\"     Nsaids Other (See Comments)     Hx of stomach ulcers       Social History     Occupational History     Employer: [employer]     Comment: [employer]   Tobacco Use     Smoking status: Former Smoker     Last attempt to quit: 1981     Years since quittin.2     Smokeless tobacco: Never Used   Substance and Sexual Activity     Alcohol use: Yes     Alcohol/week: 0.0 standard drinks     Comment: weekends     Drug use: No     Sexual activity: Yes     Partners: Female     Birth control/protection: Surgical     Comment: vasectomy       Family History   Problem Relation Age of Onset     Cancer Mother         ovarian cancer     Asthma Mother      Hypertension Mother      Arthritis Mother      Genitourinary Problems Mother      Lipids Mother      Cancer Sister         ovarian cancer     Hypertension Sister      Lipids Sister      Cancer Maternal Grandmother         stomach cancer     Asthma Father      Cardiovascular Father         heart attack; bypass x5, congenital heart disease     Heart Disease Father         heart attack; bypass x5, congenital heart disease     Diabetes Father      Hypertension Father      Cancer Father         prostate     Prostate Cancer Father      Circulatory Father         blood clots     Genitourinary Problems Father      Respiratory Father         emphysema; COPD     Diabetes Maternal Grandfather      Diabetes Paternal Grandfather      Eye Disorder Paternal Grandfather         glaucoma     Hypertension Brother      Lipids Brother      Hypertension Brother      Cardiovascular Brother         bypass x3     Heart Disease Brother         bypass x3     Lipids Brother      Hypertension Sister      C.A.D. No family hx of      Cerebrovascular Disease No family hx of      Breast Cancer No family hx of      Cancer - colorectal No family hx of      Alzheimer " "Disease No family hx of      Blood Disease No family hx of      Gastrointestinal Disease No family hx of      Musculoskeletal Disorder No family hx of      Neurologic Disorder No family hx of      Thyroid Disease No family hx of        REVIEW OF SYSTEMS  10 point review systems performed otherwise negative as noted as per history of present illness.    Physical Exam:  Vitals: /85   Ht 1.772 m (5' 9.75\")   Wt 100.7 kg (222 lb)   BMI 32.08 kg/m    BMI= Body mass index is 32.08 kg/m .    Constitutional: healthy, alert and no acute distress   Psychiatric: mentation appears normal and affect normal but was teary at one point because of stress with the fracture.   NEURO: no focal deficits, CMS intact right lower extremity   RESP: Normal with easy respirations and no use of accessory muscles to breathe, no audible wheezing or retractions  CV: Calf soft and nontender to palpation, leg warm.  SKIN: Ecchymosis noted at the proximal leg and an area of distribution approximately 14 cm in diameter around the inferior knee area.  Swelling is also noted at the knee and proximal tibial area.  Swelling also noted down the leg towards the ankle but not as bad.  No ecchymosis.  The rest of the leg no erythema, rashes, excoriation, or breakdown. No evidence of infection.  Midline incision well-healed.  MUSCULOSKELETAL:    INSPECTION of right leg: Skin as mentioned above.  We do notice some ecchymosis and swelling as noted above.  No gross deformities, erythema, edema, atrophy or fasciculations.     PALPATION: Tenderness to palpation over the proximal tibial area.  No tenderness to palpation of the patella medial or lateral knee or joint line.  No tenderness to palpation of the posterior knee.  No tenderness to palpation of the thigh and quad.  No tenderness to palpation of the lower leg ankle or foot.  No increased warmth.    ROM: Passive: Patient does come to full extension and flexes to approximately 30 degrees.  No catching " locking or pain with this range of motion today.    STRENGTH: Able to fire his quad.5 out of 5 plantar flexion and dorsiflexion as well as flexor hallucis longus and extensor hallucis longus.     SPECIAL TEST: None today.  With the new fracture I did not want to manipulate the leg much.  GAIT: Not observed.  Patient in wheelchair today.  Lymph: no palpable lymph nodes    Diagnostic Modalities:  Recent Results (from the past 744 hour(s))   XR Knee Right 3 Views    Narrative    KNEE RIGHT THREE VIEWS March 9, 2020 10:27 AM     HISTORY: Status post total knee replacement using cement, right.    COMPARISON: 12/23/2019.      Impression    IMPRESSION: Total knee arthroplasty with components in the expected  positions unchanged. No acute bony abnormality. Previously seen  possible soft tissue air anteriorly is no longer identified. However,  there is some prepatellar and other anterior soft tissue swelling. A  joint space effusion is present but is likely smaller than on the  prior exam.    LA NENA GUTIERREZ MD   XR Knee Right 3 Views    Narrative    EXAM: XR KNEE RT 3 VW  LOCATION: [facility]  DATE/TIME: 3/21/2020 1:58 AM    INDICATION: Pain and swelling after fall.    COMPARISON: None.      Impression    IMPRESSION: Nondisplaced fracture of the proximal tibia just below the total knee arthroplasty. No other fracture or dislocation. The knee arthroplasty appears normal. Joint effusion.     I agree with the above reading.    Today we did get AP and lateral of the right knee showing no change from previous x-ray done in the emergency department on 3/21/2020.  Nondisplaced fracture of the proximal tibia.  The right total knee arthroplasty with no signs of loosening, no failure, good alignment.  The fracture is nondisplaced.  No other fracture dislocation or tumor.    Independent visualization of the images was performed.    Impression: 1.  4 days status post right proximal tibia fracture, transverse,  nondisplaced  2.  3 months and 15 days status post right total knee arthroplasty by Dr. Yin.    Plan:  All of the above pertinent physical exam and imaging modalities findings was reviewed with Hguo.    Focused plan:    Patient placed in 22 inch knee immobilizer instead of 24 for his comfort.  Knee immobilizer to be on at all times except for hygiene and if he is lying on the couch supported.  Nonweightbearing right lower extremity.  Patient to continue to use crutches.  Gave oxycodone 5 mg 1 tablet at night as needed, 14 tablets.  Plan for this to be his last narcotic prescription.   His pain is getting better, he is utilizing Tylenol also.  Follow-up with Dr. Yin in 2 weeks for x-ray.    Re-x-ray on return: Yes, AP and lateral of right knee, nonweightbearing with the brace off.    BP Readings from Last 1 Encounters:   03/25/20 130/85       BP noted to be well controlled today in office.      Patient does not use Tobacco products.      Discussion: I did discuss this patient with Dr. Yin prior to the patient's visit.  Dr. Yin was able to see the x-rays from the emergency department.  We discussed the plan.  This patient did become tearful today but I re-encouraged him and he seemed better at the end of the visit.  He did talk about hurting himself at one point but in a slightly joking fashion.  We talked about this and he denied having any suicidal ideation or any possibility of hurting himself 4 times to me prior to him leaving today's clinic visit.    This note was dictated with [facility].    Leighton Tavarez PA-C

## 2020-04-03 DIAGNOSIS — K29.50 CHRONIC GASTRITIS WITHOUT BLEEDING, UNSPECIFIED GASTRITIS TYPE: ICD-10-CM

## 2020-04-03 RX ORDER — LANSOPRAZOLE 30 MG/1
CAPSULE, DELAYED RELEASE ORAL
Qty: 90 CAPSULE | Refills: 0 | OUTPATIENT
Start: 2020-04-03

## 2020-04-08 ENCOUNTER — VIRTUAL VISIT (OUTPATIENT)
Dept: FAMILY MEDICINE | Facility: OTHER | Age: 60
End: 2020-04-08
Payer: COMMERCIAL

## 2020-04-08 DIAGNOSIS — F41.9 ANXIETY: Primary | ICD-10-CM

## 2020-04-08 PROCEDURE — 99214 OFFICE O/P EST MOD 30 MIN: CPT | Mod: TEL | Performed by: FAMILY MEDICINE

## 2020-04-08 RX ORDER — HYDROXYZINE HYDROCHLORIDE 25 MG/1
25 TABLET, FILM COATED ORAL 3 TIMES DAILY PRN
Qty: 90 TABLET | Refills: 1 | Status: SHIPPED | OUTPATIENT
Start: 2020-04-08 | End: 2020-06-25

## 2020-04-08 RX ORDER — DOCUSATE SODIUM 100 MG/1
100 CAPSULE, LIQUID FILLED ORAL
COMMUNITY
Start: 2020-04-04 | End: 2021-02-23

## 2020-04-08 ASSESSMENT — ANXIETY QUESTIONNAIRES
6. BECOMING EASILY ANNOYED OR IRRITABLE: SEVERAL DAYS
2. NOT BEING ABLE TO STOP OR CONTROL WORRYING: NEARLY EVERY DAY
5. BEING SO RESTLESS THAT IT IS HARD TO SIT STILL: NEARLY EVERY DAY
IF YOU CHECKED OFF ANY PROBLEMS ON THIS QUESTIONNAIRE, HOW DIFFICULT HAVE THESE PROBLEMS MADE IT FOR YOU TO DO YOUR WORK, TAKE CARE OF THINGS AT HOME, OR GET ALONG WITH OTHER PEOPLE: SOMEWHAT DIFFICULT
7. FEELING AFRAID AS IF SOMETHING AWFUL MIGHT HAPPEN: NEARLY EVERY DAY
3. WORRYING TOO MUCH ABOUT DIFFERENT THINGS: NEARLY EVERY DAY
1. FEELING NERVOUS, ANXIOUS, OR ON EDGE: NEARLY EVERY DAY
GAD7 TOTAL SCORE: 19

## 2020-04-08 ASSESSMENT — PATIENT HEALTH QUESTIONNAIRE - PHQ9
SUM OF ALL RESPONSES TO PHQ QUESTIONS 1-9: 6
5. POOR APPETITE OR OVEREATING: NEARLY EVERY DAY

## 2020-04-08 NOTE — PROGRESS NOTES
"Subjective     Hugo Longoria is a 59 year old male who is being evaluated via a billable telephone visit.      The patient has been notified of following:     \"This telephone visit will be conducted via a call between you and your physician/provider. We have found that certain health care needs can be provided without the need for a physical exam.  This service lets us provide the care you need with a short phone conversation.  If a prescription is necessary we can send it directly to your pharmacy.  If lab work is needed we can place an order for that and you can then stop by our lab to have the test done at a later time.    Telephone visits are billed at different rates depending on your insurance coverage. During this emergency period, for some insurers they may be billed the same as an in-person visit.  Please reach out to your insurance provider with any questions.    If during the course of the call the physician/provider feels a telephone visit is not appropriate, you will not be charged for this service.\"    Patient has given verbal consent for Telephone visit?  Yes    Hugo Longoria complains of   Chief Complaint   Patient presents with     Anxiety       ALLERGIES  Hydrocodone and Nsaids      Abnormal Mood Symptoms      Duration: *December had full knee replacement. Had an accident on march 20th . Its had been hard to schedule doctors appointment. Had mutliple doctors appt cancellations . Has been anxious about uncertainity. Has terrible anxiety    Description:  Anxiety: YES  Panic attacks: YES     Accompanying signs and symptoms: see PHQ-9 and KERRY scores    History (similar episodes/previous evaluation): see above    Precipitating or alleviating factors: recent accident    Therapies tried and outcome: Zoloft (Sertraline)      -------------------------------------    Patient Active Problem List   Diagnosis     History of colonic polyps     KERRY (generalized anxiety disorder)     CARDIOVASCULAR " SCREENING; LDL GOAL LESS THAN 130     History of tobacco use: 1980 - 1990     Hemorrhoids     Erectile dysfunction     HTN, goal below 140/90     Fatigue     ACL tear     History of gastric ulcer     Chronic gastric ulcer     Seasonal allergic rhinitis     Complete tear of right rotator cuff     Rupture long head biceps tendon, right, initial encounter     Subacromial impingement of right shoulder     Mild episode of recurrent major depressive disorder (H)     Advanced directives, counseling/discussion     Primary osteoarthritis of right knee     Chronic right shoulder pain     S/P total knee replacement using cement, right     Acute pain of right knee     Aftercare following right knee joint replacement surgery     Abnormal gait     Past Surgical History:   Procedure Laterality Date     ARTHROPLASTY KNEE Right 12/10/2019    Procedure: Right knee replacement;  Surgeon: Javier Yin DO;  [facility]     ARTHROSCOPY KNEE  5/29/2013    Procedure: ARTHROSCOPY KNEE;  Right knee arthroscopy with partial medial and partial lateral menisectomies;  Surgeon: Phani Mclaughlin MD;  Location: [facility]     ARTHROSCOPY SHOULDER BICEPS TENODESIS REPAIR Right 1/13/2017    Procedure: ARTHROSCOPY SHOULDER BICEPS TENODESIS REPAIR;  Surgeon: Javier Yin DO;  [facility]     ARTHROSCOPY SHOULDER DECOMPRESSION Right 1/13/2017    Procedure: ARTHROSCOPY SHOULDER DECOMPRESSION;  Surgeon: Javier Yin DO;  [facility]     ARTHROSCOPY SHOULDER ROTATOR CUFF REPAIR Right 1/13/2017    Procedure: ARTHROSCOPY SHOULDER ROTATOR CUFF REPAIR;  Surgeon: Javier Yin DO;  [facility]     COLONOSCOPY  11/01/2007     COLONOSCOPY  12/12/11     COLONOSCOPY N/A 11/18/2015    Procedure: COLONOSCOPY;  Surgeon: Migue Mclaughlin MD;  Location:  GI     COLONOSCOPY N/A 3/22/2017    Procedure: COMBINED COLONOSCOPY, SINGLE OR MULTIPLE BIOPSY/POLYPECTOMY BY BIOPSY;  Surgeon: Salvatore Zepeda  MD Javier;  Location: [facility]     COLONOSCOPY N/A 3/5/2019    Procedure: COLONOSCOPY;  Surgeon: Prakash Ervin DO;  Location:  GI     ENDOSCOPY  12    Upper GI - [facility]     ESOPHAGOSCOPY, GASTROSCOPY, DUODENOSCOPY (EGD), COMBINED N/A 2015    Procedure: COMBINED ESOPHAGOSCOPY, GASTROSCOPY, DUODENOSCOPY (EGD), BIOPSY SINGLE OR MULTIPLE;  Surgeon: Migue Mclaughlin MD;  [facility]     ESOPHAGOSCOPY, GASTROSCOPY, DUODENOSCOPY (EGD), COMBINED N/A 3/22/2017    Procedure: COMBINED ESOPHAGOSCOPY, GASTROSCOPY, DUODENOSCOPY (EGD), BIOPSY SINGLE OR MULTIPLE;  Surgeon: Salvatore Zepeda MD;  Location:  GI     HC UGI ENDOSCOPY, SIMPLE EXAM  10/31/2007     HERNIORRHAPHY UMBILICAL N/A 3/12/2015    Procedure: HERNIORRHAPHY UMBILICAL;  Surgeon: Yared Ma MD;  [facility]: [facility]     IRRIGATION AND DEBRIDEMENT UPPER EXTREMITY, COMBINED Left 3/15/2016    Procedure: COMBINED IRRIGATION AND DEBRIDEMENT UPPER EXTREMITY;  Surgeon: Javier Yin DO;  [facility]: [facility]     REMOVAL OF SPERM DUCT(S)      Vasectomy       Social History     Tobacco Use     Smoking status: Former Smoker     Last attempt to quit: 1981     Years since quittin.2     Smokeless tobacco: Never Used   Substance Use Topics     Alcohol use: Yes     Alcohol/week: 0.0 standard drinks     Comment: weekends     Family History   Problem Relation Age of Onset     Cancer Mother         ovarian cancer     Asthma Mother      Hypertension Mother      Arthritis Mother      Genitourinary Problems Mother      Lipids Mother      Cancer Sister         ovarian cancer     Hypertension Sister      Lipids Sister      Cancer Maternal Grandmother         stomach cancer     Asthma Father      Cardiovascular Father         heart attack; bypass x5, congenital heart disease     Heart Disease Father         heart attack; bypass x5, congenital heart disease     Diabetes Father      Hypertension Father      Cancer  Father         prostate     Prostate Cancer Father      Circulatory Father         blood clots     Genitourinary Problems Father      Respiratory Father         emphysema; COPD     Diabetes Maternal Grandfather      Diabetes Paternal Grandfather      Eye Disorder Paternal Grandfather         glaucoma     Hypertension Brother      Lipids Brother      Hypertension Brother      Cardiovascular Brother         bypass x3     Heart Disease Brother         bypass x3     Lipids Brother      Hypertension Sister      C.A.D. No family hx of      Cerebrovascular Disease No family hx of      Breast Cancer No family hx of      Cancer - colorectal No family hx of      Alzheimer Disease No family hx of      Blood Disease No family hx of      Gastrointestinal Disease No family hx of      Musculoskeletal Disorder No family hx of      Neurologic Disorder No family hx of      Thyroid Disease No family hx of          Current Outpatient Medications   Medication Sig Dispense Refill     acetaminophen (TYLENOL) 325 MG tablet Take 3 tablets (975 mg) by mouth every 8 hours as needed for mild pain 100 tablet 0     ascorbic acid (VITAMIN C) 250 MG CHEW chewable tablet Take 250 mg by mouth daily       atorvastatin (LIPITOR) 20 MG tablet Take 1 tablet by mouth once daily 90 tablet 0     hydrOXYzine (ATARAX) 25 MG tablet Take 1 tablet (25 mg) by mouth 3 times daily as needed for anxiety 90 tablet 1     LANsoprazole (PREVACID) 30 MG DR capsule Take 1 capsule by mouth once daily 90 capsule 0     lisinopril (ZESTRIL) 20 MG tablet Take 1/2 (one-half) tablet by mouth once daily 45 tablet 0     Multiple Vitamin (MULTIVITAMINS PO) Take  by mouth.       nifedipine 0.2% in white petrolatum 0.2 % OINT ointment Apply topically 2 times daily 100 g 0     sertraline (ZOLOFT) 50 MG tablet Take 1 tablet (50 mg) by mouth daily 90 tablet 0     aspirin (ASA) 81 MG tablet Take 1 tablet (81 mg) by mouth daily       EQ STOOL SOFTENER 100 MG capsule 100 mg        "fluticasone (FLONASE) 50 MCG/ACT spray Spray 1-2 sprays into both nostrils daily (Patient not taking: Reported on 3/9/2020) 1 Bottle 0     hydrocortisone (ANUSOL-HC) 2.5 % cream Place rectally 2 times daily as needed for hemorrhoids (Patient not taking: Reported on 3/9/2020) 30 g 1     oxyCODONE (ROXICODONE) 5 MG tablet Take 1 tablet (5 mg) by mouth nightly as needed for pain (Patient not taking: Reported on 4/8/2020) 14 tablet 0     Allergies   Allergen Reactions     Hydrocodone Other (See Comments)     \"climbed the walls, very anxious, insomnia\"     Nsaids Other (See Comments)     Hx of stomach ulcers     BP Readings from Last 3 Encounters:   03/25/20 130/85   03/21/20 (!) 143/84   03/09/20 130/74    Wt Readings from Last 3 Encounters:   03/25/20 100.7 kg (222 lb)   03/09/20 100.8 kg (222 lb 4.8 oz)   02/26/20 102.1 kg (225 lb)                    Reviewed and updated as needed this visit by Provider         Review of Systems   ROS COMP: Constitutional, HEENT, cardiovascular, pulmonary, GI, , musculoskeletal, neuro, skin, endocrine and psych systems are negative, except as otherwise noted.       Objective   Reported vitals:  There were no vitals taken for this visit.   healthy, alert and no distress  Psych: Alert and oriented times 3; coherent speech, normal   rate and volume, able to articulate logical thoughts, able   to abstract reason, no tangential thoughts, no hallucinations   or delusions  His affect is normal     Diagnostic Test Results:  Labs reviewed in Epic        Assessment/Plan:  1. Anxiety  Trial zoloft and hydroxyzine  Recheck in 3 months for follow up  - sertraline (ZOLOFT) 50 MG tablet; Take 1 tablet (50 mg) by mouth daily  Dispense: 90 tablet; Refill: 0  - hydrOXYzine (ATARAX) 25 MG tablet; Take 1 tablet (25 mg) by mouth 3 times daily as needed for anxiety  Dispense: 90 tablet; Refill: 1    No follow-ups on file.      Phone call duration:  8 minutes    Jodie Epps MD      "

## 2020-04-09 ASSESSMENT — ANXIETY QUESTIONNAIRES: GAD7 TOTAL SCORE: 19

## 2020-04-10 ENCOUNTER — TELEPHONE (OUTPATIENT)
Dept: PHYSICAL THERAPY | Facility: CLINIC | Age: 60
End: 2020-04-10

## 2020-04-10 NOTE — TELEPHONE ENCOUNTER
Patient notes that he broke his leg.  Has appointment with MD on 4/13/2020.  Hoping for PT after follow up with MD.  Please call patient 4/13/2020, in the afternoon to set up PT if indicated.

## 2020-04-13 ENCOUNTER — OFFICE VISIT (OUTPATIENT)
Dept: ORTHOPEDICS | Facility: CLINIC | Age: 60
End: 2020-04-13
Payer: COMMERCIAL

## 2020-04-13 ENCOUNTER — ANCILLARY PROCEDURE (OUTPATIENT)
Dept: GENERAL RADIOLOGY | Facility: CLINIC | Age: 60
End: 2020-04-13
Attending: ORTHOPAEDIC SURGERY
Payer: COMMERCIAL

## 2020-04-13 VITALS
HEIGHT: 69 IN | WEIGHT: 222 LBS | BODY MASS INDEX: 32.88 KG/M2 | SYSTOLIC BLOOD PRESSURE: 128 MMHG | DIASTOLIC BLOOD PRESSURE: 88 MMHG

## 2020-04-13 DIAGNOSIS — M25.561 ACUTE PAIN OF RIGHT KNEE: Primary | ICD-10-CM

## 2020-04-13 DIAGNOSIS — Z96.651 S/P TOTAL KNEE REPLACEMENT USING CEMENT, RIGHT: ICD-10-CM

## 2020-04-13 DIAGNOSIS — M25.561 ACUTE PAIN OF RIGHT KNEE: ICD-10-CM

## 2020-04-13 PROCEDURE — 73560 X-RAY EXAM OF KNEE 1 OR 2: CPT | Mod: TC

## 2020-04-13 PROCEDURE — 99207 ZZC FRACTURE CARE IN GLOBAL PERIOD: CPT | Performed by: ORTHOPAEDIC SURGERY

## 2020-04-13 RX ORDER — TIZANIDINE HYDROCHLORIDE 4 MG/1
4 CAPSULE, GELATIN COATED ORAL 3 TIMES DAILY PRN
Qty: 40 CAPSULE | Refills: 1 | Status: SHIPPED | OUTPATIENT
Start: 2020-04-13 | End: 2020-06-25

## 2020-04-13 ASSESSMENT — MIFFLIN-ST. JEOR: SCORE: 1821.33

## 2020-04-13 ASSESSMENT — PAIN SCALES - GENERAL: PAINLEVEL: NO PAIN (1)

## 2020-04-13 NOTE — LETTER
"    2020         RE: Hugo Longoria   [facility] [id number]        Dear Colleague,    Thank you for referring your patient, Hugo Longoria, to the [facility]. Please see a copy of my visit note below.    Office Visit-Follow up    Chief Complaint: Hugo Longoria is a 59 year old male who is being seen for   Chief Complaint   Patient presents with     RECHECK     NWB - Closed torus fracture of proximal end of right tibia, initial encounter       History of Present Illness:   Follow-up right knee periprosthetic tibia fracture.  Reports the pain is controlled.  Does get some swelling into the foot when the foot is dependent.  He has been in his knee immobilizer.  Although he reports he is not wearing it at night.  He is been nonweightbearing.      Social History     Occupational History     Employer: [employer]     Comment: [employer]   Tobacco Use     Smoking status: Former Smoker     Last attempt to quit: 1981     Years since quittin.3     Smokeless tobacco: Never Used   Substance and Sexual Activity     Alcohol use: Yes     Alcohol/week: 0.0 standard drinks     Comment: weekends     Drug use: No     Sexual activity: Yes     Partners: Female     Birth control/protection: Surgical     Comment: vasectomy       REVIEW OF SYSTEMS  General: negative for, night sweats, dizziness, fatigue  Resp: No shortness of breath and no cough  CV: negative for chest pain, syncope or near-syncope  GI: negative for nausea, vomiting and diarrhea  : negative for dysuria and hematuria  Musculoskeletal: as above  Neurologic: negative for syncope   Hematologic: negative for bleeding disorder    Physical Exam:  Vitals: /88 (BP Location: Right arm, Cuff Size: Adult Large)   Ht 1.759 m (5' 9.25\")   Wt 100.7 kg (222 lb)   BMI 32.55 kg/m    BMI= Body mass index is 32.55 kg/m .  Constitutional: healthy, alert and no acute distress   Psychiatric: mentation appears " normal and affect normal/bright  NEURO: no focal deficits  RESP: Normal with easy respirations and no use of accessory muscles to breathe, no audible wheezing or retractions  CV: RLE: no edema         Regular rate and rhythm by palpation  SKIN: No erythema, rashes, excoriation, or breakdown. No evidence of infection.   JOINT/EXTREMITIES:right knee/lower leg: Extensor mechanism intact.  Range of motion not fully tested.  No palpable defects.  No effusion.  No peripheral edema.  Distal neurovascular intact.  Gentle varus and valgus testing shows no gross evidence of instability.  GAIT: not tested             Diagnostic Modalities:  right knee X-ray: Comminuted essentially nondisplaced periprosthetic tibia fracture just distal to the stem.  Prosthesis appears to be well fixed.  Good alignment throughout.  Independent visualization of the images was performed.      Impression: right knee periprosthetic proximal tibia fracture adjacent to a total knee replacement-nondisplaced, 3 weeks from injury    Plan:  All of the above pertinent physical exam and imaging modalities findings was reviewed with Hugo.    I reviewed his radiographs.  I discussed his treatment options.  He is just over 3 weeks from injury.  Radiographs are staying stable.    I had a lengthy discussion with him regarding expectations of healing.  He feels he should be better by now.  I discussed normal bone healing could take 6-8 weeks.  This is before I even allow him to place weight on it.  Recommend he wear his knee immobilizer when up.  Also recommend wearing it at night.  Okay for some gentle motion to the knee through a very limited arc when lying down.  Nothing forceful.    I had to reinforce this multiple times.  Needs to be nonweightbearing.  I again recommended his knee immobilizer at at night.  I did provide him a prescription of a muscle relaxant to be taken.    Return to clinic 3, week(s), or sooner as needed for changes.  Re-x-ray on return:  Yes.  AP lateral right knee    Gerber Yin D.O.          Again, thank you for allowing me to participate in the care of your patient.        Sincerely,        Javier Yin, DO

## 2020-04-13 NOTE — PROGRESS NOTES
"Office Visit-Follow up    Chief Complaint: Hugo Longoria is a 59 year old male who is being seen for   Chief Complaint   Patient presents with     RECHECK     NWB - Closed torus fracture of proximal end of right tibia, initial encounter       History of Present Illness:   Follow-up right knee periprosthetic tibia fracture.  Reports the pain is controlled.  Does get some swelling into the foot when the foot is dependent.  He has been in his knee immobilizer.  Although he reports he is not wearing it at night.  He is been nonweightbearing.      Social History     Occupational History     Employer: [employer]     Comment: [employer]   Tobacco Use     Smoking status: Former Smoker     Last attempt to quit: 1981     Years since quittin.3     Smokeless tobacco: Never Used   Substance and Sexual Activity     Alcohol use: Yes     Alcohol/week: 0.0 standard drinks     Comment: weekends     Drug use: No     Sexual activity: Yes     Partners: Female     Birth control/protection: Surgical     Comment: vasectomy       REVIEW OF SYSTEMS  General: negative for, night sweats, dizziness, fatigue  Resp: No shortness of breath and no cough  CV: negative for chest pain, syncope or near-syncope  GI: negative for nausea, vomiting and diarrhea  : negative for dysuria and hematuria  Musculoskeletal: as above  Neurologic: negative for syncope   Hematologic: negative for bleeding disorder    Physical Exam:  Vitals: /88 (BP Location: Right arm, Cuff Size: Adult Large)   Ht 1.759 m (5' 9.25\")   Wt 100.7 kg (222 lb)   BMI 32.55 kg/m    BMI= Body mass index is 32.55 kg/m .  Constitutional: healthy, alert and no acute distress   Psychiatric: mentation appears normal and affect normal/bright  NEURO: no focal deficits  RESP: Normal with easy respirations and no use of accessory muscles to breathe, no audible wheezing or retractions  CV: RLE: no edema         Regular rate and rhythm by palpation  SKIN: No " erythema, rashes, excoriation, or breakdown. No evidence of infection.   JOINT/EXTREMITIES:right knee/lower leg: Extensor mechanism intact.  Range of motion not fully tested.  No palpable defects.  No effusion.  No peripheral edema.  Distal neurovascular intact.  Gentle varus and valgus testing shows no gross evidence of instability.  GAIT: not tested             Diagnostic Modalities:  right knee X-ray: Comminuted essentially nondisplaced periprosthetic tibia fracture just distal to the stem.  Prosthesis appears to be well fixed.  Good alignment throughout.  Independent visualization of the images was performed.      Impression: right knee periprosthetic proximal tibia fracture adjacent to a total knee replacement-nondisplaced, 3 weeks from injury    Plan:  All of the above pertinent physical exam and imaging modalities findings was reviewed with Hugo.    I reviewed his radiographs.  I discussed his treatment options.  He is just over 3 weeks from injury.  Radiographs are staying stable.    I had a lengthy discussion with him regarding expectations of healing.  He feels he should be better by now.  I discussed normal bone healing could take 6-8 weeks.  This is before I even allow him to place weight on it.  Recommend he wear his knee immobilizer when up.  Also recommend wearing it at night.  Okay for some gentle motion to the knee through a very limited arc when lying down.  Nothing forceful.    I had to reinforce this multiple times.  Needs to be nonweightbearing.  I again recommended his knee immobilizer at at night.  I did provide him a prescription of a muscle relaxant to be taken.    Return to clinic 3, week(s), or sooner as needed for changes.  Re-x-ray on return: Yes.  AP lateral right knee nonweightbearing    Gerber Yin D.O.

## 2020-04-16 ENCOUNTER — TELEPHONE (OUTPATIENT)
Dept: FAMILY MEDICINE | Facility: OTHER | Age: 60
End: 2020-04-16

## 2020-04-16 NOTE — TELEPHONE ENCOUNTER
I called and spoke with him.  Answered his question regarding basically moving his foot dorsiflexion plantarflexion around.  He is wearing his brace.  It is okay to move the foot.  All questions answered.

## 2020-04-16 NOTE — TELEPHONE ENCOUNTER
Reason for call:  Pt is calling and wants to know if he is able to move his foot around because they have been restless and with his injury he was told not to move it. Pt would like a call back from 's office.

## 2020-04-20 ENCOUNTER — VIRTUAL VISIT (OUTPATIENT)
Dept: FAMILY MEDICINE | Facility: OTHER | Age: 60
End: 2020-04-20
Payer: COMMERCIAL

## 2020-04-20 DIAGNOSIS — J30.2 SEASONAL ALLERGIC RHINITIS, UNSPECIFIED TRIGGER: Primary | ICD-10-CM

## 2020-04-20 PROCEDURE — 99213 OFFICE O/P EST LOW 20 MIN: CPT | Mod: 95 | Performed by: FAMILY MEDICINE

## 2020-04-20 RX ORDER — FLUTICASONE PROPIONATE 50 MCG
1 SPRAY, SUSPENSION (ML) NASAL DAILY
Qty: 16 G | Refills: 1 | Status: SHIPPED | OUTPATIENT
Start: 2020-04-20 | End: 2020-12-10

## 2020-04-20 RX ORDER — LORATADINE 10 MG/1
10 TABLET ORAL DAILY
Qty: 90 TABLET | Refills: 1 | Status: SHIPPED | OUTPATIENT
Start: 2020-04-20 | End: 2020-12-22

## 2020-04-20 NOTE — PROGRESS NOTES
"Hugo Longoria is a 59 year old male who is being evaluated via a billable video visit.      The patient has been notified of following:     \"This video visit will be conducted via a call between you and your physician/provider. We have found that certain health care needs can be provided without the need for an in-person physical exam.  This service lets us provide the care you need with a video conversation.  If a prescription is necessary we can send it directly to your pharmacy.  If lab work is needed we can place an order for that and you can then stop by our lab to have the test done at a later time.    Video visits are billed at different rates depending on your insurance coverage.  Please reach out to your insurance provider with any questions.    If during the course of the call the physician/provider feels a video visit is not appropriate, you will not be charged for this service.\"    Patient has given verbal consent for Video visit? Yes    How would you like to obtain your AVS? E-Mail (inform patient AVS not encrypted)    Patient would like the video invitation sent by: Send to e-mail at: [email or web address]      Video Start Time: 1400    Additional provider notes: 59-year-old male with chronic rhinorrhea, causing congestion, interfering with sleep.  Started abruptly a few weeks ago, no improvement with singular treatments.      Video-Visit Details    Type of service:  Video Visit    Video End Time (time video stopped): 1415    Originating Location (pt. Location): Home    Distant Location (provider location):  [facility]     Mode of Communication:  Video Conference via [facility]      ASSESSMENT and PLAN  1. Seasonal allergic rhinitis, unspecified trigger  59-year-old male with history of seasonal allergic rhinitis, unspecified trigger.  He has had test in the past, they were unable to find a cause.  However his symptoms are consistent with allergy exacerbation.  He has tried different " single remedies in the past, discussed treatment with both Flonase and loratadine at this time.  He will follow-up with me in 2 to 3 days, if no improvement would consider adding Singulair as well.  - fluticasone (FLONASE) 50 MCG/ACT nasal spray; Spray 1 spray into both nostrils daily  Dispense: 16 g; Refill: 1  - loratadine (CLARITIN) 10 MG tablet; Take 1 tablet (10 mg) by mouth daily  Dispense: 90 tablet; Refill: 1         Return in about 3 months (around 7/20/2020) for Annual Well Check.     Bony Robin MD, FAAFP  Family Medicine Physician  [facility] [place]  [address] [facility] [place] [address]

## 2020-04-20 NOTE — PATIENT INSTRUCTIONS
Hugo,    It was great seeing you in clinic today.  I summarized our discussion and your plan below.  Please let me know if you have any questions or concerns.  Please follow up with me as discussed in clinic or sooner if any worsening or additional concerns.     1. Seasonal allergic rhinitis, unspecified trigger  59-year-old male with history of seasonal allergic rhinitis, unspecified trigger.  He has had test in the past, they were unable to find a cause.  However his symptoms are consistent with allergy exacerbation.  He has tried different single remedies in the past, discussed treatment with both Flonase and loratadine at this time.  He will follow-up with me in 2 to 3 days, if no improvement would consider adding Singulair as well.  - fluticasone (FLONASE) 50 MCG/ACT nasal spray; Spray 1 spray into both nostrils daily  Dispense: 16 g; Refill: 1  - loratadine (CLARITIN) 10 MG tablet; Take 1 tablet (10 mg) by mouth daily  Dispense: 90 tablet; Refill: 1       Sincerely,  Dr. JEYSON Robin MD, FAAFP  Family Medicine Physician  [facility] [place]  [address] [place] [address]    Patient Education

## 2020-04-23 ENCOUNTER — VIRTUAL VISIT (OUTPATIENT)
Dept: FAMILY MEDICINE | Facility: OTHER | Age: 60
End: 2020-04-23
Payer: COMMERCIAL

## 2020-04-23 DIAGNOSIS — J30.2 SEASONAL ALLERGIC RHINITIS, UNSPECIFIED TRIGGER: Primary | ICD-10-CM

## 2020-04-23 PROCEDURE — 99213 OFFICE O/P EST LOW 20 MIN: CPT | Mod: 95 | Performed by: FAMILY MEDICINE

## 2020-04-23 NOTE — PATIENT INSTRUCTIONS
Hugo,    It was great seeing you in clinic today.  I summarized our discussion and your plan below.  Please let me know if you have any questions or concerns.  Please follow up with me as discussed in clinic or sooner if any worsening or additional concerns.     1. Seasonal allergic rhinitis, unspecified trigger  59-year-old male virtual follow-up from last virtual encounter, he started the loratadine and Flonase.  Initially had good improvement, however last night had some return of symptoms.  We reviewed his medications over the phone, he has only taken 5 mg of loratadine at this time.  Recommended that he increase that to 10 mg, he could consider taking 10 mg twice a day as well.  It is reassuring that he had some initial improvement.  He will message me tomorrow and advise me of his progress.  Also continue Flonase.       Sincerely,  Dr. JEYSON Robin MD, [facility]  Family Medicine Physician  [facility] [place]  [address] [place] [address]    Patient Education

## 2020-04-23 NOTE — PROGRESS NOTES
"Hugo Longoria is a 59 year old male who is being evaluated via a billable telephone visit.      The patient has been notified of following:     \"This telephone visit will be conducted via a call between you and your physician/provider. We have found that certain health care needs can be provided without the need for a physical exam.  This service lets us provide the care you need with a short phone conversation.  If a prescription is necessary we can send it directly to your pharmacy.  If lab work is needed we can place an order for that and you can then stop by our lab to have the test done at a later time.    Telephone visits are billed at different rates depending on your insurance coverage. During this emergency period, for some insurers they may be billed the same as an in-person visit.  Please reach out to your insurance provider with any questions.    If during the course of the call the physician/provider feels a telephone visit is not appropriate, you will not be charged for this service.\"    Patient has given verbal consent for Telephone visit?  Yes    How would you like to obtain your AVS? Mail a copy    Subjective     Hugo Longoria is a 59 year old male who presents to clinic today for the following health issues:    HPI  Acute Illness   Acute illness concerns: Nasal Drainage, states that it drains down his throat not stop and is becoming severe.    Onset: 4 weeks     Fever: no    Chills/Sweats: no    Headache (location?): no    Sinus Pressure:no    Conjunctivitis:  no    Ear Pain: no    Rhinorrhea: YES    Congestion: no     Sore Throat: YES From drainage/dry mouth      Cough: YES-productive of yellow sputum    Wheeze: no    Decreased Appetite: no    Nausea: no    Vomiting: no    Diarrhea:  no    Dysuria/Freq.: no    Fatigue/Achiness: no    Sick/Strep Exposure: no     Therapies Tried and outcome: Flonase is not helping       Patient Active Problem List   Diagnosis     History of colonic polyps     " KERRY (generalized anxiety disorder)     CARDIOVASCULAR SCREENING; LDL GOAL LESS THAN 130     History of tobacco use: 1980 - 1990     Hemorrhoids     Erectile dysfunction     HTN, goal below 140/90     Fatigue     ACL tear     History of gastric ulcer     Chronic gastric ulcer     Seasonal allergic rhinitis     Complete tear of right rotator cuff     Rupture long head biceps tendon, right, initial encounter     Subacromial impingement of right shoulder     Mild episode of recurrent major depressive disorder (H)     Advanced directives, counseling/discussion     Primary osteoarthritis of right knee     Chronic right shoulder pain     S/P total knee replacement using cement, right     Acute pain of right knee     Aftercare following right knee joint replacement surgery     Abnormal gait     Past Surgical History:   Procedure Laterality Date     ARTHROPLASTY KNEE Right 12/10/2019    Procedure: Right knee replacement;  Surgeon: Javier Yin DO;  [facility]     ARTHROSCOPY KNEE  5/29/2013    Procedure: ARTHROSCOPY KNEE;  Right knee arthroscopy with partial medial and partial lateral menisectomies;  Surgeon: Phani Mclaughlin MD;  [facility]     ARTHROSCOPY SHOULDER BICEPS TENODESIS REPAIR Right 1/13/2017    Procedure: ARTHROSCOPY SHOULDER BICEPS TENODESIS REPAIR;  Surgeon: Javier iYn DO;  [facility]     ARTHROSCOPY SHOULDER DECOMPRESSION Right 1/13/2017    Procedure: ARTHROSCOPY SHOULDER DECOMPRESSION;  Surgeon: Javier Yin DO;  [facility]     ARTHROSCOPY SHOULDER ROTATOR CUFF REPAIR Right 1/13/2017    Procedure: ARTHROSCOPY SHOULDER ROTATOR CUFF REPAIR;  Surgeon: Javier Yin DO;  [facility]     COLONOSCOPY  11/01/2007     COLONOSCOPY  12/12/11     COLONOSCOPY N/A 11/18/2015    Procedure: COLONOSCOPY;  Surgeon: Migue Mclaughlin MD;  Location:  GI     COLONOSCOPY N/A 3/22/2017    Procedure: COMBINED COLONOSCOPY, SINGLE OR MULTIPLE  BIOPSY/POLYPECTOMY BY BIOPSY;  Surgeon: Salvatore Zepeda MD;  [facility]     COLONOSCOPY N/A 3/5/2019    Procedure: COLONOSCOPY;  Surgeon: Prakash Ervin DO;  Location:  GI     ENDOSCOPY  12    Upper GI [facility] [place]     ESOPHAGOSCOPY, GASTROSCOPY, DUODENOSCOPY (EGD), COMBINED N/A 2015    Procedure: COMBINED ESOPHAGOSCOPY, GASTROSCOPY, DUODENOSCOPY (EGD), BIOPSY SINGLE OR MULTIPLE;  Surgeon: Migue Mclaughlin MD;  Location:  GI     ESOPHAGOSCOPY, GASTROSCOPY, DUODENOSCOPY (EGD), COMBINED N/A 3/22/2017    Procedure: COMBINED ESOPHAGOSCOPY, GASTROSCOPY, DUODENOSCOPY (EGD), BIOPSY SINGLE OR MULTIPLE;  Surgeon: Salvatore Zepeda MD;  Location:  GI     HC UGI ENDOSCOPY, SIMPLE EXAM  10/31/2007     HERNIORRHAPHY UMBILICAL N/A 3/12/2015    Procedure: HERNIORRHAPHY UMBILICAL;  Surgeon: Yared Ma MD;  [facility]     IRRIGATION AND DEBRIDEMENT UPPER EXTREMITY, COMBINED Left 3/15/2016    Procedure: COMBINED IRRIGATION AND DEBRIDEMENT UPPER EXTREMITY;  Surgeon: Javier Yin DO;  [facility]     REMOVAL OF SPERM DUCT(S)      Vasectomy       Social History     Tobacco Use     Smoking status: Former Smoker     Last attempt to quit: 1981     Years since quittin.3     Smokeless tobacco: Never Used   Substance Use Topics     Alcohol use: Yes     Alcohol/week: 0.0 standard drinks     Comment: couple on the weekend      Family History   Problem Relation Age of Onset     Cancer Mother         ovarian cancer     Asthma Mother      Hypertension Mother      Arthritis Mother      Genitourinary Problems Mother      Lipids Mother      Cancer Sister         ovarian cancer     Hypertension Sister      Lipids Sister      Cancer Maternal Grandmother         stomach cancer     Asthma Father      Cardiovascular Father         heart attack; bypass x5, congenital heart disease     Heart Disease Father         heart attack; bypass x5, congenital heart  disease     Diabetes Father      Hypertension Father      Cancer Father         prostate     Prostate Cancer Father      Circulatory Father         blood clots     Genitourinary Problems Father      Respiratory Father         emphysema; COPD     Diabetes Maternal Grandfather      Diabetes Paternal Grandfather      Eye Disorder Paternal Grandfather         glaucoma     Hypertension Brother      Lipids Brother      Hypertension Brother      Cardiovascular Brother         bypass x3     Heart Disease Brother         bypass x3     Lipids Brother      Hypertension Sister      C.A.D. No family hx of      Cerebrovascular Disease No family hx of      Breast Cancer No family hx of      Cancer - colorectal No family hx of      Alzheimer Disease No family hx of      Blood Disease No family hx of      Gastrointestinal Disease No family hx of      Musculoskeletal Disorder No family hx of      Neurologic Disorder No family hx of      Thyroid Disease No family hx of          Current Outpatient Medications   Medication Sig Dispense Refill     acetaminophen (TYLENOL) 325 MG tablet Take 3 tablets (975 mg) by mouth every 8 hours as needed for mild pain (Patient taking differently: Take 500 mg by mouth every 8 hours as needed for mild pain ) 100 tablet 0     ascorbic acid (VITAMIN C) 250 MG CHEW chewable tablet Take 250 mg by mouth daily       aspirin (ASA) 81 MG tablet Take 1 tablet (81 mg) by mouth daily       atorvastatin (LIPITOR) 20 MG tablet Take 1 tablet by mouth once daily 90 tablet 0     EQ STOOL SOFTENER 100 MG capsule 100 mg       fluticasone (FLONASE) 50 MCG/ACT nasal spray Spray 1 spray into both nostrils daily 16 g 1     fluticasone (FLONASE) 50 MCG/ACT spray Spray 1-2 sprays into both nostrils daily 1 Bottle 0     hydrocortisone (ANUSOL-HC) 2.5 % cream Place rectally 2 times daily as needed for hemorrhoids 30 g 1     hydrOXYzine (ATARAX) 25 MG tablet Take 1 tablet (25 mg) by mouth 3 times daily as needed for anxiety 90  "tablet 1     LANsoprazole (PREVACID) 30 MG DR capsule Take 1 capsule by mouth once daily 90 capsule 0     lisinopril (ZESTRIL) 20 MG tablet Take 1/2 (one-half) tablet by mouth once daily 45 tablet 0     loratadine (CLARITIN) 10 MG tablet Take 1 tablet (10 mg) by mouth daily 90 tablet 1     Multiple Vitamin (MULTIVITAMINS PO) Take  by mouth.       nifedipine 0.2% in white petrolatum 0.2 % OINT ointment Apply topically 2 times daily 100 g 0     sertraline (ZOLOFT) 50 MG tablet Take 1 tablet (50 mg) by mouth daily 90 tablet 0     tiZANidine (ZANAFLEX) 4 MG capsule Take 1 capsule (4 mg) by mouth 3 times daily as needed for muscle spasms (pain) 40 capsule 1     Allergies   Allergen Reactions     Hydrocodone Other (See Comments)     \"climbed the walls, very anxious, insomnia\"     Nsaids Other (See Comments)     Hx of stomach ulcers     Recent Labs   Lab Test 01/27/20  1614 11/20/19  1636 01/24/18  0926 01/09/17  0856  09/22/15  03/12/13  1705   A1C  --  5.2  --   --   --   --   --   --    LDL  --   --  162* 162*  --   --   --   --    HDL  --   --  52 44  --   --   --   --    TRIG  --   --  164* 118  --   --   --   --    ALT 31  --   --   --   --  64  --  40   CR 0.68 0.72  --  0.78   < >  --    < > 0.87   GFRESTIMATED >90 >90  --  >90  Non  GFR Calc     < >  --    < > >90   GFRESTBLACK >90 >90  --  >90  African American GFR Calc     < >  --    < > >90   POTASSIUM 4.1 3.8  --  4.4   < >  --    < > 4.7   TSH  --   --   --   --   --   --   --  3.14    < > = values in this interval not displayed.      BP Readings from Last 3 Encounters:   04/13/20 128/88   03/25/20 130/85   03/21/20 (!) 143/84    Wt Readings from Last 3 Encounters:   04/13/20 100.7 kg (222 lb)   03/25/20 100.7 kg (222 lb)   03/09/20 100.8 kg (222 lb 4.8 oz)                    Reviewed and updated as needed this visit by Provider         Review of Systems   ROS COMP: Constitutional, HEENT, cardiovascular, pulmonary, gi and gu systems are " negative, except as otherwise noted.       Objective   Reported vitals:  There were no vitals taken for this visit.   healthy, alert and no distress  PSYCH: Alert and oriented times 3; coherent speech, normal   rate and volume, able to articulate logical thoughts, able   to abstract reason, no tangential thoughts, no hallucinations   or delusions  His affect is normal, pleasant and full  RESP: No cough, no audible wheezing, able to talk in full sentences  Remainder of exam unable to be completed due to telephone visits    Diagnostic Test Results:  Labs reviewed in Epic        ASSESSMENT and PLAN  1. Seasonal allergic rhinitis, unspecified trigger  59-year-old male virtual follow-up from last virtual encounter, he started the loratadine and Flonase.  Initially had good improvement, however last night had some return of symptoms.  We reviewed his medications over the phone, he has only taken 5 mg of loratadine at this time.  Recommended that he increase that to 10 mg, he could consider taking 10 mg twice a day as well.  It is reassuring that he had some initial improvement.  He will message me tomorrow and advise me of his progress.  Also continue Flonase.         Return in about 3 months (around 7/23/2020) for Annual Well Check.     Bony Robin MD, [facility]  Family Medicine Physician  [facility]  [id number] [place] [id number]        Phone call duration:  6 minutes    Bony Robin MD

## 2020-04-28 ENCOUNTER — VIRTUAL VISIT (OUTPATIENT)
Dept: FAMILY MEDICINE | Facility: OTHER | Age: 60
End: 2020-04-28
Payer: COMMERCIAL

## 2020-04-28 DIAGNOSIS — J30.2 SEASONAL ALLERGIC RHINITIS, UNSPECIFIED TRIGGER: Primary | ICD-10-CM

## 2020-04-28 PROCEDURE — 99213 OFFICE O/P EST LOW 20 MIN: CPT | Mod: 95 | Performed by: FAMILY MEDICINE

## 2020-04-28 RX ORDER — PREDNISONE 50 MG/1
50 TABLET ORAL DAILY
Qty: 5 TABLET | Refills: 0 | Status: SHIPPED | OUTPATIENT
Start: 2020-04-28 | End: 2020-05-03

## 2020-04-28 NOTE — PROGRESS NOTES
"Hugo Longoria is a 59 year old male who is being evaluated via a billable telephone visit.      The patient has been notified of following:     \"This telephone visit will be conducted via a call between you and your physician/provider. We have found that certain health care needs can be provided without the need for a physical exam.  This service lets us provide the care you need with a short phone conversation.  If a prescription is necessary we can send it directly to your pharmacy.  If lab work is needed we can place an order for that and you can then stop by our lab to have the test done at a later time.    Telephone visits are billed at different rates depending on your insurance coverage. During this emergency period, for some insurers they may be billed the same as an in-person visit.  Please reach out to your insurance provider with any questions.    If during the course of the call the physician/provider feels a telephone visit is not appropriate, you will not be charged for this service.\"    Patient has given verbal consent for Telephone visit?  Yes    How would you like to obtain your AVS? MyChart    Subjective     Hugo Longoria is a 59 year old male who presents to clinic today for the following health issues:    HPI     ALLERGIES     Onset: 5 weeks     Description:   Nasal congestion: YES  Sneezing: no   Red, itchy eyes: YES    Progression of Symptoms:  same    Accompanying Signs & Symptoms:  Cough: YES  Wheezing: no   Rash: no   Sinus/facial pain: no    History:   Is it seasonal: in the spring   History of Asthma: no  Has allergy testing been done: YES    Precipitating factors:   None    Alleviating factors:  None       Therapies Tried and outcome: nasal spray isn't knocking it out       Patient Active Problem List   Diagnosis     History of colonic polyps     KERRY (generalized anxiety disorder)     CARDIOVASCULAR SCREENING; LDL GOAL LESS THAN 130     History of tobacco use: 1980 - 1990     " Hemorrhoids     Erectile dysfunction     HTN, goal below 140/90     Fatigue     ACL tear     History of gastric ulcer     Chronic gastric ulcer     Seasonal allergic rhinitis     Complete tear of right rotator cuff     Rupture long head biceps tendon, right, initial encounter     Subacromial impingement of right shoulder     Mild episode of recurrent major depressive disorder (H)     Advanced directives, counseling/discussion     Primary osteoarthritis of right knee     Chronic right shoulder pain     S/P total knee replacement using cement, right     Acute pain of right knee     Aftercare following right knee joint replacement surgery     Abnormal gait     Past Surgical History:   Procedure Laterality Date     ARTHROPLASTY KNEE Right 12/10/2019    Procedure: Right knee replacement;  Surgeon: Javier Yin DO;  [facility]     ARTHROSCOPY KNEE  5/29/2013    Procedure: ARTHROSCOPY KNEE;  Right knee arthroscopy with partial medial and partial lateral menisectomies;  Surgeon: Phani Mclaughlin MD;  [facility]: [facility]     ARTHROSCOPY SHOULDER BICEPS TENODESIS REPAIR Right 1/13/2017    Procedure: ARTHROSCOPY SHOULDER BICEPS TENODESIS REPAIR;  Surgeon: Javier Yin DO;  [facility]     ARTHROSCOPY SHOULDER DECOMPRESSION Right 1/13/2017    Procedure: ARTHROSCOPY SHOULDER DECOMPRESSION;  Surgeon: Javier Yin DO;  [facility]     ARTHROSCOPY SHOULDER ROTATOR CUFF REPAIR Right 1/13/2017    Procedure: ARTHROSCOPY SHOULDER ROTATOR CUFF REPAIR;  Surgeon: Javier Yin DO;  Location:  OR     COLONOSCOPY  11/01/2007     COLONOSCOPY  12/12/11     COLONOSCOPY N/A 11/18/2015    Procedure: COLONOSCOPY;  Surgeon: Migue Mclaughlin MD;  Location:  GI     COLONOSCOPY N/A 3/22/2017    Procedure: COMBINED COLONOSCOPY, SINGLE OR MULTIPLE BIOPSY/POLYPECTOMY BY BIOPSY;  Surgeon: Salvatore Zepeda MD;  Location:  GI     COLONOSCOPY N/A 3/5/2019    Procedure:  COLONOSCOPY;  Surgeon: Prakash Ervin DO;  [facility]     ENDOSCOPY  12    [facility] [place]     ESOPHAGOSCOPY, GASTROSCOPY, DUODENOSCOPY (EGD), COMBINED N/A 2015    Procedure: COMBINED ESOPHAGOSCOPY, GASTROSCOPY, DUODENOSCOPY (EGD), BIOPSY SINGLE OR MULTIPLE;  Surgeon: Migue Mclaughlin MD;  [facility]     ESOPHAGOSCOPY, GASTROSCOPY, DUODENOSCOPY (EGD), COMBINED N/A 3/22/2017    Procedure: COMBINED ESOPHAGOSCOPY, GASTROSCOPY, DUODENOSCOPY (EGD), BIOPSY SINGLE OR MULTIPLE;  Surgeon: Salvatore Zepeda MD;  [facility]: [facility]     HC UGI ENDOSCOPY, SIMPLE EXAM  10/31/2007     HERNIORRHAPHY UMBILICAL N/A 3/12/2015    Procedure: HERNIORRHAPHY UMBILICAL;  Surgeon: Yared Ma MD;  [facility]     IRRIGATION AND DEBRIDEMENT UPPER EXTREMITY, COMBINED Left 3/15/2016    Procedure: COMBINED IRRIGATION AND DEBRIDEMENT UPPER EXTREMITY;  Surgeon: Javier Yin DO;  [facility]: [facility]     REMOVAL OF SPERM DUCT(S)      Vasectomy       Social History     Tobacco Use     Smoking status: Former Smoker     Last attempt to quit: 1981     Years since quittin.3     Smokeless tobacco: Never Used   Substance Use Topics     Alcohol use: Yes     Alcohol/week: 0.0 standard drinks     Comment: couple on the weekend      Family History   Problem Relation Age of Onset     Cancer Mother         ovarian cancer     Asthma Mother      Hypertension Mother      Arthritis Mother      Genitourinary Problems Mother      Lipids Mother      Cancer Sister         ovarian cancer     Hypertension Sister      Lipids Sister      Cancer Maternal Grandmother         stomach cancer     Asthma Father      Cardiovascular Father         heart attack; bypass x5, congenital heart disease     Heart Disease Father         heart attack; bypass x5, congenital heart disease     Diabetes Father      Hypertension Father      Cancer Father         prostate     Prostate Cancer Father       Circulatory Father         blood clots     Genitourinary Problems Father      Respiratory Father         emphysema; COPD     Diabetes Maternal Grandfather      Diabetes Paternal Grandfather      Eye Disorder Paternal Grandfather         glaucoma     Hypertension Brother      Lipids Brother      Hypertension Brother      Cardiovascular Brother         bypass x3     Heart Disease Brother         bypass x3     Lipids Brother      Hypertension Sister      C.A.D. No family hx of      Cerebrovascular Disease No family hx of      Breast Cancer No family hx of      Cancer - colorectal No family hx of      Alzheimer Disease No family hx of      Blood Disease No family hx of      Gastrointestinal Disease No family hx of      Musculoskeletal Disorder No family hx of      Neurologic Disorder No family hx of      Thyroid Disease No family hx of          Current Outpatient Medications   Medication Sig Dispense Refill     acetaminophen (TYLENOL) 325 MG tablet Take 3 tablets (975 mg) by mouth every 8 hours as needed for mild pain 100 tablet 0     ascorbic acid (VITAMIN C) 250 MG CHEW chewable tablet Take 250 mg by mouth daily       aspirin (ASA) 81 MG tablet Take 1 tablet (81 mg) by mouth daily       atorvastatin (LIPITOR) 20 MG tablet Take 1 tablet by mouth once daily 90 tablet 0     EQ STOOL SOFTENER 100 MG capsule 100 mg       fluticasone (FLONASE) 50 MCG/ACT nasal spray Spray 1 spray into both nostrils daily 16 g 1     hydrocortisone (ANUSOL-HC) 2.5 % cream Place rectally 2 times daily as needed for hemorrhoids 30 g 1     hydrOXYzine (ATARAX) 25 MG tablet Take 1 tablet (25 mg) by mouth 3 times daily as needed for anxiety 90 tablet 1     LANsoprazole (PREVACID) 30 MG DR capsule Take 1 capsule by mouth once daily 90 capsule 0     lisinopril (ZESTRIL) 20 MG tablet Take 1/2 (one-half) tablet by mouth once daily 45 tablet 0     loratadine (CLARITIN) 10 MG tablet Take 1 tablet (10 mg) by mouth daily 90 tablet 1     Multiple Vitamin  "(MULTIVITAMINS PO) Take  by mouth.       nifedipine 0.2% in white petrolatum 0.2 % OINT ointment Apply topically 2 times daily 100 g 0     predniSONE (DELTASONE) 50 MG tablet Take 1 tablet (50 mg) by mouth daily for 5 days 5 tablet 0     sertraline (ZOLOFT) 50 MG tablet Take 1 tablet (50 mg) by mouth daily 90 tablet 0     tiZANidine (ZANAFLEX) 4 MG capsule Take 1 capsule (4 mg) by mouth 3 times daily as needed for muscle spasms (pain) 40 capsule 1     fluticasone (FLONASE) 50 MCG/ACT spray Spray 1-2 sprays into both nostrils daily (Patient not taking: Reported on 4/28/2020) 1 Bottle 0     Allergies   Allergen Reactions     Hydrocodone Other (See Comments)     \"climbed the walls, very anxious, insomnia\"     Nsaids Other (See Comments)     Hx of stomach ulcers     Recent Labs   Lab Test 01/27/20  1614 11/20/19  1636 01/24/18  0926 01/09/17  0856  09/22/15  03/12/13  1705   A1C  --  5.2  --   --   --   --   --   --    LDL  --   --  162* 162*  --   --   --   --    HDL  --   --  52 44  --   --   --   --    TRIG  --   --  164* 118  --   --   --   --    ALT 31  --   --   --   --  64  --  40   CR 0.68 0.72  --  0.78   < >  --    < > 0.87   GFRESTIMATED >90 >90  --  >90  Non  GFR Calc     < >  --    < > >90   GFRESTBLACK >90 >90  --  >90  African American GFR Calc     < >  --    < > >90   POTASSIUM 4.1 3.8  --  4.4   < >  --    < > 4.7   TSH  --   --   --   --   --   --   --  3.14    < > = values in this interval not displayed.      BP Readings from Last 3 Encounters:   04/13/20 128/88   03/25/20 130/85   03/21/20 (!) 143/84    Wt Readings from Last 3 Encounters:   04/13/20 100.7 kg (222 lb)   03/25/20 100.7 kg (222 lb)   03/09/20 100.8 kg (222 lb 4.8 oz)                    Reviewed and updated as needed this visit by Provider         Review of Systems   ROS COMP: Constitutional, HEENT, cardiovascular, pulmonary, gi and gu systems are negative, except as otherwise noted.       Objective   Reported vitals:  " There were no vitals taken for this visit.   healthy, alert and no distress  PSYCH: Alert and oriented times 3; coherent speech, normal   rate and volume, able to articulate logical thoughts, able   to abstract reason, no tangential thoughts, no hallucinations   or delusions  His affect is normal, pleasant and full  RESP: No cough, no audible wheezing, able to talk in full sentences  Remainder of exam unable to be completed due to telephone visits    Diagnostic Test Results:  Labs reviewed in Epic        ASSESSMENT and PLAN  1. Seasonal allergic rhinitis, unspecified trigger  59-year-old male with seasonal allergic rhinitis, recent exacerbation, has proved difficult to treat.  He has had some improvement with loratadine and Flonase, however he continues to have copious postnasal discharge, interfering with sleep, causing cough.  Will give short burst of prednisone.  He is also recovering from a femur fracture, we discussed given the short duration no impact on healing.  He will message me in 2 to 3 days to advise me of his progress.  Continue all other medications.  - predniSONE (DELTASONE) 50 MG tablet; Take 1 tablet (50 mg) by mouth daily for 5 days  Dispense: 5 tablet; Refill: 0    Return in about 3 months (around 7/28/2020) for Annual Well Check.     Bony Robin MD, FAAFP  Family Medicine Physician  [facility]  [id number] [place] [phone]        Phone call duration:  7 minutes    Bony Robin MD

## 2020-04-28 NOTE — PATIENT INSTRUCTIONS
Hugo,    It was great seeing you in clinic today.  I summarized our discussion and your plan below.  Please let me know if you have any questions or concerns.  Please follow up with me as discussed in clinic or sooner if any worsening or additional concerns.     1. Seasonal allergic rhinitis, unspecified trigger  59-year-old male with seasonal allergic rhinitis, recent exacerbation, has proved difficult to treat.  He has had some improvement with loratadine and Flonase, however he continues to have copious postnasal discharge, interfering with sleep, causing cough.  Will give short burst of prednisone.  He is also recovering from a femur fracture, we discussed given the short duration no impact on healing.  He will message me in 2 to 3 days to advise me of his progress.  Continue all other medications.  - predniSONE (DELTASONE) 50 MG tablet; Take 1 tablet (50 mg) by mouth daily for 5 days  Dispense: 5 tablet; Refill: 0       Sincerely,  Dr. JEYSON Robin MD, FAAFP  Family Medicine Physician  [facility]  [address] [place] [address]    Patient Education

## 2020-05-04 ENCOUNTER — ANCILLARY PROCEDURE (OUTPATIENT)
Dept: GENERAL RADIOLOGY | Facility: CLINIC | Age: 60
End: 2020-05-04
Attending: ORTHOPAEDIC SURGERY
Payer: COMMERCIAL

## 2020-05-04 ENCOUNTER — OFFICE VISIT (OUTPATIENT)
Dept: ORTHOPEDICS | Facility: CLINIC | Age: 60
End: 2020-05-04
Payer: COMMERCIAL

## 2020-05-04 VITALS
DIASTOLIC BLOOD PRESSURE: 80 MMHG | SYSTOLIC BLOOD PRESSURE: 132 MMHG | BODY MASS INDEX: 32.88 KG/M2 | WEIGHT: 222 LBS | HEIGHT: 69 IN

## 2020-05-04 DIAGNOSIS — M97.8XXD PERIPROSTHETIC FRACTURE OF PROXIMAL END OF TIBIA, SUBSEQUENT ENCOUNTER: Primary | ICD-10-CM

## 2020-05-04 DIAGNOSIS — S82.161A CLOSED TORUS FRACTURE OF PROXIMAL END OF RIGHT TIBIA, INITIAL ENCOUNTER: ICD-10-CM

## 2020-05-04 DIAGNOSIS — Z96.659 PERIPROSTHETIC FRACTURE OF PROXIMAL END OF TIBIA, SUBSEQUENT ENCOUNTER: Primary | ICD-10-CM

## 2020-05-04 PROCEDURE — 73560 X-RAY EXAM OF KNEE 1 OR 2: CPT | Mod: TC

## 2020-05-04 PROCEDURE — 99207 ZZC FRACTURE CARE IN GLOBAL PERIOD: CPT | Performed by: ORTHOPAEDIC SURGERY

## 2020-05-04 ASSESSMENT — PAIN SCALES - GENERAL: PAINLEVEL: NO PAIN (0)

## 2020-05-04 ASSESSMENT — MIFFLIN-ST. JEOR: SCORE: 1821.33

## 2020-05-04 NOTE — LETTER
"    2020         RE: Hugo Longoria   [facility] [address]        Dear Colleague,    Thank you for referring your patient, Hugo Longoria, to the [facility]. Please see a copy of my visit note below.    Office Visit-Follow up    Chief Complaint: Hugo Longoria is a 59 year old male who is being seen for   Chief Complaint   Patient presents with     RECHECK     right knee periprosthetic proximal tibia fracture adjacent to a total knee replacement-nondisplaced,        History of Present Illness:   Approximately 6 weeks out from his injury.  No pain.  No skin issues from the brace.      Social History     Occupational History     Employer: [employer]     Comment: [employer]   Tobacco Use     Smoking status: Former Smoker     Last attempt to quit: 1981     Years since quittin.3     Smokeless tobacco: Never Used   Substance and Sexual Activity     Alcohol use: Yes     Alcohol/week: 0.0 standard drinks     Comment: couple on the weekend      Drug use: No     Sexual activity: Yes     Partners: Female     Birth control/protection: Surgical     Comment: vasectomy       REVIEW OF SYSTEMS  General: negative for, night sweats, dizziness, fatigue  Resp: No shortness of breath and no cough  CV: negative for chest pain, syncope or near-syncope  GI: negative for nausea, vomiting and diarrhea  : negative for dysuria and hematuria  Musculoskeletal: as above  Neurologic: negative for syncope   Hematologic: negative for bleeding disorder    Physical Exam:  Vitals: /80   Ht 1.759 m (5' 9.25\")   Wt 100.7 kg (222 lb)   BMI 32.55 kg/m    BMI= Body mass index is 32.55 kg/m .  Constitutional: healthy, alert and no acute distress   Psychiatric: mentation appears normal and affect normal/bright  NEURO: no focal deficits  RESP: Normal with easy respirations and no use of accessory muscles to breathe, no audible wheezing or retractions  CV: RLE: no edema         " Regular rate and rhythm by palpation  SKIN: No erythema, rashes, excoriation, or breakdown. No evidence of infection.   JOINT/EXTREMITIES:right knee: Motion passively tested/actively tested to approximately 85/90 degrees.  No pain.  Full extension.  No instability with varus and valgus testing.  Patella tracks midline.  No focal areas of tenderness.  GAIT: not tested             Diagnostic Modalities:  right knee X-ray: Total knee arthroplasty in place.  No evidence of position changes or loosening.  Comminuted periprosthetic tibia fracture, transverse component with some lateral comminution.  Unchanged alignment.  Decreased lucency.  Increased callus formation.  Independent visualization of the images was performed.      Impression: right prosthetic proximal tibia fracture-6 weeks out with routine healing    Plan:  All of the above pertinent physical exam and imaging modalities findings was reviewed with Hugo.    We again had a lengthy discussion.  We again discussed timeframe for recovery.  Radiographs show some healing.  Clinically he is doing well.  Recommend transition into a hinged knee brace.  Work on gentle motion.  Nothing forceful.  We also discussed weightbearing.  Recommend flatfoot with his walker for balance only.  Do not fully walk or place full weight through his leg yet.    Plan to see him back in 3 weeks.    Return to clinic 3, week(s), or sooner as needed for changes.  Re-x-ray on return: Yes.  Nonweightbearing AP lateral right knee    Gerber Yin D.O.          Again, thank you for allowing me to participate in the care of your patient.        Sincerely,        Javier Yin, DO

## 2020-05-04 NOTE — PROGRESS NOTES
"Office Visit-Follow up    Chief Complaint: Hugo Longoria is a 59 year old male who is being seen for   Chief Complaint   Patient presents with     RECHECK     right knee periprosthetic proximal tibia fracture adjacent to a total knee replacement-nondisplaced,        History of Present Illness:   Approximately 6 weeks out from his injury.  No pain.  No skin issues from the brace.      Social History     Occupational History     Employer: [employer]     Comment: [employer]   Tobacco Use     Smoking status: Former Smoker     Last attempt to quit: 1981     Years since quittin.3     Smokeless tobacco: Never Used   Substance and Sexual Activity     Alcohol use: Yes     Alcohol/week: 0.0 standard drinks     Comment: couple on the weekend      Drug use: No     Sexual activity: Yes     Partners: Female     Birth control/protection: Surgical     Comment: vasectomy       REVIEW OF SYSTEMS  General: negative for, night sweats, dizziness, fatigue  Resp: No shortness of breath and no cough  CV: negative for chest pain, syncope or near-syncope  GI: negative for nausea, vomiting and diarrhea  : negative for dysuria and hematuria  Musculoskeletal: as above  Neurologic: negative for syncope   Hematologic: negative for bleeding disorder    Physical Exam:  Vitals: /80   Ht 1.759 m (5' 9.25\")   Wt 100.7 kg (222 lb)   BMI 32.55 kg/m    BMI= Body mass index is 32.55 kg/m .  Constitutional: healthy, alert and no acute distress   Psychiatric: mentation appears normal and affect normal/bright  NEURO: no focal deficits  RESP: Normal with easy respirations and no use of accessory muscles to breathe, no audible wheezing or retractions  CV: RLE: no edema         Regular rate and rhythm by palpation  SKIN: No erythema, rashes, excoriation, or breakdown. No evidence of infection.   JOINT/EXTREMITIES:right knee: Motion passively tested/actively tested to approximately 85/90 degrees.  No pain.  Full " extension.  No instability with varus and valgus testing.  Patella tracks midline.  No focal areas of tenderness.  GAIT: not tested             Diagnostic Modalities:  right knee X-ray: Total knee arthroplasty in place.  No evidence of position changes or loosening.  Comminuted periprosthetic tibia fracture, transverse component with some lateral comminution.  Unchanged alignment.  Decreased lucency.  Increased callus formation.  Independent visualization of the images was performed.      Impression: right prosthetic proximal tibia fracture-6 weeks out with routine healing    Plan:  All of the above pertinent physical exam and imaging modalities findings was reviewed with Hugo.    We again had a lengthy discussion.  We again discussed timeframe for recovery.  Radiographs show some healing.  Clinically he is doing well.  Recommend transition into a hinged knee brace.  Work on gentle motion.  Nothing forceful.  We also discussed weightbearing.  Recommend flatfoot with his walker for balance only.  Do not fully walk or place full weight through his leg yet.    Plan to see him back in 3 weeks.    Return to clinic 3, week(s), or sooner as needed for changes.  Re-x-ray on return: Yes.  Nonweightbearing AP lateral right knee    Gerber Yin D.O.

## 2020-05-07 PROBLEM — R26.9 ABNORMAL GAIT: Status: RESOLVED | Noted: 2020-01-20 | Resolved: 2020-05-07

## 2020-05-07 PROBLEM — Z96.651 AFTERCARE FOLLOWING RIGHT KNEE JOINT REPLACEMENT SURGERY: Status: RESOLVED | Noted: 2019-12-24 | Resolved: 2020-05-07

## 2020-05-07 PROBLEM — M25.561 ACUTE PAIN OF RIGHT KNEE: Status: RESOLVED | Noted: 2019-12-24 | Resolved: 2020-05-07

## 2020-05-07 PROBLEM — Z47.1 AFTERCARE FOLLOWING RIGHT KNEE JOINT REPLACEMENT SURGERY: Status: RESOLVED | Noted: 2019-12-24 | Resolved: 2020-05-07

## 2020-05-07 NOTE — PROGRESS NOTES
Discharge Note    Progress reporting period is from last progress note on 03/05/20 to Mar 16, 2020.    Hugo failed to follow up and current status is unknown.  Please see information below for last relevant information on current status.  Patient seen for 23 visits.    SUBJECTIVE  Subjective changes noted by patient:  he continues to feel stiff in his knee, he is now able to go reciprocal pattern up stairs without railing, still difficult going up stairs, still needs to use hand rail with going down stairs  .  Current pain level is 0/10.     Previous pain level was  10/10.   Changes in function:  Yes (See Goal flowsheet attached for changes in current functional level)  Adverse reaction to treatment or activity: None    OBJECTIVE  Changes noted in objective findings: Supine heel slide: 0-110, SLS eyes open: R 43 sec, L 60 sec,      ASSESSMENT/PLAN  Diagnosis: s/p R TKA   Updated problem list and treatment plan:   Pain - HEP  Decreased ROM/flexibility - HEP  Decreased function - HEP  Decreased strength - HEP  Impaired balance - HEP  STG/LTGs have been met or progress has been made towards goals:  Yes, please see goal flowsheet for most current information  Assessment of Progress: current status is unknown.    Last current status: Pt is progressing as expected   Self Management Plans:  HEP  I have re-evaluated this patient and find that the nature, scope, duration and intensity of the therapy is appropriate for the medical condition of the patient.  Hugo continues to require the following intervention to meet STG and LTG's:  HEP.    Recommendations:  Discharge with current home program.  Patient to follow up with MD as needed.    Please refer to the daily flowsheet for treatment today, total treatment time and time spent performing 1:1 timed codes.    Dwight High,PT, DPT, OCS

## 2020-05-12 ENCOUNTER — TELEPHONE (OUTPATIENT)
Dept: FAMILY MEDICINE | Facility: OTHER | Age: 60
End: 2020-05-12

## 2020-05-12 DIAGNOSIS — I10 HTN, GOAL BELOW 140/90: ICD-10-CM

## 2020-05-12 RX ORDER — LISINOPRIL 20 MG/1
TABLET ORAL
Qty: 45 TABLET | Refills: 2 | Status: SHIPPED | OUTPATIENT
Start: 2020-05-12 | End: 2021-04-09

## 2020-05-12 NOTE — TELEPHONE ENCOUNTER
Pending Prescriptions:                       Disp   Refills    lisinopril (ZESTRIL) 20 MG tablet [Pharma*45 tab*2            Sig: Take 1/2 (one-half) tablet by mouth once daily    Prescription approved per [facility] Refill Protocol.  Next 5 appointments (look out 90 days)    May 26, 2020 10:00 AM CDT  Return Visit with Javier Yin DO  [address] [address]  [phone]   Temo 10, 2020  3:30 PM CDT  Return Visit with Javier Yin DO  [facility] ([address] [id number] [phone]        Please schedule follow up office visit with TC or SA in 7/2020.    Radha Llanes, RUTHN, RN, PHN

## 2020-05-12 NOTE — TELEPHONE ENCOUNTER
Pt informed.  Tried to schedule appt with Dr. Bony Robin for July.  I was unable to schedule this.  Postponing this until June.  Please call pt again, once we know if in office appointment or video appointment would be best.

## 2020-05-21 ENCOUNTER — ANCILLARY PROCEDURE (OUTPATIENT)
Dept: GENERAL RADIOLOGY | Facility: CLINIC | Age: 60
End: 2020-05-21
Attending: ORTHOPAEDIC SURGERY
Payer: COMMERCIAL

## 2020-05-21 ENCOUNTER — OFFICE VISIT (OUTPATIENT)
Dept: ORTHOPEDICS | Facility: CLINIC | Age: 60
End: 2020-05-21
Payer: COMMERCIAL

## 2020-05-21 VITALS
DIASTOLIC BLOOD PRESSURE: 86 MMHG | WEIGHT: 222 LBS | BODY MASS INDEX: 32.88 KG/M2 | SYSTOLIC BLOOD PRESSURE: 133 MMHG | HEIGHT: 69 IN

## 2020-05-21 DIAGNOSIS — M97.8XXD PERIPROSTHETIC FRACTURE OF PROXIMAL END OF TIBIA, SUBSEQUENT ENCOUNTER: Primary | ICD-10-CM

## 2020-05-21 DIAGNOSIS — Z96.659 PERIPROSTHETIC FRACTURE OF PROXIMAL END OF TIBIA, SUBSEQUENT ENCOUNTER: Primary | ICD-10-CM

## 2020-05-21 DIAGNOSIS — Z96.659 PERIPROSTHETIC FRACTURE OF PROXIMAL END OF TIBIA, SUBSEQUENT ENCOUNTER: ICD-10-CM

## 2020-05-21 DIAGNOSIS — M97.8XXD PERIPROSTHETIC FRACTURE OF PROXIMAL END OF TIBIA, SUBSEQUENT ENCOUNTER: ICD-10-CM

## 2020-05-21 PROCEDURE — 73560 X-RAY EXAM OF KNEE 1 OR 2: CPT | Mod: TC

## 2020-05-21 PROCEDURE — 99207 ZZC FRACTURE CARE IN GLOBAL PERIOD: CPT | Performed by: ORTHOPAEDIC SURGERY

## 2020-05-21 ASSESSMENT — MIFFLIN-ST. JEOR: SCORE: 1821.33

## 2020-05-21 ASSESSMENT — PAIN SCALES - GENERAL: PAINLEVEL: NO PAIN (0)

## 2020-05-21 NOTE — LETTER
"    2020         RE: Hugo Longoria  [id number] [facility] [address]        Dear Colleague,    Thank you for referring your patient, Hugo Longoria, to the [facility]. Please see a copy of my visit note below.    Office Visit-Follow up    Chief Complaint: Hugo Longoria is a 59 year old male who is being seen for   Chief Complaint   Patient presents with     RECHECK     right prosthetic proximal tibia fracture- DOI: 3/21/2020       History of Present Illness:   No pain.  No issues.      Social History     Occupational History     Employer: "[employer]     Comment: [employer]   Tobacco Use     Smoking status: Former Smoker     Last attempt to quit: 1981     Years since quittin.4     Smokeless tobacco: Never Used   Substance and Sexual Activity     Alcohol use: Yes     Alcohol/week: 0.0 standard drinks     Comment: couple on the weekend      Drug use: No     Sexual activity: Yes     Partners: Female     Birth control/protection: Surgical     Comment: vasectomy       REVIEW OF SYSTEMS  General: negative for, night sweats, dizziness, fatigue  Resp: No shortness of breath and no cough  CV: negative for chest pain, syncope or near-syncope  GI: negative for nausea, vomiting and diarrhea  : negative for dysuria and hematuria  Musculoskeletal: as above  Neurologic: negative for syncope   Hematologic: negative for bleeding disorder    Physical Exam:  Vitals: /86   Ht 1.759 m (5' 9.25\")   Wt 100.7 kg (222 lb)   BMI 32.55 kg/m    BMI= Body mass index is 32.55 kg/m .  Constitutional: healthy, alert and no acute distress   Psychiatric: mentation appears normal and affect normal/bright  NEURO: no focal deficits  RESP: Normal with easy respirations and no use of accessory muscles to breathe, no audible wheezing or retractions  CV: RLE: no edema           SKIN: No erythema, rashes, excoriation, or breakdown. No evidence of infection.   JOINT/EXTREMITIES:right " knee: Active motion 0-90 degrees.  Passively to approximately 100.  No instability or pain with varus and valgus testing.  No edema.  No areas of tenderness.  No effusion.  GAIT: not tested             Diagnostic Modalities:  right knee X-ray: Total knee arthroplasty in place.  Periprosthetic tibia fracture along the distal aspect of the prosthesis appears to be healing.  Decreased lucency.  Increased fracture consolidation.  Independent visualization of the images was performed.      Impression: right periprosthetic tibia fracture    Plan:  All of the above pertinent physical exam and imaging modalities findings was reviewed with Hugo.    He has both clinical and radiographic healing.  At this point from his injury based on his progression recommend progressive weightbearing over the next week.  He should use a walker.  He should also always have on his knee brace when he is walking.  Also recommend some physical therapy.  Work on active and passive range of motion progressing as tolerated.          Return to clinic 4-6 , week(s), or sooner as needed for changes.  Re-x-ray on return: Yes.    Gerber Yin D.O.          Again, thank you for allowing me to participate in the care of your patient.        Sincerely,        Javier Yin, DO

## 2020-05-21 NOTE — PROGRESS NOTES
"Office Visit-Follow up    Chief Complaint: Hugo Longoria is a 59 year old male who is being seen for   Chief Complaint   Patient presents with     RECHECK     right prosthetic proximal tibia fracture- DOI: 3/21/2020       History of Present Illness:   No pain.  No issues.      Social History     Occupational History     Employer: [email or web address]     Comment: [employer]   Tobacco Use     Smoking status: Former Smoker     Last attempt to quit: 1981     Years since quittin.4     Smokeless tobacco: Never Used   Substance and Sexual Activity     Alcohol use: Yes     Alcohol/week: 0.0 standard drinks     Comment: couple on the weekend      Drug use: No     Sexual activity: Yes     Partners: Female     Birth control/protection: Surgical     Comment: vasectomy       REVIEW OF SYSTEMS  General: negative for, night sweats, dizziness, fatigue  Resp: No shortness of breath and no cough  CV: negative for chest pain, syncope or near-syncope  GI: negative for nausea, vomiting and diarrhea  : negative for dysuria and hematuria  Musculoskeletal: as above  Neurologic: negative for syncope   Hematologic: negative for bleeding disorder    Physical Exam:  Vitals: /86   Ht 1.759 m (5' 9.25\")   Wt 100.7 kg (222 lb)   BMI 32.55 kg/m    BMI= Body mass index is 32.55 kg/m .  Constitutional: healthy, alert and no acute distress   Psychiatric: mentation appears normal and affect normal/bright  NEURO: no focal deficits  RESP: Normal with easy respirations and no use of accessory muscles to breathe, no audible wheezing or retractions  CV: RLE: no edema           SKIN: No erythema, rashes, excoriation, or breakdown. No evidence of infection.   JOINT/EXTREMITIES:right knee: Active motion 0-90 degrees.  Passively to approximately 100.  No instability or pain with varus and valgus testing.  No edema.  No areas of tenderness.  No effusion.  GAIT: not tested             Diagnostic Modalities:  right knee X-ray: " Total knee arthroplasty in place.  Periprosthetic tibia fracture along the distal aspect of the prosthesis appears to be healing.  Decreased lucency.  Increased fracture consolidation.  Independent visualization of the images was performed.      Impression: right periprosthetic tibia fracture    Plan:  All of the above pertinent physical exam and imaging modalities findings was reviewed with Hugo.    He has both clinical and radiographic healing.  At this point from his injury based on his progression recommend progressive weightbearing over the next week.  He should use a walker.  He should also always have on his knee brace when he is walking.  Also recommend some physical therapy.  Work on active and passive range of motion progressing as tolerated.          Return to clinic 4-6 , week(s), or sooner as needed for changes.  Re-x-ray on return: Yes.    Gerber Yin D.O.

## 2020-06-02 ENCOUNTER — THERAPY VISIT (OUTPATIENT)
Dept: PHYSICAL THERAPY | Facility: CLINIC | Age: 60
End: 2020-06-02
Payer: COMMERCIAL

## 2020-06-02 DIAGNOSIS — M97.8XXD PERIPROSTHETIC FRACTURE OF PROXIMAL END OF TIBIA, SUBSEQUENT ENCOUNTER: ICD-10-CM

## 2020-06-02 DIAGNOSIS — M25.561 ACUTE PAIN OF RIGHT KNEE: ICD-10-CM

## 2020-06-02 DIAGNOSIS — Z96.659 PERIPROSTHETIC FRACTURE OF PROXIMAL END OF TIBIA, SUBSEQUENT ENCOUNTER: ICD-10-CM

## 2020-06-02 PROCEDURE — 97110 THERAPEUTIC EXERCISES: CPT | Mod: GP | Performed by: PHYSICAL THERAPIST

## 2020-06-02 PROCEDURE — 97161 PT EVAL LOW COMPLEX 20 MIN: CPT | Mod: GP | Performed by: PHYSICAL THERAPIST

## 2020-06-02 PROCEDURE — 97140 MANUAL THERAPY 1/> REGIONS: CPT | Mod: GP | Performed by: PHYSICAL THERAPIST

## 2020-06-02 ASSESSMENT — ACTIVITIES OF DAILY LIVING (ADL)
WALK: ACTIVITY IS VERY DIFFICULT
AS_A_RESULT_OF_YOUR_KNEE_INJURY,_HOW_WOULD_YOU_RATE_YOUR_CURRENT_LEVEL_OF_DAILY_ACTIVITY?: SEVERELY ABNORMAL
GO DOWN STAIRS: ACTIVITY IS VERY DIFFICULT
LIMPING: THE SYMPTOM PREVENTS ME FROM ALL DAILY ACTIVITIES
KNEEL ON THE FRONT OF YOUR KNEE: ACTIVITY IS VERY DIFFICULT
STAND: ACTIVITY IS VERY DIFFICULT
PAIN: THE SYMPTOM PREVENTS ME FROM ALL DAILY ACTIVITIES
SWELLING: THE SYMPTOM PREVENTS ME FROM ALL DAILY ACTIVITIES
GIVING WAY, BUCKLING OR SHIFTING OF KNEE: THE SYMPTOM PREVENTS ME FROM ALL DAILY ACTIVITIES
RISE FROM A CHAIR: I AM UNABLE TO DO THE ACTIVITY
KNEE_ACTIVITY_OF_DAILY_LIVING_SUM: 5
GO UP STAIRS: ACTIVITY IS VERY DIFFICULT
HOW_WOULD_YOU_RATE_THE_CURRENT_FUNCTION_OF_YOUR_KNEE_DURING_YOUR_USUAL_DAILY_ACTIVITIES_ON_A_SCALE_FROM_0_TO_100_WITH_100_BEING_YOUR_LEVEL_OF_KNEE_FUNCTION_PRIOR_TO_YOUR_INJURY_AND_0_BEING_THE_INABILITY_TO_PERFORM_ANY_OF_YOUR_USUAL_DAILY_ACTIVITIES?: 10
STIFFNESS: THE SYMPTOM PREVENTS ME FROM ALL DAILY ACTIVITIES
WEAKNESS: THE SYMPTOM PREVENTS ME FROM ALL DAILY ACTIVITIES
RAW_SCORE: 5
SIT WITH YOUR KNEE BENT: I AM UNABLE TO DO THE ACTIVITY
SQUAT: I AM UNABLE TO DO THE ACTIVITY
HOW_WOULD_YOU_RATE_THE_OVERALL_FUNCTION_OF_YOUR_KNEE_DURING_YOUR_USUAL_DAILY_ACTIVITIES?: SEVERELY ABNORMAL
KNEE_ACTIVITY_OF_DAILY_LIVING_SCORE: 7.14

## 2020-06-02 NOTE — PROGRESS NOTES
[facility] Initial Evaluation  Subjective:  The history is provided by the patient. No  was used.   Patient Health History  Hugo JEYSON Longoria being seen for R knee.     Problem began: 3/20/2020.   Problem occurred: fall after surgery   Pain is reported as 7/10 on pain scale.  General health as reported by patient is good.  Pertinent medical history includes: none.   Red flags:  None as reported by patient.   Other medical allergies details: aspirin.   Surgeries include:  Orthopedic surgery. Other surgery history details: R TKA.    Current medications:  High blood pressure medication.    Current occupation is [employer] on [employer].   Primary job tasks include:  Driving, prolonged sitting and prolonged standing.                  Therapist Generated HPI Evaluation  Problem details: On 3/20/20 he had a fall on R knee that resulted in a fracture of his tibia that expanded up to the cement of his R knee replacement.  His R foot turned into ER and hyperknee flexion.  He was put in a knee brace with NWB R foot for 9 weeks.  X-rays have shown good healing and he was cleared for WBAT for walking.  The pain at his fracture site seemed to improve after 2-3 weeks after fall.  His L knee and foot got sore from hopping on L foot.  He had a lot of swelling in his R lower leg after the fall.  He had some relief with doing ankle pumps.  Now he is getting pain superior to the patella and medial R knee since walking more.  He is now walking with hinged knee brace.  He does have some weakness with going up and down stairs.  .         Type of problem:  Right knee.    This is a new condition.  Condition occurred with:  A fall/slip.  Where condition occurred: at home.  Patient reports pain:  Anterior and medial.  Pain is described as aching and is intermittent.  Pain is worse in the P.M..  Since onset symptoms are gradually improving.  Associated symptoms:  Edema, loss of strength and loss of  motion/stiffness. Symptoms are exacerbated by sitting, certain positions, ascending stairs, descending stairs, bending/squatting, activity, standing and walking  and relieved by rest.  Special tests included:  X-ray (healing fracture of tibia near tibial plateau after R TKA).  Previous treatment includes physical therapy. There was significant improvement following previous treatment.  Restrictions due to condition include:  Working in normal job with restrictions.  Barriers include:  Stairs.                        Objective:  Hugo BENÍTEZ Swati , : 1960, MRN: [id number]    Physical Therapy Objective Findings  Subjective information, goals, clinical impression, daily documentation and other information found in EPISODES tab.  Knee Objective Findings    Gait:  antalgic    Visual Inspection: swelling lower leg and around knee, good pulses,     Edema:                                                                         Right                                                  Left                                                      Circumferential Superior Pole - Patella 45cm 43cm   Circumferential Inferior Pole - Patella 40cm 37.5cm            Range of Motion:                                     Right AROM            Right PROM            Left AROM              Left PROM                Extension/Flexion 12-96 8-105 wnl wnl   other       Other:         Manual Muscle Testing  (graded 0-5, measured at 0 degrees unless otherwise noted):                                                                       Right                                                  Left                                                     Hamstring  4- 5   Quads 4 5   VMO          Other:     (+ mild pain, ++ moderate pain, +++ severe pain)    Special Tests:                                                                    Right                                                   Left                                                        Step Test Height           -Control Comment     Functional Squat (deg.)     OTHER:    Valgus stress test     +      Flexibility:                                                                   Right                                                   Left                                                      Gastroc Significant tightness Mild tightness   Soleus     Hamstrings normal normal   Hip Flexors normal normal   Quadricep     ITB            Patellar Mobility                                                               Right                                                  Left                                                      Static Position normal    Passive Mobility normal    Dynamic Mobility    tibiofemoral Normal    Hypomobile ant and post glides      Palpation: tender R VMO, R MCL, R lateral gastroc head    Assessment/Plan:    Patient is a 59 year old male with right side knee complaints.    Patient has the following significant findings with corresponding treatment plan.                Diagnosis 1:  R knee tibial plateau fx near R TKA    Pain -  hot/cold therapy, manual therapy, self management, education and home program  Decreased ROM/flexibility - manual therapy, therapeutic exercise, therapeutic activity and home program  Decreased joint mobility - manual therapy, therapeutic exercise, therapeutic activity and home program  Decreased strength - therapeutic exercise, therapeutic activities and home program  Edema - vasopneumatics, cold therapy and self management/home program  Impaired gait - gait training, assistive devices and home program  Decreased function - therapeutic activities and home program    Therapy Evaluation Codes:   1) History comprised of:   Personal factors that impact the plan of care:      Past/current experiences, Profession and Time since onset of symptoms.    Comorbidity factors that impact the plan of care are:      None.     Medications impacting care:  None.  2) Examination of Body Systems comprised of:   Body structures and functions that impact the plan of care:      Knee.   Activity limitations that impact the plan of care are:      Bathing, Bending, Driving, Lifting, Sitting, Stairs, Standing, Walking and Working.  3) Clinical presentation characteristics are:   Stable/Uncomplicated.  4) Decision-Making    Low complexity using standardized patient assessment instrument and/or measureable assessment of functional outcome.  Cumulative Therapy Evaluation is: Low complexity.    Previous and current functional limitations:  (See Goal Flow Sheet for this information)    Short term and Long term goals: (See Goal Flow Sheet for this information)     Communication ability:  Patient appears to be able to clearly communicate and understand verbal and written communication and follow directions correctly.  Treatment Explanation - The following has been discussed with the patient:   RX ordered/plan of care  Anticipated outcomes  Possible risks and side effects  This patient would benefit from PT intervention to resume normal activities.   Rehab potential is good.    Frequency:  1 X week, once daily  Duration:  for 10 weeks  Discharge Plan:  Achieve all LTG.  Independent in home treatment program.  Reach maximal therapeutic benefit.    Please refer to the daily flowsheet for treatment today, total treatment time and time spent performing 1:1 timed codes.     Dwight High,PT, DPT, OCS

## 2020-06-09 DIAGNOSIS — K29.50 CHRONIC GASTRITIS WITHOUT BLEEDING, UNSPECIFIED GASTRITIS TYPE: ICD-10-CM

## 2020-06-10 ENCOUNTER — THERAPY VISIT (OUTPATIENT)
Dept: PHYSICAL THERAPY | Facility: CLINIC | Age: 60
End: 2020-06-10
Payer: COMMERCIAL

## 2020-06-10 DIAGNOSIS — Z96.659 PERIPROSTHETIC FRACTURE OF PROXIMAL END OF TIBIA, SUBSEQUENT ENCOUNTER: ICD-10-CM

## 2020-06-10 DIAGNOSIS — M97.8XXD PERIPROSTHETIC FRACTURE OF PROXIMAL END OF TIBIA, SUBSEQUENT ENCOUNTER: ICD-10-CM

## 2020-06-10 DIAGNOSIS — M25.561 ACUTE PAIN OF RIGHT KNEE: ICD-10-CM

## 2020-06-10 PROCEDURE — 97110 THERAPEUTIC EXERCISES: CPT | Mod: GP | Performed by: PHYSICAL THERAPIST

## 2020-06-10 RX ORDER — LANSOPRAZOLE 30 MG/1
CAPSULE, DELAYED RELEASE ORAL
Qty: 90 CAPSULE | Refills: 2 | Status: SHIPPED | OUTPATIENT
Start: 2020-06-10 | End: 2021-10-29

## 2020-06-10 NOTE — TELEPHONE ENCOUNTER
Prescription approved per [facility] Refill Protocol.    Grisel Lorenz, RN, BSN

## 2020-06-17 ENCOUNTER — THERAPY VISIT (OUTPATIENT)
Dept: PHYSICAL THERAPY | Facility: CLINIC | Age: 60
End: 2020-06-17
Payer: COMMERCIAL

## 2020-06-17 DIAGNOSIS — M25.561 ACUTE PAIN OF RIGHT KNEE: ICD-10-CM

## 2020-06-17 DIAGNOSIS — Z96.659 PERIPROSTHETIC FRACTURE OF PROXIMAL END OF TIBIA, SUBSEQUENT ENCOUNTER: ICD-10-CM

## 2020-06-17 DIAGNOSIS — M97.8XXD PERIPROSTHETIC FRACTURE OF PROXIMAL END OF TIBIA, SUBSEQUENT ENCOUNTER: ICD-10-CM

## 2020-06-17 PROCEDURE — 97110 THERAPEUTIC EXERCISES: CPT | Mod: GP | Performed by: PHYSICAL THERAPIST

## 2020-06-23 ENCOUNTER — THERAPY VISIT (OUTPATIENT)
Dept: PHYSICAL THERAPY | Facility: CLINIC | Age: 60
End: 2020-06-23
Payer: COMMERCIAL

## 2020-06-23 DIAGNOSIS — M97.8XXD PERIPROSTHETIC FRACTURE OF PROXIMAL END OF TIBIA, SUBSEQUENT ENCOUNTER: ICD-10-CM

## 2020-06-23 DIAGNOSIS — Z96.659 PERIPROSTHETIC FRACTURE OF PROXIMAL END OF TIBIA, SUBSEQUENT ENCOUNTER: ICD-10-CM

## 2020-06-23 DIAGNOSIS — M25.561 ACUTE PAIN OF RIGHT KNEE: ICD-10-CM

## 2020-06-23 PROCEDURE — 97110 THERAPEUTIC EXERCISES: CPT | Mod: GP | Performed by: PHYSICAL THERAPIST

## 2020-06-23 NOTE — PROGRESS NOTES
"Subjective:  HPI  Physical Exam                    Objective:  System    Physical Exam    General     ROS    Assessment/Plan:    PROGRESS  REPORT    Progress reporting period is from 6/2/20 to 6/23/20.       SUBJECTIVE  Subjective changes noted by patient:  he continues to have a lot of stiffness/weakness after sitting, feels best with being able to stay active    Current Pain level: 6/10.     Previous pain level was  NA Initial Pain level: 10/10.   Changes in function:  Yes (See Goal flowsheet attached for changes in current functional level)  Adverse reaction to treatment or activity: activity - walking too much, first steps after sitting    OBJECTIVE  Changes noted in objective findings:    Objective: supine heelslide: 3-116, unable to control 2\" ant step down L, can control eccentric lowering on leg press: 44#, gait analysis: lateral lurch R during R stance phase     ASSESSMENT/PLAN  Updated problem list and treatment plan: Diagnosis 1:  S/p R TKA with tibial fracture    Pain -  hot/cold therapy, manual therapy, self management, education and home program  Decreased ROM/flexibility - manual therapy, therapeutic exercise, therapeutic activity and home program  Decreased strength - therapeutic exercise, therapeutic activities and home program  Decreased function - therapeutic activities and home program  STG/LTGs have been met or progress has been made towards goals:  Yes (See Goal flow sheet completed today.)  Assessment of Progress: The patient's condition is improving.  Self Management Plans:  Patient has been instructed in a home treatment program.  I have re-evaluated this patient and find that the nature, scope, duration and intensity of the therapy is appropriate for the medical condition of the patient.  Hugo continues to require the following intervention to meet STG and LTG's:  PT    Recommendations:  This patient would benefit from continued therapy.     Frequency:  1 X week, once daily  Duration:  for 8 " weeks        Please refer to the daily flowsheet for treatment today, total treatment time and time spent performing 1:1 timed codes.      Dwight High,PT, DPT, OCS

## 2020-06-25 ENCOUNTER — OFFICE VISIT (OUTPATIENT)
Dept: ORTHOPEDICS | Facility: CLINIC | Age: 60
End: 2020-06-25
Payer: COMMERCIAL

## 2020-06-25 ENCOUNTER — ANCILLARY PROCEDURE (OUTPATIENT)
Dept: GENERAL RADIOLOGY | Facility: CLINIC | Age: 60
End: 2020-06-25
Attending: ORTHOPAEDIC SURGERY
Payer: COMMERCIAL

## 2020-06-25 VITALS
SYSTOLIC BLOOD PRESSURE: 141 MMHG | DIASTOLIC BLOOD PRESSURE: 89 MMHG | HEIGHT: 69 IN | WEIGHT: 236.4 LBS | BODY MASS INDEX: 35.01 KG/M2

## 2020-06-25 DIAGNOSIS — Z96.659 PERIPROSTHETIC FRACTURE OF PROXIMAL END OF TIBIA, SUBSEQUENT ENCOUNTER: Primary | ICD-10-CM

## 2020-06-25 DIAGNOSIS — Z96.659 PERIPROSTHETIC FRACTURE OF PROXIMAL END OF TIBIA, SUBSEQUENT ENCOUNTER: ICD-10-CM

## 2020-06-25 DIAGNOSIS — M97.8XXD PERIPROSTHETIC FRACTURE OF PROXIMAL END OF TIBIA, SUBSEQUENT ENCOUNTER: ICD-10-CM

## 2020-06-25 DIAGNOSIS — M97.8XXD PERIPROSTHETIC FRACTURE OF PROXIMAL END OF TIBIA, SUBSEQUENT ENCOUNTER: Primary | ICD-10-CM

## 2020-06-25 PROCEDURE — 99213 OFFICE O/P EST LOW 20 MIN: CPT | Performed by: ORTHOPAEDIC SURGERY

## 2020-06-25 PROCEDURE — 73560 X-RAY EXAM OF KNEE 1 OR 2: CPT | Mod: TC

## 2020-06-25 ASSESSMENT — PAIN SCALES - GENERAL: PAINLEVEL: SEVERE PAIN (6)

## 2020-06-25 ASSESSMENT — MIFFLIN-ST. JEOR: SCORE: 1886.64

## 2020-06-25 NOTE — LETTER
"    2020         RE: Hugo Longoria  [address] [facility] [id number]        Dear Colleague,    Thank you for referring your patient, Hugo Longoria, to the [facility]. Please see a copy of my visit note below.    Office Visit-Follow up    Chief Complaint: Hugo Longoria is a 59 year old male who is being seen for   Chief Complaint   Patient presents with     RECHECK     right periprosthetic tibia fracture- doi: 3/21/2020       History of Present Illness:   Some soreness.  However no significant pain.  Improving.  Physical therapy is going well.  Overall very happy.      Social History     Occupational History     Employer: [employer]     Comment: [employer]   Tobacco Use     Smoking status: Former Smoker     Last attempt to quit: 1981     Years since quittin.5     Smokeless tobacco: Never Used   Substance and Sexual Activity     Alcohol use: Yes     Alcohol/week: 0.0 standard drinks     Comment: couple on the weekend      Drug use: No     Sexual activity: Yes     Partners: Female     Birth control/protection: Surgical     Comment: vasectomy       REVIEW OF SYSTEMS  General: negative for, night sweats, dizziness, fatigue  Resp: No shortness of breath and no cough  CV: negative for chest pain, syncope or near-syncope  GI: negative for nausea, vomiting and diarrhea  : negative for dysuria and hematuria  Musculoskeletal: as above  Neurologic: negative for syncope   Hematologic: negative for bleeding disorder    Physical Exam:  Vitals: BP (!) 141/89   Ht 1.759 m (5' 9.25\")   Wt 107.2 kg (236 lb 6.4 oz)   BMI 34.66 kg/m    BMI= Body mass index is 34.66 kg/m .  Constitutional: healthy, alert and no acute distress   Psychiatric: mentation appears normal and affect normal/bright  NEURO: no focal deficits  RESP: Normal with easy respirations and no use of accessory muscles to breathe, no audible wheezing or retractions  CV: RLE: midcalf and down-  " non-pitting edema         Regular rate and rhythm by palpation  SKIN: No erythema, rashes, excoriation, or breakdown. No evidence of infection.   JOINT/EXTREMITIES:right knee: No focal areas of tenderness.  Active motion approximately 2-115 degrees.  No instability through the arc of motion.  No pain with varus and valgus testing.  No bony tenderness.  GAIT: not tested             Diagnostic Modalities:  right knee X-ray: Total knee arthroplasty in place.  No position changes.  No evidence of loosening.  Periprosthetic tibia fracture noted along the lateral metaphyseal area with increased callus formation decreased lucency and increased consolidation.  Independent visualization of the images was performed.      Impression: right right total knee arthroplasty with periprosthetic tibia fracture with healing    Plan:  All of the above pertinent physical exam and imaging modalities findings was reviewed with Hugo.    He is doing well.  Progressing with physical therapy.  Radiographs showing healing.  Recommend he continue to work on strengthening.  Okay to discontinue his brace.  May take some time to come around to fully recover as he never completed recovery from the knee replacement itself.  Plan to see him back in about 6 months for reevaluation.  This would be at the year anniversary.  All questions answered.    Progress activities as he tolerates.    Return to clinic 6, month(s), or sooner as needed for changes.  Re-x-ray on return: Yes.    Gerber Yin D.O.          Again, thank you for allowing me to participate in the care of your patient.        Sincerely,        Javier Yin, DO

## 2020-06-25 NOTE — PROGRESS NOTES
"Office Visit-Follow up    Chief Complaint: Hugo Longoria is a 59 year old male who is being seen for   Chief Complaint   Patient presents with     RECHECK     right periprosthetic tibia fracture- doi: 3/21/2020       History of Present Illness:   Some soreness.  However no significant pain.  Improving.  Physical therapy is going well.  Overall very happy.      Social History     Occupational History     Employer: [employer]     Comment: [employer]   Tobacco Use     Smoking status: Former Smoker     Last attempt to quit: 1981     Years since quittin.5     Smokeless tobacco: Never Used   Substance and Sexual Activity     Alcohol use: Yes     Alcohol/week: 0.0 standard drinks     Comment: couple on the weekend      Drug use: No     Sexual activity: Yes     Partners: Female     Birth control/protection: Surgical     Comment: vasectomy       REVIEW OF SYSTEMS  General: negative for, night sweats, dizziness, fatigue  Resp: No shortness of breath and no cough  CV: negative for chest pain, syncope or near-syncope  GI: negative for nausea, vomiting and diarrhea  : negative for dysuria and hematuria  Musculoskeletal: as above  Neurologic: negative for syncope   Hematologic: negative for bleeding disorder    Physical Exam:  Vitals: BP (!) 141/89   Ht 1.759 m (5' 9.25\")   Wt 107.2 kg (236 lb 6.4 oz)   BMI 34.66 kg/m    BMI= Body mass index is 34.66 kg/m .  Constitutional: healthy, alert and no acute distress   Psychiatric: mentation appears normal and affect normal/bright  NEURO: no focal deficits  RESP: Normal with easy respirations and no use of accessory muscles to breathe, no audible wheezing or retractions  CV: RLE: midcalf and down-  non-pitting edema         Regular rate and rhythm by palpation  SKIN: No erythema, rashes, excoriation, or breakdown. No evidence of infection.   JOINT/EXTREMITIES:right knee: No focal areas of tenderness.  Active motion approximately 2-115 degrees.  No " instability through the arc of motion.  No pain with varus and valgus testing.  No bony tenderness.  GAIT: not tested             Diagnostic Modalities:  right knee X-ray: Total knee arthroplasty in place.  No position changes.  No evidence of loosening.  Periprosthetic tibia fracture noted along the lateral metaphyseal area with increased callus formation decreased lucency and increased consolidation.  Independent visualization of the images was performed.      Impression: right right total knee arthroplasty with periprosthetic tibia fracture with healing    Plan:  All of the above pertinent physical exam and imaging modalities findings was reviewed with Hugo.    He is doing well.  Progressing with physical therapy.  Radiographs showing healing.  Recommend he continue to work on strengthening.  Okay to discontinue his brace.  May take some time to come around to fully recover as he never completed recovery from the knee replacement itself.  Plan to see him back in about 6 months for reevaluation.  This would be at the year anniversary.  All questions answered.    Progress activities as he tolerates.    Return to clinic 6, month(s), or sooner as needed for changes.  Re-x-ray on return: Yes.    Gerber Yin D.O.

## 2020-06-30 ENCOUNTER — THERAPY VISIT (OUTPATIENT)
Dept: PHYSICAL THERAPY | Facility: CLINIC | Age: 60
End: 2020-06-30
Payer: COMMERCIAL

## 2020-06-30 DIAGNOSIS — M97.8XXD PERIPROSTHETIC FRACTURE OF PROXIMAL END OF TIBIA, SUBSEQUENT ENCOUNTER: ICD-10-CM

## 2020-06-30 DIAGNOSIS — Z96.659 PERIPROSTHETIC FRACTURE OF PROXIMAL END OF TIBIA, SUBSEQUENT ENCOUNTER: ICD-10-CM

## 2020-06-30 DIAGNOSIS — M25.561 ACUTE PAIN OF RIGHT KNEE: ICD-10-CM

## 2020-06-30 PROCEDURE — 97110 THERAPEUTIC EXERCISES: CPT | Mod: GP | Performed by: PHYSICAL THERAPIST

## 2020-07-07 ENCOUNTER — THERAPY VISIT (OUTPATIENT)
Dept: PHYSICAL THERAPY | Facility: CLINIC | Age: 60
End: 2020-07-07
Payer: COMMERCIAL

## 2020-07-07 DIAGNOSIS — M97.8XXD PERIPROSTHETIC FRACTURE OF PROXIMAL END OF TIBIA, SUBSEQUENT ENCOUNTER: ICD-10-CM

## 2020-07-07 DIAGNOSIS — Z96.659 PERIPROSTHETIC FRACTURE OF PROXIMAL END OF TIBIA, SUBSEQUENT ENCOUNTER: ICD-10-CM

## 2020-07-07 DIAGNOSIS — M25.561 ACUTE PAIN OF RIGHT KNEE: ICD-10-CM

## 2020-07-07 PROCEDURE — 97110 THERAPEUTIC EXERCISES: CPT | Mod: GP | Performed by: PHYSICAL THERAPIST

## 2020-07-16 ENCOUNTER — THERAPY VISIT (OUTPATIENT)
Dept: PHYSICAL THERAPY | Facility: CLINIC | Age: 60
End: 2020-07-16
Payer: COMMERCIAL

## 2020-07-16 DIAGNOSIS — M97.8XXD PERIPROSTHETIC FRACTURE OF PROXIMAL END OF TIBIA, SUBSEQUENT ENCOUNTER: ICD-10-CM

## 2020-07-16 DIAGNOSIS — M25.561 ACUTE PAIN OF RIGHT KNEE: ICD-10-CM

## 2020-07-16 DIAGNOSIS — Z96.659 PERIPROSTHETIC FRACTURE OF PROXIMAL END OF TIBIA, SUBSEQUENT ENCOUNTER: ICD-10-CM

## 2020-07-16 PROCEDURE — 97530 THERAPEUTIC ACTIVITIES: CPT | Mod: GP | Performed by: PHYSICAL THERAPIST

## 2020-07-16 PROCEDURE — 97110 THERAPEUTIC EXERCISES: CPT | Mod: GP | Performed by: PHYSICAL THERAPIST

## 2020-07-21 ENCOUNTER — THERAPY VISIT (OUTPATIENT)
Dept: PHYSICAL THERAPY | Facility: CLINIC | Age: 60
End: 2020-07-21
Payer: COMMERCIAL

## 2020-07-21 ENCOUNTER — TELEPHONE (OUTPATIENT)
Dept: SURGERY | Facility: CLINIC | Age: 60
End: 2020-07-21

## 2020-07-21 DIAGNOSIS — Z96.659 PERIPROSTHETIC FRACTURE OF PROXIMAL END OF TIBIA, SUBSEQUENT ENCOUNTER: ICD-10-CM

## 2020-07-21 DIAGNOSIS — M97.8XXD PERIPROSTHETIC FRACTURE OF PROXIMAL END OF TIBIA, SUBSEQUENT ENCOUNTER: ICD-10-CM

## 2020-07-21 DIAGNOSIS — K60.2 ANAL FISSURE: Primary | ICD-10-CM

## 2020-07-21 DIAGNOSIS — M25.561 ACUTE PAIN OF RIGHT KNEE: ICD-10-CM

## 2020-07-21 PROCEDURE — 97140 MANUAL THERAPY 1/> REGIONS: CPT | Mod: GP | Performed by: PHYSICAL THERAPIST

## 2020-07-21 PROCEDURE — 97110 THERAPEUTIC EXERCISES: CPT | Mod: GP | Performed by: PHYSICAL THERAPIST

## 2020-07-21 NOTE — TELEPHONE ENCOUNTER
[facility] Call Center    Phone Message    May a detailed message be left on voicemail: yes     Reason for Call: Other: Pt calling in with pain and discomfort. Pt was last seen in 02/2020 for an anal fissure. Pt is not sure what is going on or if he needs to be seen again. Please follow up when available. Thank you      Action Taken: Message routed to:  [facility] Colon Rect [facility]    Travel Screening: Not Applicable

## 2020-07-21 NOTE — TELEPHONE ENCOUNTER
Pt called in with issues with his anal fissure again. He states it was healing but then he started getting constipated again resulting in hard stools. I emphasized the fiber and miralax to ensure soft stools and told him I would refill his niedipine. I told him if he doesn't seen improvement in 4 weeks to give us a call.

## 2020-07-28 ENCOUNTER — THERAPY VISIT (OUTPATIENT)
Dept: PHYSICAL THERAPY | Facility: CLINIC | Age: 60
End: 2020-07-28
Payer: COMMERCIAL

## 2020-07-28 DIAGNOSIS — Z96.659 PERIPROSTHETIC FRACTURE OF PROXIMAL END OF TIBIA, SUBSEQUENT ENCOUNTER: ICD-10-CM

## 2020-07-28 DIAGNOSIS — M25.561 ACUTE PAIN OF RIGHT KNEE: ICD-10-CM

## 2020-07-28 DIAGNOSIS — M97.8XXD PERIPROSTHETIC FRACTURE OF PROXIMAL END OF TIBIA, SUBSEQUENT ENCOUNTER: ICD-10-CM

## 2020-07-28 PROCEDURE — 97110 THERAPEUTIC EXERCISES: CPT | Mod: GP | Performed by: PHYSICAL THERAPIST

## 2020-07-28 PROCEDURE — 97140 MANUAL THERAPY 1/> REGIONS: CPT | Mod: GP | Performed by: PHYSICAL THERAPIST

## 2020-07-30 DIAGNOSIS — Z13.6 CARDIOVASCULAR SCREENING; LDL GOAL LESS THAN 130: ICD-10-CM

## 2020-07-31 RX ORDER — ATORVASTATIN CALCIUM 20 MG/1
TABLET, FILM COATED ORAL
Qty: 90 TABLET | Refills: 1 | Status: SHIPPED | OUTPATIENT
Start: 2020-07-31 | End: 2021-10-07

## 2020-07-31 NOTE — TELEPHONE ENCOUNTER
Called and spoke with patient regarding message below.  Patient verbalized understanding and will call back to schedule.  Mago Back CMA ([facility])

## 2020-07-31 NOTE — TELEPHONE ENCOUNTER
"Requested Prescriptions   Pending Prescriptions Disp Refills     atorvastatin (LIPITOR) 20 MG tablet [Pharmacy Med Name: Atorvastatin Calcium 20 MG Oral Tablet] 90 tablet 0     Sig: Take 1 tablet by mouth once daily       Statins Protocol Failed - 7/30/2020 11:48 AM        Failed - LDL on file in past 12 months     Recent Labs   Lab Test 01/24/18  0926   *             Passed - No abnormal creatine kinase in past 12 months     No lab results found.             Passed - Recent (12 mo) or future (30 days) visit within the authorizing provider's specialty     Patient has had an office visit with the authorizing provider or a provider within the authorizing providers department within the previous 12 mos or has a future within next 30 days. See \"Patient Info\" tab in inbasket, or \"Choose Columns\" in Meds & Orders section of the refill encounter.              Passed - Medication is active on med list        Passed - Patient is age 18 or older           Routing refill request to provider for review/approval because:  Labs not current:  LDL    Jaziel Bueno RN, BSN            "

## 2020-08-04 ENCOUNTER — THERAPY VISIT (OUTPATIENT)
Dept: PHYSICAL THERAPY | Facility: CLINIC | Age: 60
End: 2020-08-04
Payer: COMMERCIAL

## 2020-08-04 DIAGNOSIS — M25.561 ACUTE PAIN OF RIGHT KNEE: ICD-10-CM

## 2020-08-04 DIAGNOSIS — M97.8XXD PERIPROSTHETIC FRACTURE OF PROXIMAL END OF TIBIA, SUBSEQUENT ENCOUNTER: ICD-10-CM

## 2020-08-04 DIAGNOSIS — Z96.659 PERIPROSTHETIC FRACTURE OF PROXIMAL END OF TIBIA, SUBSEQUENT ENCOUNTER: ICD-10-CM

## 2020-08-04 PROCEDURE — 97110 THERAPEUTIC EXERCISES: CPT | Mod: GP | Performed by: PHYSICAL THERAPIST

## 2020-08-04 PROCEDURE — 97530 THERAPEUTIC ACTIVITIES: CPT | Mod: GP | Performed by: PHYSICAL THERAPIST

## 2020-08-04 ASSESSMENT — ACTIVITIES OF DAILY LIVING (ADL)
PAIN: I HAVE THE SYMPTOM BUT IT DOES NOT AFFECT MY ACTIVITY
SIT WITH YOUR KNEE BENT: ACTIVITY IS MINIMALLY DIFFICULT
SWELLING: I HAVE THE SYMPTOM BUT IT DOES NOT AFFECT MY ACTIVITY
GO UP STAIRS: ACTIVITY IS NOT DIFFICULT
WALK: ACTIVITY IS MINIMALLY DIFFICULT
STAND: ACTIVITY IS NOT DIFFICULT
HOW_WOULD_YOU_RATE_THE_OVERALL_FUNCTION_OF_YOUR_KNEE_DURING_YOUR_USUAL_DAILY_ACTIVITIES?: NEARLY NORMAL
KNEE_ACTIVITY_OF_DAILY_LIVING_SCORE: 70
RAW_SCORE: 49
KNEE_ACTIVITY_OF_DAILY_LIVING_SUM: 49
RISE FROM A CHAIR: ACTIVITY IS SOMEWHAT DIFFICULT
STIFFNESS: THE SYMPTOM AFFECTS MY ACTIVITY SLIGHTLY
WEAKNESS: THE SYMPTOM AFFECTS MY ACTIVITY SLIGHTLY
SQUAT: ACTIVITY IS SOMEWHAT DIFFICULT
AS_A_RESULT_OF_YOUR_KNEE_INJURY,_HOW_WOULD_YOU_RATE_YOUR_CURRENT_LEVEL_OF_DAILY_ACTIVITY?: NEARLY NORMAL
GO DOWN STAIRS: ACTIVITY IS FAIRLY DIFFICULT
LIMPING: THE SYMPTOM AFFECTS MY ACTIVITY SLIGHTLY
GIVING WAY, BUCKLING OR SHIFTING OF KNEE: I DO NOT HAVE THE SYMPTOM
KNEEL ON THE FRONT OF YOUR KNEE: ACTIVITY IS VERY DIFFICULT

## 2020-08-04 NOTE — PROGRESS NOTES
"Subjective:  HPI  Physical Exam       Knee Activity of Daily Living Score: 70            Objective:  System    Physical Exam    General     ROS    Assessment/Plan:    PROGRESS  REPORT    Progress reporting period is from 6/2/20 to 8/4/20.       SUBJECTIVE  Subjective changes noted by patient:  feels best with walking and mov't, he kt get stiff with prolonged sitting (starts after 10-15 min of sitting), walking up hills better, still a challenge with walking down hills, no issues up stairs, still marktime pattern down stairs,     Current Pain level: 2/10.     Previous pain level was  NA Initial Pain level: 10/10.   Changes in function:  Yes (See Goal flowsheet attached for changes in current functional level)  Adverse reaction to treatment or activity: None    OBJECTIVE  Changes noted in objective findings:    Objective: supine heelslide: 1-115, squat depth: 104 deg, retro step: R  5\", L 9\", 1 rep max jules press: L 242#,  R 98#     ASSESSMENT/PLAN  Updated problem list and treatment plan: Diagnosis 1:  S/p R TKA with tibial fracture    Pain -  hot/cold therapy, manual therapy, self management, education and home program  Decreased ROM/flexibility - manual therapy, therapeutic exercise, therapeutic activity and home program  Decreased strength - therapeutic exercise, therapeutic activities and home program  Decreased function - therapeutic activities and home program  STG/LTGs have been met or progress has been made towards goals:  Yes (See Goal flow sheet completed today.)  Assessment of Progress: The patient's condition is improving.  Strength is progressing slowly after atrophy from prolonged inactivity after fracture.  Self Management Plans:  Patient has been instructed in a home treatment program.  I have re-evaluated this patient and find that the nature, scope, duration and intensity of the therapy is appropriate for the medical condition of the patient.  Hugo continues to require the following intervention to " meet STG and LTG's:  PT    Recommendations:  This patient would benefit from continued therapy.     Frequency:  1 X week, once daily  Duration:  for 8 weeks        Please refer to the daily flowsheet for treatment today, total treatment time and time spent performing 1:1 timed codes.    Dwight High,PT, DPT, OCS

## 2020-08-11 ENCOUNTER — THERAPY VISIT (OUTPATIENT)
Dept: PHYSICAL THERAPY | Facility: CLINIC | Age: 60
End: 2020-08-11
Payer: COMMERCIAL

## 2020-08-11 DIAGNOSIS — Z96.659 PERIPROSTHETIC FRACTURE OF PROXIMAL END OF TIBIA, SUBSEQUENT ENCOUNTER: ICD-10-CM

## 2020-08-11 DIAGNOSIS — M97.8XXD PERIPROSTHETIC FRACTURE OF PROXIMAL END OF TIBIA, SUBSEQUENT ENCOUNTER: ICD-10-CM

## 2020-08-11 DIAGNOSIS — M25.561 ACUTE PAIN OF RIGHT KNEE: ICD-10-CM

## 2020-08-11 PROCEDURE — 97110 THERAPEUTIC EXERCISES: CPT | Mod: GP | Performed by: PHYSICAL THERAPIST

## 2020-08-11 PROCEDURE — 97530 THERAPEUTIC ACTIVITIES: CPT | Mod: GP | Performed by: PHYSICAL THERAPIST

## 2020-08-11 PROCEDURE — 97112 NEUROMUSCULAR REEDUCATION: CPT | Mod: GP | Performed by: PHYSICAL THERAPIST

## 2020-08-18 ENCOUNTER — THERAPY VISIT (OUTPATIENT)
Dept: PHYSICAL THERAPY | Facility: CLINIC | Age: 60
End: 2020-08-18
Payer: COMMERCIAL

## 2020-08-18 DIAGNOSIS — M25.561 ACUTE PAIN OF RIGHT KNEE: ICD-10-CM

## 2020-08-18 DIAGNOSIS — Z96.659 PERIPROSTHETIC FRACTURE OF PROXIMAL END OF TIBIA, SUBSEQUENT ENCOUNTER: ICD-10-CM

## 2020-08-18 DIAGNOSIS — M97.8XXD PERIPROSTHETIC FRACTURE OF PROXIMAL END OF TIBIA, SUBSEQUENT ENCOUNTER: ICD-10-CM

## 2020-08-18 PROCEDURE — 97110 THERAPEUTIC EXERCISES: CPT | Mod: GP | Performed by: PHYSICAL THERAPIST

## 2020-08-18 PROCEDURE — 97140 MANUAL THERAPY 1/> REGIONS: CPT | Mod: GP | Performed by: PHYSICAL THERAPIST

## 2020-08-25 ENCOUNTER — THERAPY VISIT (OUTPATIENT)
Dept: PHYSICAL THERAPY | Facility: CLINIC | Age: 60
End: 2020-08-25
Payer: COMMERCIAL

## 2020-08-25 DIAGNOSIS — M25.561 ACUTE PAIN OF RIGHT KNEE: ICD-10-CM

## 2020-08-25 DIAGNOSIS — M97.8XXD PERIPROSTHETIC FRACTURE OF PROXIMAL END OF TIBIA, SUBSEQUENT ENCOUNTER: ICD-10-CM

## 2020-08-25 DIAGNOSIS — Z96.659 PERIPROSTHETIC FRACTURE OF PROXIMAL END OF TIBIA, SUBSEQUENT ENCOUNTER: ICD-10-CM

## 2020-08-25 PROCEDURE — 97110 THERAPEUTIC EXERCISES: CPT | Mod: GP | Performed by: PHYSICAL THERAPIST

## 2020-08-25 PROCEDURE — 97112 NEUROMUSCULAR REEDUCATION: CPT | Mod: GP | Performed by: PHYSICAL THERAPIST

## 2020-10-07 PROBLEM — M97.8XXD: Status: RESOLVED | Noted: 2020-06-02 | Resolved: 2020-10-07

## 2020-10-07 PROBLEM — M25.561 ACUTE PAIN OF RIGHT KNEE: Status: RESOLVED | Noted: 2020-06-02 | Resolved: 2020-10-07

## 2020-10-07 PROBLEM — Z96.659: Status: RESOLVED | Noted: 2020-06-02 | Resolved: 2020-10-07

## 2020-10-07 NOTE — PROGRESS NOTES
Discharge Note    Progress reporting period is from last progress note on 08/04/20 to Aug 25, 2020.    Hugo failed to follow up and current status is unknown.  Please see information below for last relevant information on current status.  Patient seen for 13 visits.    SUBJECTIVE  Subjective changes noted by patient:  he is doing better, he is able to manage the stiffness in his knee with doing hip ER exercise, he does get stiff if sitting >15 min  .  Current pain level is 3/10.     Previous pain level was  10/10.   Changes in function:  Yes (See Goal flowsheet attached for changes in current functional level)  Adverse reaction to treatment or activity: None    OBJECTIVE  Changes noted in objective findings: supine heel slide: 1-0-114, swelling: suprapatella: R 46.5cm, L 44cm, infrapatella R 41cm, L 38cm, one rep max leg press: L 242#, R 176#     ASSESSMENT/PLAN  Diagnosis: tibial fracture near R TKA   Updated problem list and treatment plan:   Pain - HEP  Decreased ROM/flexibility - HEP  Decreased function - HEP  Decreased strength - HEP  Edema - HEP  STG/LTGs have been met or progress has been made towards goals:  Yes, please see goal flowsheet for most current information  Assessment of Progress: current status is unknown.    Last current status: Pt is progressing as expected   Self Management Plans:  HEP  I have re-evaluated this patient and find that the nature, scope, duration and intensity of the therapy is appropriate for the medical condition of the patient.  Hugo continues to require the following intervention to meet STG and LTG's:  HEP.    Recommendations:  Discharge with current home program.  Patient to follow up with MD as needed.    Please refer to the daily flowsheet for treatment today, total treatment time and time spent performing 1:1 timed codes.    Dwight High,PT, DPT, OCS

## 2020-12-09 NOTE — PROGRESS NOTES
Subjective     Hugo Longoria is a 59 year old male who presents to clinic today for the following health issues:    History of Present Illness       He eats 0-1 servings of fruits and vegetables daily.He consumes 1 sweetened beverage(s) daily.He exercises with enough effort to increase his heart rate 9 or less minutes per day.  He exercises with enough effort to increase his heart rate 3 or less days per week. He is missing 1 dose(s) of medications per week.  He is not taking prescribed medications regularly due to other.             URI on and off for 1 year. No fevers. No chills. No sweats.   No recent travel. Wife tested postive for Covid in November.   Patient tested for Covid last month and was negative.        Rectal tear for 1 year. No blood streaked toilet paper currently.   No blood in stools currently. A lot of blood in stools in October and November.   Patient stated Butt paste did not help.  Patient's aunt and uncle had colon cancer.         Patient also has concerned about multiple joint pain, especially his right knee, status post replacement over a year ago        Review of Systems   Constitutional, HEENT, cardiovascular, pulmonary, gi and gu systems are negative, except as otherwise noted.      Objective    There were no vitals taken for this visit.  There is no height or weight on file to calculate BMI.  Physical Exam   GENERAL: healthy, alert and no distress  EYES: Eyes grossly normal to inspection, PERRL and conjunctivae and sclerae normal  HENT: ear canals and TM's normal, nose and mouth without ulcers or lesions  NECK: no adenopathy, no asymmetry, masses, or scars and thyroid normal to palpation  RESP: lungs clear to auscultation - no rales, rhonchi or wheezes  CV: regular rate and rhythm, normal S1 S2, no S3 or S4, no murmur, click or rub, no peripheral edema and peripheral pulses strong  ABDOMEN: soft, nontender, no hepatosplenomegaly, no masses and bowel sounds normal  MS: Mild swelling  right lateral knee, well-healed scar from replacement  NEURO: Normal strength and tone, mentation intact and speech normal  PSYCH: mentation appears normal, affect normal/bright    Office Visit on 01/27/2020   Component Date Value Ref Range Status     WBC 01/27/2020 5.0  4.0 - 11.0 10e9/L Final     RBC Count 01/27/2020 4.90  4.4 - 5.9 10e12/L Final     Hemoglobin 01/27/2020 14.3  13.3 - 17.7 g/dL Final     Hematocrit 01/27/2020 41.0  40.0 - 53.0 % Final     MCV 01/27/2020 84  78 - 100 fl Final     MCH 01/27/2020 29.2  26.5 - 33.0 pg Final     MCHC 01/27/2020 34.9  31.5 - 36.5 g/dL Final     RDW 01/27/2020 13.1  10.0 - 15.0 % Final     Platelet Count 01/27/2020 233  150 - 450 10e9/L Final     % Neutrophils 01/27/2020 52.0  % Final     % Lymphocytes 01/27/2020 29.5  % Final     % Monocytes 01/27/2020 14.1  % Final     % Eosinophils 01/27/2020 4.0  % Final     % Basophils 01/27/2020 0.4  % Final     Absolute Neutrophil 01/27/2020 2.6  1.6 - 8.3 10e9/L Final     Absolute Lymphocytes 01/27/2020 1.5  0.8 - 5.3 10e9/L Final     Absolute Monocytes 01/27/2020 0.7  0.0 - 1.3 10e9/L Final     Absolute Eosinophils 01/27/2020 0.2  0.0 - 0.7 10e9/L Final     Absolute Basophils 01/27/2020 0.0  0.0 - 0.2 10e9/L Final     Diff Method 01/27/2020 Automated Method   Final     Sodium 01/27/2020 136  133 - 144 mmol/L Final     Potassium 01/27/2020 4.1  3.4 - 5.3 mmol/L Final     Chloride 01/27/2020 105  94 - 109 mmol/L Final     Carbon Dioxide 01/27/2020 26  20 - 32 mmol/L Final     Anion Gap 01/27/2020 5  3 - 14 mmol/L Final     Glucose 01/27/2020 98  70 - 99 mg/dL Final     Urea Nitrogen 01/27/2020 11  7 - 30 mg/dL Final     Creatinine 01/27/2020 0.68  0.66 - 1.25 mg/dL Final     GFR Estimate 01/27/2020 >90  >60 mL/min/[1.73_m2] Final    Comment: Non  GFR Calc  Starting 12/18/2018, serum creatinine based estimated GFR (eGFR) will be   calculated using the Chronic Kidney Disease Epidemiology Collaboration   (CKD-EPI)  equation.       GFR Estimate If Black 01/27/2020 >90  >60 mL/min/[1.73_m2] Final    Comment:  GFR Calc  Starting 12/18/2018, serum creatinine based estimated GFR (eGFR) will be   calculated using the Chronic Kidney Disease Epidemiology Collaboration   (CKD-EPI) equation.       Calcium 01/27/2020 9.4  8.5 - 10.1 mg/dL Final     Bilirubin Total 01/27/2020 0.4  0.2 - 1.3 mg/dL Final     Albumin 01/27/2020 4.1  3.4 - 5.0 g/dL Final     Protein Total 01/27/2020 7.6  6.8 - 8.8 g/dL Final     Alkaline Phosphatase 01/27/2020 80  40 - 150 U/L Final     ALT 01/27/2020 31  0 - 70 U/L Final     AST 01/27/2020 13  0 - 45 U/L Final     Hepatitis C Antibody 01/27/2020 Nonreactive  NR^Nonreactive Final    Comment: Assay performance characteristics have not been established for newborns,   infants, and children               Assessment & Plan     Seasonal allergic rhinitis, unspecified trigger  51-year-old male with 3-month history of upper respiratory symptoms.  That is about the same time that he stopped taking his allergic rhinitis medication.  I believe this is more allergy related than anything.  Recommended that he restart loratadine, he can increase to twice a day, because he stated before it was only lasting half the day.  Also recommend restarting Flonase.  Follow-up in 2 weeks if no improvement.  - loratadine (CLARITIN) 10 MG tablet; Take 1 tablet (10 mg) by mouth 2 times daily  - fluticasone (FLONASE) 50 MCG/ACT nasal spray; Spray 1 spray into both nostrils daily    Need for prophylactic vaccination and inoculation against influenza  Flu shot conducted today.  - INFLUENZA QUAD, RECOMBINANT, P-FREE (RIV4) (FLUBLOCK) [73915]    Anal fissure  History of anal fissure, slow to heal.  Has also had multiple episodes of blood in the stool.  Sending for colonoscopy for evaluation.  - GASTROENTEROLOGY ADULT REF PROCEDURE ONLY; Future    Blood in stool  See above.  - GASTROENTEROLOGY ADULT REF PROCEDURE ONLY;  "Future    Fatigue, unspecified type  Chronic fatigue, had sleep study in the past that was normal.  May consider repeating sleep study.  - TSH with free T4 reflex    Arthralgia, unspecified joint  He has noticed over the past year multiple joint arthralgia, will start with lab evaluation.  Suspect osteoarthritis.  - CBC with platelets and differential  - ESR: Erythrocyte sedimentation rate  - CRP, inflammation  - Comprehensive metabolic panel (BMP + Alb, Alk Phos, ALT, AST, Total. Bili, TP)  - TSH with free T4 reflex    Primary osteoarthritis of right knee  Status post replacement, continued pain.  - XR Knee Right 3 Views     BMI:   Estimated body mass index is 33.94 kg/m  as calculated from the following:    Height as of this encounter: 1.759 m (5' 9.25\").    Weight as of this encounter: 105 kg (231 lb 8 oz).            Return in about 2 weeks (around 12/24/2020) for Annual Well Check.    Bony Robin MD  [facility]    Answers for HPI/ROS submitted by the patient on 12/10/2020   If you checked off any problems, how difficult have these problems made it for you to do your work, take care of things at home, or get along with other people?: Somewhat difficult  PHQ9 TOTAL SCORE: 4  KERRY 7 TOTAL SCORE: 0    "

## 2020-12-10 ENCOUNTER — ANCILLARY PROCEDURE (OUTPATIENT)
Dept: GENERAL RADIOLOGY | Facility: CLINIC | Age: 60
End: 2020-12-10
Attending: FAMILY MEDICINE
Payer: COMMERCIAL

## 2020-12-10 ENCOUNTER — OFFICE VISIT (OUTPATIENT)
Dept: FAMILY MEDICINE | Facility: CLINIC | Age: 60
End: 2020-12-10
Payer: COMMERCIAL

## 2020-12-10 VITALS
OXYGEN SATURATION: 95 % | DIASTOLIC BLOOD PRESSURE: 68 MMHG | HEART RATE: 86 BPM | HEIGHT: 69 IN | RESPIRATION RATE: 18 BRPM | TEMPERATURE: 97.4 F | WEIGHT: 231.5 LBS | SYSTOLIC BLOOD PRESSURE: 122 MMHG | BODY MASS INDEX: 34.29 KG/M2

## 2020-12-10 DIAGNOSIS — R53.83 FATIGUE, UNSPECIFIED TYPE: ICD-10-CM

## 2020-12-10 DIAGNOSIS — K60.2 ANAL FISSURE: ICD-10-CM

## 2020-12-10 DIAGNOSIS — J30.2 SEASONAL ALLERGIC RHINITIS, UNSPECIFIED TRIGGER: Primary | ICD-10-CM

## 2020-12-10 DIAGNOSIS — M17.11 PRIMARY OSTEOARTHRITIS OF RIGHT KNEE: ICD-10-CM

## 2020-12-10 DIAGNOSIS — M25.50 ARTHRALGIA, UNSPECIFIED JOINT: ICD-10-CM

## 2020-12-10 DIAGNOSIS — K92.1 BLOOD IN STOOL: ICD-10-CM

## 2020-12-10 DIAGNOSIS — Z23 NEED FOR PROPHYLACTIC VACCINATION AND INOCULATION AGAINST INFLUENZA: ICD-10-CM

## 2020-12-10 LAB
ALBUMIN SERPL-MCNC: 3.8 G/DL (ref 3.4–5)
ALP SERPL-CCNC: 71 U/L (ref 40–150)
ALT SERPL W P-5'-P-CCNC: 39 U/L (ref 0–70)
ANION GAP SERPL CALCULATED.3IONS-SCNC: 3 MMOL/L (ref 3–14)
AST SERPL W P-5'-P-CCNC: 13 U/L (ref 0–45)
BASOPHILS # BLD AUTO: 0 10E9/L (ref 0–0.2)
BASOPHILS NFR BLD AUTO: 0.6 %
BILIRUB SERPL-MCNC: 0.5 MG/DL (ref 0.2–1.3)
BUN SERPL-MCNC: 9 MG/DL (ref 7–30)
CALCIUM SERPL-MCNC: 8.7 MG/DL (ref 8.5–10.1)
CHLORIDE SERPL-SCNC: 110 MMOL/L (ref 94–109)
CO2 SERPL-SCNC: 28 MMOL/L (ref 20–32)
CREAT SERPL-MCNC: 0.76 MG/DL (ref 0.66–1.25)
CRP SERPL-MCNC: 9 MG/L (ref 0–8)
DIFFERENTIAL METHOD BLD: ABNORMAL
EOSINOPHIL # BLD AUTO: 0.1 10E9/L (ref 0–0.7)
EOSINOPHIL NFR BLD AUTO: 2.9 %
ERYTHROCYTE [DISTWIDTH] IN BLOOD BY AUTOMATED COUNT: 13.7 % (ref 10–15)
ERYTHROCYTE [SEDIMENTATION RATE] IN BLOOD BY WESTERGREN METHOD: 6 MM/H (ref 0–20)
GFR SERPL CREATININE-BSD FRML MDRD: >90 ML/MIN/{1.73_M2}
GLUCOSE SERPL-MCNC: 115 MG/DL (ref 70–99)
HCT VFR BLD AUTO: 44.1 % (ref 40–53)
HGB BLD-MCNC: 15.4 G/DL (ref 13.3–17.7)
LYMPHOCYTES # BLD AUTO: 1.1 10E9/L (ref 0.8–5.3)
LYMPHOCYTES NFR BLD AUTO: 32.7 %
MCH RBC QN AUTO: 30.3 PG (ref 26.5–33)
MCHC RBC AUTO-ENTMCNC: 34.9 G/DL (ref 31.5–36.5)
MCV RBC AUTO: 87 FL (ref 78–100)
MONOCYTES # BLD AUTO: 0.6 10E9/L (ref 0–1.3)
MONOCYTES NFR BLD AUTO: 16 %
NEUTROPHILS # BLD AUTO: 1.7 10E9/L (ref 1.6–8.3)
NEUTROPHILS NFR BLD AUTO: 47.8 %
PLATELET # BLD AUTO: 170 10E9/L (ref 150–450)
POTASSIUM SERPL-SCNC: 4.1 MMOL/L (ref 3.4–5.3)
PROT SERPL-MCNC: 6.9 G/DL (ref 6.8–8.8)
RBC # BLD AUTO: 5.08 10E12/L (ref 4.4–5.9)
SODIUM SERPL-SCNC: 141 MMOL/L (ref 133–144)
TSH SERPL DL<=0.005 MIU/L-ACNC: 2.51 MU/L (ref 0.4–4)
WBC # BLD AUTO: 3.5 10E9/L (ref 4–11)

## 2020-12-10 PROCEDURE — 86140 C-REACTIVE PROTEIN: CPT | Performed by: FAMILY MEDICINE

## 2020-12-10 PROCEDURE — 90682 RIV4 VACC RECOMBINANT DNA IM: CPT | Performed by: FAMILY MEDICINE

## 2020-12-10 PROCEDURE — 85652 RBC SED RATE AUTOMATED: CPT | Performed by: FAMILY MEDICINE

## 2020-12-10 PROCEDURE — 36415 COLL VENOUS BLD VENIPUNCTURE: CPT | Performed by: FAMILY MEDICINE

## 2020-12-10 PROCEDURE — 80050 GENERAL HEALTH PANEL: CPT | Performed by: FAMILY MEDICINE

## 2020-12-10 PROCEDURE — 90471 IMMUNIZATION ADMIN: CPT | Performed by: FAMILY MEDICINE

## 2020-12-10 PROCEDURE — 73562 X-RAY EXAM OF KNEE 3: CPT | Mod: RT | Performed by: RADIOLOGY

## 2020-12-10 PROCEDURE — 99214 OFFICE O/P EST MOD 30 MIN: CPT | Mod: 25 | Performed by: FAMILY MEDICINE

## 2020-12-10 RX ORDER — FLUTICASONE PROPIONATE 50 MCG
1 SPRAY, SUSPENSION (ML) NASAL DAILY
Qty: 16 G | Refills: 3 | COMMUNITY
Start: 2020-12-10 | End: 2020-12-22

## 2020-12-10 RX ORDER — LORATADINE 10 MG/1
10 TABLET ORAL 2 TIMES DAILY
Qty: 180 TABLET | Refills: 1 | COMMUNITY
Start: 2020-12-10 | End: 2020-12-22

## 2020-12-10 ASSESSMENT — ANXIETY QUESTIONNAIRES
7. FEELING AFRAID AS IF SOMETHING AWFUL MIGHT HAPPEN: NOT AT ALL
2. NOT BEING ABLE TO STOP OR CONTROL WORRYING: NOT AT ALL
GAD7 TOTAL SCORE: 0
7. FEELING AFRAID AS IF SOMETHING AWFUL MIGHT HAPPEN: NOT AT ALL
3. WORRYING TOO MUCH ABOUT DIFFERENT THINGS: NOT AT ALL
1. FEELING NERVOUS, ANXIOUS, OR ON EDGE: NOT AT ALL
5. BEING SO RESTLESS THAT IT IS HARD TO SIT STILL: NOT AT ALL
6. BECOMING EASILY ANNOYED OR IRRITABLE: NOT AT ALL
4. TROUBLE RELAXING: NOT AT ALL
GAD7 TOTAL SCORE: 0
GAD7 TOTAL SCORE: 0

## 2020-12-10 ASSESSMENT — MIFFLIN-ST. JEOR: SCORE: 1864.42

## 2020-12-10 ASSESSMENT — PATIENT HEALTH QUESTIONNAIRE - PHQ9
10. IF YOU CHECKED OFF ANY PROBLEMS, HOW DIFFICULT HAVE THESE PROBLEMS MADE IT FOR YOU TO DO YOUR WORK, TAKE CARE OF THINGS AT HOME, OR GET ALONG WITH OTHER PEOPLE: SOMEWHAT DIFFICULT
SUM OF ALL RESPONSES TO PHQ QUESTIONS 1-9: 4
SUM OF ALL RESPONSES TO PHQ QUESTIONS 1-9: 4

## 2020-12-10 NOTE — RESULT ENCOUNTER NOTE
Hugo,  Your recent studies showed a mildly decreased white blood cell count, however the rest of the blood count was normal, therefore I do not think this is clinically significant at this time.  Your glucose shows up a slightly elevated, however that is a normal nonfasting lab.  If you had been fasting that would have put you in the prediabetic range.  Your thyroid was normal, your ESR was normal that is a marker for inflammation.  Your CRP marker for inflammation was just over normal.  I do not think that this is clinically significant at this time either.  Please let me know if you have any questions or concerns and follow up as discussed in clinic.    Sincerely.  Dr. JEYSON Robin MD, FAAFP  Family Medicine Physician  [facility]  [address] [place] [address]

## 2020-12-10 NOTE — RESULT ENCOUNTER NOTE
Hugo,  Your recent x-ray showed your fracture is healed, no other significant issues.  Please let me know if you have any questions or concerns and follow up as discussed in clinic.    Sincerely.  Dr. JEYSON Robin MD, FAAFP  Family Medicine Physician  [facility]  [address] [place] [address]

## 2020-12-11 ASSESSMENT — ANXIETY QUESTIONNAIRES: GAD7 TOTAL SCORE: 0

## 2020-12-11 ASSESSMENT — PATIENT HEALTH QUESTIONNAIRE - PHQ9: SUM OF ALL RESPONSES TO PHQ QUESTIONS 1-9: 4

## 2020-12-16 ENCOUNTER — ANCILLARY PROCEDURE (OUTPATIENT)
Dept: GENERAL RADIOLOGY | Facility: CLINIC | Age: 60
End: 2020-12-16
Attending: ORTHOPAEDIC SURGERY
Payer: COMMERCIAL

## 2020-12-16 ENCOUNTER — OFFICE VISIT (OUTPATIENT)
Dept: ORTHOPEDICS | Facility: CLINIC | Age: 60
End: 2020-12-16
Payer: COMMERCIAL

## 2020-12-16 VITALS
BODY MASS INDEX: 35.25 KG/M2 | DIASTOLIC BLOOD PRESSURE: 72 MMHG | WEIGHT: 238 LBS | SYSTOLIC BLOOD PRESSURE: 106 MMHG | HEIGHT: 69 IN

## 2020-12-16 DIAGNOSIS — Z96.651 S/P TOTAL KNEE REPLACEMENT USING CEMENT, RIGHT: ICD-10-CM

## 2020-12-16 DIAGNOSIS — Z96.659 PERIPROSTHETIC FRACTURE OF PROXIMAL END OF TIBIA, SUBSEQUENT ENCOUNTER: Primary | ICD-10-CM

## 2020-12-16 DIAGNOSIS — Z96.659 PERIPROSTHETIC FRACTURE OF PROXIMAL END OF TIBIA, SUBSEQUENT ENCOUNTER: ICD-10-CM

## 2020-12-16 DIAGNOSIS — M97.8XXD PERIPROSTHETIC FRACTURE OF PROXIMAL END OF TIBIA, SUBSEQUENT ENCOUNTER: ICD-10-CM

## 2020-12-16 DIAGNOSIS — M97.8XXD PERIPROSTHETIC FRACTURE OF PROXIMAL END OF TIBIA, SUBSEQUENT ENCOUNTER: Primary | ICD-10-CM

## 2020-12-16 PROCEDURE — 73562 X-RAY EXAM OF KNEE 3: CPT | Mod: TC | Performed by: RADIOLOGY

## 2020-12-16 PROCEDURE — 99213 OFFICE O/P EST LOW 20 MIN: CPT | Performed by: ORTHOPAEDIC SURGERY

## 2020-12-16 ASSESSMENT — MIFFLIN-ST. JEOR: SCORE: 1893.9

## 2020-12-16 ASSESSMENT — PAIN SCALES - GENERAL: PAINLEVEL: NO PAIN (0)

## 2020-12-16 NOTE — PROGRESS NOTES
"Office Visit-Follow up    Chief Complaint: Hugo Longoria is a 59 year old male who is being seen for   Chief Complaint   Patient presents with     RECHECK     right right total knee arthroplasty with periprosthetic tibia fracture      Surgical Followup     dos 12/10/19 Right knee replacement~1 year postop       History of Present Illness:   Doing well.  No pain.  He feels like he is getting stronger every day.      Social History     Occupational History     Employer: [employer]     Comment: [employer]   Tobacco Use     Smoking status: Former Smoker     Quit date: 1981     Years since quittin.9     Smokeless tobacco: Never Used   Substance and Sexual Activity     Alcohol use: Yes     Alcohol/week: 0.0 standard drinks     Comment: occ      Drug use: No     Sexual activity: Yes     Partners: Female     Birth control/protection: Surgical     Comment: vasectomy       REVIEW OF SYSTEMS  General: negative for, night sweats, dizziness, fatigue  Resp: No shortness of breath and no cough  CV: negative for chest pain, syncope or near-syncope  GI: negative for nausea, vomiting and diarrhea  : negative for dysuria and hematuria  Musculoskeletal: as above  Neurologic: negative for syncope   Hematologic: negative for bleeding disorder    Physical Exam:  Vitals: /72   Ht 1.759 m (5' 9.25\")   Wt 108 kg (238 lb)   BMI 34.89 kg/m    BMI= Body mass index is 34.89 kg/m .  Constitutional: healthy, alert and no acute distress   Psychiatric: mentation appears normal and affect normal/bright  NEURO: no focal deficits  RESP: Normal with easy respirations and no use of accessory muscles to breathe, no audible wheezing or retractions  CV: RLE: no edema           SKIN: No erythema, rashes, excoriation, or breakdown. No evidence of infection.   JOINT/EXTREMITIES:right knee: Active motion 0-120 degrees.  Patella tracks midline.  No focal areas of discomfort.  No gross malalignment.  No instability varus " valgus testing.  No peripheral edema.  No effusion  GAIT: not tested             Diagnostic Modalities:  right knee X-ray: No acute fractures or dislocations.  Total knee arthroplasty is in place with no evidence of loosening.  On the lateral there is some prominence along the anterior cortex of the tibia just superior to the tibial tubercle consistent with previous fracture.  Independent visualization of the images was performed.      Impression: right total knee arthroplasty-December 10, 2019  Right periprosthetic tibia fracture-healed    Plan:  All of the above pertinent physical exam and imaging modalities findings was reviewed with Hugo.    Radiographic and clinically doing well.  Fracture is well-healed.  He is happy with the knee replacement.  Recommend he focus on strengthening.  Plan to see him back every few years for routine total knee follow-up.    Return to clinic PRN, or sooner as needed for changes.  Re-x-ray on return: No    Gerber Yin D.O.

## 2020-12-16 NOTE — LETTER
"    2020         RE: Hugo Longoria  [address] [address] [address] [facility] [address]        Dear Colleague,    Thank you for referring your patient, Hugo Longoria, to the [facility]. Please see a copy of my visit note below.    Office Visit-Follow up    Chief Complaint: Hugo Longoria is a 59 year old male who is being seen for   Chief Complaint   Patient presents with     RECHECK     right right total knee arthroplasty with periprosthetic tibia fracture      Surgical Followup     dos 12/10/19 Right knee replacement~1 year postop       History of Present Illness:   Doing well.  No pain.  He feels like he is getting stronger every day.      Social History     Occupational History     Employer: [employer]     Comment: [employer]   Tobacco Use     Smoking status: Former Smoker     Quit date: 1981     Years since quittin.9     Smokeless tobacco: Never Used   Substance and Sexual Activity     Alcohol use: Yes     Alcohol/week: 0.0 standard drinks     Comment: occ      Drug use: No     Sexual activity: Yes     Partners: Female     Birth control/protection: Surgical     Comment: vasectomy       REVIEW OF SYSTEMS  General: negative for, night sweats, dizziness, fatigue  Resp: No shortness of breath and no cough  CV: negative for chest pain, syncope or near-syncope  GI: negative for nausea, vomiting and diarrhea  : negative for dysuria and hematuria  Musculoskeletal: as above  Neurologic: negative for syncope   Hematologic: negative for bleeding disorder    Physical Exam:  Vitals: /72   Ht 1.759 m (5' 9.25\")   Wt 108 kg (238 lb)   BMI 34.89 kg/m    BMI= Body mass index is 34.89 kg/m .  Constitutional: healthy, alert and no acute distress   Psychiatric: mentation appears normal and affect normal/bright  NEURO: no focal deficits  RESP: Normal with easy respirations and no use of accessory muscles to breathe, no audible wheezing or retractions  CV: RLE: no " edema           SKIN: No erythema, rashes, excoriation, or breakdown. No evidence of infection.   JOINT/EXTREMITIES:right knee: Active motion 0-120 degrees.  Patella tracks midline.  No focal areas of discomfort.  No gross malalignment.  No instability varus valgus testing.  No peripheral edema.  No effusion  GAIT: not tested             Diagnostic Modalities:  right knee X-ray: No acute fractures or dislocations.  Total knee arthroplasty is in place with no evidence of loosening.  On the lateral there is some prominence along the anterior cortex of the tibia just superior to the tibial tubercle consistent with previous fracture.  Independent visualization of the images was performed.      Impression: right total knee arthroplasty-December 10, 2019  Right periprosthetic tibia fracture-healed    Plan:  All of the above pertinent physical exam and imaging modalities findings was reviewed with Hugo.    Radiographic and clinically doing well.  Fracture is well-healed.  He is happy with the knee replacement.  Recommend he focus on strengthening.  Plan to see him back every few years for routine total knee follow-up.    Return to clinic PRN, or sooner as needed for changes.  Re-x-ray on return: No    Gerber Yin D.O.            Again, thank you for allowing me to participate in the care of your patient.        Sincerely,        Javier Yin, DO

## 2020-12-17 NOTE — PATIENT INSTRUCTIONS
Patient Education     Electromyogram  Your test is on (date) ________________ at (time) __________  What is an electromyogram (EMG)?  This test uses electrodes and a needle to measure the electrical signals of your nerves and muscles. It helps find the cause of weakness, spasms, numbness or pain in the arms, legs or face. The test helps diagnose muscle and nerve diseases.  How is the test done?  The test takes 30 to 60 minutes. Before the test, you will have a brief exam.  Part 1    We place electrodes on the skin.    An electric charge is sent along the nerve.    This allows us to measure how well the nerve carries the signal.  Part 2    The doctor inserts a fine needle into various muscle groups. There is no electric charge.    The doctor can see and hear the muscle's electrical activity on the monitors.  Will it hurt?  Most people handle the test without a problem. You may feel shocks or muscle twitches during Part 1, and you may feel pressure or pinching during part 2.  We do not prescribe medicine for this test. If you feel very anxious, call your regular doctor and discuss options to help you relax.  How do I get the results?  Your regular doctor will receive a report in about a week. You will need to call your doctor.  For informational purposes only. Not to replace the advice of your health care provider.  Copyright   2015 [employer]. All rights reserved. Clinically reviewed by [facility]. [employer] [id number] - REV 09/18.  For informational purposes only. Not to replace the advice of your health care provider.  Copyright   2018 [employer]. All rights reserved.

## 2020-12-17 NOTE — PROGRESS NOTES
SUBJECTIVE:   CC: Hugo Longoria is an 59 year old male who presents for preventative health visit.     {Split Bill scripting  The purpose of this visit is to discuss your medical history and prevent health problems before you are sick. You may be responsible for a co-pay, coinsurance, or deductible if your visit today includes services such as checking on a sore throat, having an x-ray or lab test, or treating and evaluating a new or existing condition :465941}  Patient has been advised of split billing requirements and indicates understanding: {Yes and No:408724}  HPI  {Add if <65 person on Medicare  - Required Questions (Optional):225834}  {Outside tests to abstract? :598163}    {additional problems to add (Optional):431249}    Today's PHQ-2 Score:   PHQ-2 (  [employer]) 12/10/2020   Q1: Little interest or pleasure in doing things 0   Q2: Feeling down, depressed or hopeless 0   PHQ-2 Score 0   Q1: Little interest or pleasure in doing things Not at all   Q2: Feeling down, depressed or hopeless Not at all   PHQ-2 Score 0       Abuse: Current or Past(Physical, Sexual or Emotional)- { :570411}  Do you feel safe in your environment? { :204757}        Social History     Tobacco Use     Smoking status: Former Smoker     Quit date: 1981     Years since quittin.9     Smokeless tobacco: Never Used   Substance Use Topics     Alcohol use: Yes     Alcohol/week: 0.0 standard drinks     Comment: occ      {Rooming Staff- Complete this question if Prescreen response is not shown below for today's visit. If you drink alcohol do you typically have >3 drinks per day or >7 drinks per week? (Optional):994023}    No flowsheet data found.{add AUDIT responses (Optional) (A score of 7 for adult men is an indication of hazardous drinking; a score of 8 or more is an indication of an alcohol use disorder.  A score of 7 or more for adult women is an indication of hazardous drinking or an alchohol use disorder):668076}    Last PSA:  "  PSA   Date Value Ref Range Status   03/12/2013 0.62 0 - 4 ug/L Final       Reviewed orders with patient. Reviewed health maintenance and updated orders accordingly - { :964676::\"Yes\"}  {Chronicprobdata (optional):635426}    Reviewed and updated as needed this visit by clinical staff                 Reviewed and updated as needed this visit by Provider                {HISTORY OPTIONS (Optional):712517}    Review of Systems  {MALE ROS (Optional):848173::\"CONSTITUTIONAL: NEGATIVE for fever, chills, change in weight\",\"INTEGUMENTARY/SKIN: NEGATIVE for worrisome rashes, moles or lesions\",\"EYES: NEGATIVE for vision changes or irritation\",\"ENT: NEGATIVE for ear, mouth and throat problems\",\"RESP: NEGATIVE for significant cough or SOB\",\"CV: NEGATIVE for chest pain, palpitations or peripheral edema\",\"GI: NEGATIVE for nausea, abdominal pain, heartburn, or change in bowel habits\",\" male: negative for dysuria, hematuria, decreased urinary stream, erectile dysfunction, urethral discharge\",\"MUSCULOSKELETAL: NEGATIVE for significant arthralgias or myalgia\",\"NEURO: NEGATIVE for weakness, dizziness or paresthesias\",\"PSYCHIATRIC: NEGATIVE for changes in mood or affect\"}    OBJECTIVE:   There were no vitals taken for this visit.    Physical Exam  {Exam Choices (Optional):080618}    {Diagnostic Test Results (Optional):404736::\"Diagnostic Test Results:\",\"Labs reviewed in Epic\"}    ASSESSMENT/PLAN:   {Diag Picklist:337267}    Patient has been advised of split billing requirements and indicates understanding: {YES / NO:699602::\"Yes\"}  COUNSELING:   {MALE COUNSELING MESSAGES:629342::\"Reviewed preventive health counseling, as reflected in patient instructions\"}    Estimated body mass index is 34.89 kg/m  as calculated from the following:    Height as of 12/16/20: 1.759 m (5' 9.25\").    Weight as of 12/16/20: 108 kg (238 lb).     {Weight Management Plan (ACO) Complete if BMI is abnormal-  Ages 18-64  BMI >24.9.  Age 65+ with BMI <23 or >30 " (Optional):924226}    He reports that he quit smoking about 39 years ago. He has never used smokeless tobacco.      Counseling Resources:  ATP IV Guidelines  Pooled Cohorts Equation Calculator  FRAX Risk Assessment  ICSI Preventive Guidelines  Dietary Guidelines for Americans, 2010  USDA's MyPlate  ASA Prophylaxis  Lung CA Screening    Bony Robin MD  [facility]

## 2020-12-21 DIAGNOSIS — Z11.59 ENCOUNTER FOR SCREENING FOR OTHER VIRAL DISEASES: ICD-10-CM

## 2020-12-22 ENCOUNTER — OFFICE VISIT (OUTPATIENT)
Dept: FAMILY MEDICINE | Facility: CLINIC | Age: 60
End: 2020-12-22
Payer: COMMERCIAL

## 2020-12-22 VITALS
WEIGHT: 237.5 LBS | SYSTOLIC BLOOD PRESSURE: 128 MMHG | BODY MASS INDEX: 34.82 KG/M2 | TEMPERATURE: 97.3 F | HEART RATE: 96 BPM | OXYGEN SATURATION: 95 % | DIASTOLIC BLOOD PRESSURE: 82 MMHG | RESPIRATION RATE: 16 BRPM

## 2020-12-22 DIAGNOSIS — K60.2 ANAL FISSURE: ICD-10-CM

## 2020-12-22 DIAGNOSIS — D72.819 LEUKOPENIA, UNSPECIFIED TYPE: ICD-10-CM

## 2020-12-22 DIAGNOSIS — M17.11 PRIMARY OSTEOARTHRITIS OF RIGHT KNEE: ICD-10-CM

## 2020-12-22 DIAGNOSIS — R20.0 BILATERAL HAND NUMBNESS: Primary | ICD-10-CM

## 2020-12-22 PROCEDURE — 99214 OFFICE O/P EST MOD 30 MIN: CPT | Performed by: FAMILY MEDICINE

## 2020-12-22 ASSESSMENT — PAIN SCALES - GENERAL: PAINLEVEL: NO PAIN (0)

## 2020-12-22 NOTE — PROGRESS NOTES
Subjective     Hugo Longoria is a 60 year old male who presents to clinic today for the following health issues:    HPI       Patient here to discuss bilateral hand numbness, seems to be worsening over the past few months.  Primarily at night.  He does sleep with his arms tucked underneath him.  Also follow-up for his right knee pain, ongoing issues with anal fissure.  He has colonoscopy scheduled for next month.    Review of Systems   Constitutional, HEENT, cardiovascular, pulmonary, gi and gu systems are negative, except as otherwise noted.      Objective    /82   Pulse 96   Temp 97.3  F (36.3  C) (Temporal)   Resp 16   Wt 107.7 kg (237 lb 8 oz)   SpO2 95%   BMI 34.82 kg/m    Body mass index is 34.82 kg/m .  Physical Exam   GENERAL: healthy, alert and no distress  NECK: no adenopathy, no asymmetry, masses, or scars and thyroid normal to palpation  RESP: lungs clear to auscultation - no rales, rhonchi or wheezes  CV: regular rate and rhythm, normal S1 S2, no S3 or S4, no murmur, click or rub, no peripheral edema and peripheral pulses strong  MS: no gross musculoskeletal defects noted, no edema  NEURO: Normal strength and tone, mentation intact and speech normal  PSYCH: mentation appears normal, affect normal/bright    Office Visit on 12/10/2020   Component Date Value Ref Range Status     WBC 12/10/2020 3.5* 4.0 - 11.0 10e9/L Final     RBC Count 12/10/2020 5.08  4.4 - 5.9 10e12/L Final     Hemoglobin 12/10/2020 15.4  13.3 - 17.7 g/dL Final     Hematocrit 12/10/2020 44.1  40.0 - 53.0 % Final     MCV 12/10/2020 87  78 - 100 fl Final     MCH 12/10/2020 30.3  26.5 - 33.0 pg Final     MCHC 12/10/2020 34.9  31.5 - 36.5 g/dL Final     RDW 12/10/2020 13.7  10.0 - 15.0 % Final     Platelet Count 12/10/2020 170  150 - 450 10e9/L Final     % Neutrophils 12/10/2020 47.8  % Final     % Lymphocytes 12/10/2020 32.7  % Final     % Monocytes 12/10/2020 16.0  % Final     % Eosinophils 12/10/2020 2.9  % Final     %  Basophils 12/10/2020 0.6  % Final     Absolute Neutrophil 12/10/2020 1.7  1.6 - 8.3 10e9/L Final     Absolute Lymphocytes 12/10/2020 1.1  0.8 - 5.3 10e9/L Final     Absolute Monocytes 12/10/2020 0.6  0.0 - 1.3 10e9/L Final     Absolute Eosinophils 12/10/2020 0.1  0.0 - 0.7 10e9/L Final     Absolute Basophils 12/10/2020 0.0  0.0 - 0.2 10e9/L Final     Diff Method 12/10/2020 Automated Method   Final     Sed Rate 12/10/2020 6  0 - 20 mm/h Final     CRP Inflammation 12/10/2020 9.0* 0.0 - 8.0 mg/L Final     Sodium 12/10/2020 141  133 - 144 mmol/L Final     Potassium 12/10/2020 4.1  3.4 - 5.3 mmol/L Final     Chloride 12/10/2020 110* 94 - 109 mmol/L Final     Carbon Dioxide 12/10/2020 28  20 - 32 mmol/L Final     Anion Gap 12/10/2020 3  3 - 14 mmol/L Final     Glucose 12/10/2020 115* 70 - 99 mg/dL Final     Urea Nitrogen 12/10/2020 9  7 - 30 mg/dL Final     Creatinine 12/10/2020 0.76  0.66 - 1.25 mg/dL Final     GFR Estimate 12/10/2020 >90  >60 mL/min/[1.73_m2] Final    Comment: Non  GFR Calc  Starting 12/18/2018, serum creatinine based estimated GFR (eGFR) will be   calculated using the Chronic Kidney Disease Epidemiology Collaboration   (CKD-EPI) equation.       GFR Estimate If Black 12/10/2020 >90  >60 mL/min/[1.73_m2] Final    Comment:  GFR Calc  Starting 12/18/2018, serum creatinine based estimated GFR (eGFR) will be   calculated using the Chronic Kidney Disease Epidemiology Collaboration   (CKD-EPI) equation.       Calcium 12/10/2020 8.7  8.5 - 10.1 mg/dL Final     Bilirubin Total 12/10/2020 0.5  0.2 - 1.3 mg/dL Final     Albumin 12/10/2020 3.8  3.4 - 5.0 g/dL Final     Protein Total 12/10/2020 6.9  6.8 - 8.8 g/dL Final     Alkaline Phosphatase 12/10/2020 71  40 - 150 U/L Final     ALT 12/10/2020 39  0 - 70 U/L Final     AST 12/10/2020 13  0 - 45 U/L Final     TSH 12/10/2020 2.51  0.40 - 4.00 mU/L Final           Assessment & Plan     Bilateral hand numbness  Bilateral hand numbness,  "both median and ulnar distribution.  Sending for EMG for further evaluation.  - NEUROLOGY ADULT REFERRAL    Anal fissure  Continued symptoms, he does have colonoscopy scheduled for next month.  Follow-up afterwards.    Primary osteoarthritis of right knee  Status post complete arthroplasty of right knee.  Occasional pain, currently takes Tylenol as needed.  He does have a history of gastric ulcers, so we will avoid NSAID use.    Leukopenia, unspecified type  Slight decrease in WBC last draw, normal indices.  Will reevaluate in 3 months with CBC.       BMI:   Estimated body mass index is 34.82 kg/m  as calculated from the following:    Height as of 12/16/20: 1.759 m (5' 9.25\").    Weight as of this encounter: 107.7 kg (237 lb 8 oz).            Return in about 3 months (around 3/22/2021) for Follow Up from Today's Encounter.    Bony Robin MD  [facility]    "

## 2021-01-05 DIAGNOSIS — J30.2 SEASONAL ALLERGIC RHINITIS, UNSPECIFIED TRIGGER: ICD-10-CM

## 2021-01-05 RX ORDER — SODIUM, POTASSIUM,MAG SULFATES 17.5-3.13G
1 SOLUTION, RECONSTITUTED, ORAL ORAL SEE ADMIN INSTRUCTIONS
Qty: 2 BOTTLE | Refills: 0 | Status: SHIPPED | OUTPATIENT
Start: 2021-01-05 | End: 2021-02-23

## 2021-01-05 RX ORDER — BISACODYL 5 MG
5 TABLET, DELAYED RELEASE (ENTERIC COATED) ORAL SEE ADMIN INSTRUCTIONS
Qty: 1 TABLET | Refills: 0 | Status: SHIPPED | OUTPATIENT
Start: 2021-01-05 | End: 2021-02-23

## 2021-01-07 RX ORDER — FLUTICASONE PROPIONATE 50 MCG
SPRAY, SUSPENSION (ML) NASAL
Qty: 16 G | Refills: 0 | Status: SHIPPED | OUTPATIENT
Start: 2021-01-07 | End: 2021-10-29

## 2021-01-07 ASSESSMENT — MIFFLIN-ST. JEOR: SCORE: 1848.65

## 2021-01-07 NOTE — TELEPHONE ENCOUNTER
Routing refill request to provider for review/approval because:  Drug not active on patient's medication list  Jamilah Beck RN

## 2021-01-08 DIAGNOSIS — Z11.59 ENCOUNTER FOR SCREENING FOR OTHER VIRAL DISEASES: ICD-10-CM

## 2021-01-08 LAB
SARS-COV-2 RNA RESP QL NAA+PROBE: NORMAL
SPECIMEN SOURCE: NORMAL

## 2021-01-08 PROCEDURE — U0005 INFEC AGEN DETEC AMPLI PROBE: HCPCS | Performed by: SURGERY

## 2021-01-08 PROCEDURE — U0003 INFECTIOUS AGENT DETECTION BY NUCLEIC ACID (DNA OR RNA); SEVERE ACUTE RESPIRATORY SYNDROME CORONAVIRUS 2 (SARS-COV-2) (CORONAVIRUS DISEASE [COVID-19]), AMPLIFIED PROBE TECHNIQUE, MAKING USE OF HIGH THROUGHPUT TECHNOLOGIES AS DESCRIBED BY CMS-2020-01-R: HCPCS | Performed by: SURGERY

## 2021-01-09 LAB
LABORATORY COMMENT REPORT: NORMAL
SARS-COV-2 RNA RESP QL NAA+PROBE: NEGATIVE
SPECIMEN SOURCE: NORMAL

## 2021-01-12 ENCOUNTER — HOSPITAL ENCOUNTER (OUTPATIENT)
Facility: AMBULATORY SURGERY CENTER | Age: 61
Discharge: HOME | End: 2021-01-12
Attending: SURGERY | Admitting: SURGERY
Payer: COMMERCIAL

## 2021-01-12 VITALS
SYSTOLIC BLOOD PRESSURE: 132 MMHG | BODY MASS INDEX: 34.07 KG/M2 | HEART RATE: 83 BPM | RESPIRATION RATE: 16 BRPM | TEMPERATURE: 97.2 F | WEIGHT: 230 LBS | HEIGHT: 69 IN | OXYGEN SATURATION: 96 % | DIASTOLIC BLOOD PRESSURE: 78 MMHG

## 2021-01-12 DIAGNOSIS — Z12.11 SCREEN FOR COLON CANCER: ICD-10-CM

## 2021-01-12 DIAGNOSIS — Z86.0100 HISTORY OF COLONIC POLYPS: Primary | ICD-10-CM

## 2021-01-12 LAB — COLONOSCOPY: NORMAL

## 2021-01-12 PROCEDURE — G8907 PT DOC NO EVENTS ON DISCHARG: HCPCS

## 2021-01-12 PROCEDURE — G8918 PT W/O PREOP ORDER IV AB PRO: HCPCS

## 2021-01-12 PROCEDURE — 45381 COLONOSCOPY SUBMUCOUS NJX: CPT

## 2021-01-12 PROCEDURE — 88305 TISSUE EXAM BY PATHOLOGIST: CPT | Performed by: PATHOLOGY

## 2021-01-12 PROCEDURE — 45385 COLONOSCOPY W/LESION REMOVAL: CPT | Performed by: SURGERY

## 2021-01-12 PROCEDURE — 99152 MOD SED SAME PHYS/QHP 5/>YRS: CPT | Mod: 59 | Performed by: SURGERY

## 2021-01-12 PROCEDURE — 99153 MOD SED SAME PHYS/QHP EA: CPT | Mod: 59 | Performed by: SURGERY

## 2021-01-12 PROCEDURE — 45385 COLONOSCOPY W/LESION REMOVAL: CPT

## 2021-01-12 RX ORDER — NALOXONE HYDROCHLORIDE 0.4 MG/ML
0.2 INJECTION, SOLUTION INTRAMUSCULAR; INTRAVENOUS; SUBCUTANEOUS
Status: CANCELLED | OUTPATIENT
Start: 2021-01-12 | End: 2021-01-13

## 2021-01-12 RX ORDER — ONDANSETRON 2 MG/ML
4 INJECTION INTRAMUSCULAR; INTRAVENOUS
Status: DISCONTINUED | OUTPATIENT
Start: 2021-01-12 | End: 2021-01-13 | Stop reason: HOSPADM

## 2021-01-12 RX ORDER — LIDOCAINE 40 MG/G
CREAM TOPICAL
Status: DISCONTINUED | OUTPATIENT
Start: 2021-01-12 | End: 2021-01-13 | Stop reason: HOSPADM

## 2021-01-12 RX ORDER — ONDANSETRON 4 MG/1
4 TABLET, ORALLY DISINTEGRATING ORAL EVERY 6 HOURS PRN
Status: CANCELLED | OUTPATIENT
Start: 2021-01-12

## 2021-01-12 RX ORDER — PROCHLORPERAZINE MALEATE 10 MG
10 TABLET ORAL EVERY 6 HOURS PRN
Status: CANCELLED | OUTPATIENT
Start: 2021-01-12

## 2021-01-12 RX ORDER — NALOXONE HYDROCHLORIDE 0.4 MG/ML
0.4 INJECTION, SOLUTION INTRAMUSCULAR; INTRAVENOUS; SUBCUTANEOUS
Status: CANCELLED | OUTPATIENT
Start: 2021-01-12 | End: 2021-01-13

## 2021-01-12 RX ORDER — FENTANYL CITRATE 50 UG/ML
INJECTION, SOLUTION INTRAMUSCULAR; INTRAVENOUS PRN
Status: DISCONTINUED | OUTPATIENT
Start: 2021-01-12 | End: 2021-01-12 | Stop reason: HOSPADM

## 2021-01-12 RX ORDER — FLUMAZENIL 0.1 MG/ML
0.2 INJECTION, SOLUTION INTRAVENOUS
Status: CANCELLED | OUTPATIENT
Start: 2021-01-12 | End: 2021-01-12

## 2021-01-12 RX ORDER — ONDANSETRON 2 MG/ML
4 INJECTION INTRAMUSCULAR; INTRAVENOUS EVERY 6 HOURS PRN
Status: CANCELLED | OUTPATIENT
Start: 2021-01-12

## 2021-01-12 NOTE — LETTER
January 14, 2021      Dm Longoria  [address] [address] [address]        Dear ,    We are writing to inform you of your test results.    Multiple adenoma type polyps, one sessile serrated type which has a higher risk of cancer. All the polyps were benign. This was also likely the polyp that while still small and appeared fully removed, did end up being removed in pieces. Therefore I recommend next colonoscopy in about 1 years time.    Resulted Orders   Surgical pathology exam   Result Value Ref Range    Copath Report       Patient Name: DM LONGORIA  MR#: [id number]  Specimen #:   Collected: 1/12/2021  Received: 1/13/2021  Reported: 1/14/2021 08:20  Ordering Phy(s): KAIT OLMOS    For improved result formatting, select 'View Enhanced Report Format' under   Linked Documents section.    SPECIMEN(S):  A: Colon polyp, ascending  B: Colon polyp, hepatic flexure  C: Colon polyp, transverse  D: Colon polyp, transverse, distal  E: Rectal polyp    FINAL DIAGNOSIS:  A. ASCENDING COLON POLYP, POLYPECTOMY:  - Tubular adenoma  - Negative for high-grade dysplasia    B. HEPATIC FLEXURE COLON POLYP, POLYPECTOMYx3:  - One tubular adenoma, negative for high-grade dysplasia  - Sessile serrated adenoma(s), fragmented, negative for cytologic   dysplasia  - Strips of unremarkable colonic mucosa    C. TRANSVERSE COLON POLYP, POLYPECTOMYx3:  - One tubular adenoma, negative for high-grade dysplasia  - Strips of unremarkable colonic mucosa with lymphoid aggregates    D. DISTAL TRANSVERSE COLON POLYP, POLYPECT DHEERAJ:  - Tubular adenoma  - Negative for high-grade dysplasia    E. RECTUM POLYP, POLYPECTOMY:  - Tubular adenoma  - Negative for high-grade dysplasia    I have personally reviewed all specimens and/or slides, including the   listed special stains, and used them  with my medical judgement to determine or confirm the final diagnosis.    Electronically signed out by:    Lonnie Acevedo  "M.D., [facility]    CLINICAL HISTORY:  Screening colonoscopy.    GROSS:  A: The specimen is received in formalin with proper patient   identification, labeled \"large intestine,  right/ascending\".  The specimen consists of a 1.0 cm piece of pink-tan   soft tissue, which is entirely  submitted in cassette A1.    B: The specimen is received in formalin with proper patient   identification, labeled \"large intestine, hepatic  flexure 73 cold snare\".  The specimen consists of six pieces of pink-tan   soft tissue ranging in size from  0.2-0.6 cm in greatest dimension, which are entirely submitted in cassette    B1.    C: The specimen is received in formalin with proper patient   identification, labeled \"large intestine,  transverse 73 cold snare\".  The specimen consists of three pieces of   pink-tan soft tissue measuring 0.2 to 1.1  cm in greatest dimension, which are entirely submitted in cassette C1.    D: The specimen is received in formalin with proper patient   identification, labeled \"large intestine,  transverse distalcold snare\".  The specimen consists of a 1.2 x 0.5 x 0.1   cm piece of red-brown soft tissue,  which is trisected and entirely submitted in cassette D1.    E: The specimen is received in formalin with proper patient   identification, labeled \"large intestine, rectum  polyp cold snare\".  The specimen consists of two pieces of yellow-tan soft   tissue measuring 0.1 and 0.3 cm in  greatest dimension, which are entirely submitted in cassette E1. (Dictated   by: June Guerrero 1/13/2021 09:38  AM)    MICROSCOPIC:  Microscopic examination was performed.    The technical compon ent of this testing was completed at the [facility], with the professional component performed   at the [facility], [address] [place] [phone] ([phone])    CPT Codes:  A: 47424-ZG3  B: " 88476-YL1  C: 68013-KZ8  D: 50567-FH6  E: 25155-YT9    COLLECTION SITE:  Client: [facility]  Location: [facility] (B)    Resident  [id number]         If you have any questions or concerns, please call the clinic at the number listed above.       Sincerely,      Juan Antonio Corcoran MD

## 2021-01-14 LAB — COPATH REPORT: NORMAL

## 2021-01-14 NOTE — RESULT ENCOUNTER NOTE
Dear Hugo, your recent test results are attached.    Several polyps were discovered during your colonoscopy.  The pathology is pending.  You will be contacted with any outstanding results when they are available.  Feel free to contact me via the office or My Chart if you have any questions regarding the above.  Sincerely,  DO PATEL Steve

## 2021-01-20 NOTE — PROGRESS NOTES
"Hugo is a 60 year old who is being evaluated via a billable telephone visit.      What phone number would you like to be contacted at? [phone]  How would you like to obtain your AVS? Mail a copy  Assessment & Plan     Anal fissure  60-year-old male with history of recurrent anal fissure, recent worsening, sending to colorectal surgeon for evaluation and treatment.    Bilateral hand numbness  Ongoing bilateral hand numbness, previously ordered EMG, patient was unable to make appointment.  We will assist with making appointment today.      15 minutes spent on the date of the encounter doing chart review, interpretation of tests and patient visit        BMI:   Estimated body mass index is 33.97 kg/m  as calculated from the following:    Height as of 1/7/21: 1.753 m (5' 9\").    Weight as of 1/7/21: 104.3 kg (230 lb).           Return in about 4 weeks (around 2/18/2021) for Annual Well Check.    Bony Robin MD  [facility]    Subjective     Hugo is a 60 year old who presents to clinic today for the following health issues     HPI     Referral  Needs a referral for a rectal surgeon following his colonoscopy anal fissure        Review of Systems   Constitutional, HEENT, cardiovascular, pulmonary, gi and gu systems are negative, except as otherwise noted.      Objective           Vitals:  No vitals were obtained today due to virtual visit.    Physical Exam   healthy, alert and no distress  PSYCH: Alert and oriented times 3; coherent speech, normal   rate and volume, able to articulate logical thoughts, able   to abstract reason, no tangential thoughts, no hallucinations   or delusions  His affect is normal  RESP: No cough, no audible wheezing, able to talk in full sentences  Remainder of exam unable to be completed due to telephone visits            Phone call duration: 7 minutes  "

## 2021-01-21 ENCOUNTER — TELEPHONE (OUTPATIENT)
Dept: FAMILY MEDICINE | Facility: CLINIC | Age: 61
End: 2021-01-21

## 2021-01-21 ENCOUNTER — VIRTUAL VISIT (OUTPATIENT)
Dept: FAMILY MEDICINE | Facility: CLINIC | Age: 61
End: 2021-01-21
Payer: COMMERCIAL

## 2021-01-21 DIAGNOSIS — R20.0 BILATERAL HAND NUMBNESS: ICD-10-CM

## 2021-01-21 DIAGNOSIS — K60.2 ANAL FISSURE: Primary | ICD-10-CM

## 2021-01-21 PROCEDURE — 99213 OFFICE O/P EST LOW 20 MIN: CPT | Mod: 95 | Performed by: FAMILY MEDICINE

## 2021-01-21 ASSESSMENT — PAIN SCALES - GENERAL: PAINLEVEL: NO PAIN (0)

## 2021-01-21 NOTE — TELEPHONE ENCOUNTER
Reason for call:  Other   Patient called regarding (reason for call): call back  Additional comments: Patient is calling because he never got his phone visit, please call back     Phone number to reach patient:  Home number on file [phone] (home)    Best Time:  any    Can we leave a detailed message on this number?  YES    Travel screening: Not Applicable

## 2021-01-22 ENCOUNTER — TELEPHONE (OUTPATIENT)
Dept: SURGERY | Facility: CLINIC | Age: 61
End: 2021-01-22

## 2021-01-22 NOTE — TELEPHONE ENCOUNTER
[facility]    Phone Message    May a detailed message be left on voicemail: yes     Reason for Call: Appointment Intake    Referring Provider Name: Bony Robin MD  Diagnosis and/or Symptoms: anal fissure, to be seen urgently    Referral and records are in pt's chart for review. Please advise and contact patient to schedule. Thanks!    Action Taken: Message routed to:  [facility]    Travel Screening: Not Applicable

## 2021-01-22 NOTE — TELEPHONE ENCOUNTER
Called patient regarding the below referral. Patient states he has an anal fissure that has not healed since he saw Brianna Munoz on 2/26/2020. He would like the area reassessed. Patient is scheduled for first available appointment with Brianna Munoz. Appointment details verified.

## 2021-01-25 ENCOUNTER — OFFICE VISIT (OUTPATIENT)
Dept: SURGERY | Facility: CLINIC | Age: 61
End: 2021-01-25
Payer: COMMERCIAL

## 2021-01-25 VITALS
HEIGHT: 69 IN | WEIGHT: 243.9 LBS | DIASTOLIC BLOOD PRESSURE: 85 MMHG | OXYGEN SATURATION: 94 % | BODY MASS INDEX: 36.12 KG/M2 | SYSTOLIC BLOOD PRESSURE: 135 MMHG | HEART RATE: 96 BPM

## 2021-01-25 DIAGNOSIS — Z80.0 FAMILY HISTORY OF COLON CANCER: Primary | ICD-10-CM

## 2021-01-25 PROCEDURE — 99202 OFFICE O/P NEW SF 15 MIN: CPT | Performed by: NURSE PRACTITIONER

## 2021-01-25 ASSESSMENT — MIFFLIN-ST. JEOR: SCORE: 1911.7

## 2021-01-25 ASSESSMENT — PAIN SCALES - GENERAL: PAINLEVEL: SEVERE PAIN (6)

## 2021-01-25 NOTE — NURSING NOTE
"Chief Complaint   Patient presents with     RECHECK     Non-healing anal fissure       Vitals:    01/25/21 1448   BP: 135/85   BP Location: Left arm   Patient Position: Sitting   Cuff Size: Adult Regular   Pulse: 96   SpO2: 94%   Weight: 243 lb 14.4 oz   Height: 5' 9\"       Body mass index is 36.02 kg/m .    Jessica Zaman MA    "

## 2021-01-25 NOTE — LETTER
"2021       RE: Hugo Longoria   [facility] [id number]     Dear Colleague,    Thank you for referring your patient, Hugo Longoria, to the [facility] COLON AND RECTAL SURGERY [facility]IN[facility] [place] [facility]. Please see a copy of my visit note below.    Colon and Rectal Surgery Follow-Up Clinic Note    RE: Hugo Longoria  : 1960  GEMMA: 2021    Hugo Longoria is a very pleasant 60 year old male here for follow-up of anal fissure.    Interval history: I saw Hugo in February of last year with an anal fissure.  He started topical therapy but soon after his appointment he broke his leg.  He was on pain medications for this and developed constipation, which she feels delayed the healing of his fissure.  However, he has now had very soft bowel movements but continues to have pain, burning, and some bright red blood with bowel movements.  Per his primary care provider's recommendations, he got a colonoscopy on 2021 given a significant family history of colorectal cancer in aunt, grandmother, and grandmother's sibling all with colorectal cancers and personal history of adenomatous polyps.  He had 5 tubular adenomas and one sessile serrated adenoma.  He has admittedly used the topical diltiazem without improvement in symptoms.    Physical Examination: Exam was chaperoned by Jessica Zaman MA  /85 (BP Location: Left arm, Patient Position: Sitting, Cuff Size: Adult Regular)   Pulse 96   Ht 5' 9\"   Wt 243 lb 14.4 oz   SpO2 94%   BMI 36.02 kg/m    General: Alert, oriented, in no acute distress, sitting comfortably  HEENT: Mucous membranes moist  Perianal external examination:  Perianal skin: Intact with no excoriation or lichenification.  Lesions: No.  Eversion of buttocks: There was evidence of an anal fissure. Details: posterior midline anal fissure with small skin tags present.  Skin tags or external hemorrhoids: " None.    Digital rectal examination: Was deferred in order not to cause further trauma.    Anoscopy: Was deferred in order not to cause further trauma.    Assessment/Plan: 60 year old male with persistent anal fissure despite topical therapy and constipation management. We discussed further intervention for his fissure including Botox injections versus sphincterotomy. He thinks he may intermittently heal and would like to try the Botox injections first and is okay with doing this in clinic. Will set him up for a follow up appointment with one of the surgeons for this. In the meantime continue with topical diltiazem and constipation management and he can use tylenol, ibuprofen, and warm tub baths for pain. Recommended repeat colonoscopy in one year. Patient's questions were answered to his stated satisfaction and he is in agreement with this plan.    Medical history:  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)     Overnight stay due to blood poisoning     Gastric ulcer, unspecified as acute or chronic, without mention of hemorrhage or perforation      Hypertension        Surgical history:  Past Surgical History:   Procedure Laterality Date     ARTHROPLASTY KNEE Right 12/10/2019    Procedure: Right knee replacement;  Surgeon: Javier Yin DO;  [facility]     ARTHROSCOPY KNEE  5/29/2013    Procedure: ARTHROSCOPY KNEE;  Right knee arthroscopy with partial medial and partial lateral menisectomies;  Surgeon: Phani Mclaughlin MD;  [facility]     ARTHROSCOPY SHOULDER BICEPS TENODESIS REPAIR Right 1/13/2017    Procedure: ARTHROSCOPY SHOULDER BICEPS TENODESIS REPAIR;  Surgeon: Javier Yin DO;  [facility]     ARTHROSCOPY SHOULDER DECOMPRESSION Right 1/13/2017    Procedure: ARTHROSCOPY SHOULDER DECOMPRESSION;  Surgeon: Javier Yin  DO Nasir;  [facility]     ARTHROSCOPY SHOULDER ROTATOR CUFF REPAIR Right 1/13/2017    Procedure: ARTHROSCOPY SHOULDER ROTATOR CUFF REPAIR;  Surgeon: Javier Yin DO;  [facility]     COLONOSCOPY  11/01/2007     COLONOSCOPY  12/12/11     COLONOSCOPY N/A 11/18/2015    Procedure: COLONOSCOPY;  Surgeon: Migue Mclaughlin MD;  [facility]     COLONOSCOPY N/A 3/22/2017    Procedure: COMBINED COLONOSCOPY, SINGLE OR MULTIPLE BIOPSY/POLYPECTOMY BY BIOPSY;  Surgeon: Salvatore Zepeda MD;  [facility]     COLONOSCOPY N/A 3/5/2019    Procedure: COLONOSCOPY;  Surgeon: Prakash Ervin DO;  [facility]     COLONOSCOPY WITH CO2 INSUFFLATION N/A 1/12/2021    Procedure: COLONOSCOPY, WITH CO2 INSUFFLATION;  Surgeon: Juan Antonio Corcoran MD;  [facility]     ENDOSCOPY  01/27/12    [facility]     ESOPHAGOSCOPY, GASTROSCOPY, DUODENOSCOPY (EGD), COMBINED N/A 11/18/2015    Procedure: COMBINED ESOPHAGOSCOPY, GASTROSCOPY, DUODENOSCOPY (EGD), BIOPSY SINGLE OR MULTIPLE;  Surgeon: Migue Mclaughlin MD;  [facility]     ESOPHAGOSCOPY, GASTROSCOPY, DUODENOSCOPY (EGD), COMBINED N/A 3/22/2017    Procedure: COMBINED ESOPHAGOSCOPY, GASTROSCOPY, DUODENOSCOPY (EGD), BIOPSY SINGLE OR MULTIPLE;  Surgeon: Salvatore Zepeda MD;  [facility]     HERNIORRHAPHY UMBILICAL N/A 3/12/2015    Procedure: HERNIORRHAPHY UMBILICAL;  Surgeon: Yared Ma MD;  [facility]     IRRIGATION AND DEBRIDEMENT UPPER EXTREMITY, COMBINED Left 3/15/2016    Procedure: COMBINED IRRIGATION AND DEBRIDEMENT UPPER EXTREMITY;  Surgeon: Javier Yin DO;  [facility]     REMOVAL OF SPERM DUCT(S)      Vasectomy     [facility] UGI ENDOSCOPY, SIMPLE EXAM  10/31/2007       Problem list:  Patient Active Problem List    Diagnosis Date Noted     S/P total knee replacement using cement, right 12/10/2019     Priority: Medium     Chronic right shoulder pain 01/30/2019      Priority: Medium     Primary osteoarthritis of right knee 03/13/2017     Priority: Medium     Advanced directives, counseling/discussion 01/09/2017     Priority: Medium     Info given       Mild episode of recurrent major depressive disorder (H) 01/06/2017     Priority: Medium     Complete tear of right rotator cuff 01/02/2017     Priority: Medium     Rupture long head biceps tendon, right, initial encounter 01/02/2017     Priority: Medium     Subacromial impingement of right shoulder 01/02/2017     Priority: Medium     Chronic gastric ulcer 10/28/2015     Priority: Medium     Seasonal allergic rhinitis 10/28/2015     Priority: Medium     History of gastric ulcer 07/16/2013     Priority: Medium     ACL tear 04/23/2013     Priority: Medium     Fatigue 03/12/2013     Priority: Medium     Hemorrhoids 12/08/2011     Priority: Medium     Erectile dysfunction 12/08/2011     Priority: Medium     HTN, goal below 140/90 12/08/2011     Priority: Medium     History of tobacco use: 1980 - 1990 09/29/2011     Priority: Medium     CARDIOVASCULAR SCREENING; LDL GOAL LESS THAN 130 10/31/2010     Priority: Medium     KERRY (generalized anxiety disorder) 07/06/2010     Priority: Medium     History of colonic polyps 11/13/2007     Priority: Medium     Problem list name updated by automated process. Provider to review         Medications:  Current Outpatient Medications   Medication Sig Dispense Refill     atorvastatin (LIPITOR) 20 MG tablet Take 1 tablet by mouth once daily 90 tablet 1     bisacodyl (DULCOLAX) 5 MG EC tablet Take 1 tablet (5 mg) by mouth See Admin Instructions --Take 1 tablet bisacodyl at 12:00 the day prior to procedure. 1 tablet 0     fluticasone (FLONASE) 50 MCG/ACT nasal spray SPRAY 1 SPRAY(S) IN EACH NOSTRIL ONCE DAILY 16 g 0     LANsoprazole (PREVACID) 30 MG DR capsule Take 1 capsule by mouth once daily 90 capsule 2     lisinopril (ZESTRIL) 20 MG tablet Take 1/2 (one-half) tablet by mouth once daily 45 tablet 2  "    Multiple Vitamin (MULTIVITAMINS PO) Take  by mouth.       EQ STOOL SOFTENER 100 MG capsule 100 mg       Na Sulfate-K Sulfate-Mg Sulf (SUPREP BOWEL PREP) solution Take 177 mLs (1 Bottle) by mouth See Admin Instructions Evening before procedure, complete steps 1 through 4 (see package) using one (1) 6 ounce bottle before bed.  Morning of procedure, repeat steps 1 through 4 using the other 6 ounce bottle. (Patient not taking: Reported on 1/25/2021) 2 Bottle 0     polyethylene glycol (GOLYTELY) 236 g suspension Take 4,000 mLs by mouth See Admin Instructions -Day prior to procedure: Take 1 tablet- bisacodyl at 12:00.  Mix GoLytely as directed on container.  At 6:00 pm - drink 8 oz. glass of solution and continue drinking one 8 oz. glass every 15 minutes until bottle is empty. (Patient not taking: Reported on 1/25/2021) 1 each 0       Allergies:  Allergies   Allergen Reactions     Hydrocodone Other (See Comments)     \"climbed the walls, very anxious, insomnia\"     Nsaids Other (See Comments)     Hx of stomach ulcers       Family history:  Family History   Problem Relation Age of Onset     Cancer Mother         ovarian cancer     Asthma Mother      Hypertension Mother      Arthritis Mother      Genitourinary Problems Mother      Lipids Mother      Cancer Sister         ovarian cancer     Hypertension Sister      Lipids Sister      Cancer Maternal Grandmother         stomach cancer     Asthma Father      Cardiovascular Father         heart attack; bypass x5, congenital heart disease     Heart Disease Father         heart attack; bypass x5, congenital heart disease     Diabetes Father      Hypertension Father      Cancer Father         prostate     Prostate Cancer Father      Circulatory Father         blood clots     Genitourinary Problems Father      Respiratory Father         emphysema; COPD     Diabetes Maternal Grandfather      Diabetes Paternal Grandfather      Eye Disorder Paternal Grandfather         glaucoma     " "Hypertension Brother      Lipids Brother      Hypertension Brother      Cardiovascular Brother         bypass x3     Heart Disease Brother         bypass x3     Lipids Brother      Hypertension Sister      C.A.D. No family hx of      Cerebrovascular Disease No family hx of      Breast Cancer No family hx of      Cancer - colorectal No family hx of      Alzheimer Disease No family hx of      Blood Disease No family hx of      Gastrointestinal Disease No family hx of      Musculoskeletal Disorder No family hx of      Neurologic Disorder No family hx of      Thyroid Disease No family hx of        Social history:  Social History     Tobacco Use     Smoking status: Former Smoker     Quit date: 1981     Years since quittin.0     Smokeless tobacco: Never Used   Substance Use Topics     Alcohol use: Yes     Alcohol/week: 0.0 standard drinks     Comment: occ      Marital status: .    Nursing Notes:   Jessica Zaman  2021  2:52 PM  Signed  Chief Complaint   Patient presents with     RECHECK     Non-healing anal fissure       Vitals:    21 1448   BP: 135/85   BP Location: Left arm   Patient Position: Sitting   Cuff Size: Adult Regular   Pulse: 96   SpO2: 94%   Weight: 243 lb 14.4 oz   Height: 5' 9\"       Body mass index is 36.02 kg/m .    Jessica Zaman MA         15 minutes spent on the date of the encounter doing chart review, history and exam, documentation and further activities as noted above.     Brianna Lares, NP-C  Colon and Rectal Surgery  [facility]        Again, thank you for allowing me to participate in the care of your patient.      Sincerely,    Brianna Lares, APRN CNP      "

## 2021-01-25 NOTE — PROGRESS NOTES
"Colon and Rectal Surgery Follow-Up Clinic Note    RE: Hugo Longoria  : 1960  GEMMA: 2021    Hugo Longoria is a very pleasant 60 year old male here for follow-up of anal fissure.    Interval history: I saw Hugo in February of last year with an anal fissure.  He started topical therapy but soon after his appointment he broke his leg.  He was on pain medications for this and developed constipation, which she feels delayed the healing of his fissure.  However, he has now had very soft bowel movements but continues to have pain, burning, and some bright red blood with bowel movements.  Per his primary care provider's recommendations, he got a colonoscopy on 2021 given a significant family history of colorectal cancer in aunt, grandmother, and grandmother's sibling all with colorectal cancers and personal history of adenomatous polyps.  He had 5 tubular adenomas and one sessile serrated adenoma.  He has admittedly used the topical diltiazem without improvement in symptoms.    Physical Examination: Exam was chaperoned by Jessica Zaman MA  /85 (BP Location: Left arm, Patient Position: Sitting, Cuff Size: Adult Regular)   Pulse 96   Ht 5' 9\"   Wt 243 lb 14.4 oz   SpO2 94%   BMI 36.02 kg/m    General: Alert, oriented, in no acute distress, sitting comfortably  HEENT: Mucous membranes moist  Perianal external examination:  Perianal skin: Intact with no excoriation or lichenification.  Lesions: No.  Eversion of buttocks: There was evidence of an anal fissure. Details: posterior midline anal fissure with small skin tags present.  Skin tags or external hemorrhoids: None.    Digital rectal examination: Was deferred in order not to cause further trauma.    Anoscopy: Was deferred in order not to cause further trauma.    Assessment/Plan: 60 year old male with persistent anal fissure despite topical therapy and constipation management. We discussed further intervention for his fissure including Botox " injections versus sphincterotomy. He thinks he may intermittently heal and would like to try the Botox injections first and is okay with doing this in clinic. Will set him up for a follow up appointment with one of the surgeons for this. In the meantime continue with topical diltiazem and constipation management and he can use tylenol, ibuprofen, and warm tub baths for pain. Recommended repeat colonoscopy in one year. Patient's questions were answered to his stated satisfaction and he is in agreement with this plan.    Medical history:  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)     Overnight stay due to blood poisoning     Gastric ulcer, unspecified as acute or chronic, without mention of hemorrhage or perforation      Hypertension        Surgical history:  Past Surgical History:   Procedure Laterality Date     ARTHROPLASTY KNEE Right 12/10/2019    Procedure: Right knee replacement;  Surgeon: Javier Yin DO;  [facility]     ARTHROSCOPY KNEE  5/29/2013    Procedure: ARTHROSCOPY KNEE;  Right knee arthroscopy with partial medial and partial lateral menisectomies;  Surgeon: Phani Mclaughlin MD;  [facility]     ARTHROSCOPY SHOULDER BICEPS TENODESIS REPAIR Right 1/13/2017    Procedure: ARTHROSCOPY SHOULDER BICEPS TENODESIS REPAIR;  Surgeon: Javier Yin DO;  [facility]     ARTHROSCOPY SHOULDER DECOMPRESSION Right 1/13/2017    Procedure: ARTHROSCOPY SHOULDER DECOMPRESSION;  Surgeon: Javier Yin DO;  [facility]     ARTHROSCOPY SHOULDER ROTATOR CUFF REPAIR Right 1/13/2017    Procedure: ARTHROSCOPY SHOULDER ROTATOR CUFF REPAIR;  Surgeon: Javier Yin DO;  [facility]     COLONOSCOPY  11/01/2007     COLONOSCOPY  12/12/11     COLONOSCOPY N/A 11/18/2015    Procedure: COLONOSCOPY;  Surgeon: Arnoldo  Migue Luciano MD;  [facility]     COLONOSCOPY N/A 3/22/2017    Procedure: COMBINED COLONOSCOPY, SINGLE OR MULTIPLE BIOPSY/POLYPECTOMY BY BIOPSY;  Surgeon: Salvatore Zepeda MD;  [facility]     COLONOSCOPY N/A 3/5/2019    Procedure: COLONOSCOPY;  Surgeon: Prakash Ervin DO;  [facility]     COLONOSCOPY WITH CO2 INSUFFLATION N/A 1/12/2021    Procedure: COLONOSCOPY, WITH CO2 INSUFFLATION;  Surgeon: Juan Antonio Corcoran MD;  [facility]     ENDOSCOPY  01/27/12    [facility]     ESOPHAGOSCOPY, GASTROSCOPY, DUODENOSCOPY (EGD), COMBINED N/A 11/18/2015    Procedure: COMBINED ESOPHAGOSCOPY, GASTROSCOPY, DUODENOSCOPY (EGD), BIOPSY SINGLE OR MULTIPLE;  Surgeon: Migue Mclaughlin MD;  [facility]     ESOPHAGOSCOPY, GASTROSCOPY, DUODENOSCOPY (EGD), COMBINED N/A 3/22/2017    Procedure: COMBINED ESOPHAGOSCOPY, GASTROSCOPY, DUODENOSCOPY (EGD), BIOPSY SINGLE OR MULTIPLE;  Surgeon: Salvatore Zepeda MD;  [facility]     HERNIORRHAPHY UMBILICAL N/A 3/12/2015    Procedure: HERNIORRHAPHY UMBILICAL;  Surgeon: Yared Ma MD;  [facility]     IRRIGATION AND DEBRIDEMENT UPPER EXTREMITY, COMBINED Left 3/15/2016    Procedure: COMBINED IRRIGATION AND DEBRIDEMENT UPPER EXTREMITY;  Surgeon: Javier Yin DO;  [facility]     REMOVAL OF SPERM DUCT(S)      Vasectomy     [facility] UGI ENDOSCOPY, SIMPLE EXAM  10/31/2007       Problem list:  Patient Active Problem List    Diagnosis Date Noted     S/P total knee replacement using cement, right 12/10/2019     Priority: Medium     Chronic right shoulder pain 01/30/2019     Priority: Medium     Primary osteoarthritis of right knee 03/13/2017     Priority: Medium     Advanced directives, counseling/discussion 01/09/2017     Priority: Medium     Info given       Mild episode of recurrent major depressive disorder (H) 01/06/2017     Priority: Medium     Complete tear of right rotator cuff 01/02/2017      Priority: Medium     Rupture long head biceps tendon, right, initial encounter 01/02/2017     Priority: Medium     Subacromial impingement of right shoulder 01/02/2017     Priority: Medium     Chronic gastric ulcer 10/28/2015     Priority: Medium     Seasonal allergic rhinitis 10/28/2015     Priority: Medium     History of gastric ulcer 07/16/2013     Priority: Medium     ACL tear 04/23/2013     Priority: Medium     Fatigue 03/12/2013     Priority: Medium     Hemorrhoids 12/08/2011     Priority: Medium     Erectile dysfunction 12/08/2011     Priority: Medium     HTN, goal below 140/90 12/08/2011     Priority: Medium     History of tobacco use: 1980 - 1990 09/29/2011     Priority: Medium     CARDIOVASCULAR SCREENING; LDL GOAL LESS THAN 130 10/31/2010     Priority: Medium     KERRY (generalized anxiety disorder) 07/06/2010     Priority: Medium     History of colonic polyps 11/13/2007     Priority: Medium     Problem list name updated by automated process. Provider to review         Medications:  Current Outpatient Medications   Medication Sig Dispense Refill     atorvastatin (LIPITOR) 20 MG tablet Take 1 tablet by mouth once daily 90 tablet 1     bisacodyl (DULCOLAX) 5 MG EC tablet Take 1 tablet (5 mg) by mouth See Admin Instructions --Take 1 tablet bisacodyl at 12:00 the day prior to procedure. 1 tablet 0     fluticasone (FLONASE) 50 MCG/ACT nasal spray SPRAY 1 SPRAY(S) IN EACH NOSTRIL ONCE DAILY 16 g 0     LANsoprazole (PREVACID) 30 MG DR capsule Take 1 capsule by mouth once daily 90 capsule 2     lisinopril (ZESTRIL) 20 MG tablet Take 1/2 (one-half) tablet by mouth once daily 45 tablet 2     Multiple Vitamin (MULTIVITAMINS PO) Take  by mouth.       EQ STOOL SOFTENER 100 MG capsule 100 mg       Na Sulfate-K Sulfate-Mg Sulf (SUPREP BOWEL PREP) solution Take 177 mLs (1 Bottle) by mouth See Admin Instructions Evening before procedure, complete steps 1 through 4 (see package) using one (1) 6 ounce bottle before bed.   "Morning of procedure, repeat steps 1 through 4 using the other 6 ounce bottle. (Patient not taking: Reported on 1/25/2021) 2 Bottle 0     polyethylene glycol (GOLYTELY) 236 g suspension Take 4,000 mLs by mouth See Admin Instructions -Day prior to procedure: Take 1 tablet- bisacodyl at 12:00.  Mix GoLytely as directed on container.  At 6:00 pm - drink 8 oz. glass of solution and continue drinking one 8 oz. glass every 15 minutes until bottle is empty. (Patient not taking: Reported on 1/25/2021) 1 each 0       Allergies:  Allergies   Allergen Reactions     Hydrocodone Other (See Comments)     \"climbed the walls, very anxious, insomnia\"     Nsaids Other (See Comments)     Hx of stomach ulcers       Family history:  Family History   Problem Relation Age of Onset     Cancer Mother         ovarian cancer     Asthma Mother      Hypertension Mother      Arthritis Mother      Genitourinary Problems Mother      Lipids Mother      Cancer Sister         ovarian cancer     Hypertension Sister      Lipids Sister      Cancer Maternal Grandmother         stomach cancer     Asthma Father      Cardiovascular Father         heart attack; bypass x5, congenital heart disease     Heart Disease Father         heart attack; bypass x5, congenital heart disease     Diabetes Father      Hypertension Father      Cancer Father         prostate     Prostate Cancer Father      Circulatory Father         blood clots     Genitourinary Problems Father      Respiratory Father         emphysema; COPD     Diabetes Maternal Grandfather      Diabetes Paternal Grandfather      Eye Disorder Paternal Grandfather         glaucoma     Hypertension Brother      Lipids Brother      Hypertension Brother      Cardiovascular Brother         bypass x3     Heart Disease Brother         bypass x3     Lipids Brother      Hypertension Sister      C.A.D. No family hx of      Cerebrovascular Disease No family hx of      Breast Cancer No family hx of      Cancer - " "colorectal No family hx of      Alzheimer Disease No family hx of      Blood Disease No family hx of      Gastrointestinal Disease No family hx of      Musculoskeletal Disorder No family hx of      Neurologic Disorder No family hx of      Thyroid Disease No family hx of        Social history:  Social History     Tobacco Use     Smoking status: Former Smoker     Quit date: 1981     Years since quittin.0     Smokeless tobacco: Never Used   Substance Use Topics     Alcohol use: Yes     Alcohol/week: 0.0 standard drinks     Comment: occ      Marital status: .    Nursing Notes:   Jessica Zaman  2021  2:52 PM  Signed  Chief Complaint   Patient presents with     RECHECK     Non-healing anal fissure       Vitals:    21 1448   BP: 135/85   BP Location: Left arm   Patient Position: Sitting   Cuff Size: Adult Regular   Pulse: 96   SpO2: 94%   Weight: 243 lb 14.4 oz   Height: 5' 9\"       Body mass index is 36.02 kg/m .    Jessica Zaman MA         15 minutes spent on the date of the encounter doing chart review, history and exam, documentation and further activities as noted above.     Brianna Saldivar NP-C  Colon and Rectal Surgery  [facility]    "

## 2021-01-26 ENCOUNTER — TELEPHONE (OUTPATIENT)
Dept: ONCOLOGY | Facility: CLINIC | Age: 61
End: 2021-01-26

## 2021-01-26 NOTE — TELEPHONE ENCOUNTER
The patient called and said he didn't want to schedule this appointment. He thought we were calling to schedule for something else. He said he doesn't need genetic counseling.

## 2021-01-29 NOTE — TELEPHONE ENCOUNTER
Called patient. No answer. Left a message with return phone call information.  DONN Prasad, NP-C  Colon and Rectal Surgery  [facility]

## 2021-01-29 NOTE — TELEPHONE ENCOUNTER
Patient would like to talk to  Provider Matt Roldan or her team as soon as possible  @ [phone] .    Dora Cronin Pat. Rep

## 2021-02-02 ENCOUNTER — PATIENT OUTREACH (OUTPATIENT)
Dept: SURGERY | Facility: CLINIC | Age: 61
End: 2021-02-02

## 2021-02-07 NOTE — PROGRESS NOTES
Colon and Rectal Surgery Clinic Note    RE: Hugo Longoria.  : 1960.  GEMMA: 2/10/2021.    Reason for visit: Anal Fissure    HPI: Per Brianna Saldivar: Interval history: I saw Hugo in February of last year with an anal fissure.  He started topical therapy but soon after his appointment he broke his leg.  He was on pain medications for this and developed constipation, which she feels delayed the healing of his fissure.  However, he has now had very soft bowel movements but continues to have pain, burning, and some bright red blood with bowel movements.  Per his primary care provider's recommendations, he got a colonoscopy on 2021 given a significant family history of colorectal cancer in aunt, grandmother, and grandmother's sibling all with colorectal cancers and personal history of adenomatous polyps.  He had 5 tubular adenomas and one sessile serrated adenoma.  He has admittedly used the topical diltiazem without improvement in symptoms.    At time of last evaluation, it was recommended he undergo botox vs. Internal sphincterotomy. He elected to proceed with botox injection, for which he presents today.      Medical history:  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)     Overnight stay due to blood poisoning     Gastric ulcer, unspecified as acute or chronic, without mention of hemorrhage or perforation      Hypertension        Surgical history:  Past Surgical History:   Procedure Laterality Date     ARTHROPLASTY KNEE Right 12/10/2019    Procedure: Right knee replacement;  Surgeon: Javier Yin DO;  Location:  OR     ARTHROSCOPY KNEE  2013    Procedure: ARTHROSCOPY KNEE;  Right knee arthroscopy with partial medial and partial lateral menisectomies;  Surgeon: Phani Mclaughlin MD;  Location: [facility]     ARTHROSCOPY SHOULDER  BICEPS TENODESIS REPAIR Right 1/13/2017    Procedure: ARTHROSCOPY SHOULDER BICEPS TENODESIS REPAIR;  Surgeon: Javier Yin DO;  [facility]: [facility]     ARTHROSCOPY SHOULDER DECOMPRESSION Right 1/13/2017    Procedure: ARTHROSCOPY SHOULDER DECOMPRESSION;  Surgeon: Javier Yin DO;  [facility]     ARTHROSCOPY SHOULDER ROTATOR CUFF REPAIR Right 1/13/2017    Procedure: ARTHROSCOPY SHOULDER ROTATOR CUFF REPAIR;  Surgeon: Javier Yin DO;  [facility]     COLONOSCOPY  11/01/2007     COLONOSCOPY  12/12/11     COLONOSCOPY N/A 11/18/2015    Procedure: COLONOSCOPY;  Surgeon: Migue Mclaughlin MD;  [facility]     COLONOSCOPY N/A 3/22/2017    Procedure: COMBINED COLONOSCOPY, SINGLE OR MULTIPLE BIOPSY/POLYPECTOMY BY BIOPSY;  Surgeon: Salvatore Zepeda MD;  [facility]     COLONOSCOPY N/A 3/5/2019    Procedure: COLONOSCOPY;  Surgeon: Prakash Ervin DO;  [facility]     COLONOSCOPY WITH CO2 INSUFFLATION N/A 1/12/2021    Procedure: COLONOSCOPY, WITH CO2 INSUFFLATION;  Surgeon: Juan Antonio Corcoran MD;  Location: [facility]     ENDOSCOPY  01/27/12    Upper GI [facility]     ESOPHAGOSCOPY, GASTROSCOPY, DUODENOSCOPY (EGD), COMBINED N/A 11/18/2015    Procedure: COMBINED ESOPHAGOSCOPY, GASTROSCOPY, DUODENOSCOPY (EGD), BIOPSY SINGLE OR MULTIPLE;  Surgeon: Migue Mclaughlin MD;  Location:  GI     ESOPHAGOSCOPY, GASTROSCOPY, DUODENOSCOPY (EGD), COMBINED N/A 3/22/2017    Procedure: COMBINED ESOPHAGOSCOPY, GASTROSCOPY, DUODENOSCOPY (EGD), BIOPSY SINGLE OR MULTIPLE;  Surgeon: Salvatore Zepeda MD;  [facility]     HERNIORRHAPHY UMBILICAL N/A 3/12/2015    Procedure: HERNIORRHAPHY UMBILICAL;  Surgeon: Yared Ma MD;  [facility]     IRRIGATION AND DEBRIDEMENT UPPER EXTREMITY, COMBINED Left 3/15/2016    Procedure: COMBINED IRRIGATION AND DEBRIDEMENT UPPER EXTREMITY;  Surgeon: Javier Yin DO;  [facility]     REMOVAL  OF SPERM DUCT(S)      Vasectomy     [facility] UGI ENDOSCOPY, SIMPLE EXAM  10/31/2007       Family history:  Family History   Problem Relation Age of Onset     Cancer Mother         ovarian cancer     Asthma Mother      Hypertension Mother      Arthritis Mother      Genitourinary Problems Mother      Lipids Mother      Cancer Sister         ovarian cancer     Hypertension Sister      Lipids Sister      Cancer Maternal Grandmother         stomach cancer     Asthma Father      Cardiovascular Father         heart attack; bypass x5, congenital heart disease     Heart Disease Father         heart attack; bypass x5, congenital heart disease     Diabetes Father      Hypertension Father      Cancer Father         prostate     Prostate Cancer Father      Circulatory Father         blood clots     Genitourinary Problems Father      Respiratory Father         emphysema; COPD     Diabetes Maternal Grandfather      Diabetes Paternal Grandfather      Eye Disorder Paternal Grandfather         glaucoma     Hypertension Brother      Lipids Brother      Hypertension Brother      Cardiovascular Brother         bypass x3     Heart Disease Brother         bypass x3     Lipids Brother      Hypertension Sister      C.A.D. No family hx of      Cerebrovascular Disease No family hx of      Breast Cancer No family hx of      Cancer - colorectal No family hx of      Alzheimer Disease No family hx of      Blood Disease No family hx of      Gastrointestinal Disease No family hx of      Musculoskeletal Disorder No family hx of      Neurologic Disorder No family hx of      Thyroid Disease No family hx of        Medications:  Current Outpatient Medications   Medication Sig Dispense Refill     atorvastatin (LIPITOR) 20 MG tablet Take 1 tablet by mouth once daily 90 tablet 1     bisacodyl (DULCOLAX) 5 MG EC tablet Take 1 tablet (5 mg) by mouth See Admin Instructions --Take 1 tablet bisacodyl at 12:00 the day prior to procedure. 1 tablet 0     EQ STOOL  "SOFTENER 100 MG capsule 100 mg       fluticasone (FLONASE) 50 MCG/ACT nasal spray SPRAY 1 SPRAY(S) IN EACH NOSTRIL ONCE DAILY 16 g 0     LANsoprazole (PREVACID) 30 MG DR capsule Take 1 capsule by mouth once daily 90 capsule 2     lisinopril (ZESTRIL) 20 MG tablet Take 1/2 (one-half) tablet by mouth once daily 45 tablet 2     Multiple Vitamin (MULTIVITAMINS PO) Take  by mouth.       Na Sulfate-K Sulfate-Mg Sulf (SUPREP BOWEL PREP) solution Take 177 mLs (1 Bottle) by mouth See Admin Instructions Evening before procedure, complete steps 1 through 4 (see package) using one (1) 6 ounce bottle before bed.  Morning of procedure, repeat steps 1 through 4 using the other 6 ounce bottle. (Patient not taking: Reported on 2021) 2 Bottle 0     polyethylene glycol (GOLYTELY) 236 g suspension Take 4,000 mLs by mouth See Admin Instructions -Day prior to procedure: Take 1 tablet- bisacodyl at 12:00.  Mix GoLytely as directed on container.  At 6:00 pm - drink 8 oz. glass of solution and continue drinking one 8 oz. glass every 15 minutes until bottle is empty. (Patient not taking: Reported on 2021) 1 each 0       Allergies:  Allergies   Allergen Reactions     Hydrocodone Other (See Comments)     \"climbed the walls, very anxious, insomnia\"     Nsaids Other (See Comments)     Hx of stomach ulcers       Social history:   Social History     Tobacco Use     Smoking status: Former Smoker     Quit date: 1981     Years since quittin.1     Smokeless tobacco: Never Used   Substance Use Topics     Alcohol use: Yes     Alcohol/week: 0.0 standard drinks     Comment: occ      Marital status: .    ROS:  A complete review of systems was performed with the patient and all systems negative except as per HPI.    Physical Examination:  Exam was chaperoned by Indu Lees  BP (!) 142/91 (BP Location: Left arm, Patient Position: Sitting, Cuff Size: Adult Large)   Pulse 81   Ht 5' 9\"   Wt 239 lb 1.6 oz   SpO2 96%   BMI 35.31 " "kg/m    General: Well hydrated. No acute distress.  Abdomen: Soft, NT, ND. No inguinal adenopathy palpated.  Perianal external examination:  Perianal skin: intact.  Lesions: No evidence of an external lesion, nodularity, or induration in the perianal region.  Eversion of buttocks: There was evidence of an anal fissure. Details: posterior midline with associated tag present.  Digital rectal examination: Was deferred in order not to cause further trauma.    Anoscopy: Was deferred in order not to cause further trauma    Procedures:  After obtaining informed consent,the patient was positioned in left lateral decubitus. The perianal area was then preped and draped in the standard sterile fashion. After a \"time-out\" was performed. A posterior midline fissure was present. A total of 100U of Botox was injected in the following fashion: 50U on either side of the fissure at the intersphincteric plane.  The patient tolerated the procedure well.    COMPLICATIONS: none, immediately.      Laboratory values reviewed:  Recent Labs   Lab Test 12/10/20  1039   WBC 3.5*   HGB 15.4      CR 0.76   ALBUMIN 3.8   BILITOTAL 0.5   ALKPHOS 71   ALT 39   AST 13       ASSESSMENT  Posterior anal fissure. Risks, benefits, and alternatives of operative treatment were thoroughly discussed with the patient, he/she understands these well and agrees to proceed.    PLAN  1. Botox injected today. Follow up in 6-8 weeks for evaluation of healing. Continue remaining cares, including diltiazem, baths, fiber, water.    Time spent: 30 minutes.  >50% spent in discussing, counseling and coordinating care.    Avinash Barron M.D    Division of Colon and Rectal Surgery  [facility]    Referring Provider:  No referring provider defined for this encounter.     Primary Care Provider:  No Ref-Primary, Physician  "

## 2021-02-10 ENCOUNTER — OFFICE VISIT (OUTPATIENT)
Dept: SURGERY | Facility: CLINIC | Age: 61
End: 2021-02-10
Payer: COMMERCIAL

## 2021-02-10 VITALS
HEART RATE: 81 BPM | SYSTOLIC BLOOD PRESSURE: 142 MMHG | HEIGHT: 69 IN | OXYGEN SATURATION: 96 % | WEIGHT: 239.1 LBS | BODY MASS INDEX: 35.41 KG/M2 | DIASTOLIC BLOOD PRESSURE: 91 MMHG

## 2021-02-10 DIAGNOSIS — K60.2 ANAL FISSURE: Primary | ICD-10-CM

## 2021-02-10 DIAGNOSIS — E66.01 MORBID OBESITY (H): ICD-10-CM

## 2021-02-10 PROCEDURE — 64640 INJECTION TREATMENT OF NERVE: CPT | Performed by: SURGERY

## 2021-02-10 ASSESSMENT — PAIN SCALES - GENERAL: PAINLEVEL: NO PAIN (0)

## 2021-02-10 ASSESSMENT — MIFFLIN-ST. JEOR: SCORE: 1889.93

## 2021-02-10 NOTE — NURSING NOTE
"Chief Complaint   Patient presents with     Consult     Patient here today for botox injection.        Vitals:    02/10/21 0738   BP: (!) 142/91   BP Location: Left arm   Patient Position: Sitting   Cuff Size: Adult Large   Pulse: 81   SpO2: 96%   Weight: 108.5 kg (239 lb 1.6 oz)   Height: 1.753 m (5' 9\")       Body mass index is 35.31 kg/m .    Florin Lees LPN      "

## 2021-02-10 NOTE — LETTER
2/10/2021       RE: Hugo Longoria   [facility] [address]     Dear Colleague,    Thank you for referring your patient, Hugo Longoria, to the [facility] COLON AND RECTAL SURGERY [facility]IN[facility] [place] [facility]. Please see a copy of my visit note below.    Colon and Rectal Surgery Clinic Note    RE: Hguo Longoria.  : 1960.  GEMMA: 2/10/2021.    Reason for visit: Anal Fissure    HPI: Per Brianna Saldivar: Interval history: I saw Hugo in February of last year with an anal fissure.  He started topical therapy but soon after his appointment he broke his leg.  He was on pain medications for this and developed constipation, which she feels delayed the healing of his fissure.  However, he has now had very soft bowel movements but continues to have pain, burning, and some bright red blood with bowel movements.  Per his primary care provider's recommendations, he got a colonoscopy on 2021 given a significant family history of colorectal cancer in aunt, grandmother, and grandmother's sibling all with colorectal cancers and personal history of adenomatous polyps.  He had 5 tubular adenomas and one sessile serrated adenoma.  He has admittedly used the topical diltiazem without improvement in symptoms.    At time of last evaluation, it was recommended he undergo botox vs. Internal sphincterotomy. He elected to proceed with botox injection, for which he presents today.      Medical history:  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)     Overnight stay due to blood poisoning     Gastric ulcer, unspecified as acute or chronic, without mention of hemorrhage or perforation      Hypertension        Surgical history:  Past Surgical History:   Procedure Laterality Date     ARTHROPLASTY  KNEE Right 12/10/2019    Procedure: Right knee replacement;  Surgeon: Javier Yin DO;  [facility]: [facility]     ARTHROSCOPY KNEE  5/29/2013    Procedure: ARTHROSCOPY KNEE;  Right knee arthroscopy with partial medial and partial lateral menisectomies;  Surgeon: Phani Mclaughlin MD;  Location: [facility]     ARTHROSCOPY SHOULDER BICEPS TENODESIS REPAIR Right 1/13/2017    Procedure: ARTHROSCOPY SHOULDER BICEPS TENODESIS REPAIR;  Surgeon: Javier Yin DO;  [facility]     ARTHROSCOPY SHOULDER DECOMPRESSION Right 1/13/2017    Procedure: ARTHROSCOPY SHOULDER DECOMPRESSION;  Surgeon: Javier Yin DO;  [facility]     ARTHROSCOPY SHOULDER ROTATOR CUFF REPAIR Right 1/13/2017    Procedure: ARTHROSCOPY SHOULDER ROTATOR CUFF REPAIR;  Surgeon: Javier Yin DO;  [facility]     COLONOSCOPY  11/01/2007     COLONOSCOPY  12/12/11     COLONOSCOPY N/A 11/18/2015    Procedure: COLONOSCOPY;  Surgeon: Migue Mclaughlin MD;  [facility]     COLONOSCOPY N/A 3/22/2017    Procedure: COMBINED COLONOSCOPY, SINGLE OR MULTIPLE BIOPSY/POLYPECTOMY BY BIOPSY;  Surgeon: Salvatore Zepeda MD;  [facility]     COLONOSCOPY N/A 3/5/2019    Procedure: COLONOSCOPY;  Surgeon: Prakash Ervin DO;  [facility]     COLONOSCOPY WITH CO2 INSUFFLATION N/A 1/12/2021    Procedure: COLONOSCOPY, WITH CO2 INSUFFLATION;  Surgeon: Juan Antonio Corcoran MD;  Location: [facility]     ENDOSCOPY  01/27/12    Upper GI - [facility]     ESOPHAGOSCOPY, GASTROSCOPY, DUODENOSCOPY (EGD), COMBINED N/A 11/18/2015    Procedure: COMBINED ESOPHAGOSCOPY, GASTROSCOPY, DUODENOSCOPY (EGD), BIOPSY SINGLE OR MULTIPLE;  Surgeon: Migue Mclaughlin MD;  Location:  GI     ESOPHAGOSCOPY, GASTROSCOPY, DUODENOSCOPY (EGD), COMBINED N/A 3/22/2017    Procedure: COMBINED ESOPHAGOSCOPY, GASTROSCOPY, DUODENOSCOPY (EGD), BIOPSY SINGLE OR MULTIPLE;  Surgeon: Salvatore Zepeda MD;  Location:  GI      HERNIORRHAPHY UMBILICAL N/A 3/12/2015    Procedure: HERNIORRHAPHY UMBILICAL;  Surgeon: Yared Ma MD;  [facility]     IRRIGATION AND DEBRIDEMENT UPPER EXTREMITY, COMBINED Left 3/15/2016    Procedure: COMBINED IRRIGATION AND DEBRIDEMENT UPPER EXTREMITY;  Surgeon: Javier Yin DO;  [facility]     REMOVAL OF SPERM DUCT(S)      Vasectomy     [facility] UGI ENDOSCOPY, SIMPLE EXAM  10/31/2007       Family history:  Family History   Problem Relation Age of Onset     Cancer Mother         ovarian cancer     Asthma Mother      Hypertension Mother      Arthritis Mother      Genitourinary Problems Mother      Lipids Mother      Cancer Sister         ovarian cancer     Hypertension Sister      Lipids Sister      Cancer Maternal Grandmother         stomach cancer     Asthma Father      Cardiovascular Father         heart attack; bypass x5, congenital heart disease     Heart Disease Father         heart attack; bypass x5, congenital heart disease     Diabetes Father      Hypertension Father      Cancer Father         prostate     Prostate Cancer Father      Circulatory Father         blood clots     Genitourinary Problems Father      Respiratory Father         emphysema; COPD     Diabetes Maternal Grandfather      Diabetes Paternal Grandfather      Eye Disorder Paternal Grandfather         glaucoma     Hypertension Brother      Lipids Brother      Hypertension Brother      Cardiovascular Brother         bypass x3     Heart Disease Brother         bypass x3     Lipids Brother      Hypertension Sister      C.A.D. No family hx of      Cerebrovascular Disease No family hx of      Breast Cancer No family hx of      Cancer - colorectal No family hx of      Alzheimer Disease No family hx of      Blood Disease No family hx of      Gastrointestinal Disease No family hx of      Musculoskeletal Disorder No family hx of      Neurologic Disorder No family hx of      Thyroid Disease No family hx of   "      Medications:  Current Outpatient Medications   Medication Sig Dispense Refill     atorvastatin (LIPITOR) 20 MG tablet Take 1 tablet by mouth once daily 90 tablet 1     bisacodyl (DULCOLAX) 5 MG EC tablet Take 1 tablet (5 mg) by mouth See Admin Instructions --Take 1 tablet bisacodyl at 12:00 the day prior to procedure. 1 tablet 0     EQ STOOL SOFTENER 100 MG capsule 100 mg       fluticasone (FLONASE) 50 MCG/ACT nasal spray SPRAY 1 SPRAY(S) IN EACH NOSTRIL ONCE DAILY 16 g 0     LANsoprazole (PREVACID) 30 MG DR capsule Take 1 capsule by mouth once daily 90 capsule 2     lisinopril (ZESTRIL) 20 MG tablet Take 1/2 (one-half) tablet by mouth once daily 45 tablet 2     Multiple Vitamin (MULTIVITAMINS PO) Take  by mouth.       Na Sulfate-K Sulfate-Mg Sulf (SUPREP BOWEL PREP) solution Take 177 mLs (1 Bottle) by mouth See Admin Instructions Evening before procedure, complete steps 1 through 4 (see package) using one (1) 6 ounce bottle before bed.  Morning of procedure, repeat steps 1 through 4 using the other 6 ounce bottle. (Patient not taking: Reported on 2021) 2 Bottle 0     polyethylene glycol (GOLYTELY) 236 g suspension Take 4,000 mLs by mouth See Admin Instructions -Day prior to procedure: Take 1 tablet- bisacodyl at 12:00.  Mix GoLytely as directed on container.  At 6:00 pm - drink 8 oz. glass of solution and continue drinking one 8 oz. glass every 15 minutes until bottle is empty. (Patient not taking: Reported on 2021) 1 each 0       Allergies:  Allergies   Allergen Reactions     Hydrocodone Other (See Comments)     \"climbed the walls, very anxious, insomnia\"     Nsaids Other (See Comments)     Hx of stomach ulcers       Social history:   Social History     Tobacco Use     Smoking status: Former Smoker     Quit date: 1981     Years since quittin.1     Smokeless tobacco: Never Used   Substance Use Topics     Alcohol use: Yes     Alcohol/week: 0.0 standard drinks     Comment: occ      Marital " "status: .    ROS:  A complete review of systems was performed with the patient and all systems negative except as per HPI.    Physical Examination:  Exam was chaperoned by Indu Lees  BP (!) 142/91 (BP Location: Left arm, Patient Position: Sitting, Cuff Size: Adult Large)   Pulse 81   Ht 5' 9\"   Wt 239 lb 1.6 oz   SpO2 96%   BMI 35.31 kg/m    General: Well hydrated. No acute distress.  Abdomen: Soft, NT, ND. No inguinal adenopathy palpated.  Perianal external examination:  Perianal skin: intact.  Lesions: No evidence of an external lesion, nodularity, or induration in the perianal region.  Eversion of buttocks: There was evidence of an anal fissure. Details: posterior midline with associated tag present.  Digital rectal examination: Was deferred in order not to cause further trauma.    Anoscopy: Was deferred in order not to cause further trauma    Procedures:  After obtaining informed consent,the patient was positioned in left lateral decubitus. The perianal area was then preped and draped in the standard sterile fashion. After a \"time-out\" was performed. A posterior midline fissure was present. A total of 100U of Botox was injected in the following fashion: 50U on either side of the fissure at the intersphincteric plane.  The patient tolerated the procedure well.    COMPLICATIONS: none, immediately.      Laboratory values reviewed:  Recent Labs   Lab Test 12/10/20  1039   WBC 3.5*   HGB 15.4      CR 0.76   ALBUMIN 3.8   BILITOTAL 0.5   ALKPHOS 71   ALT 39   AST 13       ASSESSMENT  Posterior anal fissure. Risks, benefits, and alternatives of operative treatment were thoroughly discussed with the patient, he/she understands these well and agrees to proceed.    PLAN  1. Botox injected today. Follow up in 6-8 weeks for evaluation of healing. Continue remaining cares, including diltiazem, baths, fiber, water.    Time spent: 30 minutes.  >50% spent in discussing, counseling and coordinating " care.    Avinash Barron M.D    Division of Colon and Rectal Surgery  [facility]    Referring Provider:  No referring provider defined for this encounter.     Primary Care Provider:  No Ref-Primary, Physician

## 2021-02-22 NOTE — PROGRESS NOTES
"    Assessment & Plan     Gastroesophageal reflux disease, unspecified whether esophagitis present  60 year old male presents with increased reflux over the past couple months, has been taking prevacid for years. Discussed trialing another medication for the next 2 weeks.  Given worsening of sxs, will get f/u EGD.  - omeprazole (PRILOSEC) 40 MG DR capsule; Take 1 capsule (40 mg) by mouth daily  - GASTROENTEROLOGY ADULT REF PROCEDURE ONLY; Future    Screening for diabetes mellitus  Hugo does not have a recent A1c in his history, has gained about 20 pounds in the past couple months.  - Hemoglobin A1c    Chronic pain of right knee  Hugo had a right knee replacement in December 2019 and a subsequent fall in March 2020 resulting in tibial fracture. He states he was unable to achieve proper physical therapy due to the pandemic. Now the knee becomes quite stiff and he is afraid of falling and injuring the leg again.  - [facility], AND CHIROPRACTIC REFERRAL; Future    S/P total knee replacement using cement, right  Knee replacement in December 2019, continues to have decreased range of motion.  - [facility] PT, HAND, AND CHIROPRACTIC REFERRAL; Future    Class 2 obesity due to excess calories without serious comorbidity with body mass index (BMI) of 35.0 to 35.9 in adult  Hugo states he has gained about 20 pounds in the past few months, somewhat contributes this to the lack of physical exercise due to his knee replacement and subsequent fracture resulting in long term immobility.  - NUTRITION REFERRAL         BMI:   Estimated body mass index is 35.74 kg/m  as calculated from the following:    Height as of this encounter: 1.753 m (5' 9\").    Weight as of this encounter: 109.8 kg (242 lb).   Weight management plan: Patient referred to endocrine and/or weight management specialty    MEDICATIONS:   Orders Placed This Encounter   Medications     omeprazole (PRILOSEC) 40 MG DR capsule     Sig: Take 1 capsule (40 mg) by mouth daily     " "Dispense:  90 capsule     Refill:  1          - Continue other medications without change  CONSULTATION/REFERRAL to nutritionist, physical therapist, gastroenterologist for upper endoscopy.    Work on weight loss  Regular exercise    Return in about 4 weeks (around 3/23/2021) for Annual Well Check.    Bony Robin MD  [facility] HARSHA Arias is a 60 year old who presents for the following health issues  accompanied by his self:    History of Present Illness   He exercises with enough effort to increase his heart rate 10 to 19 minutes per day.  He exercises with enough effort to increase his heart rate 7 days per week.   He is taking medications regularly.         GERD/Heartburn  Onset/Duration: 3 months  Description:\" body heat\" in neck  Intensity: moderate, severe  Progression of Symptoms: worsening  Accompanying Signs & Symptoms:  Does it feel like food gets stuck or trouble swallowing: no  Nausea: YES  Vomiting (bloody?): no  Abdominal Pain: no  Black-Tarry stools: no  Bloody stools: no  History:  Previous similar episodes: YES  Previous ulcers: no  Precipitating factors:   Caffeine use: YES  Alcohol use: YES  NSAID/Aspirin use: no  Tobacco use: no  Worse with starts in the evening and when laying down to go to sleep.  Alleviating factors: None  Therapies tried and outcome:             Lifestyle changes: diet changes            Medications: Prevacid          Review of Systems   Constitutional, HEENT, cardiovascular, pulmonary, gi and gu systems are negative, except as otherwise noted.      Objective    /78 (BP Location: Left arm, Patient Position: Chair, Cuff Size: Adult Large)   Pulse 100   Temp 97.6  F (36.4  C) (Temporal)   Resp 19   Ht 1.753 m (5' 9\")   Wt 109.8 kg (242 lb)   SpO2 96%   BMI 35.74 kg/m    Body mass index is 35.74 kg/m .  Physical Exam   GENERAL: healthy, alert and no distress  EYES: Eyes grossly normal to inspection, PERRL and conjunctivae and " sclerae normal  HENT: ear canals and TM's normal, nose and mouth without ulcers or lesions  NECK: no adenopathy, no asymmetry, masses, or scars and thyroid normal to palpation  RESP: lungs clear to auscultation - no rales, rhonchi or wheezes  CV: regular rate and rhythm, normal S1 S2, no S3 or S4, no murmur, click or rub, no peripheral edema and peripheral pulses strong  ABDOMEN: large, rounded, soft, nontender, no hepatosplenomegaly, no masses and bowel sounds normal  MS: no gross musculoskeletal defects noted, no edema  SKIN: no suspicious lesions or rashes  NEURO: Normal strength and tone, mentation intact and speech normal  PSYCH: mentation appears normal, affect normal/bright

## 2021-02-23 ENCOUNTER — OFFICE VISIT (OUTPATIENT)
Dept: FAMILY MEDICINE | Facility: CLINIC | Age: 61
End: 2021-02-23
Payer: COMMERCIAL

## 2021-02-23 VITALS
HEIGHT: 69 IN | WEIGHT: 242 LBS | TEMPERATURE: 97.6 F | BODY MASS INDEX: 35.84 KG/M2 | SYSTOLIC BLOOD PRESSURE: 128 MMHG | DIASTOLIC BLOOD PRESSURE: 78 MMHG | HEART RATE: 100 BPM | RESPIRATION RATE: 19 BRPM | OXYGEN SATURATION: 96 %

## 2021-02-23 DIAGNOSIS — E66.09 CLASS 2 OBESITY DUE TO EXCESS CALORIES WITHOUT SERIOUS COMORBIDITY WITH BODY MASS INDEX (BMI) OF 35.0 TO 35.9 IN ADULT: ICD-10-CM

## 2021-02-23 DIAGNOSIS — K21.9 GASTROESOPHAGEAL REFLUX DISEASE, UNSPECIFIED WHETHER ESOPHAGITIS PRESENT: Primary | ICD-10-CM

## 2021-02-23 DIAGNOSIS — E66.812 CLASS 2 OBESITY DUE TO EXCESS CALORIES WITHOUT SERIOUS COMORBIDITY WITH BODY MASS INDEX (BMI) OF 35.0 TO 35.9 IN ADULT: ICD-10-CM

## 2021-02-23 DIAGNOSIS — G89.29 CHRONIC PAIN OF RIGHT KNEE: ICD-10-CM

## 2021-02-23 DIAGNOSIS — M25.561 CHRONIC PAIN OF RIGHT KNEE: ICD-10-CM

## 2021-02-23 DIAGNOSIS — Z11.59 ENCOUNTER FOR SCREENING FOR OTHER VIRAL DISEASES: ICD-10-CM

## 2021-02-23 DIAGNOSIS — Z13.1 SCREENING FOR DIABETES MELLITUS: ICD-10-CM

## 2021-02-23 DIAGNOSIS — Z96.651 S/P TOTAL KNEE REPLACEMENT USING CEMENT, RIGHT: ICD-10-CM

## 2021-02-23 LAB — HBA1C MFR BLD: 5.7 % (ref 0–5.6)

## 2021-02-23 PROCEDURE — 36415 COLL VENOUS BLD VENIPUNCTURE: CPT | Performed by: FAMILY MEDICINE

## 2021-02-23 PROCEDURE — 99214 OFFICE O/P EST MOD 30 MIN: CPT | Performed by: FAMILY MEDICINE

## 2021-02-23 PROCEDURE — 83036 HEMOGLOBIN GLYCOSYLATED A1C: CPT | Performed by: FAMILY MEDICINE

## 2021-02-23 RX ORDER — OMEPRAZOLE 40 MG/1
40 CAPSULE, DELAYED RELEASE ORAL DAILY
Qty: 90 CAPSULE | Refills: 1 | Status: SHIPPED | OUTPATIENT
Start: 2021-02-23 | End: 2021-10-08

## 2021-02-23 ASSESSMENT — PAIN SCALES - GENERAL: PAINLEVEL: NO PAIN (0)

## 2021-02-23 ASSESSMENT — MIFFLIN-ST. JEOR: SCORE: 1903.08

## 2021-02-23 NOTE — RESULT ENCOUNTER NOTE
Hugo,  Your recent studies showed a hemoglobin A1c that was mildly elevated into the prediabetic range.  Please continue with plan to see nutrition.  Please let me know if you have any questions or concerns and follow up as discussed in clinic.    Sincerely.  Dr. JEYSON Robin MD, FAAFP  Family Medicine Physician  [facility]  [address] [place] [address]

## 2021-03-01 ENCOUNTER — THERAPY VISIT (OUTPATIENT)
Dept: PHYSICAL THERAPY | Facility: CLINIC | Age: 61
End: 2021-03-01
Attending: FAMILY MEDICINE
Payer: COMMERCIAL

## 2021-03-01 DIAGNOSIS — G89.29 CHRONIC PAIN OF RIGHT KNEE: ICD-10-CM

## 2021-03-01 DIAGNOSIS — Z96.651 S/P TOTAL KNEE REPLACEMENT USING CEMENT, RIGHT: ICD-10-CM

## 2021-03-01 DIAGNOSIS — M25.561 CHRONIC PAIN OF RIGHT KNEE: ICD-10-CM

## 2021-03-01 PROCEDURE — 97110 THERAPEUTIC EXERCISES: CPT | Mod: GP | Performed by: PHYSICAL THERAPIST

## 2021-03-01 PROCEDURE — 97161 PT EVAL LOW COMPLEX 20 MIN: CPT | Mod: GP | Performed by: PHYSICAL THERAPIST

## 2021-03-01 ASSESSMENT — ACTIVITIES OF DAILY LIVING (ADL)
SWELLING: THE SYMPTOM AFFECTS MY ACTIVITY SEVERELY
GO DOWN STAIRS: ACTIVITY IS VERY DIFFICULT
SQUAT: ACTIVITY IS VERY DIFFICULT
RISE FROM A CHAIR: ACTIVITY IS VERY DIFFICULT
KNEE_ACTIVITY_OF_DAILY_LIVING_SCORE: 20
GIVING WAY, BUCKLING OR SHIFTING OF KNEE: THE SYMPTOM AFFECTS MY ACTIVITY SEVERELY
SIT WITH YOUR KNEE BENT: ACTIVITY IS VERY DIFFICULT
KNEEL ON THE FRONT OF YOUR KNEE: ACTIVITY IS VERY DIFFICULT
STIFFNESS: THE SYMPTOM AFFECTS MY ACTIVITY SEVERELY
RAW_SCORE: 14
AS_A_RESULT_OF_YOUR_KNEE_INJURY,_HOW_WOULD_YOU_RATE_YOUR_CURRENT_LEVEL_OF_DAILY_ACTIVITY?: SEVERELY ABNORMAL
HOW_WOULD_YOU_RATE_THE_CURRENT_FUNCTION_OF_YOUR_KNEE_DURING_YOUR_USUAL_DAILY_ACTIVITIES_ON_A_SCALE_FROM_0_TO_100_WITH_100_BEING_YOUR_LEVEL_OF_KNEE_FUNCTION_PRIOR_TO_YOUR_INJURY_AND_0_BEING_THE_INABILITY_TO_PERFORM_ANY_OF_YOUR_USUAL_DAILY_ACTIVITIES?: 0
GO UP STAIRS: ACTIVITY IS VERY DIFFICULT
WEAKNESS: THE SYMPTOM AFFECTS MY ACTIVITY SEVERELY
KNEE_ACTIVITY_OF_DAILY_LIVING_SUM: 14
STAND: ACTIVITY IS VERY DIFFICULT
PAIN: THE SYMPTOM AFFECTS MY ACTIVITY SEVERELY
WALK: ACTIVITY IS VERY DIFFICULT
HOW_WOULD_YOU_RATE_THE_OVERALL_FUNCTION_OF_YOUR_KNEE_DURING_YOUR_USUAL_DAILY_ACTIVITIES?: SEVERELY ABNORMAL
LIMPING: THE SYMPTOM AFFECTS MY ACTIVITY SEVERELY

## 2021-03-01 NOTE — PROGRESS NOTES
"[facility] Initial Evaluation  Subjective:  The history is provided by the patient. No  was used.   Patient Health History  Hugo JEYSON Longoria being seen for R knee pain.     Problem began: 2/23/2021 (referral date).   Problem occurred: Fell in March 2020, after having R knee replaced, pt reports never felt he fully recovered   Pain is reported as 4/10 on pain scale.  General health as reported by patient is fair.  Pertinent medical history includes: high blood pressure and overweight.   Red flags:  None as reported by patient.  Medical allergies: none.   Surgeries include:  None.    Current medications:  None.    Current occupation is pipe line construction .   Primary job tasks include:  Prolonged standing (walking).                  Therapist Generated HPI Evaluation  Problem details: R knee pain not improving. Pt reports knee is always swollen and he feels there is \"something wrong with his muscles.\" Has been doing home exercises, but not seeing improvement. Said leg just gets sore from exercises and still stiffens up frequently. Pt fears falling again due to not being able to bend his knee far enough. .         Type of problem:  Right knee.    This is a recurrent (R TKA one year ago) condition.  Condition occurred with:  Other reason.  Where condition occurred: other.  Patient reports pain:  Anterior and sub patellar.  Pain is described as aching, sharp and shooting and is intermittent.    Since onset symptoms are gradually worsening.  Associated symptoms:  Loss of motion/stiffness and numbness. Exacerbated by: standing, walking, stairs, squatting.  and relieved by rest.  Special tests included:  X-ray (healed tibial fracture).  Previous treatment includes physical therapy (previously helped, pt reports recently gained 20#).   Restrictions due to condition include:  Working in normal job without restrictions (currently not working due to seasonal work, going back " soon).  Barriers include:  None as reported by patient.                        Objective:    Gait:    Gait Type:  Normal   Weight Bearing Status:  WBAT               Ankle/Foot Evaluation  ROM:        Strength:      Plantarflexion: Left: 5/5   Strong/painful  Pain:   Right: 4-/5  Weak/pain free  Pain:                                     Lumbar/SI Evaluation    Lumbar Myotomes:    T12-L3 (Hip Flex):  Left: 5    Right: 4+  L2-4 (Quads):  Left:  5    Right:  5  L4 (Ankle DF):  Left:  5    Right:  5  L5 (Great Toe Ext): Left: 5    Right: 5   S1 (Toe Raise):  Left: 5    Right: 5                                                    Hip Evaluation    Hip Strength:        Abduction:  Left: 4+/5      Pain:strong/pain freeRight: 4-/5     Pain:weak/pain free  Adduction:  Left: /5   Pain:strong/pain free                         Knee Evaluation:  ROM:    AROM      Extension:  Left: 0    Right:  0  Flexion: Left: 125*    Right: 110*  PROM        Flexion: Left:   Right:  125* with belt assistance, painful  Pain: pain with flexion RLE AROM and PROM    Strength:         Quad Set Left:  Good and WNL    Pain: -   Quad Set Right:  Good and WNL    Pain: -          Mobility Testing:      Patellofemoral Medial:  Right: normal  Patellofemoral Lateral:  Right: normal  Patellofemoral Superior:  Right: normal  Patellofemoral Inferior:  Right: normal    Balance Testing:  Tandem x30s holds BLE EO        General     ROS    Assessment/Plan:    Patient is a 60 year old male with right side knee complaints.    Patient has the following significant findings with corresponding treatment plan.                Diagnosis 1:  Right Knee Pain  Pain -  manual therapy, self management, education and home program  Decreased ROM/flexibility - manual therapy and therapeutic exercise  Decreased strength - therapeutic exercise and therapeutic activities  Decreased function - therapeutic activities  Impaired posture - neuro re-education    Therapy Evaluation Codes:    1) History comprised of:   Personal factors that impact the plan of care:      Time since onset of symptoms.    Comorbidity factors that impact the plan of care are:      Overweight.     Medications impacting care: None.  2) Examination of Body Systems comprised of:   Body structures and functions that impact the plan of care:      Hip and Knee.   Activity limitations that impact the plan of care are:      walking, squatting, stairs.  3) Clinical presentation characteristics are:   Stable/Uncomplicated.  4) Decision-Making    Low complexity using standardized patient assessment instrument and/or measureable assessment of functional outcome.  Cumulative Therapy Evaluation is: Low complexity.    Previous and current functional limitations:  (See Goal Flow Sheet for this information)    Short term and Long term goals: (See Goal Flow Sheet for this information)     Communication ability:  Patient appears to be able to clearly communicate and understand verbal and written communication and follow directions correctly.  Treatment Explanation - The following has been discussed with the patient:   RX ordered/plan of care  Anticipated outcomes  Possible risks and side effects  This patient would benefit from PT intervention to resume normal activities.   Rehab potential is good.    Frequency:  1 X week, once daily  Duration:  for 8 weeks  Discharge Plan:  Achieve all LTG.  Independent in home treatment program.  Reach maximal therapeutic benefit.    Please refer to the daily flowsheet for treatment today, total treatment time and time spent performing 1:1 timed codes.       Tiara Long, PARKER Holden, HERNESTOT

## 2021-03-04 ENCOUNTER — THERAPY VISIT (OUTPATIENT)
Dept: PHYSICAL THERAPY | Facility: CLINIC | Age: 61
End: 2021-03-04
Attending: FAMILY MEDICINE
Payer: COMMERCIAL

## 2021-03-04 DIAGNOSIS — M25.561 RIGHT KNEE PAIN: ICD-10-CM

## 2021-03-04 PROCEDURE — 97110 THERAPEUTIC EXERCISES: CPT | Mod: GP | Performed by: PHYSICAL THERAPIST

## 2021-03-08 ENCOUNTER — THERAPY VISIT (OUTPATIENT)
Dept: PHYSICAL THERAPY | Facility: CLINIC | Age: 61
End: 2021-03-08
Payer: COMMERCIAL

## 2021-03-08 DIAGNOSIS — Z11.59 ENCOUNTER FOR SCREENING FOR OTHER VIRAL DISEASES: ICD-10-CM

## 2021-03-08 DIAGNOSIS — M25.561 RIGHT KNEE PAIN: ICD-10-CM

## 2021-03-08 LAB
LABORATORY COMMENT REPORT: NORMAL
SARS-COV-2 RNA RESP QL NAA+PROBE: NEGATIVE
SARS-COV-2 RNA RESP QL NAA+PROBE: NORMAL
SPECIMEN SOURCE: NORMAL
SPECIMEN SOURCE: NORMAL

## 2021-03-08 PROCEDURE — 97110 THERAPEUTIC EXERCISES: CPT | Mod: GP | Performed by: PHYSICAL THERAPIST

## 2021-03-08 PROCEDURE — U0005 INFEC AGEN DETEC AMPLI PROBE: HCPCS | Performed by: INTERNAL MEDICINE

## 2021-03-08 PROCEDURE — U0003 INFECTIOUS AGENT DETECTION BY NUCLEIC ACID (DNA OR RNA); SEVERE ACUTE RESPIRATORY SYNDROME CORONAVIRUS 2 (SARS-COV-2) (CORONAVIRUS DISEASE [COVID-19]), AMPLIFIED PROBE TECHNIQUE, MAKING USE OF HIGH THROUGHPUT TECHNOLOGIES AS DESCRIBED BY CMS-2020-01-R: HCPCS | Performed by: INTERNAL MEDICINE

## 2021-03-10 ENCOUNTER — VIRTUAL VISIT (OUTPATIENT)
Dept: NUTRITION | Facility: CLINIC | Age: 61
End: 2021-03-10
Attending: FAMILY MEDICINE
Payer: COMMERCIAL

## 2021-03-10 DIAGNOSIS — E66.01 MORBID OBESITY (H): Primary | ICD-10-CM

## 2021-03-10 PROCEDURE — 97802 MEDICAL NUTRITION INDIV IN: CPT | Mod: 95 | Performed by: DIETITIAN, REGISTERED

## 2021-03-10 NOTE — PATIENT INSTRUCTIONS
NUTRITION INTERVENTION CARDIOMETABOLIC     Long Term Goals:  Goal: Lose 20-24lbs in 6 months.   Goal: Decrease Hemoglobin A1c <5.7% within 2-6 months       Short Term Goals:    1. Start having breakfast daily with healthy fat, veggie or fruit and lean protein - ex 2 eggs or turkey/chicken sausages with   cup rolled oatmeal topped with blueberries and flax seeds plus other nuts. Could  also try a smoothie with dairy free alternative -ex. unsweetened almond milk, plant based protein powder, healthy fat - ex ground flax, [place] or hemp seeds and or avocado plus 1 serving of fruit and veggie - ex: spinach, kale)     see recipes provided   2. Start having snack 1-2x daily with healthy fats (ex: guac, nuts, hummus) and add a fruit/veggie for fiber- see ideas discussed and provided  3. Stop current multi and start the multi and omega recommended   4. Cut back on grains only 2 servings per day and increasing healthy fats like avocado at breakfast, fiber from veggies etc. - see food plan for more serving recommendations   5. Pick one meal to start to cook a week to help wife       Anti-inflammatory Mediterranean Approach: Eat whole, unprocessed real foods in their unprocessed forms such as fruits, vegetables, whole grains (prefer gluten free), nuts, legumes, extra virgin olive oil, spices, modest amounts of poultry, and fish.      Avoid inflammatory foods: Eliminate gluten found in wheat, barley, rye, oats, kamut, and spelt for at least 3 weeks to identify any hidden reaction. Gluten sensitivity or allergy can cause many different types of symptoms form migraines to fatigue to weight gain.  If symptoms go away this is a clue you may be reactive to gluten.  Dairy can also be inflammatory consider eliminating for 3 weeks as well. The proteins like casein and whey in dairy can irritate and inflame your gut. Also the sugar lactase in dairy can cause digestive issues in addition to blood sugar spikes.     Cardiometabolic Food plan  - 1800-2,200 calories per day     Protein 10-12 servings per day - include at each meal to stabilize blood sugars   (Choose 3oz or 21g per meal and aim for 1oz of 7g for snacks)   - Strive for 1-2 servings of fish per week especially of higher omega-3 fatty acid containing fish such as salmon.     Legumes <2 serving per day     Dairy alternatives 2-3 serving per day     Nuts and seeds 3-4 servings per day - great to incorporate as snacks    Fats and oils 4 servings per day     Non starchy vegetables 8-10 servings per day     Starchy vegetable limit 1 serving per day as they tend to impact blood sugar (they are moderate-GI).     Fruits 2 servings per day - best to couple with a little bit of protein or fat to offset a rise in blood sugars (they are low-moderate-GI foods).     Whole grains <2 serving per day - try gluten free whole grains instead        Incorporate protein powder daily:    Plant based hemp (recommended brands: [employer], Alitalia, Just Hemp Protein, [employer])      Plant based pea (recommended brands: Naked Pea, [employer]). If you want to try a combo of pea and hemp the brand Gonzalez in vanilla or chocolate is a great option.     Try Bone broth protein powder or collagen peptides in liquid bone broth, vegetable broth or 12 oz of water as snack. The bone broth powder and collagen can be used for soups as well. This can help provide essential amino acids and minerals that heal your gut as well as balance blood sugars. A great option if you have a hard time tolerating solids.     Choose Low Glycemic (GI) foods: Regulate your sugar levels by eating foods that do not spike blood sugars.  Eat low -GI foods so only small fluctuations in blood glucose and insulin levels are produced.      Examples of low-GI foods: nuts, seeds, GF oats, most vegetables especially non-starchy and fruits.     Medium or high-GI foods should be eaten with a protein or fat, both of which blunt the glycemic effect of these  foods. This reduces the overall glycemic impact of a meal.   Ex: Most grains and starchy veggies are medium/high GI.     Avoid foods containing refined sugars, artificial sweeteners, and refined grains they are considered high-GI because they lead to sharp increases in blood sugars levels, which increase insulin sensitivity causing increased TG, and low good cholesterol (HDL).   Ex: cakes, cookies, pies, bread, sodas, fruit drinks, presweetened tea, coffee drinks, energy or sport drinks, flavored milk and other processed foods.     Choose foods high in fiber: Aim for at least 5 grams of fiber per serving of food or a total of 25-35 grams fiber per day. Remember, when looking at the label, you can take the fiber away from the total carbs. Ex:15g of total carbs - 4g of fiber = 11g net carbs     Insoluble fiber acts like a bulky  inner broom,  sweeping out debris from the intestine and creating more motility and movement.      Soluble fiber attracts water and swells, creating a gel that slows digestion.  Also, slows the release of glucose from foods into the blood which stops spikes in blood sugar levels.  Soluble fiber traps toxins in the gut, helping to carry them to excretion and provides healthy bacteria in the digestive tract.     Choose High Quality Fats: Adding anti-inflammatory fats into your diet such as fish (salmon, herring, mackerel, and sardines), omega 3 eggs, london seeds, ground flax seeds/milk, hemp seeds/milk, walnuts and some other certain leafy greens will increase omega-3 fats to omeaga-6 fats ratio.     Therapeutic fats both monounsaturated and polyunsaturated to include daily: ground flaxseeds, unsalted mixed nuts, avocados, olives, extra-virgin olive oil.     Emphasize high-quality oils and fats in the diet daily such as avocado oil, coconut oil, flaxseed, olive, sesame. Ex: 1 tsp to 1 tbsp of MCT oil from coconuts can be added into coffee, smoothies, and salad dressings per day.     Avoid trans  fats found in processed foods     Drink more water. Hydration is critical, so drink at least six to eight glasses of water a day. Drink more water between meals and less at meals.     Try adding herbal teas (sugar free) or lemon/lime/cucumber/fruit to water for flavor. Avoid artificial sweetener packets to flavor your water.     Cut back on coffee switch to green tea. Avoid adding sugar and milk to coffee instead use dairy alternatives such as almond, flax, coconut milk.     Focus on high quality micro-nutrients.     One Multivitamin by Pure encapsulations - 1 capsule per day with food     Nordic Naturals Plus vitamin D - 1-2 capsules daily with food    Rebuild the friendly bacteria in your microbime. Start by taking a probiotic supplement, they will help rebuild the healthy bacteria so essential to good gut health.  you can help establish healthy gut microflora by consuming foods that contain live active cultures ( probiotics ) such as kimchi,     Increase physical activity. Moving our body helps move our bowels and speeds up your metabolism.     Exercise 15-60 minutes daily--whether that looks like burst training, yoga, or vigorous walking.    Manage your stress. Stress can negatively impact the way you digest foods and absorb nutrients leading to more digestive issues (constipation, diarrhea, indigestion, nausea etc), imbalanced blood sugars and weight imbalances. Try to focus on the following relaxation techniques:     Regular exercise such as walking     Yoga    Meditation     Breathing techniques     Time management     Track what you eat. Writing down or tracking through an patrizia what you eat as well as how you feel acn help you identify patterns in your symptoms. This can help you become more aware and creat a diet that is right for you.     Pick a food tracking patrizia:     Through the [employer] patrizia, you can focus more on calories and macronutrient's of foods to balance blood sugars or monitor weight. You can  track blood sugars in the notes section along with symptoms, bowl movements, medications, stress, exercise, water intake and sleep.   Note: You don't have to journal forever and it is more important that you have consistent meals as well as snacks while focusing on adding in healthy foods.    NUTRITION RESOURCES:  1. Purchase Eat Fat Get Thin by Juan Antonio Freitas Book/Cookbook   2. IFM Cardiometabolic Packet     Functional Nutrition Fundamentals     Comprehensive Guide    Meal plan with recipes     Grocery list

## 2021-03-10 NOTE — PROGRESS NOTES
Medical Nutrition Therapy  Visit Type: Initial assessment and intervention    Visit Details    How would you like to obtain your AVS? MyChart  If the correspondence for visit is dropped, how would you like your dietitian to reconnect with you:   call back by phone? Yes    Text to cell phone: [phone]   Will anyone else be joining your video visit or telephone call?     Type of service:  Video Visit     Start Time: 1:15 PM    End Time:2:26 PM    Originating Location (pt. Location): Home    Distant Location (provider location):  [facility] WORKING VIRTUAL FROM HOME     Platform used for Video Visit: Shubham      Referring Provider: Bony Robin  [facility]     REASON FOR REFERRAL:   Hugo Longoria is a 60 year old male who is interested in Medical Nutrition Therapy (MNT) and education related to weight management.   He is accompanied by spouse.     NUTRITION ASSESSMENT:   Nutritional Goals 3/10/2021   Nutritional Goal Create healthier eating patterns;Create a plan to lose weight;Work on meal planning/recipes;Manage Digestive Issues;Address nutrient deficiencies;Increase energy;What to eat for my specific health concerns        No flowsheet data found.    No flowsheet data found.   No flowsheet data found.   Gastrointestinal 3/10/2021   Constipation Past   Hemorrhoids Current   Diarrhea Past   Gas/bloating Current   Blood in stool Past   Stomach ulcer Past       No flowsheet data found.   Endocrine 3/10/2021   Overweight/obesity Current      Skin 3/10/2021   Dry Skin Current   Cold Sores Past      Cardiopulmonary 3/10/2021   High blood pressure Past;Current      No flowsheet data found.   No flowsheet data found.   No flowsheet data found.   No flowsheet data found.     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)     Overnight stay due to blood poisoning     Gastric ulcer, unspecified as acute or chronic, without mention of hemorrhage or perforation      Hypertension        Previous Surgeries:   Past Surgical History:   Procedure Laterality Date     ARTHROPLASTY KNEE Right 12/10/2019    Procedure: Right knee replacement;  Surgeon: Javier Yin DO;  Location: [facility]     ARTHROSCOPY KNEE  5/29/2013    Procedure: ARTHROSCOPY KNEE;  Right knee arthroscopy with partial medial and partial lateral menisectomies;  Surgeon: Phani Mclaughlin MD;  Location: [facility]     ARTHROSCOPY SHOULDER BICEPS TENODESIS REPAIR Right 1/13/2017    Procedure: ARTHROSCOPY SHOULDER BICEPS TENODESIS REPAIR;  Surgeon: Javier Yin DO;  [facility]     ARTHROSCOPY SHOULDER DECOMPRESSION Right 1/13/2017    Procedure: ARTHROSCOPY SHOULDER DECOMPRESSION;  Surgeon: Javier Yin DO;  [facility]     ARTHROSCOPY SHOULDER ROTATOR CUFF REPAIR Right 1/13/2017    Procedure: ARTHROSCOPY SHOULDER ROTATOR CUFF REPAIR;  Surgeon: Javier Yin DO;  [facility]     COLONOSCOPY  11/01/2007     COLONOSCOPY  12/12/11     COLONOSCOPY N/A 11/18/2015    Procedure: COLONOSCOPY;  Surgeon: Migue Mclaughlin MD;  [facility]     COLONOSCOPY N/A 3/22/2017    Procedure: COMBINED COLONOSCOPY, SINGLE OR MULTIPLE BIOPSY/POLYPECTOMY BY BIOPSY;  Surgeon: Salvatore Zepeda MD;  [facility]     COLONOSCOPY N/A 3/5/2019    Procedure: COLONOSCOPY;  Surgeon: Prakash Ervin DO;  [facility]     COLONOSCOPY WITH CO2 INSUFFLATION N/A 1/12/2021    Procedure: COLONOSCOPY, WITH CO2 INSUFFLATION;  Surgeon: Juan Antonio Corcoran MD;  Location: [facility]     ENDOSCOPY  01/27/12    Upper GI - [facility]     ESOPHAGOSCOPY, GASTROSCOPY, DUODENOSCOPY (EGD), COMBINED N/A 11/18/2015    Procedure: COMBINED ESOPHAGOSCOPY, GASTROSCOPY, DUODENOSCOPY (EGD), BIOPSY SINGLE OR MULTIPLE;   Surgeon: Migue Mclaughlin MD;  [facility]     ESOPHAGOSCOPY, GASTROSCOPY, DUODENOSCOPY (EGD), COMBINED N/A 3/22/2017    Procedure: COMBINED ESOPHAGOSCOPY, GASTROSCOPY, DUODENOSCOPY (EGD), BIOPSY SINGLE OR MULTIPLE;  Surgeon: Salvatore Zepeda MD;  [facility]     HERNIORRHAPHY UMBILICAL N/A 3/12/2015    Procedure: HERNIORRHAPHY UMBILICAL;  Surgeon: Yared Ma MD;  [facility]     IRRIGATION AND DEBRIDEMENT UPPER EXTREMITY, COMBINED Left 3/15/2016    Procedure: COMBINED IRRIGATION AND DEBRIDEMENT UPPER EXTREMITY;  Surgeon: Javier Yin DO;  [facility]: [facility]     REMOVAL OF SPERM DUCT(S)      Vasectomy     [facility] ENDOSCOPY, SIMPLE EXAM  10/31/2007        Family History:  Family History   Problem Relation Age of Onset     Cancer Mother         ovarian cancer     Asthma Mother      Hypertension Mother      Arthritis Mother      Genitourinary Problems Mother      Lipids Mother      Cancer Sister         ovarian cancer     Hypertension Sister      Lipids Sister      Cancer Maternal Grandmother         stomach cancer     Asthma Father      Cardiovascular Father         heart attack; bypass x5, congenital heart disease     Heart Disease Father         heart attack; bypass x5, congenital heart disease     Diabetes Father      Hypertension Father      Cancer Father         prostate     Prostate Cancer Father      Circulatory Father         blood clots     Genitourinary Problems Father      Respiratory Father         emphysema; COPD     Diabetes Maternal Grandfather      Diabetes Paternal Grandfather      Eye Disorder Paternal Grandfather         glaucoma     Hypertension Brother      Lipids Brother      Hypertension Brother      Cardiovascular Brother         bypass x3     Heart Disease Brother         bypass x3     Lipids Brother      Hypertension Sister      C.A.D. No family hx of      Cerebrovascular Disease No family hx of      Breast Cancer No family hx of      Cancer -  colorectal No family hx of      Alzheimer Disease No family hx of      Blood Disease No family hx of      Gastrointestinal Disease No family hx of      Musculoskeletal Disorder No family hx of      Neurologic Disorder No family hx of      Thyroid Disease No family hx of         Lifestyle History:  Lifestyle 3/10/2021   Do you feel your life is stressful right now?  No   Are you currently implementing any strategies to help manage stress? No        Exercise History:  Exercise 3/10/2021   Does your occupation require extended periods of sitting?  Yes   Does your occupation require extended periods of repetitive movements (ex: walking or lifting)?  Yes   Do you currently participate in any forms of exercise? Yes   Check all the exercises you participate in: Walking;Biking;Other Cardio   How many times per week do you exercise? 5 times   How long do you usually exercise? 30 min        Sleep History:  Sleep 3/10/2021   How many hours (on average) do you sleep per night? 7-9   What time do you turn off the lights? 11 PM   How long does it take for you to fall asleep? 25-30 mins   What time do you stop using electronic devices? 11 PM   What time do you wake up? 7 AM   When do you eat your first meal?  9 AM   Do you feel well-rested during the day?  No   Do you take naps?  No   Do you have a comfortable bedroom environment (cool, quiet, dark, etc)? No   Do you have a sleep routine/ ritual that you do before bed?  No   How many hours do you spend per day looking at a screen (TV, computer, tablet and phone)? 5 to 6   Select all factors that apply to your current sleep habits: Difficulty falling asleep;Have difficulty waking up in the morning;Snore loudly or have been told you stop breathing;Eat large meals within 3 hours of going to bed;Drink coffee, soda or energy drinks after noon;Feel tired/sluggish/fatigued during the day;Have tried supplements (ie: melatonin) for sleep        Nutrition History:  Nutrition 3/10/2021    Have you ever had a nutrition consultation? No   Do you currently follow a special diet or nutritional program? No   What do you feel are the biggest barriers getting in the way of achieving you nutritional goals? Motivation/Readiness to change;Lack of time;Lack of prep/cooking skills;Lack of nutrition knowledge   Do you have any food allergies, sensitivities or intolerances?  No       Digestion 3/10/2021   Do you experience stomach pains/cramping? Never   Do you experience bloating?  Daily   Do you experience gas?  Daily   Do you experience heartburn/acid reflux/indigestion? Rarely   How often do you have a bowel movement? 3 or more times per day   What is a typical bowel movement like for you? Select all that apply: Varies a lot      Food Access:  3/10/2021   Who does the grocery shopping? Self;Spouse/Partner   How often is grocery shopping done? Weekly   Where do you usually receive your groceries from? Select all that apply: [employer];Delivery (Amazon, Whole Foods, [employer]ac[employer], etc.)   Do you read food labels? No   Who does the cooking? Select all that apply: Self;Spouse/Partner   How many meals do you eat out per week?  1 to 3   What restaurants do you typically choose? Fast Food (Taco Bell, [employer], Subway, etc.)      Daily Patterns: 3/10/2021   How many days per week do you have breakfast? 7   How many days per week do you have lunch? 7   How many days per week do you have dinner? 7   How many days per week do you have snacks? 7      Protein Intake: 3/10/2021   How many times per day do you typically consume a protein source(s)? 2   What types of protein do you currently eat?  Ground Beef;Hamburgers;Beef Roast;Stephens/McDonough Stephens;Sausage;Chicken Breast;Eggs       Fat Intake:  3/10/2021   How many times per day do you typically consume healthy fat(s)? 1   What types of health fats do you currently eat? Select all that apply:  Sunflower seeds;Finley;Butter       Fruit Intake:  3/10/2021    How many times per day do you typically consume fruits? 1   What types of fruit do currently eat? Banana       Vegetable Intake:  3/10/2021   How many times per day do you typically consume vegetables? 1   What types of vegetables do you currently eat? Broccoli;Cauliflower;Potato (baked, boiled, mashed, French fries)      Grain Intake:  3/10/2021   How many times per day do you typically consume grains? 1   What types of grains do currently eat? Select all that apply:  Breads (non-gluten free);Cereals (non-gluten free);Chips (non-gluten free);Pasta (non-gluten free)       Dairy Intake:  3/10/2021   How many times per day do you typically consume dairy? 2   What types of dairy do currently eat? Select all that apply:  Milk;Cheese;Ice cream       Non-Dairy Alternative Intake:  3/10/2021   How many times per day do you typically consume non-dairy alternatives? 0   What types of non-dairy alternatives do currently eat? Select all that apply:  Other yogurt       Sweets Intake:  3/10/2021   How many times per day do you typically consume sweets? 1      Beverage Intake:  3/10/2021   How many 8 oz cups of water do you typically consume per day?  8 or more   How many 8 oz cups of caffeine do you typically consume per day?  3 to 4   How many drinks of alcohol do you typically consume per week (1 drink = 5 oz wine, 12 oz beer, 1.5 oz spirits)?   4 to 7       Lifestyle Recall:  3/10/2021   What time did you wake up? 6 AM   What time did you go to sleep? 10 PM   What time did you have breakfast? 6-7 AM   Where did you have breakfast?  Car   What time did you have a morning snack? 10-11 AM   Where did you have your morning snack? Work   What time did you have lunch? 12-1 PM   Where did you have lunch?  Work   What time did you have an afternoon snack? 4-5 PM   Where did you have your afternoon snack? Car   What time did you have dinner? 7-8 PM   Where did you have dinner?  Home   What time did you have an evening snack? 8-9 PM  "  Where did you have your evening snack? Home   What time of day did you exercise? 7 PM   Where did you exercise? Home          Additional concerns: goes on diets where eats well and loses weight and goes back to eating junk food. Needs help with reading labels     Likes eggs with butter and pereyra plus sausages     Wants to do keto and needs to learn more about it     MEDICATIONS:  Current Outpatient Medications   Medication Sig Dispense Refill     atorvastatin (LIPITOR) 20 MG tablet Take 1 tablet by mouth once daily 90 tablet 1     diltiazem 2% in PLO gel To anal opening three times daily.  Use a pea-sized amount.  Store at room temperature. 60 g 0     fluticasone (FLONASE) 50 MCG/ACT nasal spray SPRAY 1 SPRAY(S) IN EACH NOSTRIL ONCE DAILY 16 g 0     LANsoprazole (PREVACID) 30 MG DR capsule Take 1 capsule by mouth once daily 90 capsule 2     lisinopril (ZESTRIL) 20 MG tablet Take 1/2 (one-half) tablet by mouth once daily 45 tablet 2     Multiple Vitamin (MULTIVITAMINS PO) Take  by mouth.       omeprazole (PRILOSEC) 40 MG DR capsule Take 1 capsule (40 mg) by mouth daily 90 capsule 1       Dietary Supplements 3/10/2021   Individual Vitamin Supplements General multivitamin   Individual Mineral Supplements Zinc   Herbal Complimentary products Fiber supplement         ALLERGIES:   Allergies   Allergen Reactions     Hydrocodone Other (See Comments)     \"climbed the walls, very anxious, insomnia\"     Nsaids Other (See Comments)     Hx of stomach ulcers        .na  LABS:  Last Basic Metabolic Panel:  Lab Results   Component Value Date     12/10/2020     01/27/2020     11/20/2019      Lab Results   Component Value Date    POTASSIUM 4.1 12/10/2020    POTASSIUM 4.1 01/27/2020    POTASSIUM 3.8 11/20/2019     Lab Results   Component Value Date    CHLORIDE 110 12/10/2020    CHLORIDE 105 01/27/2020    CHLORIDE 108 11/20/2019     Lab Results   Component Value Date    CORRINE 8.7 12/10/2020    CORRINE 9.4 01/27/2020    CORRINE " 8.5 11/20/2019     Lab Results   Component Value Date    CO2 28 12/10/2020    CO2 26 01/27/2020    CO2 27 11/20/2019     Lab Results   Component Value Date    BUN 9 12/10/2020    BUN 11 01/27/2020    BUN 13 11/20/2019     Lab Results   Component Value Date    CR 0.76 12/10/2020    CR 0.68 01/27/2020    CR 0.72 11/20/2019     Lab Results   Component Value Date     12/10/2020    GLC 98 01/27/2020     12/11/2019       Last Glucose Profile:   Hemoglobin A1C   Date Value Ref Range Status   02/23/2021 5.7 (H) 0 - 5.6 % Final     Comment:     Normal <5.7% Prediabetes 5.7-6.4%  Diabetes 6.5% or higher - adopted from ADA   consensus guidelines.     11/20/2019 5.2 0 - 5.6 % Final     Comment:     Normal <5.7% Prediabetes 5.7-6.4%  Diabetes 6.5% or higher - adopted from ADA   consensus guidelines.         Last Lipid Profile:   Cholesterol   Date Value Ref Range Status   01/24/2018 247 (H) <200 mg/dL Final     Comment:     Desirable:       <200 mg/dl   01/09/2017 230 (H) <200 mg/dL Final     Comment:     Desirable:       <200 mg/dl   09/29/2011 204 (H) 0 - 200 mg/dL Final     Comment:     LDL Cholesterol is the primary guide to therapy.   The NCEP recommends further evaluation of: patients with cholesterol greater   than 200 mg/dL if additional risk factors are present, cholesterol greater   than   240 mg/dL, triglycerides greater than 150 mg/dL, or HDL less than 40 mg/dL.     HDL Cholesterol   Date Value Ref Range Status   01/24/2018 52 >39 mg/dL Final   01/09/2017 44 >39 mg/dL Final   09/29/2011 41 40 - 110 mg/dL Final     LDL Cholesterol Calculated   Date Value Ref Range Status   01/24/2018 162 (H) <100 mg/dL Final     Comment:     Above desirable:  100-129 mg/dl  Borderline High:  130-159 mg/dL  High:             160-189 mg/dL  Very high:       >189 mg/dl     01/09/2017 162 (H) <100 mg/dL Final     Comment:     Above desirable:  100-129 mg/dl   Borderline High:  130-159 mg/dL   High:             160-189  mg/dL   Very high:       >189 mg/dl     09/29/2011 124 0 - 129 mg/dL Final     Comment:     LDL Cholesterol is the primary guide to therapy: LDL-cholesterol goal in high   risk patients is <100 mg/dL and in very high risk patients is <70 mg/dL.     Triglycerides   Date Value Ref Range Status   01/24/2018 164 (H) <150 mg/dL Final     Comment:     Borderline high:  150-199 mg/dl  High:             200-499 mg/dl  Very high:       >499 mg/dl  Fasting specimen     01/09/2017 118 <150 mg/dL Final   09/29/2011 196 (H) 0 - 150 mg/dL Final     Comment:     Non Fasting     Cholesterol/HDL Ratio   Date Value Ref Range Status   09/29/2011 5.0 0.0 - 5.0 Final       Most recent CBC:  Recent Labs   Lab Test 12/10/20  1039 01/27/20  1614 12/11/19  0616 11/20/19  1636   WBC 3.5* 5.0  --  4.5   HGB 15.4 14.3 13.3 15.0   HCT 44.1 41.0  --  42.3    233  --  177     Most recent hepatic panel:  Recent Labs   Lab Test 12/10/20  1039 01/27/20  1614   ALT 39 31   AST 13 13     Most recent creatinine:  Recent Labs   Lab Test 12/10/20  1039 01/27/20  1614   CR 0.76 0.68       No components found for: GFRESETIMATEDLASTLAB(gfrestblack:1@  Lab Results   Component Value Date    ALBUMIN 3.8 12/10/2020       Last Thyroid Profile:   TSH   Date Value Ref Range Status   12/10/2020 2.51 0.40 - 4.00 mU/L Final   03/12/2013 3.14 0.4 - 5.0 mU/L Final   09/29/2011 3.15 0.4 - 5.0 mU/L Final       Last Mineral Profile:   No results found for: MANPREET, IRON, FEB    Autoimmune & Inflammatory   CRP Inflammation   Date Value Ref Range Status   12/10/2020 9.0 (H) 0.0 - 8.0 mg/L Final         Last Vitamin Profile:   No results found for: STC894, MULQ940, [id number], VITD3, D2VIT, D3VIT, DTOT, [id number], [id number], [id number], [id number], [id number], [id number]    ANTHROPOMETRICS:  Vitals:   BP Readings from Last 1 Encounters:   02/23/21 128/78     Pulse Readings from Last 1 Encounters:   02/23/21 100     Estimated body mass index is 35.74 kg/m  as calculated  "from the following:    Height as of 2/23/21: 1.753 m (5' 9\").    Weight as of 2/23/21: 109.8 kg (242 lb).    Wt Readings from Last 5 Encounters:   02/23/21 109.8 kg (242 lb)   02/10/21 108.5 kg (239 lb 1.6 oz)   01/25/21 110.6 kg (243 lb 14.4 oz)   01/07/21 104.3 kg (230 lb)   12/22/20 107.7 kg (237 lb 8 oz)           NUTRITION DIAGNOSIS:   1. Altered nutrition-related laboratory values related to endocrine dysfunction as evidenced by elevated BMI, HbA1c     2. Overweight/Obesity related to food and nutrition related knowledge deficit (excessive CHO intake, Inadequate fiber intake, inappropriate intake of healthy omega 3 fatty acids) as evidenced by nutrition intake record (limited variety of foods), A1c.    4. Overweight/obesity related to inability to sustain diet/lifestyle change, as evidenced by many previous attempts at weight loss with weight regain over the past five years.     NUTRITION INTERVENTION CARDIOMETABOLIC     Long Term Goals:  Goal: Lose 20-24lbs in 6 months.   Goal: Decrease Hemoglobin A1c <5.7% within 2-6 months       Short Term Goals:    1. Start having breakfast daily with healthy fat, veggie or fruit and lean protein - ex 2 eggs or turkey/chicken sausages with   cup rolled oatmeal topped with blueberries and flax seeds plus other nuts. Could  also try a smoothie with dairy free alternative -ex. unsweetened almond milk, plant based protein powder, healthy fat - ex ground flax, [place] or hemp seeds and or avocado plus 1 serving of fruit and veggie - ex: spinach, kale)     see recipes provided   2. Start having snack 1-2x daily with healthy fats (ex: guac, nuts, hummus) and add a fruit/veggie for fiber- see ideas discussed and provided  3. Stop current multi and start the multi and omega recommended   4. Cut back on grains only 2 servings per day and increasing healthy fats like avocado at breakfast, fiber from veggies etc. - see food plan for more serving recommendations   5. Pick one meal to " start to cook a week to help wife       Anti-inflammatory Mediterranean Approach: Eat whole, unprocessed real foods in their unprocessed forms such as fruits, vegetables, whole grains (prefer gluten free), nuts, legumes, extra virgin olive oil, spices, modest amounts of poultry, and fish.      Avoid inflammatory foods: Eliminate gluten found in wheat, barley, rye, oats, kamut, and spelt for at least 3 weeks to identify any hidden reaction. Gluten sensitivity or allergy can cause many different types of symptoms form migraines to fatigue to weight gain.  If symptoms go away this is a clue you may be reactive to gluten.  Dairy can also be inflammatory consider eliminating for 3 weeks as well. The proteins like casein and whey in dairy can irritate and inflame your gut. Also the sugar lactase in dairy can cause digestive issues in addition to blood sugar spikes.     Cardiometabolic Food plan - 1800-2,200 calories per day     Protein 10-12 servings per day - include at each meal to stabilize blood sugars   (Choose 3oz or 21g per meal and aim for 1oz of 7g for snacks)   - Strive for 1-2 servings of fish per week especially of higher omega-3 fatty acid containing fish such as salmon.     Legumes <2 serving per day     Dairy alternatives 2-3 serving per day     Nuts and seeds 3-4 servings per day - great to incorporate as snacks    Fats and oils 4 servings per day     Non starchy vegetables 8-10 servings per day     Starchy vegetable limit 1 serving per day as they tend to impact blood sugar (they are moderate-GI).     Fruits 2 servings per day - best to couple with a little bit of protein or fat to offset a rise in blood sugars (they are low-moderate-GI foods).     Whole grains <2 serving per day - try gluten free whole grains instead        Incorporate protein powder daily:    Plant based hemp (recommended brands: [place] Otoe, Nutiva, Just Hemp Protein, alive.cn Red Mill)      Plant based pea (recommended brands: Naked  Pea, Now Sports). If you want to try a combo of pea and hemp the brand Gonzalez in vanilla or chocolate is a great option.     Try Bone broth protein powder or collagen peptides in liquid bone broth, vegetable broth or 12 oz of water as snack. The bone broth powder and collagen can be used for soups as well. This can help provide essential amino acids and minerals that heal your gut as well as balance blood sugars. A great option if you have a hard time tolerating solids.     Choose Low Glycemic (GI) foods: Regulate your sugar levels by eating foods that do not spike blood sugars.  Eat low -GI foods so only small fluctuations in blood glucose and insulin levels are produced.      Examples of low-GI foods: nuts, seeds, GF oats, most vegetables especially non-starchy and fruits.     Medium or high-GI foods should be eaten with a protein or fat, both of which blunt the glycemic effect of these foods. This reduces the overall glycemic impact of a meal.   Ex: Most grains and starchy veggies are medium/high GI.     Avoid foods containing refined sugars, artificial sweeteners, and refined grains they are considered high-GI because they lead to sharp increases in blood sugars levels, which increase insulin sensitivity causing increased TG, and low good cholesterol (HDL).   Ex: cakes, cookies, pies, bread, sodas, fruit drinks, presweetened tea, coffee drinks, energy or sport drinks, flavored milk and other processed foods.     Choose foods high in fiber: Aim for at least 5 grams of fiber per serving of food or a total of 25-35 grams fiber per day. Remember, when looking at the label, you can take the fiber away from the total carbs. Ex:15g of total carbs - 4g of fiber = 11g net carbs     Insoluble fiber acts like a bulky  inner broom,  sweeping out debris from the intestine and creating more motility and movement.      Soluble fiber attracts water and swells, creating a gel that slows digestion.  Also, slows the release of  glucose from foods into the blood which stops spikes in blood sugar levels.  Soluble fiber traps toxins in the gut, helping to carry them to excretion and provides healthy bacteria in the digestive tract.     Choose High Quality Fats: Adding anti-inflammatory fats into your diet such as fish (salmon, herring, mackerel, and sardines), omega 3 eggs, london seeds, ground flax seeds/milk, hemp seeds/milk, walnuts and some other certain leafy greens will increase omega-3 fats to omeaga-6 fats ratio.     Therapeutic fats both monounsaturated and polyunsaturated to include daily: ground flaxseeds, unsalted mixed nuts, avocados, olives, extra-virgin olive oil.     Emphasize high-quality oils and fats in the diet daily such as avocado oil, coconut oil, flaxseed, olive, sesame. Ex: 1 tsp to 1 tbsp of MCT oil from coconuts can be added into coffee, smoothies, and salad dressings per day.     Avoid trans fats found in processed foods     Drink more water. Hydration is critical, so drink at least six to eight glasses of water a day. Drink more water between meals and less at meals.     Try adding herbal teas (sugar free) or lemon/lime/cucumber/fruit to water for flavor. Avoid artificial sweetener packets to flavor your water.     Cut back on coffee switch to green tea. Avoid adding sugar and milk to coffee instead use dairy alternatives such as almond, flax, coconut milk.     Focus on high quality micro-nutrients.     One Multivitamin by Pure encapsulations - 1 capsule per day with food     Nordic Naturals Plus vitamin D - 1-2 capsules daily with food    Rebuild the friendly bacteria in your microbime. Start by taking a probiotic supplement, they will help rebuild the healthy bacteria so essential to good gut health.  you can help establish healthy gut microflora by consuming foods that contain live active cultures ( probiotics ) such as kimchi,     Increase physical activity. Moving our body helps move our bowels and speeds up  your metabolism.     Exercise 15-60 minutes daily--whether that looks like burst training, yoga, or vigorous walking.    Manage your stress. Stress can negatively impact the way you digest foods and absorb nutrients leading to more digestive issues (constipation, diarrhea, indigestion, nausea etc), imbalanced blood sugars and weight imbalances. Try to focus on the following relaxation techniques:     Regular exercise such as walking     Yoga    Meditation     Breathing techniques     Time management     Track what you eat. Writing down or tracking through an patrizia what you eat as well as how you feel acn help you identify patterns in your symptoms. This can help you become more aware and creat a diet that is right for you.     Pick a food tracking patrizia:     Through the [employer] patrizia, you can focus more on calories and macronutrient's of foods to balance blood sugars or monitor weight. You can track blood sugars in the notes section along with symptoms, bowl movements, medications, stress, exercise, water intake and sleep.   Note: You don't have to journal forever and it is more important that you have consistent meals as well as snacks while focusing on adding in healthy foods.    NUTRITION RESOURCES:  1. Purchase Eat Fat Get Thin by Juan Antonio Freitas Book/Cookbook   2. IFM Cardiometabolic Packet     Functional Nutrition Fundamentals     Comprehensive Guide    Meal plan with recipes     Grocery list         PATIENT'S BEHAVIOR CHANGE GOALS:   See nutrition intervention for patient stated behavior change goals. AVS was printed and given to patient at today's appointment.    MONITOR / EVALUATE:  Registered Dietitian will monitor/evaluate the following:     Beliefs and attitudes related to food    Food and nutrition knowledge / skills    Food / Beverage / Nutrient intake     Pertinent Labs    Progress toward meeting stated nutrition-related goals    Readiness to change nutrition-related behaviors    Weight change    Digestion      COORDINATION OF CARE:  Follow up with primary care provider       FOLLOW-UP:  Follow-up appointment recommended next 4-6 weeks.     Time spent in minutes: 60 minutes 4 units   Encounter: Individual    Hanane Avendano RD, CLT, LD  Integrative Registered Dietitian

## 2021-03-11 ENCOUNTER — HOSPITAL ENCOUNTER (OUTPATIENT)
Facility: AMBULATORY SURGERY CENTER | Age: 61
Discharge: HOME | End: 2021-03-11
Attending: INTERNAL MEDICINE | Admitting: INTERNAL MEDICINE
Payer: COMMERCIAL

## 2021-03-11 VITALS
RESPIRATION RATE: 16 BRPM | DIASTOLIC BLOOD PRESSURE: 87 MMHG | HEART RATE: 91 BPM | OXYGEN SATURATION: 94 % | TEMPERATURE: 96.9 F | SYSTOLIC BLOOD PRESSURE: 126 MMHG

## 2021-03-11 LAB — UPPER GI ENDOSCOPY: NORMAL

## 2021-03-11 PROCEDURE — G8907 PT DOC NO EVENTS ON DISCHARG: HCPCS

## 2021-03-11 PROCEDURE — 43239 EGD BIOPSY SINGLE/MULTIPLE: CPT

## 2021-03-11 PROCEDURE — 88305 TISSUE EXAM BY PATHOLOGIST: CPT | Performed by: PATHOLOGY

## 2021-03-11 PROCEDURE — G8918 PT W/O PREOP ORDER IV AB PRO: HCPCS

## 2021-03-11 RX ORDER — ONDANSETRON 2 MG/ML
4 INJECTION INTRAMUSCULAR; INTRAVENOUS
Status: DISCONTINUED | OUTPATIENT
Start: 2021-03-11 | End: 2021-03-12 | Stop reason: HOSPADM

## 2021-03-11 RX ORDER — NALOXONE HYDROCHLORIDE 0.4 MG/ML
0.2 INJECTION, SOLUTION INTRAMUSCULAR; INTRAVENOUS; SUBCUTANEOUS
Status: DISCONTINUED | OUTPATIENT
Start: 2021-03-11 | End: 2021-03-12 | Stop reason: HOSPADM

## 2021-03-11 RX ORDER — NALOXONE HYDROCHLORIDE 0.4 MG/ML
0.4 INJECTION, SOLUTION INTRAMUSCULAR; INTRAVENOUS; SUBCUTANEOUS
Status: DISCONTINUED | OUTPATIENT
Start: 2021-03-11 | End: 2021-03-12 | Stop reason: HOSPADM

## 2021-03-11 RX ORDER — LIDOCAINE 40 MG/G
CREAM TOPICAL
Status: DISCONTINUED | OUTPATIENT
Start: 2021-03-11 | End: 2021-03-12 | Stop reason: HOSPADM

## 2021-03-11 RX ORDER — FLUMAZENIL 0.1 MG/ML
0.2 INJECTION, SOLUTION INTRAVENOUS
Status: ACTIVE | OUTPATIENT
Start: 2021-03-11 | End: 2021-03-11

## 2021-03-11 RX ORDER — FENTANYL CITRATE 50 UG/ML
INJECTION, SOLUTION INTRAMUSCULAR; INTRAVENOUS PRN
Status: DISCONTINUED | OUTPATIENT
Start: 2021-03-11 | End: 2021-03-11 | Stop reason: HOSPADM

## 2021-03-11 RX ORDER — PROCHLORPERAZINE MALEATE 10 MG
10 TABLET ORAL EVERY 6 HOURS PRN
Status: DISCONTINUED | OUTPATIENT
Start: 2021-03-11 | End: 2021-03-12 | Stop reason: HOSPADM

## 2021-03-11 RX ORDER — ONDANSETRON 4 MG/1
4 TABLET, ORALLY DISINTEGRATING ORAL EVERY 6 HOURS PRN
Status: DISCONTINUED | OUTPATIENT
Start: 2021-03-11 | End: 2021-03-12 | Stop reason: HOSPADM

## 2021-03-11 RX ORDER — ONDANSETRON 2 MG/ML
4 INJECTION INTRAMUSCULAR; INTRAVENOUS EVERY 6 HOURS PRN
Status: DISCONTINUED | OUTPATIENT
Start: 2021-03-11 | End: 2021-03-12 | Stop reason: HOSPADM

## 2021-03-16 LAB — COPATH REPORT: NORMAL

## 2021-04-01 ENCOUNTER — TELEPHONE (OUTPATIENT)
Dept: NUTRITION | Facility: CLINIC | Age: 61
End: 2021-04-01

## 2021-04-01 NOTE — LETTER
April 1, 2021      Hugo Longoria  [address] [facility] [address]        Dear Hugo Longoria,    In order to ensure that we are providing the best quality care, we would like to remind you that you are due for a return virtual appointment with Hanane Avendano around 4/7/21.      We have been unable to reach you by phone (or [employer]). Please call your clinic or use [employer] to make an appointment with your provider. Please let us know if you have any questions and we would be happy to help.     Thank you for trusting us with your care.    Sincerely,     [facility]  [phone]

## 2021-04-01 NOTE — TELEPHONE ENCOUNTER
3rd attempt. Patient has not called to schedule.  Appointment reminder letter sent to patient.    Patient is due for a return virtual visit with Hanane Avendano around 4/7/21.    Montserrat Hunter  Surgical Specialties Procedure   [facility] [place]  4/1/2021 7:49 AM

## 2021-04-08 DIAGNOSIS — I10 HTN, GOAL BELOW 140/90: ICD-10-CM

## 2021-04-09 RX ORDER — LISINOPRIL 20 MG/1
TABLET ORAL
Qty: 45 TABLET | Refills: 1 | Status: SHIPPED | OUTPATIENT
Start: 2021-04-09 | End: 2021-10-06

## 2021-04-09 NOTE — TELEPHONE ENCOUNTER
Prescription approved per [facility] Refill Protocol.    Rosette Richards RN on 4/9/2021 at 2:59 PM

## 2021-05-08 ENCOUNTER — HEALTH MAINTENANCE LETTER (OUTPATIENT)
Age: 61
End: 2021-05-08

## 2021-05-13 PROBLEM — M25.561 RIGHT KNEE PAIN: Status: RESOLVED | Noted: 2020-06-02 | Resolved: 2021-05-13

## 2021-05-13 NOTE — PROGRESS NOTES
Discharge Note    Progress reporting period is from initial evaluation date (please see noted date below) to Mar 8, 2021.  Linked Episodes   Type: Episode: Status: Noted: Resolved: Last update: Updated by:   PHYSICAL THERAPY R knee pain Active 3/1/2021  3/8/2021  1:56 PM Rebeka Holden PT      Comments:       Hugo failed to follow up and current status is unknown.  Please see information below for last relevant information on current status.  Patient seen for 3 visits.    SUBJECTIVE  Subjective changes noted by patient:  Pt reports he is having much less knee pain, overall happy with his exercises. understands he has to keep performing them. pt reports he is 240# currently, hoping to get down to his healthy weight of 190-200#. seeing a nutritionalist tomorrow. pt reports he has to call back to schedule because he will be getting back to work soon.  .  Current pain level is 0/10.     Previous pain level was  0/10.   Changes in function:  Yes (See Goal flowsheet attached for changes in current functional level)  Adverse reaction to treatment or activity: None    OBJECTIVE  Changes noted in objective findings: Knee Flexion AROM: 115* knee flexion AROM.     ASSESSMENT/PLAN  Diagnosis: Right Knee Pain   Updated problem list and treatment plan:   Decreased ROM/flexibility - HEP  Decreased function - HEP  STG/LTGs have been met or progress has been made towards goals:  Yes, please see goal flowsheet for most current information  Assessment of Progress: current status is unknown.    Last current status: Pt is progressing well   Self Management Plans:  HEP  I have re-evaluated this patient and find that the nature, scope, duration and intensity of the therapy is appropriate for the medical condition of the patient.  Hugo continues to require the following intervention to meet STG and LTG's:  HEP.    Recommendations:  Discharge with current home program.  Patient to follow up with MD as needed.    Please refer to the daily  flowsheet for treatment today, total treatment time and time spent performing 1:1 timed codes.    Dwight High,PT, DPT, OCS

## 2021-10-05 DIAGNOSIS — I10 HTN, GOAL BELOW 140/90: ICD-10-CM

## 2021-10-05 DIAGNOSIS — Z13.6 CARDIOVASCULAR SCREENING; LDL GOAL LESS THAN 130: ICD-10-CM

## 2021-10-06 RX ORDER — LISINOPRIL 20 MG/1
TABLET ORAL
Qty: 45 TABLET | Refills: 0 | Status: SHIPPED | OUTPATIENT
Start: 2021-10-06 | End: 2022-01-11

## 2021-10-06 NOTE — TELEPHONE ENCOUNTER
Pending Prescriptions:                       Disp   Refills    atorvastatin (LIPITOR) 20 MG tablet [Pharm*90 tab*0        Sig: Take 1 tablet by mouth once daily    Routing refill request to provider for review/approval because:  Labs not current:    Recent Labs   Lab Test 01/24/18  0926 01/09/17  0856   CHOL 247* 230*   HDL 52 44   * 162*   TRIG 164* 118      over 13 months, RN unable to provide a filiberto refill.

## 2021-10-07 RX ORDER — ATORVASTATIN CALCIUM 20 MG/1
TABLET, FILM COATED ORAL
Qty: 90 TABLET | Refills: 0 | Status: SHIPPED | OUTPATIENT
Start: 2021-10-07 | End: 2022-04-07

## 2021-10-08 DIAGNOSIS — K21.9 GASTROESOPHAGEAL REFLUX DISEASE, UNSPECIFIED WHETHER ESOPHAGITIS PRESENT: ICD-10-CM

## 2021-10-08 RX ORDER — OMEPRAZOLE 40 MG/1
CAPSULE, DELAYED RELEASE ORAL
Qty: 90 CAPSULE | Refills: 0 | Status: SHIPPED | OUTPATIENT
Start: 2021-10-08 | End: 2021-10-29

## 2021-10-08 NOTE — LETTER
October 11, 2021      Hugo Longoria  [address] [facility] [address]              Hi Hugo,     We just wanted to let you know that we sent in a refill for your Omeprazole but you are due for a physical before your next refill is due.      Please give us a call at [phone] or use [employer] to schedule.      Have a great day.     Your [facility] Team

## 2021-10-23 ENCOUNTER — HEALTH MAINTENANCE LETTER (OUTPATIENT)
Age: 61
End: 2021-10-23

## 2021-10-25 NOTE — PROGRESS NOTES
"  Assessment & Plan     Chronic pain of right knee  60-year-old male with history of total right knee arthroplasty, had subsequent tibial fracture following replacement, has had chronic pain and swelling since that time.  He has been evaluated by his orthopedic surgeon, he is requesting a second opinion as he feels he is not improving.  - XR Knee Right 3 Views; Future  - Orthopedic  Referral; Future    History of arthroplasty of right knee  See above.  - Orthopedic  Referral; Future    Gastroesophageal reflux disease, unspecified whether esophagitis present  History of GERD, currently well controlled on omeprazole, we will refill that and continue to monitor.  - omeprazole (PRILOSEC) 40 MG DR capsule; Take 1 capsule by mouth once daily    Need for prophylactic vaccination and inoculation against influenza  Flu shot conducted today.    High priority for 2019-nCoV vaccine  Patient refused Covid vaccination        BMI:   Estimated body mass index is 30.55 kg/m  as calculated from the following:    Height as of this encounter: 1.753 m (5' 9.02\").    Weight as of this encounter: 93.9 kg (207 lb).     Return in about 3 months (around 1/29/2022) for Annual Well Check.    Bony Robin MD  [facility] HARSHA Arias is a 60 year old who presents for the following health issues  accompanied by his self.    History of Present Illness       He eats 0-1 servings of fruits and vegetables daily.He consumes 0 sweetened beverage(s) daily.He exercises with enough effort to increase his heart rate 9 or less minutes per day.  He exercises with enough effort to increase his heart rate 3 or less days per week. He is missing 1 dose(s) of medications per week.     Answers for HPI/ROS submitted by the patient on 10/29/2021  If you checked off any problems, how difficult have these problems made it for you to do your work, take care of things at home, or get along with other people?: Not " "difficult at all  PHQ9 TOTAL SCORE: 0  KERRY 7 TOTAL SCORE: 0      Musculoskeletal problem/pain  Onset/Duration: 1 year  Description  Location: knee - right  Joint Swelling: YES  Redness: no  Pain: YES  Warmth: YES  Intensity:  7/10 at night  Progression of Symptoms:  worsening  Accompanying signs and symptoms:   Fevers: no  Numbness/tingling/weakness: no  History  Trauma to the area: no  Recent illness:  no  Previous similar problem: YES- Had knee surgery about 2 years ago in December  Previous evaluation:  no  Precipitating or alleviating factors:  Aggravating factors include: sitting  Therapies tried and outcome: physical therapy - stopped going, hard to go to PT and work      Review of Systems   Constitutional, HEENT, cardiovascular, pulmonary, gi and gu systems are negative, except as otherwise noted.      Objective    /72   Pulse 76   Temp 98.1  F (36.7  C) (Temporal)   Resp 16   Ht 1.753 m (5' 9.02\")   Wt 93.9 kg (207 lb)   SpO2 97%   BMI 30.55 kg/m    Body mass index is 30.55 kg/m .  Physical Exam   GENERAL: healthy, alert and no distress  NECK: no adenopathy, no asymmetry, masses, or scars and thyroid normal to palpation  RESP: lungs clear to auscultation - no rales, rhonchi or wheezes  CV: regular rate and rhythm, normal S1 S2, no S3 or S4, no murmur, click or rub, no peripheral edema and peripheral pulses strong  MS: normal muscle tone, normal range of motion, peripheral pulses normal, tenderness to palpation right lateral knee and right knee swollen compared to left, decreased muscle tone right lower extremity compared to left  SKIN: no suspicious lesions or rashes  NEURO: Normal strength and tone, mentation intact and speech normal  PSYCH: mentation appears normal, affect normal/bright    Xray - Reviewed and interpreted by me.  Status post right knee arthroplasty, no obvious fractures some degenerative changes            "

## 2021-10-29 ENCOUNTER — OFFICE VISIT (OUTPATIENT)
Dept: FAMILY MEDICINE | Facility: CLINIC | Age: 61
End: 2021-10-29
Payer: COMMERCIAL

## 2021-10-29 ENCOUNTER — ANCILLARY PROCEDURE (OUTPATIENT)
Dept: GENERAL RADIOLOGY | Facility: CLINIC | Age: 61
End: 2021-10-29
Attending: FAMILY MEDICINE
Payer: COMMERCIAL

## 2021-10-29 VITALS
OXYGEN SATURATION: 97 % | HEART RATE: 76 BPM | SYSTOLIC BLOOD PRESSURE: 126 MMHG | WEIGHT: 207 LBS | BODY MASS INDEX: 30.66 KG/M2 | HEIGHT: 69 IN | RESPIRATION RATE: 16 BRPM | DIASTOLIC BLOOD PRESSURE: 72 MMHG | TEMPERATURE: 98.1 F

## 2021-10-29 DIAGNOSIS — M25.561 CHRONIC PAIN OF RIGHT KNEE: ICD-10-CM

## 2021-10-29 DIAGNOSIS — Z96.651 HISTORY OF ARTHROPLASTY OF RIGHT KNEE: ICD-10-CM

## 2021-10-29 DIAGNOSIS — Z23 NEED FOR PROPHYLACTIC VACCINATION AND INOCULATION AGAINST INFLUENZA: ICD-10-CM

## 2021-10-29 DIAGNOSIS — G89.29 CHRONIC PAIN OF RIGHT KNEE: ICD-10-CM

## 2021-10-29 DIAGNOSIS — G89.29 CHRONIC PAIN OF RIGHT KNEE: Primary | ICD-10-CM

## 2021-10-29 DIAGNOSIS — K21.9 GASTROESOPHAGEAL REFLUX DISEASE, UNSPECIFIED WHETHER ESOPHAGITIS PRESENT: ICD-10-CM

## 2021-10-29 DIAGNOSIS — M25.561 CHRONIC PAIN OF RIGHT KNEE: Primary | ICD-10-CM

## 2021-10-29 DIAGNOSIS — Z23 HIGH PRIORITY FOR 2019-NCOV VACCINE: ICD-10-CM

## 2021-10-29 PROBLEM — F33.0 MILD EPISODE OF RECURRENT MAJOR DEPRESSIVE DISORDER (H): Status: RESOLVED | Noted: 2017-01-06 | Resolved: 2021-10-29

## 2021-10-29 PROCEDURE — 90471 IMMUNIZATION ADMIN: CPT | Performed by: FAMILY MEDICINE

## 2021-10-29 PROCEDURE — 73562 X-RAY EXAM OF KNEE 3: CPT | Mod: RT | Performed by: RADIOLOGY

## 2021-10-29 PROCEDURE — 99214 OFFICE O/P EST MOD 30 MIN: CPT | Mod: 25 | Performed by: FAMILY MEDICINE

## 2021-10-29 PROCEDURE — 90682 RIV4 VACC RECOMBINANT DNA IM: CPT | Performed by: FAMILY MEDICINE

## 2021-10-29 RX ORDER — OMEPRAZOLE 40 MG/1
CAPSULE, DELAYED RELEASE ORAL
Qty: 90 CAPSULE | Refills: 3 | Status: SHIPPED | OUTPATIENT
Start: 2021-10-29 | End: 2022-10-12 | Stop reason: ALTCHOICE

## 2021-10-29 ASSESSMENT — MIFFLIN-ST. JEOR: SCORE: 1744.58

## 2021-10-29 ASSESSMENT — ANXIETY QUESTIONNAIRES
4. TROUBLE RELAXING: NOT AT ALL
1. FEELING NERVOUS, ANXIOUS, OR ON EDGE: NOT AT ALL
7. FEELING AFRAID AS IF SOMETHING AWFUL MIGHT HAPPEN: NOT AT ALL
6. BECOMING EASILY ANNOYED OR IRRITABLE: NOT AT ALL
3. WORRYING TOO MUCH ABOUT DIFFERENT THINGS: NOT AT ALL
2. NOT BEING ABLE TO STOP OR CONTROL WORRYING: NOT AT ALL
5. BEING SO RESTLESS THAT IT IS HARD TO SIT STILL: NOT AT ALL
GAD7 TOTAL SCORE: 0
7. FEELING AFRAID AS IF SOMETHING AWFUL MIGHT HAPPEN: NOT AT ALL
8. IF YOU CHECKED OFF ANY PROBLEMS, HOW DIFFICULT HAVE THESE MADE IT FOR YOU TO DO YOUR WORK, TAKE CARE OF THINGS AT HOME, OR GET ALONG WITH OTHER PEOPLE?: NOT DIFFICULT AT ALL
GAD7 TOTAL SCORE: 0
GAD7 TOTAL SCORE: 0

## 2021-10-29 ASSESSMENT — PATIENT HEALTH QUESTIONNAIRE - PHQ9
SUM OF ALL RESPONSES TO PHQ QUESTIONS 1-9: 0
10. IF YOU CHECKED OFF ANY PROBLEMS, HOW DIFFICULT HAVE THESE PROBLEMS MADE IT FOR YOU TO DO YOUR WORK, TAKE CARE OF THINGS AT HOME, OR GET ALONG WITH OTHER PEOPLE: NOT DIFFICULT AT ALL
SUM OF ALL RESPONSES TO PHQ QUESTIONS 1-9: 0

## 2021-10-29 ASSESSMENT — PAIN SCALES - GENERAL: PAINLEVEL: SEVERE PAIN (7)

## 2021-10-29 NOTE — PATIENT INSTRUCTIONS
Patient Education     Understanding Iliotibial Band Syndrome  Iliotibial band syndrome, or IT band syndrome, is a condition that causes pain on the outside of the knee. It most often occurs in athletes, especially long-distance runners. It can also happen if you cycle, ski, row, or play soccer. It can also occur in people who are just starting to exercise.   What is the IT band?  The iliotibial (IT) band is a strong, thick band of tissue that runs down the outside of your thigh. It goes all the way from your hipbones to the top of your shinbone (tibia), just below your knee joint. The bones of your knee joint include your tibia, your thighbone (femur), and your kneecap (patella).   When you bend and straighten your leg, the IT band moves over the outer lower edge of your femur. Over time, bending and straightening your leg can cause the IT band to irritate nearby tissues and cause pain.   What causes IT band syndrome?  Researchers are still learning the exact cause of IT band syndrome. The pain may be caused by the IT band rubbing over the lower outer edge of the femur. This may cause inflammation in the bone, tendons, and small fluid-filled sacs (bursa) in the area. The IT band may also squeeze (compress) the tissue under it and cause pain.   If you are a runner, you might be more likely to develop IT band syndrome if:    You run on uneven ground or down hills    You run on worn-out shoes    You run many miles per day    Your legs bend in a little from your knee to your ankle (bowlegs)  What are the symptoms of IT band syndrome?  IT band syndrome causes pain on the outside of the knee or side of the thigh. It might affect one or both of your knees. The pain is an aching, burning feeling that can spread up the thigh to the hip. You may feel this pain only when you exercise, such as while running. The pain may be worst right after you step on your foot. It may only happen near the end of your exercise. As the  condition gets worse, pain may start earlier and continue after you have stopped exercising. Going up and down the stairs may make the pain worse.   How is IT band syndrome diagnosed?  Your healthcare provider will ask about your health history and your symptoms. He or she will give you a physical exam. This will include an exam of your knee. The range of motion and strength of your knee will be tested. The provider will look for areas of pain around your knee, thigh, and hip. The symptoms of IT band syndrome can be like those of osteoarthritis or a meniscal tear. Your provider will need to make sure IT band syndrome is the cause of your symptoms. If the diagnosis is not clear, you may need imaging tests. These can include an X-ray or MRI scan.   [employer] last reviewed this educational content on 4/1/2020 2000-2021 [employer]. All rights reserved. This information is not intended as a substitute for professional medical care. Always follow your healthcare professional's instructions.           Patient Education     Treatment for Iliotibial Band Syndrome  Iliotibial band syndrome, or IT band syndrome, is a condition that causes pain on the outside of the knee. It most often occurs in athletes, especially long-distance runners. It can happen if you cycle, ski, row, or play soccer. It can also occur in people who are starting to exercise.  Types of treatment  Treatment may include:    Avoiding any activity that makes your knee pain worse for a while (like running), and returning to this activity slowly over time    Icing the outside of your knee when it causes you pain    Taking over-the-counter pain medicines    Having corticosteroid injections, to reduce inflammation    Making changes to your activity, like lowering your bicycle seat for cycling or improving your running form    Practicing special exercises to stretch and strengthen the muscles around your hip and your knee  You may find it helpful  to work with a physical therapist.  These treatments help most people with IT band syndrome. Your doctor may advise surgery if you still have severe symptoms after 6 months or more of other treatment. Your doctor will talk with you about the types of surgery.  Preventing IT band syndrome  You may be able to prevent IT band syndrome if you:    Run on even surfaces    Replace your running shoes often    Ease up on your training    On a track, make sure you run in both directions    Have an expert check your stance for running and other sporting activities    Stretch your outer thigh and hamstrings often  If you are new to exercise, start slowly. Increase your activity over time.  Talk with your doctor or  for more advice.  When to call the healthcare provider  Call your healthcare provider right away if you have any of these:    Symptoms that get worse, or don t get better with treatment    New symptoms  [employer] last reviewed this educational content on 5/1/2018 2000-2021 [employer]. All rights reserved. This information is not intended as a substitute for professional medical care. Always follow your healthcare professional's instructions.

## 2021-10-30 ASSESSMENT — ANXIETY QUESTIONNAIRES: GAD7 TOTAL SCORE: 0

## 2021-10-30 ASSESSMENT — PATIENT HEALTH QUESTIONNAIRE - PHQ9: SUM OF ALL RESPONSES TO PHQ QUESTIONS 1-9: 0

## 2021-11-09 NOTE — PROGRESS NOTES
[address] [address]  [phone]  Dept: [phone]    PRE-OP EVALUATION:  Today's date: 2017    Hugo Longoria (: 1960) presents for pre-operative evaluation assessment as requested by Dr. Yin.  He requires evaluation and anesthesia risk assessment prior to undergoing surgery/procedure for treatment of shoulder pain .  Proposed procedure: right shoulder arthroscopy with rotator cuff repair, biceps tenodesis, subacromial decompression     Date of Surgery/ Procedure: 17  Time of Surgery/ Procedure: 2:15  Hospital/Surgical Facility: [facility]    Primary Physician: Viktor Gomez  Type of Anesthesia Anticipated: General    Patient has a Health Care Directive or Living Will:  NO    1. NO - Do you have a history of heart attack, stroke, stent, bypass or surgery on an artery in the head, neck, heart or legs?  2. NO - Do you ever have any pain or discomfort in your chest?  3. NO - Do you have a history of  Heart Failure?  4. NO - Are you troubled by shortness of breath when: walking on the level, up a slight hill or at night?  5. NO - Do you currently have a cold, bronchitis or other respiratory infection?  6. NO - Do you have a cough, shortness of breath or wheezing?  7. NO - Do you sometimes get pains in the calves of your legs when you walk?  8. YES - DO YOU OR ANYONE IN YOUR FAMILY HAVE PREVIOUS HISTORY OF BLOOD CLOTS? Grandpa  from a blood clot  9. YES - DO YOU OR DOES ANYONE IN YOUR FAMILY HAVE A SERIOUS BLEEDING PROBLEM SUCH AS PROLONGED BLEEDING FOLLOWING SURGERIES OR CUTS? Father had bleeding problems  10. YES - HAVE YOU EVER HAD PROBLEMS WITH ANEMIA OR BEEN TOLD TO TAKE IRON PILLS? Had ulcers- now repaired  11. NO - Have you had any abnormal blood loss such as black, tarry or bloody stools, or abnormal vaginal bleeding?  12. YES - HAVE YOU EVER HAD A BLOOD TRANSFUSION? With his ulcer  13. NO - Have you or any  . of your relatives ever had problems with anesthesia?  14. YES - DO YOU HAVE SLEEP APNEA, EXCESSIVE SNORING OR DAYTIME DROWSINESS? Sleep apnea  15. NO - Do you have any prosthetic heart valves?  16. NO - Do you have prosthetic joints?  17. NO - Is there any chance that you may be pregnant?      HPI:                                                      Brief HPI related to upcoming procedure: Patient had a recent fall with significant shoulder injury. He has a great deal of anxiety regarding the procedure and the postoperative recovery. We have discussed this in detail. He has sought care with physicians recently for his anxiety and was placed on hydroxyzine at bedtime for sleep and Zoloft for his obsessive-compulsive thoughts and anxiety. This does seem to be working somewhat.      See problem list for active medical problems.  Problems all longstanding and stable, except as noted/documented.  See ROS for pertinent symptoms related to these conditions.                                                                                                  .    MEDICAL HISTORY:                                                      Patient Active Problem List    Diagnosis Date Noted     Advanced directives, counseling/discussion 01/09/2017     Priority: Medium     Info given       Mild episode of recurrent major depressive disorder (H) 01/06/2017     Priority: Medium     Complete tear of right rotator cuff 01/02/2017     Priority: Medium     Rupture long head biceps tendon, right, initial encounter 01/02/2017     Priority: Medium     Subacromial impingement of right shoulder 01/02/2017     Priority: Medium     Chronic gastric ulcer 10/28/2015     Priority: Medium     Seasonal allergic rhinitis 10/28/2015     Priority: Medium     History of gastric ulcer 07/16/2013     ACL tear 04/23/2013     Arthritis of knee 04/23/2013     Tear meniscus knee 04/23/2013     Fatigue 03/12/2013     Hemorrhoids 12/08/2011     Erectile dysfunction  12/08/2011     HTN, goal below 140/90 12/08/2011     History of tobacco use: 1980 - 1990 09/29/2011     CARDIOVASCULAR SCREENING; LDL GOAL LESS THAN 130 10/31/2010     KERRY (generalized anxiety disorder) 07/06/2010     History of colonic polyps 11/13/2007     Problem list name updated by automated process. Provider to review        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)      Overnight stay due to blood poisoning     Abnormalities of size and form of teeth      Removal of wisdom teeth     Gastric ulcer, unspecified as acute or chronic, without mention of hemorrhage or perforation      Past Surgical History   Procedure Laterality Date     Removal of sperm duct(s)       Vasectomy     Hc ugi endoscopy, simple exam  10/31/2007     Colonoscopy  11/01/2007     Colonoscopy  12/12/11     Endoscopy  01/27/12     Upper GI - [facility]     Arthroscopy knee  5/29/2013     Procedure: ARTHROSCOPY KNEE;  Right knee arthroscopy with partial medial and partial lateral menisectomies;  Surgeon: Phani Mclaughlin MD;  Location: [facility] OR     Herniorrhaphy umbilical N/A 3/12/2015     Procedure: HERNIORRHAPHY UMBILICAL;  Surgeon: Yared Ma MD;  Location: [facility] OR     Colonoscopy N/A 11/18/2015     Procedure: COLONOSCOPY;  Surgeon: Migue Mclaughlin MD;  [facility]: [facility]     Esophagoscopy, gastroscopy, duodenoscopy (egd), combined N/A 11/18/2015     Procedure: COMBINED ESOPHAGOSCOPY, GASTROSCOPY, DUODENOSCOPY (EGD), BIOPSY SINGLE OR MULTIPLE;  Surgeon: Migue Mclaughlin MD;  Location:  GI     Irrigation and debridement upper extremity, combined Left 3/15/2016     Procedure: COMBINED IRRIGATION AND DEBRIDEMENT UPPER EXTREMITY;  Surgeon: Javier Yin DO;  Location: [facility] OR     Current Outpatient Prescriptions   Medication Sig Dispense Refill     Acetaminophen (TYLENOL PO)        sertraline (ZOLOFT) 50 MG tablet Take 1/2  tablet (25 mg) for 1-2 weeks, then increase to 1 tablet orally daily 30 tablet 1     LANsoprazole (PREVACID) 30 MG CR capsule TAKE ONE CAPSULE BY MOUTH ONCE DAILY 30 capsule 0     lisinopril (PRINIVIL,ZESTRIL) 20 MG tablet TAKE ONE-HALF TABLET BY MOUTH ONCE DAILY 90 tablet 1     Cholecalciferol (VITAMIN D3) 2000 UNITS TABS Take 1 tablet by mouth daily       Omega-3 Fatty Acids (RA FISH OIL) 1400 MG CPDR Take 1 tablet by mouth daily       Multiple Vitamin (MULTIVITAMINS PO) Take  by mouth.       hydrOXYzine (ATARAX) 25 MG tablet Take 2-4 tablets ( mg) by mouth nightly as needed for anxiety 30 tablet 1     HYDROcodone-acetaminophen (NORCO) 5-325 MG per tablet Take 1-2 tablets by mouth nightly as needed for moderate to severe pain 20 tablet 0     OTC products: None, except as noted above    Allergies   Allergen Reactions     No Known Drug Allergies      Nsaids Other (See Comments)     Hx of stomach ulcers      Latex Allergy: NO    Social History   Substance Use Topics     Smoking status: Former Smoker     Quit date: 01/01/1981     Smokeless tobacco: Never Used     Alcohol Use: 0.0 oz/week     0 Standard drinks or equivalent per week      Comment: weekends     History   Drug Use No       REVIEW OF SYSTEMS:                                                    C: NEGATIVE for fever, chills, change in weight  I: NEGATIVE for worrisome rashes, moles or lesions  E: NEGATIVE for vision changes or irritation  E/M: NEGATIVE for ear, mouth and throat problems  R: NEGATIVE for significant cough or SOB  B: NEGATIVE for masses, tenderness or discharge  CV: NEGATIVE for chest pain, palpitations or peripheral edema  GI: NEGATIVE for nausea, abdominal pain, heartburn, or change in bowel habits  : NEGATIVE for frequency, dysuria, or hematuria  MUSCULOSKELETAL:Patient has severe weakness and pain in the right shoulder secondary to biceps tendon tear and rotator cuff tear.  N: NEGATIVE for weakness, dizziness or paresthesias  E:  "NEGATIVE for temperature intolerance, skin/hair changes  H: NEGATIVE for bleeding problems  P: NEGATIVE for changes in mood or affect    EXAM:                                                    /92 mmHg  Pulse 107  Temp(Src) 97  F (36.1  C) (Temporal)  Ht 5' 9\" (1.753 m)  Wt 225 lb (102.059 kg)  BMI 33.21 kg/m2  SpO2 96%    GENERAL APPEARANCE: healthy, alert and no distress     EYES: EOMI, - PERRL     HENT: ear canals and TM's normal and nose and mouth without ulcers or lesions     NECK: no adenopathy, no asymmetry, masses, or scars and thyroid normal to palpation     RESP: lungs clear to auscultation - no rales, rhonchi or wheezes     CV: regular rates and rhythm, normal S1 S2, no S3 or S4 and no murmur, click or rub -     ABDOMEN:  soft, nontender, no HSM or masses and bowel sounds normal     MS: Right shoulder examination deferred to orthopedics. No other significant orthopedic injury or traumas noted. No significant arthritis is present.     SKIN: no suspicious lesions or rashes     NEURO: Normal strength and tone, sensory exam grossly normal, mentation intact and speech normal     PSYCH: mentation appears normal. and affect normal/bright     LYMPHATICS: No axillary, cervical, inguinal, or supraclavicular nodes    DIAGNOSTICS:                                                    pending    EKG demonstrates normal sinus rhythm without evidence of ischemia or arrhythmia.    IMPRESSION:                                                    Reason for surgery/procedure: Rotator cuff and bicipital tendon tear  Diagnosis/reason for consult: Perioperative risk assessment in a 56-year-old male with:  #1 rotator cuff and bicipital tendon tear  #2 obsessive-compulsive disorder  #3 anxiety/generalized  #4 hypertension, controlled, benign, essential  #5 gastritis, mild, nonbleeding        The proposed surgical procedure is considered INTERMEDIATE risk.    REVISED CARDIAC RISK INDEX  The patient has the following " serious cardiovascular risks for perioperative complications such as (MI, PE, VFib and 3  AV Block):  No serious cardiac risks  INTERPRETATION: 1 risks: Class II (low risk - 0.9% complication rate)    The patient has the following additional risks for perioperative complications:  No identified additional risks    ICD-10-CM    1. Advanced directives, counseling/discussion Z71.89    2. Preop general physical exam Z01.818 *UA reflex to Microscopic and Culture ([facility], [place] and [facility] (except [place] and [place])     CBC with platelets     Basic metabolic panel     EKG 12-lead complete w/read - Clinics   3. Complete tear of right rotator cuff M75.121    4. KERRY (generalized anxiety disorder) F41.1    5. Mild episode of recurrent major depressive disorder (H) F33.0            RECOMMENDATIONS:                                                        Patient instructed to hold all medications prior to surgery.      APPROVAL GIVEN to proceed with proposed procedure, without further diagnostic evaluation       Signed Electronically by: Viktor Gomez DO    Copy of this evaluation report is provided to requesting physician.    [place] Preop Guidelines

## 2021-11-23 ENCOUNTER — OFFICE VISIT (OUTPATIENT)
Dept: ORTHOPEDICS | Facility: CLINIC | Age: 61
End: 2021-11-23
Payer: COMMERCIAL

## 2021-11-23 VITALS — WEIGHT: 211.5 LBS | BODY MASS INDEX: 31.32 KG/M2 | HEIGHT: 69 IN

## 2021-11-23 DIAGNOSIS — G89.29 CHRONIC PAIN OF RIGHT KNEE: ICD-10-CM

## 2021-11-23 DIAGNOSIS — Z96.651 HISTORY OF ARTHROPLASTY OF RIGHT KNEE: ICD-10-CM

## 2021-11-23 DIAGNOSIS — M25.561 CHRONIC PAIN OF RIGHT KNEE: ICD-10-CM

## 2021-11-23 LAB
CRP SERPL-MCNC: 3.4 MG/L (ref 0–8)
ERYTHROCYTE [SEDIMENTATION RATE] IN BLOOD BY WESTERGREN METHOD: 5 MM/HR (ref 0–20)

## 2021-11-23 PROCEDURE — 36415 COLL VENOUS BLD VENIPUNCTURE: CPT | Performed by: ORTHOPAEDIC SURGERY

## 2021-11-23 PROCEDURE — 85652 RBC SED RATE AUTOMATED: CPT | Performed by: ORTHOPAEDIC SURGERY

## 2021-11-23 PROCEDURE — 99214 OFFICE O/P EST MOD 30 MIN: CPT | Performed by: ORTHOPAEDIC SURGERY

## 2021-11-23 PROCEDURE — 86140 C-REACTIVE PROTEIN: CPT | Performed by: ORTHOPAEDIC SURGERY

## 2021-11-23 ASSESSMENT — MIFFLIN-ST. JEOR: SCORE: 1764.74

## 2021-11-23 NOTE — PROGRESS NOTES
Assessment: This is a 60 year old with a proximal tibia fracture about 6 weeks after total knee treated non-operatively in a cast. Has been sore with more anerio knee pain and frequent swelling. Has not had that I can find an infectino work-up.     Plan:  SED, CRP, if high will have him RTC for an aspiration.     Chief Complaint: Consult (Right knee pain; s/p RTKA 12/10/19 with Dr. Yin)      Physician:  Bony Robin    HPI: Hugo Longoria is a 60 year old male who presents today for evaluation of    Symptom Profile  Location of symptoms:  anterio knee   Onset:after a prox tibial fracture during the first 2 months post-op TKA  Trend: getting no better or worse  Duration of symptoms: about 2 years  Quality of symptoms: aching, sharp/stabbing  Severity:moderate to severe   Alleviate:activity modification   Exacerbating: activities  Previous Treatments: Previous treatments include activity modification, oral pain medication,       Current Status:  Results of the patient s Hip Disability and Osteoarthritis Outcome Score (HOOS)  are as follows (0-100 scales with 100 being the theoretical best):  Pain: 42   Symptoms: 25   ADLs:53   Sports/Recreation:50  Quality of Life:0  ([email or web address].[email or web address]/)  [facility] Activity Score:6    MEDICAL HISTORY:   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)     Overnight stay due to blood poisoning     Gastric ulcer, unspecified as acute or chronic, without mention of hemorrhage or perforation      Hypertension        Medications:     Current Outpatient Medications:      atorvastatin (LIPITOR) 20 MG tablet, Take 1 tablet by mouth once daily, Disp: 90 tablet, Rfl: 0     diltiazem 2% in PLO gel, To anal opening three times daily.  Use a pea-sized amount.  Store at room temperature. (Patient not taking: Reported on 10/29/2021), Disp: 60 g, Rfl: 0      lisinopril (ZESTRIL) 20 MG tablet, Take 1/2 (one-half) tablet by mouth once daily, Disp: 45 tablet, Rfl: 0     Multiple Vitamin (MULTIVITAMINS PO), Take  by mouth., Disp: , Rfl:      omeprazole (PRILOSEC) 40 MG DR capsule, Take 1 capsule by mouth once daily, Disp: 90 capsule, Rfl: 3    Current Facility-Administered Medications:      botulinum toxin type A (BOTOX) 100 units injection 100 Units, 100 Units, Intramuscular, Once, Avinash Barron MD    Allergies: Hydrocodone and Nsaids    SURGICAL HISTORY:   Past Surgical History:   Procedure Laterality Date     ARTHROPLASTY KNEE Right 12/10/2019    Procedure: Right knee replacement;  Surgeon: Javier Yin DO;  [facility]     ARTHROSCOPY KNEE  5/29/2013    Procedure: ARTHROSCOPY KNEE;  Right knee arthroscopy with partial medial and partial lateral menisectomies;  Surgeon: Phani Mclaughlin MD;  [facility]: [facility]     ARTHROSCOPY SHOULDER BICEPS TENODESIS REPAIR Right 1/13/2017    Procedure: ARTHROSCOPY SHOULDER BICEPS TENODESIS REPAIR;  Surgeon: Javier Yin DO;  [facility]     ARTHROSCOPY SHOULDER DECOMPRESSION Right 1/13/2017    Procedure: ARTHROSCOPY SHOULDER DECOMPRESSION;  Surgeon: Javier Yin DO;  [facility]     ARTHROSCOPY SHOULDER ROTATOR CUFF REPAIR Right 1/13/2017    Procedure: ARTHROSCOPY SHOULDER ROTATOR CUFF REPAIR;  Surgeon: Javier Yin DO;  [facility]     COLONOSCOPY  11/01/2007     COLONOSCOPY  12/12/11     COLONOSCOPY N/A 11/18/2015    Procedure: COLONOSCOPY;  Surgeon: Migue Mclaughlin MD;  [facility]     COLONOSCOPY N/A 3/22/2017    Procedure: COMBINED COLONOSCOPY, SINGLE OR MULTIPLE BIOPSY/POLYPECTOMY BY BIOPSY;  Surgeon: Salvatore Zepeda MD;  [facility]     COLONOSCOPY N/A 3/5/2019    Procedure: COLONOSCOPY;  Surgeon: Prakash Ervin DO;  [facility]     COLONOSCOPY WITH CO2 INSUFFLATION N/A 1/12/2021    Procedure: COLONOSCOPY, WITH CO2  INSUFFLATION;  Surgeon: Juan Antonio Corcoran MD;  [facility]     COMBINED ESOPHAGOSCOPY, GASTROSCOPY, DUODENOSCOPY (EGD) WITH CO2 INSUFFLATION N/A 3/11/2021    Procedure: ESOPHAGOGASTRODUODENOSCOPY, WITH CO2 INSUFFLATION;  Surgeon: Sara Calix DO;  Location: [facility]     ENDOSCOPY  01/27/12    [facility]     ESOPHAGOSCOPY, GASTROSCOPY, DUODENOSCOPY (EGD), COMBINED N/A 11/18/2015    Procedure: COMBINED ESOPHAGOSCOPY, GASTROSCOPY, DUODENOSCOPY (EGD), BIOPSY SINGLE OR MULTIPLE;  Surgeon: Migue Mclaughlin MD;  [facility]     ESOPHAGOSCOPY, GASTROSCOPY, DUODENOSCOPY (EGD), COMBINED N/A 3/22/2017    Procedure: COMBINED ESOPHAGOSCOPY, GASTROSCOPY, DUODENOSCOPY (EGD), BIOPSY SINGLE OR MULTIPLE;  Surgeon: Salvatore Zepeda MD;  [facility]     ESOPHAGOSCOPY, GASTROSCOPY, DUODENOSCOPY (EGD), COMBINED N/A 3/11/2021    Procedure: Esophagogastroduodenoscopy, With Biopsy;  Surgeon: Sara Calix DO;  [facility]: [facility]     HERNIORRHAPHY UMBILICAL N/A 3/12/2015    Procedure: HERNIORRHAPHY UMBILICAL;  Surgeon: Yared Ma MD;  [facility]     IRRIGATION AND DEBRIDEMENT UPPER EXTREMITY, COMBINED Left 3/15/2016    Procedure: COMBINED IRRIGATION AND DEBRIDEMENT UPPER EXTREMITY;  Surgeon: Javier Yin DO;  [facility]     REMOVAL OF SPERM DUCT(S)      Vasectomy     [facility] ENDOSCOPY, SIMPLE EXAM  10/31/2007       FAMILY HISTORY:   Family History   Problem Relation Age of Onset     Cancer Mother         ovarian cancer     Asthma Mother      Hypertension Mother      Arthritis Mother      Genitourinary Problems Mother      Lipids Mother      Cancer Sister         ovarian cancer     Hypertension Sister      Lipids Sister      Cancer Maternal Grandmother         stomach cancer     Asthma Father      Cardiovascular Father         heart attack; bypass x5, congenital heart disease     Heart Disease Father         heart attack; bypass x5, congenital heart  disease     Diabetes Father      Hypertension Father      Cancer Father         prostate     Prostate Cancer Father      Circulatory Father         blood clots     Genitourinary Problems Father      Respiratory Father         emphysema; COPD     Diabetes Maternal Grandfather      Diabetes Paternal Grandfather      Eye Disorder Paternal Grandfather         glaucoma     Hypertension Brother      Lipids Brother      Hypertension Brother      Cardiovascular Brother         bypass x3     Heart Disease Brother         bypass x3     Lipids Brother      Hypertension Sister      C.A.D. No family hx of      Cerebrovascular Disease No family hx of      Breast Cancer No family hx of      Cancer - colorectal No family hx of      Alzheimer Disease No family hx of      Blood Disease No family hx of      Gastrointestinal Disease No family hx of      Musculoskeletal Disorder No family hx of      Neurologic Disorder No family hx of      Thyroid Disease No family hx of        SOCIAL HISTORY:   Social History     Tobacco Use     Smoking status: Former Smoker     Quit date: 1981     Years since quittin.9     Smokeless tobacco: Never Used   Substance Use Topics     Alcohol use: Yes     Alcohol/week: 0.0 standard drinks     Comment: occ        REVIEW OF SYSTEMS:  The comprehensive review of systems from the intake form was reviewed with the patient.  No fever, weight change or fatigue. No dry eyes. No oral ulcers, sore throat or voice change. No palpitations, syncope, angina or edema.  No chest pain, excessive sleepiness, shortness of breath or hemoptysis.   No abdominal pain, nausea, vomiting, diarrhea or heartburn.  No skin rash. No focal weakness or numbness. No bleeding or lymphadenopathy. No rhinitis or hives.     Exam:  On physical examination the patient appears the stated age, is in no acute distress, affectThe is appropriate, and breathing is non-labored.  Vitals are documented in the EMR and have been reviewed:    Mariah  "1.753 m (5' 9\")   Wt 95.9 kg (211 lb 8 oz)   BMI 31.23 kg/m    5' 9\"  Body mass index is 31.23 kg/m .    Right Knee  Appearance: benign  Clinical alignment:neutral   Effusion:no  Tenderness to palpation:  Extension:   Flexion:  90  Collateral ligaments: intact  Cruciate ligaments: grossly intact    Distally, the circulatory, motor, and sensation exam is intact with 5/5 EHL, gastroc-soleus, and tibialis anterior.  Sensation to light touch is intact.  Dorsalis pedis and posterior tibialis pulses are palpable.  There are no sores on the feet, no bruising, and no lymphedema.    X-rays:   We reviewed the radiographs together. These show the normal progression for a total knee arthroplasty without evidence of loosening or subsidence.       "

## 2021-11-23 NOTE — LETTER
11/23/2021         RE: Hugo Longoria  [address] [facility] [address]        Dear Colleague,    Thank you for referring your patient, Hugo Longoria, to the [facility]. Please see a copy of my visit note below.    Assessment: This is a 60 year old with a proximal tibia fracture about 6 weeks after total knee treated non-operatively in a cast. Has been sore with more anerio knee pain and frequent swelling. Has not had that I can find an infectino work-up.     Plan:  SED, CRP, if high will have him RTC for an aspiration.     Chief Complaint: Consult (Right knee pain; s/p RTKA 12/10/19 with Dr. Yin)      Physician:  Bony Robin    HPI: Hugo Longoria is a 60 year old male who presents today for evaluation of    Symptom Profile  Location of symptoms:  anterio knee   Onset:after a prox tibial fracture during the first 2 months post-op TKA  Trend: getting no better or worse  Duration of symptoms: about 2 years  Quality of symptoms: aching, sharp/stabbing  Severity:moderate to severe   Alleviate:activity modification   Exacerbating: activities  Previous Treatments: Previous treatments include activity modification, oral pain medication,       Current Status:  Results of the patient s Hip Disability and Osteoarthritis Outcome Score (HOOS)  are as follows (0-100 scales with 100 being the theoretical best):  Pain: 42   Symptoms: 25   ADLs:53   Sports/Recreation:50  Quality of Life:0  ([email or web address].nu/)  [facility] Activity Score:6    MEDICAL HISTORY:   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)     Overnight stay due to blood poisoning     Gastric ulcer, unspecified as acute or chronic, without mention of hemorrhage or perforation      Hypertension        Medications:     Current Outpatient Medications:      atorvastatin (LIPITOR) 20  MG tablet, Take 1 tablet by mouth once daily, Disp: 90 tablet, Rfl: 0     diltiazem 2% in PLO gel, To anal opening three times daily.  Use a pea-sized amount.  Store at room temperature. (Patient not taking: Reported on 10/29/2021), Disp: 60 g, Rfl: 0     lisinopril (ZESTRIL) 20 MG tablet, Take 1/2 (one-half) tablet by mouth once daily, Disp: 45 tablet, Rfl: 0     Multiple Vitamin (MULTIVITAMINS PO), Take  by mouth., Disp: , Rfl:      omeprazole (PRILOSEC) 40 MG DR capsule, Take 1 capsule by mouth once daily, Disp: 90 capsule, Rfl: 3    Current Facility-Administered Medications:      botulinum toxin type A (BOTOX) 100 units injection 100 Units, 100 Units, Intramuscular, Once, Avinash Barron MD    Allergies: Hydrocodone and Nsaids    SURGICAL HISTORY:   Past Surgical History:   Procedure Laterality Date     ARTHROPLASTY KNEE Right 12/10/2019    Procedure: Right knee replacement;  Surgeon: Javier Yin DO;  Location: [facility]     ARTHROSCOPY KNEE  5/29/2013    Procedure: ARTHROSCOPY KNEE;  Right knee arthroscopy with partial medial and partial lateral menisectomies;  Surgeon: Phani Mclaughlin MD;  Location: [facility] OR     ARTHROSCOPY SHOULDER BICEPS TENODESIS REPAIR Right 1/13/2017    Procedure: ARTHROSCOPY SHOULDER BICEPS TENODESIS REPAIR;  Surgeon: Javier Yin DO;  Location: [facility]     ARTHROSCOPY SHOULDER DECOMPRESSION Right 1/13/2017    Procedure: ARTHROSCOPY SHOULDER DECOMPRESSION;  Surgeon: Javier Yin DO;  Location: [facility] OR     ARTHROSCOPY SHOULDER ROTATOR CUFF REPAIR Right 1/13/2017    Procedure: ARTHROSCOPY SHOULDER ROTATOR CUFF REPAIR;  Surgeon: Javier Yin DO;  Location: [facility] OR     COLONOSCOPY  11/01/2007     COLONOSCOPY  12/12/11     COLONOSCOPY N/A 11/18/2015    Procedure: COLONOSCOPY;  Surgeon: Migue Mclaughlin MD;  Location:  GI     COLONOSCOPY N/A 3/22/2017    Procedure: COMBINED COLONOSCOPY, SINGLE OR MULTIPLE BIOPSY/POLYPECTOMY BY BIOPSY;   Surgeon: Salvatore Zepeda MD;  Location: [facility] GI     COLONOSCOPY N/A 3/5/2019    Procedure: COLONOSCOPY;  Surgeon: Prakash Ervin DO;  Location: [facility] GI     COLONOSCOPY WITH CO2 INSUFFLATION N/A 1/12/2021    Procedure: COLONOSCOPY, WITH CO2 INSUFFLATION;  Surgeon: Juan Antonio Corcoran MD;  Location: [facility] OR     COMBINED ESOPHAGOSCOPY, GASTROSCOPY, DUODENOSCOPY (EGD) WITH CO2 INSUFFLATION N/A 3/11/2021    Procedure: ESOPHAGOGASTRODUODENOSCOPY, WITH CO2 INSUFFLATION;  Surgeon: Sara Calix DO;  Location: [facility] OR     ENDOSCOPY  01/27/12    [facility]     ESOPHAGOSCOPY, GASTROSCOPY, DUODENOSCOPY (EGD), COMBINED N/A 11/18/2015    Procedure: COMBINED ESOPHAGOSCOPY, GASTROSCOPY, DUODENOSCOPY (EGD), BIOPSY SINGLE OR MULTIPLE;  Surgeon: Migue Mclaughlin MD;  Location: [facility] GI     ESOPHAGOSCOPY, GASTROSCOPY, DUODENOSCOPY (EGD), COMBINED N/A 3/22/2017    Procedure: COMBINED ESOPHAGOSCOPY, GASTROSCOPY, DUODENOSCOPY (EGD), BIOPSY SINGLE OR MULTIPLE;  Surgeon: Salvatore Zepeda MD;  Location: [facility]     ESOPHAGOSCOPY, GASTROSCOPY, DUODENOSCOPY (EGD), COMBINED N/A 3/11/2021    Procedure: Esophagogastroduodenoscopy, With Biopsy;  Surgeon: Sara Calix DO;  Location: [facility]     HERNIORRHAPHY UMBILICAL N/A 3/12/2015    Procedure: HERNIORRHAPHY UMBILICAL;  Surgeon: Yared Ma MD;  Location: [facility]     IRRIGATION AND DEBRIDEMENT UPPER EXTREMITY, COMBINED Left 3/15/2016    Procedure: COMBINED IRRIGATION AND DEBRIDEMENT UPPER EXTREMITY;  Surgeon: Javier Yin DO;  Location: [facility]     REMOVAL OF SPERM DUCT(S)      Vasectomy     [facility] ENDOSCOPY, SIMPLE EXAM  10/31/2007       FAMILY HISTORY:   Family History   Problem Relation Age of Onset     Cancer Mother         ovarian cancer     Asthma Mother      Hypertension Mother      Arthritis Mother      Genitourinary Problems Mother      Lipids Mother      Cancer Sister         ovarian cancer     Hypertension Sister       Lipids Sister      Cancer Maternal Grandmother         stomach cancer     Asthma Father      Cardiovascular Father         heart attack; bypass x5, congenital heart disease     Heart Disease Father         heart attack; bypass x5, congenital heart disease     Diabetes Father      Hypertension Father      Cancer Father         prostate     Prostate Cancer Father      Circulatory Father         blood clots     Genitourinary Problems Father      Respiratory Father         emphysema; COPD     Diabetes Maternal Grandfather      Diabetes Paternal Grandfather      Eye Disorder Paternal Grandfather         glaucoma     Hypertension Brother      Lipids Brother      Hypertension Brother      Cardiovascular Brother         bypass x3     Heart Disease Brother         bypass x3     Lipids Brother      Hypertension Sister      C.A.D. No family hx of      Cerebrovascular Disease No family hx of      Breast Cancer No family hx of      Cancer - colorectal No family hx of      Alzheimer Disease No family hx of      Blood Disease No family hx of      Gastrointestinal Disease No family hx of      Musculoskeletal Disorder No family hx of      Neurologic Disorder No family hx of      Thyroid Disease No family hx of        SOCIAL HISTORY:   Social History     Tobacco Use     Smoking status: Former Smoker     Quit date: 1981     Years since quittin.9     Smokeless tobacco: Never Used   Substance Use Topics     Alcohol use: Yes     Alcohol/week: 0.0 standard drinks     Comment: occ        REVIEW OF SYSTEMS:  The comprehensive review of systems from the intake form was reviewed with the patient.  No fever, weight change or fatigue. No dry eyes. No oral ulcers, sore throat or voice change. No palpitations, syncope, angina or edema.  No chest pain, excessive sleepiness, shortness of breath or hemoptysis.   No abdominal pain, nausea, vomiting, diarrhea or heartburn.  No skin rash. No focal weakness or numbness. No bleeding or  "lymphadenopathy. No rhinitis or hives.     Exam:  On physical examination the patient appears the stated age, is in no acute distress, affectThe is appropriate, and breathing is non-labored.  Vitals are documented in the EMR and have been reviewed:    Ht 1.753 m (5' 9\")   Wt 95.9 kg (211 lb 8 oz)   BMI 31.23 kg/m    5' 9\"  Body mass index is 31.23 kg/m .    Right Knee  Appearance: benign  Clinical alignment:neutral   Effusion:no  Tenderness to palpation:  Extension:   Flexion:  90  Collateral ligaments: intact  Cruciate ligaments: grossly intact    Distally, the circulatory, motor, and sensation exam is intact with 5/5 EHL, gastroc-soleus, and tibialis anterior.  Sensation to light touch is intact.  Dorsalis pedis and posterior tibialis pulses are palpable.  There are no sores on the feet, no bruising, and no lymphedema.    X-rays:   We reviewed the radiographs together. These show the normal progression for a total knee arthroplasty without evidence of loosening or subsidence.           Again, thank you for allowing me to participate in the care of your patient.        Sincerely,        oNé Davison MD    "

## 2021-11-23 NOTE — NURSING NOTE
"Hugo Longoria's chief complaint for this visit includes:  Chief Complaint   Patient presents with     Consult     Right knee pain; s/p RTKA 12/10/19 with Dr. Yin     PCP: No Ref-Primary, Physician    Referring Provider:  Bony Robin MD  [address] [facility]  [place] [id number]    Ht 1.753 m (5' 9\")   Wt 95.9 kg (211 lb 8 oz)   BMI 31.23 kg/m    Data Unavailable     Do you need any medication refills at today's visit? NO    Karen Dhillon MA, ATC  "

## 2022-01-11 DIAGNOSIS — I10 HTN, GOAL BELOW 140/90: ICD-10-CM

## 2022-01-11 RX ORDER — LISINOPRIL 20 MG/1
TABLET ORAL
Qty: 45 TABLET | Refills: 0 | Status: SHIPPED | OUTPATIENT
Start: 2022-01-11 | End: 2022-04-06

## 2022-01-11 NOTE — TELEPHONE ENCOUNTER
Pending Prescriptions:                       Disp   Refills    lisinopril (ZESTRIL) 20 MG tablet [Pharma*45 tab*0            Sig: Take 1/2 (one-half) tablet by mouth once daily    Medication is being filled for 1 time filiberto refill only due to:  Patient is due for physical and medication check.     Please call and help schedule.  Thank you!    Melany Tolentino, RUTHN, RN, PHN  [place] [facility]  January 11, 2022

## 2022-01-21 ENCOUNTER — TELEPHONE (OUTPATIENT)
Dept: DERMATOLOGY | Facility: CLINIC | Age: 62
End: 2022-01-21
Payer: COMMERCIAL

## 2022-01-21 NOTE — TELEPHONE ENCOUNTER
[facility] Call Center    Phone Message    May a detailed message be left on voicemail: yes     Reason for Call: Symptoms or Concerns     If patient has red-flag symptoms, warm transfer to triage line    Current symptom or concern: Spot on forehead     Symptoms have been present for:  8 month(s)    Has patient previously been seen for this? No    By : NA    Date: NA    Are there any new or worsening symptoms? Yes:slightly- It is growing      Action Taken: Message routed to:  [facility] Derm    Travel Screening: Not Applicable

## 2022-01-24 NOTE — TELEPHONE ENCOUNTER
Patient scheduled for skin check with Dr. Strauss on Jan 26th. No personal hx of skin cancer but states he does have a family hx skin cancer but is unsure of what kind.     Marika Torres LPN

## 2022-01-25 NOTE — PROGRESS NOTES
[facility] Dermatology Note  Encounter Date: Jan 26, 2022  Office Visit     Dermatology Problem List:  Last skin check 1/26/22  1. 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 Has a home in [place].  Family history: Mother, father and cousins with unknown skin cancer  ____________________________________________    Assessment & Plan:    # Sun damaged skin with solar lentigines: Chronic, stable.  - Recommend sunscreens SPF #30 or greater, protective clothing and avoidance of tanning beds.    # Benign skin findings including: seborrheic keratoses, cherry angioma - minor, self limited problem  - No further intervention required. Patient to report changes.   - Patient reassured of the benign nature of these lesions.    # Multiple clinically benign nevi: Chronic, stable  - No further intervention required. Patient to report changes.   - Patient reassured of the benign nature of these lesions.    # Neoplasm of uncertain behavior on the left lateral forehead. The differential diagnosis includes BCC vs other.    - See procedure note.       Procedures Performed:   - Shave biopsy procedure note, location(s): left lateral forehead. After discussion of benefits and risks including but not limited to bleeding, infection, scar, incomplete removal, recurrence, and non-diagnostic biopsy, written consent and photographs were obtained. The area was cleaned with isopropyl alcohol. 0.5mL of 1% lidocaine with epinephrine was injected to obtain adequate anesthesia of lesion(s). Shave biopsy at site(s) performed. Hemostasis was achieved with aluminium chloride. Petrolatum ointment and a sterile dressing were applied. The patient tolerated the procedure and no complications were noted. The patient was provided with verbal and written post care instructions.     Follow-up: pending path results    Staff and Scribe:      Scribe Disclosure:   I, Zo Jonathan/Walter Li, am serving as a scribe to document services personally performed by this physician, Dr. Brittany Strauss, based on data collection and the provider's statements to me.     Provider Disclosure:   The documentation recorded by the scribe accurately reflects the services I personally performed and the decisions made by me.    Brittany Strauss MD    Department of Dermatology  [facility]: Phone: [phone], Fax:[phone]  [facility]: Phone: [phone], Fax: [phone]    ____________________________________________    CC: Skin Check (Spot on left forehead, bridge of nose, and on back. No personal Hx of SC. Family Hx of unknown SC in mother, father, and cousins. Has never seen Derm. Not immunocompromised.)    HPI:  Mr. Hugo Longoria is a(n) 61 year old male who presents today as a new patient for skin check.    He is new to our department. He has not seen a dermatologist prior. He has no history of skin cancer, atypical moles, or precancerous lesions to his knowledge.    Self referred.    Today, Hugo notes a lesion of concern on the forehead that has been present for 8 months. This lesion has been growing. He also has a lesion of concern on the bridge of the nose and the back. There's a lesion on his back that his wife will squeeze and drains.    Patient is otherwise feeling well, without additional skin concerns.    Labs Reviewed:  N/A    Physical Exam:  Vitals: There were no vitals taken for this visit.  SKIN: Total skin excluding the undergarment areas was performed. The exam included the head/face, neck, both arms, chest, back, abdomen, both legs, digits and/or nails.  - Paredes Type ll  - Scattered brown macules on sun exposed areas.  - There are dome shaped bright red papules on the trunk and extremities.   -  Multiple regular brown pigmented macules and papules are identified on the trunk and extremities.   - There are waxy stuck on tan to brown papules on the trunk and extremities.  - Scattered dilated pores of alvin on the back  - Left lalteral forehead: pink pearly papule with arborizing vessels    - No other lesions of concern on areas examined.     Medications:  Current Outpatient Medications   Medication     atorvastatin (LIPITOR) 20 MG tablet     diltiazem 2% in PLO gel     lisinopril (ZESTRIL) 20 MG tablet     Multiple Vitamin (MULTIVITAMINS PO)     omeprazole (PRILOSEC) 40 MG DR capsule     Current Facility-Administered Medications   Medication     botulinum toxin type A (BOTOX) 100 units injection 100 Units      Past Medical History:   Patient Active Problem List   Diagnosis     History of colonic polyps     KERRY (generalized anxiety disorder)     CARDIOVASCULAR SCREENING; LDL GOAL LESS THAN 130     History of tobacco use: 1980 - 1990     Hemorrhoids     Erectile dysfunction     HTN, goal below 140/90     Fatigue     ACL tear     History of gastric ulcer     Chronic gastric ulcer     Seasonal allergic rhinitis     Complete tear of right rotator cuff     Rupture long head biceps tendon, right, initial encounter     Subacromial impingement of right shoulder     Advanced directives, counseling/discussion     Primary osteoarthritis of right knee     Chronic right shoulder pain     S/P total knee replacement using cement, right     Morbid obesity (H)     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)     Overnight stay due to blood poisoning     Gastric ulcer, unspecified as acute or chronic, without mention of hemorrhage or perforation      Hypertension         CC Referred Self, MD  No address on file on close of this encounter.

## 2022-01-26 ENCOUNTER — OFFICE VISIT (OUTPATIENT)
Dept: DERMATOLOGY | Facility: CLINIC | Age: 62
End: 2022-01-26
Payer: COMMERCIAL

## 2022-01-26 DIAGNOSIS — L57.8 SUN-DAMAGED SKIN: Primary | ICD-10-CM

## 2022-01-26 DIAGNOSIS — D48.5 NEOPLASM OF UNCERTAIN BEHAVIOR OF SKIN: ICD-10-CM

## 2022-01-26 DIAGNOSIS — L81.4 SOLAR LENTIGO: ICD-10-CM

## 2022-01-26 DIAGNOSIS — D18.01 CHERRY ANGIOMA: ICD-10-CM

## 2022-01-26 DIAGNOSIS — D22.9 MULTIPLE BENIGN NEVI: ICD-10-CM

## 2022-01-26 DIAGNOSIS — D23.9 DILATED PORE OF WINER: ICD-10-CM

## 2022-01-26 DIAGNOSIS — L82.1 SEBORRHEIC KERATOSES: ICD-10-CM

## 2022-01-26 PROCEDURE — 88305 TISSUE EXAM BY PATHOLOGIST: CPT | Performed by: DERMATOLOGY

## 2022-01-26 PROCEDURE — 11102 TANGNTL BX SKIN SINGLE LES: CPT | Performed by: DERMATOLOGY

## 2022-01-26 PROCEDURE — 99203 OFFICE O/P NEW LOW 30 MIN: CPT | Mod: 25 | Performed by: DERMATOLOGY

## 2022-01-26 ASSESSMENT — PAIN SCALES - GENERAL: PAINLEVEL: NO PAIN (0)

## 2022-01-26 NOTE — LETTER
1/26/2022         RE: Hugo Longoria  [address] [facility] [address]        Dear Colleague,    Thank you for referring your patient, Hugo Longoria, to the [facility]. Please see a copy of my visit note below.    [facility] Dermatology Note  Encounter Date: Jan 26, 2022  Office Visit     Dermatology Problem List:  Last skin check 1/26/22  1. 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 Has a home in [place].  Family history: Mother, father and cousins with unknown skin cancer  ____________________________________________    Assessment & Plan:    # Sun damaged skin with solar lentigines: Chronic, stable.  - Recommend sunscreens SPF #30 or greater, protective clothing and avoidance of tanning beds.    # Benign skin findings including: seborrheic keratoses, cherry angioma - minor, self limited problem  - No further intervention required. Patient to report changes.   - Patient reassured of the benign nature of these lesions.    # Multiple clinically benign nevi: Chronic, stable  - No further intervention required. Patient to report changes.   - Patient reassured of the benign nature of these lesions.    # Neoplasm of uncertain behavior on the left lateral forehead. The differential diagnosis includes BCC vs other.    - See procedure note.       Procedures Performed:   - Shave biopsy procedure note, location(s): left lateral forehead. After discussion of benefits and risks including but not limited to bleeding, infection, scar, incomplete removal, recurrence, and non-diagnostic biopsy, written consent and photographs were obtained. The area was cleaned with isopropyl alcohol. 0.5mL of 1% lidocaine with epinephrine was injected to obtain adequate anesthesia of lesion(s). Shave biopsy at site(s) performed. Hemostasis was achieved with aluminium  chloride. Petrolatum ointment and a sterile dressing were applied. The patient tolerated the procedure and no complications were noted. The patient was provided with verbal and written post care instructions.     Follow-up: pending path results    Staff and Scribe:     Scribe Disclosure:   I, Zo Jonathan/Walter Li, am serving as a scribe to document services personally performed by this physician, Dr. Brittany Strauss, based on data collection and the provider's statements to me.     Provider Disclosure:   The documentation recorded by the scribe accurately reflects the services I personally performed and the decisions made by me.    Brittany Strauss MD    Department of Dermatology  [facility]: Phone: [phone], Fax:[phone]  [facility]: Phone: [phone], Fax: [phone]    ____________________________________________    CC: Skin Check (Spot on left forehead, bridge of nose, and on back. No personal Hx of SC. Family Hx of unknown SC in mother, father, and cousins. Has never seen Derm. Not immunocompromised.)    HPI:  Mr. Hugo Longoria is a(n) 61 year old male who presents today as a new patient for skin check.    He is new to our department. He has not seen a dermatologist prior. He has no history of skin cancer, atypical moles, or precancerous lesions to his knowledge.    Self referred.    Today, Hugo notes a lesion of concern on the forehead that has been present for 8 months. This lesion has been growing. He also has a lesion of concern on the bridge of the nose and the back. There's a lesion on his back that his wife will squeeze and drains.    Patient is otherwise feeling well, without additional skin concerns.    Labs Reviewed:  N/A    Physical Exam:  Vitals: There were no vitals taken for this visit.  SKIN: Total skin excluding the undergarment areas was  performed. The exam included the head/face, neck, both arms, chest, back, abdomen, both legs, digits and/or nails.  - Paredes Type ll  - Scattered brown macules on sun exposed areas.  - There are dome shaped bright red papules on the trunk and extremities.   - Multiple regular brown pigmented macules and papules are identified on the trunk and extremities.   - There are waxy stuck on tan to brown papules on the trunk and extremities.  - Scattered dilated pores of alvin on the back  - Left lalteral forehead: pink pearly papule with arborizing vessels    - No other lesions of concern on areas examined.     Medications:  Current Outpatient Medications   Medication     atorvastatin (LIPITOR) 20 MG tablet     diltiazem 2% in PLO gel     lisinopril (ZESTRIL) 20 MG tablet     Multiple Vitamin (MULTIVITAMINS PO)     omeprazole (PRILOSEC) 40 MG DR capsule     Current Facility-Administered Medications   Medication     botulinum toxin type A (BOTOX) 100 units injection 100 Units      Past Medical History:   Patient Active Problem List   Diagnosis     History of colonic polyps     KERRY (generalized anxiety disorder)     CARDIOVASCULAR SCREENING; LDL GOAL LESS THAN 130     History of tobacco use: 1980 - 1990     Hemorrhoids     Erectile dysfunction     HTN, goal below 140/90     Fatigue     ACL tear     History of gastric ulcer     Chronic gastric ulcer     Seasonal allergic rhinitis     Complete tear of right rotator cuff     Rupture long head biceps tendon, right, initial encounter     Subacromial impingement of right shoulder     Advanced directives, counseling/discussion     Primary osteoarthritis of right knee     Chronic right shoulder pain     S/P total knee replacement using cement, right     Morbid obesity (H)     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)     Overnight  stay due to blood poisoning     Gastric ulcer, unspecified as acute or chronic, without mention of hemorrhage or perforation      Hypertension         CC Referred Self, MD  No address on file on close of this encounter.       Again, thank you for allowing me to participate in the care of your patient.        Sincerely,        Brittany Strauss MD

## 2022-01-26 NOTE — NURSING NOTE
Hugo Longoria's chief complaint for this visit includes:  Chief Complaint   Patient presents with     Skin Check     Spot on left forehead, bridge of nose, and on back. No personal Hx of SC. Family Hx of unknown SC in mother, father, and cousins. Has never seen Derm. Not immunocompromised.     PCP: No Ref-Primary, Physician    Referring Provider:  Referred Self, MD  No address on file    There were no vitals taken for this visit.  No Pain (0)     Do you need any medication refills at today's visit? No    Brittany Oconnell, [facility]

## 2022-01-26 NOTE — PATIENT INSTRUCTIONS

## 2022-01-28 ENCOUNTER — NURSE TRIAGE (OUTPATIENT)
Dept: FAMILY MEDICINE | Facility: CLINIC | Age: 62
End: 2022-01-28
Payer: COMMERCIAL

## 2022-01-28 LAB
PATH REPORT.COMMENTS IMP SPEC: ABNORMAL
PATH REPORT.COMMENTS IMP SPEC: ABNORMAL
PATH REPORT.COMMENTS IMP SPEC: YES
PATH REPORT.FINAL DX SPEC: ABNORMAL
PATH REPORT.GROSS SPEC: ABNORMAL
PATH REPORT.MICROSCOPIC SPEC OTHER STN: ABNORMAL
PATH REPORT.RELEVANT HX SPEC: ABNORMAL

## 2022-01-28 NOTE — TELEPHONE ENCOUNTER
"Patient calling with sinus issues. In the winter they are \"really bad\"    Has been going for 3 months.     Constant draining in throat. Coughing, only to get mucous out but otherwise none.     Just the drainage in sinuses.     States \"I have hearing aids, and this has gotten so weak lately.\"    No ear pain, no sinus pain, no headache, no fevers. No other symptoms.     Patient would also like to have his blood sugar checked because he has been told he is pre-diabetic and would like to follow-up.    Requesting a shingles shot at visit.    SEE TODAY OR TOMORROW IN OFFICE: You need to be examined. Let me give you an appointment.    Advised patient be seen today. Patient is unavailable today and going [place] cabin [place] weekend. Patient is scheduled for 2/8/22 as this has been ongoing for three months.     Advised if anything worsens or starts having pain or fevers than he needs to be evaluated in urgent care. Patient states understanding and agrees to plan.     ALONA Mack/[facility]  January 28, 2022        Reason for Disposition    Sinus congestion (pressure, fullness) present > 10 days    Additional Information    Negative: Sounds like a life-threatening emergency to the triager    Negative: Difficulty breathing, and not from stuffy nose (e.g., not relieved by cleaning out the nose)    Negative: SEVERE headache and has fever    Negative: Patient sounds very sick or weak to the triager    Negative: SEVERE sinus pain    Negative: Severe headache    Negative: Redness or swelling on the cheek, forehead, or around the eye    Negative: Fever > 103 F (39.4 C)    Negative: Fever > 101 F (38.3 C) and over 60 years of age    Negative: Fever > 100.0 F (37.8 C) and has diabetes mellitus or a weak immune system (e.g., HIV positive, cancer chemotherapy, organ transplant, splenectomy, chronic steroids)    Negative: Fever > 100.0 F (37.8 C) and bedridden (e.g., nursing home patient, stroke, chronic " illness, recovering from surgery)    Negative: Fever present > 3 days (72 hours)    Negative: Fever returns after gone for over 24 hours and symptoms worse or not improved    Negative: Sinus pain (not just congestion) and fever    Negative: Earache    Protocols used: SINUS PAIN OR CONGESTION-A-OH

## 2022-02-01 ENCOUNTER — TELEPHONE (OUTPATIENT)
Dept: DERMATOLOGY | Facility: CLINIC | Age: 62
End: 2022-02-01
Payer: COMMERCIAL

## 2022-02-01 NOTE — TELEPHONE ENCOUNTER
"[facility] Dermatologic Surgery Clinic [place] Pre-Surgery Screening Note     Pre-screening Information:  Hx of Skin Cancer: No  Hx of Mohs Surgery: No  Transplant: No  Immunocompromised: No  Current Anticoagulant(s): None  Bleeding Disorder(s): No  Stent: No  Pacemaker: No  Defibrillator: No  Brain/Nerve Stimulator: No  Endocarditis/Rheumatic Fever Hx: No  Vascular graft: No  Prophylactic Antibiotic Needed: No  Congenital heart defect: No  Prosthetic Heart Valve: No  Lesion on Leg/Groin: No  Prosthetic Joint : Yes (Knee 12/2019)  Diabetic: No  HIV/AIDS: No  Hepatitis: No  Pregnant: No  Prior Problem with Local Anesthesia: No  Patient wears CPAP mask: No  Current Tobacco Use: No  Current Alcohol Use: Yes  Extended Care Facility: No  Occupation: off for winter  Referring MD: Dr. Strauss   needed?: No  Do you have mobility issues?: No  Do you use any assistive devices/DME?: No  Do you have any issues with lying for long periods of time?: No      Medications/Allergies  Medications and allergies review with patient: Yes     Current Outpatient Medications   Medication Sig Dispense Refill     atorvastatin (LIPITOR) 20 MG tablet Take 1 tablet by mouth once daily 90 tablet 0     lisinopril (ZESTRIL) 20 MG tablet Take 1/2 (one-half) tablet by mouth once daily 45 tablet 0     Multiple Vitamin (MULTIVITAMINS PO) Take  by mouth.       omeprazole (PRILOSEC) 40 MG DR capsule Take 1 capsule by mouth once daily 90 capsule 3     Allergies   Allergen Reactions     Hydrocodone Other (See Comments)     \"climbed the walls, very anxious, insomnia\"     Nsaids Other (See Comments)     Hx of stomach ulcers       Pertinent Labs:  Last INR: No results found for: INR    Other Reminders:    Reminded patient to take any order prophylactic antibiotics 1 hour prior to appointment: N/A     Please be aware that this can be an all day procedure. Please bring your daily medications and food/cash. Encourage patient to eat prior to " procedure(s). After care instructions were reviewed with patient.      Marichuy Lees RN

## 2022-02-01 NOTE — TELEPHONE ENCOUNTER
Marichuy Lees RN   2/1/2022  1:53 PM CST Back to Top        Results reviewed with Hugo.  Mohs scheduled for 2/2/22.    ALONA Vasquez MD   1/31/2022  4:38 PM CST         Ok to schedule mohs after telephone screening unless he would prefer a virtual consult. Thank you.     Marichuy Lees RN   1/31/2022 11:06 AM CST         Dr. Vides - please review and advise if you'd like a consult prior to proceeding with MMS.  Photo in chart.  No hx of MMS.  ALONA Vasquez MD   1/28/2022 12:00 PM CST         Please let Hugo know spot biopsied was a BCC. This will need Mohs to treat. He has not had skin cancer or mohs before, so explain in detail. Help schedule with Dr. Vides. Next skin check with me in 6 months.

## 2022-02-02 ENCOUNTER — OFFICE VISIT (OUTPATIENT)
Dept: DERMATOLOGY | Facility: CLINIC | Age: 62
End: 2022-02-02
Payer: COMMERCIAL

## 2022-02-02 VITALS — SYSTOLIC BLOOD PRESSURE: 135 MMHG | DIASTOLIC BLOOD PRESSURE: 87 MMHG

## 2022-02-02 DIAGNOSIS — C44.319 BASAL CELL CARCINOMA (BCC) OF LEFT FOREHEAD: Primary | ICD-10-CM

## 2022-02-02 PROCEDURE — 17311 MOHS 1 STAGE H/N/HF/G: CPT | Performed by: DERMATOLOGY

## 2022-02-02 PROCEDURE — 12051 INTMD RPR FACE/MM 2.5 CM/<: CPT | Performed by: DERMATOLOGY

## 2022-02-02 ASSESSMENT — PAIN SCALES - GENERAL: PAINLEVEL: NO PAIN (0)

## 2022-02-02 NOTE — PROGRESS NOTES
Dermatology Problem List:  Last skin check 1/26/22  1. 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 Has a home in [place].  Family history: Mother, father and cousins with unknown skin cancer.  _______________________________________    [facility] D[facility]at[facility] Surgery [facility] [place] Procedure Note      Date of Service:  Feb 2, 2022  Surgery: Mohs micrographic surgery    Case 1  Repair Type: intermediate  Repair Size: 2.5 cm  Suture Material: Fast Absorbing Gut 5-0  Tumor Type: BCC - Basal cell carcinoma  Location: Left lateral forehead  Derm-Path Accession #: [id number]  PreOp Size: 0.5x0.5 cm  PostOp Size: 0.9x0.8 cm  Mohs Accession #: [id number]  Level of Defect: fat      Procedure:  We discussed the principles of treatment and most likely complications including scarring, bleeding, infection, swelling, pain, crusting, nerve damage, large wound,  incomplete excision, wound dehiscence,  nerve damage, recurrence, and a second procedure may be recommended to obtain the best cosmetic or functional result.    Informed consent was obtained and the patient underwent the procedure as follows:  The patient was placed supine on the operating table.  The cancer was identified, outlined with a marker, and verified by the patient.  The entire surgical field was prepped with Hibiclens.  The surgical site was anesthetized using Lidocaine 1% with epi 1:100,000.      The area of clinically apparent tumor was debulked. The layer of tissue was then surgically excised using a #15 blade and was then transferred onto a specimen sheet maintaining the orientation of the specimen. Hemostasis was obtained using bipolar electrocoagulation. The wound site was then covered with a dressing while the tissue samples were processed for examination.    The excised tissue was  transported to the Mohs histology laboratory maintaining the tissue orientation.  The tissue specimen was relaxed so that the entire surgical margin was in a a single horizontal plane for sectioning and inked for precise mapping.  A precise reference map was drawn to reflect the sectioning of the specimen, colored inking of the margins, and orientation on the patient. The tissue was processed using horizontal sectioning of the base and continuous peripheral margins.  The histopathologic sections were reviewed in conjunction with the reference map.    Total blocks: 1    Total slides:  1    There were no cancer cells visualized on examination, therefore Mohs surgery was complete.      REPAIR: An intermediate layered linear closure was selected as the procedure which would maximally preserve both function and cosmesis.    After the excision of the tumor, the area was undermined. Hemostasis was obtained with bipolar electrocoagulation.  Closure was oriented so that the wound was in the patient's natural skin tension lines. The subcutaneous and dermal layers were then closed with 5-0 monocryl buried vertical mattress sutures. The epidermis was then carefully approximated along the length of the wound using 5-0 Fast Absorbing Gut simple running sutures.     Estimated blood loss was less than 10 ml for all surgical sites. A sterile pressure dressing was applied and wound care instructions, with a written handout, were given. The patient was discharged from the [facility]atologic Surgery Center alert and ambulatory.    Follow-up in 2 weeks      Scribe Disclosure:   I, Zo Castillo, am serving as a scribe to document services personally performed by this physician, Dr. Clive Vides, based on data collection and the provider's statements to me.     Provider Disclosure:   The documentation recorded by the scribe accurately reflects the services I personally performed and the decisions made by me.  I personally performed the  procedures today.  Clive Vides DO    Department of Dermatology  [facility]: Phone: [phone], Fax:[phone]  [facility] [place]: Phone: [phone], Fax: [phone]    Care and Laboratory Testing Performed at:  [facility]   Dermatology Clinic  [address] [place] [address]

## 2022-02-02 NOTE — PATIENT INSTRUCTIONS
Mohs Wound Care Instructions  I will experience scar, altered skin color, bleeding, swelling, pain, crusting and redness. I may experience altered sensation. Risks are excessive bleeding, infection, muscle weakness, thick (hypertrophic or keloidal) scar, and recurrence. A second procedure may be recommended to obtain the best cosmetic or functional result.  Possible complications of any surgical procedure are bleeding, infection, scarring, alteration in skin color and sensation, muscle weakness in the area, wound dehiscence or seperation, or recurrence of the lesion or disease. On occasion, after healing, a secondary procedure or revision may be recommended in order to obtain the best cosmetic or functional result.   After your surgery, a pressure bandage will be placed over the area that has sutures. This will help prevent bleeding. Please follow these instructions as they will help you to prevent complications as your wound heals.  For the First 48 hours After Surgery:  1. Leave the pressure bandage on and keep it dry. If it should come loose, you may retape it, but do not take it off.  2. Relax and take it easy. Do not do any vigorous exercise, heavy lifting, or bending forward. This could cause the wound to bleed.  3. Post-operative pain is usually mild. You may take plain or extra strength Tylenol every 4 hours as needed (do not take more than 4,000mg in one day). Do not take any medicine that contains aspirin, ibuprofen or motrin unless you have been recommended these by a doctor.  Avoid alcohol and vitamin E as these may increase your tendency to bleed.  4. You may put an ice pack around the bandaged area for 20 minutes every 2-3 hours. This may help reduce swelling, bruising, and pain. Make sure the ice pack is waterproof so that the pressure bandage does not get wet.   5. You may see a small amount of drainage or blood on your pressure bandage. This is normal. However, if drainage or bleeding continues or  saturates the bandage, you will need to apply firm pressure over the bandage with a washcloth for 15 minutes. If bleeding continues after applying pressure for 15 minutes then go to the nearest emergency room.  48 Hours After Surgery  Carefully remove the bandage and start daily wound care and dressing changes. You may also now shower and get the wound wet.  Daily Wound Care:  1. Wash wound with a mild soap and water.  Use caution when washing the wound, be gentle and do not let the forceful shower stream hit the wound directly.  2. Pat dry.  3. Apply Vaseline (from a new container or tube) over the suture line with a Q-tip. It is very important to keep the wound continuously moist, as wounds heal best in a moist environment.  4. Keep the site covered until sutures are removed, you can cover it with a Telfa (non-stick) dressing and tape or a band-aid.    5. If you are unable to keep wound covered, you must apply Vaseline every 2-3 hours (while awake) to ensure it is being kept moist for optimal healing. A dressing overnight is recommended to keep the area moist.  Call Us If:  1. You have pain that is not controlled with Tylenol.  2. You have signs or symptoms of an infection, such as: fever over 100 degrees F, redness, warmth, or foul-smelling or yellow/creamy drainage from the wound.  Who should I call with questions?    [facility]: [phone]     [facility]: [phone]    For urgent needs outside of business hours call the [facility] at [phone] and ask to speak with the dermatology resident on call

## 2022-02-02 NOTE — NURSING NOTE
Hugo Longoria's goals for this visit include:   Chief Complaint   Patient presents with     Procedure     left lateral forehead BCC       He requests these members of his care team be copied on today's visit information:     PCP: No Ref-Primary, Physician    Referring Provider:  Brittany Strauss MD  [address] [place] [id number]    There were no vitals taken for this visit.    Do you need any medication refills at today's visit? Dafne Neville LPN

## 2022-02-02 NOTE — NURSING NOTE
The following medication was given:     MEDICATION:  Lidocaine with epinephrine 1% 1:[id number]  ROUTE: SQ  SITE: see procedure note  DOSE: 3cc  LOT #: -[id number]  : Hospira  EXPIRATION DATE: 06/22  NDC#: 4813-0305-42  Was there drug waste? 0cc  Multi-dose vial: Yes    Marika Torres LPN  February 2, 2022    Vaseline and pressure dressing applied to Mohs site on left lateral forehead.  Wound care instructions reviewed with patient and AVS provided.  Patient verbalized understanding. No further questions or concerns at this time.

## 2022-02-02 NOTE — LETTER
2/2/2022         RE: Hugo Longoria  [address] [facility] [id number]        Dear Colleague,    Thank you for referring your patient, Hugo Longoria, to the [facility]. Please see a copy of my visit note below.    Dermatology Problem List:  Last skin check 1/26/22  1. 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 Has a home in [place].  Family history: Mother, father and cousins with unknown skin cancer.  _______________________________________    [facility] Dermatologic Surgery Clinic [place] Procedure Note      Date of Service:  Feb 2, 2022  Surgery: Mohs micrographic surgery    Case 1  Repair Type: intermediate  Repair Size: 2.5 cm  Suture Material: Fast Absorbing Gut 5-0  Tumor Type: BCC - Basal cell carcinoma  Location: Left lateral forehead  Derm-Path Accession #: [id number]  PreOp Size: 0.5x0.5 cm  PostOp Size: 0.9x0.8 cm  Mohs Accession #: [id number]  Level of Defect: fat      Procedure:  We discussed the principles of treatment and most likely complications including scarring, bleeding, infection, swelling, pain, crusting, nerve damage, large wound,  incomplete excision, wound dehiscence,  nerve damage, recurrence, and a second procedure may be recommended to obtain the best cosmetic or functional result.    Informed consent was obtained and the patient underwent the procedure as follows:  The patient was placed supine on the operating table.  The cancer was identified, outlined with a marker, and verified by the patient.  The entire surgical field was prepped with Hibiclens.  The surgical site was anesthetized using Lidocaine 1% with epi 1:100,000.      The area of clinically apparent tumor was debulked. The layer of tissue was then surgically excised using a #15 blade and was then transferred onto a  specimen sheet maintaining the orientation of the specimen. Hemostasis was obtained using bipolar electrocoagulation. The wound site was then covered with a dressing while the tissue samples were processed for examination.    The excised tissue was transported to the [facility] histology laboratory maintaining the tissue orientation.  The tissue specimen was relaxed so that the entire surgical margin was in a a single horizontal plane for sectioning and inked for precise mapping.  A precise reference map was drawn to reflect the sectioning of the specimen, colored inking of the margins, and orientation on the patient. The tissue was processed using horizontal sectioning of the base and continuous peripheral margins.  The histopathologic sections were reviewed in conjunction with the reference map.    Total blocks: 1    Total slides:  1    There were no cancer cells visualized on examination, therefore Mohs surgery was complete.      REPAIR: An intermediate layered linear closure was selected as the procedure which would maximally preserve both function and cosmesis.    After the excision of the tumor, the area was undermined. Hemostasis was obtained with bipolar electrocoagulation.  Closure was oriented so that the wound was in the patient's natural skin tension lines. The subcutaneous and dermal layers were then closed with 5-0 monocryl buried vertical mattress sutures. The epidermis was then carefully approximated along the length of the wound using 5-0 Fast Absorbing Gut simple running sutures.     Estimated blood loss was less than 10 ml for all surgical sites. A sterile pressure dressing was applied and wound care instructions, with a written handout, were given. The patient was discharged from the Dermatologic Surgery Center alert and ambulatory.    Follow-up in 2 weeks      Scribe Disclosure:   Zo HERNANDEZ, am serving as a scribe to document services personally performed by this physician, Dr. Clive Vides,  based on data collection and the provider's statements to me.     Provider Disclosure:   The documentation recorded by the scribe accurately reflects the services I personally performed and the decisions made by me.  I personally performed the procedures today.  Clive Vides DO    Department of Dermatology  [facility] Clin[facility]: Phone: [phone], Fax:[phone]  [facility] [place]: Phone: [phone], Fax: [phone]    Care and Laboratory Testing Performed at:  [facility]   Dermatology Clinic  [address] [place] [address]      Again, thank you for allowing me to participate in the care of your patient.        Sincerely,        Clive Vides MD

## 2022-02-07 NOTE — PROGRESS NOTES
"  Assessment & Plan     Acute recurrent sinusitis, unspecified location  61-year-old male, recurrent sinusitis, occurs this time every year.  Last year responded well to Augmentin, will repeat that today.  Follow-up no improvement or any worsening.  - amoxicillin-clavulanate (AUGMENTIN) 875-125 MG tablet; Take 1 tablet by mouth 2 times daily for 7 days    Chronic pain of right knee  Status post complete right knee arthroplasty, continued pain, improved with chiropractory.  Recommended Voltaren, follow-up no improvement or any worsening.  - diclofenac (VOLTAREN) 1 % topical gel; Apply 2 g topically 4 times daily    Prediabetes  Has had glucose intolerance in the past, repeating test today.  - BASIC METABOLIC PANEL; Future  - Hemoglobin A1c; Future  - Hemoglobin A1c  - BASIC METABOLIC PANEL    Bilateral sensorineural hearing loss  Is not consistent with wearing his hearing aids, I did recommend that he does this daily.  His last hearing exam was over 3 years ago, sending to audiology for follow-up.  - Adult Audiology Referral; Future      BMI:   Estimated body mass index is 32.93 kg/m  as calculated from the following:    Height as of this encounter: 1.753 m (5' 9\").    Weight as of this encounter: 101.2 kg (223 lb).         Return in about 6 months (around 8/8/2022) for Annual Well Check.    Bony Robin MD  [facility] HARSHA Arias is a 61 year old who presents for the following health issues     Cough  This is a recurrent problem. Episode onset: about 3 months. The problem occurs every few hours. The problem has been waxing and waning. The cough is productive of purulent sputum. There has been no fever. Associated symptoms include chills, ear congestion and rhinorrhea. Pertinent negatives include no sweats, no ear pain, no headaches, no sore throat and no wheezing. Associated symptoms comments: Post nasal drip, fatigued, \"like a cold I just cant shake\". He has tried decongestants and " "mist for the symptoms. The treatment provided no relief. Risk factors include animal exposure.   Answers for HPI/ROS submitted by the patient on 2/8/2022  Aggravated by: lying down       Do labs for prediabetes-he is fasting Long history of impaired fasting glucose    Shingles vaccine    Check ears due to noticing his hearing is worse          Review of Systems   Constitutional: Positive for chills.   HENT: Positive for rhinorrhea. Negative for ear pain and sore throat.    Respiratory: Positive for cough. Negative for wheezing.    Neurological: Negative for headaches.            Objective    /76   Pulse 84   Temp 97.8  F (36.6  C) (Temporal)   Resp 18   Ht 1.753 m (5' 9\")   Wt 101.2 kg (223 lb)   SpO2 96%   BMI 32.93 kg/m    Body mass index is 32.93 kg/m .  Physical Exam   GENERAL: healthy, alert and no distress  EYES: Eyes grossly normal to inspection, PERRL and conjunctivae and sclerae normal  HENT: ear canals and TM's normal, nose and mouth without ulcers or lesions  NECK: no adenopathy, no asymmetry, masses, or scars and thyroid normal to palpation  RESP: lungs clear to auscultation - no rales, rhonchi or wheezes  CV: regular rate and rhythm, normal S1 S2, no S3 or S4, no murmur, click or rub, no peripheral edema and peripheral pulses strong  MS: no gross musculoskeletal defects noted, no edema  NEURO: Normal strength and tone, mentation intact and speech normal  PSYCH: mentation appears normal, affect normal/bright                "

## 2022-02-08 ENCOUNTER — OFFICE VISIT (OUTPATIENT)
Dept: FAMILY MEDICINE | Facility: CLINIC | Age: 62
End: 2022-02-08
Payer: COMMERCIAL

## 2022-02-08 VITALS
HEIGHT: 69 IN | HEART RATE: 84 BPM | OXYGEN SATURATION: 96 % | DIASTOLIC BLOOD PRESSURE: 76 MMHG | SYSTOLIC BLOOD PRESSURE: 128 MMHG | TEMPERATURE: 97.8 F | BODY MASS INDEX: 33.03 KG/M2 | WEIGHT: 223 LBS | RESPIRATION RATE: 18 BRPM

## 2022-02-08 DIAGNOSIS — H90.3 BILATERAL SENSORINEURAL HEARING LOSS: ICD-10-CM

## 2022-02-08 DIAGNOSIS — J01.91 ACUTE RECURRENT SINUSITIS, UNSPECIFIED LOCATION: Primary | ICD-10-CM

## 2022-02-08 DIAGNOSIS — M25.561 CHRONIC PAIN OF RIGHT KNEE: ICD-10-CM

## 2022-02-08 DIAGNOSIS — G89.29 CHRONIC PAIN OF RIGHT KNEE: ICD-10-CM

## 2022-02-08 DIAGNOSIS — R73.03 PREDIABETES: ICD-10-CM

## 2022-02-08 LAB
ANION GAP SERPL CALCULATED.3IONS-SCNC: 2 MMOL/L (ref 3–14)
BUN SERPL-MCNC: 10 MG/DL (ref 7–30)
CALCIUM SERPL-MCNC: 9.3 MG/DL (ref 8.5–10.1)
CHLORIDE BLD-SCNC: 105 MMOL/L (ref 94–109)
CO2 SERPL-SCNC: 31 MMOL/L (ref 20–32)
CREAT SERPL-MCNC: 0.77 MG/DL (ref 0.66–1.25)
GFR SERPL CREATININE-BSD FRML MDRD: >90 ML/MIN/1.73M2
GLUCOSE BLD-MCNC: 117 MG/DL (ref 70–99)
HBA1C MFR BLD: 5.1 % (ref 0–5.6)
POTASSIUM BLD-SCNC: 4.5 MMOL/L (ref 3.4–5.3)
SODIUM SERPL-SCNC: 138 MMOL/L (ref 133–144)

## 2022-02-08 PROCEDURE — 36415 COLL VENOUS BLD VENIPUNCTURE: CPT | Performed by: FAMILY MEDICINE

## 2022-02-08 PROCEDURE — 83036 HEMOGLOBIN GLYCOSYLATED A1C: CPT | Performed by: FAMILY MEDICINE

## 2022-02-08 PROCEDURE — 80048 BASIC METABOLIC PNL TOTAL CA: CPT | Performed by: FAMILY MEDICINE

## 2022-02-08 PROCEDURE — 90471 IMMUNIZATION ADMIN: CPT | Performed by: FAMILY MEDICINE

## 2022-02-08 PROCEDURE — 90750 HZV VACC RECOMBINANT IM: CPT | Performed by: FAMILY MEDICINE

## 2022-02-08 PROCEDURE — 99214 OFFICE O/P EST MOD 30 MIN: CPT | Mod: 25 | Performed by: FAMILY MEDICINE

## 2022-02-08 ASSESSMENT — ENCOUNTER SYMPTOMS
SORE THROAT: 0
COUGH: 1
WHEEZING: 0
CHILLS: 1
RHINORRHEA: 1
HEADACHES: 0
SWEATS: 0

## 2022-02-08 ASSESSMENT — MIFFLIN-ST. JEOR: SCORE: 1811.9

## 2022-02-08 ASSESSMENT — PAIN SCALES - GENERAL: PAINLEVEL: NO PAIN (0)

## 2022-02-11 NOTE — RESULT ENCOUNTER NOTE
Hugo,  Your recent studies look fine.  Please let me know if you have any questions or concerns and follow up as discussed in clinic.    Sincerely.  Dr. JEYSON Robin MD, [facility]  Family Medicine Physician  [facility]  [address] [place] [address]

## 2022-02-16 ENCOUNTER — OFFICE VISIT (OUTPATIENT)
Dept: DERMATOLOGY | Facility: CLINIC | Age: 62
End: 2022-02-16
Payer: COMMERCIAL

## 2022-02-16 DIAGNOSIS — Z51.89 VISIT FOR WOUND CHECK: ICD-10-CM

## 2022-02-16 DIAGNOSIS — Z85.828 HISTORY OF BASAL CELL CARCINOMA OF SKIN: Primary | ICD-10-CM

## 2022-02-16 PROCEDURE — 99024 POSTOP FOLLOW-UP VISIT: CPT | Performed by: DERMATOLOGY

## 2022-02-16 ASSESSMENT — PAIN SCALES - GENERAL: PAINLEVEL: NO PAIN (0)

## 2022-02-16 NOTE — PROGRESS NOTES
"[facility] Dermatology Note  Encounter Date: Feb 16, 2022  Office Visit     Dermatology Problem List:  Last skin check 1/26/22  1. 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 Has a home in [place].  Family history: Mother, father and cousins with unknown skin cancer.  ____________________________________________    Assessment & Plan:     # BCC, left forehead s/p Mohs and linear repair.   The wound is healing appropriately.  There is a focal area of hemorrhagic crust in the center.   Encouraged him to use petroleum jelly and a dressing until the center is healed over with new pink skin.   Okay to follow-up with Dr. Winter back in July '22       Staff:     Clive Vides DO    Department of Dermatology  [facility]: Phone: [phone], Fax:[phone]  [facility]: Phone: [phone], Fax: [phone]      ____________________________________________    CC: Derm Problem (Hugo is here today for wound chec, pt states \"things are fine\")    HPI:  Mr. Hugo Longoria is a(n) 61 year old male who presents today for follow-up  After Mohs surgery to treat basal cell carcinoma of the left lateral forehead.  The wound was repaired with a linear repair, it is healing well at the ends.  The center has some hemorrhagic crust.  He discontinued petroleum jelly and a dressing a couple days ago so the wound \" can get air.  And scab.\"     Patient is otherwise feeling well, without additional skin concerns.     Labs Reviewed:  N/A    Physical Exam:  Vitals: There were no vitals taken for this visit.  SKIN: Focused examination of left forehead was performed.  -Healing linear surgical wound well-healed at the ends.  A small erosion with " hemorrhagic crust remains in the center.   - No other lesions of concern on areas examined.     Medications:  Current Outpatient Medications   Medication     atorvastatin (LIPITOR) 20 MG tablet     diclofenac (VOLTAREN) 1 % topical gel     lisinopril (ZESTRIL) 20 MG tablet     Multiple Vitamin (MULTIVITAMINS PO)     omeprazole (PRILOSEC) 40 MG DR capsule     Current Facility-Administered Medications   Medication     botulinum toxin type A (BOTOX) 100 units injection 100 Units      Past Medical History:   Patient Active Problem List   Diagnosis     History of colonic polyps     KERRY (generalized anxiety disorder)     CARDIOVASCULAR SCREENING; LDL GOAL LESS THAN 130     History of tobacco use: 1980 - 1990     Hemorrhoids     Erectile dysfunction     HTN, goal below 140/90     Fatigue     ACL tear     History of gastric ulcer     Chronic gastric ulcer     Seasonal allergic rhinitis     Complete tear of right rotator cuff     Rupture long head biceps tendon, right, initial encounter     Subacromial impingement of right shoulder     Advanced directives, counseling/discussion     Primary osteoarthritis of right knee     Chronic right shoulder pain     S/P total knee replacement using cement, right     Morbid obesity (H)     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)     Overnight stay due to blood poisoning     Gastric ulcer, unspecified as acute or chronic, without mention of hemorrhage or perforation      Hypertension

## 2022-02-16 NOTE — LETTER
"    2/16/2022         RE: Hugo Longoria  [address] [address]        Dear Colleague,    Thank you for referring your patient, Hugo Longoria, to the [facility]. Please see a copy of my visit note below.    [facility] Dermatology Note  Encounter Date: Feb 16, 2022  Office Visit     Dermatology Problem List:  Last skin check 1/26/22  1. 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 Has a home in [place].  Family history: Mother, father and cousins with unknown skin cancer.  ____________________________________________    Assessment & Plan:     # BCC, left forehead s/p Mohs and linear repair.   The wound is healing appropriately.  There is a focal area of hemorrhagic crust in the center.   Encouraged him to use petroleum jelly and a dressing until the center is healed over with new pink skin.   Okay to follow-up with Dr. Winter back in July '22       Staff:     Clive Vides DO    Department of Dermatology  [facility]: Phone: [phone], Fax:[phone]  [facility]: Phone: [phone], Fax: [phone]      ____________________________________________    CC: Derm Problem (Hugo is here today for wound chec, pt states \"things are fine\")    HPI:  Mr. Hugo Longoria is a(n) 61 year old male who presents today for follow-up  After Mohs surgery to treat basal cell carcinoma of the left lateral forehead.  The wound was repaired with a linear repair, it is healing well at the ends.  The center has some hemorrhagic crust.  He discontinued petroleum jelly and a dressing a couple days ago so the wound \" can get air.  And scab.\"     Patient is otherwise feeling well, without " additional skin concerns.     Labs Reviewed:  N/A    Physical Exam:  Vitals: There were no vitals taken for this visit.  SKIN: Focused examination of left forehead was performed.  -Healing linear surgical wound well-healed at the ends.  A small erosion with hemorrhagic crust remains in the center.   - No other lesions of concern on areas examined.     Medications:  Current Outpatient Medications   Medication     atorvastatin (LIPITOR) 20 MG tablet     diclofenac (VOLTAREN) 1 % topical gel     lisinopril (ZESTRIL) 20 MG tablet     Multiple Vitamin (MULTIVITAMINS PO)     omeprazole (PRILOSEC) 40 MG DR capsule     Current Facility-Administered Medications   Medication     botulinum toxin type A (BOTOX) 100 units injection 100 Units      Past Medical History:   Patient Active Problem List   Diagnosis     History of colonic polyps     KERRY (generalized anxiety disorder)     CARDIOVASCULAR SCREENING; LDL GOAL LESS THAN 130     History of tobacco use: 1980 - 1990     Hemorrhoids     Erectile dysfunction     HTN, goal below 140/90     Fatigue     ACL tear     History of gastric ulcer     Chronic gastric ulcer     Seasonal allergic rhinitis     Complete tear of right rotator cuff     Rupture long head biceps tendon, right, initial encounter     Subacromial impingement of right shoulder     Advanced directives, counseling/discussion     Primary osteoarthritis of right knee     Chronic right shoulder pain     S/P total knee replacement using cement, right     Morbid obesity (H)     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)     Overnight stay due to blood poisoning     Gastric ulcer, unspecified as acute or chronic, without mention of hemorrhage or perforation      Hypertension              Again, thank you for allowing me to participate in the care of your patient.         Sincerely,        Clive Vides MD

## 2022-02-16 NOTE — NURSING NOTE
"Hugo Longoria's goals for this visit include:   Chief Complaint   Patient presents with     Derm Problem     Hugo is here today for wound chec, pt states \"things are fine\"       He requests these members of his care team be copied on today's visit information:     PCP: No Ref-Primary, Physician    Referring Provider:  No referring provider defined for this encounter.    There were no vitals taken for this visit.    Do you need any medication refills at today's visit? No    Marika Torres LPN      "

## 2022-03-30 ENCOUNTER — VIRTUAL VISIT (OUTPATIENT)
Dept: FAMILY MEDICINE | Facility: CLINIC | Age: 62
End: 2022-03-30
Payer: COMMERCIAL

## 2022-03-30 DIAGNOSIS — J01.90 ACUTE SINUSITIS TREATED WITH ANTIBIOTICS IN THE PAST 60 DAYS: ICD-10-CM

## 2022-03-30 DIAGNOSIS — E66.01 MORBID OBESITY (H): ICD-10-CM

## 2022-03-30 DIAGNOSIS — R09.82 PND (POST-NASAL DRIP): Primary | ICD-10-CM

## 2022-03-30 DIAGNOSIS — I10 HTN, GOAL BELOW 140/90: ICD-10-CM

## 2022-03-30 PROCEDURE — 99214 OFFICE O/P EST MOD 30 MIN: CPT | Mod: 95 | Performed by: FAMILY MEDICINE

## 2022-03-30 RX ORDER — MONTELUKAST SODIUM 10 MG/1
10 TABLET ORAL AT BEDTIME
Qty: 30 TABLET | Refills: 1 | Status: SHIPPED | OUTPATIENT
Start: 2022-03-30 | End: 2022-05-06

## 2022-03-30 RX ORDER — FLUTICASONE PROPIONATE 50 MCG
1 SPRAY, SUSPENSION (ML) NASAL DAILY
Qty: 18.2 ML | Refills: 1 | Status: SHIPPED | OUTPATIENT
Start: 2022-03-30 | End: 2023-10-12

## 2022-03-30 NOTE — PROGRESS NOTES
Hugo is a 61 year old who is being evaluated via a billable video visit.      How would you like to obtain your AVS? MyChart  If the video visit is dropped, the invitation should be resent by: Other e-mail: .  Will anyAssessment & Plan     PND (post-nasal drip)  On going coughing for months to years  DDX include allergies, viral vs bacterial infection.  Antihistamine prescribed for 2 weeks and then as needed  - fluticasone (FLONASE) 50 MCG/ACT nasal spray; Spray 1 spray into both nostrils daily  - montelukast (SINGULAIR) 10 MG tablet; Take 1 tablet (10 mg) by mouth At Bedtime    Acute sinusitis treated with antibiotics in the past 60 days  Due to the duration of symptoms- its best to start antibiotic for superadded bacterial infection    - amoxicillin-clavulanate (AUGMENTIN) 875-125 MG tablet; Take 1 tablet by mouth 2 times daily for 10 days    Potential medication side effects were discussed with the patient; let me know if any occur.  Follow up in 11 days is amdefor patient as per their request.  I do recommend to get Chest xray  Completed at the visit and based on exam need blood tests as well    Morbid obesity (H)  HTN, goal below 140/90  - considered this problem when making today's plans  - no interventions today       -followed by primary care physicain       Ordering of each unique test  Prescription drug management  38 minutes spent on the date of the encounter doing chart review, history and exam, documentation and further activities per the note  {  Return in about 12 days (around 4/11/2022) for concerns,unresolved, follow up with PCP.    Sharon Barber MD  [facility] else be joining your video visit?       Video Start Time: 5:36 PM        Subjective   Hugo is a 61 year old who presents for the following health issues     HPI     Acute Illness  Acute illness concerns: chest cold  Seen with his wife  Reports on and off long standing history of chest cold- that start with dripping  of nose, settles in chest- used to clear within days with antibiotic.  Was given Zithromax by PCP on VV on 2/8/22-   probably did feel better for 1 week or so.  Now Coughing all day long,worse in am, by afternoon- a bit better. Wife also add that he has been on and off Sweaty , snoring loud, sometimes wheezing for months      He add that's since March 13th worse- initial Sore throat , - drainage in throat.and since then not any better. Wants antibiotics.No known asthma.  Quit smoking 30 yrs ago  Chest xray - 11/2021- [facility]-Works for [employer] company    Month ago- similar less sever symptoms- Thought allergies- took claritin in am- would dry up for 1-2 hrs 7 then same symptoms, variable in intensity      Review of Systems   Constitutional, HEENT, cardiovascular, pulmonary, GI, , musculoskeletal, neuro, skin, endocrine and psych systems are negative, except as otherwise noted.      Objective           Vitals:  No vitals were obtained today due to virtual visit.    Physical Exam   GENERAL: Healthy, alert and no distress  EYES: Eyes grossly normal to inspection.  No discharge or erythema, or obvious scleral/conjunctival abnormalities.  RESP: No audible wheeze, cough, or visible cyanosis.  No visible retractions or increased work of breathing.    SKIN: Visible skin clear. No significant rash, abnormal pigmentation or lesions.  NEURO: Cranial nerves grossly intact.  Mentation and speech appropriate for age.  PSYCH: Mentation appears normal, affect normal/bright, judgement and insight intact, normal speech and appearance well-groomed.                Video-Visit Details    Type of service:  Video Visit    Video End Time:5:57 PM    Originating Location (pt. Location): Home    Distant Location (provider location):  [facility]     Platform used for Video Visit: Monetsu

## 2022-03-30 NOTE — PATIENT INSTRUCTIONS
1. PND (post-nasal drip)  For allergies  - fluticasone (FLONASE) 50 MCG/ACT nasal spray; Spray 1 spray into both nostrils daily  Dispense: 18.2 mL; Refill: 1  - montelukast (SINGULAIR) 10 MG tablet; Take 1 tablet (10 mg) by mouth At Bedtime  Dispense: 30 tablet; Refill: 1    2. Acute sinusitis treated with antibiotics in the past 60 days    - amoxicillin-clavulanate (AUGMENTIN) 875-125 MG tablet; Take 1 tablet by mouth 2 times daily for 10 days  Dispense: 20 tablet; Refill: 0    [address] [facility] , [address]

## 2022-04-06 DIAGNOSIS — Z13.6 CARDIOVASCULAR SCREENING; LDL GOAL LESS THAN 130: ICD-10-CM

## 2022-04-06 DIAGNOSIS — I10 HTN, GOAL BELOW 140/90: ICD-10-CM

## 2022-04-06 RX ORDER — LISINOPRIL 20 MG/1
TABLET ORAL
Qty: 45 TABLET | Refills: 0 | Status: SHIPPED | OUTPATIENT
Start: 2022-04-06 | End: 2022-07-08

## 2022-04-06 NOTE — TELEPHONE ENCOUNTER
Pending Prescriptions:                       Disp   Refills    atorvastatin (LIPITOR) 20 MG tablet [Pharm*90 tab*0        Sig: Take 1 tablet by mouth once daily    Routing refill request to provider for review/approval because:  Labs not current:  LDL  A break in medication

## 2022-04-07 RX ORDER — ATORVASTATIN CALCIUM 20 MG/1
TABLET, FILM COATED ORAL
Qty: 90 TABLET | Refills: 0 | Status: SHIPPED | OUTPATIENT
Start: 2022-04-07 | End: 2022-10-12

## 2022-04-07 NOTE — PROGRESS NOTES
"  Assessment & Plan     Mixed simple and mucopurulent chronic bronchitis (H)  Comments.  61-year-old male, , non-smoker presents with 3-year history of coughing on and off, episodic flareup productive sputum, clear to yellow.  Flareup happens at least twice a year, this year he already reports had 3 flareup.  He was started on montelukast and Flonase and reports benefit.  Nasal congestion postnasal drip.  He was also given doxycycline that he took for 5 days and did improve.  Finally he stopped because of diarrhea.    Plan: Chest x-ray to rule out pneumonia, mass.  - XR Chest 2 Views; completed today & interpreted by me -no acute mass,.  Official report is pending.  - CBC with platelets and differential  - Pro calcitonin  Advised to start preventative inhaler and wash mouth afterwards.  - fluticasone-salmeterol (ADVAIR) 250-50 MCG/DOSE inhaler; Inhale 1 puff into the lungs in the morning and 1 puff in the evening.  - Adult Allergy/Asthma Referral; Future    Chronic rhinitis  Continue with montelukast 10 mg daily and Flonase nasal spray daily.    HTN, goal below 140/90  Controlled on lisinopril no medication changes made.  He reports strong family history of coronary artery disease, brother heart attack in  in 40s, dad had first bypass at age 61.  He did have  - Echocardiogram Complete; Future    Hyperlipidemia LDL goal <130  - Echocardiogram Complete; Future    BMI 33.0-33.9,adult  Encouraged attempt in loosing weight  - Echocardiogram Complete; Future    Ordering of each unique test  Prescription drug management  38 minutes spent on the date of the encounter doing chart review, history and exam, documentation and further activities per the note       BMI:   Estimated body mass index is 33.42 kg/m  as calculated from the following:    Height as of this encounter: 1.763 m (5' 9.41\").    Weight as of this encounter: 103.9 kg (229 lb).   Weight management plan: Discussed healthy diet and exercise " "guidelines    See Patient Instructions    Return in about 4 weeks (around 5/9/2022) for concerns,unresolved.    Sharon Barber MD  [facility]    Lenora Arias is a 61 year old who presents for the following health issues     HPI     Acute Illness  Acute illness concerns:  Nasal congestion, cold and coughs   Onset/Duration: 3 years on and off   Symptoms:  Fever: no  Chills/Sweats: YES  Headache (location?): no  Sinus Pressure: no   Conjunctivitis:  no  Ear Pain: no  Rhinorrhea: no  Congestion: YES  Sore Throat: YES  Cough: YES  Wheeze: YES-   Decreased Appetite: no  Nausea: no  Vomiting: no  Diarrhea: YES-   Dysuria/Freq.: no  Dysuria or Hematuria: no  Fatigue/Achiness: no  Sick/Strep Exposure: no  Therapies tried and outcome: medication to was provided (Singular )      Review of Systems   Constitutional, HEENT, cardiovascular, pulmonary, GI, , musculoskeletal, neuro, skin, endocrine and psych systems are negative, except as otherwise noted.      Objective    /84 (BP Location: Left arm, Patient Position: Sitting, Cuff Size: Adult Large)   Pulse 73   Temp 98.5  F (36.9  C)   Resp 20   Ht 1.763 m (5' 9.41\")   Wt 103.9 kg (229 lb)   SpO2 96%   BMI 33.42 kg/m    Body mass index is 33.42 kg/m .  Physical Exam   GENERAL: healthy, alert and no distress  NECK: no adenopathy, no asymmetry, masses, or scars and thyroid normal to palpation  RESP: lungs clear to auscultation - no rales, rhonchi or wheezes  CV: regular rate and rhythm, normal S1 S2, no S3 or S4, no murmur, click or rub, no peripheral edema and peripheral pulses strong  ABDOMEN: soft, nontender, no hepatosplenomegaly, no masses and bowel sounds normal  MS: no gross musculoskeletal defects noted, no edema    Results for orders placed or performed in visit on 04/11/22   CBC with platelets and differential     Status: None (In process)    Narrative    The following orders were created for panel order CBC with platelets and " differential.  Procedure                               Abnormality         Status                     ---------                               -----------         ------                     CBC with platelets and d...[[id number]]                      In process                   Please view results for these tests on the individual orders.               Answers for HPI/ROS submitted by the patient on 4/11/2022  PHQ9 TOTAL SCORE: 1

## 2022-04-11 ENCOUNTER — OFFICE VISIT (OUTPATIENT)
Dept: FAMILY MEDICINE | Facility: CLINIC | Age: 62
End: 2022-04-11
Payer: COMMERCIAL

## 2022-04-11 VITALS
OXYGEN SATURATION: 96 % | WEIGHT: 229 LBS | BODY MASS INDEX: 33.92 KG/M2 | SYSTOLIC BLOOD PRESSURE: 122 MMHG | HEIGHT: 69 IN | DIASTOLIC BLOOD PRESSURE: 84 MMHG | HEART RATE: 73 BPM | RESPIRATION RATE: 20 BRPM | TEMPERATURE: 98.5 F

## 2022-04-11 DIAGNOSIS — E78.5 HYPERLIPIDEMIA LDL GOAL <130: ICD-10-CM

## 2022-04-11 DIAGNOSIS — I10 HTN, GOAL BELOW 140/90: ICD-10-CM

## 2022-04-11 DIAGNOSIS — J31.0 CHRONIC RHINITIS: ICD-10-CM

## 2022-04-11 DIAGNOSIS — J41.8 MIXED SIMPLE AND MUCOPURULENT CHRONIC BRONCHITIS (H): Primary | ICD-10-CM

## 2022-04-11 LAB
BASOPHILS # BLD AUTO: 0 10E3/UL (ref 0–0.2)
BASOPHILS NFR BLD AUTO: 1 %
EOSINOPHIL # BLD AUTO: 0.1 10E3/UL (ref 0–0.7)
EOSINOPHIL NFR BLD AUTO: 2 %
ERYTHROCYTE [DISTWIDTH] IN BLOOD BY AUTOMATED COUNT: 12.7 % (ref 10–15)
HCT VFR BLD AUTO: 39.5 % (ref 40–53)
HGB BLD-MCNC: 13.8 G/DL (ref 13.3–17.7)
IMM GRANULOCYTES # BLD: 0.1 10E3/UL
IMM GRANULOCYTES NFR BLD: 1 %
LYMPHOCYTES # BLD AUTO: 1.9 10E3/UL (ref 0.8–5.3)
LYMPHOCYTES NFR BLD AUTO: 43 %
MCH RBC QN AUTO: 30.5 PG (ref 26.5–33)
MCHC RBC AUTO-ENTMCNC: 34.9 G/DL (ref 31.5–36.5)
MCV RBC AUTO: 87 FL (ref 78–100)
MONOCYTES # BLD AUTO: 0.4 10E3/UL (ref 0–1.3)
MONOCYTES NFR BLD AUTO: 10 %
NEUTROPHILS # BLD AUTO: 1.9 10E3/UL (ref 1.6–8.3)
NEUTROPHILS NFR BLD AUTO: 43 %
NRBC # BLD AUTO: 0 10E3/UL
NRBC BLD AUTO-RTO: 0 /100
PLATELET # BLD AUTO: 201 10E3/UL (ref 150–450)
PROCALCITONIN SERPL-MCNC: <0.05 NG/ML
RBC # BLD AUTO: 4.53 10E6/UL (ref 4.4–5.9)
WBC # BLD AUTO: 4.3 10E3/UL (ref 4–11)

## 2022-04-11 PROCEDURE — 36415 COLL VENOUS BLD VENIPUNCTURE: CPT | Performed by: FAMILY MEDICINE

## 2022-04-11 PROCEDURE — 84145 PROCALCITONIN (PCT): CPT | Performed by: FAMILY MEDICINE

## 2022-04-11 PROCEDURE — 99214 OFFICE O/P EST MOD 30 MIN: CPT | Performed by: FAMILY MEDICINE

## 2022-04-11 PROCEDURE — 85025 COMPLETE CBC W/AUTO DIFF WBC: CPT | Performed by: FAMILY MEDICINE

## 2022-04-11 ASSESSMENT — PATIENT HEALTH QUESTIONNAIRE - PHQ9
SUM OF ALL RESPONSES TO PHQ QUESTIONS 1-9: 1
SUM OF ALL RESPONSES TO PHQ QUESTIONS 1-9: 1

## 2022-04-11 NOTE — PATIENT INSTRUCTIONS
Keep taking flonase, nasal spray & montelukast.  Proceed with echo- Call to schedule your imaging:  Christian garza [phone].      See asthma specialist or chronic bronchitis- elisabeth if recurring episodes of productive cough.  For on going coughing flare up- use avair twice daily.  Wash mouth after use.

## 2022-05-05 ENCOUNTER — TELEPHONE (OUTPATIENT)
Dept: FAMILY MEDICINE | Facility: CLINIC | Age: 62
End: 2022-05-05
Payer: COMMERCIAL

## 2022-05-05 NOTE — TELEPHONE ENCOUNTER
Please walk-in to my clinic tomorrow at 2:00.    Bony Robin MD, FAAFP  Family Medicine Physician  [facility]  [address] [place] [address]

## 2022-05-05 NOTE — TELEPHONE ENCOUNTER
Spoke with patient appointment scheduled   Next 5 appointments (look out 90 days)    May 06, 2022  2:00 PM  (Arrive by 1:40 PM)  Provider Visit with Bony Robin MD  [facility] ([facility] ) [address] [place] [address], [address]  [facility] [address]  [phone]   Aug 03, 2022 11:45 AM  (Arrive by 11:30 AM)  Return Visit with Lonnie Rascon MD  [facility] ([facility]) [id number] [place] [id number]  [phone]        Closing encounter  Rosette Cardona RT (R)

## 2022-05-05 NOTE — TELEPHONE ENCOUNTER
Pt called back again, said for some reason the voicemail I left at 8:45 this morning didn't alert him until just before his appointment.     He is wondering if SA would work him in still yet today or anytime tomorrow, he waited 3 weeks for this appointment and is frustrated

## 2022-05-05 NOTE — TELEPHONE ENCOUNTER
Patient called stating he noticed he missed a call from the clinic and there was a message so he called back. He thought he missed his phone visit and I told him his visit had been changed to in person and he did not realize that and now wants to know if the provider can call him.

## 2022-05-05 NOTE — TELEPHONE ENCOUNTER
Patient is scheduled today at 130 with request for full heart exam.  This is scheduled as a telephone visit.  This needs to be in person.  If he cannot make it at 130, I can walk him in tomorrow at 2:00.    Bony Robin MD, FAAFP  Family Medicine Physician  [facility] [place]  [address] [place] [address]

## 2022-05-05 NOTE — PROGRESS NOTES
Assessment & Plan     Palpitations  61-year-old male with 1 year history of occasional palpitations, worse at night.  Family history of CAD in his father and brother, both had MIs in their 40s.  He has had normal [facility] med stress test in the past.  EKG today showed sinus rhythm with occasional PVC.  Sending for Zio patch as well as exercise echo.  He plans to increase activity to lose weight, so we will use this as an opportunity to further evaluate his cardiac health.  He will go to the emergency room if any significant worsening of symptoms.  - EKG 12-lead complete w/read - Clinics  - Echocardiogram Exercise Stress; Future  - Zio Patch Holter 48 Hour Adult Pediatric; Future  - TSH with free T4 reflex; Future  - CBC with platelets and differential; Future  - TSH with free T4 reflex  - CBC with platelets and differential    HTN, goal below 140/90  Blood pressure just at goal, continue to monitor.  When he follows up in a few weeks, will recheck at that time.  - Echocardiogram Exercise Stress; Future  - Zio Patch Holter 48 Hour Adult Pediatric; Future    Family history of ischemic heart disease  Father and brother both had MIs in their 40s.  - Echocardiogram Exercise Stress; Future  - Zio Patch Holter 48 Hour Adult Pediatric; Future      Return in about 4 weeks (around 6/3/2022) for Follow Up from Today's Encounter.    Bony Robin MD  [facility] HARSHA Longoria is a 61 year old male who presents to clinic today for the following health issues:    HPI     Question 5/6/2022  1:49 PM CDT - Incomplete   I understand that completing this form is intended to provide my doctor and/or care team with helpful information for my upcoming clinic visit. It is not to notify my doctor and/or care team of medical matters requiring urgent attention. If I have an urgent medical matter, I should call 911 or my doctor's office. Acknowledge   For what are you coming to see the doctor today?  "Heart or Circulation Problems   How many servings of fruits and vegetables do you eat daily? 0-1   On average, how many sweetened beverages do you drink each day (Examples: soda, juice, sweet tea, etc.  Do NOT count diet or artificially sweetened beverages)? 0   How many minutes a day do you exercise enough to make your heart beat faster? 9 or less   How many days a week do you exercise enough to make your heart beat faster? 3 or less   How many days per week do you miss taking your medication? 1   What makes it hard for you to take your medication every day? remembering to take         Answers for HPI/ROS submitted by the patient on 5/6/2022  If you checked off any problems, how difficult have these problems made it for you to do your work, take care of things at home, or get along with other people?: Not difficult at all  PHQ9 TOTAL SCORE: 2  KERRY 7 TOTAL SCORE: 0      Review of Systems   Constitutional, HEENT, cardiovascular, pulmonary, gi and gu systems are negative, except as otherwise noted.      Objective    BP (!) 140/92   Pulse 85   Temp 98.6  F (37  C) (Temporal)   Resp 18   Ht 1.763 m (5' 9.41\")   Wt 100.7 kg (222 lb)   SpO2 96%   BMI 32.40 kg/m    Body mass index is 32.4 kg/m .  Physical Exam   GENERAL: healthy, alert and no distress  EYES: Eyes grossly normal to inspection, PERRL and conjunctivae and sclerae normal  HENT: ear canals and TM's normal, nose and mouth without ulcers or lesions  NECK: no adenopathy, no asymmetry, masses, or scars and thyroid normal to palpation  RESP: lungs clear to auscultation - no rales, rhonchi or wheezes  CV: regular rate and rhythm, normal S1 S2, no S3 or S4, no murmur, click or rub, no peripheral edema and peripheral pulses strong  MS: no gross musculoskeletal defects noted, no edema  NEURO: Normal strength and tone, mentation intact and speech normal  BACK: no CVA tenderness, no paralumbar tenderness  PSYCH: mentation appears normal, affect normal/bright    EKG " - Reviewed and interpreted by me normal axis, normal intervals, no acute ST/T changes c/w ischemia, no LVH by voltage criteria, occasional PVC noted, unifocal

## 2022-05-05 NOTE — TELEPHONE ENCOUNTER
Left detailed message for pt and converted to office visit for the time being until I hear back

## 2022-05-06 ENCOUNTER — OFFICE VISIT (OUTPATIENT)
Dept: FAMILY MEDICINE | Facility: CLINIC | Age: 62
End: 2022-05-06
Payer: COMMERCIAL

## 2022-05-06 VITALS
BODY MASS INDEX: 32.88 KG/M2 | HEART RATE: 85 BPM | HEIGHT: 69 IN | WEIGHT: 222 LBS | SYSTOLIC BLOOD PRESSURE: 140 MMHG | TEMPERATURE: 98.6 F | OXYGEN SATURATION: 96 % | DIASTOLIC BLOOD PRESSURE: 92 MMHG | RESPIRATION RATE: 18 BRPM

## 2022-05-06 DIAGNOSIS — I10 HTN, GOAL BELOW 140/90: ICD-10-CM

## 2022-05-06 DIAGNOSIS — Z82.49 FAMILY HISTORY OF ISCHEMIC HEART DISEASE: ICD-10-CM

## 2022-05-06 DIAGNOSIS — R00.2 PALPITATIONS: Primary | ICD-10-CM

## 2022-05-06 LAB
BASOPHILS # BLD AUTO: 0 10E3/UL (ref 0–0.2)
BASOPHILS NFR BLD AUTO: 0 %
EOSINOPHIL # BLD AUTO: 0.1 10E3/UL (ref 0–0.7)
EOSINOPHIL NFR BLD AUTO: 2 %
ERYTHROCYTE [DISTWIDTH] IN BLOOD BY AUTOMATED COUNT: 12.2 % (ref 10–15)
HCT VFR BLD AUTO: 43.6 % (ref 40–53)
HGB BLD-MCNC: 15.1 G/DL (ref 13.3–17.7)
IMM GRANULOCYTES # BLD: 0 10E3/UL
IMM GRANULOCYTES NFR BLD: 1 %
LYMPHOCYTES # BLD AUTO: 1.6 10E3/UL (ref 0.8–5.3)
LYMPHOCYTES NFR BLD AUTO: 36 %
MCH RBC QN AUTO: 30.1 PG (ref 26.5–33)
MCHC RBC AUTO-ENTMCNC: 34.6 G/DL (ref 31.5–36.5)
MCV RBC AUTO: 87 FL (ref 78–100)
MONOCYTES # BLD AUTO: 0.5 10E3/UL (ref 0–1.3)
MONOCYTES NFR BLD AUTO: 10 %
NEUTROPHILS # BLD AUTO: 2.3 10E3/UL (ref 1.6–8.3)
NEUTROPHILS NFR BLD AUTO: 51 %
PLATELET # BLD AUTO: 162 10E3/UL (ref 150–450)
RBC # BLD AUTO: 5.02 10E6/UL (ref 4.4–5.9)
TSH SERPL DL<=0.005 MIU/L-ACNC: 3.18 MU/L (ref 0.4–4)
WBC # BLD AUTO: 4.4 10E3/UL (ref 4–11)

## 2022-05-06 PROCEDURE — 85025 COMPLETE CBC W/AUTO DIFF WBC: CPT | Performed by: FAMILY MEDICINE

## 2022-05-06 PROCEDURE — 99214 OFFICE O/P EST MOD 30 MIN: CPT | Performed by: FAMILY MEDICINE

## 2022-05-06 PROCEDURE — 36415 COLL VENOUS BLD VENIPUNCTURE: CPT | Performed by: FAMILY MEDICINE

## 2022-05-06 PROCEDURE — 84443 ASSAY THYROID STIM HORMONE: CPT | Performed by: FAMILY MEDICINE

## 2022-05-06 PROCEDURE — 93000 ELECTROCARDIOGRAM COMPLETE: CPT | Performed by: FAMILY MEDICINE

## 2022-05-06 ASSESSMENT — ANXIETY QUESTIONNAIRES
7. FEELING AFRAID AS IF SOMETHING AWFUL MIGHT HAPPEN: NOT AT ALL
3. WORRYING TOO MUCH ABOUT DIFFERENT THINGS: NOT AT ALL
4. TROUBLE RELAXING: NOT AT ALL
GAD7 TOTAL SCORE: 0
8. IF YOU CHECKED OFF ANY PROBLEMS, HOW DIFFICULT HAVE THESE MADE IT FOR YOU TO DO YOUR WORK, TAKE CARE OF THINGS AT HOME, OR GET ALONG WITH OTHER PEOPLE?: NOT DIFFICULT AT ALL
1. FEELING NERVOUS, ANXIOUS, OR ON EDGE: NOT AT ALL
GAD7 TOTAL SCORE: 0
GAD7 TOTAL SCORE: 0
6. BECOMING EASILY ANNOYED OR IRRITABLE: NOT AT ALL
5. BEING SO RESTLESS THAT IT IS HARD TO SIT STILL: NOT AT ALL
7. FEELING AFRAID AS IF SOMETHING AWFUL MIGHT HAPPEN: NOT AT ALL
2. NOT BEING ABLE TO STOP OR CONTROL WORRYING: NOT AT ALL

## 2022-05-06 ASSESSMENT — PATIENT HEALTH QUESTIONNAIRE - PHQ9
SUM OF ALL RESPONSES TO PHQ QUESTIONS 1-9: 2
SUM OF ALL RESPONSES TO PHQ QUESTIONS 1-9: 2
10. IF YOU CHECKED OFF ANY PROBLEMS, HOW DIFFICULT HAVE THESE PROBLEMS MADE IT FOR YOU TO DO YOUR WORK, TAKE CARE OF THINGS AT HOME, OR GET ALONG WITH OTHER PEOPLE: NOT DIFFICULT AT ALL

## 2022-05-07 ASSESSMENT — ANXIETY QUESTIONNAIRES: GAD7 TOTAL SCORE: 0

## 2022-05-11 NOTE — RESULT ENCOUNTER NOTE
Please inform of results if patient has not viewed in [employer] within 3 business days.    TSH - Your thyroid lab results were normal.    Please call the clinic with any questions you may have.     Have a great day,    Dr. Wolfe

## 2022-06-04 ENCOUNTER — HEALTH MAINTENANCE LETTER (OUTPATIENT)
Age: 62
End: 2022-06-04

## 2022-06-28 ENCOUNTER — ANCILLARY PROCEDURE (OUTPATIENT)
Dept: CARDIOLOGY | Facility: CLINIC | Age: 62
End: 2022-06-28
Attending: FAMILY MEDICINE
Payer: COMMERCIAL

## 2022-06-28 DIAGNOSIS — R00.2 PALPITATIONS: ICD-10-CM

## 2022-06-28 DIAGNOSIS — I10 HTN, GOAL BELOW 140/90: ICD-10-CM

## 2022-06-28 DIAGNOSIS — Z82.49 FAMILY HISTORY OF ISCHEMIC HEART DISEASE: ICD-10-CM

## 2022-06-28 PROCEDURE — 93352 ADMIN ECG CONTRAST AGENT: CPT | Performed by: STUDENT IN AN ORGANIZED HEALTH CARE EDUCATION/TRAINING PROGRAM

## 2022-06-28 PROCEDURE — 93321 DOPPLER ECHO F-UP/LMTD STD: CPT | Performed by: STUDENT IN AN ORGANIZED HEALTH CARE EDUCATION/TRAINING PROGRAM

## 2022-06-28 PROCEDURE — 93016 CV STRESS TEST SUPVJ ONLY: CPT | Performed by: INTERNAL MEDICINE

## 2022-06-28 PROCEDURE — 93350 STRESS TTE ONLY: CPT | Performed by: STUDENT IN AN ORGANIZED HEALTH CARE EDUCATION/TRAINING PROGRAM

## 2022-06-28 PROCEDURE — 93018 CV STRESS TEST I&R ONLY: CPT | Performed by: STUDENT IN AN ORGANIZED HEALTH CARE EDUCATION/TRAINING PROGRAM

## 2022-06-28 PROCEDURE — 93224 XTRNL ECG REC UP TO 48 HRS: CPT | Performed by: INTERNAL MEDICINE

## 2022-06-28 PROCEDURE — 93017 CV STRESS TEST TRACING ONLY: CPT | Performed by: STUDENT IN AN ORGANIZED HEALTH CARE EDUCATION/TRAINING PROGRAM

## 2022-06-28 PROCEDURE — 93325 DOPPLER ECHO COLOR FLOW MAPG: CPT | Performed by: STUDENT IN AN ORGANIZED HEALTH CARE EDUCATION/TRAINING PROGRAM

## 2022-06-28 RX ADMIN — Medication 5 ML: at 09:41

## 2022-06-28 NOTE — PATIENT INSTRUCTIONS
Patient has been prescribed a ZioPatch holter for 2 days.  Patient was instructed regarding the indication, function, care and prompt return of the ZioPatch holter monitor. The monitor, with S/N [id number],  was placed on the patient with instructions regarding care of the skin, electrodes, and monitor, as well as documentation in the patient diary. Patient demonstrated understanding of this information and [facility] questions or concerns.

## 2022-07-06 DIAGNOSIS — I10 HTN, GOAL BELOW 140/90: ICD-10-CM

## 2022-07-07 ENCOUNTER — TELEPHONE (OUTPATIENT)
Dept: FAMILY MEDICINE | Facility: CLINIC | Age: 62
End: 2022-07-07

## 2022-07-07 ENCOUNTER — OFFICE VISIT (OUTPATIENT)
Dept: FAMILY MEDICINE | Facility: CLINIC | Age: 62
End: 2022-07-07
Payer: COMMERCIAL

## 2022-07-07 VITALS
OXYGEN SATURATION: 98 % | BODY MASS INDEX: 32.88 KG/M2 | RESPIRATION RATE: 17 BRPM | HEIGHT: 69 IN | TEMPERATURE: 98 F | SYSTOLIC BLOOD PRESSURE: 132 MMHG | WEIGHT: 222 LBS | HEART RATE: 82 BPM | DIASTOLIC BLOOD PRESSURE: 88 MMHG

## 2022-07-07 DIAGNOSIS — Z82.49 FAMILY HISTORY OF ISCHEMIC HEART DISEASE: ICD-10-CM

## 2022-07-07 DIAGNOSIS — I10 HTN, GOAL BELOW 140/90: ICD-10-CM

## 2022-07-07 DIAGNOSIS — R00.2 PALPITATIONS: Primary | ICD-10-CM

## 2022-07-07 PROCEDURE — 99214 OFFICE O/P EST MOD 30 MIN: CPT | Performed by: FAMILY MEDICINE

## 2022-07-07 RX ORDER — ATENOLOL 25 MG/1
25 TABLET ORAL DAILY
Qty: 90 TABLET | Refills: 1 | Status: SHIPPED | OUTPATIENT
Start: 2022-07-07 | End: 2022-08-18

## 2022-07-07 ASSESSMENT — PAIN SCALES - GENERAL: PAINLEVEL: NO PAIN (0)

## 2022-07-07 NOTE — PROGRESS NOTES
"  Assessment & Plan     Palpitations  61-year-old male with history of palpitations, recent echo stress test showed some maximal heart rate, some symptoms, no evidence of ischemia.  He is aware of deconditioning, he plans to start reconditioning program later in the year.  We are still awaiting the Zio patch.  We discussed referral to cardiology, he has a significant family history of CAD in his older brother.  We will add atenolol for now, consider referral to cardiology if any continued or new symptoms.  He will follow-up with me in 2 weeks, sooner if any concerns.  Plan is to review the Zio patch at that time.  - atenolol (TENORMIN) 25 MG tablet; Take 1 tablet (25 mg) by mouth daily    HTN, goal below 140/90  Adding atenolol today.  - atenolol (TENORMIN) 25 MG tablet; Take 1 tablet (25 mg) by mouth daily    Family history of ischemic heart disease  Brother had stents placed last year.  Brother is 5 years older than patient.      Return in about 2 weeks (around 7/21/2022) for Follow up with PCP.    Bony Robin MD  [facility] HARSHA Arias is a 61 year old, presenting for the following health issues:  RECHECK (For test results)      History of Present Illness       Reason for visit:  Follow up with stress test    He eats 0-1 servings of fruits and vegetables daily.He consumes 0 sweetened beverage(s) daily.He exercises with enough effort to increase his heart rate 9 or less minutes per day.  He exercises with enough effort to increase his heart rate 3 or less days per week.   He is taking medications regularly.       Review of Systems   Constitutional, HEENT, cardiovascular, pulmonary, gi and gu systems are negative, except as otherwise noted.      Objective    /88 (BP Location: Left arm, Patient Position: Chair, Cuff Size: Adult Regular)   Pulse 82   Temp 98  F (36.7  C) (Temporal)   Resp 17   Ht 1.755 m (5' 9.09\")   Wt 100.7 kg (222 lb)   SpO2 98%   BMI 32.69 kg/m  "   Body mass index is 32.69 kg/m .  Physical Exam   GENERAL: healthy, alert and no distress  EYES: Eyes grossly normal to inspection, PERRL and conjunctivae and sclerae normal  HENT: ear canals and TM's normal, nose and mouth without ulcers or lesions  NECK: no adenopathy, no asymmetry, masses, or scars and thyroid normal to palpation  RESP: lungs clear to auscultation - no rales, rhonchi or wheezes  CV: regular rate and rhythm, normal S1 S2, no S3 or S4, no murmur, click or rub, no peripheral edema and peripheral pulses strong  MS: no gross musculoskeletal defects noted, no edema  NEURO: Normal strength and tone, mentation intact and speech normal  PSYCH: mentation appears normal, affect normal/bright                    .  ..

## 2022-07-07 NOTE — TELEPHONE ENCOUNTER
"Pending Prescriptions:                       Disp   Refills    lisinopril (ZESTRIL) 20 MG tablet [Pharmac*45 tab*0        Sig: Take 1/2 (one-half) tablet by mouth once daily    Routing refill request to provider for review/approval because:  A break in medication    Requested Prescriptions   Pending Prescriptions Disp Refills    lisinopril (ZESTRIL) 20 MG tablet [Pharmacy Med Name: Lisinopril 20 MG Oral Tablet] 45 tablet 0     Sig: Take 1/2 (one-half) tablet by mouth once daily        ACE Inhibitors (Including Combos) Protocol Failed - 7/6/2022  8:44 AM        Failed - Blood pressure under 140/90 in past 12 months       BP Readings from Last 3 Encounters:   07/07/22 132/88   05/06/22 (!) 140/92   04/11/22 122/84                 Passed - Recent (12 mo) or future (30 days) visit within the authorizing provider's specialty     Patient has had an office visit with the authorizing provider or a provider within the authorizing providers department within the previous 12 mos or has a future within next 30 days. See \"Patient Info\" tab in inbasket, or \"Choose Columns\" in Meds & Orders section of the refill encounter.              Passed - Medication is active on med list        Passed - Patient is age 18 or older        Passed - Normal serum creatinine on file in past 12 months     Recent Labs   Lab Test 02/08/22  1115   CR 0.77       Ok to refill medication if creatinine is low          Passed - Normal serum potassium on file in past 12 months     Recent Labs   Lab Test 02/08/22  1115   POTASSIUM 4.5                     "

## 2022-07-07 NOTE — TELEPHONE ENCOUNTER
Reason for Call:  Other prescription: MEDICATION QUESTION    Detailed comments: Patient stating he was seen today and was prescribed Atenolol. Patient is questioning if he is to continue on his Lisinopril 20 mg, takes half tablet (10 mg) daily?    Phone Number Patient can be reached at: Cell number on file:    Telephone Information:   Mobile [phone]       Best Time: anytime    Can we leave a detailed message on this number? YES    Call taken on 7/7/2022 at 5:21 PM by Ashleigh Mansfield LPN

## 2022-07-08 RX ORDER — LISINOPRIL 20 MG/1
TABLET ORAL
Qty: 45 TABLET | Refills: 0 | Status: SHIPPED | OUTPATIENT
Start: 2022-07-08 | End: 2022-10-11

## 2022-07-08 NOTE — TELEPHONE ENCOUNTER
Left detailed message with information below   Closing encounter  Rosette Brendan RT (R)       Yes, he should continue to take lisinopril 10 mg.  Monitor his blood pressure at home, follow-up if any lightheadedness or dizziness.     Bony Robin MD, FAAFP  Family Medicine Physician  [facility]  [address] [place] [address]

## 2022-07-14 DIAGNOSIS — R00.2 PALPITATIONS: Primary | ICD-10-CM

## 2022-07-14 DIAGNOSIS — I47.29 PAROXYSMAL VENTRICULAR TACHYCARDIA (H): ICD-10-CM

## 2022-07-14 NOTE — RESULT ENCOUNTER NOTE
Hugo,  Your recent monitor study showed an occasional fast heart rate called ventricular tachycardia.  I placed a consult to cardiology for their recommendations.  Please call and schedule that appointment at your earliest convenience.  Please let me know if you have any questions or concerns and follow up as discussed in clinic.    Sincerely.  Dr. JEYSON Robin MD, FAAFP  Family Medicine Physician  [facility]- [address] [address]

## 2022-07-21 ENCOUNTER — OFFICE VISIT (OUTPATIENT)
Dept: CARDIOLOGY | Facility: CLINIC | Age: 62
End: 2022-07-21

## 2022-07-21 VITALS
BODY MASS INDEX: 32.66 KG/M2 | WEIGHT: 220.5 LBS | HEIGHT: 69 IN | DIASTOLIC BLOOD PRESSURE: 62 MMHG | SYSTOLIC BLOOD PRESSURE: 116 MMHG | OXYGEN SATURATION: 96 % | HEART RATE: 70 BPM

## 2022-07-21 DIAGNOSIS — R00.2 PALPITATIONS: ICD-10-CM

## 2022-07-21 DIAGNOSIS — I47.29 PAROXYSMAL VENTRICULAR TACHYCARDIA (H): ICD-10-CM

## 2022-07-21 PROCEDURE — 99203 OFFICE O/P NEW LOW 30 MIN: CPT | Performed by: INTERNAL MEDICINE

## 2022-07-21 NOTE — PROGRESS NOTES
Service Date: 07/21/2022    Thank you for allowing me to participate in the care of your delightful patient.  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  Recently, he has been having more palpitations, for which he describes a skip mostly at rest.  When he is up and about, he does not notice as much.  He subsequently was recommended to wear a 48-hour Zio Patch monitor showing that the burden is about 11% of the time with frequent nonsustained VT episodes with the longest one lasting about 5 beats long of similar morphology.  He is known to have normal cardiac structure and function and a negative stress test.  The patient does have a family history of premature coronary disease in his brothers and his dad in their 60s.    Hugo stated ever since starting on low-dose of atenolol, he has more energy and feels less palpitations.    We discussed at length about the cause of his PVCs, which appear to be idiopathic, given the location of it, likely being outflow tract, and it is unlikely related to any coronary disease.  I am glad that Hugo is feeling better by taking atenolol and I would like to have him wear a 24-hour Holter to assess for PVC burden.  I also asked Hugo to cut down his coffee intake as he does drink almost a liter a day.  If the monitor comes back showing that the PVC burden is much better, less than 10% of the time, then the risks of PVC-induced cardiomyopathy is quite low.  We will continue with atenolol.  I will consider an EP study and ablation if medical therapy is ineffective in the future.  If Hugo continues doing quite well, I will have him come back to see one of our advanced practice providers in a year's time.    Haider Mendiola MD        D: 07/21/2022   T: 07/21/2022   MT: al    Name:     HUGO ASHBY  MRN:      [id number]        Account:       [id number]   :      1960           Service Date: 2022       Document: [id number]

## 2022-07-21 NOTE — LETTER
"7/21/2022    Physician No Ref-Primary  No address on file    RE: Hugo JEYSON Benítezeileengiselle       Dear Colleague,     I had the pleasure of seeing Hugo BENÍTEZ Jeysoneileengiselle in the [facility].  HPI and Plan:   See dictation   [id number]  Today's clinic visit entailed:  The following tests were independently interpreted by me as noted in my documentation: ecg, zio  15 minutes spent on the date of the encounter doing chart review   Provider  Link to [facility] Help Grid     The level of medical decision making during this visit was of moderate complexity.      No orders of the defined types were placed in this encounter.      Orders Placed This Encounter   Medications     Bioflavonoid Products (VITAMIN C) CHEW       There are no discontinued medications.      Encounter Diagnoses   Name Primary?     Palpitations      Paroxysmal ventricular tachycardia (H)        CURRENT MEDICATIONS:  Current Outpatient Medications   Medication Sig Dispense Refill     atenolol (TENORMIN) 25 MG tablet Take 1 tablet (25 mg) by mouth daily 90 tablet 1     atorvastatin (LIPITOR) 20 MG tablet Take 1 tablet by mouth once daily 90 tablet 0     Bioflavonoid Products (VITAMIN C) CHEW        lisinopril (ZESTRIL) 20 MG tablet Take 1/2 (one-half) tablet by mouth once daily 45 tablet 0     Multiple Vitamin (MULTIVITAMINS PO) Take  by mouth.       omeprazole (PRILOSEC) 40 MG DR capsule Take 1 capsule by mouth once daily 90 capsule 3     diclofenac (VOLTAREN) 1 % topical gel Apply 2 g topically 4 times daily (Patient not taking: Reported on 7/7/2022) 50 g 1     fluticasone (FLONASE) 50 MCG/ACT nasal spray Spray 1 spray into both nostrils daily (Patient not taking: Reported on 7/7/2022) 18.2 mL 1       ALLERGIES     Allergies   Allergen Reactions     Hydrocodone Other (See Comments)     \"climbed the walls, very anxious, insomnia\"     Nsaids Other (See Comments)     Hx of stomach ulcers       PAST MEDICAL HISTORY:  Past Medical History:   Diagnosis Date     " Abnormalities of size and form of teeth     Removal of wisdom teeth     Accidental poisoning by agents primarily affecting blood constituents(E858.2)     Overnight stay due to blood poisoning     Arthritis 2011     Cancer (H) 2/15/2022    Skin Cancer on Face     Gastric ulcer, unspecified as acute or chronic, without mention of hemorrhage or perforation      History of blood transfusion 2007     Hypertension        PAST SURGICAL HISTORY:  Past Surgical History:   Procedure Laterality Date     ABDOMEN SURGERY       ARTHROPLASTY KNEE Right 12/10/2019    Procedure: Right knee replacement;  Surgeon: Javier Yin DO;  Location: [facility]     ARTHROSCOPY KNEE  05/29/2013    Procedure: ARTHROSCOPY KNEE;  Right knee arthroscopy with partial medial and partial lateral menisectomies;  Surgeon: Phani Mclaughlin MD;  Location: [facility]     ARTHROSCOPY SHOULDER BICEPS TENODESIS REPAIR Right 01/13/2017    Procedure: ARTHROSCOPY SHOULDER BICEPS TENODESIS REPAIR;  Surgeon: Javier Yin DO;  [facility]     ARTHROSCOPY SHOULDER DECOMPRESSION Right 01/13/2017    Procedure: ARTHROSCOPY SHOULDER DECOMPRESSION;  Surgeon: Javier Yin DO;  [facility]     ARTHROSCOPY SHOULDER ROTATOR CUFF REPAIR Right 01/13/2017    Procedure: ARTHROSCOPY SHOULDER ROTATOR CUFF REPAIR;  Surgeon: Javier Yin DO;  [facility]: [facility]     BIOPSY       COLONOSCOPY  11/01/2007     COLONOSCOPY  12/12/2011     COLONOSCOPY N/A 11/18/2015    Procedure: COLONOSCOPY;  Surgeon: Migue Mclaughlin MD;  [facility]     COLONOSCOPY N/A 03/22/2017    Procedure: COMBINED COLONOSCOPY, SINGLE OR MULTIPLE BIOPSY/POLYPECTOMY BY BIOPSY;  Surgeon: Salvatore Zepeda MD;  [facility]     COLONOSCOPY N/A 03/05/2019    Procedure: COLONOSCOPY;  Surgeon: Prakash Ervin DO;  [facility]     COLONOSCOPY WITH CO2 INSUFFLATION N/A 01/12/2021    Procedure: COLONOSCOPY, WITH CO2 INSUFFLATION;  Surgeon:  Juan Antonio Corcoran MD;  Location: [facility]     COMBINED ESOPHAGOSCOPY, GASTROSCOPY, DUODENOSCOPY (EGD) WITH CO2 INSUFFLATION N/A 03/11/2021    Procedure: ESOPHAGOGASTRODUODENOSCOPY, WITH CO2 INSUFFLATION;  Surgeon: Sara Calix DO;  Location: [facility]     ENDOSCOPY  01/27/2012    [facility] GI - [facility]     ESOPHAGOSCOPY, GASTROSCOPY, DUODENOSCOPY (EGD), COMBINED N/A 11/18/2015    Procedure: COMBINED ESOPHAGOSCOPY, GASTROSCOPY, DUODENOSCOPY (EGD), BIOPSY SINGLE OR MULTIPLE;  Surgeon: Migue Mclaughlin MD;  [facility]     ESOPHAGOSCOPY, GASTROSCOPY, DUODENOSCOPY (EGD), COMBINED N/A 03/22/2017    Procedure: COMBINED ESOPHAGOSCOPY, GASTROSCOPY, DUODENOSCOPY (EGD), BIOPSY SINGLE OR MULTIPLE;  Surgeon: Salvatore Zepeda MD;  [facility]     ESOPHAGOSCOPY, GASTROSCOPY, DUODENOSCOPY (EGD), COMBINED N/A 03/11/2021    Procedure: Esophagogastroduodenoscopy, With Biopsy;  Surgeon: Sara Calix DO;  Location: [facility]     HERNIORRHAPHY UMBILICAL N/A 03/12/2015    Procedure: HERNIORRHAPHY UMBILICAL;  Surgeon: Yared Ma MD;  [facility]     IRRIGATION AND DEBRIDEMENT UPPER EXTREMITY, COMBINED Left 03/15/2016    Procedure: COMBINED IRRIGATION AND DEBRIDEMENT UPPER EXTREMITY;  Surgeon: aJvier Yin DO;  Location: [facility]     REMOVAL OF SPERM DUCT(S)      Vasectomy     [facility] ENDOSCOPY, SIMPLE EXAM  10/31/2007       FAMILY HISTORY:  Family History   Problem Relation Age of Onset     Cancer Mother         ovarian cancer     Asthma Mother      Hypertension Mother      Arthritis Mother      Genitourinary Problems Mother      Lipids Mother      Hyperlipidemia Mother      Other Cancer Mother      Cancer Sister         ovarian cancer     Hypertension Sister      Lipids Sister      Hyperlipidemia Sister      Other Cancer Sister      Anxiety Disorder Sister      Cancer Maternal Grandmother         stomach cancer     Asthma Father      Cardiovascular Father         heart attack;  bypass x5, congenital heart disease     Heart Disease Father         heart attack; bypass x5, congenital heart disease     Diabetes Father      Hypertension Father      Cancer Father         prostate     Prostate Cancer Father      Circulatory Father         blood clots     Genitourinary Problems Father      Respiratory Father         emphysema; COPD     Hyperlipidemia Father      Other Cancer Father      Diabetes Maternal Grandfather      Diabetes Paternal Grandfather      Eye Disorder Paternal Grandfather         glaucoma     Hypertension Brother      Lipids Brother      Hyperlipidemia Brother      Hypertension Brother      Cardiovascular Brother         bypass x3     Heart Disease Brother         bypass x3     Lipids Brother      Hyperlipidemia Brother      Hypertension Sister      C.A.D. No family hx of      Cerebrovascular Disease No family hx of      Breast Cancer No family hx of      Cancer - colorectal No family hx of      Alzheimer Disease No family hx of      Blood Disease No family hx of      Gastrointestinal Disease No family hx of      Musculoskeletal Disorder No family hx of      Neurologic Disorder No family hx of      Thyroid Disease No family hx of        SOCIAL HISTORY:  Social History     Socioeconomic History     Marital status:    Occupational History     Employer: [employer]     Comment: [employer]   Tobacco Use     Smoking status: Former Smoker     Packs/day: 1.50     Years: 10.00     Pack years: 15.00     Types: Cigarettes     Start date: 1982     Quit date: 6/15/1992     Years since quittin.1     Smokeless tobacco: Never Used   Vaping Use     Vaping Use: Never used   Substance and Sexual Activity     Alcohol use: Yes     Comment: occ      Drug use: No     Sexual activity: Yes     Partners: Female     Birth control/protection: Surgical     Comment: vasectomy       Review of Systems:  Skin:  Negative       Eyes:  Positive for   eye feel heavy and weird feeling  "head  ENT:  Negative      Respiratory:  Positive for dyspnea on exertion     Cardiovascular:  Negative for;chest pain Positive for;palpitations;lightheadedness;dizziness    Gastroenterology: Negative      Genitourinary:  Negative      Musculoskeletal:  Negative      Neurologic:  Positive for headaches    Psychiatric:  Negative      Heme/Lymph/Imm:  Positive for allergies    Endocrine:  Negative        Physical Exam:  Vitals: /62 (BP Location: Right arm, Patient Position: Sitting, Cuff Size: Adult Large)   Pulse 70   Ht 1.755 m (5' 9.09\")   Wt 100 kg (220 lb 8 oz)   SpO2 96%   BMI 32.48 kg/m      Constitutional:  cooperative, alert and oriented, well developed, well nourished, in no acute distress        Skin:  warm and dry to the touch, no apparent skin lesions or masses noted          Head:  normocephalic, no masses or lesions        Eyes:  pupils equal and round, conjunctivae and lids unremarkable, sclera white, no xanthalasma, EOMS intact, no nystagmus        Lymph:No Cervical lymphadenopathy present     ENT:  no pallor or cyanosis        Neck:  carotid pulses are full and equal bilaterally, JVP normal, no carotid bruit        Respiratory:  normal breath sounds, clear to auscultation, normal A-P diameter, normal symmetry, normal respiratory excursion, no use of accessory muscles         Cardiac: regular rhythm, normal S1/S2, no S3 or S4, apical impulse not displaced, no murmurs, gallops or rubs                pulses full and equal, no bruits auscultated                                        GI:  abdomen soft, non-tender, BS normoactive, no mass, no HSM, no bruits obese      Extremities and Muscular Skeletal:  no deformities, clubbing, cyanosis, erythema observed              Neurological:  no gross motor deficits        Psych:  Alert and Oriented x 3        CC  Bony Robin MD  [address] [facility]  [place] [id number]                Service Date: 07/21/2022    Thank you for allowing me to " participate in the care of your delightful patient.  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  Recently, he has been having more palpitations, for which he describes a skip mostly at rest.  When he is up and about, he does not notice as much.  He subsequently was recommended to wear a 48-hour Zio Patch monitor showing that the burden is about 11% of the time with frequent nonsustained VT episodes with the longest one lasting about 5 beats long of similar morphology.  He is known to have normal cardiac structure and function and a negative stress test.  The patient does have a family history of premature coronary disease in his brothers and his dad in their 60s.    Hugo stated ever since starting on low-dose of atenolol, he has more energy and feels less palpitations.    We discussed at length about the cause of his PVCs, which appear to be idiopathic, given the location of it, likely being outflow tract, and it is unlikely related to any coronary disease.  I am glad that Hugo is feeling better by taking atenolol and I would like to have him wear a 24-hour Holter to assess for PVC burden.  I also asked Hugo to cut down his coffee intake as he does drink almost a liter a day.  If the monitor comes back showing that the PVC burden is much better, less than 10% of the time, then the risks of PVC-induced cardiomyopathy is quite low.  We will continue with atenolol.  I will consider an EP study and ablation if medical therapy is ineffective in the future.  If Hugo continues doing quite well, I will have him come back to see one of our advanced practice providers in a year's time.    Haider Mendiola MD        D: 2022   T: 2022   MT: al    Name:     HUGO ASHBY  MRN:      [id number]        Account:      [id number]   :      1960           Service  Date: 07/21/2022       Document: [id number]      Thank you for allowing me to participate in the care of your patient.      Sincerely,     Haider Gay MD     [facility]  cc:   Bony Robin MD  [address] [facility]  [place] [address]

## 2022-07-21 NOTE — PROGRESS NOTES
"HPI and Plan:   See dictation   [id number]  Today's clinic visit entailed:  The following tests were independently interpreted by me as noted in my documentation: ecg, july  15 minutes spent on the date of the encounter doing chart review   Provider  Link to MDM Help Grid     The level of medical decision making during this visit was of moderate complexity.      No orders of the defined types were placed in this encounter.      Orders Placed This Encounter   Medications     Bioflavonoid Products (VITAMIN C) CHEW       There are no discontinued medications.      Encounter Diagnoses   Name Primary?     Palpitations      Paroxysmal ventricular tachycardia (H)        CURRENT MEDICATIONS:  Current Outpatient Medications   Medication Sig Dispense Refill     atenolol (TENORMIN) 25 MG tablet Take 1 tablet (25 mg) by mouth daily 90 tablet 1     atorvastatin (LIPITOR) 20 MG tablet Take 1 tablet by mouth once daily 90 tablet 0     Bioflavonoid Products (VITAMIN C) CHEW        lisinopril (ZESTRIL) 20 MG tablet Take 1/2 (one-half) tablet by mouth once daily 45 tablet 0     Multiple Vitamin (MULTIVITAMINS PO) Take  by mouth.       omeprazole (PRILOSEC) 40 MG DR capsule Take 1 capsule by mouth once daily 90 capsule 3     diclofenac (VOLTAREN) 1 % topical gel Apply 2 g topically 4 times daily (Patient not taking: Reported on 7/7/2022) 50 g 1     fluticasone (FLONASE) 50 MCG/ACT nasal spray Spray 1 spray into both nostrils daily (Patient not taking: Reported on 7/7/2022) 18.2 mL 1       ALLERGIES     Allergies   Allergen Reactions     Hydrocodone Other (See Comments)     \"climbed the walls, very anxious, insomnia\"     Nsaids Other (See Comments)     Hx of stomach ulcers       PAST MEDICAL HISTORY:  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)     Overnight stay due to blood poisoning     " Arthritis 2011     Cancer (H) 2/15/2022    Skin Cancer on Face     Gastric ulcer, unspecified as acute or chronic, without mention of hemorrhage or perforation      History of blood transfusion 2007     Hypertension        PAST SURGICAL HISTORY:  Past Surgical History:   Procedure Laterality Date     ABDOMEN SURGERY       ARTHROPLASTY KNEE Right 12/10/2019    Procedure: Right knee replacement;  Surgeon: Javier Yin DO;  [facility]     ARTHROSCOPY KNEE  05/29/2013    Procedure: ARTHROSCOPY KNEE;  Right knee arthroscopy with partial medial and partial lateral menisectomies;  Surgeon: Phani Mclaughlin MD;  Location[facility]     ARTHROSCOPY SHOULDER BICEPS TENODESIS REPAIR Right 01/13/2017    Procedure: ARTHROSCOPY SHOULDER BICEPS TENODESIS REPAIR;  Surgeon: Javier Yin DO;  [facility]     ARTHROSCOPY SHOULDER DECOMPRESSION Right 01/13/2017    Procedure: ARTHROSCOPY SHOULDER DECOMPRESSION;  Surgeon: Javier Yin DO;  [facility]     ARTHROSCOPY SHOULDER ROTATOR CUFF REPAIR Right 01/13/2017    Procedure: ARTHROSCOPY SHOULDER ROTATOR CUFF REPAIR;  Surgeon: Javier Yin DO;  [facility]: [facility]     BIOPSY       COLONOSCOPY  11/01/2007     COLONOSCOPY  12/12/2011     COLONOSCOPY N/A 11/18/2015    Procedure: COLONOSCOPY;  Surgeon: Migue Mclaughlin MD;  [facility]     COLONOSCOPY N/A 03/22/2017    Procedure: COMBINED COLONOSCOPY, SINGLE OR MULTIPLE BIOPSY/POLYPECTOMY BY BIOPSY;  Surgeon: Salvatore Zepeda MD;  [facility]     COLONOSCOPY N/A 03/05/2019    Procedure: COLONOSCOPY;  Surgeon: Prakash Ervin DO;  [facility]     COLONOSCOPY WITH CO2 INSUFFLATION N/A 01/12/2021    Procedure: COLONOSCOPY, WITH CO2 INSUFFLATION;  Surgeon: Juan Antonio Corcoran MD;  [facility]     COMBINED ESOPHAGOSCOPY, GASTROSCOPY, DUODENOSCOPY (EGD) WITH CO2 INSUFFLATION N/A 03/11/2021    Procedure: ESOPHAGOGASTRODUODENOSCOPY, WITH CO2  INSUFFLATION;  Surgeon: Sara Calix DO;  Location: [facility]     ENDOSCOPY  01/27/2012    [facility] - [facility]     ESOPHAGOSCOPY, GASTROSCOPY, DUODENOSCOPY (EGD), COMBINED N/A 11/18/2015    Procedure: COMBINED ESOPHAGOSCOPY, GASTROSCOPY, DUODENOSCOPY (EGD), BIOPSY SINGLE OR MULTIPLE;  Surgeon: Migue Mclaughlin MD;  [facility]     ESOPHAGOSCOPY, GASTROSCOPY, DUODENOSCOPY (EGD), COMBINED N/A 03/22/2017    Procedure: COMBINED ESOPHAGOSCOPY, GASTROSCOPY, DUODENOSCOPY (EGD), BIOPSY SINGLE OR MULTIPLE;  Surgeon: Salvatore Zepeda MD;  [facility]     ESOPHAGOSCOPY, GASTROSCOPY, DUODENOSCOPY (EGD), COMBINED N/A 03/11/2021    Procedure: Esophagogastroduodenoscopy, With Biopsy;  Surgeon: Sara Calix DO;  [facility]     HERNIORRHAPHY UMBILICAL N/A 03/12/2015    Procedure: HERNIORRHAPHY UMBILICAL;  Surgeon: Yared Ma MD;  [facility]     IRRIGATION AND DEBRIDEMENT UPPER EXTREMITY, COMBINED Left 03/15/2016    Procedure: COMBINED IRRIGATION AND DEBRIDEMENT UPPER EXTREMITY;  Surgeon: Javier Yin DO;  Location: [facility]     REMOVAL OF SPERM DUCT(S)      Vasectomy     [facility] ENDOSCOPY, SIMPLE EXAM  10/31/2007       FAMILY HISTORY:  Family History   Problem Relation Age of Onset     Cancer Mother         ovarian cancer     Asthma Mother      Hypertension Mother      Arthritis Mother      Genitourinary Problems Mother      Lipids Mother      Hyperlipidemia Mother      Other Cancer Mother      Cancer Sister         ovarian cancer     Hypertension Sister      Lipids Sister      Hyperlipidemia Sister      Other Cancer Sister      Anxiety Disorder Sister      Cancer Maternal Grandmother         stomach cancer     Asthma Father      Cardiovascular Father         heart attack; bypass x5, congenital heart disease     Heart Disease Father         heart attack; bypass x5, congenital heart disease     Diabetes Father      Hypertension Father      Cancer Father          prostate     Prostate Cancer Father      Circulatory Father         blood clots     Genitourinary Problems Father      Respiratory Father         emphysema; COPD     Hyperlipidemia Father      Other Cancer Father      Diabetes Maternal Grandfather      Diabetes Paternal Grandfather      Eye Disorder Paternal Grandfather         glaucoma     Hypertension Brother      Lipids Brother      Hyperlipidemia Brother      Hypertension Brother      Cardiovascular Brother         bypass x3     Heart Disease Brother         bypass x3     Lipids Brother      Hyperlipidemia Brother      Hypertension Sister      C.A.D. No family hx of      Cerebrovascular Disease No family hx of      Breast Cancer No family hx of      Cancer - colorectal No family hx of      Alzheimer Disease No family hx of      Blood Disease No family hx of      Gastrointestinal Disease No family hx of      Musculoskeletal Disorder No family hx of      Neurologic Disorder No family hx of      Thyroid Disease No family hx of        SOCIAL HISTORY:  Social History     Socioeconomic History     Marital status:    Occupational History     Employer: [employer]     Comment: [employer]   Tobacco Use     Smoking status: Former Smoker     Packs/day: 1.50     Years: 10.00     Pack years: 15.00     Types: Cigarettes     Start date: 1982     Quit date: 6/15/1992     Years since quittin.1     Smokeless tobacco: Never Used   Vaping Use     Vaping Use: Never used   Substance and Sexual Activity     Alcohol use: Yes     Comment: occ      Drug use: No     Sexual activity: Yes     Partners: Female     Birth control/protection: Surgical     Comment: vasectomy       Review of Systems:  Skin:  Negative       Eyes:  Positive for   eye feel heavy and weird feeling head  ENT:  Negative      Respiratory:  Positive for dyspnea on exertion     Cardiovascular:  Negative for;chest pain Positive for;palpitations;lightheadedness;dizziness    Gastroenterology:  "Negative      Genitourinary:  Negative      Musculoskeletal:  Negative      Neurologic:  Positive for headaches    Psychiatric:  Negative      Heme/Lymph/Imm:  Positive for allergies    Endocrine:  Negative        Physical Exam:  Vitals: /62 (BP Location: Right arm, Patient Position: Sitting, Cuff Size: Adult Large)   Pulse 70   Ht 1.755 m (5' 9.09\")   Wt 100 kg (220 lb 8 oz)   SpO2 96%   BMI 32.48 kg/m      Constitutional:  cooperative, alert and oriented, well developed, well nourished, in no acute distress        Skin:  warm and dry to the touch, no apparent skin lesions or masses noted          Head:  normocephalic, no masses or lesions        Eyes:  pupils equal and round, conjunctivae and lids unremarkable, sclera white, no xanthalasma, EOMS intact, no nystagmus        Lymph:No Cervical lymphadenopathy present     ENT:  no pallor or cyanosis        Neck:  carotid pulses are full and equal bilaterally, JVP normal, no carotid bruit        Respiratory:  normal breath sounds, clear to auscultation, normal A-P diameter, normal symmetry, normal respiratory excursion, no use of accessory muscles         Cardiac: regular rhythm, normal S1/S2, no S3 or S4, apical impulse not displaced, no murmurs, gallops or rubs                pulses full and equal, no bruits auscultated                                        GI:  abdomen soft, non-tender, BS normoactive, no mass, no HSM, no bruits obese      Extremities and Muscular Skeletal:  no deformities, clubbing, cyanosis, erythema observed              Neurological:  no gross motor deficits        Psych:  Alert and Oriented x 3        CC  Bony Robin MD  [id number] [facility]  [place] [id number]              "

## 2022-07-27 ENCOUNTER — HOSPITAL ENCOUNTER (OUTPATIENT)
Dept: CARDIOLOGY | Facility: CLINIC | Age: 62
Discharge: HOME | End: 2022-07-27
Attending: INTERNAL MEDICINE | Admitting: INTERNAL MEDICINE
Payer: COMMERCIAL

## 2022-07-27 DIAGNOSIS — R00.2 PALPITATIONS: ICD-10-CM

## 2022-07-27 DIAGNOSIS — I47.29 PAROXYSMAL VENTRICULAR TACHYCARDIA (H): ICD-10-CM

## 2022-07-27 PROCEDURE — 93227 XTRNL ECG REC<48 HR R&I: CPT | Performed by: INTERNAL MEDICINE

## 2022-07-27 PROCEDURE — 93226 XTRNL ECG REC<48 HR SCAN A/R: CPT

## 2022-08-01 NOTE — PROGRESS NOTES
[facility] Dermatology Note  Encounter Date: Aug 3, 2022  Office Visit     Dermatology Problem List:  Last skin check 1/26/22  1. 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 Has a home in [place].  Family history: Mother, father and cousins with unknown skin cancer.    ____________________________________________    Assessment & Plan:    1. History of nonmelanoma skin cancer, no clincial evidence of recurrence.   - ABCDEs: Counseled ABCDEs of melanoma: Asymmetry, Border (irregularity), Color (not uniform, changes in color), Diameter (greater than 6 mm which is about the size of a pencil eraser), and Evolving (any changes in preexisting moles).  - Sun protection: Counseled SPF30+ sunscreen, UPF clothing, sun avoidance, tanning bed avoidance.     2. Folliculitis - chest and abdomen. Recommended a benzoyl peroxide wash daily. No interventions started at this time.    3. Multiple clinically banal-appearing melanocytic nevi: Chronic, stable  - No further intervention required. Patient to report changes.   - Patient reassured of the benign nature of these lesions.    4. Benign findings including: seborrheic keratoses, epidermoid cyst, and cherry angioma: minor, self limited problems.  - No further intervention required. Patient to report changes.   - Patient reassured of the benign nature of these lesions.      Procedures Performed:   None.    Follow-up: 6 month(s) in-person, or earlier for new or changing lesions    Staff and Scribe:     Scribe Disclosure:   I, Brett Rubio, am serving as a scribe to document services personally performed by this physician, Dr. Lonnie Rascon, based on data collection and the provider's statements to me.       Provider Disclosure:   The documentation recorded by the scribe accurately reflects the services I  personally performed and the decisions made by me.    Lonnie Rascon MD   of Dermatology  Department of Dermatology  [facility]      ____________________________________________    CC: Skin Check (Full body skin check. Patient states concerns for itchy back. Personal history of NMSC.)    HPI:  Mr. Hugo Longoria is a(n) 61 year old male who presents today as a return patient for a skin check.    Last seen 2/16/22 for a wound check with Dr. Vides.    Today, he has concerns about itchiness on his back. He says that he develops little bumps, and there was one in particular that he has had for years. His wife occasionally pops it.     Patient is otherwise feeling well, without additional skin concerns.    Labs Reviewed:  N/A    Physical Exam:  Vitals: There were no vitals taken for this visit.  SKIN: Total skin excluding the undergarment areas was performed. The exam included the head/face, neck, both arms, chest, back, abdomen, both legs, digits and/or nails.   - Well healed scar at site of previous NMSC.  - Multiple erythematous folliculocentric pustule on the abdomen and thighs.  - There are dome shaped bright red papules on the trunk and extremities.   - There is a raised dome shaped 1 cm nodule with a central punctum located on lower back.  - Multiple regular brown pigmented macules and papules are identified on the trunk and extremities.   - Scattered brown macules on sun exposed areas.  - There are waxy stuck on tan to brown papules on the trunk and extremities.   - No other lesions of concern on areas examined.     Medications:  Current Outpatient Medications   Medication     atenolol (TENORMIN) 25 MG tablet     atorvastatin (LIPITOR) 20 MG tablet     Bioflavonoid Products (VITAMIN C) CHEW     diclofenac (VOLTAREN) 1 % topical gel     fluticasone (FLONASE) 50 MCG/ACT nasal spray     lisinopril (ZESTRIL) 20 MG tablet     Multiple Vitamin (MULTIVITAMINS PO)      omeprazole (PRILOSEC) 40 MG DR capsule     Current Facility-Administered Medications   Medication     botulinum toxin type A (BOTOX) 100 units injection 100 Units      Past Medical History:   Patient Active Problem List   Diagnosis     History of colonic polyps     KERRY (generalized anxiety disorder)     CARDIOVASCULAR SCREENING; LDL GOAL LESS THAN 130     History of tobacco use: 1980 - 1990     Hemorrhoids     Erectile dysfunction     HTN, goal below 140/90     Fatigue     ACL tear     History of gastric ulcer     Chronic gastric ulcer     Seasonal allergic rhinitis     Complete tear of right rotator cuff     Rupture long head biceps tendon, right, initial encounter     Subacromial impingement of right shoulder     Advanced directives, counseling/discussion     Primary osteoarthritis of right knee     Chronic right shoulder pain     S/P total knee replacement using cement, right     Morbid obesity (H)     Chronic rhinitis     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)     Overnight stay due to blood poisoning     Arthritis 2011     Cancer (H) 2/15/2022    Skin Cancer on Face     Gastric ulcer, unspecified as acute or chronic, without mention of hemorrhage or perforation      History of blood transfusion 2007     Hypertension         CC Referred Self, MD  No address on file on close of this encounter.

## 2022-08-03 ENCOUNTER — OFFICE VISIT (OUTPATIENT)
Dept: DERMATOLOGY | Facility: CLINIC | Age: 62
End: 2022-08-03
Payer: COMMERCIAL

## 2022-08-03 VITALS — HEART RATE: 92 BPM | SYSTOLIC BLOOD PRESSURE: 137 MMHG | TEMPERATURE: 98.4 F | DIASTOLIC BLOOD PRESSURE: 79 MMHG

## 2022-08-03 DIAGNOSIS — L73.9 FOLLICULITIS: ICD-10-CM

## 2022-08-03 DIAGNOSIS — L82.1 SEBORRHEIC KERATOSES: ICD-10-CM

## 2022-08-03 DIAGNOSIS — Z85.828 HISTORY OF NONMELANOMA SKIN CANCER: Primary | ICD-10-CM

## 2022-08-03 DIAGNOSIS — D22.9 MULTIPLE BENIGN NEVI: ICD-10-CM

## 2022-08-03 DIAGNOSIS — L81.4 SOLAR LENTIGO: ICD-10-CM

## 2022-08-03 DIAGNOSIS — D18.01 CHERRY ANGIOMA: ICD-10-CM

## 2022-08-03 DIAGNOSIS — L72.0 EPIDERMOID CYST: ICD-10-CM

## 2022-08-03 PROCEDURE — 99213 OFFICE O/P EST LOW 20 MIN: CPT | Performed by: DERMATOLOGY

## 2022-08-03 NOTE — NURSING NOTE
Hugo Longoria's goals for this visit include:   Chief Complaint   Patient presents with     Skin Check     Full body skin check. Patient states concerns for itchy back. Personal history of NMSC.       He requests these members of his care team be copied on today's visit information:     PCP: Bony Robin    Referring Provider:  Referred Self, MD  No address on file    /79 (BP Location: Left arm, Patient Position: Sitting, Cuff Size: Adult Regular)   Pulse 92   Temp 98.4  F (36.9  C) (Oral)     Do you need any medication refills at today's visit? No  Mary De CMA

## 2022-08-12 ENCOUNTER — TELEPHONE (OUTPATIENT)
Dept: FAMILY MEDICINE | Facility: CLINIC | Age: 62
End: 2022-08-12

## 2022-08-12 DIAGNOSIS — R00.2 PALPITATIONS: ICD-10-CM

## 2022-08-12 DIAGNOSIS — I10 HTN, GOAL BELOW 140/90: ICD-10-CM

## 2022-08-12 NOTE — TELEPHONE ENCOUNTER
Test Results    Contacts       Type Contact Phone/Fax    08/12/2022 04:18 PM CDT Phone (Incoming) Hugo Longoria (Self) [phone] (H)          Who ordered the test:  Dr. Mendiola, cardiology    Type of test: Holter Monitor    Date of test:  7/27/2022    Where was the test performed:  [facility]    What are your questions/concerns?:  Patient has not heard anything on his results and stating that Dr. Mendiola was going to call him. No result in his mychart either    [facility] or would you prefer to receive a phone call?:   Patient would prefer a phone call   Okay to leave a detailed message?: NA

## 2022-08-15 NOTE — TELEPHONE ENCOUNTER
Hi, please inform pt that pvc burden remains unchanged at 10%.  We can increase current Atenolol to bid if he continues to feel the pvcs. If much better on current atenolol ok to monitor for now and repeat holter in a year. qp

## 2022-08-15 NOTE — TELEPHONE ENCOUNTER
Unable to reach patient. Left detailed message with Dr. Mendiola recommendations as noted below. Asked patient to return call if he is going to increase his atenolol 25mg to twice daily so we can send in a new script.

## 2022-08-18 ENCOUNTER — ALLIED HEALTH/NURSE VISIT (OUTPATIENT)
Dept: FAMILY MEDICINE | Facility: CLINIC | Age: 62
End: 2022-08-18
Payer: COMMERCIAL

## 2022-08-18 DIAGNOSIS — Z23 NEED FOR VACCINATION: Primary | ICD-10-CM

## 2022-08-18 PROCEDURE — 90471 IMMUNIZATION ADMIN: CPT

## 2022-08-18 PROCEDURE — 99207 PR NO CHARGE NURSE ONLY: CPT

## 2022-08-18 PROCEDURE — 90750 HZV VACC RECOMBINANT IM: CPT

## 2022-08-18 RX ORDER — ATENOLOL 25 MG/1
25 TABLET ORAL 2 TIMES DAILY
Qty: 180 TABLET | Refills: 1 | Status: SHIPPED | OUTPATIENT
Start: 2022-08-18 | End: 2023-01-24

## 2022-08-18 NOTE — PROGRESS NOTES
Prior to immunization administration, verified patients identity using patient s name and date of birth. Please see Immunization Activity for additional information.     Screening Questionnaire for Adult Immunization    Are you sick today?   No   Do you have allergies to medications, food, a vaccine component or latex?   No   Have you ever had a serious reaction after receiving a vaccination?   No   Do you have a long-term health problem with heart, lung, kidney, or metabolic disease (e.g., diabetes), asthma, a blood disorder, no spleen, complement component deficiency, a cochlear implant, or a spinal fluid leak?  Are you on long-term aspirin therapy?   No   Do you have cancer, leukemia, HIV/AIDS, or any other immune system problem?   No   Do you have a parent, brother, or sister with an immune system problem?   No   In the past 3 months, have you taken medications that affect  your immune system, such as prednisone, other steroids, or anticancer drugs; drugs for the treatment of rheumatoid arthritis, Crohn s disease, or psoriasis; or have you had radiation treatments?   No   Have you had a seizure, or a brain or other nervous system problem?   No   During the past year, have you received a transfusion of blood or blood    products, or been given immune (gamma) globulin or antiviral drug?   No   For women: Are you pregnant or is there a chance you could become       pregnant during the next month?   No   Have you received any vaccinations in the past 4 weeks?   No     Immunization questionnaire answers were all negative.           Screening performed by Adilia Bueno MA on 8/18/2022 at 2:11 PM.

## 2022-08-18 NOTE — TELEPHONE ENCOUNTER
Pt was in clinic for an immunization today and inquired about his holter results.   Pt given instructions per Dr. Mendiola's note below.    Hi, please inform pt that pvc burden remains unchanged at 10%.  We can increase current Atenolol to bid if he continues to feel the pvcs. If much better on current atenolol ok to monitor for now and repeat holter in a year. Qp    Pt will increase his Atenolol to BID. Can you please send in a new prescription to reflect this to [facility] [place].

## 2022-09-13 ENCOUNTER — TELEPHONE (OUTPATIENT)
Dept: FAMILY MEDICINE | Facility: CLINIC | Age: 62
End: 2022-09-13

## 2022-09-13 NOTE — TELEPHONE ENCOUNTER
LMTC/Mycahrt message sent to reschedule due to Dr Robin not in clinic   PLease assist in rescheduling     Thanks  Rosette Cardona RT (R)

## 2022-09-22 ENCOUNTER — TELEPHONE (OUTPATIENT)
Dept: FAMILY MEDICINE | Facility: CLINIC | Age: 62
End: 2022-09-22

## 2022-09-22 DIAGNOSIS — Z12.11 COLON CANCER SCREENING: Primary | ICD-10-CM

## 2022-09-22 NOTE — TELEPHONE ENCOUNTER
Patient was late checking in for his appointment on 09/22/22. Next available appointment is 10/19/22. Patient was r/s two times previously. Wondering if orders can be placed for a colonoscopy prior to when the patient is seen. Patient left upset and did not schedule anything.       Melany Tolentino, RUTHN, RN, KARINEN  [place] [facility]  September 22, 2022

## 2022-09-23 ENCOUNTER — TELEPHONE (OUTPATIENT)
Dept: GASTROENTEROLOGY | Facility: CLINIC | Age: 62
End: 2022-09-23

## 2022-09-23 NOTE — TELEPHONE ENCOUNTER
Screening Questions  BLUE  KIND OF PREP RED  LOCATION [review exclusion criteria] GREEN  SEDATION TYPE        Y Are you active on mychart?       Bony Robin MD Ordering/Referring Provider?        BCBS What type of coverage do you have?      N Have you had a positive covid test in the last 90 days?     1.  BMI  [BMI 40+ - review exclusion criteria]    2. Y  Are you able to give consent for your medical care? [IF NO,RN REVIEW]        3. N  Are you taking any prescription pain medications on a routine schedule?        3a.  EXTENDED PREP What kind of prescription?   4. N Do you have any chemical dependencies such as alcohol, street drugs, or methadone?    5. N Do you have any history of post-traumatic stress syndrome, severe anxiety or history of psychosis?      **If yes 3- 5 , please schedule with MAC sedation.**          IF YES TO ANY 6 - 10 - HOSPITAL SETTING ONLY.     6.   N Do you need assistance transferring?     7.   N Have you had a heart or lung transplant?    8.   N Are you currently on dialysis?   9.   N Do you use daily home oxygen?   10. N Do you take nitroglycerin?   10a.  If yes, how often?     11. [FEMALES]  N Are you currently pregnant?    11a.  If yes, how many weeks? [ Greater than 12 weeks, OR NEEDED]    12. N Do you have Pulmonary Hypertension? *NEED PAC APPT AT [facility]*     13. N [review exclusion criteria]  Do you have any implantable devices in your body (pacemaker, defib, LVAD)?    14. N In the past 6 months, have you had any heart related issues including cardiomyopathy or heart attack?     14a.  If yes, did it require cardiac stenting if so when?     15. N Have you had a stroke or Transient ischemic attack (TIA - aka  mini stroke ) within 6 months?      16. N Do you have mod to severe Obstructive Sleep Apnea?  [Hospital only - Ok at [place]]    17. N Do you have SEVERE AND UNCONTROLLED asthma? *NEED PAC APPT AT [facility]*     18. Y-PT NOT SURE WHAT IT IS CALLED Are you currently taking  "any blood thinners?     18a. If yes, inform patient to \"follow up w/ ordering provider for bridging instructions.\"    19. N Do you take the medication Phentermine?    19a. If yes, \"Hold for 7 days before procedure.  Please consult your prescribing provider if you have questions about holding this medication.\"     20. N  Do you have chronic kidney disease?      21. N  Do you have a diagnosis of diabetes?     22. N  On a regular basis do you go 3-5 days between bowel movements?     23.  Preferred LOCAL Pharmacy for Pre Prescription    [ LIST ONLY ONE PHARMACY]        [facility] [address] [address] [facility]        - CLOSING REMINDERS -    Informed patient they will need an adult    Cannot take any type of public or medical transportation alone    Conscious Sedation- Needs  for 6 hours after the procedure       MAC/General-Needs  for 24 hours after procedure    Pre-Procedure Covid test to be completed [[facility] PCR Testing Required]    Confirmed Nurse will call to complete assessment       - SCHEDULING DETAILS -     TRISHA  Surgeon    11/15/22  Date of Procedure  Lower Endoscopy [Colonoscopy]  Type of Procedure Scheduled   [facility]If you answer yes to questions #8, #20, #21Which Colonoscopy Prep was Sent?     CS Sedation Type      PAC / Pre-op Required         Additional comments:            "

## 2022-10-06 DIAGNOSIS — I10 HTN, GOAL BELOW 140/90: ICD-10-CM

## 2022-10-06 DIAGNOSIS — Z13.6 CARDIOVASCULAR SCREENING; LDL GOAL LESS THAN 130: ICD-10-CM

## 2022-10-09 ENCOUNTER — HEALTH MAINTENANCE LETTER (OUTPATIENT)
Age: 62
End: 2022-10-09

## 2022-10-11 RX ORDER — LISINOPRIL 20 MG/1
TABLET ORAL
Qty: 45 TABLET | Refills: 0 | Status: SHIPPED | OUTPATIENT
Start: 2022-10-11 | End: 2023-01-26

## 2022-10-11 NOTE — TELEPHONE ENCOUNTER
Pending Prescriptions:                       Disp   Refills    atorvastatin (LIPITOR) 20 MG tablet [Pharm*90 tab*0        Sig: Take 1 tablet by mouth once daily    Signed Prescriptions:                        Disp   Refills    lisinopril (ZESTRIL) 20 MG tablet          45 tab*0        Sig: Take 1/2 (one-half) tablet by mouth once daily  Authorizing Provider: SHEILA ROGERS  Ordering User: MARIAMA DRAKE    Routing refill request to provider for review/approval because:  Labs not current:    LDL Cholesterol Calculated   Date Value Ref Range Status   01/24/2018 162 (H) <100 mg/dL Final     Comment:     Above desirable:  100-129 mg/dl  Borderline High:  130-159 mg/dL  High:             160-189 mg/dL  Very high:       >189 mg/dl

## 2022-10-12 ENCOUNTER — OFFICE VISIT (OUTPATIENT)
Dept: FAMILY MEDICINE | Facility: CLINIC | Age: 62
End: 2022-10-12
Payer: COMMERCIAL

## 2022-10-12 VITALS
BODY MASS INDEX: 33.3 KG/M2 | DIASTOLIC BLOOD PRESSURE: 78 MMHG | HEART RATE: 74 BPM | SYSTOLIC BLOOD PRESSURE: 130 MMHG | OXYGEN SATURATION: 97 % | HEIGHT: 69 IN | TEMPERATURE: 98.5 F | RESPIRATION RATE: 10 BRPM | WEIGHT: 224.8 LBS

## 2022-10-12 DIAGNOSIS — Z00.00 ROUTINE GENERAL MEDICAL EXAMINATION AT A HEALTH CARE FACILITY: Primary | ICD-10-CM

## 2022-10-12 DIAGNOSIS — K21.9 GASTROESOPHAGEAL REFLUX DISEASE WITHOUT ESOPHAGITIS: ICD-10-CM

## 2022-10-12 DIAGNOSIS — Z87.11 HISTORY OF GASTRIC ULCER: ICD-10-CM

## 2022-10-12 DIAGNOSIS — I10 HTN, GOAL BELOW 140/90: ICD-10-CM

## 2022-10-12 DIAGNOSIS — E78.5 HYPERLIPIDEMIA LDL GOAL <130: ICD-10-CM

## 2022-10-12 DIAGNOSIS — M17.11 PRIMARY OSTEOARTHRITIS OF RIGHT KNEE: ICD-10-CM

## 2022-10-12 DIAGNOSIS — R73.03 PREDIABETES: ICD-10-CM

## 2022-10-12 LAB
ALBUMIN SERPL-MCNC: 3.9 G/DL (ref 3.4–5)
ALP SERPL-CCNC: 59 U/L (ref 40–150)
ALT SERPL W P-5'-P-CCNC: 37 U/L (ref 0–70)
ANION GAP SERPL CALCULATED.3IONS-SCNC: 5 MMOL/L (ref 3–14)
AST SERPL W P-5'-P-CCNC: 20 U/L (ref 0–45)
BASOPHILS # BLD AUTO: 0 10E3/UL (ref 0–0.2)
BASOPHILS NFR BLD AUTO: 0 %
BILIRUB SERPL-MCNC: 0.8 MG/DL (ref 0.2–1.3)
BUN SERPL-MCNC: 14 MG/DL (ref 7–30)
CALCIUM SERPL-MCNC: 9.1 MG/DL (ref 8.5–10.1)
CHLORIDE BLD-SCNC: 107 MMOL/L (ref 94–109)
CHOLEST SERPL-MCNC: 179 MG/DL
CO2 SERPL-SCNC: 27 MMOL/L (ref 20–32)
CREAT SERPL-MCNC: 0.84 MG/DL (ref 0.66–1.25)
EOSINOPHIL # BLD AUTO: 0.1 10E3/UL (ref 0–0.7)
EOSINOPHIL NFR BLD AUTO: 2 %
ERYTHROCYTE [DISTWIDTH] IN BLOOD BY AUTOMATED COUNT: 12.5 % (ref 10–15)
FASTING STATUS PATIENT QL REPORTED: YES
GFR SERPL CREATININE-BSD FRML MDRD: >90 ML/MIN/1.73M2
GLUCOSE BLD-MCNC: 111 MG/DL (ref 70–99)
HBA1C MFR BLD: 5.4 % (ref 0–5.6)
HCT VFR BLD AUTO: 44.3 % (ref 40–53)
HDLC SERPL-MCNC: 53 MG/DL
HGB BLD-MCNC: 15.3 G/DL (ref 13.3–17.7)
IMM GRANULOCYTES # BLD: 0 10E3/UL
IMM GRANULOCYTES NFR BLD: 0 %
LDLC SERPL CALC-MCNC: 98 MG/DL
LYMPHOCYTES # BLD AUTO: 1.8 10E3/UL (ref 0.8–5.3)
LYMPHOCYTES NFR BLD AUTO: 37 %
MCH RBC QN AUTO: 30.4 PG (ref 26.5–33)
MCHC RBC AUTO-ENTMCNC: 34.5 G/DL (ref 31.5–36.5)
MCV RBC AUTO: 88 FL (ref 78–100)
MONOCYTES # BLD AUTO: 0.6 10E3/UL (ref 0–1.3)
MONOCYTES NFR BLD AUTO: 13 %
NEUTROPHILS # BLD AUTO: 2.2 10E3/UL (ref 1.6–8.3)
NEUTROPHILS NFR BLD AUTO: 47 %
NONHDLC SERPL-MCNC: 126 MG/DL
PLATELET # BLD AUTO: 170 10E3/UL (ref 150–450)
POTASSIUM BLD-SCNC: 4.2 MMOL/L (ref 3.4–5.3)
PROT SERPL-MCNC: 7.1 G/DL (ref 6.8–8.8)
PSA SERPL-MCNC: 1.36 UG/L (ref 0–4)
RBC # BLD AUTO: 5.04 10E6/UL (ref 4.4–5.9)
SODIUM SERPL-SCNC: 139 MMOL/L (ref 133–144)
TRIGL SERPL-MCNC: 140 MG/DL
WBC # BLD AUTO: 4.8 10E3/UL (ref 4–11)

## 2022-10-12 PROCEDURE — 99214 OFFICE O/P EST MOD 30 MIN: CPT | Mod: 25 | Performed by: PHYSICIAN ASSISTANT

## 2022-10-12 PROCEDURE — G0103 PSA SCREENING: HCPCS | Performed by: PHYSICIAN ASSISTANT

## 2022-10-12 PROCEDURE — 90682 RIV4 VACC RECOMBINANT DNA IM: CPT | Performed by: PHYSICIAN ASSISTANT

## 2022-10-12 PROCEDURE — 99396 PREV VISIT EST AGE 40-64: CPT | Performed by: PHYSICIAN ASSISTANT

## 2022-10-12 PROCEDURE — 90471 IMMUNIZATION ADMIN: CPT | Performed by: PHYSICIAN ASSISTANT

## 2022-10-12 PROCEDURE — 85025 COMPLETE CBC W/AUTO DIFF WBC: CPT | Performed by: PHYSICIAN ASSISTANT

## 2022-10-12 PROCEDURE — 83036 HEMOGLOBIN GLYCOSYLATED A1C: CPT | Performed by: PHYSICIAN ASSISTANT

## 2022-10-12 PROCEDURE — 80061 LIPID PANEL: CPT | Performed by: PHYSICIAN ASSISTANT

## 2022-10-12 PROCEDURE — 80053 COMPREHEN METABOLIC PANEL: CPT | Performed by: PHYSICIAN ASSISTANT

## 2022-10-12 PROCEDURE — 36415 COLL VENOUS BLD VENIPUNCTURE: CPT | Performed by: PHYSICIAN ASSISTANT

## 2022-10-12 RX ORDER — PANTOPRAZOLE SODIUM 40 MG/1
40 TABLET, DELAYED RELEASE ORAL DAILY
Qty: 90 TABLET | Refills: 1 | Status: SHIPPED | OUTPATIENT
Start: 2022-10-12 | End: 2023-01-24

## 2022-10-12 RX ORDER — ATORVASTATIN CALCIUM 20 MG/1
TABLET, FILM COATED ORAL
Qty: 90 TABLET | Refills: 0 | Status: SHIPPED | OUTPATIENT
Start: 2022-10-12 | End: 2023-04-03

## 2022-10-12 ASSESSMENT — ENCOUNTER SYMPTOMS
PALPITATIONS: 0
WEAKNESS: 1
PARESTHESIAS: 0
FREQUENCY: 0
NAUSEA: 0
JOINT SWELLING: 1
HEMATURIA: 0
DIZZINESS: 0
HEADACHES: 0
DIARRHEA: 0
ARTHRALGIAS: 1
COUGH: 0
HEARTBURN: 0
DYSURIA: 0
HEMATOCHEZIA: 0
EYE PAIN: 0
MYALGIAS: 1
SORE THROAT: 0
FEVER: 0
SHORTNESS OF BREATH: 1
CHILLS: 0
ABDOMINAL PAIN: 1
CONSTIPATION: 0
NERVOUS/ANXIOUS: 0

## 2022-10-12 ASSESSMENT — PAIN SCALES - GENERAL: PAINLEVEL: NO PAIN (0)

## 2022-10-12 NOTE — PROGRESS NOTES
SUBJECTIVE:   CC: Hugo is an 61 year old who presents for preventative health visit.     {Split Bill scripting  The purpose of this visit is to discuss your medical history and prevent health problems before you are sick. You may be responsible for a co-pay, coinsurance, or deductible if your visit today includes services such as checking on a sore throat, having an x-ray or lab test, or treating and evaluating a new or existing condition :464047}  {Patient advised of split billing (Optional):069148}  Healthy Habits:    Getting at least 3 servings of Calcium per day:  Yes    Frequency of exercise:  1 day/week    PHQ-2 Total Score:    {Add if <65 person on Medicare  - Required Questions (Optional):422792}  {Outside tests to abstract? :919685}    {additional problems to add (Optional):970466}    Today's PHQ-2 Score:   PHQ-2 (  [employer]) 10/12/2022   Q1: Little interest or pleasure in doing things -   Q2: Feeling down, depressed or hopeless -   PHQ-2 Score -   PHQ-2 Total Score (12-17 Years)- Positive if 3 or more points; Administer PHQ-A if positive -   Q1: Little interest or pleasure in doing things -   Q2: Feeling down, depressed or hopeless -   PHQ-2 Score Incomplete       Abuse: Current or Past(Physical, Sexual or Emotional)- { :394610}  Do you feel safe in your environment? { :098231}    Have you ever done Advance Care Planning? (For example, a Health Directive, POLST, or a discussion with a medical provider or your loved ones about your wishes): { :318877}    Social History     Tobacco Use     Smoking status: Former     Packs/day: 1.50     Years: 10.00     Pack years: 15.00     Types: Cigarettes     Start date: 1982     Quit date: 6/15/1992     Years since quittin.3     Smokeless tobacco: Never   Substance Use Topics     Alcohol use: Yes     Comment: occ      If you drink alcohol do you typically have >3 drinks per day or >7 drinks per week? Yes      Alcohol Use 2022   Prescreen: >3 drinks/day or  ">7 drinks/week? Yes     AUDIT - Alcohol Use Disorders Identification Test - Reproduced from the World Health Organization Audit 2001 (Second Edition) 10/12/2022   1.  How often do you have a drink containing alcohol? 4 or more times a week   2.  How many drinks containing alcohol do you have on a typical day when you are drinking? 1 or 2   3.  How often do you have five or more drinks on one occasion? Monthly   4.  How often during the last year have you found that you were not able to stop drinking once you had started? Never   5.  How often during the last year have you failed to do what was normally expected of you because of drinking? Never   6.  How often during the last year have you needed a first drink in the morning to get yourself going after a heavy drinking session? Never   7.  How often during the last year have you had a feeling of guilt or remorse after drinking? Never   8.  How often during the last year have you been unable to remember what happened the night before because of your drinking? Never   9.  Have you or someone else been injured because of your drinking? No   10. Has a relative, friend, doctor or other health care worker been concerned about your drinking or suggested you cut down? No   TOTAL SCORE 6       Last PSA:   PSA   Date Value Ref Range Status   03/12/2013 0.62 0 - 4 ug/L Final       Reviewed orders with patient. Reviewed health maintenance and updated orders accordingly - { :532872::\"Yes\"}  {Chronicprobdata (optional):177346}    Reviewed and updated as needed this visit by clinical staff   Tobacco  Allergies    Med Hx  Surg Hx  Fam Hx  Soc Hx        Reviewed and updated as needed this visit by Provider                 {HISTORY OPTIONS (Optional):550115}    Review of Systems  {MALE ROS (Optional):451387::\"CONSTITUTIONAL: NEGATIVE for fever, chills, change in weight\",\"INTEGUMENTARY/SKIN: NEGATIVE for worrisome rashes, moles or lesions\",\"EYES: NEGATIVE for vision changes or " "irritation\",\"ENT: NEGATIVE for ear, mouth and throat problems\",\"RESP: NEGATIVE for significant cough or SOB\",\"CV: NEGATIVE for chest pain, palpitations or peripheral edema\",\"GI: NEGATIVE for nausea, abdominal pain, heartburn, or change in bowel habits\",\" male: negative for dysuria, hematuria, decreased urinary stream, erectile dysfunction, urethral discharge\",\"MUSCULOSKELETAL: NEGATIVE for significant arthralgias or myalgia\",\"NEURO: NEGATIVE for weakness, dizziness or paresthesias\",\"PSYCHIATRIC: NEGATIVE for changes in mood or affect\"}    OBJECTIVE:   There were no vitals taken for this visit.    Physical Exam  {Exam Choices (Optional):476243}    {Diagnostic Test Results (Optional):738799::\"Diagnostic Test Results:\",\"Labs reviewed in Epic\"}    ASSESSMENT/PLAN:   {Diag Picklist:774148}    {Patient advised of split billing (Optional):539497}    COUNSELING:   {MALE COUNSELING MESSAGES:111557::\"Reviewed preventive health counseling, as reflected in patient instructions\"}    Estimated body mass index is 32.48 kg/m  as calculated from the following:    Height as of 7/21/22: 1.755 m (5' 9.09\").    Weight as of 7/21/22: 100 kg (220 lb 8 oz).     {Weight Management Plan (ACO) Complete if BMI is abnormal-  Ages 18-64  BMI >24.9.  Age 65+ with BMI <23 or >30 (Optional):958392}    He reports that he quit smoking about 30 years ago. His smoking use included cigarettes. He started smoking about 40 years ago. He has a 15.00 pack-year smoking history. He has never used smokeless tobacco.      Counseling Resources:  ATP IV Guidelines  Pooled Cohorts Equation Calculator  FRAX Risk Assessment  ICSI Preventive Guidelines  Dietary Guidelines for Americans, 2010  USDA's MyPlate  ASA Prophylaxis  Lung CA Screening    [facility]  "

## 2022-10-12 NOTE — PATIENT INSTRUCTIONS
Right knee-  (non-surgical sports medicine). In [place] on Fridays     Try pantoprazole instead of omeprazole. See if more helpful.   Avoid coffee, OJ, spicy, red sauce.   Avoid NSAIDs     Flu shot         Preventive Health Recommendations  Male Ages 50 - 64    Yearly exam:             See your health care provider every year in order to  o   Review health changes.   o   Discuss preventive care.    o   Review your medicines if your doctor has prescribed any.   Have a cholesterol test every 5 years, or more frequently if you are at risk for high cholesterol/heart disease.   Have a diabetes test (fasting glucose) every three years. If you are at risk for diabetes, you should have this test more often.   Have a colonoscopy at age 50, or have a yearly FIT test (stool test). These exams will check for colon cancer.    Talk with your health care provider about whether or not a prostate cancer screening test (PSA) is right for you.  You should be tested each year for STDs (sexually transmitted diseases), if you re at risk.     Shots: Get a flu shot each year. Get a tetanus shot every 10 years.     Nutrition:  Eat at least 5 servings of fruits and vegetables daily.   Eat whole-grain bread, whole-wheat pasta and brown rice instead of white grains and rice.   Get adequate Calcium and Vitamin D.     Lifestyle  Exercise for at least 150 minutes a week (30 minutes a day, 5 days a week). This will help you control your weight and prevent disease.   Limit alcohol to one drink per day.   No smoking.   Wear sunscreen to prevent skin cancer.   See your dentist every six months for an exam and cleaning.   See your eye doctor every 1 to 2 years.

## 2022-10-12 NOTE — PROGRESS NOTES
"SUBJECTIVE:   CC: Hugo is an 61 year old who presents for preventative health visit.       Patient has been advised of split billing requirements and indicates understanding: Yes  Healthy Habits:     Getting at least 3 servings of Calcium per day:  Yes    Bi-annual eye exam:  Yes    Dental care twice a year:  NO    Sleep apnea or symptoms of sleep apnea:  None    Frequency of exercise:  1 day/week    Duration of exercise:  Less than 15 minutes    Taking medications regularly:  Yes    Medication side effects:  Muscle aches    PHQ-2 Total Score: 0    Additional concerns today:  No    Routine Health Maintenance:  Immunizations Due for: will only do flu shot   Colonoscopy: is schedule for his colonoscopy.  Fam hx of colon cancer: yes- mat gma   PSA screening: Last: 11/2021. Fam hx of prostate cancer: father  Sexually active: yes,   Fasting: no- OJ   Lipids screening: Treating lipids on atorvastatin   Diabetes screening: hx of prediabetes in problem list     GERD/gastritis- on omeprazole 40mg daily. Sometimes \"I can feel my ulcers\"- feels like stomach \"feels hot\". He gets concerned he has ulcers again. In evening/middle of night feels this heat- about 1x per week. Not burping or tasting acid. No dysphagia. No cough. No abd pain, N/V. No tarry stools. No missed doses of PPI  Hx of  Ulcer >10 yr ago.   EGD 03/2021- . Gastritis (no ulcer) and benign fundic gland polyps that did not require follow up.  Not taking NSAIDs often only \"once in awhile\".   Alcohol- few drinks a few times per week- 12 gallo per week.     Orthopedics- right knee pain. Prior replacement and fracture. Saw ortho 1 yr ago in [place]-  - told him not surgical but pt states \"didn't go anything. Told me I was fine\". But has pain and tightness and does not know the cause. Tried topical voltaren- temporary fix. Uses if pain is bad.     Hyperlipidemia- atorvastatin 20mg - HALF pill daily    Atenolol- for palpitations - saw " cardiology 2022    HTN- lisinopril taking HALF pill daily       Today's PHQ-2 Score:   PHQ-2 (  [employer]) 10/12/2022   Q1: Little interest or pleasure in doing things 0   Q2: Feeling down, depressed or hopeless 0   PHQ-2 Score 0   PHQ-2 Total Score (12-17 Years)- Positive if 3 or more points; Administer PHQ-A if positive -   Q1: Little interest or pleasure in doing things Not at all   Q2: Feeling down, depressed or hopeless Not at all   PHQ-2 Score 0       Abuse: Current or Past(Physical, Sexual or Emotional)- No  Do you feel safe in your environment? Yes    Have you ever done Advance Care Planning? (For example, a Health Directive, POLST, or a discussion with a medical provider or your loved ones about your wishes): No, advance care planning information given to patient to review.  Patient plans to discuss their wishes with loved ones or provider.      Social History     Tobacco Use     Smoking status: Former     Packs/day: 1.50     Years: 10.00     Pack years: 15.00     Types: Cigarettes     Start date: 1982     Quit date: 6/15/1992     Years since quittin.3     Smokeless tobacco: Never   Substance Use Topics     Alcohol use: Yes     Comment: occ      If you drink alcohol do you typically have >3 drinks per day or >7 drinks per week? Yes      Alcohol Use 10/12/2022   Prescreen: >3 drinks/day or >7 drinks/week? Yes   AUDIT SCORE  6     AUDIT - Alcohol Use Disorders Identification Test - Reproduced from the World Health Organization Audit 2001 (Second Edition) 10/12/2022   1.  How often do you have a drink containing alcohol? 4 or more times a week   2.  How many drinks containing alcohol do you have on a typical day when you are drinking? 1 or 2   3.  How often do you have five or more drinks on one occasion? Monthly   4.  How often during the last year have you found that you were not able to stop drinking once you had started? Never   5.  How often during the last year have you failed to do what  was normally expected of you because of drinking? Never   6.  How often during the last year have you needed a first drink in the morning to get yourself going after a heavy drinking session? Never   7.  How often during the last year have you had a feeling of guilt or remorse after drinking? Never   8.  How often during the last year have you been unable to remember what happened the night before because of your drinking? Never   9.  Have you or someone else been injured because of your drinking? No   10. Has a relative, friend, doctor or other health care worker been concerned about your drinking or suggested you cut down? No   TOTAL SCORE 6       Last PSA:   PSA   Date Value Ref Range Status   03/12/2013 0.62 0 - 4 ug/L Final       Reviewed orders with patient. Reviewed health maintenance and updated orders accordingly - Yes  Lab work is in process  Labs reviewed in EPIC  BP Readings from Last 3 Encounters:   10/12/22 130/78   08/03/22 137/79   07/21/22 116/62    Wt Readings from Last 3 Encounters:   10/12/22 102 kg (224 lb 12.8 oz)   07/21/22 100 kg (220 lb 8 oz)   07/07/22 100.7 kg (222 lb)                  Patient Active Problem List   Diagnosis     History of colonic polyps     KERRY (generalized anxiety disorder)     CARDIOVASCULAR SCREENING; LDL GOAL LESS THAN 130     History of tobacco use: 1980 - 1990     Hemorrhoids     Erectile dysfunction     HTN, goal below 140/90     Fatigue     ACL tear     History of gastric ulcer     Chronic gastric ulcer     Seasonal allergic rhinitis     Complete tear of right rotator cuff     Rupture long head biceps tendon, right, initial encounter     Subacromial impingement of right shoulder     Advanced directives, counseling/discussion     Primary osteoarthritis of right knee     Chronic right shoulder pain     S/P total knee replacement using cement, right     Morbid obesity (H)     Chronic rhinitis     Past Surgical History:   Procedure Laterality Date     ABDOMEN SURGERY        ARTHROPLASTY KNEE Right 12/10/2019    Procedure: Right knee replacement;  Surgeon: Javier Yin DO;  [facility]     ARTHROSCOPY KNEE  05/29/2013    Procedure: ARTHROSCOPY KNEE;  Right knee arthroscopy with partial medial and partial lateral menisectomies;  Surgeon: Phani Mclaughlin MD;  Location: [facility]     ARTHROSCOPY SHOULDER BICEPS TENODESIS REPAIR Right 01/13/2017    Procedure: ARTHROSCOPY SHOULDER BICEPS TENODESIS REPAIR;  Surgeon: Javier Yin DO;  [facility]     ARTHROSCOPY SHOULDER DECOMPRESSION Right 01/13/2017    Procedure: ARTHROSCOPY SHOULDER DECOMPRESSION;  Surgeon: Javier Yin DO;  [facility]     ARTHROSCOPY SHOULDER ROTATOR CUFF REPAIR Right 01/13/2017    Procedure: ARTHROSCOPY SHOULDER ROTATOR CUFF REPAIR;  Surgeon: Javier Yin DO;  [facility]     BIOPSY       COLONOSCOPY  11/01/2007     COLONOSCOPY  12/12/2011     COLONOSCOPY N/A 11/18/2015    Procedure: COLONOSCOPY;  Surgeon: Migue Mclaughlin MD;  [facility]     COLONOSCOPY N/A 03/22/2017    Procedure: COMBINED COLONOSCOPY, SINGLE OR MULTIPLE BIOPSY/POLYPECTOMY BY BIOPSY;  Surgeon: Salvatore Zepeda MD;  [facility]     COLONOSCOPY N/A 03/05/2019    Procedure: COLONOSCOPY;  Surgeon: Prakash Ervin DO;  [facility]     COLONOSCOPY WITH CO2 INSUFFLATION N/A 01/12/2021    Procedure: COLONOSCOPY, WITH CO2 INSUFFLATION;  Surgeon: Juan Antonio Corcoran MD;  [facility]     COMBINED ESOPHAGOSCOPY, GASTROSCOPY, DUODENOSCOPY (EGD) WITH CO2 INSUFFLATION N/A 03/11/2021    Procedure: ESOPHAGOGASTRODUODENOSCOPY, WITH CO2 INSUFFLATION;  Surgeon: Sara Calix DO;  [facility]     ENDOSCOPY  01/27/2012    [facility] GI - [facility]     ESOPHAGOSCOPY, GASTROSCOPY, DUODENOSCOPY (EGD), COMBINED N/A 11/18/2015    Procedure: COMBINED ESOPHAGOSCOPY, GASTROSCOPY, DUODENOSCOPY (EGD), BIOPSY SINGLE OR MULTIPLE;  Surgeon: Migue Mclaughlin  MD Mustapha;  [facility]     ESOPHAGOSCOPY, GASTROSCOPY, DUODENOSCOPY (EGD), COMBINED N/A 2017    Procedure: COMBINED ESOPHAGOSCOPY, GASTROSCOPY, DUODENOSCOPY (EGD), BIOPSY SINGLE OR MULTIPLE;  Surgeon: Salvatore Zepeda MD;  [facility]     ESOPHAGOSCOPY, GASTROSCOPY, DUODENOSCOPY (EGD), COMBINED N/A 2021    Procedure: Esophagogastroduodenoscopy, With Biopsy;  Surgeon: Sara Calix DO;  [facility]     HERNIORRHAPHY UMBILICAL N/A 2015    Procedure: HERNIORRHAPHY UMBILICAL;  Surgeon: Yared Ma MD;  [facility]     IRRIGATION AND DEBRIDEMENT UPPER EXTREMITY, COMBINED Left 03/15/2016    Procedure: COMBINED IRRIGATION AND DEBRIDEMENT UPPER EXTREMITY;  Surgeon: Javier Yin DO;  [facility]     REMOVAL OF SPERM DUCT(S)      [email or web address]  10[facility]/2007       Social History     Tobacco Use     Smoking status: Former     Packs/day: 1.50     Years: 10.00     Pack years: 15.00     Types: Cigarettes     Start date: 1982     Quit date: 6/15/1992     Years since quittin.3     Smokeless tobacco: Never   Substance Use Topics     Alcohol use: Yes     Comment: 12 per week     Family History   Problem Relation Age of Onset     Cancer Mother         ovarian cancer     Asthma Mother      Hypertension Mother      Arthritis Mother      Genitourinary Problems Mother      Lipids Mother      Hyperlipidemia Mother      Other Cancer Mother      Cancer Sister         ovarian cancer     Hypertension Sister      Lipids Sister      Hyperlipidemia Sister      Other Cancer Sister      Anxiety Disorder Sister      Cancer Maternal Grandmother         stomach cancer     Asthma Father      Cardiovascular Father         heart attack; bypass x5, congenital heart disease     Heart Disease Father         heart attack; bypass x5, congenital heart disease     Diabetes Father      Hypertension Father      Cancer Father         prostate     Prostate  Cancer Father      Circulatory Father         blood clots     Genitourinary Problems Father      Respiratory Father         emphysema; COPD     Hyperlipidemia Father      Other Cancer Father      Diabetes Maternal Grandfather      Diabetes Paternal Grandfather      Eye Disorder Paternal Grandfather         glaucoma     Hypertension Brother      Lipids Brother      Hyperlipidemia Brother      Hypertension Brother      Cardiovascular Brother         bypass x3     Heart Disease Brother         bypass x3     Lipids Brother      Hyperlipidemia Brother      Hypertension Sister      C.A.D. No family hx of      Cerebrovascular Disease No family hx of      Breast Cancer No family hx of      Cancer - colorectal No family hx of      Alzheimer Disease No family hx of      Blood Disease No family hx of      Gastrointestinal Disease No family hx of      Musculoskeletal Disorder No family hx of      Neurologic Disorder No family hx of      Thyroid Disease No family hx of          Current Outpatient Medications   Medication Sig Dispense Refill     atenolol (TENORMIN) 25 MG tablet Take 1 tablet (25 mg) by mouth 2 times daily 180 tablet 1     Bioflavonoid Products (VITAMIN C) CHEW        lisinopril (ZESTRIL) 20 MG tablet Take 1/2 (one-half) tablet by mouth once daily 45 tablet 0     Multiple Vitamin (MULTIVITAMINS PO) Take  by mouth.       omeprazole (PRILOSEC) 40 MG DR capsule Take 1 capsule by mouth once daily 90 capsule 3     pantoprazole (PROTONIX) 40 MG EC tablet Take 1 tablet (40 mg) by mouth daily 30 min prior to breakfast 90 tablet 1     atorvastatin (LIPITOR) 20 MG tablet Take 1 tablet by mouth once daily 90 tablet 0     diclofenac (VOLTAREN) 1 % topical gel Apply 2 g topically 4 times daily (Patient not taking: No sig reported) 50 g 1     fluticasone (FLONASE) 50 MCG/ACT nasal spray Spray 1 spray into both nostrils daily (Patient not taking: No sig reported) 18.2 mL 1     Allergies   Allergen Reactions     Hydrocodone Other  "(See Comments)     \"climbed the walls, very anxious, insomnia\"     Nsaids Other (See Comments)     Hx of stomach ulcers       Reviewed and updated as needed this visit by clinical staff   Tobacco  Allergies    Med Hx  Surg Hx  Fam Hx  Soc Hx        Reviewed and updated as needed this visit by Provider                     Review of Systems   Constitutional: Negative for chills and fever.   HENT: Positive for congestion and hearing loss. Negative for ear pain and sore throat.    Eyes: Positive for visual disturbance. Negative for pain.   Respiratory: Positive for shortness of breath. Negative for cough.    Cardiovascular: Negative for chest pain, palpitations and peripheral edema.   Gastrointestinal: Positive for abdominal pain. Negative for constipation, diarrhea, heartburn, hematochezia and nausea.   Genitourinary: Negative for dysuria, frequency, genital sores, hematuria and urgency.   Musculoskeletal: Positive for arthralgias, joint swelling and myalgias.   Skin: Negative for rash.   Neurological: Positive for weakness. Negative for dizziness, headaches and paresthesias.   Psychiatric/Behavioral: Negative for mood changes. The patient is not nervous/anxious.        OBJECTIVE:   /78   Pulse 74   Temp 98.5  F (36.9  C) (Temporal)   Resp 10   Ht 1.74 m (5' 8.5\")   Wt 102 kg (224 lb 12.8 oz)   SpO2 97%   BMI 33.68 kg/m      Physical Exam  GENERAL: healthy, alert and no distress  EYES: Eyes grossly normal to inspection, PERRL and conjunctivae and sclerae normal  HENT: ear canals and TM's normal, nose and mouth without ulcers or lesions  NECK: no adenopathy, no asymmetry, masses, or scars and thyroid normal to palpation  RESP: lungs clear to auscultation - no rales, rhonchi or wheezes  CV: regular rate and rhythm, normal S1 S2, no S3 or S4, no murmur, click or rub, no peripheral edema and peripheral pulses strong  ABDOMEN: soft, nontender, no hepatosplenomegaly, no masses and bowel sounds normal  MS: " no gross musculoskeletal defects noted, no edema  SKIN: no suspicious lesions or rashes  NEURO: Normal strength and tone, mentation intact and speech normal  PSYCH: mentation appears normal, affect normal/bright  LYMPH: normal ant/post cervical, supraclavicular nodes    Diagnostic Test Results:  Labs reviewed in Epic  Results for orders placed or performed in visit on 10/12/22   Hemoglobin A1c     Status: Normal   Result Value Ref Range    Hemoglobin A1C 5.4 0.0 - 5.6 %   CBC with platelets and differential     Status: None   Result Value Ref Range    WBC Count 4.8 4.0 - 11.0 10e3/uL    RBC Count 5.04 4.40 - 5.90 10e6/uL    Hemoglobin 15.3 13.3 - 17.7 g/dL    Hematocrit 44.3 40.0 - 53.0 %    MCV 88 78 - 100 fL    MCH 30.4 26.5 - 33.0 pg    MCHC 34.5 31.5 - 36.5 g/dL    RDW 12.5 10.0 - 15.0 %    Platelet Count 170 150 - 450 10e3/uL    % Neutrophils 47 %    % Lymphocytes 37 %    % Monocytes 13 %    % Eosinophils 2 %    % Basophils 0 %    % Immature Granulocytes 0 %    Absolute Neutrophils 2.2 1.6 - 8.3 10e3/uL    Absolute Lymphocytes 1.8 0.8 - 5.3 10e3/uL    Absolute Monocytes 0.6 0.0 - 1.3 10e3/uL    Absolute Eosinophils 0.1 0.0 - 0.7 10e3/uL    Absolute Basophils 0.0 0.0 - 0.2 10e3/uL    Absolute Immature Granulocytes 0.0 <=0.4 10e3/uL   CBC with platelets and differential     Status: None    Narrative    The following orders were created for panel order CBC with platelets and differential.  Procedure                               Abnormality         Status                     ---------                               -----------         ------                     CBC with platelets and d...[[id number]]                      Final result                 Please view results for these tests on the individual orders.       ASSESSMENT/PLAN:       ICD-10-CM    1. Routine general medical examination at a health care facility  Z00.00 INFLUENZA QUAD, RECOMBINANT, P-FREE (RIV4) (FLUBLOK)     CBC with platelets and differential      PSA, screen     CBC with platelets and differential     PSA, screen      2. Prediabetes  R73.03 Comprehensive metabolic panel (BMP + Alb, Alk Phos, ALT, AST, Total. Bili, TP)     Hemoglobin A1c     Comprehensive metabolic panel (BMP + Alb, Alk Phos, ALT, AST, Total. Bili, TP)     Hemoglobin A1c      3. History of gastric ulcer  Z87.11 pantoprazole (PROTONIX) 40 MG EC tablet      4. Gastroesophageal reflux disease without esophagitis  K21.9 pantoprazole (PROTONIX) 40 MG EC tablet      5. HTN, goal below 140/90  I10 Comprehensive metabolic panel (BMP + Alb, Alk Phos, ALT, AST, Total. Bili, TP)     Comprehensive metabolic panel (BMP + Alb, Alk Phos, ALT, AST, Total. Bili, TP)      6. Hyperlipidemia LDL goal <130  E78.5 Comprehensive metabolic panel (BMP + Alb, Alk Phos, ALT, AST, Total. Bili, TP)     Lipid panel reflex to direct LDL Non-fasting     Comprehensive metabolic panel (BMP + Alb, Alk Phos, ALT, AST, Total. Bili, TP)     Lipid panel reflex to direct LDL Non-fasting      7. Primary osteoarthritis of right knee  M17.11 Orthopedic  Referral        1. Routine general medical examination at a health care facility  Reviewed VS and weight/BMI with pt   Immunizations:   updated - see below;  discussed and pt declined prevnar and covid   Counseling as below   Health screenings/maintenance per orders/comments below  When applicable based on age, personal hx, fam hx, and RF- counseled on appropriate cancer screenings.   He is scheduled for colonoscopy - 11/15/2022  - INFLUENZA QUAD, RECOMBINANT, P-FREE (RIV4) (FLUBLOK)  - CBC with platelets and differential; Future  - PSA, screen; Future  - CBC with platelets and differential  - PSA, screen    2. Prediabetes  Discussed glucose/a1c results  Advised carb controlled diet, portion control, regular exercise, weight management.   Rescreen/monitor every 12 months.   Consider nutrition referral.   - Comprehensive metabolic panel (BMP + Alb, Alk Phos, ALT, AST,  "Total. Bili, TP); Future  - Hemoglobin A1c; Future  - Comprehensive metabolic panel (BMP + Alb, Alk Phos, ALT, AST, Total. Bili, TP)  - Hemoglobin A1c    3. History of gastric ulcer  4. Gastroesophageal reflux disease without esophagitis  He has been on omeprazole 40mg daily. Once weekly stomach feels \"warm\".   Discussed trigger avoidance.   Limit alcohol and nsaids  He would like to try alternative PPI. Pantoprazole sent.  To GI if sx persist.  Had EGD 1 yr ago by  (GI) and only gastritis at that time (no ulcer)   - pantoprazole (PROTONIX) 40 MG EC tablet; Take 1 tablet (40 mg) by mouth daily 30 min prior to breakfast  Dispense: 90 tablet; Refill: 1    5. HTN, goal below 140/90  He has refills of his lisinopril 20mg tab (taking 10mg daily).   Update labs   Atenolol is for palpitations. Has refills from cardiology   - Comprehensive metabolic panel (BMP + Alb, Alk Phos, ALT, AST, Total. Bili, TP); Future  - Comprehensive metabolic panel (BMP + Alb, Alk Phos, ALT, AST, Total. Bili, TP)    6. Hyperlipidemia LDL goal <130  On atorvastatin. 90 days just sent. Will update labs and refill when needed.  - Comprehensive metabolic panel (BMP + Alb, Alk Phos, ALT, AST, Total. Bili, TP); Future  - Lipid panel reflex to direct LDL Non-fasting; Future  - Comprehensive metabolic panel (BMP + Alb, Alk Phos, ALT, AST, Total. Bili, TP)  - Lipid panel reflex to direct LDL Non-fasting    7. Primary osteoarthritis of right knee  Saw orthopedics last year and pt felt like his concerns were not addressed other than was not felt to be surgical  Ref to sports ortho. Did off PT but he wants to hold for that until consult   - Orthopedic  Referral; Future      Patient has been advised of split billing requirements and indicates understanding: Yes    COUNSELING:   Reviewed preventive health counseling, as reflected in patient instructions       Regular exercise       Healthy diet/nutrition       Vision screening       " "Colorectal cancer screening       Prostate cancer screening    Estimated body mass index is 32.48 kg/m  as calculated from the following:    Height as of 7/21/22: 1.755 m (5' 9.09\").    Weight as of 7/21/22: 100 kg (220 lb 8 oz).     Weight management plan: Discussed healthy diet and exercise guidelines    He reports that he quit smoking about 30 years ago. His smoking use included cigarettes. He started smoking about 40 years ago. He has a 15.00 pack-year smoking history. He has never used smokeless tobacco.      Counseling Resources:  ATP IV Guidelines  Pooled Cohorts Equation Calculator  FRAX Risk Assessment  ICSI Preventive Guidelines  Dietary Guidelines for Americans, 2010  USDA's MyPlate  ASA Prophylaxis  Lung CA Screening    [facility]  "

## 2022-10-26 NOTE — TELEPHONE ENCOUNTER
Called patient to discuss appts with Covid-19 restrictions.  He notes that his strength continues to improve slowly.  He still feels really stiff at night.  Feels better in the AM.  Stairs are going pretty well.      He feels good about his home exercise program.  He agrees to some follow up communication via phone calls.  We will hold off on PT services for now.    Dwight High,PT, DPT, OCS     Closure 3 Information: This tab is for additional flaps and grafts above and beyond our usual structured repairs.  Please note if you enter information here it will not currently bill and you will need to add the billing information manually.

## 2022-11-07 RX ORDER — BISACODYL 5 MG
TABLET, DELAYED RELEASE (ENTERIC COATED) ORAL
Qty: 4 TABLET | Refills: 0 | Status: SHIPPED | OUTPATIENT
Start: 2022-11-07 | End: 2023-04-03

## 2022-11-10 ENCOUNTER — ANCILLARY PROCEDURE (OUTPATIENT)
Dept: GENERAL RADIOLOGY | Facility: CLINIC | Age: 62
End: 2022-11-10
Attending: ORTHOPAEDIC SURGERY
Payer: COMMERCIAL

## 2022-11-10 ENCOUNTER — OFFICE VISIT (OUTPATIENT)
Dept: ORTHOPEDICS | Facility: CLINIC | Age: 62
End: 2022-11-10
Attending: PHYSICIAN ASSISTANT
Payer: COMMERCIAL

## 2022-11-10 VITALS
WEIGHT: 224 LBS | HEIGHT: 69 IN | RESPIRATION RATE: 18 BRPM | SYSTOLIC BLOOD PRESSURE: 126 MMHG | HEART RATE: 74 BPM | BODY MASS INDEX: 33.18 KG/M2 | DIASTOLIC BLOOD PRESSURE: 82 MMHG

## 2022-11-10 DIAGNOSIS — M17.11 PRIMARY OSTEOARTHRITIS OF RIGHT KNEE: ICD-10-CM

## 2022-11-10 DIAGNOSIS — Z96.651 HISTORY OF ARTHROPLASTY OF RIGHT KNEE: ICD-10-CM

## 2022-11-10 DIAGNOSIS — T84.84XA PAIN DUE TO TOTAL RIGHT KNEE REPLACEMENT, INITIAL ENCOUNTER (H): Primary | ICD-10-CM

## 2022-11-10 DIAGNOSIS — Z96.651 PAIN DUE TO TOTAL RIGHT KNEE REPLACEMENT, INITIAL ENCOUNTER (H): Primary | ICD-10-CM

## 2022-11-10 PROCEDURE — 73562 X-RAY EXAM OF KNEE 3: CPT | Mod: TC | Performed by: RADIOLOGY

## 2022-11-10 PROCEDURE — 99213 OFFICE O/P EST LOW 20 MIN: CPT | Performed by: ORTHOPAEDIC SURGERY

## 2022-11-10 RX ORDER — MELOXICAM 15 MG/1
15 TABLET ORAL DAILY
Qty: 30 TABLET | Refills: 11 | Status: SHIPPED | OUTPATIENT
Start: 2022-11-10 | End: 2023-01-24

## 2022-11-10 NOTE — LETTER
11/10/2022         RE: Hugo Longoria  [id number] [facility] [id number]        Dear Colleague,    Thank you for referring your patient, Hugo Longoria, to the [facility]. Please see a copy of my visit note below.    Hugo Longoria is seen for follow up right total knee arthroplasty from 12/10/2019 by Dr. Javier Yin.  He developed a nondisplaced periprosthetic tibial fracture just below the cemented stem about 3 months postop.  This has healed nicely.  He complains of pain and weakness in the knee and leg.  He has aching pain he rates up to 8 out of 10.  He has noted swelling of the knee also.    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)     Overnight stay due to blood poisoning     Arthritis 2011     Cancer (H) 2/15/2022    Skin Cancer on Face     Gastric ulcer, unspecified as acute or chronic, without mention of hemorrhage or perforation      History of blood transfusion 2007     Hypertension        Past Surgical History:   Procedure Laterality Date     ABDOMEN SURGERY       ARTHROPLASTY KNEE Right 12/10/2019    Procedure: Right knee replacement;  Surgeon: Javier Yin DO;  [facility]     ARTHROSCOPY KNEE  05/29/2013    Procedure: ARTHROSCOPY KNEE;  Right knee arthroscopy with partial medial and partial lateral menisectomies;  Surgeon: Phani Mclaughlin MD;  [facility]     ARTHROSCOPY SHOULDER BICEPS TENODESIS REPAIR Right 01/13/2017    Procedure: ARTHROSCOPY SHOULDER BICEPS TENODESIS REPAIR;  Surgeon: Javier Yin DO;  [facility]     ARTHROSCOPY SHOULDER DECOMPRESSION Right 01/13/2017    Procedure: ARTHROSCOPY SHOULDER DECOMPRESSION;  Surgeon: Javier Yin DO;  [facility]     ARTHROSCOPY SHOULDER ROTATOR CUFF REPAIR Right 01/13/2017    Procedure: ARTHROSCOPY  SHOULDER ROTATOR CUFF REPAIR;  Surgeon: Javier Yin DO;  [facility]     BIOPSY       COLONOSCOPY  11/01/2007     COLONOSCOPY  12/12/2011     COLONOSCOPY N/A 11/18/2015    Procedure: COLONOSCOPY;  Surgeon: Migue Mclaughlin MD;  [facility]     COLONOSCOPY N/A 03/22/2017    Procedure: COMBINED COLONOSCOPY, SINGLE OR MULTIPLE BIOPSY/POLYPECTOMY BY BIOPSY;  Surgeon: Salvatore Zepeda MD;  [facility]     COLONOSCOPY N/A 03/05/2019    Procedure: COLONOSCOPY;  Surgeon: Prakash Ervin DO;  [facility]     COLONOSCOPY WITH CO2 INSUFFLATION N/A 01/12/2021    Procedure: COLONOSCOPY, WITH CO2 INSUFFLATION;  Surgeon: Juan Antonio Corcoran MD;  [facility]     COMBINED ESOPHAGOSCOPY, GASTROSCOPY, DUODENOSCOPY (EGD) WITH CO2 INSUFFLATION N/A 03/11/2021    Procedure: ESOPHAGOGASTRODUODENOSCOPY, WITH CO2 INSUFFLATION;  Surgeon: Sara Calix DO;  [facility]: [facility]     ENDOSCOPY  01/27/2012    [facility]     ESOPHAGOSCOPY, GASTROSCOPY, DUODENOSCOPY (EGD), COMBINED N/A 11/18/2015    Procedure: COMBINED ESOPHAGOSCOPY, GASTROSCOPY, DUODENOSCOPY (EGD), BIOPSY SINGLE OR MULTIPLE;  Surgeon: Migue Mclaughlin MD;  [facility]     ESOPHAGOSCOPY, GASTROSCOPY, DUODENOSCOPY (EGD), COMBINED N/A 03/22/2017    Procedure: COMBINED ESOPHAGOSCOPY, GASTROSCOPY, DUODENOSCOPY (EGD), BIOPSY SINGLE OR MULTIPLE;  Surgeon: Salvatore Zepeda MD;  [facility]     ESOPHAGOSCOPY, GASTROSCOPY, DUODENOSCOPY (EGD), COMBINED N/A 03/11/2021    Procedure: Esophagogastroduodenoscopy, With Biopsy;  Surgeon: Sara Calix DO;  [facility]     HERNIORRHAPHY UMBILICAL N/A 03/12/2015    Procedure: HERNIORRHAPHY UMBILICAL;  Surgeon: Yared Ma MD;  [facility]     IRRIGATION AND DEBRIDEMENT UPPER EXTREMITY, COMBINED Left 03/15/2016    Procedure: COMBINED IRRIGATION AND DEBRIDEMENT UPPER EXTREMITY;  Surgeon: Javier Yin DO;  [facility]      REMOVAL OF SPERM DUCT(S)      Vasectomy     [facility] UGI ENDOSCOPY, SIMPLE EXAM  10/31/2007       Family History   Problem Relation Age of Onset     Cancer Mother         ovarian cancer     Asthma Mother      Hypertension Mother      Arthritis Mother      Genitourinary Problems Mother      Lipids Mother      Hyperlipidemia Mother      Other Cancer Mother      Cancer Sister         ovarian cancer     Hypertension Sister      Lipids Sister      Hyperlipidemia Sister      Other Cancer Sister      Anxiety Disorder Sister      Cancer Maternal Grandmother         stomach cancer     Asthma Father      Cardiovascular Father         heart attack; bypass x5, congenital heart disease     Heart Disease Father         heart attack; bypass x5, congenital heart disease     Diabetes Father      Hypertension Father      Cancer Father         prostate     Prostate Cancer Father      Circulatory Father         blood clots     Genitourinary Problems Father      Respiratory Father         emphysema; COPD     Hyperlipidemia Father      Other Cancer Father      Diabetes Maternal Grandfather      Diabetes Paternal Grandfather      Eye Disorder Paternal Grandfather         glaucoma     Hypertension Brother      Lipids Brother      Hyperlipidemia Brother      Hypertension Brother      Cardiovascular Brother         bypass x3     Heart Disease Brother         bypass x3     Lipids Brother      Hyperlipidemia Brother      Hypertension Sister      C.A.D. No family hx of      Cerebrovascular Disease No family hx of      Breast Cancer No family hx of      Cancer - colorectal No family hx of      Alzheimer Disease No family hx of      Blood Disease No family hx of      Gastrointestinal Disease No family hx of      Musculoskeletal Disorder No family hx of      Neurologic Disorder No family hx of      Thyroid Disease No family hx of        Social History     Socioeconomic History     Marital status:      Spouse name: Not on file     Number of  children: Not on file     Years of education: Not on file     Highest education level: Not on file   Occupational History     Employer: [employer]     Comment: [employer]   Tobacco Use     Smoking status: Former     Packs/day: 1.50     Years: 10.00     Pack years: 15.00     Types: Cigarettes     Start date: 1982     Quit date: 6/15/1992     Years since quittin.4     Smokeless tobacco: Never   Vaping Use     Vaping Use: Never used   Substance and Sexual Activity     Alcohol use: Yes     Comment: 12 per week     Drug use: No     Sexual activity: Yes     Partners: Female     Birth control/protection: Surgical     Comment: vasectomy   Other Topics Concern     Parent/sibling w/ CABG, MI or angioplasty before 65F 55M? Not Asked   Social History Narrative     Not on file     Social Determinants of Health     Financial Resource Strain: Not on file   Food Insecurity: Not on file   Transportation Needs: Not on file   Physical Activity: Not on file   Stress: Not on file   Social Connections: Not on file   Intimate Partner Violence: Not on file   Housing Stability: Not on file       Current Outpatient Medications   Medication Sig Dispense Refill     meloxicam (MOBIC) 15 MG tablet Take 1 tablet (15 mg) by mouth daily 30 tablet 11     atenolol (TENORMIN) 25 MG tablet Take 1 tablet (25 mg) by mouth 2 times daily 180 tablet 1     atorvastatin (LIPITOR) 20 MG tablet Take 1 tablet by mouth once daily 90 tablet 0     Bioflavonoid Products (VITAMIN C) CHEW        bisacodyl (DULCOLAX) 5 MG EC tablet Take 2 tablets at 3 pm the day before your procedure. If your procedure is before 11 am, take 2 additional tablets at 11 pm. If your procedure is after 11 am, take 2 additional tablets at 6 am. For additional instructions refer to your colonoscopy prep instructions. 4 tablet 0     diclofenac (VOLTAREN) 1 % topical gel Apply 2 g topically 4 times daily (Patient not taking: No sig reported) 50 g 1     fluticasone  "(FLONASE) 50 MCG/ACT nasal spray Spray 1 spray into both nostrils daily (Patient not taking: No sig reported) 18.2 mL 1     lisinopril (ZESTRIL) 20 MG tablet Take 1/2 (one-half) tablet by mouth once daily 45 tablet 0     Multiple Vitamin (MULTIVITAMINS PO) Take  by mouth.       pantoprazole (PROTONIX) 40 MG EC tablet Take 1 tablet (40 mg) by mouth daily 30 min prior to breakfast 90 tablet 1     polyethylene glycol (GOLYTELY) 236 g suspension The night before the exam at 6 pm drink an 8-ounce glass every 15 minutes until the jug is half empty. If you arrive before 11 AM: Drink the other half of the Golytely jug at 11 PM night before procedure. If you arrive after 11 AM: Drink the other half of the Golytely jug at 6 AM day of procedure. For additional instructions refer to your colonoscopy prep instructions. 4000 mL 0       Allergies   Allergen Reactions     Hydrocodone Other (See Comments)     \"climbed the walls, very anxious, insomnia\"     Nsaids Other (See Comments)     Hx of stomach ulcers       REVIEW OF SYSTEMS:  CONSTITUTIONAL:  NEGATIVE for fever, chills, change in weight, not feeling tired  SKIN:  NEGATIVE for worrisome rashes, no skin lumps, no skin ulcers and no non-healing wounds  EYES:  NEGATIVE for vision changes or irritation.  ENT/MOUTH:  NEGATIVE.  No hearing loss, no hoarseness, no difficulty swallowing.  RESP:  NEGATIVE. No cough or shortness of breath.  BREAST:  NEGATIVE for masses, tenderness or discharge  CV:  NEGATIVE for chest pain, palpitations or peripheral edema  GI:  NEGATIVE for nausea, abdominal pain, heartburn, or change in bowel habits  :  Negative. No dysuria, no hematuria  MUSCULOSKELETAL:  See HPI above  NEURO:  NEGATIVE . No headaches, no dizziness,  no numbness  ENDOCRINE:  NEGATIVE for temperature intolerance, skin/hair changes  HEME/ALLERGY/IMMUNE:  NEGATIVE for bleeding problems  PSYCHIATRIC:  NEGATIVE. no anxiety, no depression.      Xray:  Xray images were independently " "visualized with the patient.  This shows good position of components.  No sign of loosening or wear.     Exam:  Vitals: /82   Pulse 74   Resp 18   Ht 1.74 m (5' 8.5\")   Wt 101.6 kg (224 lb)   BMI 33.56 kg/m    BMI= Body mass index is 33.56 kg/m .  Range of motion right 0-105 degrees, left 0-115 degrees.  Alignment  Normal.  Stability:   Right       Lateral collateral ligament no laxity       Medial collateral ligament no laxity  Left:       Lateral collateral ligament no laxity       Medial collateral ligament no laxity  Wound:  Well healed.  Edema: None  Effusion: Minor - right knee.  There does appear to be synovial thickening in the knee.  Tenderness: Right Minor - medial joint line, lateral joint line, nasrin-patellar.       Left:  None  Sensation, motor, and circulation intact.    Assessment:    right total knee arthroplasty with persistent pain and synovial thickening  Plan:  Physical Therapy to work on strengthening.  I cannot find a source of pain.  I recommend he see Dr. Herman Gonzalez at [facility] for a second opinion regarding pain and swelling in knee.      Again, thank you for allowing me to participate in the care of your patient.        Sincerely,        Lonnie Parada MD    "

## 2022-11-12 NOTE — PROGRESS NOTES
Hugo Longoria is seen for follow up right total knee arthroplasty from 12/10/2019 by Dr. Javier Yin.  He developed a nondisplaced periprosthetic tibial fracture just below the cemented stem about 3 months postop.  This has healed nicely.  He complains of pain and weakness in the knee and leg.  He has aching pain he rates up to 8 out of 10.  He has noted swelling of the knee also.    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)     Overnight stay due to blood poisoning     Arthritis 2011     Cancer (H) 2/15/2022    Skin Cancer on Face     Gastric ulcer, unspecified as acute or chronic, without mention of hemorrhage or perforation      History of blood transfusion 2007     Hypertension        Past Surgical History:   Procedure Laterality Date     ABDOMEN SURGERY       ARTHROPLASTY KNEE Right 12/10/2019    Procedure: Right knee replacement;  Surgeon: Javier Yin DO;  [facility]     ARTHROSCOPY KNEE  05/29/2013    Procedure: ARTHROSCOPY KNEE;  Right knee arthroscopy with partial medial and partial lateral menisectomies;  Surgeon: Phani Mclaughlin MD;  [facility]     ARTHROSCOPY SHOULDER BICEPS TENODESIS REPAIR Right 01/13/2017    Procedure: ARTHROSCOPY SHOULDER BICEPS TENODESIS REPAIR;  Surgeon: Javier Yin DO;  [facility]     ARTHROSCOPY SHOULDER DECOMPRESSION Right 01/13/2017    Procedure: ARTHROSCOPY SHOULDER DECOMPRESSION;  Surgeon: Javier Yin DO;  [facility]     ARTHROSCOPY SHOULDER ROTATOR CUFF REPAIR Right 01/13/2017    Procedure: ARTHROSCOPY SHOULDER ROTATOR CUFF REPAIR;  Surgeon: Javier Yin DO;  [facility]     BIOPSY       COLONOSCOPY  11/01/2007     COLONOSCOPY  12/12/2011     COLONOSCOPY N/A 11/18/2015    Procedure: COLONOSCOPY;  Surgeon: Migue Mclaughlin MD;   [facility]     COLONOSCOPY N/A 03/22/2017    Procedure: COMBINED COLONOSCOPY, SINGLE OR MULTIPLE BIOPSY/POLYPECTOMY BY BIOPSY;  Surgeon: Salvatore Zepeda MD;  [facility]     COLONOSCOPY N/A 03/05/2019    Procedure: COLONOSCOPY;  Surgeon: Prakash Ervin DO;  [facility]     COLONOSCOPY WITH CO2 INSUFFLATION N/A 01/12/2021    Procedure: COLONOSCOPY, WITH CO2 INSUFFLATION;  Surgeon: Juan Antonio Corcoran MD;  [facility]     COMBINED ESOPHAGOSCOPY, GASTROSCOPY, DUODENOSCOPY (EGD) WITH CO2 INSUFFLATION N/A 03/11/2021    Procedure: ESOPHAGOGASTRODUODENOSCOPY, WITH CO2 INSUFFLATION;  Surgeon: Sara Calix DO;  [facility]: [facility]     ENDOSCOPY  01/27/2012    [facility] [place]     ESOPHAGOSCOPY, GASTROSCOPY, DUODENOSCOPY (EGD), COMBINED N/A 11/18/2015    Procedure: COMBINED ESOPHAGOSCOPY, GASTROSCOPY, DUODENOSCOPY (EGD), BIOPSY SINGLE OR MULTIPLE;  Surgeon: Migue Mclaughlin MD;  [facility]     ESOPHAGOSCOPY, GASTROSCOPY, DUODENOSCOPY (EGD), COMBINED N/A 03/22/2017    Procedure: COMBINED ESOPHAGOSCOPY, GASTROSCOPY, DUODENOSCOPY (EGD), BIOPSY SINGLE OR MULTIPLE;  Surgeon: Salvatore Zepeda MD;  [facility]     ESOPHAGOSCOPY, GASTROSCOPY, DUODENOSCOPY (EGD), COMBINED N/A 03/11/2021    Procedure: Esophagogastroduodenoscopy, With Biopsy;  Surgeon: Sara Calix DO;  [facility]     HERNIORRHAPHY UMBILICAL N/A 03/12/2015    Procedure: HERNIORRHAPHY UMBILICAL;  Surgeon: Yared Ma MD;  [facility]     IRRIGATION AND DEBRIDEMENT UPPER EXTREMITY, COMBINED Left 03/15/2016    Procedure: COMBINED IRRIGATION AND DEBRIDEMENT UPPER EXTREMITY;  Surgeon: Javier Yin DO;  [facility]     REMOVAL OF SPERM DUCT(S)      Vasectomy     [facility] UGI ENDOSCOPY, SIMPLE EXAM  10/31/2007       Family History   Problem Relation Age of Onset     Cancer Mother         ovarian cancer     Asthma Mother      Hypertension Mother      Arthritis Mother       Genitourinary Problems Mother      Lipids Mother      Hyperlipidemia Mother      Other Cancer Mother      Cancer Sister         ovarian cancer     Hypertension Sister      Lipids Sister      Hyperlipidemia Sister      Other Cancer Sister      Anxiety Disorder Sister      Cancer Maternal Grandmother         stomach cancer     Asthma Father      Cardiovascular Father         heart attack; bypass x5, congenital heart disease     Heart Disease Father         heart attack; bypass x5, congenital heart disease     Diabetes Father      Hypertension Father      Cancer Father         prostate     Prostate Cancer Father      Circulatory Father         blood clots     Genitourinary Problems Father      Respiratory Father         emphysema; COPD     Hyperlipidemia Father      Other Cancer Father      Diabetes Maternal Grandfather      Diabetes Paternal Grandfather      Eye Disorder Paternal Grandfather         glaucoma     Hypertension Brother      Lipids Brother      Hyperlipidemia Brother      Hypertension Brother      Cardiovascular Brother         bypass x3     Heart Disease Brother         bypass x3     Lipids Brother      Hyperlipidemia Brother      Hypertension Sister      C.A.D. No family hx of      Cerebrovascular Disease No family hx of      Breast Cancer No family hx of      Cancer - colorectal No family hx of      Alzheimer Disease No family hx of      Blood Disease No family hx of      Gastrointestinal Disease No family hx of      Musculoskeletal Disorder No family hx of      Neurologic Disorder No family hx of      Thyroid Disease No family hx of        Social History     Socioeconomic History     Marital status:      Spouse name: Not on file     Number of children: Not on file     Years of education: Not on file     Highest education level: Not on file   Occupational History     Employer: [employer]     Comment: [employer]   Tobacco Use     Smoking status: Former     Packs/day: 1.50      Years: 10.00     Pack years: 15.00     Types: Cigarettes     Start date: 1982     Quit date: 6/15/1992     Years since quittin.4     Smokeless tobacco: Never   Vaping Use     Vaping Use: Never used   Substance and Sexual Activity     Alcohol use: Yes     Comment: 12 per week     Drug use: No     Sexual activity: Yes     Partners: Female     Birth control/protection: Surgical     Comment: vasectomy   Other Topics Concern     Parent/sibling w/ CABG, MI or angioplasty before 65F 55M? Not Asked   Social History Narrative     Not on file     Social Determinants of Health     Financial Resource Strain: Not on file   Food Insecurity: Not on file   Transportation Needs: Not on file   Physical Activity: Not on file   Stress: Not on file   Social Connections: Not on file   Intimate Partner Violence: Not on file   Housing Stability: Not on file       Current Outpatient Medications   Medication Sig Dispense Refill     meloxicam (MOBIC) 15 MG tablet Take 1 tablet (15 mg) by mouth daily 30 tablet 11     atenolol (TENORMIN) 25 MG tablet Take 1 tablet (25 mg) by mouth 2 times daily 180 tablet 1     atorvastatin (LIPITOR) 20 MG tablet Take 1 tablet by mouth once daily 90 tablet 0     Bioflavonoid Products (VITAMIN C) CHEW        bisacodyl (DULCOLAX) 5 MG EC tablet Take 2 tablets at 3 pm the day before your procedure. If your procedure is before 11 am, take 2 additional tablets at 11 pm. If your procedure is after 11 am, take 2 additional tablets at 6 am. For additional instructions refer to your colonoscopy prep instructions. 4 tablet 0     diclofenac (VOLTAREN) 1 % topical gel Apply 2 g topically 4 times daily (Patient not taking: No sig reported) 50 g 1     fluticasone (FLONASE) 50 MCG/ACT nasal spray Spray 1 spray into both nostrils daily (Patient not taking: No sig reported) 18.2 mL 1     lisinopril (ZESTRIL) 20 MG tablet Take 1/2 (one-half) tablet by mouth once daily 45 tablet 0     Multiple Vitamin (MULTIVITAMINS PO)  "Take  by mouth.       pantoprazole (PROTONIX) 40 MG EC tablet Take 1 tablet (40 mg) by mouth daily 30 min prior to breakfast 90 tablet 1     polyethylene glycol (GOLYTELY) 236 g suspension The night before the exam at 6 pm drink an 8-ounce glass every 15 minutes until the jug is half empty. If you arrive before 11 AM: Drink the other half of the Golytely jug at 11 PM night before procedure. If you arrive after 11 AM: Drink the other half of the Golytely jug at 6 AM day of procedure. For additional instructions refer to your colonoscopy prep instructions. 4000 mL 0       Allergies   Allergen Reactions     Hydrocodone Other (See Comments)     \"climbed the walls, very anxious, insomnia\"     Nsaids Other (See Comments)     Hx of stomach ulcers       REVIEW OF SYSTEMS:  CONSTITUTIONAL:  NEGATIVE for fever, chills, change in weight, not feeling tired  SKIN:  NEGATIVE for worrisome rashes, no skin lumps, no skin ulcers and no non-healing wounds  EYES:  NEGATIVE for vision changes or irritation.  ENT/MOUTH:  NEGATIVE.  No hearing loss, no hoarseness, no difficulty swallowing.  RESP:  NEGATIVE. No cough or shortness of breath.  BREAST:  NEGATIVE for masses, tenderness or discharge  CV:  NEGATIVE for chest pain, palpitations or peripheral edema  GI:  NEGATIVE for nausea, abdominal pain, heartburn, or change in bowel habits  :  Negative. No dysuria, no hematuria  MUSCULOSKELETAL:  See HPI above  NEURO:  NEGATIVE . No headaches, no dizziness,  no numbness  ENDOCRINE:  NEGATIVE for temperature intolerance, skin/hair changes  HEME/ALLERGY/IMMUNE:  NEGATIVE for bleeding problems  PSYCHIATRIC:  NEGATIVE. no anxiety, no depression.      Xray:  Xray images were independently visualized with the patient.  This shows good position of components.  No sign of loosening or wear.     Exam:  Vitals: /82   Pulse 74   Resp 18   Ht 1.74 m (5' 8.5\")   Wt 101.6 kg (224 lb)   BMI 33.56 kg/m    BMI= Body mass index is 33.56 " kg/m .  Range of motion right 0-105 degrees, left 0-115 degrees.  Alignment  Normal.  Stability:   Right       Lateral collateral ligament no laxity       Medial collateral ligament no laxity  Left:       Lateral collateral ligament no laxity       Medial collateral ligament no laxity  Wound:  Well healed.  Edema: None  Effusion: Minor - right knee.  There does appear to be synovial thickening in the knee.  Tenderness: Right Minor - medial joint line, lateral joint line, nasrin-patellar.       Left:  None  Sensation, motor, and circulation intact.    Assessment:    right total knee arthroplasty with persistent pain and synovial thickening  Plan:  Physical Therapy to work on strengthening.  I cannot find a source of pain.  I recommend he see Dr. Herman Gonzalez at [facility] for a second opinion regarding pain and swelling in knee.

## 2022-11-15 ENCOUNTER — HOSPITAL ENCOUNTER (OUTPATIENT)
Facility: AMBULATORY SURGERY CENTER | Age: 62
Discharge: HOME | End: 2022-11-15
Attending: INTERNAL MEDICINE | Admitting: INTERNAL MEDICINE
Payer: COMMERCIAL

## 2022-11-15 VITALS
HEART RATE: 87 BPM | OXYGEN SATURATION: 95 % | TEMPERATURE: 97 F | DIASTOLIC BLOOD PRESSURE: 87 MMHG | RESPIRATION RATE: 16 BRPM | SYSTOLIC BLOOD PRESSURE: 123 MMHG

## 2022-11-15 DIAGNOSIS — Z12.11 COLON CANCER SCREENING: Primary | ICD-10-CM

## 2022-11-15 LAB — COLONOSCOPY: NORMAL

## 2022-11-15 PROCEDURE — G8918 PT W/O PREOP ORDER IV AB PRO: HCPCS

## 2022-11-15 PROCEDURE — 45385 COLONOSCOPY W/LESION REMOVAL: CPT

## 2022-11-15 PROCEDURE — 88305 TISSUE EXAM BY PATHOLOGIST: CPT | Performed by: PATHOLOGY

## 2022-11-15 PROCEDURE — G8907 PT DOC NO EVENTS ON DISCHARG: HCPCS

## 2022-11-15 RX ORDER — NALOXONE HYDROCHLORIDE 0.4 MG/ML
0.2 INJECTION, SOLUTION INTRAMUSCULAR; INTRAVENOUS; SUBCUTANEOUS
Status: DISCONTINUED | OUTPATIENT
Start: 2022-11-15 | End: 2022-11-16 | Stop reason: HOSPADM

## 2022-11-15 RX ORDER — ONDANSETRON 2 MG/ML
4 INJECTION INTRAMUSCULAR; INTRAVENOUS EVERY 6 HOURS PRN
Status: DISCONTINUED | OUTPATIENT
Start: 2022-11-15 | End: 2022-11-16 | Stop reason: HOSPADM

## 2022-11-15 RX ORDER — LIDOCAINE 40 MG/G
CREAM TOPICAL
Status: DISCONTINUED | OUTPATIENT
Start: 2022-11-15 | End: 2022-11-16 | Stop reason: HOSPADM

## 2022-11-15 RX ORDER — NALOXONE HYDROCHLORIDE 0.4 MG/ML
0.4 INJECTION, SOLUTION INTRAMUSCULAR; INTRAVENOUS; SUBCUTANEOUS
Status: DISCONTINUED | OUTPATIENT
Start: 2022-11-15 | End: 2022-11-16 | Stop reason: HOSPADM

## 2022-11-15 RX ORDER — PROCHLORPERAZINE MALEATE 10 MG
10 TABLET ORAL EVERY 6 HOURS PRN
Status: DISCONTINUED | OUTPATIENT
Start: 2022-11-15 | End: 2022-11-16 | Stop reason: HOSPADM

## 2022-11-15 RX ORDER — FLUMAZENIL 0.1 MG/ML
0.2 INJECTION, SOLUTION INTRAVENOUS
Status: ACTIVE | OUTPATIENT
Start: 2022-11-15 | End: 2022-11-15

## 2022-11-15 RX ORDER — FENTANYL CITRATE 50 UG/ML
INJECTION, SOLUTION INTRAMUSCULAR; INTRAVENOUS PRN
Status: DISCONTINUED | OUTPATIENT
Start: 2022-11-15 | End: 2022-11-15 | Stop reason: HOSPADM

## 2022-11-15 RX ORDER — ONDANSETRON 4 MG/1
4 TABLET, ORALLY DISINTEGRATING ORAL EVERY 6 HOURS PRN
Status: DISCONTINUED | OUTPATIENT
Start: 2022-11-15 | End: 2022-11-16 | Stop reason: HOSPADM

## 2022-11-15 RX ORDER — ONDANSETRON 2 MG/ML
4 INJECTION INTRAMUSCULAR; INTRAVENOUS
Status: DISCONTINUED | OUTPATIENT
Start: 2022-11-15 | End: 2022-11-16 | Stop reason: HOSPADM

## 2022-11-15 NOTE — H&P
[facility] Anesthesia Pre-op History and Physical    Hugo JEYSON Longoria MRN# [id number]   Age: 61 year old YOB: 1960            Date of Exam 11/15/2022           Primary care provider: Bony Robin         Chief Complaint and/or Reason for Procedure:     History of colon polyps         Active problem list:     Patient Active Problem List    Diagnosis Date Noted     Chronic rhinitis 04/11/2022     Priority: Medium     Morbid obesity (H) 02/10/2021     Priority: Medium     S/P total knee replacement using cement, right 12/10/2019     Priority: Medium     Chronic right shoulder pain 01/30/2019     Priority: Medium     Primary osteoarthritis of right knee 03/13/2017     Priority: Medium     Advanced directives, counseling/discussion 01/09/2017     Priority: Medium     Info given       Complete tear of right rotator cuff 01/02/2017     Priority: Medium     Rupture long head biceps tendon, right, initial encounter 01/02/2017     Priority: Medium     Subacromial impingement of right shoulder 01/02/2017     Priority: Medium     Chronic gastric ulcer 10/28/2015     Priority: Medium     Seasonal allergic rhinitis 10/28/2015     Priority: Medium     History of gastric ulcer 07/16/2013     Priority: Medium     ACL tear 04/23/2013     Priority: Medium     Fatigue 03/12/2013     Priority: Medium     Hemorrhoids 12/08/2011     Priority: Medium     Erectile dysfunction 12/08/2011     Priority: Medium     HTN, goal below 140/90 12/08/2011     Priority: Medium     History of tobacco use: 1980 - 1990 09/29/2011     Priority: Medium     CARDIOVASCULAR SCREENING; LDL GOAL LESS THAN 130 10/31/2010     Priority: Medium     KERRY (generalized anxiety disorder) 07/06/2010     Priority: Medium     History of colonic polyps 11/13/2007     Priority: Medium     Problem list name updated by automated process. Provider to review              Medications (include herbals and vitamins):   Any Plavix use in the last 7 days?  No     Current Outpatient Medications   Medication Sig     bisacodyl (DULCOLAX) 5 MG EC tablet Take 2 tablets at 3 pm the day before your procedure. If your procedure is before 11 am, take 2 additional tablets at 11 pm. If your procedure is after 11 am, take 2 additional tablets at 6 am. For additional instructions refer to your colonoscopy prep instructions.     lisinopril (ZESTRIL) 20 MG tablet Take 1/2 (one-half) tablet by mouth once daily     polyethylene glycol (GOLYTELY) 236 g suspension The night before the exam at 6 pm drink an 8-ounce glass every 15 minutes until the jug is half empty. If you arrive before 11 AM: Drink the other half of the Golytely jug at 11 PM night before procedure. If you arrive after 11 AM: Drink the other half of the Golytely jug at 6 AM day of procedure. For additional instructions refer to your colonoscopy prep instructions.     atenolol (TENORMIN) 25 MG tablet Take 1 tablet (25 mg) by mouth 2 times daily     atorvastatin (LIPITOR) 20 MG tablet Take 1 tablet by mouth once daily     Bioflavonoid Products (VITAMIN C) CHEW      diclofenac (VOLTAREN) 1 % topical gel Apply 2 g topically 4 times daily (Patient not taking: No sig reported)     fluticasone (FLONASE) 50 MCG/ACT nasal spray Spray 1 spray into both nostrils daily (Patient not taking: No sig reported)     meloxicam (MOBIC) 15 MG tablet Take 1 tablet (15 mg) by mouth daily     Multiple Vitamin (MULTIVITAMINS PO) Take  by mouth.     pantoprazole (PROTONIX) 40 MG EC tablet Take 1 tablet (40 mg) by mouth daily 30 min prior to breakfast     Current Facility-Administered Medications   Medication     botulinum toxin type A (BOTOX) 100 units injection 100 Units     lidocaine (LMX4) kit     lidocaine 1 % 0.1-1 mL     ondansetron (ZOFRAN) injection 4 mg     sodium chloride (PF) 0.9% PF flush 3 mL     sodium chloride (PF) 0.9% PF flush 3 mL             Allergies:      Allergies   Allergen Reactions     Hydrocodone Other (See Comments)      "\"climbed the walls, very anxious, insomnia\"     Nsaids Other (See Comments)     Hx of stomach ulcers     Allergy to Latex? No  Allergy to tape?   No  Intolerances:             Physical Exam:   All vitals have been reviewed  Patient Vitals for the past 8 hrs:   BP Temp Temp src Resp SpO2   11/15/22 0702 119/80 97  F (36.1  C) Temporal 16 97 %     No intake/output data recorded.  Lungs:   No increased work of breathing, good air exchange, clear to auscultation bilaterally, no crackles or wheezing     Cardiovascular:   normal S1 and S2             Lab / Radiology Results:            Anesthetic risk and/or ASA classification:   2    Sara Calix, DO     "

## 2022-11-16 LAB
PATH REPORT.COMMENTS IMP SPEC: NORMAL
PATH REPORT.COMMENTS IMP SPEC: NORMAL
PATH REPORT.FINAL DX SPEC: NORMAL
PATH REPORT.GROSS SPEC: NORMAL
PATH REPORT.MICROSCOPIC SPEC OTHER STN: NORMAL
PATH REPORT.RELEVANT HX SPEC: NORMAL
PHOTO IMAGE: NORMAL

## 2022-12-07 ENCOUNTER — THERAPY VISIT (OUTPATIENT)
Dept: PHYSICAL THERAPY | Facility: CLINIC | Age: 62
End: 2022-12-07
Attending: ORTHOPAEDIC SURGERY
Payer: COMMERCIAL

## 2022-12-07 DIAGNOSIS — Z96.651 CHRONIC KNEE PAIN AFTER TOTAL REPLACEMENT OF RIGHT KNEE JOINT: ICD-10-CM

## 2022-12-07 DIAGNOSIS — M17.11 PRIMARY OSTEOARTHRITIS OF RIGHT KNEE: ICD-10-CM

## 2022-12-07 DIAGNOSIS — Z96.651 PAIN DUE TO TOTAL RIGHT KNEE REPLACEMENT, INITIAL ENCOUNTER (H): ICD-10-CM

## 2022-12-07 DIAGNOSIS — G89.29 CHRONIC KNEE PAIN AFTER TOTAL REPLACEMENT OF RIGHT KNEE JOINT: ICD-10-CM

## 2022-12-07 DIAGNOSIS — Z96.651 HISTORY OF ARTHROPLASTY OF RIGHT KNEE: ICD-10-CM

## 2022-12-07 DIAGNOSIS — T84.84XA PAIN DUE TO TOTAL RIGHT KNEE REPLACEMENT, INITIAL ENCOUNTER (H): ICD-10-CM

## 2022-12-07 DIAGNOSIS — M25.561 CHRONIC KNEE PAIN AFTER TOTAL REPLACEMENT OF RIGHT KNEE JOINT: ICD-10-CM

## 2022-12-07 PROCEDURE — 97535 SELF CARE MNGMENT TRAINING: CPT | Mod: GP | Performed by: PHYSICAL THERAPIST

## 2022-12-07 PROCEDURE — 97162 PT EVAL MOD COMPLEX 30 MIN: CPT | Mod: GP | Performed by: PHYSICAL THERAPIST

## 2022-12-07 ASSESSMENT — ACTIVITIES OF DAILY LIVING (ADL)
SWELLING: THE SYMPTOM AFFECTS MY ACTIVITY SLIGHTLY
STIFFNESS: THE SYMPTOM AFFECTS MY ACTIVITY SEVERELY
HOW_WOULD_YOU_RATE_THE_CURRENT_FUNCTION_OF_YOUR_KNEE_DURING_YOUR_USUAL_DAILY_ACTIVITIES_ON_A_SCALE_FROM_0_TO_100_WITH_100_BEING_YOUR_LEVEL_OF_KNEE_FUNCTION_PRIOR_TO_YOUR_INJURY_AND_0_BEING_THE_INABILITY_TO_PERFORM_ANY_OF_YOUR_USUAL_DAILY_ACTIVITIES?: 45
WALK: ACTIVITY IS MINIMALLY DIFFICULT
SQUAT: ACTIVITY IS VERY DIFFICULT
KNEE_ACTIVITY_OF_DAILY_LIVING_SUM: 32
GO UP STAIRS: ACTIVITY IS SOMEWHAT DIFFICULT
AS_A_RESULT_OF_YOUR_KNEE_INJURY,_HOW_WOULD_YOU_RATE_YOUR_CURRENT_LEVEL_OF_DAILY_ACTIVITY?: SEVERELY ABNORMAL
KNEE_ACTIVITY_OF_DAILY_LIVING_SCORE: 45.71
LIMPING: THE SYMPTOM AFFECTS MY ACTIVITY SLIGHTLY
STAND: ACTIVITY IS FAIRLY DIFFICULT
PAIN: THE SYMPTOM AFFECTS MY ACTIVITY SEVERELY
GO DOWN STAIRS: ACTIVITY IS SOMEWHAT DIFFICULT
GIVING WAY, BUCKLING OR SHIFTING OF KNEE: I DO NOT HAVE THE SYMPTOM
RISE FROM A CHAIR: ACTIVITY IS FAIRLY DIFFICULT
WEAKNESS: THE SYMPTOM AFFECTS MY ACTIVITY SEVERELY
KNEEL ON THE FRONT OF YOUR KNEE: ACTIVITY IS VERY DIFFICULT
RAW_SCORE: 32
SIT WITH YOUR KNEE BENT: ACTIVITY IS FAIRLY DIFFICULT
HOW_WOULD_YOU_RATE_THE_OVERALL_FUNCTION_OF_YOUR_KNEE_DURING_YOUR_USUAL_DAILY_ACTIVITIES?: SEVERELY ABNORMAL

## 2022-12-07 NOTE — PROGRESS NOTES
Physical Therapy Initial Evaluation  Subjective:  The history is provided by the patient. No  was used.   Patient Health History  Hugo JEYSON Longoria being seen for Chronic R Knee Pain.     Date of Onset: [place] 11/10/22.   Problem occurred: TKA 2019 followed by fall with tibia fracture   Pain score: Ranges 6-8/10.  General health as reported by patient is fair.  Pertinent medical history includes: high blood pressure and overweight.   Red flags:  None as reported by patient.  Medical allergies: other. Other medical allergies details: aspirin.   Surgeries include:  Orthopedic surgery and other. Other surgery history details: R TKA, R RCR, hernia.    Current medications:  High blood pressure medication. Other medications details: cholesterol, ulcer meds.    Current occupation is [employer]   Primary job tasks include:  Computer work and prolonged sitting.                  Therapist Generated HPI Evaluation         Type of problem:  Right knee.    This is a chronic condition.  Condition occurred with:  Degenerative joint disease.  Where condition occurred: other.  Patient reports pain:  Anterior.  Pain is described as aching (tightness) and is constant.  Pain is worse in the P.M..  Since onset symptoms are unchanged.  Associated symptoms:  Loss of motion/stiffness and loss of strength. Symptoms are exacerbated by ascending stairs, descending stairs, kneeling and bending/squatting  and relieved by nothing.    Previous treatment includes physical therapy. There was moderate improvement following previous treatment.  Work activity restrictions: seasonal work - not currently working with [employer].  Barriers include:  None as reported by patient.                        Objective:    Gait:    Gait Type:  Antalgic   Assistive Devices:  None      Flexibility/Screens:   Positive screens:  Knee                                                          Knee Evaluation:  ROM:  Strength:   "Normal    PROM    Hyperextension: Left: 5    Right:  0    Flexion: Left: 130    Right:  105        Ligament Testing:  Not Assessed                Special Tests:     Right knee positive for the following tests:  Patellar Compression  Palpation:      Right knee tenderness present at:  Patellar Superior and Patellar Inferior  Right knee tenderness not present at:  Medial Joint Line; Lateral Joint Line; Patellar Tendon; IT Band; Patellar Medial and Patellar Lateral  Edema:  Edema of the knee: General effusion anterior right knee.  Circumference:    10 cm Distal:  Left:  19\"  Right:  18.25\" (atrophy)  Joint Line:  Left:  16.5\"   Right:  17.5\" (edema)    Mobility Testing:  Not Assessed            Functional Testing:  not assessed                  General     ROS    Assessment/Plan:    Patient is a 61 year old male with right side knee complaints.    Patient has the following significant findings with corresponding treatment plan.                Diagnosis 1:  Chronic Knee Pain S/P TKA (2019 with fall causing fracture tibial plateau)  Pain -  hot/cold therapy, manual therapy, splint/taping/bracing/orthotics, self management, education and home program  Decreased ROM/flexibility - manual therapy, therapeutic exercise, therapeutic activity and home program  Decreased strength - therapeutic exercise, therapeutic activities and home program  Inflammation - self management/home program  Edema - cold therapy and self management/home program  Impaired gait - gait training and home program  Impaired muscle performance - neuro re-education and home program  Decreased function - therapeutic activities and home program    Therapy Evaluation Codes:   1) History comprised of:   Personal factors that impact the plan of care:      Time since onset of symptoms.    Comorbidity factors that impact the plan of care are:      High blood pressure and Overweight.     Medications impacting care: High blood pressure.  2) Examination of Body " Systems comprised of:   Body structures and functions that impact the plan of care:      Knee.   Activity limitations that impact the plan of care are:      Squatting/kneeling, Stairs, Standing and Sleeping.  3) Clinical presentation characteristics are:   Evolving/Changing.  4) Decision-Making    Moderate complexity using standardized patient assessment instrument and/or measureable assessment of functional outcome.  Cumulative Therapy Evaluation is: Moderate complexity.    Previous and current functional limitations:  (See Goal Flow Sheet for this information)    Short term and Long term goals: (See Goal Flow Sheet for this information)     Communication ability:  Patient appears to be able to clearly communicate and understand verbal and written communication and follow directions correctly.  Treatment Explanation - The following has been discussed with the patient:   RX ordered/plan of care  Anticipated outcomes  Possible risks and side effects  This patient would benefit from PT intervention to resume normal activities.   Rehab potential is fair.    Frequency:  1 X week, once daily  Duration:  for 12 weeks  Discharge Plan:  Achieve all LTG.  Independent in home treatment program.  Return to previous functional level by discharge.  Reach maximal therapeutic benefit.    Please refer to the daily flowsheet for treatment today, total treatment time and time spent performing 1:1 timed codes.

## 2022-12-16 DIAGNOSIS — M17.11 PRIMARY OSTEOARTHRITIS OF RIGHT KNEE: Primary | ICD-10-CM

## 2022-12-19 NOTE — PROGRESS NOTES
[facility]  Orthopaedic Surgery Consultation by Salvatore Bojorquez M.D.    Hugo Longoria MRN# [id number]   Age: 62 year old YOB: 1960     Requesting physician: Bony Suh     Background history:  DX:  1. Chronic right shoulder pain  2. Primary osteoarthritis of right knee  3. Complete tear of right rotator cuff  4. Rupture long head biceps tendon, right, initial encounter  5. Subacromial impingement of right shoulder  6. Morbid obesity (H)  7. Chronic gastric ulcer  8. History of gastric ulcer  9. Hypertension    TREATMENTS:  1. 5/29/13, Right knee arthrosc[facility]  [facility].  [facility]            History of Present Illness:   62 year old male who presents to our clinic for a second opinion in regards to persistent stiffness and pain after right total knee arthroplasty placed on 12/10/2019.  Patient states that his postsurgical phase was complicated by a fall 2 months after implantation of total knee arthroplasty during which he sustained a nondisplaced transverse proximal extra-articular tibial fracture.  This was managed nonsurgical.  During the healing phase of the fracture patient was unable to participate in physical therapy.  He states that he is having persistent stiffness and pain especially during deep flexion such as kneeling.  He denies significant night pain but does endorse initiation stiffness and soreness.  The knee appears to swell.  No clear pain upon weightbearing.  There is no clear mechanical or giving way symptoms.  No signs of wound healing issues, fevers or chills.  Patient has never been worked up for prosthetic joint infection.  To mitigate the pain is tried over-the-counter analgesics, physical therapy and recently started taping the knee.  This does appear to help with his pain.  Besides the deep flexion he is otherwise not  "severely limited in his daily activities.    Social:   Occupation: Works for [employer]  Living situation: Lives with wife   Hobbies / Sports: perlita, fishing, snowmobiling    Smoking: No  Alcohol: Yes  Illicit drug use: No         Physical Exam:     EXAMINATION pertinent findings:   PSYCH: Pleasant, healthy-appearing, alert, oriented x3, cooperative. Normal mood and affect.  VITAL SIGNS: Height 1.775 m (5' 9.88\"), weight 106.4 kg (234 lb 9.6 oz).  Reviewed nursing intake notes.   Body mass index is 33.78 kg/m .  RESP: non labored breathing   ABD: benign, soft, non-tender, no acute peritoneal findings  SKIN: grossly normal   LYMPHATIC: grossly normal, no adenopathy, no extremity edema  NEURO: grossly normal , no motor deficits  VASCULAR: satisfactory perfusion of all extremities   MUSCULOSKELETAL:   Alignment: Neutral  Gait: Normal.    Right hip exhibits a full range of motion.  No pain upon rotations.  Lasegue's test is negative.  No tenderness to palpation of greater trochanteric region    R knee: -0-0  .  Deep flexion is recognizably painful.  Straight leg raise +. No redness, warmth or skin changes present. Effusion Some. Ligamentously stable in ML direction in extension and some laxity in flexion..  Some laxity present in AP direction and flexion.  Normal PF tracking without crepitus. Apprehension -.     Right LE:   Thigh and leg compartments soft and compressible   +Quad/TA/GSC/FHL/EHL   SILT DP/SP/David/Saph/Tib nerve distributions   Palpable dorsalis pedis pulse          Data:   All laboratory data reviewed  All imaging studies reviewed by me personally.    XR knee right 12/21/2022:  My interpretation: Status post placement of right total knee arthroplasty.  Adequate sizing, orientation and fixation of components.  No signs of loosening, fractures or other complications.           Assessment and Plan:   Assessment:  62-year-old male presenting for a second opinion because of " persistent stiffness and pain after right total knee arthroplasty complicated by nondisplaced proximal tibia fracture.  Currently low index of suspicion for prosthetic joint infection, loosening of components, arthrofibrosis, malpositioning, malorientation of components.  There appears to be a discrepancy between flexion and extension stability with possibly associated inflammation.    Plan:  I extensively discussed my findings with the patient.  We discussed that the underlying instability in flexion may contribute to inflammation of the knee and some of the pain that he has been describing.  We discussed that there is a low index of suspicion for other pathologies such as infection or loosening.  We talked about the fact that technically we could address the discrepancy between flexion and extension but this would require at least a femoral component revision.  Given patient's current level of functioning and rather mild presentation without significant limitations in activities of daily living or quality of life it would not be my recommendation to pursue surgical intervention with the associated risks, complications and morbidity.  Since currently the taping and physical therapy appears to be helping it would be my recommendation to continue to pursue this.  Alternatively, external bracing could be considered.  Patient understands and agrees to the treatment plan as set forth.  We will follow-up with patient on an as-needed basis.    Thank you for your referral.    Salvatore Bojorquez MD, PhD     Adult Reconstruction  [facility] Department of Orthopaedic Surgery        DATA for DOCUMENTATION:         Past Medical History:     Patient Active Problem List   Diagnosis     History of colonic polyps     KERRY (generalized anxiety disorder)     CARDIOVASCULAR SCREENING; LDL GOAL LESS THAN 130     History of tobacco use: 1980 - 1990     Hemorrhoids     Erectile dysfunction     HTN, goal  below 140/90     Fatigue     ACL tear     History of gastric ulcer     Chronic gastric ulcer     Seasonal allergic rhinitis     Complete tear of right rotator cuff     Rupture long head biceps tendon, right, initial encounter     Subacromial impingement of right shoulder     Advanced directives, counseling/discussion     Primary osteoarthritis of right knee     Chronic right shoulder pain     S/P total knee replacement using cement, right     Morbid obesity (H)     Chronic rhinitis     Chronic knee pain after total replacement of right knee joint     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)     Overnight stay due to blood poisoning     Arthritis 2011     Cancer (H) 2/15/2022    Skin Cancer on Face     Gastric ulcer, unspecified as acute or chronic, without mention of hemorrhage or perforation      History of blood transfusion 2007     Hypertension        Also see scanned health assessment forms.       Past Surgical History:     Past Surgical History:   Procedure Laterality Date     ABDOMEN SURGERY       ARTHROPLASTY KNEE Right 12/10/2019    Procedure: Right knee replacement;  Surgeon: Javier Yin DO;  Location: [facility]     ARTHROSCOPY KNEE  05/29/2013    Procedure: ARTHROSCOPY KNEE;  Right knee arthroscopy with partial medial and partial lateral menisectomies;  Surgeon: Phani Mclaughlin MD;  Location: [facility]     ARTHROSCOPY SHOULDER BICEPS TENODESIS REPAIR Right 01/13/2017    Procedure: ARTHROSCOPY SHOULDER BICEPS TENODESIS REPAIR;  Surgeon: Javier Yin DO;  Location: [facility]     ARTHROSCOPY SHOULDER DECOMPRESSION Right 01/13/2017    Procedure: ARTHROSCOPY SHOULDER DECOMPRESSION;  Surgeon: Javier Yin DO;  Location: [facility]     ARTHROSCOPY SHOULDER ROTATOR CUFF REPAIR Right 01/13/2017    Procedure: ARTHROSCOPY SHOULDER ROTATOR  CUFF REPAIR;  Surgeon: Javier Yin DO;  Location: [facility]     BIOPSY       COLONOSCOPY  11/01/2007     COLONOSCOPY  12/12/2011     COLONOSCOPY N/A 11/18/2015    Procedure: COLONOSCOPY;  Surgeon: Migue Mclaughlin MD;  Location: [facility]     COLONOSCOPY N/A 03/22/2017    Procedure: COMBINED COLONOSCOPY, SINGLE OR MULTIPLE BIOPSY/POLYPECTOMY BY BIOPSY;  Surgeon: Salvatore Zepeda MD;  [facility]     COLONOSCOPY N/A 03/05/2019    Procedure: COLONOSCOPY;  Surgeon: Prakash Ervin DO;  [facility]: [facility]     COLONOSCOPY N/A 11/15/2022    Procedure: COLONOSCOPY, FLEXIBLE, WITH LESION REMOVAL USING SNARE;  Surgeon: Sara Calix DO;  Location: [facility]     COLONOSCOPY WITH CO2 INSUFFLATION N/A 01/12/2021    Procedure: COLONOSCOPY, WITH CO2 INSUFFLATION;  Surgeon: Juan Antonio Corcoran MD;  [facility]: [facility]     COLONOSCOPY WITH CO2 INSUFFLATION N/A 11/15/2022    Procedure: COLONOSCOPY, WITH CO2 INSUFFLATION;  Surgeon: Sara Calix DO;  [facility]: [facility]     COMBINED ESOPHAGOSCOPY, GASTROSCOPY, DUODENOSCOPY (EGD) WITH CO2 INSUFFLATION N/A 03/11/2021    Procedure: ESOPHAGOGASTRODUODENOSCOPY, WITH CO2 INSUFFLATION;  Surgeon: Sara Calix DO;  [facility]: [facility]     ENDOSCOPY  01/27/2012    [facility]     ESOPHAGOSCOPY, GASTROSCOPY, DUODENOSCOPY (EGD), COMBINED N/A 11/18/2015    Procedure: COMBINED ESOPHAGOSCOPY, GASTROSCOPY, DUODENOSCOPY (EGD), BIOPSY SINGLE OR MULTIPLE;  Surgeon: Migeu Mclaughlin MD;  Location:  GI     ESOPHAGOSCOPY, GASTROSCOPY, DUODENOSCOPY (EGD), COMBINED N/A 03/22/2017    Procedure: COMBINED ESOPHAGOSCOPY, GASTROSCOPY, DUODENOSCOPY (EGD), BIOPSY SINGLE OR MULTIPLE;  Surgeon: Salvatore Zepeda MD;  Location:  GI     ESOPHAGOSCOPY, GASTROSCOPY, DUODENOSCOPY (EGD), COMBINED N/A 03/11/2021    Procedure: Esophagogastroduodenoscopy, With Biopsy;  Surgeon: Sara Calix DO;  Location: [facility]     HERNIORRHAPHY UMBILICAL N/A 03/12/2015     Procedure: HERNIORRHAPHY UMBILICAL;  Surgeon: Yared Ma MD;  Location: [facility]     IRRIGATION AND DEBRIDEMENT UPPER EXTREMITY, COMBINED Left 03/15/2016    Procedure: COMBINED IRRIGATION AND DEBRIDEMENT UPPER EXTREMITY;  Surgeon: Javier Yin DO;  Location: [facility]     REMOVAL OF SPERM DUCT(S)      Vasectomy     ZZ UGI ENDOSCOPY, SIMPLE EXAM  10/31/2007            Social History:     Social History     Socioeconomic History     Marital status:      Spouse name: Not on file     Number of children: Not on file     Years of education: Not on file     Highest education level: Not on file   Occupational History     Employer: [employer]     Comment: [employer]   Tobacco Use     Smoking status: Former     Packs/day: 1.50     Years: 10.00     Pack years: 15.00     Types: Cigarettes     Start date: 1982     Quit date: 6/15/1992     Years since quittin.5     Smokeless tobacco: Never   Vaping Use     Vaping Use: Never used   Substance and Sexual Activity     Alcohol use: Yes     Comment: 12 per week     Drug use: No     Sexual activity: Yes     Partners: Female     Birth control/protection: Surgical     Comment: vasectomy   Other Topics Concern     Parent/sibling w/ CABG, MI or angioplasty before 65F 55M? Not Asked   Social History Narrative     Not on file     Social Determinants of Health     Financial Resource Strain: Not on file   Food Insecurity: Not on file   Transportation Needs: Not on file   Physical Activity: Not on file   Stress: Not on file   Social Connections: Not on file   Intimate Partner Violence: Not on file   Housing Stability: Not on file            Family History:       Family History   Problem Relation Age of Onset     Cancer Mother         ovarian cancer     Asthma Mother      Hypertension Mother      Arthritis Mother      Genitourinary Problems Mother      Lipids Mother      Hyperlipidemia Mother      Other Cancer Mother      Cancer Sister          ovarian cancer     Hypertension Sister      Lipids Sister      Hyperlipidemia Sister      Other Cancer Sister      Anxiety Disorder Sister      Cancer Maternal Grandmother         stomach cancer     Asthma Father      Cardiovascular Father         heart attack; bypass x5, congenital heart disease     Heart Disease Father         heart attack; bypass x5, congenital heart disease     Diabetes Father      Hypertension Father      Cancer Father         prostate     Prostate Cancer Father      Circulatory Father         blood clots     Genitourinary Problems Father      Respiratory Father         emphysema; COPD     Hyperlipidemia Father      Other Cancer Father      Diabetes Maternal Grandfather      Diabetes Paternal Grandfather      Eye Disorder Paternal Grandfather         glaucoma     Hypertension Brother      Lipids Brother      Hyperlipidemia Brother      Hypertension Brother      Cardiovascular Brother         bypass x3     Heart Disease Brother         bypass x3     Lipids Brother      Hyperlipidemia Brother      Hypertension Sister      C.A.D. No family hx of      Cerebrovascular Disease No family hx of      Breast Cancer No family hx of      Cancer - colorectal No family hx of      Alzheimer Disease No family hx of      Blood Disease No family hx of      Gastrointestinal Disease No family hx of      Musculoskeletal Disorder No family hx of      Neurologic Disorder No family hx of      Thyroid Disease No family hx of             Medications:     Current Outpatient Medications   Medication Sig     atenolol (TENORMIN) 25 MG tablet Take 1 tablet (25 mg) by mouth 2 times daily     atorvastatin (LIPITOR) 20 MG tablet Take 1 tablet by mouth once daily     Bioflavonoid Products (VITAMIN C) CHEW      lisinopril (ZESTRIL) 20 MG tablet Take 1/2 (one-half) tablet by mouth once daily     meloxicam (MOBIC) 15 MG tablet Take 1 tablet (15 mg) by mouth daily     Multiple Vitamin (MULTIVITAMINS PO) Take  by mouth.      pantoprazole (PROTONIX) 40 MG EC tablet Take 1 tablet (40 mg) by mouth daily 30 min prior to breakfast     bisacodyl (DULCOLAX) 5 MG EC tablet Take 2 tablets at 3 pm the day before your procedure. If your procedure is before 11 am, take 2 additional tablets at 11 pm. If your procedure is after 11 am, take 2 additional tablets at 6 am. For additional instructions refer to your colonoscopy prep instructions. (Patient not taking: Reported on 12/21/2022)     diclofenac (VOLTAREN) 1 % topical gel Apply 2 g topically 4 times daily (Patient not taking: Reported on 7/7/2022)     fluticasone (FLONASE) 50 MCG/ACT nasal spray Spray 1 spray into both nostrils daily (Patient not taking: Reported on 7/7/2022)     polyethylene glycol (GOLYTELY) 236 g suspension The night before the exam at 6 pm drink an 8-ounce glass every 15 minutes until the jug is half empty. If you arrive before 11 AM: Drink the other half of the Golytely jug at 11 PM night before procedure. If you arrive after 11 AM: Drink the other half of the Golytely jug at 6 AM day of procedure. For additional instructions refer to your colonoscopy prep instructions. (Patient not taking: Reported on 12/21/2022)     Current Facility-Administered Medications   Medication     botulinum toxin type A (BOTOX) 100 units injection 100 Units              Review of Systems:   A comprehensive 10 point review of systems (constitutional, ENT, cardiac, peripheral vascular, lymphatic, respiratory, GI, , Musculoskeletal, skin, Neurological) was performed and found to be negative except as described in this note.     See intake form completed by patient

## 2022-12-19 NOTE — TELEPHONE ENCOUNTER
DIAGNOSIS:  Primary osteoarthritis of right knee [M17.11]  History of arthroplasty of right knee [Z96.651]  Pain due to total right knee replacement, initial encounter (H) [T84.84XA, Z96.6]   APPOINTMENT DATE:    NOTES STATUS DETAILS   OFFICE NOTE from referring provider Internal 11/10/2022 - Juan Antonio Parada MD - [facility]   OFFICE NOTE from other specialist Internal 11/23/2021- Noé Davison MD - [facility]    10/29/2021 - Bony Robin MD - [facility] FP    03/08/2021 - Rebeka Holden PT - [facility] PT    More in Epic..   DISCHARGE REPORT from the ER Internal 03/21/2020 - [facility] ED   OPERATIVE REPORT Internal 12/10/2019 - Rt Knee Replacement   MEDICATION LIST Internal    IMPLANT RECORD/STICKER Internal    LABS     CBC/DIFF Internal 10/12/2022   MRI Internal    XRAYS (IMAGES & REPORTS) Internal

## 2022-12-20 ENCOUNTER — THERAPY VISIT (OUTPATIENT)
Dept: PHYSICAL THERAPY | Facility: CLINIC | Age: 62
End: 2022-12-20
Payer: COMMERCIAL

## 2022-12-20 DIAGNOSIS — Z96.651 CHRONIC KNEE PAIN AFTER TOTAL REPLACEMENT OF RIGHT KNEE JOINT: Primary | ICD-10-CM

## 2022-12-20 DIAGNOSIS — G89.29 CHRONIC KNEE PAIN AFTER TOTAL REPLACEMENT OF RIGHT KNEE JOINT: Primary | ICD-10-CM

## 2022-12-20 DIAGNOSIS — M25.561 CHRONIC KNEE PAIN AFTER TOTAL REPLACEMENT OF RIGHT KNEE JOINT: Primary | ICD-10-CM

## 2022-12-20 PROCEDURE — 97110 THERAPEUTIC EXERCISES: CPT | Mod: GP | Performed by: PHYSICAL THERAPIST

## 2022-12-20 PROCEDURE — 97535 SELF CARE MNGMENT TRAINING: CPT | Mod: GP | Performed by: PHYSICAL THERAPIST

## 2022-12-21 ENCOUNTER — PRE VISIT (OUTPATIENT)
Dept: ORTHOPEDICS | Facility: CLINIC | Age: 62
End: 2022-12-21

## 2022-12-21 ENCOUNTER — OFFICE VISIT (OUTPATIENT)
Dept: ORTHOPEDICS | Facility: CLINIC | Age: 62
End: 2022-12-21
Payer: COMMERCIAL

## 2022-12-21 ENCOUNTER — ANCILLARY PROCEDURE (OUTPATIENT)
Dept: GENERAL RADIOLOGY | Facility: CLINIC | Age: 62
End: 2022-12-21
Attending: STUDENT IN AN ORGANIZED HEALTH CARE EDUCATION/TRAINING PROGRAM
Payer: COMMERCIAL

## 2022-12-21 VITALS — WEIGHT: 234.6 LBS | BODY MASS INDEX: 33.58 KG/M2 | HEIGHT: 70 IN

## 2022-12-21 DIAGNOSIS — M17.11 PRIMARY OSTEOARTHRITIS OF RIGHT KNEE: ICD-10-CM

## 2022-12-21 DIAGNOSIS — T84.84XA PAIN DUE TO TOTAL RIGHT KNEE REPLACEMENT, INITIAL ENCOUNTER (H): ICD-10-CM

## 2022-12-21 DIAGNOSIS — Z96.651 HISTORY OF ARTHROPLASTY OF RIGHT KNEE: ICD-10-CM

## 2022-12-21 DIAGNOSIS — Z96.651 PAIN DUE TO TOTAL RIGHT KNEE REPLACEMENT, INITIAL ENCOUNTER (H): ICD-10-CM

## 2022-12-21 PROCEDURE — 73562 X-RAY EXAM OF KNEE 3: CPT | Mod: RT | Performed by: RADIOLOGY

## 2022-12-21 PROCEDURE — 99203 OFFICE O/P NEW LOW 30 MIN: CPT | Performed by: STUDENT IN AN ORGANIZED HEALTH CARE EDUCATION/TRAINING PROGRAM

## 2022-12-21 ASSESSMENT — KOOS JR
STRAIGHTENING KNEE FULLY: SEVERE
HOW SEVERE IS YOUR KNEE STIFFNESS AFTER FIRST WAKING IN MORNING: SEVERE
TWISING OR PIVOTING ON KNEE: SEVERE
STANDING UPRIGHT: SEVERE
GOING UP OR DOWN STAIRS: SEVERE
RISING FROM SITTING: MILD
BENDING TO THE FLOOR TO PICK UP OBJECT: MODERATE
KOOS JR SCORING: 42.28

## 2022-12-21 NOTE — LETTER
12/21/2022         RE: Hugo Longoria  [id number] [facility] [address]        Dear Colleague,    Thank you for referring your patient, Hugo Longoria, to the [facility]. Please see a copy of my visit note below.        [facility]  Orthopaedic Surgery Consultation by Salvatore Bojorquez M.D.    Hugo Longoria MRN# [id number]   Age: 62 year old YOB: 1960     Requesting physician: Bony Suh     Background history:  DX:  1. Chronic right shoulder pain  2. Primary osteoarthritis of right knee  3. Complete tear of right rotator cuff  4. Rupture long head biceps tendon, right, initial encounter  5. Subacromial impingement of right shoulder  6. Morbid obesity (H)  7. Chronic gastric ulcer  8. History of gastric ulcer  9. Hypertension    TREATMENTS:  1. 5/29/13, Right knee arthroscopy with partial medial and partial lateral menisectomies, Dr. Mclaughlin, [facility]  2.  [facility]            History of Present Illness:   62 year old male who presents to our clinic for a second opinion in regards to persistent stiffness and pain after right total knee arthroplasty placed on 12/10/2019.  Patient states that his postsurgical phase was complicated by a fall 2 months after implantation of total knee arthroplasty during which he sustained a nondisplaced transverse proximal extra-articular tibial fracture.  This was managed nonsurgical.  During the healing phase of the fracture patient was unable to participate in physical therapy.  He states that he is having persistent stiffness and pain especially during deep flexion such as kneeling.  He denies significant night pain but does endorse initiation stiffness and soreness.  The knee appears to swell.  No clear pain upon weightbearing.  There is no clear mechanical or giving way symptoms.  No signs of wound healing issues, fevers or chills.   "Patient has never been worked up for prosthetic joint infection.  To mitigate the pain is tried over-the-counter analgesics, physical therapy and recently started taping the knee.  This does appear to help with his pain.  Besides the deep flexion he is otherwise not severely limited in his daily activities.    Social:   Occupation: Works for [employer]  Living situation: Lives with wife   Hobbies / Sports: perlita, fishing, snowmobiling    Smoking: No  Alcohol: Yes  Illicit drug use: No         Physical Exam:     EXAMINATION pertinent findings:   PSYCH: Pleasant, healthy-appearing, alert, oriented x3, cooperative. Normal mood and affect.  VITAL SIGNS: Height 1.775 m (5' 9.88\"), weight 106.4 kg (234 lb 9.6 oz).  Reviewed nursing intake notes.   Body mass index is 33.78 kg/m .  RESP: non labored breathing   ABD: benign, soft, non-tender, no acute peritoneal findings  SKIN: grossly normal   LYMPHATIC: grossly normal, no adenopathy, no extremity edema  NEURO: grossly normal , no motor deficits  VASCULAR: satisfactory perfusion of all extremities   MUSCULOSKELETAL:   Alignment: Neutral  Gait: Normal.    Right hip exhibits a full range of motion.  No pain upon rotations.  Lasegue's test is negative.  No tenderness to palpation of greater trochanteric region    R knee: -0-0  .  Deep flexion is recognizably painful.  Straight leg raise +. No redness, warmth or skin changes present. Effusion Some. Ligamentously stable in ML direction in extension and some laxity in flexion..  Some laxity present in AP direction and flexion.  Normal PF tracking without crepitus. Apprehension -.     Right LE:   Thigh and leg compartments soft and compressible   +Quad/TA/GSC/FHL/EHL   SILT DP/SP/David/Saph/Tib nerve distributions   Palpable dorsalis pedis pulse          Data:   All laboratory data reviewed  All imaging studies reviewed by me personally.    XR knee right 12/21/2022:  My interpretation: Status post " placement of right total knee arthroplasty.  Adequate sizing, orientation and fixation of components.  No signs of loosening, fractures or other complications.           Assessment and Plan:   Assessment:  62-year-old male presenting for a second opinion because of persistent stiffness and pain after right total knee arthroplasty complicated by nondisplaced proximal tibia fracture.  Currently low index of suspicion for prosthetic joint infection, loosening of components, arthrofibrosis, malpositioning, malorientation of components.  There appears to be a discrepancy between flexion and extension stability with possibly associated inflammation.    Plan:  I extensively discussed my findings with the patient.  We discussed that the underlying instability in flexion may contribute to inflammation of the knee and some of the pain that he has been describing.  We discussed that there is a low index of suspicion for other pathologies such as infection or loosening.  We talked about the fact that technically we could address the discrepancy between flexion and extension but this would require at least a femoral component revision.  Given patient's current level of functioning and rather mild presentation without significant limitations in activities of daily living or quality of life it would not be my recommendation to pursue surgical intervention with the associated risks, complications and morbidity.  Since currently the taping and physical therapy appears to be helping it would be my recommendation to continue to pursue this.  Alternatively, external bracing could be considered.  Patient understands and agrees to the treatment plan as set forth.  We will follow-up with patient on an as-needed basis.    Thank you for your referral.    Salvatore Bojorquez MD, PhD     Adult Reconstruction  [facility] Department of Orthopaedic Surgery        DATA for DOCUMENTATION:         Past Medical History:      Patient Active Problem List   Diagnosis     History of colonic polyps     KERRY (generalized anxiety disorder)     CARDIOVASCULAR SCREENING; LDL GOAL LESS THAN 130     History of tobacco use: 1980 - 1990     Hemorrhoids     Erectile dysfunction     HTN, goal below 140/90     Fatigue     ACL tear     History of gastric ulcer     Chronic gastric ulcer     Seasonal allergic rhinitis     Complete tear of right rotator cuff     Rupture long head biceps tendon, right, initial encounter     Subacromial impingement of right shoulder     Advanced directives, counseling/discussion     Primary osteoarthritis of right knee     Chronic right shoulder pain     S/P total knee replacement using cement, right     Morbid obesity (H)     Chronic rhinitis     Chronic knee pain after total replacement of right knee joint     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)     Overnight stay due to blood poisoning     Arthritis 2011     Cancer (H) 2/15/2022    Skin Cancer on Face     Gastric ulcer, unspecified as acute or chronic, without mention of hemorrhage or perforation      History of blood transfusion 2007     Hypertension        Also see scanned health assessment forms.       Past Surgical History:     Past Surgical History:   Procedure Laterality Date     ABDOMEN SURGERY       ARTHROPLASTY KNEE Right 12/10/2019    Procedure: Right knee replacement;  Surgeon: Javier Yin DO;  Location:  OR     ARTHROSCOPY KNEE  05/29/2013    Procedure: ARTHROSCOPY KNEE;  Right knee arthroscopy with partial medial and partial lateral menisectomies;  Surgeon: Phani Mclaughlin MD;  Location:  OR     ARTHROSCOPY SHOULDER BICEPS TENODESIS REPAIR Right 01/13/2017    Procedure: ARTHROSCOPY SHOULDER BICEPS TENODESIS REPAIR;  Surgeon: Javier Yin DO;  Location:  OR      ARTHROSCOPY SHOULDER DECOMPRESSION Right 01/13/2017    Procedure: ARTHROSCOPY SHOULDER DECOMPRESSION;  Surgeon: Javier Yin DO;  Location: [facility] OR     ARTHROSCOPY SHOULDER ROTATOR CUFF REPAIR Right 01/13/2017    Procedure: ARTHROSCOPY SHOULDER ROTATOR CUFF REPAIR;  Surgeon: Javier Yin DO;  Location: [facility] OR     BIOPSY       COLONOSCOPY  11/01/2007     COLONOSCOPY  12/12/2011     COLONOSCOPY N/A 11/18/2015    Procedure: COLONOSCOPY;  Surgeon: Migue Mclaughlin MD;  Location: [facility] GI     COLONOSCOPY N/A 03/22/2017    Procedure: COMBINED COLONOSCOPY, SINGLE OR MULTIPLE BIOPSY/POLYPECTOMY BY BIOPSY;  Surgeon: Salvatore Zepeda MD;  Location: [facility] GI     COLONOSCOPY N/A 03/05/2019    Procedure: COLONOSCOPY;  Surgeon: Prakash Ervin DO;  Location: [facility]     COLONOSCOPY N/A 11/15/2022    Procedure: COLONOSCOPY, FLEXIBLE, WITH LESION REMOVAL USING SNARE;  Surgeon: Sara Calix DO;  Location: [facility] OR     COLONOSCOPY WITH CO2 INSUFFLATION N/A 01/12/2021    Procedure: COLONOSCOPY, WITH CO2 INSUFFLATION;  Surgeon: Juan Antonio Corcoran MD;  Location: [facility] OR     COLONOSCOPY WITH CO2 INSUFFLATION N/A 11/15/2022    Procedure: COLONOSCOPY, WITH CO2 INSUFFLATION;  Surgeon: Sara Calix DO;  Location: [facility] OR     COMBINED ESOPHAGOSCOPY, GASTROSCOPY, DUODENOSCOPY (EGD) WITH CO2 INSUFFLATION N/A 03/11/2021    Procedure: ESOPHAGOGASTRODUODENOSCOPY, WITH CO2 INSUFFLATION;  Surgeon: Sara Calix DO;  Location: [facility] OR     ENDOSCOPY  01/27/2012    [facility] [place]     ESOPHAGOSCOPY, GASTROSCOPY, DUODENOSCOPY (EGD), COMBINED N/A 11/18/2015    Procedure: COMBINED ESOPHAGOSCOPY, GASTROSCOPY, DUODENOSCOPY (EGD), BIOPSY SINGLE OR MULTIPLE;  Surgeon: Migue Mclaughlin MD;  Location:  GI     ESOPHAGOSCOPY, GASTROSCOPY, DUODENOSCOPY (EGD), COMBINED N/A 03/22/2017    Procedure: COMBINED ESOPHAGOSCOPY, GASTROSCOPY, DUODENOSCOPY (EGD), BIOPSY SINGLE OR MULTIPLE;  Surgeon:  Salvatore Zepeda MD;  Location: [facility]     ESOPHAGOSCOPY, GASTROSCOPY, DUODENOSCOPY (EGD), COMBINED N/A 2021    Procedure: Esophagogastroduodenoscopy, With Biopsy;  Surgeon: Sara Calix DO;  Location: [facility] OR     HERNIORRHAPHY UMBILICAL N/A 2015    Procedure: HERNIORRHAPHY UMBILICAL;  Surgeon: Yared Ma MD;  Location: [facility] OR     IRRIGATION AND DEBRIDEMENT UPPER EXTREMITY, COMBINED Left 03/15/2016    Procedure: COMBINED IRRIGATION AND DEBRIDEMENT UPPER EXTREMITY;  Surgeon: Javier Yin DO;  Location: [facility]     REMOVAL OF SPERM DUCT(S)      Vasectomy     [facility], SIMPLE EXAM  10/31/2007            Social History:     Social History     Socioeconomic History     Marital status:      Spouse name: Not on file     Number of children: Not on file     Years of education: Not on file     Highest education level: Not on file   Occupational History     Employer: [employer]     Comment: [employer]   Tobacco Use     Smoking status: Former     Packs/day: 1.50     Years: 10.00     Pack years: 15.00     Types: Cigarettes     Start date: 1982     Quit date: 6/15/1992     Years since quittin.5     Smokeless tobacco: Never   Vaping Use     Vaping Use: Never used   Substance and Sexual Activity     Alcohol use: Yes     Comment: 12 per week     Drug use: No     Sexual activity: Yes     Partners: Female     Birth control/protection: Surgical     Comment: vasectomy   Other Topics Concern     Parent/sibling w/ CABG, MI or angioplasty before 65F 55M? Not Asked   Social History Narrative     Not on file     Social Determinants of Health     Financial Resource Strain: Not on file   Food Insecurity: Not on file   Transportation Needs: Not on file   Physical Activity: Not on file   Stress: Not on file   Social Connections: Not on file   Intimate Partner Violence: Not on file   Housing Stability: Not on file            Family History:       Family History    Problem Relation Age of Onset     Cancer Mother         ovarian cancer     Asthma Mother      Hypertension Mother      Arthritis Mother      Genitourinary Problems Mother      Lipids Mother      Hyperlipidemia Mother      Other Cancer Mother      Cancer Sister         ovarian cancer     Hypertension Sister      Lipids Sister      Hyperlipidemia Sister      Other Cancer Sister      Anxiety Disorder Sister      Cancer Maternal Grandmother         stomach cancer     Asthma Father      Cardiovascular Father         heart attack; bypass x5, congenital heart disease     Heart Disease Father         heart attack; bypass x5, congenital heart disease     Diabetes Father      Hypertension Father      Cancer Father         prostate     Prostate Cancer Father      Circulatory Father         blood clots     Genitourinary Problems Father      Respiratory Father         emphysema; COPD     Hyperlipidemia Father      Other Cancer Father      Diabetes Maternal Grandfather      Diabetes Paternal Grandfather      Eye Disorder Paternal Grandfather         glaucoma     Hypertension Brother      Lipids Brother      Hyperlipidemia Brother      Hypertension Brother      Cardiovascular Brother         bypass x3     Heart Disease Brother         bypass x3     Lipids Brother      Hyperlipidemia Brother      Hypertension Sister      C.A.D. No family hx of      Cerebrovascular Disease No family hx of      Breast Cancer No family hx of      Cancer - colorectal No family hx of      Alzheimer Disease No family hx of      Blood Disease No family hx of      Gastrointestinal Disease No family hx of      Musculoskeletal Disorder No family hx of      Neurologic Disorder No family hx of      Thyroid Disease No family hx of             Medications:     Current Outpatient Medications   Medication Sig     atenolol (TENORMIN) 25 MG tablet Take 1 tablet (25 mg) by mouth 2 times daily     atorvastatin (LIPITOR) 20 MG tablet Take 1 tablet by mouth once daily      Bioflavonoid Products (VITAMIN C) CHEW      lisinopril (ZESTRIL) 20 MG tablet Take 1/2 (one-half) tablet by mouth once daily     meloxicam (MOBIC) 15 MG tablet Take 1 tablet (15 mg) by mouth daily     Multiple Vitamin (MULTIVITAMINS PO) Take  by mouth.     pantoprazole (PROTONIX) 40 MG EC tablet Take 1 tablet (40 mg) by mouth daily 30 min prior to breakfast     bisacodyl (DULCOLAX) 5 MG EC tablet Take 2 tablets at 3 pm the day before your procedure. If your procedure is before 11 am, take 2 additional tablets at 11 pm. If your procedure is after 11 am, take 2 additional tablets at 6 am. For additional instructions refer to your colonoscopy prep instructions. (Patient not taking: Reported on 12/21/2022)     diclofenac (VOLTAREN) 1 % topical gel Apply 2 g topically 4 times daily (Patient not taking: Reported on 7/7/2022)     fluticasone (FLONASE) 50 MCG/ACT nasal spray Spray 1 spray into both nostrils daily (Patient not taking: Reported on 7/7/2022)     polyethylene glycol (GOLYTELY) 236 g suspension The night before the exam at 6 pm drink an 8-ounce glass every 15 minutes until the jug is half empty. If you arrive before 11 AM: Drink the other half of the Golytely jug at 11 PM night before procedure. If you arrive after 11 AM: Drink the other half of the Golytely jug at 6 AM day of procedure. For additional instructions refer to your colonoscopy prep instructions. (Patient not taking: Reported on 12/21/2022)     Current Facility-Administered Medications   Medication     botulinum toxin type A (BOTOX) 100 units injection 100 Units              Review of Systems:   A comprehensive 10 point review of systems (constitutional, ENT, cardiac, peripheral vascular, lymphatic, respiratory, GI, , Musculoskeletal, skin, Neurological) was performed and found to be negative except as described in this note.     See intake form completed by patient

## 2022-12-28 ENCOUNTER — THERAPY VISIT (OUTPATIENT)
Dept: PHYSICAL THERAPY | Facility: CLINIC | Age: 62
End: 2022-12-28
Payer: COMMERCIAL

## 2022-12-28 DIAGNOSIS — G89.29 CHRONIC KNEE PAIN AFTER TOTAL REPLACEMENT OF RIGHT KNEE JOINT: Primary | ICD-10-CM

## 2022-12-28 DIAGNOSIS — M25.561 CHRONIC KNEE PAIN AFTER TOTAL REPLACEMENT OF RIGHT KNEE JOINT: Primary | ICD-10-CM

## 2022-12-28 DIAGNOSIS — Z96.651 CHRONIC KNEE PAIN AFTER TOTAL REPLACEMENT OF RIGHT KNEE JOINT: Primary | ICD-10-CM

## 2022-12-28 PROCEDURE — 97530 THERAPEUTIC ACTIVITIES: CPT | Mod: GP | Performed by: PHYSICAL THERAPIST

## 2022-12-28 PROCEDURE — 97110 THERAPEUTIC EXERCISES: CPT | Mod: GP | Performed by: PHYSICAL THERAPIST

## 2022-12-28 ASSESSMENT — ACTIVITIES OF DAILY LIVING (ADL)
AS_A_RESULT_OF_YOUR_KNEE_INJURY,_HOW_WOULD_YOU_RATE_YOUR_CURRENT_LEVEL_OF_DAILY_ACTIVITY?: ABNORMAL
WEAKNESS: THE SYMPTOM AFFECTS MY ACTIVITY MODERATELY
GO UP STAIRS: ACTIVITY IS MINIMALLY DIFFICULT
SWELLING: THE SYMPTOM AFFECTS MY ACTIVITY SLIGHTLY
KNEE_ACTIVITY_OF_DAILY_LIVING_SCORE: 60
STIFFNESS: THE SYMPTOM AFFECTS MY ACTIVITY MODERATELY
HOW_WOULD_YOU_RATE_THE_OVERALL_FUNCTION_OF_YOUR_KNEE_DURING_YOUR_USUAL_DAILY_ACTIVITIES?: ABNORMAL
RAW_SCORE: 42
SIT WITH YOUR KNEE BENT: ACTIVITY IS SOMEWHAT DIFFICULT
KNEE_ACTIVITY_OF_DAILY_LIVING_SUM: 42
GO DOWN STAIRS: ACTIVITY IS MINIMALLY DIFFICULT
GIVING WAY, BUCKLING OR SHIFTING OF KNEE: I DO NOT HAVE THE SYMPTOM
HOW_WOULD_YOU_RATE_THE_CURRENT_FUNCTION_OF_YOUR_KNEE_DURING_YOUR_USUAL_DAILY_ACTIVITIES_ON_A_SCALE_FROM_0_TO_100_WITH_100_BEING_YOUR_LEVEL_OF_KNEE_FUNCTION_PRIOR_TO_YOUR_INJURY_AND_0_BEING_THE_INABILITY_TO_PERFORM_ANY_OF_YOUR_USUAL_DAILY_ACTIVITIES?: 60
RISE FROM A CHAIR: ACTIVITY IS SOMEWHAT DIFFICULT
STAND: ACTIVITY IS SOMEWHAT DIFFICULT
LIMPING: THE SYMPTOM AFFECTS MY ACTIVITY SLIGHTLY
PAIN: THE SYMPTOM AFFECTS MY ACTIVITY SLIGHTLY
WALK: ACTIVITY IS MINIMALLY DIFFICULT
KNEEL ON THE FRONT OF YOUR KNEE: ACTIVITY IS VERY DIFFICULT
SQUAT: ACTIVITY IS FAIRLY DIFFICULT

## 2023-01-03 ENCOUNTER — THERAPY VISIT (OUTPATIENT)
Dept: PHYSICAL THERAPY | Facility: CLINIC | Age: 63
End: 2023-01-03
Payer: COMMERCIAL

## 2023-01-03 DIAGNOSIS — Z96.651 CHRONIC KNEE PAIN AFTER TOTAL REPLACEMENT OF RIGHT KNEE JOINT: Primary | ICD-10-CM

## 2023-01-03 DIAGNOSIS — M25.561 CHRONIC KNEE PAIN AFTER TOTAL REPLACEMENT OF RIGHT KNEE JOINT: Primary | ICD-10-CM

## 2023-01-03 DIAGNOSIS — G89.29 CHRONIC KNEE PAIN AFTER TOTAL REPLACEMENT OF RIGHT KNEE JOINT: Primary | ICD-10-CM

## 2023-01-03 PROCEDURE — 97530 THERAPEUTIC ACTIVITIES: CPT | Mod: GP | Performed by: PHYSICAL THERAPIST

## 2023-01-03 PROCEDURE — 97110 THERAPEUTIC EXERCISES: CPT | Mod: GP | Performed by: PHYSICAL THERAPIST

## 2023-01-17 ENCOUNTER — THERAPY VISIT (OUTPATIENT)
Dept: PHYSICAL THERAPY | Facility: CLINIC | Age: 63
End: 2023-01-17
Payer: COMMERCIAL

## 2023-01-17 DIAGNOSIS — Z96.651 CHRONIC KNEE PAIN AFTER TOTAL REPLACEMENT OF RIGHT KNEE JOINT: Primary | ICD-10-CM

## 2023-01-17 DIAGNOSIS — M25.561 CHRONIC KNEE PAIN AFTER TOTAL REPLACEMENT OF RIGHT KNEE JOINT: Primary | ICD-10-CM

## 2023-01-17 DIAGNOSIS — G89.29 CHRONIC KNEE PAIN AFTER TOTAL REPLACEMENT OF RIGHT KNEE JOINT: Primary | ICD-10-CM

## 2023-01-17 PROCEDURE — 97110 THERAPEUTIC EXERCISES: CPT | Mod: 59 | Performed by: PHYSICAL THERAPIST

## 2023-01-17 PROCEDURE — 97530 THERAPEUTIC ACTIVITIES: CPT | Mod: GP | Performed by: PHYSICAL THERAPIST

## 2023-01-19 ENCOUNTER — TELEPHONE (OUTPATIENT)
Dept: FAMILY MEDICINE | Facility: OTHER | Age: 63
End: 2023-01-19

## 2023-01-19 NOTE — TELEPHONE ENCOUNTER
Reason for Call:  Appointment Request    Patient requesting this type of appt:  Office visit    Requested provider: any provider    Reason patient unable to be scheduled: Not within requested timeframe    When does patient want to be seen/preferred time: 1-2 days    Comments: Patient called and is having stomach issues his medication is not working and his shortness of breath he has had he feels like its getting worse he declined triage but his hoping to get in this week if possible    Could we send this information to you in [facility] or would you prefer to receive a phone call?:   Patient would prefer a phone call   Okay to leave a detailed message?: Yes at Home number on file [phone] (home)    Call taken on 1/19/2023 at 9:50 AM by Sonja Jiménez

## 2023-01-24 ENCOUNTER — OFFICE VISIT (OUTPATIENT)
Dept: FAMILY MEDICINE | Facility: CLINIC | Age: 63
End: 2023-01-24
Payer: COMMERCIAL

## 2023-01-24 ENCOUNTER — THERAPY VISIT (OUTPATIENT)
Dept: PHYSICAL THERAPY | Facility: CLINIC | Age: 63
End: 2023-01-24
Payer: COMMERCIAL

## 2023-01-24 VITALS
BODY MASS INDEX: 34.22 KG/M2 | DIASTOLIC BLOOD PRESSURE: 70 MMHG | OXYGEN SATURATION: 97 % | HEIGHT: 70 IN | WEIGHT: 239 LBS | HEART RATE: 78 BPM | SYSTOLIC BLOOD PRESSURE: 116 MMHG | TEMPERATURE: 97.4 F

## 2023-01-24 DIAGNOSIS — M25.561 CHRONIC KNEE PAIN AFTER TOTAL REPLACEMENT OF RIGHT KNEE JOINT: Primary | ICD-10-CM

## 2023-01-24 DIAGNOSIS — Z87.11 HISTORY OF GASTRIC ULCER: ICD-10-CM

## 2023-01-24 DIAGNOSIS — Z87.891 HISTORY OF TOBACCO USE: ICD-10-CM

## 2023-01-24 DIAGNOSIS — Z86.0100 HISTORY OF COLONIC POLYPS: ICD-10-CM

## 2023-01-24 DIAGNOSIS — I10 HTN, GOAL BELOW 140/90: ICD-10-CM

## 2023-01-24 DIAGNOSIS — G89.29 CHRONIC KNEE PAIN AFTER TOTAL REPLACEMENT OF RIGHT KNEE JOINT: Primary | ICD-10-CM

## 2023-01-24 DIAGNOSIS — R00.2 PALPITATIONS: ICD-10-CM

## 2023-01-24 DIAGNOSIS — Z96.651 S/P TOTAL KNEE REPLACEMENT USING CEMENT, RIGHT: ICD-10-CM

## 2023-01-24 DIAGNOSIS — Z96.651 CHRONIC KNEE PAIN AFTER TOTAL REPLACEMENT OF RIGHT KNEE JOINT: Primary | ICD-10-CM

## 2023-01-24 DIAGNOSIS — K29.70 GASTRITIS WITHOUT BLEEDING, UNSPECIFIED CHRONICITY, UNSPECIFIED GASTRITIS TYPE: Primary | ICD-10-CM

## 2023-01-24 DIAGNOSIS — E66.01 MORBID OBESITY (H): ICD-10-CM

## 2023-01-24 PROCEDURE — 99214 OFFICE O/P EST MOD 30 MIN: CPT | Performed by: STUDENT IN AN ORGANIZED HEALTH CARE EDUCATION/TRAINING PROGRAM

## 2023-01-24 PROCEDURE — 97530 THERAPEUTIC ACTIVITIES: CPT | Mod: GP | Performed by: PHYSICAL THERAPIST

## 2023-01-24 PROCEDURE — 97110 THERAPEUTIC EXERCISES: CPT | Mod: 59 | Performed by: PHYSICAL THERAPIST

## 2023-01-24 RX ORDER — ATENOLOL 25 MG/1
25 TABLET ORAL DAILY
Qty: 180 TABLET | Refills: 1 | Status: SHIPPED | OUTPATIENT
Start: 2023-01-24 | End: 2023-06-01

## 2023-01-24 RX ORDER — OMEPRAZOLE 40 MG/1
40 CAPSULE, DELAYED RELEASE ORAL 2 TIMES DAILY
Qty: 60 CAPSULE | Refills: 0 | Status: SHIPPED | OUTPATIENT
Start: 2023-01-24 | End: 2023-02-09

## 2023-01-24 NOTE — PROGRESS NOTES
Assessment & Plan     Gastritis without bleeding, unspecified chronicity, unspecified gastritis type  History of gastric ulcer  History of colonic polyps  Most recent colonoscopy good.  Did not have EGD at that time.  Given worsening symptoms with pantoprazole change I will have him start omeprazole twice daily and decrease down to once daily after a month of symptoms controlled.  I suspect his continued alcohol use is contributing to worsening gastritis.  He has no other stool changes that would be concerning for bleeding ulcer currently.  Instructed him to stop NSAID use.  Also focus on diet changes and propping himself up at night.  I would like to see him back in 1 month to ensure improvement.  Sooner if new or worsening symptoms.  If persistent will need to repeat EGD.    Palpitations  Encourage patient to restart atenolol at decreased dose as he seemed to tolerate this well previously.  Would not discontinue given his runs of tachycardia in the past.  Plan for him to follow-up with cardiology.  - atenolol (TENORMIN) 25 MG tablet  Dispense: 180 tablet; Refill: 1    Morbid obesity (H)  Encourage increased exercise and improvement diet to help with his weight.    History of tobacco use: 1980 - 1990    S/P total knee replacement using cement, right    HTN, goal below 140/90  - atenolol (TENORMIN) 25 MG tablet  Dispense: 180 tablet; Refill: 1      Lowell Marshall MD  [facility] TAMMY Arias is a 62 year old, presenting for the following health issues:  Recheck Medication      History of Present Illness       Reason for visit:  Medication is not working  Symptom onset:  3-4 weeks ago  Symptom intensity:  Moderate  Symptom progression:  Staying the same  Had these symptoms before:  Yes  Has tried/received treatment for these symptoms:  Yes  Previous treatment was successful:  No  What makes it worse:  Yes  What makes it better:  No    He eats 0-1 servings of fruits and vegetables  "daily.He consumes 0 sweetened beverage(s) daily.He exercises with enough effort to increase his heart rate 9 or less minutes per day.  He exercises with enough effort to increase his heart rate 3 or less days per week.   He is taking medications regularly.     Patient note recurrent symptoms consistent with his previous gastritis and ulcers he has had in the past.  Does have an episode of bleeding ulcer previously.  He feels warm sensation and pressure but does not note classic reflux symptoms.  Patient is due, after he eats and when he lies down flat.  He does not have any other chest pain shortness of breath wheezing or exertional symptoms.  Patient with history of gastritis and alcohol use.  Did have EGD done almost 2 years ago which had some gastric polyps, gastritis with some superficial erosions.  Negative for H. pylori.  Was recommended to discontinue drinking and to omeprazole.  Symptoms have been well controlled until the last several months where things have been worse.  Was seen in October with similar symptoms and switch to pantoprazole.  No change in his symptoms which continue to be less controlled than they had in the past.  He is drinking several days per week but does note times without drinking in between.    Patient also dealing with ventricular tachycardia and following with cardiology.  He was started on atenolol for this should dramatically reduce events.  Have been having some palpitations with this but with repeat patching things improved with atenolol.  He did tolerate initial dose well but when this was increased he felt more tired on it.  This improved with stopping.    Review of Systems   Constitutional, HEENT, cardiovascular, pulmonary, gi and gu systems are negative, except as otherwise noted.      Objective    /70   Pulse 78   Temp 97.4  F (36.3  C) (Temporal)   Ht 1.778 m (5' 10\")   Wt 108.4 kg (239 lb)   SpO2 97%   BMI 34.29 kg/m    Body mass index is 34.29 " kg/m .  Physical Exam   GENERAL: healthy, alert and no distress  EYES: Eyes grossly normal to inspection, PERRL and conjunctivae and sclerae normal  HENT: nose and mouth without ulcers or lesions  NECK: no adenopathy, no asymmetry, masses, or scars and thyroid normal to palpation  RESP: lungs clear to auscultation - no rales, rhonchi or wheezes  CV: regular rate and rhythm, normal S1 S2, no S3 or S4, no murmur, click or rub, no peripheral edema and peripheral pulses strong  ABDOMEN: soft, nontender, no hepatosplenomegaly, no masses and bowel sounds normal  MS: no gross musculoskeletal defects noted, no edema  SKIN: no suspicious lesions or rashes  NEURO: Normal strength and tone, mentation intact and speech normal  PSYCH: mentation appears normal, affect normal/bright

## 2023-01-26 RX ORDER — LISINOPRIL 20 MG/1
TABLET ORAL
Qty: 45 TABLET | Refills: 0 | Status: SHIPPED | OUTPATIENT
Start: 2023-01-26 | End: 2023-04-03

## 2023-01-26 NOTE — TELEPHONE ENCOUNTER
We appreciate the opportunity to care for you and your family members.  In order to continue providing for your health care needs, please call [phone] to establish care with another provider.

## 2023-01-31 NOTE — PROGRESS NOTES
[facility] Dermatology Note  Encounter Date: Feb 1, 2023  Office Visit     Dermatology Problem List:  Last skin check 2/1/23, recommended every 6 months  1. History of NMSC  - BCC - left lateral forehead, s/p Mohs with intermediate repair 2/2/22  2. AKs, s/p cryo  3. ISKs, s/p cryo  4. Seborrheic dermatitis  - Current tx: ketoconazole 2% cream BID PRN  5. 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 Has a home in [place].  Family history: Mother, father, and cousins with unknown skin cancer.  ____________________________________________     Assessment & Plan:     1. History of nonmelanoma skin cancer, no clincial evidence of recurrence.   - ABCDEs: Counseled ABCDEs of melanoma: Asymmetry, Border (irregularity), Color (not uniform, changes in color), Diameter (greater than 6 mm which is about the size of a pencil eraser), and Evolving (any changes in preexisting moles).  - Sun protection: Counseled SPF30+ sunscreen, UPF clothing, sun avoidance, tanning bed avoidance.     2. Actinic keratosis - left chest x 1. Based on the location and chronic irritation to this lesion, treatment with cryotherapy is warranted.   - See cryo procedure note.     3. Seborrheic keratosis, symptomatic - left temporal scalp x 1, right upper eyelid x 1, right axilla x 2. Based on the location and chronic irritation to this lesion, treatment with cryotherapy is warranted.   - See cryo procedure note.     4. Seborrheic dermatitis - left postauricular  - Apply ketoconazole 2% cream BID as needed for flares.     5. Tinea pedis - bilateral 4th webspaces.   - Recommended OTC terbinafine cream for treatment if needed.       6. Multiple clinically banal-appearing melanocytic nevi: Chronic, stable  - No further intervention required. Patient to report changes.   - Patient reassured of the benign nature of these  lesions.    7. Benign findings including: seborrheic keratoses, solar lentigines, cherry angioma: minor, self limited problems.  - No further intervention required. Patient to report changes.   - Patient reassured of the benign nature of these lesions.      Procedures Performed:   - Cryotherapy procedure note, location(s): left parietal scalp, right upper eyelid, left chest. After verbal consent and discussion of risks and benefits including, but not limited to, dyspigmentation/scar, blister, and pain, 1 AK and 4 ISK(s) was(were) treated with 1-2 mm freeze border for 1-2 cycles with liquid nitrogen. Post cryotherapy instructions were provided.    Follow-up: 6 months(s) in-person for a skin check, or earlier for new or changing lesions    Staff and Scribe:     Scribe Disclosure:   I, Brett Rubio, am serving as a scribe to document services personally performed by this physician, Dr. Lonnie Rascon, based on data collection and the provider's statements to me.       Provider Disclosure:   The documentation recorded by the scribe accurately reflects the services I personally performed and the decisions made by me.    Lonnie Rascon MD   of Dermatology  Department of Dermatology  [facility]      ____________________________________________    CC: Derm Problem (Skin check, hx on NMSC, no areas of concern)    HPI:  Mr. Hugo Longoria is a(n) 62 year old male who presents today as a return patient for a skin check.    Last seen 8/3/22 for a skin check. At that time, pt was recommended to start benzoyl peroxide was for folliculitis on the chest and abdomen.     Today, he has no areas of concern.     Patient is otherwise feeling well, without additional skin concerns.    Labs Reviewed:  N/A    Physical Exam:  Vitals: There were no vitals taken for this visit.  SKIN: Total skin excluding the undergarment areas was performed. The exam included the head/face, neck,  both arms, chest, back, abdomen, both legs, digits and/or nails.   - There are dome shaped bright red papules on the trunk and extremities.   - Multiple regular brown pigmented macules and papules are identified on the trunk and extremities.   - Scattered brown macules on sun exposed areas.  - There are waxy stuck on tan to brown papules on the trunk and extremities.    - Well healed scar at site of previous NMSC, no repigmentation or nodularity noted.   - There are tan to brown waxy stuck on papules with surrounding erythema on the left temporal scalp x 1, right upper eyelid x 1, right axilla x 2.    - There are erythematous macules with overyling adherent scale on the left chest x 1.       - No other lesions of concern on areas examined.     Medications:  Current Outpatient Medications   Medication     atenolol (TENORMIN) 25 MG tablet     atorvastatin (LIPITOR) 20 MG tablet     Bioflavonoid Products (VITAMIN C) CHEW     diclofenac (VOLTAREN) 1 % topical gel     fluticasone (FLONASE) 50 MCG/ACT nasal spray     lisinopril (ZESTRIL) 20 MG tablet     Multiple Vitamin (MULTIVITAMINS PO)     omeprazole (PRILOSEC) 40 MG DR capsule     bisacodyl (DULCOLAX) 5 MG EC tablet     polyethylene glycol (GOLYTELY) 236 g suspension     Current Facility-Administered Medications   Medication     botulinum toxin type A (BOTOX) 100 units injection 100 Units      Past Medical History:   Patient Active Problem List   Diagnosis     History of colonic polyps     KERRY (generalized anxiety disorder)     CARDIOVASCULAR SCREENING; LDL GOAL LESS THAN 130     History of tobacco use: 1980 - 1990     Hemorrhoids     Erectile dysfunction     HTN, goal below 140/90     Fatigue     ACL tear     History of gastric ulcer     Chronic gastric ulcer     Seasonal allergic rhinitis     Complete tear of right rotator cuff     Rupture long head biceps tendon, right, initial encounter     Subacromial impingement of right shoulder     Advanced directives,  counseling/discussion     Primary osteoarthritis of right knee     Chronic right shoulder pain     S/P total knee replacement using cement, right     Morbid obesity (H)     Chronic rhinitis     Chronic knee pain after total replacement of right knee joint     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)     Overnight stay due to blood poisoning     Arthritis 2011     Cancer (H) 2/15/2022    Skin Cancer on Face     Gastric ulcer, unspecified as acute or chronic, without mention of hemorrhage or perforation      History of blood transfusion 2007     Hypertension         CC No referring provider defined for this encounter. on close of this encounter.

## 2023-02-01 ENCOUNTER — OFFICE VISIT (OUTPATIENT)
Dept: DERMATOLOGY | Facility: CLINIC | Age: 63
End: 2023-02-01
Payer: COMMERCIAL

## 2023-02-01 DIAGNOSIS — Z85.828 HISTORY OF NONMELANOMA SKIN CANCER: ICD-10-CM

## 2023-02-01 DIAGNOSIS — D22.9 MULTIPLE MELANOCYTIC NEVI: ICD-10-CM

## 2023-02-01 DIAGNOSIS — B35.3 TINEA PEDIS OF BOTH FEET: ICD-10-CM

## 2023-02-01 DIAGNOSIS — L81.4 SOLAR LENTIGO: ICD-10-CM

## 2023-02-01 DIAGNOSIS — L21.9 DERMATITIS, SEBORRHEIC: ICD-10-CM

## 2023-02-01 DIAGNOSIS — L57.0 ACTINIC KERATOSES: Primary | ICD-10-CM

## 2023-02-01 DIAGNOSIS — D18.01 CHERRY ANGIOMA: ICD-10-CM

## 2023-02-01 DIAGNOSIS — L82.1 SEBORRHEIC KERATOSES: ICD-10-CM

## 2023-02-01 DIAGNOSIS — L82.0 SEBORRHEIC KERATOSIS, INFLAMED: ICD-10-CM

## 2023-02-01 PROCEDURE — 17110 DESTRUCTION B9 LES UP TO 14: CPT | Performed by: DERMATOLOGY

## 2023-02-01 PROCEDURE — 99214 OFFICE O/P EST MOD 30 MIN: CPT | Mod: 25 | Performed by: DERMATOLOGY

## 2023-02-01 PROCEDURE — 17000 DESTRUCT PREMALG LESION: CPT | Mod: XS | Performed by: DERMATOLOGY

## 2023-02-01 RX ORDER — KETOCONAZOLE 20 MG/G
CREAM TOPICAL 2 TIMES DAILY
Qty: 60 G | Refills: 11 | Status: SHIPPED | OUTPATIENT
Start: 2023-02-01

## 2023-02-01 NOTE — LETTER
2/1/2023         RE: Hugo Longoria  [address] [facility] [address]        Dear Colleague,    Thank you for referring your patient, Hugo Longoria, to the [facility]. Please see a copy of my visit note below.    [facility] Dermatology Note  Encounter Date: Feb 1, 2023  Office Visit     Dermatology Problem List:  Last skin check 2/1/23, recommended every 6 months  1. History of NMSC  - BCC - left lateral forehead, s/p Mohs with intermediate repair 2/2/22  2. AKs, s/p cryo  3. ISKs, s/p cryo  4. Seborrheic dermatitis  - Current tx: ketoconazole 2% cream BID PRN  5. 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 Has a home in [place].  Family history: Mother, father, and cousins with unknown skin cancer.  ____________________________________________     Assessment & Plan:     1. History of nonmelanoma skin cancer, no clincial evidence of recurrence.   - ABCDEs: Counseled ABCDEs of melanoma: Asymmetry, Border (irregularity), Color (not uniform, changes in color), Diameter (greater than 6 mm which is about the size of a pencil eraser), and Evolving (any changes in preexisting moles).  - Sun protection: Counseled SPF30+ sunscreen, UPF clothing, sun avoidance, tanning bed avoidance.     2. Actinic keratosis - left chest x 1. Based on the location and chronic irritation to this lesion, treatment with cryotherapy is warranted.   - See cryo procedure note.     3. Seborrheic keratosis, symptomatic - left temporal scalp x 1, right upper eyelid x 1, right axilla x 2. Based on the location and chronic irritation to this lesion, treatment with cryotherapy is warranted.   - See cryo procedure note.     4. Seborrheic dermatitis - left postauricular  - Apply ketoconazole 2% cream BID as needed for flares.     5. Tinea pedis - bilateral 4th webspaces.   -  Recommended OTC terbinafine cream for treatment if needed.       6. Multiple clinically banal-appearing melanocytic nevi: Chronic, stable  - No further intervention required. Patient to report changes.   - Patient reassured of the benign nature of these lesions.    7. Benign findings including: seborrheic keratoses, solar lentigines, cherry angioma: minor, self limited problems.  - No further intervention required. Patient to report changes.   - Patient reassured of the benign nature of these lesions.      Procedures Performed:   - Cryotherapy procedure note, location(s): left parietal scalp, right upper eyelid, left chest. After verbal consent and discussion of risks and benefits including, but not limited to, dyspigmentation/scar, blister, and pain, 1 AK and 4 ISK(s) was(were) treated with 1-2 mm freeze border for 1-2 cycles with liquid nitrogen. Post cryotherapy instructions were provided.    Follow-up: 6 months(s) in-person for a skin check, or earlier for new or changing lesions    Staff and Scribe:     Scribe Disclosure:   I, Brett Rubio, am serving as a scribe to document services personally performed by this physician, Dr. Lonnie Rascon, based on data collection and the provider's statements to me.       Provider Disclosure:   The documentation recorded by the scribe accurately reflects the services I personally performed and the decisions made by me.    Lonnie Rascon MD   of Dermatology  Department of Dermatology  [facility]      ____________________________________________    CC: Derm Problem (Skin check, hx on NMSC, no areas of concern)    HPI:  Mr. Hugo Longoria is a(n) 62 year old male who presents today as a return patient for a skin check.    Last seen 8/3/22 for a skin check. At that time, pt was recommended to start benzoyl peroxide was for folliculitis on the chest and abdomen.     Today, he has no areas of concern.     Patient is  otherwise feeling well, without additional skin concerns.    Labs Reviewed:  N/A    Physical Exam:  Vitals: There were no vitals taken for this visit.  SKIN: Total skin excluding the undergarment areas was performed. The exam included the head/face, neck, both arms, chest, back, abdomen, both legs, digits and/or nails.   - There are dome shaped bright red papules on the trunk and extremities.   - Multiple regular brown pigmented macules and papules are identified on the trunk and extremities.   - Scattered brown macules on sun exposed areas.  - There are waxy stuck on tan to brown papules on the trunk and extremities.    - Well healed scar at site of previous NMSC, no repigmentation or nodularity noted.   - There are tan to brown waxy stuck on papules with surrounding erythema on the left temporal scalp x 1, right upper eyelid x 1, right axilla x 2.    - There are erythematous macules with overyling adherent scale on the left chest x 1.       - No other lesions of concern on areas examined.     Medications:  Current Outpatient Medications   Medication     atenolol (TENORMIN) 25 MG tablet     atorvastatin (LIPITOR) 20 MG tablet     Bioflavonoid Products (VITAMIN C) CHEW     diclofenac (VOLTAREN) 1 % topical gel     fluticasone (FLONASE) 50 MCG/ACT nasal spray     lisinopril (ZESTRIL) 20 MG tablet     Multiple Vitamin (MULTIVITAMINS PO)     omeprazole (PRILOSEC) 40 MG DR capsule     bisacodyl (DULCOLAX) 5 MG EC tablet     polyethylene glycol (GOLYTELY) 236 g suspension     Current Facility-Administered Medications   Medication     botulinum toxin type A (BOTOX) 100 units injection 100 Units      Past Medical History:   Patient Active Problem List   Diagnosis     History of colonic polyps     KERRY (generalized anxiety disorder)     CARDIOVASCULAR SCREENING; LDL GOAL LESS THAN 130     History of tobacco use: 1980 - 1990     Hemorrhoids     Erectile dysfunction     HTN, goal below 140/90     Fatigue     ACL tear      History of gastric ulcer     Chronic gastric ulcer     Seasonal allergic rhinitis     Complete tear of right rotator cuff     Rupture long head biceps tendon, right, initial encounter     Subacromial impingement of right shoulder     Advanced directives, counseling/discussion     Primary osteoarthritis of right knee     Chronic right shoulder pain     S/P total knee replacement using cement, right     Morbid obesity (H)     Chronic rhinitis     Chronic knee pain after total replacement of right knee joint     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)     Overnight stay due to blood poisoning     Arthritis 2011     Cancer (H) 2/15/2022    Skin Cancer on Face     Gastric ulcer, unspecified as acute or chronic, without mention of hemorrhage or perforation      History of blood transfusion 2007     Hypertension         CC No referring provider defined for this encounter. on close of this encounter.      Again, thank you for allowing me to participate in the care of your patient.        Sincerely,        Lonnie Rascon MD

## 2023-02-01 NOTE — NURSING NOTE
Hugo Longoria's goals for this visit include:   Chief Complaint   Patient presents with     Derm Problem     Skin check, hx on NMSC, no areas of concern       He requests these members of his care team be copied on today's visit information: no    PCP: Bony Robin    Referring Provider:  No referring provider defined for this encounter.    There were no vitals taken for this visit.    Do you need any medication refills at today's visit? No    Swetha Zaman LPN

## 2023-02-01 NOTE — PATIENT INSTRUCTIONS
For the dry area behind your left ear, you can apply ketoconazole 2% cream to the area. You can use this two times daily when the area is active.       Cryotherapy    What is it?  Use of a very cold liquid, such as liquid nitrogen, to freeze and destroy abnormal skin cells that need to be removed    What should I expect?  Tenderness and redness  A small blister that might grow and fill with dark purple blood. There may be crusting.  More than one treatment may be needed if the lesions do not go away.    How do I care for the treated area?  Gently wash the area with your hands when bathing.  Use a thin layer of Vaseline to help with healing. You may use a Band-Aid.   The area should heal within 7-10 days and may leave behind a pink or lighter color.   Do not use an antibiotic or Neosporin ointment.   You may take acetaminophen (Tylenol) for pain.     Call your doctor if you have:  Severe pain  Signs of infection (warmth, redness, cloudy yellow drainage, and or a bad smell)  Questions or concerns    Who should I call with questions?      [facility]: [phone]      [facility]: [phone]      For urgent needs outside of business hours call the [facility] at [phone] and ask for the dermatology resident on call         Patient Education     Checking for Skin Cancer  You can find cancer early by checking your skin each month. There are 3 kinds of skin cancer. They are melanoma, basal cell carcinoma, and squamous cell carcinoma. Doing monthly skin checks is the best way to find new marks or skin changes. Follow the instructions below for checking your skin.   The ABCDEs of checking moles for melanoma   Check your moles or growths for signs of melanoma using ABCDE:   Asymmetry: the sides of the mole or growth don t match  Border: the edges are ragged, notched, or blurred  Color: the color within the mole or growth varies  Diameter: the mole or  growth is larger than 6 mm (size of a pencil eraser)  Evolving: the size, shape, or color of the mole or growth is changing (evolving is not shown in the images below)    Checking for other types of skin cancer  Basal cell carcinoma or squamous cell carcinoma have symptoms such as:     A spot or mole that looks different from all other marks on your skin  Changes in how an area feels, such as itching, tenderness, or pain  Changes in the skin's surface, such as oozing, bleeding, or scaliness  A sore that does not heal  New swelling or redness beyond the border of a mole    Who s at risk?  Anyone can get skin cancer. But you are at greater risk if you have:   Fair skin, light-colored hair, or light-colored eyes  Many moles or abnormal moles on your skin  A history of sunburns from sunlight or tanning beds  A family history of skin cancer  A history of exposure to radiation or chemicals  A weakened immune system  If you have had skin cancer in the past, you are at risk for recurring skin cancer.   How to check your skin  Do your monthly skin checkups in front of a full-length mirror. Check all parts of your body, including your:   Head (ears, face, neck, and scalp)  Torso (front, back, and sides)  Arms (tops, undersides, upper, and lower armpits)  Hands (palms, backs, and fingers, including under the nails)  Buttocks and genitals  Legs (front, back, and sides)  Feet (tops, soles, toes, including under the nails, and between toes)  If you have a lot of moles, take digital photos of them each month. Make sure to take photos both up close and from a distance. These can help you see if any moles change over time.   Most skin changes are not cancer. But if you see any changes in your skin, call your doctor right away. Only he or she can diagnose a problem. If you have skin cancer, seeing your doctor can be the first step toward getting the treatment that could save your life.   Karlee last reviewed this educational  content on 4/1/2019 2000-2020 [employer]. [address] [place] [address], [place] [address]. All rights reserved. This information is not intended as a substitute for professional medical care. Always follow your healthcare professional's instructions.       When should I call my doctor?  If you are worsening or not improving, please, contact us or seek urgent care as noted below.     Who should I call with questions (adults)?  [facility] (adult and pediatric): [phone]  [facility] (adult): [phone]  For urgent needs outside of business hours call the [facility] at [phone] and ask for the dermatology resident on call to be paged  If this is a medical emergency and you are unable to reach an ER, Call [phone]    Who should I call with questions (pediatric)?  [facility]- Pediatric Dermatology  Dr. Ju Aponte, Dr. Autumn Canchola, Dr. Chandni Villatoro, NEDA Mason, Dr. Erika Barker, Dr. Zoë Lott & Dr. Lonnie Cabrera  Non-urgent nurse triage line; [phone]- Dominique and Afshan RN Care Coordinators   Angelica (/Complex ) [phone]    If you need a prescription refill, please contact your pharmacy. Refills are approved or denied by our Physicians during normal business hours, Monday through Fridays  Per office policy, refills will not be granted if you have not been seen within the past year (or sooner depending on your child's condition)    Scheduling Information:  Pediatric Appointment Scheduling and Call Center [phone]  Radiology Scheduling- [phone]  Sedation Unit Scheduling- [phone]  [facility] Scheduling- General [phone]; Pediatric Dermatology [phone]  Main  Services: [phone]  Citizen of [place]: [phone]  Welsh: [phone]  Hmong/Dominican/[place] and [place] Islander: [phone]  Preadmission Nursing Department Fax Number: [phone] (Fax all  pre-operative paperwork to this number)    For urgent matters arising during evenings, weekends, or holidays that cannot wait for normal business hours please call [phone] and ask for the dermatology resident on call to be paged.

## 2023-02-03 ENCOUNTER — TELEPHONE (OUTPATIENT)
Dept: FAMILY MEDICINE | Facility: CLINIC | Age: 63
End: 2023-02-03

## 2023-02-03 NOTE — TELEPHONE ENCOUNTER
Prior Authorization Retail Medication Request    Medication/Dose: omeprazole (PRILOSEC) 40 MG DR capsule  ICD code (if different than what is on RX):  Gastritis without bleeding, unspecified chronicity, unspecified gastritis type [K29.70]  - Primary   Previously Tried and Failed:  na  Rationale:  na    Insurance Name:  [facility]  Insurance ID:  [id number]      Pharmacy Information (if different than what is on RX)  Name:  [facility]  Phone:  [phone]

## 2023-02-07 NOTE — TELEPHONE ENCOUNTER
PA Initiation    Medication: omeprazole (PRILOSEC) 40 MG DR capsule PA INITIATED  Insurance Company: [employer] ([facility]) - Phone [phone] Fax [phone]  Pharmacy Filling the Rx: [facility] [address] [facility] [address] [facility]  Filling Pharmacy Phone: [phone]  Filling Pharmacy Fax:    Start Date: 2/7/2023    Central Prior Authorization Team   Phone: [phone]

## 2023-02-09 DIAGNOSIS — K29.70 GASTRITIS WITHOUT BLEEDING, UNSPECIFIED CHRONICITY, UNSPECIFIED GASTRITIS TYPE: ICD-10-CM

## 2023-02-09 RX ORDER — OMEPRAZOLE 40 MG/1
CAPSULE, DELAYED RELEASE ORAL
Qty: 180 CAPSULE | Refills: 3 | Status: SHIPPED | OUTPATIENT
Start: 2023-02-09 | End: 2023-06-01

## 2023-02-09 NOTE — TELEPHONE ENCOUNTER
Prior Authorization Approval    Authorization Effective Date: 2/7/2023  Authorization Expiration Date: 2/7/2024  Medication: omeprazole (PRILOSEC) 40 MG DR dean RED APPROVED  Approved Dose/Quantity: 60/30 days  Reference #:     Insurance Company: [employer] ([facility]) - Phone [phone] Fax [phone]  Expected CoPay:       CoPay Card Available:      Foundation Assistance Needed:    Which Pharmacy is filling the prescription (Not needed for infusion/clinic administered): [facility] [address] [place] [id number] [facility]  Pharmacy Notified: Yes  Patient Notified: Comment:  Pharmacy will notify patient.

## 2023-02-09 NOTE — TELEPHONE ENCOUNTER
Prescription approved per [facility] Refill Protocol.  Rosette Richards RN on 2/9/2023 at 4:21 PM

## 2023-03-06 NOTE — PROGRESS NOTES
Subjective:  HPI  Physical Exam                    Objective:  System    Physical Exam    General     ROS    Assessment/Plan:    DISCHARGE REPORT    Progress reporting period is from 12/7/23 to 1/24/23.       SUBJECTIVE  Subjective changes noted by patient:  Per SOAP note 1/24/23: Hugo reports that the only pain he experiences is at night with sleeping. Everyday is different with R knee pain. Taping inconsistently. No longer taking anti-inflammatories.    Current Pain level: 0/10.     Initial Pain level: 6/10.   Changes in function:  Yes (See Goal flowsheet attached for changes in current functional level)  Adverse reaction to treatment or activity: None    OBJECTIVE  Changes noted in objective findings:  Per SOAP note 1/24/23:  Descends stairs reciprocally with limited time R LE.     ASSESSMENT/PLAN  Updated problem list and treatment plan: Diagnosis 1:  R Knee Pain   Pain -  manual therapy, self management, education and home program  Decreased strength - therapeutic exercise, therapeutic activities and home program  Inflammation - self management/home program and Gal[place]os Taping  Impaired muscle performance - neuro re-education and home program  Decreased function - therapeutic activities and home program  STG/LTGs have been met or progress has been made towards goals:  Yes (See Goal flow sheet completed today.)  Assessment of Progress: The patient's condition is improving.  The patient has not returned to therapy. Current status is unknown.  Patient is meeting short term goals and is progressing towards long term goals.  Self Management Plans:  Patient is independent in a home treatment program.  Patient is independent in self management of symptoms.  I have re-evaluated this patient and find that the nature, scope, duration and intensity of the therapy is appropriate for the medical condition of the patient.  Hugo continues to require the following intervention to meet STG and LTG's:  PT intervention is no  longer required to meet STG/LTG.    Recommendations:  This patient is ready to be discharged from therapy and continue their home treatment program.    Please refer to the daily flowsheet for treatment today, total treatment time and time spent performing 1:1 timed codes.

## 2023-04-03 ENCOUNTER — ANCILLARY PROCEDURE (OUTPATIENT)
Dept: GENERAL RADIOLOGY | Facility: CLINIC | Age: 63
End: 2023-04-03
Attending: FAMILY MEDICINE
Payer: COMMERCIAL

## 2023-04-03 ENCOUNTER — OFFICE VISIT (OUTPATIENT)
Dept: FAMILY MEDICINE | Facility: CLINIC | Age: 63
End: 2023-04-03
Payer: COMMERCIAL

## 2023-04-03 VITALS
BODY MASS INDEX: 34.36 KG/M2 | RESPIRATION RATE: 16 BRPM | DIASTOLIC BLOOD PRESSURE: 80 MMHG | HEIGHT: 70 IN | OXYGEN SATURATION: 95 % | HEART RATE: 66 BPM | TEMPERATURE: 97.9 F | WEIGHT: 240 LBS | SYSTOLIC BLOOD PRESSURE: 114 MMHG

## 2023-04-03 DIAGNOSIS — M79.642 BILATERAL HAND PAIN: ICD-10-CM

## 2023-04-03 DIAGNOSIS — I47.29 PAROXYSMAL VENTRICULAR TACHYCARDIA (H): ICD-10-CM

## 2023-04-03 DIAGNOSIS — M79.641 BILATERAL HAND PAIN: ICD-10-CM

## 2023-04-03 DIAGNOSIS — I10 HTN, GOAL BELOW 140/90: Primary | ICD-10-CM

## 2023-04-03 DIAGNOSIS — K29.70 GASTRITIS WITHOUT BLEEDING, UNSPECIFIED CHRONICITY, UNSPECIFIED GASTRITIS TYPE: ICD-10-CM

## 2023-04-03 DIAGNOSIS — Z13.6 CARDIOVASCULAR SCREENING; LDL GOAL LESS THAN 130: ICD-10-CM

## 2023-04-03 DIAGNOSIS — E66.01 MORBID OBESITY (H): ICD-10-CM

## 2023-04-03 PROBLEM — I47.20 PAROXYSMAL VENTRICULAR TACHYCARDIA (H): Status: ACTIVE | Noted: 2023-04-03

## 2023-04-03 LAB
ANION GAP SERPL CALCULATED.3IONS-SCNC: 2 MMOL/L (ref 3–14)
BUN SERPL-MCNC: 14 MG/DL (ref 7–30)
CALCIUM SERPL-MCNC: 8.9 MG/DL (ref 8.5–10.1)
CHLORIDE BLD-SCNC: 113 MMOL/L (ref 94–109)
CO2 SERPL-SCNC: 27 MMOL/L (ref 20–32)
CREAT SERPL-MCNC: 0.77 MG/DL (ref 0.66–1.25)
CRP SERPL-MCNC: 3.9 MG/L (ref 0–8)
ERYTHROCYTE [SEDIMENTATION RATE] IN BLOOD BY WESTERGREN METHOD: 5 MM/HR (ref 0–20)
GFR SERPL CREATININE-BSD FRML MDRD: >90 ML/MIN/1.73M2
GLUCOSE BLD-MCNC: 133 MG/DL (ref 70–99)
MAGNESIUM SERPL-MCNC: 2.2 MG/DL (ref 1.6–2.3)
POTASSIUM BLD-SCNC: 4.3 MMOL/L (ref 3.4–5.3)
SODIUM SERPL-SCNC: 142 MMOL/L (ref 133–144)

## 2023-04-03 PROCEDURE — 80048 BASIC METABOLIC PNL TOTAL CA: CPT | Performed by: FAMILY MEDICINE

## 2023-04-03 PROCEDURE — 86038 ANTINUCLEAR ANTIBODIES: CPT | Performed by: FAMILY MEDICINE

## 2023-04-03 PROCEDURE — 86200 CCP ANTIBODY: CPT | Performed by: FAMILY MEDICINE

## 2023-04-03 PROCEDURE — 99214 OFFICE O/P EST MOD 30 MIN: CPT | Performed by: FAMILY MEDICINE

## 2023-04-03 PROCEDURE — 86140 C-REACTIVE PROTEIN: CPT | Performed by: FAMILY MEDICINE

## 2023-04-03 PROCEDURE — 36415 COLL VENOUS BLD VENIPUNCTURE: CPT | Performed by: FAMILY MEDICINE

## 2023-04-03 PROCEDURE — 83735 ASSAY OF MAGNESIUM: CPT | Performed by: FAMILY MEDICINE

## 2023-04-03 PROCEDURE — 86431 RHEUMATOID FACTOR QUANT: CPT | Performed by: FAMILY MEDICINE

## 2023-04-03 PROCEDURE — 73130 X-RAY EXAM OF HAND: CPT | Mod: TC | Performed by: RADIOLOGY

## 2023-04-03 PROCEDURE — 85652 RBC SED RATE AUTOMATED: CPT | Performed by: FAMILY MEDICINE

## 2023-04-03 RX ORDER — LISINOPRIL 20 MG/1
10 TABLET ORAL DAILY
Qty: 45 TABLET | Refills: 0 | Status: CANCELLED | OUTPATIENT
Start: 2023-04-03

## 2023-04-03 RX ORDER — ATORVASTATIN CALCIUM 20 MG/1
20 TABLET, FILM COATED ORAL DAILY
Qty: 90 TABLET | Refills: 1 | Status: SHIPPED | OUTPATIENT
Start: 2023-04-03 | End: 2023-10-02

## 2023-04-03 ASSESSMENT — PAIN SCALES - GENERAL: PAINLEVEL: NO PAIN (0)

## 2023-04-03 NOTE — PROGRESS NOTES
Assessment & Plan   1. HTN, goal below 140/90: Well-controlled.  Experiencing lightheadedness at times.  Would like to get off of lisinopril.  Currently on atenolol to help control paroxysmal ventricular tachycardia runs.  Can trial off lisinopril and have recheck in 2 weeks.    2. CARDIOVASCULAR SCREENING; LDL GOAL LESS THAN 130: Continue statin therapy.  Reordered for patient.  - atorvastatin (LIPITOR) 20 MG tablet; Take 1 tablet (20 mg) by mouth daily  Dispense: 90 tablet; Refill: 1    3. Paroxysmal ventricular tachycardia (H): Check magnesium level as I do not see this has been checked in the past.  Prefer this greater than 2.  Recommend rechecking Holter monitor given recurrence of symptoms looking at runs of NSVT versus PAC/PVC load.  Pending this can be seen by cardiology to discuss increasing atenolol, meeting with EP and considering ablation, etc.  - Magnesium; Future  - Adult Leadless EKG Monitor 3 to 7 Days; Future  - Adult Cardiology Eval  Referral; Future    4. Gastritis without bleeding, unspecified chronicity, unspecified gastritis type: Patient can attempt to taper off of omeprazole to lowest effective dosing.  Discussed rebound symptoms occur of sudden abrupt stop of Prilosec occurs.  Discussed avoiding triggering foods and weight loss to improve symptoms.  - omeprazole (PRILOSEC) 20 MG DR capsule; Take 1 capsule (20 mg) by mouth daily To wean off of the 40 mg omeprazole as discussed  Dispense: 90 capsule; Refill: 0    5. Bilateral hand pain: Evidence of synovitis of MCP and CMC joints as noted.  Going on for multiple months.  X-ray negative.  Recommend checking autoimmune panel.  If negative could consider EMG or referral to rheumatology for consideration of seronegative rheumatoid arthritis.  - Rheumatoid factor; Future  - Cyclic Citrullinated Peptide Antibody IgG; Future  - Anti Nuclear Meg IgG by IFA with Reflex; Future  - CRP inflammation; Future  - Erythrocyte sedimentation rate auto;  Future  - Basic metabolic panel  (Ca, Cl, CO2, Creat, Gluc, K, Na, BUN); Future  - XR Hand Bilateral G/E 3 Views; Future  - Rheumatoid factor  - Cyclic Citrullinated Peptide Antibody IgG  - Anti Nuclear Meg IgG by IFA with Reflex  - CRP inflammation  - Erythrocyte sedimentation rate auto  - Basic metabolic panel  (Ca, Cl, CO2, Creat, Gluc, K, Na, BUN)    6. Morbid obesity (H): Encourage weight loss to help with gastritis, hypertension, etc.      Allen Rincon MD  [facility]    Disclaimer: This note consists of symbols derived from keyboarding, dictation and/or voice recognition software. As a result, there may be errors in the script that have gone undetected. Please consider this when interpreting information found in this chart.    Subjective   Hugo is a 62 year old, presenting for the following health issues:        4/3/2023     7:56 AM   Additional Questions   Roomed by Yk   Accompanied by self     History of Present Illness       Reason for visit:  Muscle stiffnessand joints  Symptom onset:  More than a month  Symptoms include:  Soar muscles and stiff joints  Symptom intensity:  Severe  Symptom progression:  Staying the same  Had these symptoms before:  No  What makes it worse:  Sleeping  What makes it better:  No    He eats 0-1 servings of fruits and vegetables daily.He consumes 1 sweetened beverage(s) daily.He exercises with enough effort to increase his heart rate 9 or less minutes per day.  He exercises with enough effort to increase his heart rate 3 or less days per week.   He is taking medications regularly.     6-7 months noticing hands going numb at night. Left > right. Right hand is hard to open at times. Also notices swelling and pain. Becoming unbearable.     Hypertension Follow-up      Do you check your blood pressure regularly outside of the clinic? No     Are you following a low salt diet? No    Are your blood pressures ever more than 140 on the top number  "(systolic) OR more   than 90 on the bottom number (diastolic), for example 140/90? n/a    Medication Followup of omeprazole    Taking Medication as prescribed: yes    Side Effects:  None    Medication Helping Symptoms:  Helps when taking it    Concern - Hand numbness  Onset: 6 months  Description: Numbness in both hands  Intensity: moderate  Progression of Symptoms:  worsening  Accompanying Signs & Symptoms: Pain in arm and hands when sleeping  Previous history of similar problem: None  Therapies tried and outcome: None    Notices palpitations 15 mins, 2-3 times per week. History of NSVT and PVC's. Follows with cardiology on atenolol.    Doesn't like how Lisinopril make him feel. Also wants to get off omeprazole. Currently on twice daily dosing [place] its not good to be on long term. EGD on 3/11/2021 showed benign gastric polyps and gastritis.    Review of Systems   Constitutional, HEENT, cardiovascular, pulmonary, GI, , musculoskeletal, neuro, skin, endocrine and psych systems are negative, except as otherwise noted.      Objective    /80   Pulse 66   Temp 97.9  F (36.6  C) (Temporal)   Resp 16   Ht 1.778 m (5' 10\")   Wt 108.9 kg (240 lb)   SpO2 95%   BMI 34.44 kg/m    Body mass index is 34.44 kg/m .  Physical Exam   General: Appears well and in no acute distress.  Cardiovascular: Regular rate and rhythm, normal S1 and S2 without murmur. No extra heartsounds or friction rub. Radial pulses present and equal bilaterally.  Respiratory: Lungs clear to auscultation bilaterally. No wheezing or crackles. No prolonged expiration. Symmetrical chest rise.  Musculoskeletal: Pain to palpation of CMC joints bilaterally and MCP joints of right hand digits 2 and 3. No gross extremity deformities. No peripheral edema. Normal muscle bulk.    Labs: pending      XR HAND BILATERAL G/E 3 VIEWS   4/3/2023 9:03 AM      HISTORY: Bilateral hand pain; Bilateral hand pain  COMPARISON: None.                                       "                                 IMPRESSION: Normal joint spaces and alignment. No fracture. No osseous  erosions are seen to suggest an erosive arthropathy.

## 2023-04-03 NOTE — RESULT ENCOUNTER NOTE
Please inform of results if patient has not viewed in [employer] within 3 business days.    ESR - One of your inflammatory marker lab results was normal.    Your other labs are pending.    Please call the clinic with any questions you may have.     Have a great day,    Dr. Wolfe

## 2023-04-03 NOTE — PATIENT INSTRUCTIONS
Important Takeaway Points From This Visit:  I would do 40 mg daily of the omeprazole in the morning, and 20 mg at night for 2 weeks.  Then decrease to 20 mg twice daily for 2 weeks.  Then decrease to 20 mg daily.    Stop your lisinopril medication and we will recheck your blood pressure in 2 weeks with a free nursing visit. I prefer you get a arm cuff for home blood pressure monitoring.    You can try topical lidocaine or Biofreeze, as well as the Voltaren gel you have.     You can still take 1000 mg of tylenol three times a day for pain and should not upset your stomach like ibuprofen.    Please call [phone] to schedule your holter monitor study.    As always, please call with any questions or concerns. I look forward to seeing you again soon!    Take care,  Dr. Rincon    Your current medication list is printed. Please keep this with you - it is helpful to bring this current list to any other medical appointments. It can also be helpful if you ever go to the emergency room or hospital.    If you had lab testing today we will call you with the results. The phone number we will call with your results is # [phone] (home) [phone] (work). If this is not the best number please call our clinic and change the number.    If you need any refills, please call your pharmacy and they will contact us.    If you have any further concerns or wish to schedule another appointment, please call our office at [phone].    If you have a medical emergency, please call [phone].    Thank you for coming to [facility]!

## 2023-04-03 NOTE — RESULT ENCOUNTER NOTE
Please inform of results if patient has not viewed in [employer] within 3 business days.    Your magnesium level was normal and at a good level at 2.2.    BMP - Your blood sugar was 133. Your kidney function and electrolytes were normal.    CRP - Your other inflammatory lab marker result was normal.    Your other labs are pending.    Please call the clinic with any questions you may have.     Have a great day,    Dr. Wolfe

## 2023-04-03 NOTE — RESULT ENCOUNTER NOTE
Please inform of results if patient has not viewed in [employer] within 3 business days.    Your hand xray was normal. Your labs are pending.    Please call the clinic with any questions you may have.     Have a great day,    Dr. Wolfe

## 2023-04-04 LAB — RHEUMATOID FACT SER NEPH-ACNC: <7 IU/ML

## 2023-04-04 NOTE — RESULT ENCOUNTER NOTE
Please inform of results if patient has not viewed in [employer] within 3 business days.    One of your labs specific for rheumatoid arthritis was normal. Other autoimmune tests are still pending.    Please call the clinic with any questions you may have.     Have a great day,    Dr. Wolfe

## 2023-04-05 LAB — ANA SER QL IF: NEGATIVE

## 2023-04-05 NOTE — RESULT ENCOUNTER NOTE
Disp trials of more near-- Discussed if she needs more dist we will need to go with a toric dist CL on OD. Disp 5 trials of   Oasys 1 day +0.75/+3.25. Eval 145. Use more drops. Please inform of results if patient has not viewed in [employer] within 3 business days.    Another autoimmune test (WILLIAM) was normal.     Your other labs are pending.    Please call the clinic with any questions you may have.     Have a great day,    Dr. Wolfe

## 2023-04-06 LAB — CCP AB SER IA-ACNC: 1.4 U/ML

## 2023-04-17 ENCOUNTER — ALLIED HEALTH/NURSE VISIT (OUTPATIENT)
Dept: FAMILY MEDICINE | Facility: CLINIC | Age: 63
End: 2023-04-17
Payer: COMMERCIAL

## 2023-04-17 VITALS — SYSTOLIC BLOOD PRESSURE: 136 MMHG | DIASTOLIC BLOOD PRESSURE: 72 MMHG

## 2023-04-17 DIAGNOSIS — I10 HTN, GOAL BELOW 140/90: Primary | ICD-10-CM

## 2023-04-17 PROCEDURE — 99207 PR NO CHARGE NURSE ONLY: CPT

## 2023-04-17 NOTE — PROGRESS NOTES
Chief Complaint   Patient presents with     Allied Health Visit     BP Check     Hugo JEYSON Longoria is a 62 year old patient who comes in today for a Blood Pressure check.  Initial BP:  /72      Data Unavailable  Disposition: follow-up as previously indicated by provider and results routed to provider.    Jasmin Mansfield CMA ([facility])

## 2023-04-17 NOTE — Clinical Note
Patient stated to call with any changes you would like.  Jasmin Mansfield CMA ([facility])

## 2023-04-28 ENCOUNTER — ANCILLARY PROCEDURE (OUTPATIENT)
Dept: CARDIOLOGY | Facility: CLINIC | Age: 63
End: 2023-04-28
Attending: FAMILY MEDICINE
Payer: COMMERCIAL

## 2023-04-28 DIAGNOSIS — I47.29 PAROXYSMAL VENTRICULAR TACHYCARDIA (H): ICD-10-CM

## 2023-04-28 PROCEDURE — 93248 EXT ECG>7D<15D REV&INTERPJ: CPT | Performed by: INTERNAL MEDICINE

## 2023-04-28 PROCEDURE — 93246 EXT ECG>7D<15D RECORDING: CPT | Performed by: INTERNAL MEDICINE

## 2023-04-28 NOTE — PATIENT INSTRUCTIONS
Patient has been prescribed a ZioPatch holter for 7 days.  Patient was instructed regarding the indication, function, care and prompt return of the ZioPatch holter monitor. The monitor, with S/N [id number],  was placed on the patient with instructions regarding care of the skin, electrodes, and monitor, as well as documentation in the patient diary. Patient demonstrated understanding of this information and agreed to call [employer] with further questions or concerns.

## 2023-05-24 ENCOUNTER — TELEPHONE (OUTPATIENT)
Dept: DERMATOLOGY | Facility: CLINIC | Age: 63
End: 2023-05-24
Payer: COMMERCIAL

## 2023-05-24 NOTE — TELEPHONE ENCOUNTER
[facility] Call Center    Phone Message    May a detailed message be left on voicemail: yes     Reason for Call: Symptoms or Concerns     If patient has red-flag symptoms, warm transfer to triage line    Current symptom or concern: Spot on right temple opposite from where he had skin cancer removed prior. Pt has appt. With Dr. Rascon 07/12/2023    Symptoms have been present for:  4 month(s)    Has patient previously been seen for this? No    By : Na    Date: NA    Are there any new or worsening symptoms? Yes: new spot      Action Taken: Other: Derm    Travel Screening: Not Applicable

## 2023-05-26 NOTE — TELEPHONE ENCOUNTER
Pt called, offered sooner appointment with a different provider. He did not want to see anyone else at this time. He will call back if he changes his mind and wants to be seen sooner.    Yolis Pearson RN on 5/26/2023 at 8:42 AM

## 2023-06-01 ENCOUNTER — OFFICE VISIT (OUTPATIENT)
Dept: FAMILY MEDICINE | Facility: CLINIC | Age: 63
End: 2023-06-01
Payer: COMMERCIAL

## 2023-06-01 VITALS
DIASTOLIC BLOOD PRESSURE: 72 MMHG | OXYGEN SATURATION: 98 % | BODY MASS INDEX: 34.73 KG/M2 | TEMPERATURE: 98.2 F | HEART RATE: 79 BPM | HEIGHT: 69 IN | WEIGHT: 234.5 LBS | SYSTOLIC BLOOD PRESSURE: 128 MMHG | RESPIRATION RATE: 16 BRPM

## 2023-06-01 DIAGNOSIS — I10 HTN, GOAL BELOW 140/90: Primary | ICD-10-CM

## 2023-06-01 DIAGNOSIS — E66.812 CLASS 2 OBESITY DUE TO EXCESS CALORIES WITHOUT SERIOUS COMORBIDITY WITH BODY MASS INDEX (BMI) OF 35.0 TO 35.9 IN ADULT: ICD-10-CM

## 2023-06-01 DIAGNOSIS — Z23 NEED FOR TDAP VACCINATION: ICD-10-CM

## 2023-06-01 DIAGNOSIS — Z87.11 HISTORY OF GASTRIC ULCER: ICD-10-CM

## 2023-06-01 DIAGNOSIS — E66.09 CLASS 2 OBESITY DUE TO EXCESS CALORIES WITHOUT SERIOUS COMORBIDITY WITH BODY MASS INDEX (BMI) OF 35.0 TO 35.9 IN ADULT: ICD-10-CM

## 2023-06-01 DIAGNOSIS — I47.29 PAROXYSMAL VENTRICULAR TACHYCARDIA (H): ICD-10-CM

## 2023-06-01 DIAGNOSIS — M25.562 ACUTE PAIN OF LEFT KNEE: ICD-10-CM

## 2023-06-01 DIAGNOSIS — I49.3 FREQUENT PVCS: ICD-10-CM

## 2023-06-01 PROCEDURE — 99214 OFFICE O/P EST MOD 30 MIN: CPT | Mod: 25 | Performed by: FAMILY MEDICINE

## 2023-06-01 PROCEDURE — 90715 TDAP VACCINE 7 YRS/> IM: CPT | Performed by: FAMILY MEDICINE

## 2023-06-01 PROCEDURE — 90471 IMMUNIZATION ADMIN: CPT | Performed by: FAMILY MEDICINE

## 2023-06-01 RX ORDER — ATENOLOL 25 MG/1
25 TABLET ORAL DAILY
Qty: 180 TABLET | Refills: 1 | Status: SHIPPED | OUTPATIENT
Start: 2023-06-01 | End: 2023-10-02

## 2023-06-01 ASSESSMENT — PAIN SCALES - GENERAL: PAINLEVEL: NO PAIN (0)

## 2023-06-01 NOTE — PROGRESS NOTES
Assessment & Plan   1. HTN, goal below 140/90  2. Paroxysmal ventricular tachycardia (H)  3. Frequent PVCs:  Hypertension controlled off of lisinopril.  Reviewed Holter monitor results with patient.  She is still having PVC burden of 10%.  Recommend seeing cardiology.  Referral given during last visit.  Patient was having lightheadedness on lisinopril.  Consider increasing atenolol, however cardiology was discussing EP consult and ablation.  Refills given for now and recommend he follow-up with them.  - atenolol (TENORMIN) 25 MG tablet; Take 1 tablet (25 mg) by mouth daily  Dispense: 180 tablet; Refill: 1    4. Acute pain of left knee: Discouraged use of meloxicam and other NSAIDs.  Tylenol is okay.    5. History of gastric ulcer: Discouraged use of NSAIDs.  Omeprazole filled.  Discussed weight loss as below.  - omeprazole (PRILOSEC) 20 MG DR capsule; Take 1 capsule (20 mg) by mouth 2 times daily  Dispense: 180 capsule; Refill: 0    6. Class 2 obesity due to excess calories without serious comorbidity with body mass index (BMI) of 35.0 to 35.9 in adult: Patient with class II obesity with comorbidities including GERD/gastric ulcers, osteoarthritis of the knee etc.  Would avoid phentermine given PVCs and nonsustained V. tach above.  Discussed oral medications available.  Discussed injectables.  He will check with insurance on coverage.    7. Need for Tdap vaccination  - TDAP VACCINE (Adacel, Boostrix)  [[id number]]  - VACCINE ADMINISTRATION, INITIAL       Follow-up Visit   Expected date:  Oct 04, 2023 (Approximate)      Follow Up Appointment Details:     Follow-up with whom?: Me    Follow-Up for what?: Chronic Disease f/u    Chronic Disease f/u: Hypertension    How?: In Person    Is this an as-needed follow-up?: No                  Allen Rincon MD  [facility]    Disclaimer: This note consists of symbols derived from keyboarding, dictation and/or voice recognition software. As a  result, there may be errors in the script that have gone undetected. Please consider this when interpreting information found in this chart.      Subjective   Hugo is a 62 year old, presenting for the following health issues:  Recheck Medication        6/1/2023     9:45 AM   Additional Questions   Roomed by Frank HEREDIA   Accompanied by n/a         6/1/2023     9:45 AM   Patient Reported Additional Medications   Patient reports taking the following new medications n/a     History of Present Illness       Hypertension: He presents for follow up of hypertension.  He does check blood pressure  regularly outside of the clinic. Outside blood pressures have been over 140/90. He does not follow a low salt diet.     Vascular Disease:  He presents for follow up of vascular disease.  He never takes nitroglycerin. He is not taking daily aspirin.    He eats 0-1 servings of fruits and vegetables daily.He consumes 0 sweetened beverage(s) daily.He exercises with enough effort to increase his heart rate 9 or less minutes per day.  He exercises with enough effort to increase his heart rate 3 or less days per week.   He is taking medications regularly.     Patient here to review holter monitor results and follow up hypertension after going off of lisinopril.    No side effects since discontinuing. Was having lightheadedness with use of the medication.    Holter monitor again showing runs of NSVT and PVC burden ~10%. Has not yet made cardiology follow up appointment, was given referral last visit. Were discussing EP consult/ablation. Last visit 7/21/22 and wanted to keep burden <10% to reduce risk of cardiomyopathy. No structural heart disease seen on stress echo dated 6/28/22.    He has also been dealing with left knee pain. Usually his right causes him issues, but this has been better with use of meloxicam prescribed by ortho. He was wondering if this medication could cause pain in his right knee. No trauma to this knee.    Hx of stomach  "ulcers. On Omeprazole. Last EGD 3/11/21.    Interested in weight loss medications.    Ok with tetanus shot.    Review of Systems   Constitutional, HEENT, cardiovascular, pulmonary, GI, , musculoskeletal, neuro, skin, endocrine and psych systems are negative, except as otherwise noted.      Objective    /72   Pulse 79   Temp 98.2  F (36.8  C) (Temporal)   Resp 16   Ht 1.743 m (5' 8.62\")   Wt 106.4 kg (234 lb 8 oz)   SpO2 98%   BMI 35.01 kg/m    Body mass index is 35.01 kg/m .  Physical Exam   General: Obese male. Appears well and in no acute distress.  Cardiovascular: Regular rate and rhythm, normal S1 and S2 without murmur. No extra heartsounds or friction rub.  Respiratory: Lungs clear to auscultation bilaterally. No wheezing or crackles.  Musculoskeletal: No gross extremity deformities. No peripheral edema. Normal muscle bulk.    Labs: none    Reviewed holter monitor results with patient.            "

## 2023-06-01 NOTE — PATIENT INSTRUCTIONS
Check with your insurance about the following medications and if they are covered/cost to you:  Wegovy (semaglutide) - Weekly shot  Ozempic (semaglutide) - Weekly shot  Trulicity (Dulaglutide) - Weekly shot  Mounjaro (Tirzepatide) - Weekly shot  Saxenda (Liraglutide) - Daily shot  Victoza (Liraglutide) - Daily shot4

## 2023-06-04 PROBLEM — E66.09 CLASS 2 OBESITY DUE TO EXCESS CALORIES WITHOUT SERIOUS COMORBIDITY WITH BODY MASS INDEX (BMI) OF 35.0 TO 35.9 IN ADULT: Status: ACTIVE | Noted: 2021-02-10

## 2023-06-04 PROBLEM — E66.812 CLASS 2 OBESITY DUE TO EXCESS CALORIES WITHOUT SERIOUS COMORBIDITY WITH BODY MASS INDEX (BMI) OF 35.0 TO 35.9 IN ADULT: Status: ACTIVE | Noted: 2021-02-10

## 2023-06-04 PROBLEM — I49.3 FREQUENT PVCS: Status: ACTIVE | Noted: 2023-06-04

## 2023-06-04 PROBLEM — E66.01 MORBID OBESITY (H): Status: RESOLVED | Noted: 2021-02-10 | Resolved: 2023-06-04

## 2023-06-04 PROBLEM — J31.0 CHRONIC RHINITIS: Status: RESOLVED | Noted: 2022-04-11 | Resolved: 2023-06-04

## 2023-06-04 PROBLEM — M17.11 PRIMARY OSTEOARTHRITIS OF RIGHT KNEE: Status: RESOLVED | Noted: 2017-03-13 | Resolved: 2023-06-04

## 2023-06-09 ENCOUNTER — TELEPHONE (OUTPATIENT)
Dept: FAMILY MEDICINE | Facility: CLINIC | Age: 63
End: 2023-06-09

## 2023-06-09 NOTE — TELEPHONE ENCOUNTER
Prior Authorization Retail Medication Request    Medication/Dose: Omeprazole 20mg DR capsules   ICD code (if different than what is on RX):    Previously Tried and Failed:    Rationale:      Insurance Name:    Insurance ID:        Pharmacy Information (if different than what is on RX)  Name:  [facility]  Phone:  [phone]

## 2023-06-12 ENCOUNTER — TELEPHONE (OUTPATIENT)
Dept: FAMILY MEDICINE | Facility: CLINIC | Age: 63
End: 2023-06-12
Payer: COMMERCIAL

## 2023-06-14 NOTE — TELEPHONE ENCOUNTER
Central Prior Authorization Team   Phone: [phone]      PA Initiation    Medication: OMEPRAZOLE 20 MG PO CPDR  Insurance Company: [employer] ([facility]) - Phone [phone] Fax [phone]  Pharmacy Filling the Rx: [facility] [facility] [id number] [facility]  Filling Pharmacy Phone: [phone]  Filling Pharmacy Fax:    Start Date: 6/14/2023

## 2023-06-19 NOTE — TELEPHONE ENCOUNTER
Prior Authorization Approval    Medication: OMEPRAZOLE 20 MG PO CPDR  Authorization Effective Date: 6/19/2023  Authorization Expiration Date: 6/19/2024  Approved Dose/Quantity:   Reference #:  [id number]  Insurance Company: [employer] ([facility]) - Phone [phone] Fax [phone]  Expected CoPay:       CoPay Card Available:      Financial Assistance Needed:   Which Pharmacy is filling the prescription: [facility] [address] [place] [id number] [facility]  Pharmacy Notified: Yes  Patient Notified: No-Pharmacy will notify patient when ready.

## 2023-07-11 NOTE — PROGRESS NOTES
[facility] Dermatology Note  Encounter Date: Jul 12, 2023  Office Visit     Dermatology Problem List:  Last skin check 7/12/23, recommended every 6 months  1. History of NMSC  - BCC - left lateral forehead, s/p Mohs with intermediate repair 2/2/22  2. AKs, s/p cryo  3. ISKs, s/p cryo  4. Seborrheic dermatitis  - Current tx: ketoconazole 2% cream BID PRN  5. 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 Has a home in [place].  Family history: Mother, father, and cousins with unknown skin cancer.  ____________________________________________     Assessment & Plan:     1. History of nonmelanoma skin cancer, no clinical evidence of recurrence.  - ABCDEs: Counseled ABCDEs of melanoma: Asymmetry, Border (irregularity), Color (not uniform, changes in color), Diameter (greater than 6 mm which is about the size of a pencil eraser), and Evolving (any changes in preexisting moles).  - Sun protection: Counseled SPF30+ sunscreen, UPF clothing, sun avoidance, tanning bed avoidance.   - Recommended regular skin checks.     2. Multiple clinically banal-appearing melanocytic nevi: Chronic, stable   -No further intervention required.   -Patient reassured of the benign nature of these lesions.      3. Benign lesions: Multiple benign nevi, solar lentigos, seborrheic keratoses, cherry angiomas. Explained to patient benign nature of lesion. No treatment is necessary at this time unless the lesion changes or becomes symptomatic.   - ABCDEs of melanoma were discussed and self skin checks were advised.  - Sun precaution was advised including the use of sun screens of SPF 30 or higher, sun protective clothing, and avoidance of tanning beds.    4. Seborrheic keratosis, symptomatic - right temple x1. Based on the location and chronic irritation to this lesion, treatment with cryotherapy is warranted.   - See cryo  procedure note.    5. Seborrheic dermatitis- ears. Pt reports using ketoconazole 2% cream with minimal improvement. Discussed additional treatment options such as starting tacrolimus 0.1% cream BID.   -Start tacrolimus 0. 1% cream BID.       Procedures Performed:   - Cryotherapy procedure note, location(s): right temple x1. After verbal consent and discussion of risks and benefits including, but not limited to, dyspigmentation/scar, blister, and pain, 1 ISK (s) was(were) treated with 1-2 mm freeze border for 1-2 cycles with liquid nitrogen. Post cryotherapy instructions were provided.       Follow-up: 6 month(s) in-person, or earlier for new or changing lesions    Staff and Scribe:     Scribe Disclosure:   I, Liat Howell am serving as a scribe to document services personally performed by this physician, Dr. Lonnie Rascon, based on data collection and the provider's statements to me.       Provider Disclosure:   The documentation recorded by the scribe accurately reflects the services I personally performed and the decisions made by me.    Lonnie Rascon MD   of Dermatology  Department of Dermatology  [facility]      ____________________________________________    CC: RECHECK (6 month follow up, hx of skin cancer)    HPI:  Mr. Hugo Longoria is a(n) 62 year old male who presents today as a return patient for a skin check. Pt reports there are spots on the scalp that are concerning.     Last seen 2/1/23 for a skin check. At that time 1 AK and 4 ISKs were treated with cryo.     Patient is otherwise feeling well, without additional skin concerns.    Labs Reviewed:  N/A    Physical Exam:  Vitals: There were no vitals taken for this visit.  SKIN: Total skin excluding the undergarment areas was performed. The exam included the head/face, neck, both arms, chest, back, abdomen, both legs, digits and/or nails.   -Well healed scar at site of previous NMSC, no  repigmentation or nodularity noted.  - There are tan to brown waxy stuck on papules with surrounding erythema on the right temple x1.  - There are waxy stuck on tan to brown papules on the trunk and extremities.    - Scattered brown macules on sun exposed areas.  - There are dome shaped bright red papules on the trunk and extremities.  - Multiple regular brown pigmented macules and papules are identified on the trunk and extremities.   -mild flaking and erythema in the ears  - No other lesions of concern on areas examined.     Medications:  Current Outpatient Medications   Medication     atenolol (TENORMIN) 25 MG tablet     atorvastatin (LIPITOR) 20 MG tablet     Bioflavonoid Products (VITAMIN C) CHEW     diclofenac (VOLTAREN) 1 % topical gel     fluticasone (FLONASE) 50 MCG/ACT nasal spray     ketoconazole (NIZORAL) 2 % external cream     Multiple Vitamin (MULTIVITAMINS PO)     omeprazole (PRILOSEC) 20 MG DR capsule     Current Facility-Administered Medications   Medication     botulinum toxin type A (BOTOX) 100 units injection 100 Units      Past Medical History:   Patient Active Problem List   Diagnosis     History of colonic polyps     KERRY (generalized anxiety disorder)     History of tobacco use: 1980 - 1990     Hemorrhoids     HTN, goal below 140/90     History of gastric ulcer     Seasonal allergic rhinitis     Complete tear of right rotator cuff     Rupture long head biceps tendon, right, initial encounter     Subacromial impingement of right shoulder     Advanced directives, counseling/discussion     Chronic right shoulder pain     S/P total knee replacement using cement, right     Class 2 obesity due to excess calories without serious comorbidity with body mass index (BMI) of 35.0 to 35.9 in adult     Chronic knee pain after total replacement of right knee joint     Paroxysmal ventricular tachycardia (H)     Frequent PVCs     Past Medical History:   Diagnosis Date     Abnormalities of size and form of  teeth     Removal of wisdom teeth     Accidental poisoning by agents primarily affecting blood constituents(E858.2)     Overnight stay due to blood poisoning     Arthritis 2011     Cancer (H) 2/15/2022    Skin Cancer on Face     Gastric ulcer, unspecified as acute or chronic, without mention of hemorrhage or perforation      History of blood transfusion 2007     Hypertension      Paroxysmal ventricular tachycardia (H) 4/3/2023        CC No referring provider defined for this encounter. on close of this encounter.

## 2023-07-12 ENCOUNTER — OFFICE VISIT (OUTPATIENT)
Dept: DERMATOLOGY | Facility: CLINIC | Age: 63
End: 2023-07-12
Payer: COMMERCIAL

## 2023-07-12 DIAGNOSIS — L82.1 SEBORRHEIC KERATOSIS: ICD-10-CM

## 2023-07-12 DIAGNOSIS — Z85.828 HISTORY OF NONMELANOMA SKIN CANCER: ICD-10-CM

## 2023-07-12 DIAGNOSIS — L21.9 DERMATITIS, SEBORRHEIC: ICD-10-CM

## 2023-07-12 DIAGNOSIS — D18.01 CHERRY ANGIOMA: ICD-10-CM

## 2023-07-12 DIAGNOSIS — L82.0 INFLAMED SEBORRHEIC KERATOSIS: Primary | ICD-10-CM

## 2023-07-12 DIAGNOSIS — D22.9 MULTIPLE MELANOCYTIC NEVI: ICD-10-CM

## 2023-07-12 DIAGNOSIS — L81.4 SOLAR LENTIGO: ICD-10-CM

## 2023-07-12 PROCEDURE — 99213 OFFICE O/P EST LOW 20 MIN: CPT | Mod: 25 | Performed by: DERMATOLOGY

## 2023-07-12 PROCEDURE — 17110 DESTRUCTION B9 LES UP TO 14: CPT | Performed by: DERMATOLOGY

## 2023-07-12 RX ORDER — TACROLIMUS 1 MG/G
OINTMENT TOPICAL 2 TIMES DAILY
Qty: 60 G | Refills: 3 | Status: SHIPPED | OUTPATIENT
Start: 2023-07-12

## 2023-07-12 NOTE — PATIENT INSTRUCTIONS
Patient Education     Checking for Skin Cancer  You can find cancer early by checking your skin each month. There are 3 kinds of skin cancer. They are melanoma, basal cell carcinoma, and squamous cell carcinoma. Doing monthly skin checks is the best way to find new marks or skin changes. Follow the instructions below for checking your skin.   The ABCDEs of checking moles for melanoma   Check your moles or growths for signs of melanoma using ABCDE:   Asymmetry: the sides of the mole or growth don t match  Border: the edges are ragged, notched, or blurred  Color: the color within the mole or growth varies  Diameter: the mole or growth is larger than 6 mm (size of a pencil eraser)  Evolving: the size, shape, or color of the mole or growth is changing (evolving is not shown in the images below)    Checking for other types of skin cancer  Basal cell carcinoma or squamous cell carcinoma have symptoms such as:     A spot or mole that looks different from all other marks on your skin  Changes in how an area feels, such as itching, tenderness, or pain  Changes in the skin's surface, such as oozing, bleeding, or scaliness  A sore that does not heal  New swelling or redness beyond the border of a mole    Who s at risk?  Anyone can get skin cancer. But you are at greater risk if you have:   Fair skin, light-colored hair, or light-colored eyes  Many moles or abnormal moles on your skin  A history of sunburns from sunlight or tanning beds  A family history of skin cancer  A history of exposure to radiation or chemicals  A weakened immune system  If you have had skin cancer in the past, you are at risk for recurring skin cancer.   How to check your skin  Do your monthly skin checkups in front of a full-length mirror. Check all parts of your body, including your:   Head (ears, face, neck, and scalp)  Torso (front, back, and sides)  Arms (tops, undersides, upper, and lower armpits)  Hands (palms, backs, and fingers, including  under the nails)  Buttocks and genitals  Legs (front, back, and sides)  Feet (tops, soles, toes, including under the nails, and between toes)  If you have a lot of moles, take digital photos of them each month. Make sure to take photos both up close and from a distance. These can help you see if any moles change over time.   Most skin changes are not cancer. But if you see any changes in your skin, call your doctor right away. Only he or she can diagnose a problem. If you have skin cancer, seeing your doctor can be the first step toward getting the treatment that could save your life.   [employer] last reviewed this educational content on 4/1/2019 2000-2020 [employer]. [address] [place] [address], [place] [address]. All rights reserved. This information is not intended as a substitute for professional medical care. Always follow your healthcare professional's instructions.       When should I call my doctor?  If you are worsening or not improving, please, contact us or seek urgent care as noted below.     Who should I call with questions (adults)?  [facility] (adult and pediatric): [phone]  [facility] (adult): [phone]  For urgent needs outside of business hours call the [facility] at [phone] and ask for the dermatology resident on call to be paged  If this is a medical emergency and you are unable to reach an ER, Call [phone]    Who should I call with questions (pediatric)?  [facility]- Pediatric Dermatology  Dr. Ju Aponte, Dr. Autumn Canchola, Dr. Chandni Villatoro, NEDA Mason, Dr. Erika Barker, Dr. Zoë Lott & Dr. Lonnie Cabrera  Non-urgent nurse triage line; [phone]- Dominique and Afshan SAGE Care Coordinatoraide Dominguez (/Complex ) [phone]    If you need a prescription refill, please contact your pharmacy. Refills are approved or denied by our  Physicians during normal business hours, Monday through Fridays  Per office policy, refills will not be granted if you have not been seen within the past year (or sooner depending on your child's condition)    Scheduling Information:  Pediatric Appointment Scheduling and Call Center [phone]  Radiology Scheduling- [phone]  Sedation Unit Scheduling- [phone]  [place] Scheduling- General [phone]; Pediatric Dermatology [phone]  Main  Services: [phone]  Turkmen: [phone]  Swedish: [phone]  Hmong/Marshallese/Yakut: [phone]  Preadmission Nursing Department Fax Number: [phone] (Fax all pre-operative paperwork to this number)    For urgent matters arising during evenings, weekends, or holidays that cannot wait for normal business hours please call [phone] and ask for the dermatology resident on call to be paged.                        Cryotherapy    What is it?  Use of a very cold liquid, such as liquid nitrogen, to freeze and destroy abnormal skin cells that need to be removed    What should I expect?  Tenderness and redness  A small blister that might grow and fill with dark purple blood. There may be crusting.  More than one treatment may be needed if the lesions do not go away.    How do I care for the treated area?  Gently wash the area with your hands when bathing.  Use a thin layer of Vaseline to help with healing. You may use a Band-Aid.   The area should heal within 7-10 days and may leave behind a pink or lighter color.   Do not use an antibiotic or Neosporin ointment.   You may take acetaminophen (Tylenol) for pain.     Call your doctor if you have:  Severe pain  Signs of infection (warmth, redness, cloudy yellow drainage, and or a bad smell)  Questions or concerns    Who should I call with questions?      [facility]: [phone]      [facility]: [phone]      For urgent needs  outside of business hours call the [facility] at [phone] and ask for the dermatology resident on call

## 2023-07-12 NOTE — NURSING NOTE
"Hugo Longoria's chief complaint for this visit includes:  Chief Complaint   Patient presents with     RECHECK     6 month follow up, hx of skin cancer     PCP: Allen Rincon    Referring Provider:  No referring provider defined for this encounter.    There were no vitals taken for this visit.  Data Unavailable        Allergies   Allergen Reactions     Hydrocodone Other (See Comments)     \"climbed the walls, very anxious, insomnia\"     Nsaids Other (See Comments)     Hx of stomach ulcers         Do you need any medication refills at today's visit? No    Mariam [facility]bar Clinic Visit Facilitator- Surgical Specialties       "

## 2023-07-12 NOTE — LETTER
7/12/2023         RE: Hugo Longoria  [address] [facility] [address]        Dear Colleague,    Thank you for referring your patient, Hugo Longoria, to the [facility]. Please see a copy of my visit note below.    [facility] Dermatology Note  Encounter Date: Jul 12, 2023  Office Visit     Dermatology Problem List:  Last skin check 7/12/23, recommended every 6 months  1. History of NMSC  - BCC - left lateral forehead, s/p Mohs with intermediate repair 2/2/22  2. AKs, s/p cryo  3. ISKs, s/p cryo  4. Seborrheic dermatitis  - Current tx: ketoconazole 2% cream BID PRN  5. 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 Has a home in [place].  Family history: Mother, father, and cousins with unknown skin cancer.  ____________________________________________     Assessment & Plan:     1. History of nonmelanoma skin cancer, no clinical evidence of recurrence.  - ABCDEs: Counseled ABCDEs of melanoma: Asymmetry, Border (irregularity), Color (not uniform, changes in color), Diameter (greater than 6 mm which is about the size of a pencil eraser), and Evolving (any changes in preexisting moles).  - Sun protection: Counseled SPF30+ sunscreen, UPF clothing, sun avoidance, tanning bed avoidance.   - Recommended regular skin checks.     2. Multiple clinically banal-appearing melanocytic nevi: Chronic, stable   -No further intervention required.   -Patient reassured of the benign nature of these lesions.      3. Benign lesions: Multiple benign nevi, solar lentigos, seborrheic keratoses, cherry angiomas. Explained to patient benign nature of lesion. No treatment is necessary at this time unless the lesion changes or becomes symptomatic.   - ABCDEs of melanoma were discussed and self skin checks were advised.  - Sun precaution was advised including the use of sun  screens of SPF 30 or higher, sun protective clothing, and avoidance of tanning beds.    4. Seborrheic keratosis, symptomatic - right temple x1. Based on the location and chronic irritation to this lesion, treatment with cryotherapy is warranted.   - See cryo procedure note.    5. Seborrheic dermatitis- ears. Pt reports using ketoconazole 2% cream with minimal improvement. Discussed additional treatment options such as starting tacrolimus 0.1% cream BID.   -Start tacrolimus 0. 1% cream BID.       Procedures Performed:   - Cryotherapy procedure note, location(s): right temple x1. After verbal consent and discussion of risks and benefits including, but not limited to, dyspigmentation/scar, blister, and pain, 1 ISK (s) was(were) treated with 1-2 mm freeze border for 1-2 cycles with liquid nitrogen. Post cryotherapy instructions were provided.       Follow-up: 6 month(s) in-person, or earlier for new or changing lesions    Staff and Scribe:     Scribe Disclosure:   I, Liat Howell am serving as a scribe to document services personally performed by this physician, Dr. Lonnie Rascon, based on data collection and the provider's statements to me.       Provider Disclosure:   The documentation recorded by the scribe accurately reflects the services I personally performed and the decisions made by me.    Lonnie Rascon MD   of Dermatology  Department of Dermatology  [facility]      ____________________________________________    CC: RECHECK (6 month follow up, hx of skin cancer)    HPI:  Mr. Hugo Longoria is a(n) 62 year old male who presents today as a return patient for a skin check. Pt reports there are spots on the scalp that are concerning.     Last seen 2/1/23 for a skin check. At that time 1 AK and 4 ISKs were treated with cryo.     Patient is otherwise feeling well, without additional skin concerns.    Labs Reviewed:  N/A    Physical Exam:  Vitals: There  were no vitals taken for this visit.  SKIN: Total skin excluding the undergarment areas was performed. The exam included the head/face, neck, both arms, chest, back, abdomen, both legs, digits and/or nails.   -Well healed scar at site of previous NMSC, no repigmentation or nodularity noted.  - There are tan to brown waxy stuck on papules with surrounding erythema on the right temple x1.  - There are waxy stuck on tan to brown papules on the trunk and extremities.    - Scattered brown macules on sun exposed areas.  - There are dome shaped bright red papules on the trunk and extremities.  - Multiple regular brown pigmented macules and papules are identified on the trunk and extremities.   -mild flaking and erythema in the ears  - No other lesions of concern on areas examined.     Medications:  Current Outpatient Medications   Medication     atenolol (TENORMIN) 25 MG tablet     atorvastatin (LIPITOR) 20 MG tablet     Bioflavonoid Products (VITAMIN C) CHEW     diclofenac (VOLTAREN) 1 % topical gel     fluticasone (FLONASE) 50 MCG/ACT nasal spray     ketoconazole (NIZORAL) 2 % external cream     Multiple Vitamin (MULTIVITAMINS PO)     omeprazole (PRILOSEC) 20 MG DR capsule     Current Facility-Administered Medications   Medication     botulinum toxin type A (BOTOX) 100 units injection 100 Units      Past Medical History:   Patient Active Problem List   Diagnosis     History of colonic polyps     KERRY (generalized anxiety disorder)     History of tobacco use: 1980 - 1990     Hemorrhoids     HTN, goal below 140/90     History of gastric ulcer     Seasonal allergic rhinitis     Complete tear of right rotator cuff     Rupture long head biceps tendon, right, initial encounter     Subacromial impingement of right shoulder     Advanced directives, counseling/discussion     Chronic right shoulder pain     S/P total knee replacement using cement, right     Class 2 obesity due to excess calories without serious comorbidity with  body mass index (BMI) of 35.0 to 35.9 in adult     Chronic knee pain after total replacement of right knee joint     Paroxysmal ventricular tachycardia (H)     Frequent PVCs     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)     Overnight stay due to blood poisoning     Arthritis 2011     Cancer (H) 2/15/2022    Skin Cancer on Face     Gastric ulcer, unspecified as acute or chronic, without mention of hemorrhage or perforation      History of blood transfusion 2007     Hypertension      Paroxysmal ventricular tachycardia (H) 4/3/2023        CC No referring provider defined for this encounter. on close of this encounter.    Again, thank you for allowing me to participate in the care of your patient.        Sincerely,        Lonnie Rascon MD

## 2023-09-12 ENCOUNTER — PATIENT OUTREACH (OUTPATIENT)
Dept: CARE COORDINATION | Facility: CLINIC | Age: 63
End: 2023-09-12
Payer: COMMERCIAL

## 2023-09-26 ENCOUNTER — PATIENT OUTREACH (OUTPATIENT)
Dept: CARE COORDINATION | Facility: CLINIC | Age: 63
End: 2023-09-26
Payer: COMMERCIAL

## 2023-10-02 ENCOUNTER — OFFICE VISIT (OUTPATIENT)
Dept: FAMILY MEDICINE | Facility: CLINIC | Age: 63
End: 2023-10-02
Payer: COMMERCIAL

## 2023-10-02 VITALS
WEIGHT: 234.5 LBS | RESPIRATION RATE: 16 BRPM | HEART RATE: 71 BPM | TEMPERATURE: 98.5 F | DIASTOLIC BLOOD PRESSURE: 80 MMHG | SYSTOLIC BLOOD PRESSURE: 124 MMHG | OXYGEN SATURATION: 96 % | HEIGHT: 69 IN | BODY MASS INDEX: 34.73 KG/M2

## 2023-10-02 DIAGNOSIS — I49.3 FREQUENT PVCS: ICD-10-CM

## 2023-10-02 DIAGNOSIS — I10 HTN, GOAL BELOW 140/90: ICD-10-CM

## 2023-10-02 DIAGNOSIS — Z23 NEED FOR PROPHYLACTIC VACCINATION AND INOCULATION AGAINST INFLUENZA: ICD-10-CM

## 2023-10-02 DIAGNOSIS — Z13.6 CARDIOVASCULAR SCREENING; LDL GOAL LESS THAN 130: Primary | ICD-10-CM

## 2023-10-02 DIAGNOSIS — I47.29 PAROXYSMAL VENTRICULAR TACHYCARDIA (H): ICD-10-CM

## 2023-10-02 DIAGNOSIS — Z96.651 HISTORY OF ARTHROPLASTY OF RIGHT KNEE: ICD-10-CM

## 2023-10-02 DIAGNOSIS — E66.01 CLASS 2 SEVERE OBESITY DUE TO EXCESS CALORIES WITH SERIOUS COMORBIDITY AND BODY MASS INDEX (BMI) OF 35.0 TO 35.9 IN ADULT (H): ICD-10-CM

## 2023-10-02 DIAGNOSIS — Z12.5 SCREENING FOR PROSTATE CANCER: ICD-10-CM

## 2023-10-02 DIAGNOSIS — Z87.11 HISTORY OF GASTRIC ULCER: ICD-10-CM

## 2023-10-02 DIAGNOSIS — E66.812 CLASS 2 SEVERE OBESITY DUE TO EXCESS CALORIES WITH SERIOUS COMORBIDITY AND BODY MASS INDEX (BMI) OF 35.0 TO 35.9 IN ADULT (H): ICD-10-CM

## 2023-10-02 LAB
ANION GAP SERPL CALCULATED.3IONS-SCNC: 12 MMOL/L (ref 7–15)
BUN SERPL-MCNC: 14 MG/DL (ref 8–23)
CALCIUM SERPL-MCNC: 9 MG/DL (ref 8.8–10.2)
CHLORIDE SERPL-SCNC: 105 MMOL/L (ref 98–107)
CHOLEST SERPL-MCNC: 173 MG/DL
CREAT SERPL-MCNC: 0.86 MG/DL (ref 0.67–1.17)
DEPRECATED HCO3 PLAS-SCNC: 25 MMOL/L (ref 22–29)
EGFRCR SERPLBLD CKD-EPI 2021: >90 ML/MIN/1.73M2
GLUCOSE SERPL-MCNC: 104 MG/DL (ref 70–99)
HDLC SERPL-MCNC: 46 MG/DL
LDLC SERPL CALC-MCNC: 66 MG/DL
NONHDLC SERPL-MCNC: 127 MG/DL
POTASSIUM SERPL-SCNC: 4 MMOL/L (ref 3.4–5.3)
PSA SERPL DL<=0.01 NG/ML-MCNC: 1.12 NG/ML (ref 0–4.5)
SODIUM SERPL-SCNC: 142 MMOL/L (ref 135–145)
TRIGL SERPL-MCNC: 305 MG/DL

## 2023-10-02 PROCEDURE — G0103 PSA SCREENING: HCPCS | Performed by: FAMILY MEDICINE

## 2023-10-02 PROCEDURE — 90471 IMMUNIZATION ADMIN: CPT | Performed by: FAMILY MEDICINE

## 2023-10-02 PROCEDURE — 90686 IIV4 VACC NO PRSV 0.5 ML IM: CPT | Performed by: FAMILY MEDICINE

## 2023-10-02 PROCEDURE — 80061 LIPID PANEL: CPT | Performed by: FAMILY MEDICINE

## 2023-10-02 PROCEDURE — 99214 OFFICE O/P EST MOD 30 MIN: CPT | Mod: 25 | Performed by: FAMILY MEDICINE

## 2023-10-02 PROCEDURE — 80048 BASIC METABOLIC PNL TOTAL CA: CPT | Performed by: FAMILY MEDICINE

## 2023-10-02 PROCEDURE — 36415 COLL VENOUS BLD VENIPUNCTURE: CPT | Performed by: FAMILY MEDICINE

## 2023-10-02 RX ORDER — ATENOLOL 25 MG/1
25 TABLET ORAL DAILY
Qty: 180 TABLET | Refills: 1 | Status: SHIPPED | OUTPATIENT
Start: 2023-10-02

## 2023-10-02 RX ORDER — ATORVASTATIN CALCIUM 20 MG/1
20 TABLET, FILM COATED ORAL DAILY
Qty: 90 TABLET | Refills: 1 | Status: SHIPPED | OUTPATIENT
Start: 2023-10-02

## 2023-10-02 RX ORDER — MELOXICAM 15 MG/1
15 TABLET ORAL DAILY
Qty: 30 TABLET | Refills: 11 | Status: SHIPPED | OUTPATIENT
Start: 2023-10-02

## 2023-10-02 ASSESSMENT — PAIN SCALES - GENERAL: PAINLEVEL: NO PAIN (0)

## 2023-10-02 NOTE — COMMUNITY RESOURCES LIST (ENGLISH)
10/02/2023   [facility] StatusNet [place]  N/A  For questions about this resource list or additional care needs, please contact your primary care clinic or care manager.  Phone: [phone]   Email: N/A   Address: [address] [place] [address]   Hours: N/A        Housing       Shelter for families  1  [facility] Homeless Shelter/Ljnzgfnl-cp-bku-last-preethi'igashaniceing Gii[place]amig Distance: 19.71 miles      In-Person   [id number] [place] [address]  Language: English  Hours: Mon - Sun Open 24 Hours  Fees: Free   Phone: (808) 892-4926 Email: [email or web address] Website: [email or web address]://[email or web address].Outline App/homeless-shelter/     2  [facility] Distance: 23.02 miles      In-Person   [address] [place] [address]  Language: English  Hours: Mon - Sun Open 24 Hours  Fees: Free   Phone: (185) 502-6027 Ext.2 Email: info@[facility] Website: https://www.[facility].org/     Shelter for individuals  3  [facility] Shelter/Fuuinznm-jj-fad-last-preethi'igashaniceing [place]ii[place]iiwigamig Distance: 19.71 miles      In-Person   [address] [place] [address]  Language: English  Hours: Mon - Sun Open 24 Hours  Fees: Free   Phone: (224) 614-3425 Email: [email or web address] [employer] Website: [email or web address]://[email or web address].org/homeless-shelter/          Important Numbers & Websites       Emergency Services   911  [facility]   311  Poison Control   (500) 647-3641  Suicide Prevention Lifeline   (987) 844-8724 (TALK)  Child Abuse Hotline   (507) 626-4658 (4-A-Child)  Sexual Assault Hotline   (154) 320-6722 (HOPE)  National Runaway Safeline   (347) 714-2258 (RUNAWAY)  All-Options Talkline   (927) 578-7799  Substance Abuse Referral   (687) 796-1495 (HELP)

## 2023-10-02 NOTE — RESULT ENCOUNTER NOTE
Please inform of results if patient has not viewed in [place] within 3 business days.    Lipids - Your cholesterol lab results were normal. Your triglycerides (fat particles) in your blood were a little high, likely from a previous meal. They are not at a dangerous level.    BMP - Your blood sugar was minimally elevated at 104. Your kidney function and electrolytes were normal.    PSA - Your Prostate cancer screening lab results were normal.    Continue with the your current plan of care we discussed.    Please call the clinic with any questions you may have.     Have a great day,    Dr. Wolfe

## 2023-10-02 NOTE — PROGRESS NOTES
Assessment & Plan   1. CARDIOVASCULAR SCREENING; LDL GOAL LESS THAN 130  Check labs to monitor efficacy of interventions (medication, lifestyle, etc). No side effects reported. Continue on current therapy. See medication list for more details. Ok for refill of current lipid lowering medications for next 1 year. Will need follow-up visit with labs in 1 year.  - atorvastatin (LIPITOR) 20 MG tablet; Take 1 tablet (20 mg) by mouth daily  Dispense: 90 tablet; Refill: 1  - Lipid panel reflex to direct LDL Non-fasting; Future    2. HTN, goal below 140/90  Monitoring labs ordered. Medication refilled as below. Tolerating medication well.  - Basic metabolic panel  (Ca, Cl, CO2, Creat, Gluc, K, Na, BUN); Future  - atenolol (TENORMIN) 25 MG tablet; Take 1 tablet (25 mg) by mouth daily  Dispense: 180 tablet; Refill: 1    3. Paroxysmal ventricular tachycardia (H)  4. Frequent PVCs  Refilled medication. Cardiology referral given, has not seen yet.  - atenolol (TENORMIN) 25 MG tablet; Take 1 tablet (25 mg) by mouth daily  Dispense: 180 tablet; Refill: 1  - Adult Cardiology [facility] Referral; Future    5. History of arthroplasty of right knee  6. History of gastric ulcer  Discourage NSAID use, but will offer PRN meloxicam as has been effective. On omeprazole.  - meloxicam (MOBIC) 15 MG tablet; Take 1 tablet (15 mg) by mouth daily  Dispense: 30 tablet; Refill: 11  - omeprazole (PRILOSEC) 20 MG DR capsule; Take 1 capsule (20 mg) by mouth 2 times daily  Dispense: 180 capsule; Refill: 0    7. Screening for prostate cancer  Patient elects to undergo PSA screening after informed decision making process. This discussion with the patient included reviewing the benefits of testing such as: Ability to easily add on to existing blood work, screening for a common cancer in men which has treatment options and otherwise may have been silent, and possibility for early detection to prevent morbidity from future metastasis and/or death.  Additionally, risks were discussed including possibility of false positive testing (leading to anxiety and further unnecessary testing/biopsies), possibility of over treating a cancer that may not affect a man in his lifetime, and the side effects of the current treatment options for prosate cancer (urinary incontinence, ED, etc).  - PSA, screen; Future    8. Class 2 severe obesity due to excess calories with serious comorbidity and body mass index (BMI) of 35.0 to 35.9 in adult (H)  Noted. Encourage diet and exercise changes for weight loss and overall health improvement.    9. Need for prophylactic vaccination and inoculation against influenza  - INFLUENZA VACCINE IM > 6 MONTHS VALENT IIV4 (AFLURIA/FLUZONE)  - VACCINE ADMINISTRATION, INITIAL       Follow-up Visit   Expected date:  Oct 02, 2024 (Approximate)      Follow Up Appointment Details:     Follow-up with whom?: Me    Follow-Up for what?: Adult Preventive    Any Additional Chronic Condition Management?:  Hyperlipidemia  Hypertension       How?: In Person                   Allen Rincon MD  [facility] [place]    Disclaimer: This note consists of symbols derived from keyboarding, dictation and/or voice recognition software. As a result, there may be errors in the script that have gone undetected. Please consider this when interpreting information found in this chart.    Subjective   Hugo is a 62 year old, presenting for the following health issues:  Hypertension and Lipids        10/2/2023     6:53 AM   Additional Questions   Roomed by BAIRON   Accompanied by self       History of Present Illness       Reason for visit:  Blood pressure check up and medications    He eats 0-1 servings of fruits and vegetables daily.He consumes 1 sweetened beverage(s) daily.He exercises with enough effort to increase his heart rate 9 or less minutes per day.  He exercises with enough effort to increase his heart rate 3 or less days per week. He is  "missing 7 dose(s) of medications per week.     Patient states he had medication for inflammation for knees prescribed from a provider at [facility]. Does not remember name of medication. Would like to go back on that medication.       Hyperlipidemia Follow-Up    Are you regularly taking any medication or supplement to lower your cholesterol?   Yes- atorvastatin  Are you having muscle aches or other side effects that you think could be caused by your cholesterol lowering medication?  No    Hypertension Follow-up    Do you check your blood pressure regularly outside of the clinic? Yes   Are you following a low salt diet? Yes  Are your blood pressures ever more than 140 on the top number (systolic) OR more   than 90 on the bottom number (diastolic), for example 140/90? No      Review of Systems   Constitutional, HEENT, cardiovascular, pulmonary, GI, , musculoskeletal, neuro, skin, endocrine and psych systems are negative, except as otherwise noted.      Objective    /80   Pulse 71   Temp 98.5  F (36.9  C) (Temporal)   Resp 16   Ht 1.743 m (5' 8.62\")   Wt 106.4 kg (234 lb 8 oz)   SpO2 96%   BMI 35.01 kg/m    Body mass index is 35.01 kg/m .  Physical Exam   General: Appears well and in no acute distress.  Cardiovascular: Regular rate and rhythm, normal S1 and S2 without murmur. No extra heartsounds or friction rub.  Respiratory: Lungs clear to auscultation bilaterally. No wheezing or crackles.  Musculoskeletal: No gross extremity deformities. No peripheral edema. Normal muscle bulk.    Labs: pending              "

## 2023-10-02 NOTE — PATIENT INSTRUCTIONS
Important Takeaway Points From This Visit:  See the cardiologist.  Avoid using the meloxicam medication when you can.      As always, please call with any questions or concerns. I look forward to seeing you again soon!    Take care,  Dr. Rincon    Your current medication list is printed. Please keep this with you - it is helpful to bring this current list to any other medical appointments. It can also be helpful if you ever go to the emergency room or hospital.    If you had lab testing today we will call you with the results. The phone number we will call with your results is # [phone] (home) [phone] (work). If this is not the best number please call our clinic and change the number.    If you need any refills, please call your pharmacy and they will contact us.    If you have any further concerns or wish to schedule another appointment, please call our office at [phone].    If you have a medical emergency, please call [phone].    Thank you for coming to [facility]!

## 2023-10-02 NOTE — COMMUNITY RESOURCES LIST (ENGLISH)
10/02/2023   [facility] Outpatient Clinics  N/A  For additional resource needs, please contact your health insurance member services or your primary care team.  Phone: [phone]   Email: N/A   Address: [address] [place] [address]   Hours: N/A        Housing       Shelter for families  1  [facility]eless [facility]/Porkiisb-ag-pgn-last-preethi'igashaniceing [place] Distance: 19.71 miles      In-Person   [id number] [place] [address]  Language: English  Hours: Mon - Sun Open 24 Hours  Fees: Free   Phone: (538) 441-7593 Email: [email or web address] Website: [email or web address]://[email or web address].gantto/homeless-shelter/     2  [facility] Distance: 23.02 miles      In-Person   [address] [place] [address]  Language: English  Hours: Mon - Sun Open 24 Hours  Fees: Free   Phone: (463) 551-3257 Ext.2 Email: info@[facility] Website: https://www.[facility].org/     Shelter for individuals  3  [facility]/Sxtwzkzi-pp-ygr-last-preethi'igashaniceing [place] Distance: 19.71 miles      In-Person   [address] [place] [address]  Language: English  Hours: Mon - Sun Open 24 Hours  Fees: Free   Phone: (179) 284-6206 Email: [email or web address].gantto Website: https://www.[facility].org/homeless-shelter/          Important Numbers & Websites       [address]  Poison Control   (920) 729-8079 [place]  Suicide and Crisis Lifeline   [address] [place], [place] [address]line.org  Childhelp National Child Abuse Hotline   229.621.3241 Childhelphotline.org  National Sexual Assault Hotline   (346) 584-2862 (HOPE) Rainn.org  National Runaway Safeline   (424) 244-2220 (RUNAWAY) [facility].org  Pregnancy & Postpartum Support [place]   Call/text 694-445-9278 Ppsupport[place].org  Substance Abuse National Helpline ([facility]   854-400-HELP (3855) Findtreatment.gov  Emergency Services   918

## 2023-10-03 ENCOUNTER — TELEPHONE (OUTPATIENT)
Dept: FAMILY MEDICINE | Facility: CLINIC | Age: 63
End: 2023-10-03
Payer: COMMERCIAL

## 2023-10-03 NOTE — TELEPHONE ENCOUNTER
Patient calling about recent labs. Read back result note from PCP, patient expressed understanding.

## 2023-10-05 ENCOUNTER — TELEPHONE (OUTPATIENT)
Dept: FAMILY MEDICINE | Facility: CLINIC | Age: 63
End: 2023-10-05
Payer: COMMERCIAL

## 2023-10-12 DIAGNOSIS — R09.82 PND (POST-NASAL DRIP): ICD-10-CM

## 2023-10-12 RX ORDER — FLUTICASONE PROPIONATE 50 MCG
1 SPRAY, SUSPENSION (ML) NASAL DAILY
Qty: 18.2 ML | Refills: 0 | Status: SHIPPED | OUTPATIENT
Start: 2023-10-12

## 2023-11-19 NOTE — TELEPHONE ENCOUNTER
Pending Prescriptions:                       Disp   Refills    omeprazole (PRILOSEC) 40 MG DR capsule [P*90 cap*0            Sig: Take 1 capsule by mouth once daily    Medication is being filled for 1 time filiberto refill only due to:  Patient is due for physical exam    Please call and help schedule.  Thank you!             recent hip surgery, unsteady gait

## 2024-01-06 ENCOUNTER — HEALTH MAINTENANCE LETTER (OUTPATIENT)
Age: 64
End: 2024-01-06

## 2024-01-24 ENCOUNTER — OFFICE VISIT (OUTPATIENT)
Dept: DERMATOLOGY | Facility: CLINIC | Age: 64
End: 2024-01-24
Payer: COMMERCIAL

## 2024-01-24 DIAGNOSIS — L81.4 SOLAR LENTIGO: ICD-10-CM

## 2024-01-24 DIAGNOSIS — L82.1 SEBORRHEIC KERATOSES: ICD-10-CM

## 2024-01-24 DIAGNOSIS — L21.9 DERMATITIS, SEBORRHEIC: ICD-10-CM

## 2024-01-24 DIAGNOSIS — B35.3 TINEA PEDIS OF BOTH FEET: ICD-10-CM

## 2024-01-24 DIAGNOSIS — Z85.828 HISTORY OF NONMELANOMA SKIN CANCER: ICD-10-CM

## 2024-01-24 DIAGNOSIS — D18.01 CHERRY ANGIOMA: Primary | ICD-10-CM

## 2024-01-24 PROCEDURE — 99213 OFFICE O/P EST LOW 20 MIN: CPT | Performed by: DERMATOLOGY

## 2024-01-24 RX ORDER — TACROLIMUS 1 MG/G
OINTMENT TOPICAL 2 TIMES DAILY
Qty: 60 G | Refills: 3 | Status: CANCELLED | OUTPATIENT
Start: 2024-01-24

## 2024-01-24 RX ORDER — KETOCONAZOLE 20 MG/G
CREAM TOPICAL 2 TIMES DAILY
Qty: 60 G | Refills: 11 | Status: CANCELLED | OUTPATIENT
Start: 2024-01-24

## 2024-01-24 ASSESSMENT — PAIN SCALES - GENERAL: PAINLEVEL: NO PAIN (0)

## 2024-01-24 NOTE — NURSING NOTE
"Hugo Longoria's chief complaint for this visit includes:  Chief Complaint   Patient presents with    Skin Check     No areas of concern     PCP: Allen Rincon    Referring Provider:  No referring provider defined for this encounter.    There were no vitals taken for this visit.  No Pain (0)        Allergies   Allergen Reactions    Hydrocodone Other (See Comments)     \"climbed the walls, very anxious, insomnia\"    Nsaids Other (See Comments)     Hx of stomach ulcers         Do you need any medication refills at today's visit?    Yes, Tacrolimus  "

## 2024-01-24 NOTE — PROGRESS NOTES
[facility] Dermatology Note  Encounter Date: Jan 24, 2024  Office Visit     Dermatology Problem List:  Last skin check 01/24/2024, recommended every 1 year  1. History of NMSC  - BCC - left lateral forehead, s/p Mohs with intermediate repair 2/2/22  2. AKs, s/p cryo  3. ISKs, s/p cryo  4. Seborrheic dermatitis  - Current tx: ketoconazole 2% cream BID PRN, Tacrolimus 0.1% cream BID  5. 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 Has a home in [place].  Family history: Mother, father, and cousins with unknown skin cancer.  ____________________________________________     Assessment & Plan:     1. History of nonmelanoma skin cancer, no clinical evidence of recurrence.  - Recommended regular skin checks.    2. Multiple clinically banal-appearing melanocytic nevi: Chronic, stable   -No further intervention required.   -Patient reassured of the benign nature of these lesions.     3. Benign lesions: Multiple benign nevi, solar lentigos, seborrheic keratoses, cherry angiomas. Explained to patient benign nature of lesion. No treatment is necessary at this time unless the lesion changes or becomes symptomatic.     4. Seborrheic dermatitis- ears. Pt reports using ketoconazole 2% cream with minimal improvement. Discussed additional treatment options such as starting tacrolimus 0.1% cream BID.   - continue tacrolimus 0.1% cream BID.   - continue Ketoconazole 2% cream      Procedures Performed:   N/A      Follow-up: 1 year(s) in-person, or earlier for new or changing lesions    Staff and Scribe:     Scribe Disclosure:   By signing my name below, I, Parisa Souza, attest that this documentation has been prepared under the direction and in the presence of Lonnie Rascon MD.  - Electronically Signed: Parisa Souza 01/24/24       Provider Disclosure:   The documentation recorded by the scribe  accurately reflects the services I personally performed and the decisions made by me.    Lonnie Rascon MD   of Dermatology  Department of Dermatology  [facility]          ____________________________________________    CC: Skin Check (No areas of concern)    HPI:  Mr. Hugo Longoria is a(n) 63 year old male who presents today as a return patient for a skin check.     Last seen 07/12/2023 for a skin check.      Patient is otherwise feeling well, without additional skin concerns.    Labs Reviewed:  N/A    Physical exam:  Vitals: There were no vitals taken for this visit.  GEN: This is a well developed, well-nourished male in no acute distress, in a pleasant mood.    SKIN: Paredes phototype II  - Full skin, which includes the head/face, both arms, chest, back, abdomen,both legs, genitalia and/or groin buttocks, digits and/or nails, was examined.  - There are dome shaped bright red papules on the head/neck, trunk, extremities.   - Multiple regular brown pigmented macules and papules are identified on the head/neck, trunk, extremities.   - Scattered brown macules on sun exposed areas.  - There are waxy stuck on tan to brown papules on the head/neck, trunk, extremities.  - No other lesions of concern on areas examined.       Medications:  Current Outpatient Medications   Medication    atenolol (TENORMIN) 25 MG tablet    atorvastatin (LIPITOR) 20 MG tablet    Bioflavonoid Products (VITAMIN C) CHEW    diclofenac (VOLTAREN) 1 % topical gel    fluticasone (FLONASE) 50 MCG/ACT nasal spray    ketoconazole (NIZORAL) 2 % external cream    meloxicam (MOBIC) 15 MG tablet    Multiple Vitamin (MULTIVITAMINS PO)    omeprazole (PRILOSEC) 20 MG DR capsule    tacrolimus (PROTOPIC) 0.1 % external ointment     Current Facility-Administered Medications   Medication    botulinum toxin type A (BOTOX) 100 units injection 100 Units      Past Medical History:   Patient Active Problem  List   Diagnosis    History of colonic polyps    KERRY (generalized anxiety disorder)    History of tobacco use: 1980 - 1990    Hemorrhoids    HTN, goal below 140/90    History of gastric ulcer    Seasonal allergic rhinitis    Complete tear of right rotator cuff    Rupture long head biceps tendon, right, initial encounter    Subacromial impingement of right shoulder    Advanced directives, counseling/discussion    Chronic right shoulder pain    S/P total knee replacement using cement, right    Class 2 obesity due to excess calories without serious comorbidity with body mass index (BMI) of 35.0 to 35.9 in adult    Chronic knee pain after total replacement of right knee joint    Paroxysmal ventricular tachycardia (H)    Frequent PVCs    Class 2 severe obesity due to excess calories with serious comorbidity in adult (H)     Past Medical History:   Diagnosis Date    Abnormalities of size and form of teeth     Removal of wisdom teeth    Accidental poisoning by agents primarily affecting blood constituents(E858.2)     Overnight stay due to blood poisoning    Arthritis 2011    Cancer (H) 2/15/2022    Skin Cancer on Face    Gastric ulcer, unspecified as acute or chronic, without mention of hemorrhage or perforation     History of blood transfusion 2007    Hypertension     Paroxysmal ventricular tachycardia (H) 4/3/2023        CC No referring provider defined for this encounter. on close of this encounter.

## 2024-01-24 NOTE — LETTER
1/24/2024         RE: Hugo Longoria  [address] [facility] [address]        Dear Colleague,    Thank you for referring your patient, Hugo Longoria, to the [facility]. Please see a copy of my visit note below.    [facility] Dermatology Note  Encounter Date: Jan 24, 2024  Office Visit     Dermatology Problem List:  Last skin check 01/24/2024, recommended every 1 year  1. History of NMSC  - BCC - left lateral forehead, s/p Mohs with intermediate repair 2/2/22  2. AKs, s/p cryo  3. ISKs, s/p cryo  4. Seborrheic dermatitis  - Current tx: ketoconazole 2% cream BID PRN, Tacrolimus 0.1% cream BID  5. 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 Has a home in [place].  Family history: Mother, father, and cousins with unknown skin cancer.  ____________________________________________     Assessment & Plan:     1. History of nonmelanoma skin cancer, no clinical evidence of recurrence.  - Recommended regular skin checks.    2. Multiple clinically banal-appearing melanocytic nevi: Chronic, stable   -No further intervention required.   -Patient reassured of the benign nature of these lesions.     3. Benign lesions: Multiple benign nevi, solar lentigos, seborrheic keratoses, cherry angiomas. Explained to patient benign nature of lesion. No treatment is necessary at this time unless the lesion changes or becomes symptomatic.     4. Seborrheic dermatitis- ears. Pt reports using ketoconazole 2% cream with minimal improvement. Discussed additional treatment options such as starting tacrolimus 0.1% cream BID.   - continue tacrolimus 0.1% cream BID.   - continue Ketoconazole 2% cream      Procedures Performed:   N/A      Follow-up: 1 year(s) in-person, or earlier for new or changing lesions    Staff and Scribe:     Scribe Disclosure:   By signing my name below, I,  Parisa Souza, attest that this documentation has been prepared under the direction and in the presence of Lonnie Rascon MD.  - Electronically Signed: Parisa Souza 01/24/24       Provider Disclosure:   The documentation recorded by the scribe accurately reflects the services I personally performed and the decisions made by me.    Lonnie Rascon MD   of Dermatology  Department of Dermatology  [facility]          ____________________________________________    CC: Skin Check (No areas of concern)    HPI:  Mr. Hugo Longoria is a(n) 63 year old male who presents today as a return patient for a skin check.     Last seen 07/12/2023 for a skin check.      Patient is otherwise feeling well, without additional skin concerns.    Labs Reviewed:  N/A    Physical exam:  Vitals: There were no vitals taken for this visit.  GEN: This is a well developed, well-nourished male in no acute distress, in a pleasant mood.    SKIN: Paredes phototype II  - Full skin, which includes the head/face, both arms, chest, back, abdomen,both legs, genitalia and/or groin buttocks, digits and/or nails, was examined.  - There are dome shaped bright red papules on the head/neck, trunk, extremities.   - Multiple regular brown pigmented macules and papules are identified on the head/neck, trunk, extremities.   - Scattered brown macules on sun exposed areas.  - There are waxy stuck on tan to brown papules on the head/neck, trunk, extremities.  - No other lesions of concern on areas examined.       Medications:  Current Outpatient Medications   Medication     atenolol (TENORMIN) 25 MG tablet     atorvastatin (LIPITOR) 20 MG tablet     Bioflavonoid Products (VITAMIN C) CHEW     diclofenac (VOLTAREN) 1 % topical gel     fluticasone (FLONASE) 50 MCG/ACT nasal spray     ketoconazole (NIZORAL) 2 % external cream     meloxicam (MOBIC) 15 MG tablet     Multiple Vitamin (MULTIVITAMINS PO)      omeprazole (PRILOSEC) 20 MG DR capsule     tacrolimus (PROTOPIC) 0.1 % external ointment     Current Facility-Administered Medications   Medication     botulinum toxin type A (BOTOX) 100 units injection 100 Units      Past Medical History:   Patient Active Problem List   Diagnosis     History of colonic polyps     KERRY (generalized anxiety disorder)     History of tobacco use: 1980 - 1990     Hemorrhoids     HTN, goal below 140/90     History of gastric ulcer     Seasonal allergic rhinitis     Complete tear of right rotator cuff     Rupture long head biceps tendon, right, initial encounter     Subacromial impingement of right shoulder     Advanced directives, counseling/discussion     Chronic right shoulder pain     S/P total knee replacement using cement, right     Class 2 obesity due to excess calories without serious comorbidity with body mass index (BMI) of 35.0 to 35.9 in adult     Chronic knee pain after total replacement of right knee joint     Paroxysmal ventricular tachycardia (H)     Frequent PVCs     Class 2 severe obesity due to excess calories with serious comorbidity in adult (H)     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)     Overnight stay due to blood poisoning     Arthritis 2011     Cancer (H) 2/15/2022    Skin Cancer on Face     Gastric ulcer, unspecified as acute or chronic, without mention of hemorrhage or perforation      History of blood transfusion 2007     Hypertension      Paroxysmal ventricular tachycardia (H) 4/3/2023        CC No referring provider defined for this encounter. on close of this encounter.      Again, thank you for allowing me to participate in the care of your patient.        Sincerely,        Lonnie Rascon MD

## 2024-01-29 ENCOUNTER — PATIENT OUTREACH (OUTPATIENT)
Dept: GASTROENTEROLOGY | Facility: CLINIC | Age: 64
End: 2024-01-29
Payer: COMMERCIAL

## 2024-06-17 PROBLEM — Z71.89 ADVANCED DIRECTIVES, COUNSELING/DISCUSSION: Status: RESOLVED | Noted: 2017-01-09 | Resolved: 2024-06-17

## 2024-12-16 NOTE — TELEPHONE ENCOUNTER
----- Message from Jasmin Mansfield CMA sent at 10/5/2023  7:21 AM CDT -----    ----- Message -----  From: Allen Rincon MD  Sent: 10/2/2023   2:56 PM CDT  To: [facility] Patient Care Team    Please inform of results if patient has not viewed in [employer] within 3 business days.    Lipids - Your cholesterol lab results were normal. Your triglycerides (fat particles) in your blood were a little high, likely from a previous meal. They are not at a dangerous level.    BMP - Your blood sugar was minimally elevated at 104. Your kidney function and electrolytes were normal.    PSA - Your Prostate cancer screening lab results were normal.    Continue with the your current plan of care we discussed.    Please call the clinic with any questions you may have.     Have a great day,    Dr. Wolfe    
Called and informed patient.   No further questions or concerns at this time.     Shauna MIRANDAN, RN  [facility]    
forehead

## 2025-03-24 ENCOUNTER — MYC MEDICAL ADVICE (OUTPATIENT)
Dept: FAMILY MEDICINE | Facility: CLINIC | Age: 65
End: 2025-03-24
Payer: COMMERCIAL

## 2025-03-24 NOTE — TELEPHONE ENCOUNTER
Patient Quality Outreach    Patient is due for the following:   Hypertension -  Hypertension follow-up visit      Topic Date Due    Pneumococcal Vaccine (1 of 1 - PCV) Never done    Flu Vaccine (1) 09/01/2024    COVID-19 Vaccine (1 - 2024-25 season) Never done       Action(s) Taken:   Schedule a lab only visit for BP monitoring.    Type of outreach:    Sent [employer] message.  Call in 1 week if not read/completed; send letter in 2 weeks if not returned/read.     Questions for provider review:    None         Asia Villarreal MA  Chart routed to Care Team.

## (undated) DEVICE — TOURNIQUET HEMACLEAR 60 BLUE 30-60CM PRH-060-BL-01A

## (undated) DEVICE — DRAPE EXTREMITY W/ARMBOARD 29405

## (undated) DEVICE — GLOVE ESTEEM BLUE W/NEU-THERA 8.0  2D73PB80

## (undated) DEVICE — CAST PADDING 4" COTTON WEBRIL UNSTERILE 9084

## (undated) DEVICE — CAST PADDING 6" COTTON WEBRIL UNSTERILE 9086

## (undated) DEVICE — PREP CHLORAPREP 26ML TINTED ORANGE  260815

## (undated) DEVICE — GLOVE PROTEXIS W/NEU-THERA 6.5  2D73TE65

## (undated) DEVICE — CATH TRAY FOLEY 16FR DRAINAGE BAG STATLOCK 899916

## (undated) DEVICE — DRAPE POUCH INSTRUMENT 1018

## (undated) DEVICE — HOOD FLYTE 0408-800-000

## (undated) DEVICE — DRAPE CONVERTORS U-DRAPE 60X72" 8476

## (undated) DEVICE — IMM KIT SHOULDER TMAX MASK FACE 7210559

## (undated) DEVICE — GLOVE PROTEXIS W/NEU-THERA 7.5  2D73TE75

## (undated) DEVICE — ESU ARTHROWAND 90DEG RF AMBIENT SUPER TURBOVAC ASHA4250-01

## (undated) DEVICE — DRAPE STERI U 1015

## (undated) DEVICE — Device

## (undated) DEVICE — SU ETHILON 3-0 PS-2 18" 1669H

## (undated) DEVICE — GLOVE ESTEEM BLUE W/NEU-THERA 7.5  2D73PB75

## (undated) DEVICE — DRAPE IOBAN INCISE 36X23" 6651EZ

## (undated) DEVICE — DRSG XEROFORM 1X8"

## (undated) DEVICE — SOL NACL 0.9% IRRIG 3000ML BAG 07972-08

## (undated) DEVICE — DRAPE U SPLIT 74X120" 29440

## (undated) DEVICE — STPL SKIN 35W 059037

## (undated) DEVICE — IMM KIT SHOULDER STABILIZATION 7210573

## (undated) DEVICE — SUCTION IRR SYSTEM W/O TIP INTERPULSE HANDPIECE 0210-100-000

## (undated) DEVICE — SOL NACL 0.9% IRRIG 1000ML BOTTLE 07138-09

## (undated) DEVICE — BONE CEMENT MIXING BOWL W/SPATULA

## (undated) DEVICE — DRAPE MAYO STAND 23X54 8337

## (undated) DEVICE — NDL SPINAL 18GA 3.5" 405184

## (undated) DEVICE — GLOVE PROTEXIS BLUE W/NEU-THERA 8.0  2D73EB80

## (undated) DEVICE — BLADE KNIFE SURG 11 371111

## (undated) DEVICE — SOL WATER IRRIG 1000ML BOTTLE 07139-09

## (undated) DEVICE — CAST PADDING 4" COTTON WEBRIL STERILE 9084S

## (undated) DEVICE — BNDG COBAN 4"X5YDS STERILE

## (undated) DEVICE — BLADE SAW SAGITTAL STRK 18X90X1.19MM HD SYS 6 6118-119-090

## (undated) DEVICE — PACK TOTAL JOINT STD LATEX

## (undated) DEVICE — DRAPE STERI TOWEL SM 1000

## (undated) DEVICE — PACK OPEN SHOULDER SOP15OCFSC

## (undated) DEVICE — GLOVE PROTEXIS BLUE W/NEU-THERA 6.5  2D73EB65

## (undated) DEVICE — KIT ENDO FIRST STEP DISINFECTANT 200ML W/POUCH EP-4

## (undated) DEVICE — GLOVE PROTEXIS W/NEU-THERA 7.0  2D73TE70

## (undated) DEVICE — SYR 50ML LL W/O NDL 309653

## (undated) DEVICE — NDL COUNTER 40CT  31142311

## (undated) DEVICE — PEN MARKING SKIN

## (undated) DEVICE — BNDG ELASTIC 4" DBL LENGTH UNSTERILE 6611-14

## (undated) DEVICE — DRSG GAUZE 4X4" 3033

## (undated) DEVICE — PAD CHUX UNDERPAD 23X24" 7136

## (undated) DEVICE — BLADE SAW OSCIL/SAG STRK 11X90X1.19MM 4/2000 4111-119-090

## (undated) DEVICE — SUCTION TIP YANKAUER ORTHO SUPER SUCKER EFS-111

## (undated) DEVICE — SYR 10ML PREFILLED 0.9% NACL INJ NOT STERILE 306500

## (undated) DEVICE — TAPE MEDIFIX 4"

## (undated) DEVICE — TUBING SUCTION 12"X1/4" N612

## (undated) DEVICE — SU VICRYL 2-0 CP-2 18" UND J762D

## (undated) DEVICE — ARTHROSCOPIC CANNULA 5.5X70MM GRAY  9718

## (undated) DEVICE — DRAPE SHEET REV FOLD 3/4 9349

## (undated) DEVICE — TUBING INFLOW CROSSFLOW 0450-000-100

## (undated) DEVICE — ARTHROSCOPIC CANNULA CLEAR-TRAC 8.0X72MM 72200425

## (undated) DEVICE — DRSG ABDOMINAL 07 1/2X8" 7197D

## (undated) DEVICE — SU VICRYL 0 OS-6 18" UND J754T

## (undated) DEVICE — TAPE MEDIPORE 4"X10YD 2964

## (undated) DEVICE — BONE CLEANING TIP INTERPULSE  0210-010-000

## (undated) RX ORDER — FENTANYL CITRATE 50 UG/ML
INJECTION, SOLUTION INTRAMUSCULAR; INTRAVENOUS
Status: DISPENSED
Start: 2019-12-10

## (undated) RX ORDER — GLYCOPYRROLATE 0.2 MG/ML
INJECTION INTRAMUSCULAR; INTRAVENOUS
Status: DISPENSED
Start: 2019-12-10

## (undated) RX ORDER — LIDOCAINE 50 MG/G
OINTMENT TOPICAL
Status: DISPENSED
Start: 2021-02-10

## (undated) RX ORDER — FENTANYL CITRATE 50 UG/ML
INJECTION, SOLUTION INTRAMUSCULAR; INTRAVENOUS
Status: DISPENSED
Start: 2022-11-15

## (undated) RX ORDER — SODIUM CHLORIDE 234 MG/ML
INJECTION, SOLUTION INTRAVENOUS
Status: DISPENSED
Start: 2021-02-10

## (undated) RX ORDER — FENTANYL CITRATE 50 UG/ML
INJECTION, SOLUTION INTRAMUSCULAR; INTRAVENOUS
Status: DISPENSED
Start: 2021-01-12

## (undated) RX ORDER — BACITRACIN 50000 [IU]/1
INJECTION, POWDER, FOR SOLUTION INTRAMUSCULAR
Status: DISPENSED
Start: 2019-12-10

## (undated) RX ORDER — LIDOCAINE HYDROCHLORIDE 20 MG/ML
INJECTION, SOLUTION EPIDURAL; INFILTRATION; INTRACAUDAL; PERINEURAL
Status: DISPENSED
Start: 2019-12-10

## (undated) RX ORDER — FENTANYL CITRATE 50 UG/ML
INJECTION, SOLUTION INTRAMUSCULAR; INTRAVENOUS
Status: DISPENSED
Start: 2017-03-22

## (undated) RX ORDER — DEXAMETHASONE SODIUM PHOSPHATE 10 MG/ML
INJECTION, SOLUTION INTRAMUSCULAR; INTRAVENOUS
Status: DISPENSED
Start: 2019-12-10

## (undated) RX ORDER — ACETAMINOPHEN 10 MG/ML
INJECTION, SOLUTION INTRAVENOUS
Status: DISPENSED
Start: 2017-01-13

## (undated) RX ORDER — PROPOFOL 10 MG/ML
INJECTION, EMULSION INTRAVENOUS
Status: DISPENSED
Start: 2017-01-13

## (undated) RX ORDER — ONDANSETRON 2 MG/ML
INJECTION INTRAMUSCULAR; INTRAVENOUS
Status: DISPENSED
Start: 2017-01-13

## (undated) RX ORDER — PROPOFOL 10 MG/ML
INJECTION, EMULSION INTRAVENOUS
Status: DISPENSED
Start: 2019-12-10

## (undated) RX ORDER — PHENYLEPHRINE HCL IN 0.9% NACL 1 MG/10 ML
SYRINGE (ML) INTRAVENOUS
Status: DISPENSED
Start: 2019-12-10

## (undated) RX ORDER — ONDANSETRON 2 MG/ML
INJECTION INTRAMUSCULAR; INTRAVENOUS
Status: DISPENSED
Start: 2019-12-10

## (undated) RX ORDER — FENTANYL CITRATE 50 UG/ML
INJECTION, SOLUTION INTRAMUSCULAR; INTRAVENOUS
Status: DISPENSED
Start: 2021-03-11

## (undated) RX ORDER — KETOROLAC TROMETHAMINE 30 MG/ML
INJECTION, SOLUTION INTRAMUSCULAR; INTRAVENOUS
Status: DISPENSED
Start: 2019-12-10